# Patient Record
Sex: MALE | Race: WHITE | NOT HISPANIC OR LATINO | URBAN - METROPOLITAN AREA
[De-identification: names, ages, dates, MRNs, and addresses within clinical notes are randomized per-mention and may not be internally consistent; named-entity substitution may affect disease eponyms.]

---

## 2019-03-06 ENCOUNTER — EMERGENCY (EMERGENCY)
Facility: HOSPITAL | Age: 54
LOS: 1 days | Discharge: HOME | End: 2019-03-06
Attending: EMERGENCY MEDICINE
Payer: COMMERCIAL

## 2019-03-06 VITALS
DIASTOLIC BLOOD PRESSURE: 115 MMHG | HEIGHT: 76 IN | TEMPERATURE: 98 F | SYSTOLIC BLOOD PRESSURE: 149 MMHG | RESPIRATION RATE: 20 BRPM | HEART RATE: 101 BPM | WEIGHT: 300.05 LBS | OXYGEN SATURATION: 97 %

## 2019-03-06 VITALS
SYSTOLIC BLOOD PRESSURE: 159 MMHG | DIASTOLIC BLOOD PRESSURE: 107 MMHG | RESPIRATION RATE: 20 BRPM | TEMPERATURE: 99 F | HEART RATE: 100 BPM | OXYGEN SATURATION: 95 %

## 2019-03-06 DIAGNOSIS — K56.609 UNSPECIFIED INTESTINAL OBSTRUCTION, UNSPECIFIED AS TO PARTIAL VERSUS COMPLETE OBSTRUCTION: ICD-10-CM

## 2019-03-06 LAB
ALBUMIN SERPL ELPH-MCNC: 4 G/DL — SIGNIFICANT CHANGE UP (ref 3.3–5)
ALP SERPL-CCNC: 77 U/L — SIGNIFICANT CHANGE UP (ref 40–120)
ALT FLD-CCNC: 11 U/L — SIGNIFICANT CHANGE UP (ref 10–45)
ANION GAP SERPL CALC-SCNC: 14 MMOL/L — SIGNIFICANT CHANGE UP (ref 5–17)
APTT BLD: 31.3 SEC — SIGNIFICANT CHANGE UP (ref 27.5–36.3)
AST SERPL-CCNC: 11 U/L — SIGNIFICANT CHANGE UP (ref 10–40)
BASOPHILS # BLD AUTO: 0.1 K/UL — SIGNIFICANT CHANGE UP (ref 0–0.2)
BASOPHILS NFR BLD AUTO: 0.5 % — SIGNIFICANT CHANGE UP (ref 0–2)
BILIRUB SERPL-MCNC: 0.9 MG/DL — SIGNIFICANT CHANGE UP (ref 0.2–1.2)
BUN SERPL-MCNC: 14 MG/DL — SIGNIFICANT CHANGE UP (ref 7–23)
CALCIUM SERPL-MCNC: 9.7 MG/DL — SIGNIFICANT CHANGE UP (ref 8.4–10.5)
CHLORIDE SERPL-SCNC: 101 MMOL/L — SIGNIFICANT CHANGE UP (ref 96–108)
CO2 SERPL-SCNC: 23 MMOL/L — SIGNIFICANT CHANGE UP (ref 22–31)
CREAT SERPL-MCNC: 1.28 MG/DL — SIGNIFICANT CHANGE UP (ref 0.5–1.3)
EOSINOPHIL # BLD AUTO: 0.3 K/UL — SIGNIFICANT CHANGE UP (ref 0–0.5)
EOSINOPHIL NFR BLD AUTO: 2.3 % — SIGNIFICANT CHANGE UP (ref 0–6)
GAS PNL BLDV: SIGNIFICANT CHANGE UP
GLUCOSE SERPL-MCNC: 104 MG/DL — HIGH (ref 70–99)
HCT VFR BLD CALC: 48.2 % — SIGNIFICANT CHANGE UP (ref 39–50)
HGB BLD-MCNC: 16.6 G/DL — SIGNIFICANT CHANGE UP (ref 13–17)
INR BLD: 1.09 RATIO — SIGNIFICANT CHANGE UP (ref 0.88–1.16)
LYMPHOCYTES # BLD AUTO: 1.8 K/UL — SIGNIFICANT CHANGE UP (ref 1–3.3)
LYMPHOCYTES # BLD AUTO: 15 % — SIGNIFICANT CHANGE UP (ref 13–44)
MCHC RBC-ENTMCNC: 28.9 PG — SIGNIFICANT CHANGE UP (ref 27–34)
MCHC RBC-ENTMCNC: 34.4 GM/DL — SIGNIFICANT CHANGE UP (ref 32–36)
MCV RBC AUTO: 84.2 FL — SIGNIFICANT CHANGE UP (ref 80–100)
MONOCYTES # BLD AUTO: 1 K/UL — HIGH (ref 0–0.9)
MONOCYTES NFR BLD AUTO: 8.2 % — SIGNIFICANT CHANGE UP (ref 2–14)
NEUTROPHILS # BLD AUTO: 8.8 K/UL — HIGH (ref 1.8–7.4)
NEUTROPHILS NFR BLD AUTO: 73.9 % — SIGNIFICANT CHANGE UP (ref 43–77)
PLATELET # BLD AUTO: 335 K/UL — SIGNIFICANT CHANGE UP (ref 150–400)
POTASSIUM SERPL-MCNC: 3.4 MMOL/L — LOW (ref 3.5–5.3)
POTASSIUM SERPL-SCNC: 3.4 MMOL/L — LOW (ref 3.5–5.3)
PROT SERPL-MCNC: 7.4 G/DL — SIGNIFICANT CHANGE UP (ref 6–8.3)
PROTHROM AB SERPL-ACNC: 12.6 SEC — SIGNIFICANT CHANGE UP (ref 10–12.9)
RBC # BLD: 5.72 M/UL — SIGNIFICANT CHANGE UP (ref 4.2–5.8)
RBC # FLD: 13.1 % — SIGNIFICANT CHANGE UP (ref 10.3–14.5)
SODIUM SERPL-SCNC: 138 MMOL/L — SIGNIFICANT CHANGE UP (ref 135–145)
WBC # BLD: 11.9 K/UL — HIGH (ref 3.8–10.5)
WBC # FLD AUTO: 11.9 K/UL — HIGH (ref 3.8–10.5)

## 2019-03-06 PROCEDURE — 85730 THROMBOPLASTIN TIME PARTIAL: CPT

## 2019-03-06 PROCEDURE — 85027 COMPLETE CBC AUTOMATED: CPT

## 2019-03-06 PROCEDURE — 74177 CT ABD & PELVIS W/CONTRAST: CPT

## 2019-03-06 PROCEDURE — 96375 TX/PRO/DX INJ NEW DRUG ADDON: CPT

## 2019-03-06 PROCEDURE — 82803 BLOOD GASES ANY COMBINATION: CPT

## 2019-03-06 PROCEDURE — 85014 HEMATOCRIT: CPT

## 2019-03-06 PROCEDURE — 82330 ASSAY OF CALCIUM: CPT

## 2019-03-06 PROCEDURE — 74177 CT ABD & PELVIS W/CONTRAST: CPT | Mod: 26

## 2019-03-06 PROCEDURE — 84132 ASSAY OF SERUM POTASSIUM: CPT

## 2019-03-06 PROCEDURE — 83690 ASSAY OF LIPASE: CPT

## 2019-03-06 PROCEDURE — 80053 COMPREHEN METABOLIC PANEL: CPT

## 2019-03-06 PROCEDURE — 93005 ELECTROCARDIOGRAM TRACING: CPT

## 2019-03-06 PROCEDURE — 99284 EMERGENCY DEPT VISIT MOD MDM: CPT | Mod: 25

## 2019-03-06 PROCEDURE — 85610 PROTHROMBIN TIME: CPT

## 2019-03-06 PROCEDURE — 83605 ASSAY OF LACTIC ACID: CPT

## 2019-03-06 PROCEDURE — 82435 ASSAY OF BLOOD CHLORIDE: CPT

## 2019-03-06 PROCEDURE — 96374 THER/PROPH/DIAG INJ IV PUSH: CPT | Mod: XU

## 2019-03-06 PROCEDURE — 99285 EMERGENCY DEPT VISIT HI MDM: CPT | Mod: 25

## 2019-03-06 PROCEDURE — 93010 ELECTROCARDIOGRAM REPORT: CPT

## 2019-03-06 PROCEDURE — 84295 ASSAY OF SERUM SODIUM: CPT

## 2019-03-06 PROCEDURE — 82947 ASSAY GLUCOSE BLOOD QUANT: CPT

## 2019-03-06 RX ORDER — METRONIDAZOLE 500 MG
500 TABLET ORAL ONCE
Qty: 0 | Refills: 0 | Status: COMPLETED | OUTPATIENT
Start: 2019-03-06 | End: 2019-03-06

## 2019-03-06 RX ORDER — MOXIFLOXACIN HYDROCHLORIDE TABLETS, 400 MG 400 MG/1
1 TABLET, FILM COATED ORAL
Qty: 20 | Refills: 0 | OUTPATIENT
Start: 2019-03-06 | End: 2019-03-15

## 2019-03-06 RX ORDER — OXYCODONE HYDROCHLORIDE 5 MG/1
5 TABLET ORAL ONCE
Qty: 0 | Refills: 0 | Status: DISCONTINUED | OUTPATIENT
Start: 2019-03-06 | End: 2019-03-06

## 2019-03-06 RX ORDER — METRONIDAZOLE 500 MG
1 TABLET ORAL
Qty: 20 | Refills: 0 | OUTPATIENT
Start: 2019-03-06 | End: 2019-03-15

## 2019-03-06 RX ORDER — SODIUM CHLORIDE 9 MG/ML
1000 INJECTION INTRAMUSCULAR; INTRAVENOUS; SUBCUTANEOUS ONCE
Qty: 0 | Refills: 0 | Status: COMPLETED | OUTPATIENT
Start: 2019-03-06 | End: 2019-03-06

## 2019-03-06 RX ORDER — FAMOTIDINE 10 MG/ML
20 INJECTION INTRAVENOUS ONCE
Qty: 0 | Refills: 0 | Status: COMPLETED | OUTPATIENT
Start: 2019-03-06 | End: 2019-03-06

## 2019-03-06 RX ORDER — ACETAMINOPHEN 500 MG
975 TABLET ORAL ONCE
Qty: 0 | Refills: 0 | Status: COMPLETED | OUTPATIENT
Start: 2019-03-06 | End: 2019-03-06

## 2019-03-06 RX ORDER — ONDANSETRON 8 MG/1
4 TABLET, FILM COATED ORAL ONCE
Qty: 0 | Refills: 0 | Status: COMPLETED | OUTPATIENT
Start: 2019-03-06 | End: 2019-03-06

## 2019-03-06 RX ORDER — MORPHINE SULFATE 50 MG/1
4 CAPSULE, EXTENDED RELEASE ORAL ONCE
Qty: 0 | Refills: 0 | Status: DISCONTINUED | OUTPATIENT
Start: 2019-03-06 | End: 2019-03-06

## 2019-03-06 RX ORDER — CIPROFLOXACIN LACTATE 400MG/40ML
500 VIAL (ML) INTRAVENOUS ONCE
Qty: 0 | Refills: 0 | Status: COMPLETED | OUTPATIENT
Start: 2019-03-06 | End: 2019-03-06

## 2019-03-06 RX ADMIN — Medication 500 MILLIGRAM(S): at 11:21

## 2019-03-06 RX ADMIN — Medication 975 MILLIGRAM(S): at 11:21

## 2019-03-06 RX ADMIN — ONDANSETRON 4 MILLIGRAM(S): 8 TABLET, FILM COATED ORAL at 08:37

## 2019-03-06 RX ADMIN — FAMOTIDINE 20 MILLIGRAM(S): 10 INJECTION INTRAVENOUS at 08:37

## 2019-03-06 RX ADMIN — MORPHINE SULFATE 4 MILLIGRAM(S): 50 CAPSULE, EXTENDED RELEASE ORAL at 08:37

## 2019-03-06 RX ADMIN — SODIUM CHLORIDE 2000 MILLILITER(S): 9 INJECTION INTRAMUSCULAR; INTRAVENOUS; SUBCUTANEOUS at 08:36

## 2019-03-06 RX ADMIN — MORPHINE SULFATE 4 MILLIGRAM(S): 50 CAPSULE, EXTENDED RELEASE ORAL at 09:19

## 2019-03-06 RX ADMIN — OXYCODONE HYDROCHLORIDE 5 MILLIGRAM(S): 5 TABLET ORAL at 11:21

## 2019-03-06 NOTE — ED PROVIDER NOTE - PROGRESS NOTE DETAILS
Laney: Pain improved, tolerating PO, discussed need for cscope given unlikely concurrence of CA. Patient verbalized understanding and results provided.

## 2019-03-06 NOTE — ED PROVIDER NOTE - OBJECTIVE STATEMENT
54M w/PMH remote diverticulitis (w/cscope ~15y ago reportedly normal), obesity, hld, hemorrhoids p/w abd pain x5d, worsened 3d ago. Pain is epigastric, radiating b/l and suprapubic, was constant, now intermittent, "excruciating" like a "vice". +nausea, decreased flatus. Last BM loose and "black" s/p kaopectate yesterday AM. +small red blood s/p BM that patient attributes to hemorrhoid. +varicocele surgery but no abd surgeries. Denies f/c, cp/sob/cough, vomiting, dysuria/frequency/hematuria, travel/sick contacts. Former ETOH abuse abstinent for years. +smoker, +cocaine abuse intermittent (last 2d ago).

## 2019-03-06 NOTE — ED ADULT NURSE NOTE - NSIMPLEMENTINTERV_GEN_ALL_ED
Implemented All Universal Safety Interventions:  [place] to call system. Call bell, personal items and telephone within reach. Instruct patient to call for assistance. Room bathroom lighting operational. Non-slip footwear when patient is off stretcher. Physically safe environment: no spills, clutter or unnecessary equipment. Stretcher in lowest position, wheels locked, appropriate side rails in place.

## 2019-03-06 NOTE — ED ADULT TRIAGE NOTE - CHIEF COMPLAINT QUOTE
Abdominal pain since Monday with nausea, no vomiting and diarrhea. Pt states pain started after eating 4 bagels Saturday

## 2019-03-06 NOTE — ED ADULT NURSE NOTE - OBJECTIVE STATEMENT
54 y m came to the ed with abdominal pain and constipation. patient states being constipated since the weekend. was having diarrhea then had one solid bm yesterday morning but has been constipated since yesterday. denies diarrhea since yesterday. c/o "total blockage" without passing gas. c/o minor nausea but denies vomiting. states abdominal pain is all over his abdomen. had abdominal surgery about 15 years ago but forgot what the surgery was for. denies any pmh. denies fevers, chills, chest pain, sob. skin is warm and dry.

## 2019-03-06 NOTE — ED PROVIDER NOTE - CLINICAL SUMMARY MEDICAL DECISION MAKING FREE TEXT BOX
54M w/PMH remote diverticulitis, hld, obesity p/w abd pain. Assess for SBO, diverticulitis, pancreatitis. Screening EKG, labs, analgesic, IVF, antiemetic, UA, CT A/P, reassess.

## 2019-03-06 NOTE — ED PROVIDER NOTE - ATTENDING CONTRIBUTION TO CARE
53 yo obese male presents with abdominal distension and obstipation.  Nontoxic on exam, scant bowel sounds.  Will check labs, CT abdomen/pelvis r/o obstruction and reassess.

## 2019-03-06 NOTE — ED PROVIDER NOTE - NSFOLLOWUPINSTRUCTIONS_ED_ALL_ED_FT
-please see your primary doctor this week    -please take your antibiotics as prescribed    -please follow-up with a gastroenterologist for colonoscopy given CT inflammatory findings    -please return to ER for worsening pain, fever, chills ,vomiting

## 2019-03-06 NOTE — ED PROVIDER NOTE - INTERPRETATION
EKG reviewed for rate, rhythm, axis, intervals and segments, including QRS morphology, P wave appearance T wave appearance, NV interval, and QT interval.  I find the EKG to be unremarkable in all of these regards except as follows: 1st deg AVB

## 2019-03-07 ENCOUNTER — INPATIENT (INPATIENT)
Facility: HOSPITAL | Age: 54
LOS: 12 days | Discharge: HOME | DRG: 330 | End: 2019-03-20
Attending: SURGERY | Admitting: SURGERY
Payer: COMMERCIAL

## 2019-03-07 VITALS
WEIGHT: 270.07 LBS | HEART RATE: 116 BPM | SYSTOLIC BLOOD PRESSURE: 131 MMHG | TEMPERATURE: 98 F | RESPIRATION RATE: 20 BRPM | HEIGHT: 72 IN | DIASTOLIC BLOOD PRESSURE: 86 MMHG | OXYGEN SATURATION: 98 %

## 2019-03-07 LAB
ALBUMIN SERPL ELPH-MCNC: 4.1 G/DL — SIGNIFICANT CHANGE UP (ref 3.3–5)
ALP SERPL-CCNC: 78 U/L — SIGNIFICANT CHANGE UP (ref 40–120)
ALT FLD-CCNC: 9 U/L — LOW (ref 10–45)
ANION GAP SERPL CALC-SCNC: 18 MMOL/L — HIGH (ref 5–17)
APTT BLD: 29.4 SEC — SIGNIFICANT CHANGE UP (ref 27.5–36.3)
AST SERPL-CCNC: 13 U/L — SIGNIFICANT CHANGE UP (ref 10–40)
BASOPHILS # BLD AUTO: 0.1 K/UL — SIGNIFICANT CHANGE UP (ref 0–0.2)
BASOPHILS NFR BLD AUTO: 0.8 % — SIGNIFICANT CHANGE UP (ref 0–2)
BILIRUB SERPL-MCNC: 1.1 MG/DL — SIGNIFICANT CHANGE UP (ref 0.2–1.2)
BUN SERPL-MCNC: 15 MG/DL — SIGNIFICANT CHANGE UP (ref 7–23)
CALCIUM SERPL-MCNC: 9.9 MG/DL — SIGNIFICANT CHANGE UP (ref 8.4–10.5)
CHLORIDE SERPL-SCNC: 101 MMOL/L — SIGNIFICANT CHANGE UP (ref 96–108)
CO2 SERPL-SCNC: 19 MMOL/L — LOW (ref 22–31)
CREAT SERPL-MCNC: 1.38 MG/DL — HIGH (ref 0.5–1.3)
EOSINOPHIL # BLD AUTO: 0 K/UL — SIGNIFICANT CHANGE UP (ref 0–0.5)
EOSINOPHIL NFR BLD AUTO: 0.5 % — SIGNIFICANT CHANGE UP (ref 0–6)
GAS PNL BLDV: SIGNIFICANT CHANGE UP
GLUCOSE SERPL-MCNC: 119 MG/DL — HIGH (ref 70–99)
HCT VFR BLD CALC: 51.2 % — HIGH (ref 39–50)
HGB BLD-MCNC: 17.6 G/DL — HIGH (ref 13–17)
INR BLD: 1.14 RATIO — SIGNIFICANT CHANGE UP (ref 0.88–1.16)
LIDOCAIN IGE QN: 15 U/L — SIGNIFICANT CHANGE UP (ref 7–60)
LYMPHOCYTES # BLD AUTO: 0.4 K/UL — LOW (ref 1–3.3)
LYMPHOCYTES # BLD AUTO: 3.6 % — LOW (ref 13–44)
MCHC RBC-ENTMCNC: 28.9 PG — SIGNIFICANT CHANGE UP (ref 27–34)
MCHC RBC-ENTMCNC: 34.4 GM/DL — SIGNIFICANT CHANGE UP (ref 32–36)
MCV RBC AUTO: 84 FL — SIGNIFICANT CHANGE UP (ref 80–100)
MONOCYTES # BLD AUTO: 0.7 K/UL — SIGNIFICANT CHANGE UP (ref 0–0.9)
MONOCYTES NFR BLD AUTO: 7.1 % — SIGNIFICANT CHANGE UP (ref 2–14)
NEUTROPHILS # BLD AUTO: 9.2 K/UL — HIGH (ref 1.8–7.4)
NEUTROPHILS NFR BLD AUTO: 88 % — HIGH (ref 43–77)
PLATELET # BLD AUTO: 286 K/UL — SIGNIFICANT CHANGE UP (ref 150–400)
POTASSIUM SERPL-MCNC: 3.8 MMOL/L — SIGNIFICANT CHANGE UP (ref 3.5–5.3)
POTASSIUM SERPL-SCNC: 3.8 MMOL/L — SIGNIFICANT CHANGE UP (ref 3.5–5.3)
PROT SERPL-MCNC: 7.5 G/DL — SIGNIFICANT CHANGE UP (ref 6–8.3)
PROTHROM AB SERPL-ACNC: 13.1 SEC — HIGH (ref 10–12.9)
RBC # BLD: 6.1 M/UL — HIGH (ref 4.2–5.8)
RBC # FLD: 13.2 % — SIGNIFICANT CHANGE UP (ref 10.3–14.5)
SODIUM SERPL-SCNC: 138 MMOL/L — SIGNIFICANT CHANGE UP (ref 135–145)
WBC # BLD: 10.4 K/UL — SIGNIFICANT CHANGE UP (ref 3.8–10.5)
WBC # FLD AUTO: 10.4 K/UL — SIGNIFICANT CHANGE UP (ref 3.8–10.5)

## 2019-03-07 PROCEDURE — 99284 EMERGENCY DEPT VISIT MOD MDM: CPT | Mod: 25

## 2019-03-07 PROCEDURE — 93010 ELECTROCARDIOGRAM REPORT: CPT | Mod: 59

## 2019-03-07 PROCEDURE — 43753 TX GASTRO INTUB W/ASP: CPT

## 2019-03-07 PROCEDURE — 74018 RADEX ABDOMEN 1 VIEW: CPT | Mod: 26

## 2019-03-07 RX ORDER — SODIUM CHLORIDE 9 MG/ML
1000 INJECTION INTRAMUSCULAR; INTRAVENOUS; SUBCUTANEOUS ONCE
Qty: 0 | Refills: 0 | Status: COMPLETED | OUTPATIENT
Start: 2019-03-07 | End: 2019-03-07

## 2019-03-07 RX ORDER — ONDANSETRON 8 MG/1
4 TABLET, FILM COATED ORAL ONCE
Qty: 0 | Refills: 0 | Status: COMPLETED | OUTPATIENT
Start: 2019-03-07 | End: 2019-03-07

## 2019-03-07 RX ORDER — MORPHINE SULFATE 50 MG/1
4 CAPSULE, EXTENDED RELEASE ORAL ONCE
Qty: 0 | Refills: 0 | Status: DISCONTINUED | OUTPATIENT
Start: 2019-03-07 | End: 2019-03-07

## 2019-03-07 RX ORDER — METRONIDAZOLE 500 MG
1 TABLET ORAL
Qty: 12 | Refills: 0 | OUTPATIENT
Start: 2019-03-07 | End: 2019-03-10

## 2019-03-07 RX ADMIN — SODIUM CHLORIDE 2000 MILLILITER(S): 9 INJECTION INTRAMUSCULAR; INTRAVENOUS; SUBCUTANEOUS at 22:56

## 2019-03-07 RX ADMIN — MORPHINE SULFATE 4 MILLIGRAM(S): 50 CAPSULE, EXTENDED RELEASE ORAL at 22:52

## 2019-03-07 RX ADMIN — ONDANSETRON 4 MILLIGRAM(S): 8 TABLET, FILM COATED ORAL at 23:09

## 2019-03-07 NOTE — ED PROVIDER NOTE - ATTENDING CONTRIBUTION TO CARE
I performed a history and physical exam of the patient and discussed their management with the resident and /or advanced care provider. I reviewed the resident and /or ACP's note and agree with the documented findings and plan of care. My medical decison making and observations are found above.  Abd distended, tender upper abd

## 2019-03-07 NOTE — ED POST DISCHARGE NOTE - ADDITIONAL DOCUMENTATION
pt's wife called, concerned  is still having pain. spoke to , his pain is improving he reports. pt on metronidazole BID, will increase to TID for the 10 days, will send rx for additional pills

## 2019-03-07 NOTE — ED PROVIDER NOTE - CLINICAL SUMMARY MEDICAL DECISION MAKING FREE TEXT BOX
Odilia: pt with increasing abd pian, seen yesterday had CT which may have suggested fistula, hx of diverticula. Will look for perforation, check labs, treat sx and get surgery involved. Pain control and hydration are important.

## 2019-03-07 NOTE — ED PROVIDER NOTE - OBJECTIVE STATEMENT
54M presenting with worsening of abd pain. Pt presented to the ED yesterday with abd pain, s/p CT with finding consistent for colitis with possible fistulization to the more distal sigmoid colon with positive regional lymphadenopathy/ no SBO. Pt was dc with Cipro and Flagyl - pt came back today because his pain is not well controlled in the ED associated with nausea and vomiting. No chest pain endorse. No GI/ sxs.

## 2019-03-08 DIAGNOSIS — K56.609 UNSPECIFIED INTESTINAL OBSTRUCTION, UNSPECIFIED AS TO PARTIAL VERSUS COMPLETE OBSTRUCTION: ICD-10-CM

## 2019-03-08 DIAGNOSIS — Z98.890 OTHER SPECIFIED POSTPROCEDURAL STATES: Chronic | ICD-10-CM

## 2019-03-08 LAB
ANION GAP SERPL CALC-SCNC: 16 MMOL/L — SIGNIFICANT CHANGE UP (ref 5–17)
APPEARANCE UR: CLEAR — SIGNIFICANT CHANGE UP
BACTERIA # UR AUTO: NEGATIVE — SIGNIFICANT CHANGE UP
BILIRUB UR-MCNC: ABNORMAL
BUN SERPL-MCNC: 14 MG/DL — SIGNIFICANT CHANGE UP (ref 7–23)
CALCIUM SERPL-MCNC: 8.8 MG/DL — SIGNIFICANT CHANGE UP (ref 8.4–10.5)
CHLORIDE SERPL-SCNC: 101 MMOL/L — SIGNIFICANT CHANGE UP (ref 96–108)
CO2 SERPL-SCNC: 23 MMOL/L — SIGNIFICANT CHANGE UP (ref 22–31)
COD CRY URNS QL: ABNORMAL
COLOR SPEC: YELLOW — SIGNIFICANT CHANGE UP
CREAT SERPL-MCNC: 1.44 MG/DL — HIGH (ref 0.5–1.3)
DIFF PNL FLD: ABNORMAL
EPI CELLS # UR: 1 /HPF — SIGNIFICANT CHANGE UP
GLUCOSE SERPL-MCNC: 106 MG/DL — HIGH (ref 70–99)
GLUCOSE UR QL: NEGATIVE — SIGNIFICANT CHANGE UP
HCT VFR BLD CALC: 53.2 % — HIGH (ref 39–50)
HGB BLD-MCNC: 16.2 G/DL — SIGNIFICANT CHANGE UP (ref 13–17)
HYALINE CASTS # UR AUTO: 5 /LPF — HIGH (ref 0–2)
KETONES UR-MCNC: ABNORMAL
LEUKOCYTE ESTERASE UR-ACNC: NEGATIVE — SIGNIFICANT CHANGE UP
MAGNESIUM SERPL-MCNC: 1.8 MG/DL — SIGNIFICANT CHANGE UP (ref 1.6–2.6)
MCHC RBC-ENTMCNC: 26.7 PG — LOW (ref 27–34)
MCHC RBC-ENTMCNC: 30.5 GM/DL — LOW (ref 32–36)
MCV RBC AUTO: 87.6 FL — SIGNIFICANT CHANGE UP (ref 80–100)
NITRITE UR-MCNC: NEGATIVE — SIGNIFICANT CHANGE UP
PH UR: 6.5 — SIGNIFICANT CHANGE UP (ref 5–8)
PHOSPHATE SERPL-MCNC: 3.4 MG/DL — SIGNIFICANT CHANGE UP (ref 2.5–4.5)
PLATELET # BLD AUTO: 280 K/UL — SIGNIFICANT CHANGE UP (ref 150–400)
POTASSIUM SERPL-MCNC: 3.7 MMOL/L — SIGNIFICANT CHANGE UP (ref 3.5–5.3)
POTASSIUM SERPL-SCNC: 3.7 MMOL/L — SIGNIFICANT CHANGE UP (ref 3.5–5.3)
PROT UR-MCNC: ABNORMAL
RBC # BLD: 6.07 M/UL — HIGH (ref 4.2–5.8)
RBC # FLD: 13.9 % — SIGNIFICANT CHANGE UP (ref 10.3–14.5)
RBC CASTS # UR COMP ASSIST: 18 /HPF — HIGH (ref 0–4)
SODIUM SERPL-SCNC: 140 MMOL/L — SIGNIFICANT CHANGE UP (ref 135–145)
SP GR SPEC: >1.05 (ref 1.01–1.02)
UROBILINOGEN FLD QL: ABNORMAL
WBC # BLD: 4.06 K/UL — SIGNIFICANT CHANGE UP (ref 3.8–10.5)
WBC # FLD AUTO: 4.06 K/UL — SIGNIFICANT CHANGE UP (ref 3.8–10.5)
WBC UR QL: 2 /HPF — SIGNIFICANT CHANGE UP (ref 0–5)

## 2019-03-08 PROCEDURE — 99222 1ST HOSP IP/OBS MODERATE 55: CPT

## 2019-03-08 PROCEDURE — 99253 IP/OBS CNSLTJ NEW/EST LOW 45: CPT | Mod: GC

## 2019-03-08 PROCEDURE — 74177 CT ABD & PELVIS W/CONTRAST: CPT | Mod: 26

## 2019-03-08 PROCEDURE — 71045 X-RAY EXAM CHEST 1 VIEW: CPT | Mod: 26,77

## 2019-03-08 PROCEDURE — 71045 X-RAY EXAM CHEST 1 VIEW: CPT | Mod: 26

## 2019-03-08 RX ORDER — POTASSIUM CHLORIDE 20 MEQ
10 PACKET (EA) ORAL
Qty: 0 | Refills: 0 | Status: COMPLETED | OUTPATIENT
Start: 2019-03-08 | End: 2019-03-08

## 2019-03-08 RX ORDER — ACETAMINOPHEN 500 MG
1000 TABLET ORAL ONCE
Qty: 0 | Refills: 0 | Status: COMPLETED | OUTPATIENT
Start: 2019-03-08 | End: 2019-03-08

## 2019-03-08 RX ORDER — MORPHINE SULFATE 50 MG/1
4 CAPSULE, EXTENDED RELEASE ORAL ONCE
Qty: 0 | Refills: 0 | Status: DISCONTINUED | OUTPATIENT
Start: 2019-03-08 | End: 2019-03-08

## 2019-03-08 RX ORDER — ENOXAPARIN SODIUM 100 MG/ML
40 INJECTION SUBCUTANEOUS DAILY
Qty: 0 | Refills: 0 | Status: DISCONTINUED | OUTPATIENT
Start: 2019-03-08 | End: 2019-03-11

## 2019-03-08 RX ORDER — MAGNESIUM SULFATE 500 MG/ML
2 VIAL (ML) INJECTION ONCE
Qty: 0 | Refills: 0 | Status: COMPLETED | OUTPATIENT
Start: 2019-03-08 | End: 2019-03-08

## 2019-03-08 RX ORDER — SODIUM CHLORIDE 9 MG/ML
1000 INJECTION, SOLUTION INTRAVENOUS
Qty: 0 | Refills: 0 | Status: DISCONTINUED | OUTPATIENT
Start: 2019-03-08 | End: 2019-03-08

## 2019-03-08 RX ORDER — PIPERACILLIN AND TAZOBACTAM 4; .5 G/20ML; G/20ML
3.38 INJECTION, POWDER, LYOPHILIZED, FOR SOLUTION INTRAVENOUS EVERY 8 HOURS
Qty: 0 | Refills: 0 | Status: DISCONTINUED | OUTPATIENT
Start: 2019-03-08 | End: 2019-03-12

## 2019-03-08 RX ORDER — PIPERACILLIN AND TAZOBACTAM 4; .5 G/20ML; G/20ML
3.38 INJECTION, POWDER, LYOPHILIZED, FOR SOLUTION INTRAVENOUS ONCE
Qty: 0 | Refills: 0 | Status: COMPLETED | OUTPATIENT
Start: 2019-03-08 | End: 2019-03-08

## 2019-03-08 RX ORDER — DEXTROSE MONOHYDRATE, SODIUM CHLORIDE, AND POTASSIUM CHLORIDE 50; .745; 4.5 G/1000ML; G/1000ML; G/1000ML
1000 INJECTION, SOLUTION INTRAVENOUS
Qty: 0 | Refills: 0 | Status: DISCONTINUED | OUTPATIENT
Start: 2019-03-08 | End: 2019-03-12

## 2019-03-08 RX ADMIN — Medication 100 MILLIEQUIVALENT(S): at 09:57

## 2019-03-08 RX ADMIN — SODIUM CHLORIDE 100 MILLILITER(S): 9 INJECTION, SOLUTION INTRAVENOUS at 04:48

## 2019-03-08 RX ADMIN — PIPERACILLIN AND TAZOBACTAM 25 GRAM(S): 4; .5 INJECTION, POWDER, LYOPHILIZED, FOR SOLUTION INTRAVENOUS at 21:01

## 2019-03-08 RX ADMIN — Medication 1000 MILLIGRAM(S): at 09:14

## 2019-03-08 RX ADMIN — MORPHINE SULFATE 4 MILLIGRAM(S): 50 CAPSULE, EXTENDED RELEASE ORAL at 02:49

## 2019-03-08 RX ADMIN — Medication 100 MILLIEQUIVALENT(S): at 21:00

## 2019-03-08 RX ADMIN — Medication 1000 MILLIGRAM(S): at 02:49

## 2019-03-08 RX ADMIN — PIPERACILLIN AND TAZOBACTAM 25 GRAM(S): 4; .5 INJECTION, POWDER, LYOPHILIZED, FOR SOLUTION INTRAVENOUS at 04:48

## 2019-03-08 RX ADMIN — Medication 400 MILLIGRAM(S): at 00:29

## 2019-03-08 RX ADMIN — PIPERACILLIN AND TAZOBACTAM 25 GRAM(S): 4; .5 INJECTION, POWDER, LYOPHILIZED, FOR SOLUTION INTRAVENOUS at 13:50

## 2019-03-08 RX ADMIN — Medication 100 MILLIEQUIVALENT(S): at 11:49

## 2019-03-08 RX ADMIN — ENOXAPARIN SODIUM 40 MILLIGRAM(S): 100 INJECTION SUBCUTANEOUS at 11:49

## 2019-03-08 RX ADMIN — Medication 400 MILLIGRAM(S): at 08:46

## 2019-03-08 RX ADMIN — SODIUM CHLORIDE 125 MILLILITER(S): 9 INJECTION, SOLUTION INTRAVENOUS at 13:50

## 2019-03-08 RX ADMIN — PIPERACILLIN AND TAZOBACTAM 200 GRAM(S): 4; .5 INJECTION, POWDER, LYOPHILIZED, FOR SOLUTION INTRAVENOUS at 02:49

## 2019-03-08 RX ADMIN — Medication 50 GRAM(S): at 09:12

## 2019-03-08 RX ADMIN — SODIUM CHLORIDE 2000 MILLILITER(S): 9 INJECTION INTRAMUSCULAR; INTRAVENOUS; SUBCUTANEOUS at 00:18

## 2019-03-08 RX ADMIN — SODIUM CHLORIDE 125 MILLILITER(S): 9 INJECTION, SOLUTION INTRAVENOUS at 09:13

## 2019-03-08 RX ADMIN — MORPHINE SULFATE 4 MILLIGRAM(S): 50 CAPSULE, EXTENDED RELEASE ORAL at 03:45

## 2019-03-08 RX ADMIN — Medication 400 MILLIGRAM(S): at 15:15

## 2019-03-08 RX ADMIN — MORPHINE SULFATE 4 MILLIGRAM(S): 50 CAPSULE, EXTENDED RELEASE ORAL at 00:20

## 2019-03-08 RX ADMIN — MORPHINE SULFATE 4 MILLIGRAM(S): 50 CAPSULE, EXTENDED RELEASE ORAL at 00:18

## 2019-03-08 NOTE — ED ADULT NURSE NOTE - OBJECTIVE STATEMENT
53 y/o male presented to the ED with worsening abdominal pain. pt was seen yesterday at ED and discharged, Dx with colitis with possible fistulization to the more distal sigmoid colon with positive regional lymphadenopathy/ no SBO. Pt was dc with Cipro and Flagyl - pt came back today because his pain is not well controlled in the ED associated with nausea and vomiting. pt states pain is 10/10. yelling in hallway, being aggressive for pain management. pt is A&Ox3, on room air with no signs of distress. pt states he cant eat or drink due to pain and abdominal distention. pt wife next to bedside. awaiting MD dispo.

## 2019-03-08 NOTE — CONSULT NOTE ADULT - ATTENDING COMMENTS
As above.    Pt had multiple bowel movements.  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    Recommendations:  Surgery planned for next week, please call back if requesting for colonic stent.

## 2019-03-08 NOTE — H&P ADULT - NSHPPHYSICALEXAM_GEN_ALL_CORE
General: WN/WD NAD  Neurology: A&Ox3, nonfocal, CARSON x 4  Head:  Normocephalic, atraumatic  ENT:  Mucosa moist, no ulcerations  Neck:  Supple, no sinuses or palpable masses  Lymphatic:  No palpable cervical, supraclavicular, axillary or inguinal adenopathy  Respiratory: CTA B/L  CV: RRR, S1S2, no murmur  Abdominal: obese, softly distended, mild tenderness LUQ  MSK: No edema, + peripheral pulses, FROM all 4 extremity

## 2019-03-08 NOTE — H&P ADULT - ASSESSMENT
54M presented with abdominal pain for one week found to have persistent sigmoid colo colonic fistula, unchanged from two days ago, now being read as an obstruction with proximal large bowel dilatation.    Contrast all the way to the colon at this time from prior CT.  Patient being decompressed proximally by NGT with incompetant ileocecal valve.  Close monitoring and serial abdominal exams.   Differential diagnosis includes malignancy (with lymphadenopathy on CT) vs diverticular stricture/ obstruction  GI and colorectal consultation likely in the AM  Discussed with Dr. Ketty Templeton, PGY-3

## 2019-03-08 NOTE — ED ADULT NURSE REASSESSMENT NOTE - NS ED NURSE REASSESS COMMENT FT1
Pt had NG placed. Draining TV 800ML dark brown emesis. NG tube is not marked. Xray verified in stomach. pt tolerated well.

## 2019-03-08 NOTE — H&P ADULT - HISTORY OF PRESENT ILLNESS
54M presented with abdominal pain for one week. As per patient, he started having pain since last Friday, after eating a few bagels. Pain in bilateral upper quadrants and somewhat sharp, associated with diarrhea. Patient denies nausea or vomiting but does endorse decreased appetite. No fevers or chills at home. Reports last BM this am and last flatus also this am. Last colonoscopy 15 years ago reportedly normal, brother with history of colon CA in the past.   The patient presented to the ER on Wednesday with normal vital signs, leukocytosis to 12 and CT read showing colitis with a sigmoid colo-colonic fistula. The patient was discharged home at that time on cipro/ flagyl without surgical/ GI consultation.   He returned to the ED this evening complaining of persistent pain. Patient mildly febrile in the ED to 38.1 and tachycardic to 115. Exam with tenderness in bilateral upper quadrants, CT scan showing persistent fistula now also with small bowel dilatation. NGT placed in ED with 800cc feculent output.  Of note, patient says he has had one episode of diverticulitis in the past, was hospitalized 10 years ago in [facility]. Denies weight loss or constitutional symptoms at this time. Has not seen a doctor in many many years, is not aware of any medical problems and takes no medicines. Only surgery in the past was repair of hydrocele >15 years ago.

## 2019-03-08 NOTE — H&P ADULT - ATTENDING COMMENTS
diverticulitis verse malignancy verse inflammatory bowel disease  causing colonic colonic fistula of the sigmoid colon and large bowel obstruction  competant ileo cecal valve  not toxic  non acute abdomen  NPO,  NGT  will need further workup and plan evaluation by CRS

## 2019-03-08 NOTE — H&P ADULT - NSHPLABSRESULTS_GEN_ALL_CORE
CBC Full  -  ( 07 Mar 2019 23:04 )  WBC Count : 10.4 K/uL  Hemoglobin : 17.6 g/dL  Hematocrit : 51.2 %  Platelet Count - Automated : 286 K/uL  Mean Cell Volume : 84.0 fl  Mean Cell Hemoglobin : 28.9 pg  Mean Cell Hemoglobin Concentration : 34.4 gm/dL  Auto Neutrophil # : 9.2 K/uL  Auto Lymphocyte # : 0.4 K/uL  Auto Monocyte # : 0.7 K/uL  Auto Eosinophil # : 0.0 K/uL  Auto Basophil # : 0.1 K/uL  Auto Neutrophil % : 88.0 %  Auto Lymphocyte % : 3.6 %  Auto Monocyte % : 7.1 %  Auto Eosinophil % : 0.5 %  Auto Basophil % : 0.8 %    03-07    138  |  101  |  15  ----------------------------<  119<H>  3.8   |  19<L>  |  1.38<H>    Ca    9.9      07 Mar 2019 23:04    TPro  7.5  /  Alb  4.1  /  TBili  1.1  /  DBili  x   /  AST  13  /  ALT  9<L>  /  AlkPhos  78  03-07    LIVER FUNCTIONS - ( 07 Mar 2019 23:04 )  Alb: 4.1 g/dL / Pro: 7.5 g/dL / ALK PHOS: 78 U/L / ALT: 9 U/L / AST: 13 U/L / GGT: x           CAPILLARY BLOOD GLUCOSE          PT/INR - ( 07 Mar 2019 23:04 )   PT: 13.1 sec;   INR: 1.14 ratio         PTT - ( 07 Mar 2019 23:04 )  PTT:29.4 sec

## 2019-03-08 NOTE — CHART NOTE - NSCHARTNOTEFT_GEN_A_CORE
Patient seen and examined at bedside after provider was contacted that NGT only drained 200cc today and notified that patient does not appear well. NGT appeared to be draining appropriately on exam, but CXR obtained to confirm NGT placement. NGT appeared to be curled at the level of the GE junction. Returned to patients room to adjust NGT. On arrival patient moved, dislodged his NGT causing it to fall out completely. Afterwards, patient noted that he felt much better with NGT out. Denies increased distention and nausea/vomiting. Pt notes lots of flatus and 3 BMs earlier today. VS HR 95, BP 95/63, afebrile. Discussed with patient that if he feels increased nausea, NGT should be replaced overnight and patient was agreeable.       Gen: resting comfortably in bed, NAD  Resp: breathing comfortably  Abd: morbidly obese, NT, softly distented. No rebound/guarding.     A/P: 54M w/ LBO 2/2 sigmoid mass    - Pain control  - Monitor for N/V, increased distention  - Monitor GI function  - If patient feels increased nausea overnight, will replace NGT stat  - Otherwise NGT replacement per discretion of primary team on 3/9  - Plan d/w patient and Dr. Helder Marin, PGY-1  General Surgery Patient seen and examined at bedside after provider was contacted that NGT only drained 200cc today and notified that patient does not appear well. NGT appeared to be draining appropriately on exam, but CXR obtained to confirm NGT placement. NGT appeared to be curled at the level of the GE junction. Returned to patients room to adjust NGT. On arrival patient moved, dislodged his NGT causing it to fall out completely. Afterwards, patient noted that he felt much better with NGT out. Denies increased distention and nausea/vomiting. Pt notes lots of flatus and 3 BMs earlier today. VS HR 95, BP 95/63, afebrile. Discussed with patient that if he feels increased nausea, NGT should be replaced overnight and patient was agreeable.       Gen: resting comfortably in bed, NAD  Resp: breathing comfortably  Abd: morbidly obese, NT, softly distended. No rebound/guarding.     A/P: 54M w/ LBO 2/2 sigmoid mass    - Pain control  - Monitor for N/V, increased distention  - Monitor GI function  - If patient feels increased nausea overnight, will replace NGT stat  - Otherwise NGT replacement per discretion of primary team on 3/9  - Plan d/w patient and Dr. Helder Marin, PGY-1  General Surgery Patient seen and examined at bedside after provider was contacted that NGT only drained 200cc today and notified that patient does not appear well. NGT appeared to be draining appropriately on exam, but CXR obtained to confirm NGT placement. NGT appeared to be curled at the level of the GE junction. Returned to patients room to adjust NGT. On arrival patient moved, dislodged his NGT causing it to fall out completely. Afterwards, patient noted that he felt much better with NGT out. Denies increased distention and nausea/vomiting. Pt notes lots of flatus and 3 BMs earlier today. VS HR 95, BP 95/63, afebrile. Discussed with patient that if he feels increased nausea, NGT should be replaced overnight and patient was agreeable.       Gen: resting comfortably in bed, NAD  Resp: breathing comfortably  Abd: morbidly obese, NT, softly distended. No rebound/guarding.     A/P: 54M w/ LBO 2/2 sigmoid mass    - Pain control  - NPO/IVF  - Monitor for N/V, increased distention  - Monitor GI function  - If patient feels increased nausea overnight, will replace NGT stat  - Otherwise NGT replacement per discretion of primary team on 3/9  - Plan d/w patient and Dr. Helder Marin, PGY-1  General Surgery

## 2019-03-08 NOTE — CONSULT NOTE ADULT - ASSESSMENT
54M with PMHx of morbid obesity, HLD, sleep apnea presented with abdominal pain for one week found to have LBO with concern for sigmoid mass. Planned for surgical resection next week but GI consulted for possible colonic stent if need or patient clinically worsens.    1) LBO 2/2 possible sigmoid mass      - 54M with PMHx of morbid obesity, HLD, sleep apnea presented with abdominal pain for one week found to have LBO with concern for sigmoid mass. Planned for surgical resection next week but GI consulted for possible colonic stent if need or patient clinically worsens.    1) LBO 2/2 possible sigmoid mass  Planned for surgical resection next week.    - continue to monitor BMs  - if any signs of worsening abdominal distension, constipation, or N/V, please notify GI  - NGT in place  - rest of plan as per primary team

## 2019-03-08 NOTE — CONSULT NOTE ADULT - SUBJECTIVE AND OBJECTIVE BOX
Chief Complaint:  Patient is a 54y old  Male who presents with a chief complaint of abdominal pain (08 Mar 2019 10:05)      HPI:  54M with PMHx of morbid obesity, HLD, sleep apnea presented with abdominal pain for one week.     As per patient, he started having pain since last Friday, after eating a few bagels. Pain in bilateral upper quadrants and somewhat sharp, associated with diarrhea. Patient denies nausea or vomiting but does endorse decreased appetite. No fevers or chills at home. Reports last BM this am and last flatus also this am. Last colonoscopy 15 years ago reportedly normal, brother with history of colon CA in the past.     The patient presented to the ER on Wednesday with normal vital signs, leukocytosis to 12 and CT read showing colitis with a sigmoid colo-colonic fistula. The patient was discharged home at that time on cipro/ flagyl without surgical/ GI consultation.   He returned to the ED for this admission complaining of persistent pain. Patient mildly febrile in the ED to 38.1 and tachycardic to 115. Exam with tenderness in bilateral upper quadrants.  NGT placed in ED with 800cc feculent output.  CT scan showing persistent fistula now with LBO 2/2 obstruction in the sigmoid colon. Colorectal reviewed images with radiology and there is concern for colonic mass.    Surgical plan for colonic resection. At this time, patient having BMs. GI consulted to consider possible colonic stent if patient becomes more obstructed as bridge to surgery next week.        Allergies:  No Known Allergies      Home Medications:    Hospital Medications:  enoxaparin Injectable 40 milliGRAM(s) SubCutaneous daily  lactated ringers. 1000 milliLiter(s) IV Continuous <Continuous>  piperacillin/tazobactam IVPB. 3.375 Gram(s) IV Intermittent every 8 hours  potassium chloride  10 mEq/100 mL IVPB 10 milliEquivalent(s) IV Intermittent every 1 hour      PMHX/PSHX:  Diverticulitis  Morbid obesity  HLD (hyperlipidemia)  Sleep apnea  S/P repair of hydrocele  No significant past surgical history      Family history:  No pertinent family history in first degree relatives      Social History:     ROS:   as above      PHYSICAL EXAM:     GENERAL:  Appears stated age, well-groomed, well-nourished, no distress  HEENT:  NC/AT,  conjunctivae clear and pink,  no JVD  CHEST:  Full & symmetric excursion, no increased effort, breath sounds clear  HEART:  Regular rhythm, S1, S2, no murmur/rub/S3/S4, no abdominal bruit, no edema  ABDOMEN:  Soft, non-tender, non-distended, normoactive bowel sounds,  no masses ,  EXTREMITIES:  no cyanosis,clubbing or edema  SKIN:  No rash/erythema/ecchymoses/petechiae/wounds/abscess/warm/dry  NEURO:  Alert, oriented    Vital Signs:  Vital Signs Last 24 Hrs  T(C): 37.2 (08 Mar 2019 09:40), Max: 38.1 (08 Mar 2019 00:16)  T(F): 99 (08 Mar 2019 09:40), Max: 100.6 (08 Mar 2019 00:16)  HR: 109 (08 Mar 2019 09:40) (105 - 116)  BP: 114/75 (08 Mar 2019 09:40) (114/75 - 131/86)  BP(mean): 102 (08 Mar 2019 02:52) (102 - 102)  RR: 18 (08 Mar 2019 09:40) (18 - 20)  SpO2: 93% (08 Mar 2019 09:40) (93% - 98%)  Daily Height in cm: 182.88 (07 Mar 2019 22:06)    Daily     LABS:                        16.2   4.06  )-----------( 280      ( 08 Mar 2019 10:00 )             53.2     03-08    140  |  101  |  14  ----------------------------<  106<H>  3.7   |  23  |  1.44<H>    Ca    8.8      08 Mar 2019 07:16  Phos  3.4     03-08  Mg     1.8     03-08    TPro  7.5  /  Alb  4.1  /  TBili  1.1  /  DBili  x   /  AST  13  /  ALT  9<L>  /  AlkPhos  78  03-07    LIVER FUNCTIONS - ( 07 Mar 2019 23:04 )  Alb: 4.1 g/dL / Pro: 7.5 g/dL / ALK PHOS: 78 U/L / ALT: 9 U/L / AST: 13 U/L / GGT: x           PT/INR - ( 07 Mar 2019 23:04 )   PT: 13.1 sec;   INR: 1.14 ratio         PTT - ( 07 Mar 2019 23:04 )  PTT:29.4 sec  Urinalysis Basic - ( 08 Mar 2019 07:17 )    Color: Yellow / Appearance: Clear / SG: >1.050 / pH: x  Gluc: x / Ketone: Small  / Bili: Small / Urobili: 2 mg/dL   Blood: x / Protein: 30 mg/dL / Nitrite: Negative   Leuk Esterase: Negative / RBC: 18 /hpf / WBC 2 /HPF   Sq Epi: x / Non Sq Epi: 1 /hpf / Bacteria: Negative      Amylase Serum--      Lipase serum15       Ammonia--      Imaging:      < from: CT Abdomen and Pelvis w/ Oral Cont and w/ IV Cont (03.08.19 @ 00:55) >  INDINGS:    LOWER CHEST: Subsegmental atelectasis in the right lower lobe.    LIVER: A 5 cm cyst in the caudate lobe is again noted.  BILE DUCTS: Normal caliber.  GALLBLADDER: High density may represent vicarious excretion of contrast.  SPLEEN: Within normal limits.  PANCREAS: Within normal limits.  ADRENALS: Within normal limits.    KIDNEYS/URETERS: Within normal limits.  BLADDER: Within normal limits.  REPRODUCTIVE ORGANS: Prostate within normal limits.    BOWEL: Mild diffuse pericolonic inflammation. Redemonstration of a   complex fistulous connection between 2 loops of sigmoid colon (2,   102-105). There are two narrowed segments of sigmoid colon in this region   with persistent colonic distention proximally. Mildly dilated stomach and   loops of small bowel is new from the prior study. Scattered colonic   diverticula. Appendix is normal.    PERITONEUM: No free air.  VESSELS:  Within normal limits.    RETROPERITONEUM: Mildly enlarged retroperitoneal lymph nodes. A lower   para-aortic lymph node measuring 1.6x 1.3 cm, previously 1.7 x 1.9 cm.   ABDOMINAL WALL: Surgical clip in the left inguinal region.  BONES: Multilevel spondylosis with L4-L5 disc degeneration and vacuum   change.    IMPRESSION:     Findings are suggestive of a large bowel obstruction secondary to   inflammatory stricture in the sigmoid colon (sigmoid colo-colonic   fistula). Small bowel and gastric distention is new from the prior study.    Findings discussed with Dr. Martin on 3/8/2019 at 3/8/2019 at 1:30 AM with   read back.    < end of copied text > Chief Complaint:  Patient is a 54y old  Male who presents with a chief complaint of abdominal pain (08 Mar 2019 10:05)      HPI:  54M with PMHx of morbid obesity, HLD, sleep apnea presented with abdominal pain for one week.     As per patient, he started having pain since last Friday, after eating a few bagels. Pain in bilateral upper quadrants and somewhat sharp, associated with diarrhea. Patient denies nausea or vomiting but does endorse decreased appetite. No fevers or chills at home. He was having watery intermittent episodes of diarrhea over the course of last week after the start of his symptoms. Reports last BM this am and last flatus also this am. Last colonoscopy 15 years ago reportedly normal, brother with history of colon CA in the past.     The patient presented to the ER on Wednesday with normal vital signs, leukocytosis to 12 and CT read showing colitis with a sigmoid colo-colonic fistula. The patient was discharged home at that time on cipro/ flagyl without surgical/ GI consultation.   He returned to the ED for this admission complaining of persistent pain. Patient mildly febrile in the ED to 38.1 and tachycardic to 115. Exam with tenderness in bilateral upper quadrants.  NGT placed in ED with 800cc feculent output.  CT scan showing persistent fistula now with LBO 2/2 obstruction in the sigmoid colon. Colorectal reviewed images with radiology and there is concern for colonic mass.    Surgical plan for colonic resection. At this time, patient having BMs. GI consulted to consider possible colonic stent if patient becomes more obstructed as bridge to surgery next week.    Today, patient feels abdominal pain improved. Denies any N/V, melena BRBpR. He had another watery BM this morning.        Allergies:  No Known Allergies      Home Medications:    Hospital Medications:  enoxaparin Injectable 40 milliGRAM(s) SubCutaneous daily  lactated ringers. 1000 milliLiter(s) IV Continuous <Continuous>  piperacillin/tazobactam IVPB. 3.375 Gram(s) IV Intermittent every 8 hours  potassium chloride  10 mEq/100 mL IVPB 10 milliEquivalent(s) IV Intermittent every 1 hour      PMHX/PSHX:  Diverticulitis  Morbid obesity  HLD (hyperlipidemia)  Sleep apnea  S/P repair of hydrocele  No significant past surgical history      Family history:  No pertinent family history in first degree relatives      Social History:     ROS:   as above      PHYSICAL EXAM:     GENERAL: NAD, obese  HEENT:  sclera anicteric  CHEST:  CTA bl  HEART:  Regular rhythm  ABDOMEN:  obese, nontender, soft  EXTREMITIES:  no cyanosis,clubbing or edema  SKIN:  No rash/erythema/ecchymoses/petechiae/wounds/abscess/warm/dry  NEURO:  Alert, oriented    Vital Signs:  Vital Signs Last 24 Hrs  T(C): 37.2 (08 Mar 2019 09:40), Max: 38.1 (08 Mar 2019 00:16)  T(F): 99 (08 Mar 2019 09:40), Max: 100.6 (08 Mar 2019 00:16)  HR: 109 (08 Mar 2019 09:40) (105 - 116)  BP: 114/75 (08 Mar 2019 09:40) (114/75 - 131/86)  BP(mean): 102 (08 Mar 2019 02:52) (102 - 102)  RR: 18 (08 Mar 2019 09:40) (18 - 20)  SpO2: 93% (08 Mar 2019 09:40) (93% - 98%)  Daily Height in cm: 182.88 (07 Mar 2019 22:06)    Daily     LABS:                        16.2   4.06  )-----------( 280      ( 08 Mar 2019 10:00 )             53.2     03-08    140  |  101  |  14  ----------------------------<  106<H>  3.7   |  23  |  1.44<H>    Ca    8.8      08 Mar 2019 07:16  Phos  3.4     03-08  Mg     1.8     03-08    TPro  7.5  /  Alb  4.1  /  TBili  1.1  /  DBili  x   /  AST  13  /  ALT  9<L>  /  AlkPhos  78  03-07    LIVER FUNCTIONS - ( 07 Mar 2019 23:04 )  Alb: 4.1 g/dL / Pro: 7.5 g/dL / ALK PHOS: 78 U/L / ALT: 9 U/L / AST: 13 U/L / GGT: x           PT/INR - ( 07 Mar 2019 23:04 )   PT: 13.1 sec;   INR: 1.14 ratio         PTT - ( 07 Mar 2019 23:04 )  PTT:29.4 sec  Urinalysis Basic - ( 08 Mar 2019 07:17 )    Color: Yellow / Appearance: Clear / SG: >1.050 / pH: x  Gluc: x / Ketone: Small  / Bili: Small / Urobili: 2 mg/dL   Blood: x / Protein: 30 mg/dL / Nitrite: Negative   Leuk Esterase: Negative / RBC: 18 /hpf / WBC 2 /HPF   Sq Epi: x / Non Sq Epi: 1 /hpf / Bacteria: Negative      Amylase Serum--      Lipase serum15       Ammonia--      Imaging:      < from: CT Abdomen and Pelvis w/ Oral Cont and w/ IV Cont (03.08.19 @ 00:55) >  INDINGS:    LOWER CHEST: Subsegmental atelectasis in the right lower lobe.    LIVER: A 5 cm cyst in the caudate lobe is again noted.  BILE DUCTS: Normal caliber.  GALLBLADDER: High density may represent vicarious excretion of contrast.  SPLEEN: Within normal limits.  PANCREAS: Within normal limits.  ADRENALS: Within normal limits.    KIDNEYS/URETERS: Within normal limits.  BLADDER: Within normal limits.  REPRODUCTIVE ORGANS: Prostate within normal limits.    BOWEL: Mild diffuse pericolonic inflammation. Redemonstration of a   complex fistulous connection between 2 loops of sigmoid colon (2,   102-105). There are two narrowed segments of sigmoid colon in this region   with persistent colonic distention proximally. Mildly dilated stomach and   loops of small bowel is new from the prior study. Scattered colonic   diverticula. Appendix is normal.    PERITONEUM: No free air.  VESSELS:  Within normal limits.    RETROPERITONEUM: Mildly enlarged retroperitoneal lymph nodes. A lower   para-aortic lymph node measuring 1.6x 1.3 cm, previously 1.7 x 1.9 cm.   ABDOMINAL WALL: Surgical clip in the left inguinal region.  BONES: Multilevel spondylosis with L4-L5 disc degeneration and vacuum   change.    IMPRESSION:     Findings are suggestive of a large bowel obstruction secondary to   inflammatory stricture in the sigmoid colon (sigmoid colo-colonic   fistula). Small bowel and gastric distention is new from the prior study.    Findings discussed with Dr. Martin on 3/8/2019 at 3/8/2019 at 1:30 AM with   read back.    < end of copied text >

## 2019-03-09 LAB
ANION GAP SERPL CALC-SCNC: 15 MMOL/L — SIGNIFICANT CHANGE UP (ref 5–17)
BUN SERPL-MCNC: 13 MG/DL — SIGNIFICANT CHANGE UP (ref 7–23)
CALCIUM SERPL-MCNC: 8.9 MG/DL — SIGNIFICANT CHANGE UP (ref 8.4–10.5)
CHLORIDE SERPL-SCNC: 103 MMOL/L — SIGNIFICANT CHANGE UP (ref 96–108)
CO2 SERPL-SCNC: 21 MMOL/L — LOW (ref 22–31)
CREAT SERPL-MCNC: 1.25 MG/DL — SIGNIFICANT CHANGE UP (ref 0.5–1.3)
GLUCOSE SERPL-MCNC: 109 MG/DL — HIGH (ref 70–99)
HCT VFR BLD CALC: 42.1 % — SIGNIFICANT CHANGE UP (ref 39–50)
HGB BLD-MCNC: 14.3 G/DL — SIGNIFICANT CHANGE UP (ref 13–17)
MAGNESIUM SERPL-MCNC: 2 MG/DL — SIGNIFICANT CHANGE UP (ref 1.6–2.6)
MCHC RBC-ENTMCNC: 28.2 PG — SIGNIFICANT CHANGE UP (ref 27–34)
MCHC RBC-ENTMCNC: 34.1 GM/DL — SIGNIFICANT CHANGE UP (ref 32–36)
MCV RBC AUTO: 82.6 FL — SIGNIFICANT CHANGE UP (ref 80–100)
PHOSPHATE SERPL-MCNC: 2.2 MG/DL — LOW (ref 2.5–4.5)
PHOSPHATE SERPL-MCNC: 3 MG/DL — SIGNIFICANT CHANGE UP (ref 2.5–4.5)
PLATELET # BLD AUTO: 271 K/UL — SIGNIFICANT CHANGE UP (ref 150–400)
POTASSIUM SERPL-MCNC: 3.1 MMOL/L — LOW (ref 3.5–5.3)
POTASSIUM SERPL-MCNC: 3.5 MMOL/L — SIGNIFICANT CHANGE UP (ref 3.5–5.3)
POTASSIUM SERPL-SCNC: 3.1 MMOL/L — LOW (ref 3.5–5.3)
POTASSIUM SERPL-SCNC: 3.5 MMOL/L — SIGNIFICANT CHANGE UP (ref 3.5–5.3)
RBC # BLD: 5.1 M/UL — SIGNIFICANT CHANGE UP (ref 4.2–5.8)
RBC # FLD: 12.7 % — SIGNIFICANT CHANGE UP (ref 10.3–14.5)
SODIUM SERPL-SCNC: 139 MMOL/L — SIGNIFICANT CHANGE UP (ref 135–145)
WBC # BLD: 5.4 K/UL — SIGNIFICANT CHANGE UP (ref 3.8–10.5)
WBC # FLD AUTO: 5.4 K/UL — SIGNIFICANT CHANGE UP (ref 3.8–10.5)

## 2019-03-09 PROCEDURE — 71045 X-RAY EXAM CHEST 1 VIEW: CPT | Mod: 26

## 2019-03-09 RX ORDER — LANOLIN ALCOHOL/MO/W.PET/CERES
3 CREAM (GRAM) TOPICAL AT BEDTIME
Qty: 0 | Refills: 0 | Status: DISCONTINUED | OUTPATIENT
Start: 2019-03-09 | End: 2019-03-12

## 2019-03-09 RX ORDER — ONDANSETRON 8 MG/1
4 TABLET, FILM COATED ORAL ONCE
Qty: 0 | Refills: 0 | Status: COMPLETED | OUTPATIENT
Start: 2019-03-09 | End: 2019-03-09

## 2019-03-09 RX ORDER — BENZOCAINE AND MENTHOL 5; 1 G/100ML; G/100ML
1 LIQUID ORAL THREE TIMES A DAY
Qty: 0 | Refills: 0 | Status: DISCONTINUED | OUTPATIENT
Start: 2019-03-09 | End: 2019-03-12

## 2019-03-09 RX ORDER — ACETAMINOPHEN 500 MG
1000 TABLET ORAL ONCE
Qty: 0 | Refills: 0 | Status: COMPLETED | OUTPATIENT
Start: 2019-03-09 | End: 2019-03-09

## 2019-03-09 RX ORDER — POTASSIUM PHOSPHATE, MONOBASIC POTASSIUM PHOSPHATE, DIBASIC 236; 224 MG/ML; MG/ML
30 INJECTION, SOLUTION INTRAVENOUS ONCE
Qty: 0 | Refills: 0 | Status: COMPLETED | OUTPATIENT
Start: 2019-03-09 | End: 2019-03-09

## 2019-03-09 RX ORDER — POTASSIUM CHLORIDE 20 MEQ
10 PACKET (EA) ORAL
Qty: 0 | Refills: 0 | Status: COMPLETED | OUTPATIENT
Start: 2019-03-09 | End: 2019-03-09

## 2019-03-09 RX ORDER — LANOLIN ALCOHOL/MO/W.PET/CERES
3 CREAM (GRAM) TOPICAL AT BEDTIME
Qty: 0 | Refills: 0 | Status: DISCONTINUED | OUTPATIENT
Start: 2019-03-09 | End: 2019-03-09

## 2019-03-09 RX ORDER — KETOROLAC TROMETHAMINE 30 MG/ML
15 SYRINGE (ML) INJECTION ONCE
Qty: 0 | Refills: 0 | Status: DISCONTINUED | OUTPATIENT
Start: 2019-03-09 | End: 2019-03-09

## 2019-03-09 RX ORDER — LIDOCAINE 4 G/100G
1 CREAM TOPICAL ONCE
Qty: 0 | Refills: 0 | Status: DISCONTINUED | OUTPATIENT
Start: 2019-03-09 | End: 2019-03-12

## 2019-03-09 RX ADMIN — PIPERACILLIN AND TAZOBACTAM 25 GRAM(S): 4; .5 INJECTION, POWDER, LYOPHILIZED, FOR SOLUTION INTRAVENOUS at 20:19

## 2019-03-09 RX ADMIN — Medication 1000 MILLIGRAM(S): at 14:43

## 2019-03-09 RX ADMIN — Medication 100 MILLIEQUIVALENT(S): at 20:15

## 2019-03-09 RX ADMIN — PIPERACILLIN AND TAZOBACTAM 25 GRAM(S): 4; .5 INJECTION, POWDER, LYOPHILIZED, FOR SOLUTION INTRAVENOUS at 05:27

## 2019-03-09 RX ADMIN — DEXTROSE MONOHYDRATE, SODIUM CHLORIDE, AND POTASSIUM CHLORIDE 150 MILLILITER(S): 50; .745; 4.5 INJECTION, SOLUTION INTRAVENOUS at 14:13

## 2019-03-09 RX ADMIN — Medication 400 MILLIGRAM(S): at 14:13

## 2019-03-09 RX ADMIN — Medication 100 MILLIEQUIVALENT(S): at 23:51

## 2019-03-09 RX ADMIN — ENOXAPARIN SODIUM 40 MILLIGRAM(S): 100 INJECTION SUBCUTANEOUS at 11:36

## 2019-03-09 RX ADMIN — Medication 15 MILLIGRAM(S): at 16:05

## 2019-03-09 RX ADMIN — POTASSIUM PHOSPHATE, MONOBASIC POTASSIUM PHOSPHATE, DIBASIC 83.33 MILLIMOLE(S): 236; 224 INJECTION, SOLUTION INTRAVENOUS at 11:36

## 2019-03-09 RX ADMIN — ONDANSETRON 4 MILLIGRAM(S): 8 TABLET, FILM COATED ORAL at 02:31

## 2019-03-09 RX ADMIN — Medication 15 MILLIGRAM(S): at 15:35

## 2019-03-09 RX ADMIN — PIPERACILLIN AND TAZOBACTAM 25 GRAM(S): 4; .5 INJECTION, POWDER, LYOPHILIZED, FOR SOLUTION INTRAVENOUS at 14:13

## 2019-03-09 NOTE — CHART NOTE - NSCHARTNOTEFT_GEN_A_CORE
Provider called by nursing staff because patient having increased abdominal pain. Pt notes slightly more bloating and intermittent abdominal pain. Recommended NGT to patient. Discussed risks/benefits/alternatives and patient is refusing NG tube at this time.       VS: /70, , SpO2 95% RA    Gen: resting in bed, appears anxious   Resp: breathing comfortably  Abd: morbidly obese, NT, softly distended. No rebound/guarding.     A/P: 54M w/ LBO 2/2 sigmoid mass    - Pain control  - NPO/IVF  - Monitor for N/V, increased distention  - Monitor GI function  - Pt currently refusing NGT at this time. Will re-attempt NGT in AM  - Plan d/w patient and Dr. Helder Marin, PGY-1  General Surgery

## 2019-03-09 NOTE — CHART NOTE - NSCHARTNOTEFT_GEN_A_CORE
[facility] CHART NOTE    Patient seen and examined. Reports pain is improved. Passing gas intermittently and having liquid BMs. Soft, distended, tender in epigastrum, no rebound/guarding.    [id number]

## 2019-03-09 NOTE — PROGRESS NOTE ADULT - SUBJECTIVE AND OBJECTIVE BOX
Surgery Progress Note     Subjective/24hour Events:   Patient seen and examined.   Overnight NGT self dc'ed, replaced for continued nausea.   Passing flatus, having bowel movements.     Vital Signs:  Vital Signs Last 24 Hrs  T(C): 36.8 (09 Mar 2019 01:41), Max: 37.2 (08 Mar 2019 09:40)  T(F): 98.2 (09 Mar 2019 01:41), Max: 99 (08 Mar 2019 09:40)  HR: 84 (09 Mar 2019 01:41) (84 - 109)  BP: 116/89 (09 Mar 2019 01:41) (95/63 - 127/81)  BP(mean): --  RR: 18 (09 Mar 2019 01:41) (18 - 18)  SpO2: 96% (09 Mar 2019 01:41) (93% - 96%)    CAPILLARY BLOOD GLUCOSE          I&O's Detail    07 Mar 2019 07:01  -  08 Mar 2019 07:00  --------------------------------------------------------  IN:    IV PiggyBack: 100 mL    lactated ringers.: 1200 mL  Total IN: 1300 mL    OUT:    Voided: 300 mL  Total OUT: 300 mL    Total NET: 1000 mL      08 Mar 2019 07:01  -  09 Mar 2019 05:58  --------------------------------------------------------  IN:    dextrose 5% + sodium chloride 0.45% with potassium chloride 20 mEq/L: 1800 mL    IV PiggyBack: 450 mL  Total IN: 2250 mL    OUT:    Nasoenteral Tube: 300 mL    Voided: 150 mL  Total OUT: 450 mL    Total NET: 1800 mL          MEDICATIONS  (STANDING):  dextrose 5% + sodium chloride 0.45% with potassium chloride 20 mEq/L 1000 milliLiter(s) (150 mL/Hr) IV Continuous <Continuous>  enoxaparin Injectable 40 milliGRAM(s) SubCutaneous daily  lidocaine 2% Gel 1 Application(s) Topical once  piperacillin/tazobactam IVPB. 3.375 Gram(s) IV Intermittent every 8 hours    MEDICATIONS  (PRN):      Physical Exam:  Gen: NAD.  Ab: Soft, nontender, minimally distended.   Ext: Moves all 4 spontaneously.     Labs:    03-08    140  |  101  |  14  ----------------------------<  106<H>  3.7   |  23  |  1.44<H>    Ca    8.8      08 Mar 2019 07:16  Phos  3.4     03-08  Mg     1.8     03-08    TPro  7.5  /  Alb  4.1  /  TBili  1.1  /  DBili  x   /  AST  13  /  ALT  9<L>  /  AlkPhos  78  03-07    LIVER FUNCTIONS - ( 07 Mar 2019 23:04 )  Alb: 4.1 g/dL / Pro: 7.5 g/dL / ALK PHOS: 78 U/L / ALT: 9 U/L / AST: 13 U/L / GGT: x                                 16.2   4.06  )-----------( 280      ( 08 Mar 2019 10:00 )             53.2     PT/INR - ( 07 Mar 2019 23:04 )   PT: 13.1 sec;   INR: 1.14 ratio         PTT - ( 07 Mar 2019 23:04 )  PTT:29.4 sec

## 2019-03-09 NOTE — CHART NOTE - NSCHARTNOTEFT_GEN_A_CORE
Pt with continued abdominal pain and burping. NGT was placed with 100cc of clear output.     Gen: resting in bed, appears anxious   Resp: breathing comfortably  Abd: morbidly obese, NT, softly distended. No rebound/guarding.     A/P: 54M w/ LBO 2/2 sigmoid mass    - CXR to confirm NGT placement   - Pain control  - NPO/IVF  - Monitor for N/V, increased distention  - Monitor GI function    King Marin, PGY-1  General Surgery

## 2019-03-09 NOTE — PROGRESS NOTE ADULT - ASSESSMENT
54M presented with abdominal pain for one week found to have an obstruction with proximal large bowel dilatation.    - Appreciate GI input: will hold off on colonic stenting at this time due to positive bowel functin.   - NPO/NGT/IVF.   - Serial abdominal exams.   - Follow up AM labs.   - IV abx.   - DVT ppx: lovenox, sequential compression devices.     [facility] Pager #[phone]

## 2019-03-10 LAB
ANION GAP SERPL CALC-SCNC: 12 MMOL/L — SIGNIFICANT CHANGE UP (ref 5–17)
BUN SERPL-MCNC: 10 MG/DL — SIGNIFICANT CHANGE UP (ref 7–23)
CALCIUM SERPL-MCNC: 9.1 MG/DL — SIGNIFICANT CHANGE UP (ref 8.4–10.5)
CEA SERPL-MCNC: 3.3 NG/ML — SIGNIFICANT CHANGE UP (ref 0–3.8)
CHLORIDE SERPL-SCNC: 104 MMOL/L — SIGNIFICANT CHANGE UP (ref 96–108)
CO2 SERPL-SCNC: 25 MMOL/L — SIGNIFICANT CHANGE UP (ref 22–31)
CREAT SERPL-MCNC: 1.23 MG/DL — SIGNIFICANT CHANGE UP (ref 0.5–1.3)
GLUCOSE SERPL-MCNC: 95 MG/DL — SIGNIFICANT CHANGE UP (ref 70–99)
HCT VFR BLD CALC: 42.7 % — SIGNIFICANT CHANGE UP (ref 39–50)
HGB BLD-MCNC: 13.6 G/DL — SIGNIFICANT CHANGE UP (ref 13–17)
MAGNESIUM SERPL-MCNC: 2 MG/DL — SIGNIFICANT CHANGE UP (ref 1.6–2.6)
MCHC RBC-ENTMCNC: 27.2 PG — SIGNIFICANT CHANGE UP (ref 27–34)
MCHC RBC-ENTMCNC: 31.9 GM/DL — LOW (ref 32–36)
MCV RBC AUTO: 85.4 FL — SIGNIFICANT CHANGE UP (ref 80–100)
PHOSPHATE SERPL-MCNC: 2.2 MG/DL — LOW (ref 2.5–4.5)
PLATELET # BLD AUTO: 289 K/UL — SIGNIFICANT CHANGE UP (ref 150–400)
POTASSIUM SERPL-MCNC: 3.6 MMOL/L — SIGNIFICANT CHANGE UP (ref 3.5–5.3)
POTASSIUM SERPL-SCNC: 3.6 MMOL/L — SIGNIFICANT CHANGE UP (ref 3.5–5.3)
RBC # BLD: 5 M/UL — SIGNIFICANT CHANGE UP (ref 4.2–5.8)
RBC # FLD: 14 % — SIGNIFICANT CHANGE UP (ref 10.3–14.5)
SODIUM SERPL-SCNC: 141 MMOL/L — SIGNIFICANT CHANGE UP (ref 135–145)
WBC # BLD: 6.37 K/UL — SIGNIFICANT CHANGE UP (ref 3.8–10.5)
WBC # FLD AUTO: 6.37 K/UL — SIGNIFICANT CHANGE UP (ref 3.8–10.5)

## 2019-03-10 RX ORDER — DIPHENHYDRAMINE HCL 50 MG
25 CAPSULE ORAL ONCE
Qty: 0 | Refills: 0 | Status: COMPLETED | OUTPATIENT
Start: 2019-03-10 | End: 2019-03-10

## 2019-03-10 RX ORDER — ACETAMINOPHEN 500 MG
1000 TABLET ORAL ONCE
Qty: 0 | Refills: 0 | Status: COMPLETED | OUTPATIENT
Start: 2019-03-10 | End: 2019-03-10

## 2019-03-10 RX ORDER — POTASSIUM PHOSPHATE, MONOBASIC POTASSIUM PHOSPHATE, DIBASIC 236; 224 MG/ML; MG/ML
15 INJECTION, SOLUTION INTRAVENOUS ONCE
Qty: 0 | Refills: 0 | Status: COMPLETED | OUTPATIENT
Start: 2019-03-10 | End: 2019-03-10

## 2019-03-10 RX ORDER — CHLORHEXIDINE GLUCONATE 213 G/1000ML
1 SOLUTION TOPICAL EVERY 12 HOURS
Qty: 0 | Refills: 0 | Status: COMPLETED | OUTPATIENT
Start: 2019-03-10 | End: 2019-03-11

## 2019-03-10 RX ADMIN — Medication 3 MILLIGRAM(S): at 00:50

## 2019-03-10 RX ADMIN — Medication 25 MILLIGRAM(S): at 22:42

## 2019-03-10 RX ADMIN — PIPERACILLIN AND TAZOBACTAM 25 GRAM(S): 4; .5 INJECTION, POWDER, LYOPHILIZED, FOR SOLUTION INTRAVENOUS at 22:42

## 2019-03-10 RX ADMIN — Medication 400 MILLIGRAM(S): at 00:51

## 2019-03-10 RX ADMIN — ENOXAPARIN SODIUM 40 MILLIGRAM(S): 100 INJECTION SUBCUTANEOUS at 11:09

## 2019-03-10 RX ADMIN — Medication 1000 MILLIGRAM(S): at 11:55

## 2019-03-10 RX ADMIN — Medication 100 MILLIEQUIVALENT(S): at 00:52

## 2019-03-10 RX ADMIN — PIPERACILLIN AND TAZOBACTAM 25 GRAM(S): 4; .5 INJECTION, POWDER, LYOPHILIZED, FOR SOLUTION INTRAVENOUS at 05:37

## 2019-03-10 RX ADMIN — PIPERACILLIN AND TAZOBACTAM 25 GRAM(S): 4; .5 INJECTION, POWDER, LYOPHILIZED, FOR SOLUTION INTRAVENOUS at 15:11

## 2019-03-10 RX ADMIN — POTASSIUM PHOSPHATE, MONOBASIC POTASSIUM PHOSPHATE, DIBASIC 62.5 MILLIMOLE(S): 236; 224 INJECTION, SOLUTION INTRAVENOUS at 11:09

## 2019-03-10 RX ADMIN — Medication 1000 MILLIGRAM(S): at 01:57

## 2019-03-10 RX ADMIN — DEXTROSE MONOHYDRATE, SODIUM CHLORIDE, AND POTASSIUM CHLORIDE 150 MILLILITER(S): 50; .745; 4.5 INJECTION, SOLUTION INTRAVENOUS at 05:37

## 2019-03-10 RX ADMIN — Medication 400 MILLIGRAM(S): at 11:25

## 2019-03-10 RX ADMIN — CHLORHEXIDINE GLUCONATE 1 APPLICATION(S): 213 SOLUTION TOPICAL at 22:41

## 2019-03-10 NOTE — CHART NOTE - NSCHARTNOTEFT_GEN_A_CORE
Pre-operative Note    - Pre-operative Diagnosis: Large Bowel Obstruction    - Procedure: Lap possible open Sigmoidectomy    - Labs:                        13.6   6.37  )-----------( 289      ( 10 Mar 2019 10:44 )             42.7     03-10    141  |  104  |  10  ----------------------------<  95  3.6   |  25  |  1.23    Ca    9.1      10 Mar 2019 07:32  Phos  2.2     03-10  Mg     2.0     03-10        Type & Screen #1: Pending  Type & Screen #2: Pending    - CXR: < from: Xray Chest 1 View- PORTABLE-Urgent (03.09.19 @ 03:33) >    FINDINGS:  The study is limited with collimation of lateral left chest.  NG tube tip and side-port within the stomach.  Cardiac silhouette is stable in appearance.  The lungs are grossly clear. No significant pleural effusion or   pneumothorax.  The bony structures are intact.    IMPRESSION:   NG tube in stomach.  Grossly clear lungs.        < end of copied text >        - EKG: Ventricular Rate 115 BPM    Atrial Rate 115 BPM    P-R Interval 176 ms    QRS Duration 98 ms    Q-T Interval 334 ms    QTC Calculation(Bezet) 462 ms    P Axis 15 degrees    R Axis 68 degrees    T Axis 25 degrees    Diagnosis Line SINUS TACHYCARDIA  OTHERWISE NORMAL ECG    Confirmed by ATTENDING, ED ([id number]),  JUNIOR DAIGLE ([id number]) on 3/8/2019 6:56:45 AM    < end of copied text >    - Orders:  > NPO at midnight  > IVF at midnight  > Perioperative antibiotics not needed.  > Morning Labs: CBC, BMP, coags, type & screen, night and morning chlorhexidine bath    - Permits:  > Consent in chart.  > Case scheduled with OR at 1400.

## 2019-03-10 NOTE — PROGRESS NOTE ADULT - ASSESSMENT
54M presented with abdominal pain for one week found to have an obstruction with proximal large bowel dilatation.    - Appreciate GI input: will hold off on colonic stenting at this time due to positive bowel function.   - NPO/NGT/IVF.   - Serial abdominal exams.   - Follow up AM labs.   - IV abx.   - DVT ppx: lovenox, sequential compression devices.     [facility] Pager #[phone]

## 2019-03-10 NOTE — PROGRESS NOTE ADULT - SUBJECTIVE AND OBJECTIVE BOX
Surgery Progress Note     Subjective/24hour Events:   Patient seen and examined.   No acute events overnight.   Pain controlled.   Passing flatus, having liquid bowel movements.     Vital Signs:  Vital Signs Last 24 Hrs  T(C): 36.9 (10 Mar 2019 05:02), Max: 36.9 (09 Mar 2019 05:58)  T(F): 98.5 (10 Mar 2019 05:02), Max: 98.5 (09 Mar 2019 05:58)  HR: 72 (10 Mar 2019 05:02) (72 - 96)  BP: 121/77 (10 Mar 2019 05:02) (116/78 - 136/89)  BP(mean): --  RR: 18 (10 Mar 2019 05:02) (18 - 18)  SpO2: 96% (10 Mar 2019 05:02) (94% - 96%)    CAPILLARY BLOOD GLUCOSE          I&O's Detail    08 Mar 2019 07:01  -  09 Mar 2019 07:00  --------------------------------------------------------  IN:    dextrose 5% + sodium chloride 0.45% with potassium chloride 20 mEq/L: 1800 mL    Solution: 450 mL  Total IN: 2250 mL    OUT:    Nasoenteral Tube: 300 mL    Voided: 150 mL  Total OUT: 450 mL    Total NET: 1800 mL      09 Mar 2019 06:01  -  10 Mar 2019 05:49  --------------------------------------------------------  IN:    dextrose 5% + sodium chloride 0.45% with potassium chloride 20 mEq/L: 1800 mL    Solution: 250 mL    Solution: 100 mL    Solution: 100 mL  Total IN: 2250 mL    OUT:    Nasoenteral Tube: 50 mL    Voided: 550 mL  Total OUT: 600 mL    Total NET: 1650 mL          MEDICATIONS  (STANDING):  dextrose 5% + sodium chloride 0.45% with potassium chloride 20 mEq/L 1000 milliLiter(s) (150 mL/Hr) IV Continuous <Continuous>  enoxaparin Injectable 40 milliGRAM(s) SubCutaneous daily  lidocaine 2% Gel 1 Application(s) Topical once  piperacillin/tazobactam IVPB. 3.375 Gram(s) IV Intermittent every 8 hours    MEDICATIONS  (PRN):  benzocaine 15 mG/menthol 3.6 mG (Sugar-Free) Lozenge 1 Lozenge Oral three times a day PRN Sore Throat  melatonin 3 milliGRAM(s) Oral at bedtime PRN Insomnia      Physical Exam:  Gen: NAD.  Ab: Large ,soft, minimally tender in epigastrium, minimally distended.   Ext: Moves all 4 spontaneously.       Labs:    03-09    x   |  x   |  x   ----------------------------<  x   3.5   |  x   |  x     Ca    8.9      09 Mar 2019 09:00  Phos  3.0     03-09  Mg     2.0     03-09                              14.3   5.4   )-----------( 271      ( 09 Mar 2019 09:00 )             42.1

## 2019-03-11 ENCOUNTER — TRANSCRIPTION ENCOUNTER (OUTPATIENT)
Age: 54
End: 2019-03-11

## 2019-03-11 PROBLEM — K57.92 DIVERTICULITIS OF INTESTINE, PART UNSPECIFIED, WITHOUT PERFORATION OR ABSCESS WITHOUT BLEEDING: Chronic | Status: ACTIVE | Noted: 2019-03-08

## 2019-03-11 LAB
ANION GAP SERPL CALC-SCNC: 11 MMOL/L — SIGNIFICANT CHANGE UP (ref 5–17)
ANION GAP SERPL CALC-SCNC: 13 MMOL/L — SIGNIFICANT CHANGE UP (ref 5–17)
APTT BLD: 28.8 SEC — SIGNIFICANT CHANGE UP (ref 27.5–36.3)
APTT BLD: 29.7 SEC — SIGNIFICANT CHANGE UP (ref 27.5–36.3)
BLD GP AB SCN SERPL QL: NEGATIVE — SIGNIFICANT CHANGE UP
BUN SERPL-MCNC: 5 MG/DL — LOW (ref 7–23)
BUN SERPL-MCNC: 6 MG/DL — LOW (ref 7–23)
CALCIUM SERPL-MCNC: 8.8 MG/DL — SIGNIFICANT CHANGE UP (ref 8.4–10.5)
CALCIUM SERPL-MCNC: 8.9 MG/DL — SIGNIFICANT CHANGE UP (ref 8.4–10.5)
CHLORIDE SERPL-SCNC: 104 MMOL/L — SIGNIFICANT CHANGE UP (ref 96–108)
CHLORIDE SERPL-SCNC: 104 MMOL/L — SIGNIFICANT CHANGE UP (ref 96–108)
CO2 SERPL-SCNC: 23 MMOL/L — SIGNIFICANT CHANGE UP (ref 22–31)
CO2 SERPL-SCNC: 25 MMOL/L — SIGNIFICANT CHANGE UP (ref 22–31)
CREAT SERPL-MCNC: 1.06 MG/DL — SIGNIFICANT CHANGE UP (ref 0.5–1.3)
CREAT SERPL-MCNC: 1.09 MG/DL — SIGNIFICANT CHANGE UP (ref 0.5–1.3)
GLUCOSE SERPL-MCNC: 92 MG/DL — SIGNIFICANT CHANGE UP (ref 70–99)
GLUCOSE SERPL-MCNC: 94 MG/DL — SIGNIFICANT CHANGE UP (ref 70–99)
HCT VFR BLD CALC: 44.2 % — SIGNIFICANT CHANGE UP (ref 39–50)
HGB BLD-MCNC: 14 G/DL — SIGNIFICANT CHANGE UP (ref 13–17)
INR BLD: 1.2 RATIO — HIGH (ref 0.88–1.16)
INR BLD: 1.28 RATIO — HIGH (ref 0.88–1.16)
MAGNESIUM SERPL-MCNC: 1.7 MG/DL — SIGNIFICANT CHANGE UP (ref 1.6–2.6)
MCHC RBC-ENTMCNC: 26.8 PG — LOW (ref 27–34)
MCHC RBC-ENTMCNC: 31.7 GM/DL — LOW (ref 32–36)
MCV RBC AUTO: 84.7 FL — SIGNIFICANT CHANGE UP (ref 80–100)
PHOSPHATE SERPL-MCNC: 2.3 MG/DL — LOW (ref 2.5–4.5)
PLATELET # BLD AUTO: 309 K/UL — SIGNIFICANT CHANGE UP (ref 150–400)
POTASSIUM SERPL-MCNC: 3.2 MMOL/L — LOW (ref 3.5–5.3)
POTASSIUM SERPL-MCNC: 3.9 MMOL/L — SIGNIFICANT CHANGE UP (ref 3.5–5.3)
POTASSIUM SERPL-SCNC: 3.2 MMOL/L — LOW (ref 3.5–5.3)
POTASSIUM SERPL-SCNC: 3.9 MMOL/L — SIGNIFICANT CHANGE UP (ref 3.5–5.3)
PROTHROM AB SERPL-ACNC: 13.8 SEC — HIGH (ref 10–13.1)
PROTHROM AB SERPL-ACNC: 14.7 SEC — HIGH (ref 10–12.9)
RBC # BLD: 5.22 M/UL — SIGNIFICANT CHANGE UP (ref 4.2–5.8)
RBC # FLD: 13.7 % — SIGNIFICANT CHANGE UP (ref 10.3–14.5)
RH IG SCN BLD-IMP: POSITIVE — SIGNIFICANT CHANGE UP
RH IG SCN BLD-IMP: POSITIVE — SIGNIFICANT CHANGE UP
SODIUM SERPL-SCNC: 140 MMOL/L — SIGNIFICANT CHANGE UP (ref 135–145)
SODIUM SERPL-SCNC: 140 MMOL/L — SIGNIFICANT CHANGE UP (ref 135–145)
WBC # BLD: 6.5 K/UL — SIGNIFICANT CHANGE UP (ref 3.8–10.5)
WBC # FLD AUTO: 6.5 K/UL — SIGNIFICANT CHANGE UP (ref 3.8–10.5)

## 2019-03-11 RX ORDER — MAGNESIUM SULFATE 500 MG/ML
2 VIAL (ML) INJECTION ONCE
Qty: 0 | Refills: 0 | Status: COMPLETED | OUTPATIENT
Start: 2019-03-11 | End: 2019-03-11

## 2019-03-11 RX ORDER — POTASSIUM PHOSPHATE, MONOBASIC POTASSIUM PHOSPHATE, DIBASIC 236; 224 MG/ML; MG/ML
30 INJECTION, SOLUTION INTRAVENOUS ONCE
Qty: 0 | Refills: 0 | Status: COMPLETED | OUTPATIENT
Start: 2019-03-11 | End: 2019-03-11

## 2019-03-11 RX ORDER — POLYETHYLENE GLYCOL 3350 17 G/17G
119 POWDER, FOR SOLUTION ORAL ONCE
Qty: 0 | Refills: 0 | Status: COMPLETED | OUTPATIENT
Start: 2019-03-11 | End: 2019-03-11

## 2019-03-11 RX ORDER — POLYETHYLENE GLYCOL 3350 17 G/17G
117 POWDER, FOR SOLUTION ORAL ONCE
Qty: 0 | Refills: 0 | Status: DISCONTINUED | OUTPATIENT
Start: 2019-03-11 | End: 2019-03-11

## 2019-03-11 RX ORDER — POTASSIUM CHLORIDE 20 MEQ
10 PACKET (EA) ORAL
Qty: 0 | Refills: 0 | Status: COMPLETED | OUTPATIENT
Start: 2019-03-11 | End: 2019-03-11

## 2019-03-11 RX ORDER — POLYETHYLENE GLYCOL 3350 17 G/17G
17 POWDER, FOR SOLUTION ORAL ONCE
Qty: 0 | Refills: 0 | Status: COMPLETED | OUTPATIENT
Start: 2019-03-11 | End: 2019-03-11

## 2019-03-11 RX ADMIN — PIPERACILLIN AND TAZOBACTAM 25 GRAM(S): 4; .5 INJECTION, POWDER, LYOPHILIZED, FOR SOLUTION INTRAVENOUS at 13:33

## 2019-03-11 RX ADMIN — Medication 100 MILLIEQUIVALENT(S): at 15:53

## 2019-03-11 RX ADMIN — Medication 2 ENEMA: at 20:37

## 2019-03-11 RX ADMIN — POLYETHYLENE GLYCOL 3350 119 GRAM(S): 17 POWDER, FOR SOLUTION ORAL at 10:18

## 2019-03-11 RX ADMIN — POLYETHYLENE GLYCOL 3350 17 GRAM(S): 17 POWDER, FOR SOLUTION ORAL at 07:47

## 2019-03-11 RX ADMIN — CHLORHEXIDINE GLUCONATE 1 APPLICATION(S): 213 SOLUTION TOPICAL at 07:46

## 2019-03-11 RX ADMIN — Medication 50 GRAM(S): at 10:17

## 2019-03-11 RX ADMIN — Medication 100 MILLIEQUIVALENT(S): at 13:33

## 2019-03-11 RX ADMIN — PIPERACILLIN AND TAZOBACTAM 25 GRAM(S): 4; .5 INJECTION, POWDER, LYOPHILIZED, FOR SOLUTION INTRAVENOUS at 21:46

## 2019-03-11 RX ADMIN — ENOXAPARIN SODIUM 40 MILLIGRAM(S): 100 INJECTION SUBCUTANEOUS at 13:33

## 2019-03-11 RX ADMIN — Medication 1 ENEMA: at 17:06

## 2019-03-11 RX ADMIN — PIPERACILLIN AND TAZOBACTAM 25 GRAM(S): 4; .5 INJECTION, POWDER, LYOPHILIZED, FOR SOLUTION INTRAVENOUS at 05:27

## 2019-03-11 RX ADMIN — Medication 100 MILLIEQUIVALENT(S): at 10:17

## 2019-03-11 RX ADMIN — DEXTROSE MONOHYDRATE, SODIUM CHLORIDE, AND POTASSIUM CHLORIDE 150 MILLILITER(S): 50; .745; 4.5 INJECTION, SOLUTION INTRAVENOUS at 07:45

## 2019-03-11 RX ADMIN — POTASSIUM PHOSPHATE, MONOBASIC POTASSIUM PHOSPHATE, DIBASIC 83.33 MILLIMOLE(S): 236; 224 INJECTION, SOLUTION INTRAVENOUS at 18:44

## 2019-03-11 RX ADMIN — Medication 1 ENEMA: at 15:26

## 2019-03-11 NOTE — PROGRESS NOTE ADULT - ASSESSMENT
54M presented with abdominal pain for one week found to have an obstruction with proximal large bowel dilatation.    - OR today for lap/open sigmoidectomy.   - NPO/NGT/IVF.   - Serial abdominal exams.   - Follow up AM labs.   - IV abx.   - DVT ppx: lovenox, sequential compression devices.     [facility] Pager #[phone] 54M presented with abdominal pain for one week found to have an obstruction with proximal large bowel dilatation.    - OR tomorrow for lap/open sigmoidectomy.   - NPO/NGT/IVF.   - Serial abdominal exams.   - Follow up AM labs.   - IV abx.   - DVT ppx: lovenox, sequential compression devices.     [facility] Pager #[phone]

## 2019-03-11 NOTE — PROGRESS NOTE ADULT - SUBJECTIVE AND OBJECTIVE BOX
Surgery Progress Note     Subjective/24hour Events:   Patient seen and examined.   Expresses anxiety over upcoming surgery.  No acute events overnight.   Pain controlled.   Passing flatus, having liquid bowel movements.     Vital Signs:  Vital Signs Last 24 Hrs  T(C): 36.4 (10 Mar 2019 21:53), Max: 36.9 (10 Mar 2019 01:02)  T(F): 97.5 (10 Mar 2019 21:53), Max: 98.5 (10 Mar 2019 05:02)  HR: 75 (10 Mar 2019 21:53) (69 - 80)  BP: 137/90 (10 Mar 2019 21:53) (118/71 - 139/80)  BP(mean): --  RR: 18 (10 Mar 2019 21:53) (18 - 18)  SpO2: 98% (10 Mar 2019 21:53) (94% - 98%)    CAPILLARY BLOOD GLUCOSE          I&O's Detail    09 Mar 2019 06:01  -  10 Mar 2019 07:00  --------------------------------------------------------  IN:    dextrose 5% + sodium chloride 0.45% with potassium chloride 20 mEq/L: 3600 mL    Solution: 550 mL    Solution: 300 mL    Solution: 200 mL  Total IN: 4650 mL    OUT:    Nasoenteral Tube: 200 mL    Voided: 550 mL  Total OUT: 750 mL    Total NET: 3900 mL      10 Mar 2019 07:01  -  11 Mar 2019 00:04  --------------------------------------------------------  IN:    dextrose 5% + sodium chloride 0.45% with potassium chloride 20 mEq/L: 1500 mL    Solution: 100 mL    Solution: 250 mL    Solution: 100 mL  Total IN: 1950 mL    OUT:    Nasoenteral Tube: 50 mL    Voided: 400 mL  Total OUT: 450 mL    Total NET: 1500 mL          MEDICATIONS  (STANDING):  chlorhexidine 4% Liquid 1 Application(s) Topical every 12 hours  dextrose 5% + sodium chloride 0.45% with potassium chloride 20 mEq/L 1000 milliLiter(s) (150 mL/Hr) IV Continuous <Continuous>  enoxaparin Injectable 40 milliGRAM(s) SubCutaneous daily  lidocaine 2% Gel 1 Application(s) Topical once  piperacillin/tazobactam IVPB. 3.375 Gram(s) IV Intermittent every 8 hours    MEDICATIONS  (PRN):  benzocaine 15 mG/menthol 3.6 mG (Sugar-Free) Lozenge 1 Lozenge Oral three times a day PRN Sore Throat  melatonin 3 milliGRAM(s) Oral at bedtime PRN Insomnia      Physical Exam:  Gen: NAD, NGT in place.   Ab: Large ,soft, minimally tender in epigastrium, minimally distended.   Ext: Moves all 4 spontaneously.     Labs:    03-10    141  |  104  |  10  ----------------------------<  95  3.6   |  25  |  1.23    Ca    9.1      10 Mar 2019 07:32  Phos  2.2     03-10  Mg     2.0     03-10                              13.6   6.37  )-----------( 289      ( 10 Mar 2019 10:44 )             42.7

## 2019-03-11 NOTE — DIETITIAN INITIAL EVALUATION ADULT. - NS FNS REASON FOR WEIGHT CHANG
Pt reports following a healthy weight loss diet, walking more and going to the gym/other (specify)/decreased po intake

## 2019-03-11 NOTE — DIETITIAN INITIAL EVALUATION ADULT. - ENERGY NEEDS
Ht: 72 Wt: 270 pounds BMI: 36.6 kg/m2 IBW:  178 pounds(+/-10%)   No edema. No pressure ulcers documented.

## 2019-03-11 NOTE — DIETITIAN INITIAL EVALUATION ADULT. - NS FNS WEIGHT CHANGE REASON
intentional no nasal obstruction/no sinus symptoms/no nasal congestion/no nasal discharge/no vertigo

## 2019-03-11 NOTE — DIETITIAN INITIAL EVALUATION ADULT. - ORAL INTAKE PTA
good/Pt reports good appetite and PO intake PTA.  Pt denies micronutrient supplementation PTA. NKFA.

## 2019-03-12 ENCOUNTER — APPOINTMENT (OUTPATIENT)
Dept: SURGERY | Facility: HOSPITAL | Age: 54
End: 2019-03-12
Payer: COMMERCIAL

## 2019-03-12 ENCOUNTER — RESULT REVIEW (OUTPATIENT)
Age: 54
End: 2019-03-12

## 2019-03-12 DIAGNOSIS — K63.9 DISEASE OF INTESTINE, UNSPECIFIED: ICD-10-CM

## 2019-03-12 LAB
ANION GAP SERPL CALC-SCNC: 15 MMOL/L — SIGNIFICANT CHANGE UP (ref 5–17)
ANION GAP SERPL CALC-SCNC: 16 MMOL/L — SIGNIFICANT CHANGE UP (ref 5–17)
BUN SERPL-MCNC: 4 MG/DL — LOW (ref 7–23)
BUN SERPL-MCNC: 5 MG/DL — LOW (ref 7–23)
CALCIUM SERPL-MCNC: 9 MG/DL — SIGNIFICANT CHANGE UP (ref 8.4–10.5)
CALCIUM SERPL-MCNC: 9.1 MG/DL — SIGNIFICANT CHANGE UP (ref 8.4–10.5)
CHLORIDE SERPL-SCNC: 102 MMOL/L — SIGNIFICANT CHANGE UP (ref 96–108)
CHLORIDE SERPL-SCNC: 102 MMOL/L — SIGNIFICANT CHANGE UP (ref 96–108)
CO2 SERPL-SCNC: 21 MMOL/L — LOW (ref 22–31)
CO2 SERPL-SCNC: 22 MMOL/L — SIGNIFICANT CHANGE UP (ref 22–31)
CREAT SERPL-MCNC: 1.02 MG/DL — SIGNIFICANT CHANGE UP (ref 0.5–1.3)
CREAT SERPL-MCNC: 1.19 MG/DL — SIGNIFICANT CHANGE UP (ref 0.5–1.3)
GAS PNL BLDA: SIGNIFICANT CHANGE UP
GAS PNL BLDA: SIGNIFICANT CHANGE UP
GLUCOSE SERPL-MCNC: 104 MG/DL — HIGH (ref 70–99)
GLUCOSE SERPL-MCNC: 118 MG/DL — HIGH (ref 70–99)
HCT VFR BLD CALC: 45.7 % — SIGNIFICANT CHANGE UP (ref 39–50)
HCT VFR BLD CALC: 48.6 % — SIGNIFICANT CHANGE UP (ref 39–50)
HGB BLD-MCNC: 15.2 G/DL — SIGNIFICANT CHANGE UP (ref 13–17)
HGB BLD-MCNC: 15.5 G/DL — SIGNIFICANT CHANGE UP (ref 13–17)
MAGNESIUM SERPL-MCNC: 1.8 MG/DL — SIGNIFICANT CHANGE UP (ref 1.6–2.6)
MAGNESIUM SERPL-MCNC: 1.9 MG/DL — SIGNIFICANT CHANGE UP (ref 1.6–2.6)
MCHC RBC-ENTMCNC: 26.8 PG — LOW (ref 27–34)
MCHC RBC-ENTMCNC: 27.6 PG — SIGNIFICANT CHANGE UP (ref 27–34)
MCHC RBC-ENTMCNC: 31.9 GM/DL — LOW (ref 32–36)
MCHC RBC-ENTMCNC: 33.2 GM/DL — SIGNIFICANT CHANGE UP (ref 32–36)
MCV RBC AUTO: 83.3 FL — SIGNIFICANT CHANGE UP (ref 80–100)
MCV RBC AUTO: 83.9 FL — SIGNIFICANT CHANGE UP (ref 80–100)
PHOSPHATE SERPL-MCNC: 3.1 MG/DL — SIGNIFICANT CHANGE UP (ref 2.5–4.5)
PHOSPHATE SERPL-MCNC: 4.6 MG/DL — HIGH (ref 2.5–4.5)
PLATELET # BLD AUTO: 332 K/UL — SIGNIFICANT CHANGE UP (ref 150–400)
PLATELET # BLD AUTO: 377 K/UL — SIGNIFICANT CHANGE UP (ref 150–400)
POTASSIUM SERPL-MCNC: 3.6 MMOL/L — SIGNIFICANT CHANGE UP (ref 3.5–5.3)
POTASSIUM SERPL-MCNC: 4 MMOL/L — SIGNIFICANT CHANGE UP (ref 3.5–5.3)
POTASSIUM SERPL-SCNC: 3.6 MMOL/L — SIGNIFICANT CHANGE UP (ref 3.5–5.3)
POTASSIUM SERPL-SCNC: 4 MMOL/L — SIGNIFICANT CHANGE UP (ref 3.5–5.3)
RBC # BLD: 5.49 M/UL — SIGNIFICANT CHANGE UP (ref 4.2–5.8)
RBC # BLD: 5.79 M/UL — SIGNIFICANT CHANGE UP (ref 4.2–5.8)
RBC # FLD: 13.2 % — SIGNIFICANT CHANGE UP (ref 10.3–14.5)
RBC # FLD: 13.9 % — SIGNIFICANT CHANGE UP (ref 10.3–14.5)
SODIUM SERPL-SCNC: 139 MMOL/L — SIGNIFICANT CHANGE UP (ref 135–145)
SODIUM SERPL-SCNC: 139 MMOL/L — SIGNIFICANT CHANGE UP (ref 135–145)
WBC # BLD: 13.5 K/UL — HIGH (ref 3.8–10.5)
WBC # BLD: 9.87 K/UL — SIGNIFICANT CHANGE UP (ref 3.8–10.5)
WBC # FLD AUTO: 13.5 K/UL — HIGH (ref 3.8–10.5)
WBC # FLD AUTO: 9.87 K/UL — SIGNIFICANT CHANGE UP (ref 3.8–10.5)

## 2019-03-12 PROCEDURE — 88307 TISSUE EXAM BY PATHOLOGIST: CPT | Mod: 26

## 2019-03-12 PROCEDURE — 88342 IMHCHEM/IMCYTCHM 1ST ANTB: CPT | Mod: 26,59

## 2019-03-12 PROCEDURE — 88365 INSITU HYBRIDIZATION (FISH): CPT | Mod: 26,59

## 2019-03-12 PROCEDURE — 51860 REPAIR OF BLADDER WOUND: CPT

## 2019-03-12 PROCEDURE — 45330 DIAGNOSTIC SIGMOIDOSCOPY: CPT | Mod: 59

## 2019-03-12 PROCEDURE — 88309 TISSUE EXAM BY PATHOLOGIST: CPT | Mod: 26

## 2019-03-12 PROCEDURE — 44143 PARTIAL REMOVAL OF COLON: CPT

## 2019-03-12 PROCEDURE — 88341 IMHCHEM/IMCYTCHM EA ADD ANTB: CPT | Mod: 26,59

## 2019-03-12 PROCEDURE — 88360 TUMOR IMMUNOHISTOCHEM/MANUAL: CPT | Mod: 26

## 2019-03-12 PROCEDURE — 88364 INSITU HYBRIDIZATION (FISH): CPT | Mod: 26

## 2019-03-12 RX ORDER — SODIUM CHLORIDE 9 MG/ML
1000 INJECTION, SOLUTION INTRAVENOUS
Qty: 0 | Refills: 0 | Status: DISCONTINUED | OUTPATIENT
Start: 2019-03-12 | End: 2019-03-13

## 2019-03-12 RX ORDER — PANTOPRAZOLE SODIUM 20 MG/1
40 TABLET, DELAYED RELEASE ORAL DAILY
Qty: 0 | Refills: 0 | Status: DISCONTINUED | OUTPATIENT
Start: 2019-03-12 | End: 2019-03-14

## 2019-03-12 RX ORDER — HYDROMORPHONE HYDROCHLORIDE 2 MG/ML
30 INJECTION INTRAMUSCULAR; INTRAVENOUS; SUBCUTANEOUS
Qty: 0 | Refills: 0 | Status: DISCONTINUED | OUTPATIENT
Start: 2019-03-12 | End: 2019-03-16

## 2019-03-12 RX ORDER — ACETAMINOPHEN 500 MG
1000 TABLET ORAL ONCE
Qty: 0 | Refills: 0 | Status: COMPLETED | OUTPATIENT
Start: 2019-03-13 | End: 2019-03-13

## 2019-03-12 RX ORDER — ACETAMINOPHEN 500 MG
1000 TABLET ORAL ONCE
Qty: 0 | Refills: 0 | Status: COMPLETED | OUTPATIENT
Start: 2019-03-12 | End: 2019-03-12

## 2019-03-12 RX ORDER — ONDANSETRON 8 MG/1
4 TABLET, FILM COATED ORAL ONCE
Qty: 0 | Refills: 0 | Status: DISCONTINUED | OUTPATIENT
Start: 2019-03-12 | End: 2019-03-12

## 2019-03-12 RX ORDER — MAGNESIUM SULFATE 500 MG/ML
2 VIAL (ML) INJECTION ONCE
Qty: 0 | Refills: 0 | Status: COMPLETED | OUTPATIENT
Start: 2019-03-12 | End: 2019-03-12

## 2019-03-12 RX ORDER — LABETALOL HCL 100 MG
10 TABLET ORAL ONCE
Qty: 0 | Refills: 0 | Status: COMPLETED | OUTPATIENT
Start: 2019-03-12 | End: 2019-03-12

## 2019-03-12 RX ORDER — HYDROMORPHONE HYDROCHLORIDE 2 MG/ML
1 INJECTION INTRAMUSCULAR; INTRAVENOUS; SUBCUTANEOUS
Qty: 0 | Refills: 0 | Status: DISCONTINUED | OUTPATIENT
Start: 2019-03-12 | End: 2019-03-12

## 2019-03-12 RX ORDER — HYDROMORPHONE HYDROCHLORIDE 2 MG/ML
0.5 INJECTION INTRAMUSCULAR; INTRAVENOUS; SUBCUTANEOUS
Qty: 0 | Refills: 0 | Status: DISCONTINUED | OUTPATIENT
Start: 2019-03-12 | End: 2019-03-12

## 2019-03-12 RX ORDER — CHLORHEXIDINE GLUCONATE 213 G/1000ML
1 SOLUTION TOPICAL ONCE
Qty: 0 | Refills: 0 | Status: COMPLETED | OUTPATIENT
Start: 2019-03-12 | End: 2019-03-12

## 2019-03-12 RX ORDER — DIPHENHYDRAMINE HCL 50 MG
12.5 CAPSULE ORAL EVERY 4 HOURS
Qty: 0 | Refills: 0 | Status: DISCONTINUED | OUTPATIENT
Start: 2019-03-12 | End: 2019-03-16

## 2019-03-12 RX ORDER — HYDROMORPHONE HYDROCHLORIDE 2 MG/ML
0.5 INJECTION INTRAMUSCULAR; INTRAVENOUS; SUBCUTANEOUS
Qty: 0 | Refills: 0 | Status: DISCONTINUED | OUTPATIENT
Start: 2019-03-12 | End: 2019-03-16

## 2019-03-12 RX ORDER — METOPROLOL TARTRATE 50 MG
5 TABLET ORAL EVERY 6 HOURS
Qty: 0 | Refills: 0 | Status: DISCONTINUED | OUTPATIENT
Start: 2019-03-13 | End: 2019-03-16

## 2019-03-12 RX ORDER — NALOXONE HYDROCHLORIDE 4 MG/.1ML
0.1 SPRAY NASAL
Qty: 0 | Refills: 0 | Status: DISCONTINUED | OUTPATIENT
Start: 2019-03-12 | End: 2019-03-16

## 2019-03-12 RX ORDER — METOPROLOL TARTRATE 50 MG
5 TABLET ORAL ONCE
Qty: 0 | Refills: 0 | Status: COMPLETED | OUTPATIENT
Start: 2019-03-12 | End: 2019-03-12

## 2019-03-12 RX ORDER — ENOXAPARIN SODIUM 100 MG/ML
40 INJECTION SUBCUTANEOUS EVERY 24 HOURS
Qty: 0 | Refills: 0 | Status: DISCONTINUED | OUTPATIENT
Start: 2019-03-12 | End: 2019-03-20

## 2019-03-12 RX ORDER — ONDANSETRON 8 MG/1
4 TABLET, FILM COATED ORAL EVERY 6 HOURS
Qty: 0 | Refills: 0 | Status: DISCONTINUED | OUTPATIENT
Start: 2019-03-12 | End: 2019-03-16

## 2019-03-12 RX ADMIN — HYDROMORPHONE HYDROCHLORIDE 30 MILLILITER(S): 2 INJECTION INTRAMUSCULAR; INTRAVENOUS; SUBCUTANEOUS at 21:49

## 2019-03-12 RX ADMIN — HYDROMORPHONE HYDROCHLORIDE 0.5 MILLIGRAM(S): 2 INJECTION INTRAMUSCULAR; INTRAVENOUS; SUBCUTANEOUS at 16:45

## 2019-03-12 RX ADMIN — Medication 10 MILLIGRAM(S): at 19:56

## 2019-03-12 RX ADMIN — HYDROMORPHONE HYDROCHLORIDE 0.5 MILLIGRAM(S): 2 INJECTION INTRAMUSCULAR; INTRAVENOUS; SUBCUTANEOUS at 17:15

## 2019-03-12 RX ADMIN — HYDROMORPHONE HYDROCHLORIDE 30 MILLILITER(S): 2 INJECTION INTRAMUSCULAR; INTRAVENOUS; SUBCUTANEOUS at 23:53

## 2019-03-12 RX ADMIN — CHLORHEXIDINE GLUCONATE 1 APPLICATION(S): 213 SOLUTION TOPICAL at 08:14

## 2019-03-12 RX ADMIN — HYDROMORPHONE HYDROCHLORIDE 30 MILLILITER(S): 2 INJECTION INTRAMUSCULAR; INTRAVENOUS; SUBCUTANEOUS at 17:20

## 2019-03-12 RX ADMIN — SODIUM CHLORIDE 125 MILLILITER(S): 9 INJECTION, SOLUTION INTRAVENOUS at 17:23

## 2019-03-12 RX ADMIN — SODIUM CHLORIDE 125 MILLILITER(S): 9 INJECTION, SOLUTION INTRAVENOUS at 22:30

## 2019-03-12 RX ADMIN — PIPERACILLIN AND TAZOBACTAM 25 GRAM(S): 4; .5 INJECTION, POWDER, LYOPHILIZED, FOR SOLUTION INTRAVENOUS at 06:19

## 2019-03-12 RX ADMIN — Medication 2 ENEMA: at 06:20

## 2019-03-12 RX ADMIN — Medication 50 GRAM(S): at 20:37

## 2019-03-12 RX ADMIN — HYDROMORPHONE HYDROCHLORIDE 0.5 MILLIGRAM(S): 2 INJECTION INTRAMUSCULAR; INTRAVENOUS; SUBCUTANEOUS at 20:17

## 2019-03-12 RX ADMIN — Medication 5 MILLIGRAM(S): at 22:15

## 2019-03-12 RX ADMIN — Medication 1000 MILLIGRAM(S): at 22:50

## 2019-03-12 RX ADMIN — Medication 400 MILLIGRAM(S): at 22:21

## 2019-03-12 NOTE — PROVIDER CONTACT NOTE (OTHER) - ASSESSMENT
Pt bp 160/113, other VSS. Pt making adequate urine output. Pt states he is very anxious and nervous about surgery.

## 2019-03-12 NOTE — PROGRESS NOTE ADULT - SUBJECTIVE AND OBJECTIVE BOX
Surgery Progress Note      Subjective: Patient seen and examined. No acute events overnight. Bowel prep and enemas yesterday  passing gas and liquid stool    T(C): 36.8 (03-12-19 @ 00:49), Max: 36.9 (03-11-19 @ 21:37)  HR: 74 (03-12-19 @ 00:49) (69 - 80)  BP: 133/91 (03-12-19 @ 00:49) (133/91 - 157/99)  RR: 20 (03-12-19 @ 00:49) (18 - 20)  SpO2: 98% (03-12-19 @ 00:49) (95% - 98%)      03-10-19 @ 07:01  -  03-11-19 @ 07:00  --------------------------------------------------------  IN: 3350 mL / OUT: 525 mL / NET: 2825 mL    03-11-19 @ 07:01  -  03-12-19 @ 04:47  --------------------------------------------------------  IN: 2566 mL / OUT: 0 mL / NET: 2566 mL        Physical Exam:   Gen: NAD, NGT in place.   Ab: Large ,soft, minimally tender in epigastrium, minimally distended.     Labs:                        14.0   6.50  )-----------( 309      ( 11 Mar 2019 07:55 )             44.2     03-11    140  |  104  |  5<L>  ----------------------------<  94  3.9   |  25  |  1.06    Ca    8.8      11 Mar 2019 18:57  Phos  2.3     03-11  Mg     1.7     03-11          Medications:     benzocaine 15 mG/menthol 3.6 mG (Sugar-Free) Lozenge 1 Lozenge Oral three times a day PRN  dextrose 5% + sodium chloride 0.45% with potassium chloride 20 mEq/L 1000 milliLiter(s) IV Continuous <Continuous>  lidocaine 2% Gel 1 Application(s) Topical once  melatonin 3 milliGRAM(s) Oral at bedtime PRN  piperacillin/tazobactam IVPB. 3.375 Gram(s) IV Intermittent every 8 hours  saline laxative (FLEET) Rectal Enema 2 Enema Rectal <User Schedule>      Radiographs: No new imaging

## 2019-03-12 NOTE — CHART NOTE - NSCHARTNOTEFT_GEN_A_CORE
POST-OP NOTE:    Procedure: Diagnostic laparoscopy converted to lower midline laparotomy with sigmoid resection, partial repair of bladder, and end colostomy.    Subjective: Having some difficulties with pain control earlier, though somewhat better pain control now after some PCA boluses. -Nausea/-vomiting.    HTN in PACU. Not HTN at home per pt, though also does not see a doctor per pt's family member. One dose of 10mg labetalol IV given in PACU by anesthesia, as well as one dose of 5 mg lopressor IV given by team.    Vital Signs Last 24 Hrs  T(C): 36.4 (12 Mar 2019 23:56), Max: 36.8 (12 Mar 2019 06:08)  T(F): 97.6 (12 Mar 2019 23:56), Max: 98.2 (12 Mar 2019 06:08)  HR: 71 (12 Mar 2019 23:56) (66 - 90)  BP: 132/93 (12 Mar 2019 23:56) (132/93 - 184/85)  BP(mean): 119 (12 Mar 2019 22:30) (111 - 141)  RR: 20 (12 Mar 2019 23:56) (16 - 20)  SpO2: 94% (12 Mar 2019 23:56) (93% - 100%)  I&O's Summary    11 Mar 2019 07:01  -  12 Mar 2019 07:00  --------------------------------------------------------  IN: 4732 mL / OUT: 0 mL / NET: 4732 mL    12 Mar 2019 07:01  -  13 Mar 2019 00:57  --------------------------------------------------------  IN: 975 mL / OUT: 1080 mL / NET: -105 mL                            15.2   13.5  )-----------( 332      ( 12 Mar 2019 16:52 )             45.7     03-12    139  |  102  |  5<L>  ----------------------------<  118<H>  4.0   |  21<L>  |  1.19    Ca    9.1      12 Mar 2019 16:52  Phos  4.6     03-12  Mg     1.8     03-12     PT/INR - ( 11 Mar 2019 18:57 )   PT: 14.7 sec;   INR: 1.28 ratio         PTT - ( 11 Mar 2019 18:57 )  PTT:29.7 sec    PHYSICAL EXAM:  Gen: NAD, well-developed, NGT to LCWS with bilious output  Resp: breathing easily, no stridor  CV: no peripheral edema/cyanosis  Abd: soft, nontender, +morbidly obese abdomen, midline dressing with a little serosanguinous strikethru in the middle, laparoscopic incisions c/d/i, ostomy on left pink and viable though somewhat edematous, with gas and liquid green stool in the bag.  : allison draining light red-tinged yellow urine.     ASSESSMENT/PLAN:  53 y/o M found to have sigmoid mass causing obstruction and proximal large bowel dilatation now s/p diagnostic laparoscopy converted to lower midline laparotomy with sigmoid resection, partial repair of bladder, and end colostomy.    - Pain control: PCA, scheduled IV tylenol.  - C/w NGT to LCWS.  - F/u ostomy function & monitor output.  - Monitor UOP. Allison to continue to stay in place for 5 days because of bladder repair.  - Post-op labs checked and electrolytes repleted as needed.  - F/u AM labs.  - HTN in PACU - no HTN at home per pt, though possibly undiagnosed HTN -> 5 mg IV lopressor q6h (hold for sys BP <110 or hr <60).    [facility]  [id number] POST-OP NOTE:    Procedure: Diagnostic laparoscopy converted to lower midline laparotomy with sigmoid resection, partial repair of bladder, and end colostomy.    Subjective: Having some difficulties with pain control earlier, though somewhat better pain control now after some PCA boluses. -Nausea/-vomiting.    HTN in PACU to 160s-180s/90s-100s. Not HTN at home per pt, though also does not see a doctor per pt's family member. One dose of 10mg labetalol IV given in PACU by anesthesia, as well as one dose of 5 mg lopressor IV given by team.    Vital Signs Last 24 Hrs  T(C): 36.4 (12 Mar 2019 23:56), Max: 36.8 (12 Mar 2019 06:08)  T(F): 97.6 (12 Mar 2019 23:56), Max: 98.2 (12 Mar 2019 06:08)  HR: 71 (12 Mar 2019 23:56) (66 - 90)  BP: 132/93 (12 Mar 2019 23:56) (132/93 - 184/85)  BP(mean): 119 (12 Mar 2019 22:30) (111 - 141)  RR: 20 (12 Mar 2019 23:56) (16 - 20)  SpO2: 94% (12 Mar 2019 23:56) (93% - 100%)  I&O's Summary    11 Mar 2019 07:01  -  12 Mar 2019 07:00  --------------------------------------------------------  IN: 4732 mL / OUT: 0 mL / NET: 4732 mL    12 Mar 2019 07:01  -  13 Mar 2019 00:57  --------------------------------------------------------  IN: 975 mL / OUT: 1080 mL / NET: -105 mL                            15.2   13.5  )-----------( 332      ( 12 Mar 2019 16:52 )             45.7     03-12    139  |  102  |  5<L>  ----------------------------<  118<H>  4.0   |  21<L>  |  1.19    Ca    9.1      12 Mar 2019 16:52  Phos  4.6     03-12  Mg     1.8     03-12     PT/INR - ( 11 Mar 2019 18:57 )   PT: 14.7 sec;   INR: 1.28 ratio         PTT - ( 11 Mar 2019 18:57 )  PTT:29.7 sec    PHYSICAL EXAM:  Gen: NAD, well-developed, NGT to LCWS with bilious output  Resp: breathing easily, no stridor  CV: no peripheral edema/cyanosis  Abd: soft, nontender, +morbidly obese abdomen, midline dressing with a little serosanguinous strikethru in the middle, laparoscopic incisions c/d/i, ostomy on left pink and viable though somewhat edematous, with gas and liquid green stool in the bag.  : allison draining light red-tinged yellow urine.     ASSESSMENT/PLAN:  53 y/o M found to have sigmoid mass causing obstruction and proximal large bowel dilatation now s/p diagnostic laparoscopy converted to lower midline laparotomy with sigmoid resection, partial repair of bladder, and end colostomy.    - Pain control: PCA, scheduled IV tylenol.  - C/w NGT to LCWS.  - F/u ostomy function & monitor output.  - Monitor UOP. Allison to continue to stay in place for 5 days because of bladder repair.  - Post-op labs checked and electrolytes repleted as needed.  - F/u AM labs.  - HTN in PACU - no HTN at home per pt, though possibly undiagnosed HTN -> 5 mg IV lopressor q6h (hold for sys BP <110 or hr <60).    [facility]  [id number]

## 2019-03-12 NOTE — BRIEF OPERATIVE NOTE - OPERATION/FINDINGS
Cystoscopy with insertion of ureteral catheters by urology, Dr. Vargas.  Flexible sigmoidoscopy.  Diagnostic laparoscopy.  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  Conversion to lower midline laparotomy.  Sigmoid resection, bilateral ureters identified and preserved.  Mass was adhered to bladder, partial repair of bladder performed.  Rectal stump closed and tacked with 2-0 prolene suture.  Left sided end colostomy matured.  Licha in midline wound.  Right ureter removed at the end of the case Cystoscopy with insertion of ureteral catheters by urology, Dr. Vargas.  Flexible sigmoidoscopy.  Diagnostic laparoscopy.  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  Conversion to lower midline laparotomy.  Sigmoid resection, bilateral ureters identified and preserved.  Mass was adhered to bladder, partial repair of bladder performed.  Rectal stump closed and tacked with 2-0 prolene suture.  Left sided end colostomy matured.  Licha in midline wound.  Right ureteral catheter removed at the end of the case

## 2019-03-12 NOTE — PROGRESS NOTE ADULT - ASSESSMENT
54M presented with abdominal pain for one week found to have an obstruction with proximal large bowel dilatation.    - OR today for lap/open sigmoidectomy.   - NPO/NGT/IVF.   - Serial abdominal exams.   - Follow up AM labs.   - IV abx.   - DVT ppx: lovenox, sequential compression devices.     [facility] Pager #[phone]

## 2019-03-13 LAB
ANION GAP SERPL CALC-SCNC: 13 MMOL/L — SIGNIFICANT CHANGE UP (ref 5–17)
BUN SERPL-MCNC: 6 MG/DL — LOW (ref 7–23)
CALCIUM SERPL-MCNC: 8.7 MG/DL — SIGNIFICANT CHANGE UP (ref 8.4–10.5)
CHLORIDE SERPL-SCNC: 102 MMOL/L — SIGNIFICANT CHANGE UP (ref 96–108)
CO2 SERPL-SCNC: 25 MMOL/L — SIGNIFICANT CHANGE UP (ref 22–31)
CREAT SERPL-MCNC: 1.14 MG/DL — SIGNIFICANT CHANGE UP (ref 0.5–1.3)
GLUCOSE SERPL-MCNC: 97 MG/DL — SIGNIFICANT CHANGE UP (ref 70–99)
HCT VFR BLD CALC: 45.7 % — SIGNIFICANT CHANGE UP (ref 39–50)
HGB BLD-MCNC: 14.7 G/DL — SIGNIFICANT CHANGE UP (ref 13–17)
MAGNESIUM SERPL-MCNC: 2 MG/DL — SIGNIFICANT CHANGE UP (ref 1.6–2.6)
MCHC RBC-ENTMCNC: 26.8 PG — LOW (ref 27–34)
MCHC RBC-ENTMCNC: 32.2 GM/DL — SIGNIFICANT CHANGE UP (ref 32–36)
MCV RBC AUTO: 83.4 FL — SIGNIFICANT CHANGE UP (ref 80–100)
PHOSPHATE SERPL-MCNC: 4.7 MG/DL — HIGH (ref 2.5–4.5)
PLATELET # BLD AUTO: 353 K/UL — SIGNIFICANT CHANGE UP (ref 150–400)
POTASSIUM SERPL-MCNC: 4.4 MMOL/L — SIGNIFICANT CHANGE UP (ref 3.5–5.3)
POTASSIUM SERPL-SCNC: 4.4 MMOL/L — SIGNIFICANT CHANGE UP (ref 3.5–5.3)
RBC # BLD: 5.48 M/UL — SIGNIFICANT CHANGE UP (ref 4.2–5.8)
RBC # FLD: 14 % — SIGNIFICANT CHANGE UP (ref 10.3–14.5)
SODIUM SERPL-SCNC: 140 MMOL/L — SIGNIFICANT CHANGE UP (ref 135–145)
WBC # BLD: 11.93 K/UL — HIGH (ref 3.8–10.5)
WBC # FLD AUTO: 11.93 K/UL — HIGH (ref 3.8–10.5)

## 2019-03-13 RX ORDER — DEXTROSE MONOHYDRATE, SODIUM CHLORIDE, AND POTASSIUM CHLORIDE 50; .745; 4.5 G/1000ML; G/1000ML; G/1000ML
1000 INJECTION, SOLUTION INTRAVENOUS
Qty: 0 | Refills: 0 | Status: DISCONTINUED | OUTPATIENT
Start: 2019-03-13 | End: 2019-03-16

## 2019-03-13 RX ADMIN — Medication 1000 MILLIGRAM(S): at 05:15

## 2019-03-13 RX ADMIN — Medication 400 MILLIGRAM(S): at 11:44

## 2019-03-13 RX ADMIN — HYDROMORPHONE HYDROCHLORIDE 30 MILLILITER(S): 2 INJECTION INTRAMUSCULAR; INTRAVENOUS; SUBCUTANEOUS at 07:08

## 2019-03-13 RX ADMIN — ENOXAPARIN SODIUM 40 MILLIGRAM(S): 100 INJECTION SUBCUTANEOUS at 04:45

## 2019-03-13 RX ADMIN — Medication 1000 MILLIGRAM(S): at 12:14

## 2019-03-13 RX ADMIN — PANTOPRAZOLE SODIUM 40 MILLIGRAM(S): 20 TABLET, DELAYED RELEASE ORAL at 11:45

## 2019-03-13 RX ADMIN — SODIUM CHLORIDE 125 MILLILITER(S): 9 INJECTION, SOLUTION INTRAVENOUS at 04:46

## 2019-03-13 RX ADMIN — Medication 5 MILLIGRAM(S): at 11:45

## 2019-03-13 RX ADMIN — Medication 400 MILLIGRAM(S): at 04:46

## 2019-03-13 RX ADMIN — Medication 5 MILLIGRAM(S): at 04:45

## 2019-03-13 RX ADMIN — HYDROMORPHONE HYDROCHLORIDE 30 MILLILITER(S): 2 INJECTION INTRAMUSCULAR; INTRAVENOUS; SUBCUTANEOUS at 19:02

## 2019-03-13 RX ADMIN — Medication 5 MILLIGRAM(S): at 17:55

## 2019-03-13 RX ADMIN — HYDROMORPHONE HYDROCHLORIDE 30 MILLILITER(S): 2 INJECTION INTRAMUSCULAR; INTRAVENOUS; SUBCUTANEOUS at 22:19

## 2019-03-13 NOTE — PROGRESS NOTE ADULT - ASSESSMENT
54M found to have sigmoid mass causing obstruction and proximal large bowel dilatation now s/p diagnostic laparoscopy converted to lower midline laparotomy with sigmoid resection, partial repair of bladder, and end colostomy.    - Pain control: PCA, scheduled IV tylenol.  - C/w NGT to LCWS.  - F/u ostomy function & monitor output.  - Monitor UOP. Quispe to continue to stay in place for 5 days because of bladder repair.  - F/u AM labs.  - HTN in PACU - no HTN at home per pt, though possibly undiagnosed HTN -> 5 mg IV lopressor q6h (hold for sys BP <110 or hr <60).    [facility]  [id number]. 54M found to have sigmoid mass causing obstruction and proximal large bowel dilatation now s/p diagnostic laparoscopy converted to lower midline laparotomy with sigmoid resection, partial repair of bladder, and end colostomy.    - Pain control: PCA, scheduled IV tylenol.  - C/w NGT to LCWS.  - F/u ostomy function & monitor output.  - Monitor UOP. Quispe to continue to stay in place for 5 days because of bladder repair  - d/c ucath today  - ostomy teaching  - F/u AM labs.  - HTN in PACU - no HTN at home per pt, though possibly undiagnosed HTN -> 5 mg IV lopressor q6h (hold for sys BP <110 or hr <60).    [facility]  [id number]. 54M found to have sigmoid mass causing obstruction and proximal large bowel dilatation now s/p Diagnostic laparoscopy, open John's sigmoid resection for very large mass of the sigmoid adhered to the bladder, cystorhaphy 3/12     - Pain control: PCA, scheduled IV tylenol.  - C/w NGT to LCWS.  - F/u ostomy function & monitor output.  - Monitor UOP. Quispe to continue to stay in place for 5 days given the close adherence of the sigmoid mass to the bladder  - d/c ucath today  - ostomy teaching  - F/u AM labs.  - HTN in PACU - no HTN at home per pt, though possibly undiagnosed HTN -> 5 mg IV lopressor q6h (hold for sys BP <110 or hr <60).    [facility]  [id number].

## 2019-03-13 NOTE — PROGRESS NOTE ADULT - SUBJECTIVE AND OBJECTIVE BOX
Surgery Progress Note      Subjective: Patient seen and examined. underwent Diagnostic laparoscopy converted to lower midline laparotomy with sigmoid resection, partial repair of bladder, and end colostomy yesterday  Some hypertension in PACU  pain controlled with PCA    T(C): 36.8 (03-13-19 @ 03:00), Max: 36.8 (03-12-19 @ 06:08)  HR: 76 (03-13-19 @ 03:00) (66 - 90)  BP: 147/98 (03-13-19 @ 03:00) (120/79 - 184/85)  RR: 18 (03-13-19 @ 03:00) (16 - 20)  SpO2: 95% (03-13-19 @ 03:00) (93% - 100%)      03-11-19 @ 07:01  -  03-12-19 @ 07:00  --------------------------------------------------------  IN: 4732 mL / OUT: 0 mL / NET: 4732 mL    03-12-19 @ 07:01  -  03-13-19 @ 04:50  --------------------------------------------------------  IN: 975 mL / OUT: 1080 mL / NET: -105 mL        Physical Exam:   General: NAD, NGT in place  Abdomen: soft, nontender, +morbidly obese abdomen, midline dressing with a little serosanguinous strikethru in the middle, laparoscopic incisions c/d/i, ostomy on left pink and viable though somewhat edematous, with gas and liquid green fluid in the bag.  : allison in situ with pink tinged urine    Labs:                          15.2   13.5  )-----------( 332      ( 12 Mar 2019 16:52 )             45.7     03-12    139  |  102  |  5<L>  ----------------------------<  118<H>  4.0   |  21<L>  |  1.19    Ca    9.1      12 Mar 2019 16:52  Phos  4.6     03-12  Mg     1.8     03-12          Medications:     acetaminophen  IVPB .. 1000 milliGRAM(s) IV Intermittent once  diphenhydrAMINE   Injectable 12.5 milliGRAM(s) IV Push every 4 hours PRN  enoxaparin Injectable 40 milliGRAM(s) SubCutaneous every 24 hours  HYDROmorphone PCA (1 mG/mL) 30 milliLiter(s) PCA Continuous PCA Continuous  HYDROmorphone PCA (1 mG/mL) Rescue Clinician Bolus 0.5 milliGRAM(s) IV Push every 15 minutes PRN  lactated ringers. 1000 milliLiter(s) IV Continuous <Continuous>  metoprolol tartrate Injectable 5 milliGRAM(s) IV Push every 6 hours  naloxone Injectable 0.1 milliGRAM(s) IV Push every 3 minutes PRN  ondansetron Injectable 4 milliGRAM(s) IV Push every 6 hours PRN  pantoprazole  Injectable 40 milliGRAM(s) IV Push daily      Radiographs: No new imaging Surgery Progress Note      Subjective: Patient seen and examined. underwent Diagnostic laparoscopy, open John's sigmoid resection, cystorhaphy 3/12   Some hypertension in PACU  pain controlled with PCA    T(C): 36.8 (03-13-19 @ 03:00), Max: 36.8 (03-12-19 @ 06:08)  HR: 76 (03-13-19 @ 03:00) (66 - 90)  BP: 147/98 (03-13-19 @ 03:00) (120/79 - 184/85)  RR: 18 (03-13-19 @ 03:00) (16 - 20)  SpO2: 95% (03-13-19 @ 03:00) (93% - 100%)      03-11-19 @ 07:01  -  03-12-19 @ 07:00  --------------------------------------------------------  IN: 4732 mL / OUT: 0 mL / NET: 4732 mL    03-12-19 @ 07:01  -  03-13-19 @ 04:50  --------------------------------------------------------  IN: 975 mL / OUT: 1080 mL / NET: -105 mL        Physical Exam:   General: NAD, NGT in place  Abdomen: soft, nontender, +morbidly obese abdomen, midline dressing with a little serosanguinous strike thru in the middle, laparoscopic incisions c/d/i, ostomy on left pink and viable though somewhat edematous, with gas and liquid green fluid in the bag.  : allison in situ with pink tinged urine    Labs:                          15.2   13.5  )-----------( 332      ( 12 Mar 2019 16:52 )             45.7     03-12    139  |  102  |  5<L>  ----------------------------<  118<H>  4.0   |  21<L>  |  1.19    Ca    9.1      12 Mar 2019 16:52  Phos  4.6     03-12  Mg     1.8     03-12          Medications:     acetaminophen  IVPB .. 1000 milliGRAM(s) IV Intermittent once  diphenhydrAMINE   Injectable 12.5 milliGRAM(s) IV Push every 4 hours PRN  enoxaparin Injectable 40 milliGRAM(s) SubCutaneous every 24 hours  HYDROmorphone PCA (1 mG/mL) 30 milliLiter(s) PCA Continuous PCA Continuous  HYDROmorphone PCA (1 mG/mL) Rescue Clinician Bolus 0.5 milliGRAM(s) IV Push every 15 minutes PRN  lactated ringers. 1000 milliLiter(s) IV Continuous <Continuous>  metoprolol tartrate Injectable 5 milliGRAM(s) IV Push every 6 hours  naloxone Injectable 0.1 milliGRAM(s) IV Push every 3 minutes PRN  ondansetron Injectable 4 milliGRAM(s) IV Push every 6 hours PRN  pantoprazole  Injectable 40 milliGRAM(s) IV Push daily      Radiographs: No new imaging

## 2019-03-13 NOTE — PROGRESS NOTE ADULT - SUBJECTIVE AND OBJECTIVE BOX
Day 1 of Anesthesia Pain Management Service    SUBJECTIVE: Patient is doing well with IV PCA    Pain Scale Score:	[X] Refer to charted pain scores    THERAPY:    [ ] IV PCA Morphine		[ ] 5 mg/mL	[ ] 1 mg/mL  [X] IV PCA Hydromorphone	[ ] 5 mg/mL	[X] 1 mg/mL  [ ] IV PCA Fentanyl		[ ] 50 micrograms/mL    Demand dose: 0.2 mg     Lockout: 6 minutes   Continuous Rate: 0 mg/hr  4 Hour Limit: 4 mg    MEDICATIONS  (STANDING):  acetaminophen  IVPB .. 1000 milliGRAM(s) IV Intermittent once  enoxaparin Injectable 40 milliGRAM(s) SubCutaneous every 24 hours  HYDROmorphone PCA (1 mG/mL) 30 milliLiter(s) PCA Continuous PCA Continuous  lactated ringers. 1000 milliLiter(s) (125 mL/Hr) IV Continuous <Continuous>  metoprolol tartrate Injectable 5 milliGRAM(s) IV Push every 6 hours  pantoprazole  Injectable 40 milliGRAM(s) IV Push daily    MEDICATIONS  (PRN):  diphenhydrAMINE   Injectable 12.5 milliGRAM(s) IV Push every 4 hours PRN Pruritus  HYDROmorphone PCA (1 mG/mL) Rescue Clinician Bolus 0.5 milliGRAM(s) IV Push every 15 minutes PRN for Pain Scale GREATER THAN 6  naloxone Injectable 0.1 milliGRAM(s) IV Push every 3 minutes PRN For ANY of the following changes in patient status:  A. RR LESS THAN 10 breaths per minute, B. Oxygen saturation LESS THAN 90%, C. Sedation score of 6  ondansetron Injectable 4 milliGRAM(s) IV Push every 6 hours PRN Nausea      OBJECTIVE:    Sedation Score:	[ X] Alert	[ ] Drowsy 	[ ] Arousable	[ ] Asleep	[ ] Unresponsive    Side Effects:	[X ] None	[ ] Nausea	[ ] Vomiting	[ ] Pruritus  		[ ] Other:    Vital Signs Last 24 Hrs  T(C): 36.8 (13 Mar 2019 06:39), Max: 36.8 (13 Mar 2019 03:00)  T(F): 98.3 (13 Mar 2019 06:39), Max: 98.3 (13 Mar 2019 06:39)  HR: 73 (13 Mar 2019 06:39) (70 - 90)  BP: 139/88 (13 Mar 2019 06:39) (120/79 - 184/85)  BP(mean): 119 (12 Mar 2019 22:30) (111 - 141)  RR: 18 (13 Mar 2019 06:39) (16 - 20)  SpO2: 96% (13 Mar 2019 06:39) (93% - 100%)    ASSESSMENT/ PLAN    Therapy to  be:               [X] Continued   [ ] Discontinued   [ ] Changed to PRN Analgesics    Documentation and Verification of current medications:   [X] Done	[ ] Not done, not eligible    Comments: +NGT +DAMION -CPAP use (lost 125pounds) PCA use 1-5x/hr. Endorses being uncomfortable. Continue PCA and IV acetaminophen

## 2019-03-14 LAB
ANION GAP SERPL CALC-SCNC: 10 MMOL/L — SIGNIFICANT CHANGE UP (ref 5–17)
BUN SERPL-MCNC: 7 MG/DL — SIGNIFICANT CHANGE UP (ref 7–23)
CALCIUM SERPL-MCNC: 8.8 MG/DL — SIGNIFICANT CHANGE UP (ref 8.4–10.5)
CHLORIDE SERPL-SCNC: 101 MMOL/L — SIGNIFICANT CHANGE UP (ref 96–108)
CO2 SERPL-SCNC: 29 MMOL/L — SIGNIFICANT CHANGE UP (ref 22–31)
CREAT SERPL-MCNC: 0.97 MG/DL — SIGNIFICANT CHANGE UP (ref 0.5–1.3)
GLUCOSE SERPL-MCNC: 100 MG/DL — HIGH (ref 70–99)
HCT VFR BLD CALC: 41.2 % — SIGNIFICANT CHANGE UP (ref 39–50)
HGB BLD-MCNC: 13 G/DL — SIGNIFICANT CHANGE UP (ref 13–17)
MAGNESIUM SERPL-MCNC: 1.8 MG/DL — SIGNIFICANT CHANGE UP (ref 1.6–2.6)
MCHC RBC-ENTMCNC: 26.6 PG — LOW (ref 27–34)
MCHC RBC-ENTMCNC: 31.6 GM/DL — LOW (ref 32–36)
MCV RBC AUTO: 84.4 FL — SIGNIFICANT CHANGE UP (ref 80–100)
PHOSPHATE SERPL-MCNC: 2.4 MG/DL — LOW (ref 2.5–4.5)
PLATELET # BLD AUTO: 355 K/UL — SIGNIFICANT CHANGE UP (ref 150–400)
POTASSIUM SERPL-MCNC: 4.3 MMOL/L — SIGNIFICANT CHANGE UP (ref 3.5–5.3)
POTASSIUM SERPL-SCNC: 4.3 MMOL/L — SIGNIFICANT CHANGE UP (ref 3.5–5.3)
RBC # BLD: 4.88 M/UL — SIGNIFICANT CHANGE UP (ref 4.2–5.8)
RBC # FLD: 14.2 % — SIGNIFICANT CHANGE UP (ref 10.3–14.5)
SODIUM SERPL-SCNC: 140 MMOL/L — SIGNIFICANT CHANGE UP (ref 135–145)
WBC # BLD: 8.91 K/UL — SIGNIFICANT CHANGE UP (ref 3.8–10.5)
WBC # FLD AUTO: 8.91 K/UL — SIGNIFICANT CHANGE UP (ref 3.8–10.5)

## 2019-03-14 RX ORDER — PANTOPRAZOLE SODIUM 20 MG/1
40 TABLET, DELAYED RELEASE ORAL EVERY 12 HOURS
Qty: 0 | Refills: 0 | Status: DISCONTINUED | OUTPATIENT
Start: 2019-03-14 | End: 2019-03-16

## 2019-03-14 RX ORDER — MAGNESIUM SULFATE 500 MG/ML
2 VIAL (ML) INJECTION ONCE
Qty: 0 | Refills: 0 | Status: COMPLETED | OUTPATIENT
Start: 2019-03-14 | End: 2019-03-14

## 2019-03-14 RX ADMIN — ENOXAPARIN SODIUM 40 MILLIGRAM(S): 100 INJECTION SUBCUTANEOUS at 05:29

## 2019-03-14 RX ADMIN — Medication 5 MILLIGRAM(S): at 05:29

## 2019-03-14 RX ADMIN — HYDROMORPHONE HYDROCHLORIDE 30 MILLILITER(S): 2 INJECTION INTRAMUSCULAR; INTRAVENOUS; SUBCUTANEOUS at 19:01

## 2019-03-14 RX ADMIN — Medication 5 MILLIGRAM(S): at 01:30

## 2019-03-14 RX ADMIN — Medication 50 GRAM(S): at 09:07

## 2019-03-14 RX ADMIN — PANTOPRAZOLE SODIUM 40 MILLIGRAM(S): 20 TABLET, DELAYED RELEASE ORAL at 21:53

## 2019-03-14 RX ADMIN — HYDROMORPHONE HYDROCHLORIDE 30 MILLILITER(S): 2 INJECTION INTRAMUSCULAR; INTRAVENOUS; SUBCUTANEOUS at 07:17

## 2019-03-14 RX ADMIN — Medication 62.5 MILLIMOLE(S): at 11:30

## 2019-03-14 RX ADMIN — PANTOPRAZOLE SODIUM 40 MILLIGRAM(S): 20 TABLET, DELAYED RELEASE ORAL at 11:30

## 2019-03-14 NOTE — PROGRESS NOTE ADULT - SUBJECTIVE AND OBJECTIVE BOX
Pain Management Attending Addendum    SUBJECTIVE: no complaints    Therapy:	  [x ] IV PCA	   [ ] Epidural           [ ] s/p Spinal Opoid              [ ] Postpartum infusion	  [ ] Patient controlled regional anesthesia (PCRA)    [ ] prn Analgesics    OBJECTIVE:   [x ] No new signs     [ ] Other:    Side Effects:  [x ] None			[ ] Other:    Assessment of Catheter Site:		[ ] Intact		[ ] Other:    ASSESSMENT/PLAN  [x ] Continue current therapy    [ ] Therapy changed to:    [ ] IV PCA       [ ] Epidural     [ ] prn Analgesics     [ ] post partum infusion    Comments:

## 2019-03-14 NOTE — PROVIDER CONTACT NOTE (OTHER) - SITUATION
pt with c/o abdominal pain
/76. Held IV Lopressor for parameter
PT NGT accidentally removed by patient.
Pt bp elevated
Pt c/o abd pain. No pain medication ordered. NGT in place.

## 2019-03-14 NOTE — CHART NOTE - NSCHARTNOTEFT_GEN_A_CORE
Nutrition Follow Up Note      Source: medical record     Diet : NPO    Patient seen for nutrition follow up for NPO status. Medical chart reviewed/events noted. Per chart, pt "found to have sigmoid mass causing obstruction and proximal large bowel dilatation now s/p Diagnostic laparoscopy, open John's sigmoid resection for very large mass of the sigmoid adhered to the bladder, cystorhaphy 3/12". Pt currently NPO day #7. Pt denies GI distress. Colostomy output 350ml x 24 hours, NGT output 1500ml x 24 hours. Pt amenable to nutrition education when diet advanced, made aware RD remains available.            Daily Weight in k.7 ()- no new weight to assess       Pertinent Medications: MEDICATIONS  (STANDING):  dextrose 5% + sodium chloride 0.45% with potassium chloride 20 mEq/L 1000 milliLiter(s) (100 mL/Hr) IV Continuous <Continuous>  enoxaparin Injectable 40 milliGRAM(s) SubCutaneous every 24 hours  HYDROmorphone PCA (1 mG/mL) 30 milliLiter(s) PCA Continuous PCA Continuous  metoprolol tartrate Injectable 5 milliGRAM(s) IV Push every 6 hours  pantoprazole  Injectable 40 milliGRAM(s) IV Push daily  sodium phosphate IVPB 15 milliMole(s) IV Intermittent once    MEDICATIONS  (PRN):  diphenhydrAMINE   Injectable 12.5 milliGRAM(s) IV Push every 4 hours PRN Pruritus  HYDROmorphone PCA (1 mG/mL) Rescue Clinician Bolus 0.5 milliGRAM(s) IV Push every 15 minutes PRN for Pain Scale GREATER THAN 6  naloxone Injectable 0.1 milliGRAM(s) IV Push every 3 minutes PRN For ANY of the following changes in patient status:  A. RR LESS THAN 10 breaths per minute, B. Oxygen saturation LESS THAN 90%, C. Sedation score of 6  ondansetron Injectable 4 milliGRAM(s) IV Push every 6 hours PRN Nausea    Pertinent Labs:  @ 07:23: Na 140, BUN 7, Cr 0.97, <H>, K+ 4.3, Phos 2.4<L>, Mg 1.8, Alk Phos --, ALT/SGPT --, AST/SGOT --, HbA1c --    No edema. No pressure ulcers documented.     Estimated Needs:   [X ] no change since previous assessment  [ ] recalculated:     Previous Nutrition Diagnosis: Inadequate protein energy intake   Nutrition Diagnosis is: ongoing     New Nutrition Diagnosis: N/A       Recommend  1)  Medical team to advance diet when medically feasible via tolerated route. Consider advancing to clear liquid, followed by low fiber diet as tolerated. If unable to advance diet, consider alternate means of nutrition if within pt/family GOC.  2) RD remains available for nutrition education with diet advancement     Monitoring and Evaluation:     Continue to monitor Nutritional intake, Tolerance to diet prescription, weights, labs, skin integrity    RD remains available upon request and will follow up per protocol

## 2019-03-14 NOTE — PROVIDER CONTACT NOTE (OTHER) - BACKGROUND
pt admitted with LBO
+LBO
Large bowel obstruction
Pt admitted with large bowl obstruction and is scheduled for OR today
S/P Oneyda

## 2019-03-14 NOTE — PROGRESS NOTE ADULT - ASSESSMENT
54M found to have sigmoid mass causing obstruction and proximal large bowel dilatation now s/p Diagnostic laparoscopy, open John's sigmoid resection for very large mass of the sigmoid adhered to the bladder, cystorhaphy 3/12     - Pain control: PCA  - C/w NGT to LCWS.  - F/u ostomy function & monitor output.  - Monitor UOP. Quispe to continue to stay in place for 5 days given the close adherence of the sigmoid mass to the bladder  - ostomy teaching  - F/u AM labs.  - HTN in PACU - no HTN at home per pt, though possibly undiagnosed HTN -> 5 mg IV lopressor q6h (hold for sys BP <110 or hr <60).    [facility]  [id number].

## 2019-03-14 NOTE — PROGRESS NOTE ADULT - SUBJECTIVE AND OBJECTIVE BOX
Surgery Progress Note      Subjective: Patient seen and examined. No acute events overnight.     T(C): 37.3 (03-14-19 @ 01:24), Max: 37.4 (03-13-19 @ 21:33)  HR: 83 (03-14-19 @ 01:24) (73 - 83)  BP: 122/78 (03-14-19 @ 01:24) (121/73 - 139/88)  RR: 20 (03-14-19 @ 01:24) (17 - 20)  SpO2: 95% (03-14-19 @ 01:24) (94% - 97%)      03-12-19 @ 07:01  -  03-13-19 @ 07:00  --------------------------------------------------------  IN: 1950 mL / OUT: 1430 mL / NET: 520 mL    03-13-19 @ 07:01  -  03-14-19 @ 05:11  --------------------------------------------------------  IN: 1300 mL / OUT: 2525 mL / NET: -1225 mL        Physical Exam:     General: NAD, NGT in place  Abdomen: soft, nontender, +morbidly obese abdomen, midline dressing with a little serosanguinous strike thru in the middle, laparoscopic incisions c/d/i, ostomy on left pink and viable though somewhat edematous, with gas and liquid green fluid in the bag.  : allison in situ with pink tinged urine    Labs:                          14.7   11.93 )-----------( 353      ( 13 Mar 2019 09:34 )             45.7     03-13    140  |  102  |  6<L>  ----------------------------<  97  4.4   |  25  |  1.14    Ca    8.7      13 Mar 2019 07:12  Phos  4.7     03-13  Mg     2.0     03-13        Medications:     dextrose 5% + sodium chloride 0.45% with potassium chloride 20 mEq/L 1000 milliLiter(s) IV Continuous <Continuous>  diphenhydrAMINE   Injectable 12.5 milliGRAM(s) IV Push every 4 hours PRN  enoxaparin Injectable 40 milliGRAM(s) SubCutaneous every 24 hours  HYDROmorphone PCA (1 mG/mL) 30 milliLiter(s) PCA Continuous PCA Continuous  HYDROmorphone PCA (1 mG/mL) Rescue Clinician Bolus 0.5 milliGRAM(s) IV Push every 15 minutes PRN  metoprolol tartrate Injectable 5 milliGRAM(s) IV Push every 6 hours  naloxone Injectable 0.1 milliGRAM(s) IV Push every 3 minutes PRN  ondansetron Injectable 4 milliGRAM(s) IV Push every 6 hours PRN  pantoprazole  Injectable 40 milliGRAM(s) IV Push daily      Radiographs: No new imaging

## 2019-03-14 NOTE — PROGRESS NOTE ADULT - SUBJECTIVE AND OBJECTIVE BOX
Day 2 of Anesthesia Pain Management Service    SUBJECTIVE: Patient is doing well with IV PCA    Pain Scale Score:	[X] Refer to charted pain scores    THERAPY:    [ ] IV PCA Morphine		[ ] 5 mg/mL	[ ] 1 mg/mL  [X] IV PCA Hydromorphone	[ ] 5 mg/mL	[X] 1 mg/mL  [ ] IV PCA Fentanyl		[ ] 50 micrograms/mL    Demand dose: 0.2 mg     Lockout: 6 minutes   Continuous Rate: 0 mg/hr  4 Hour Limit: 4 mg    MEDICATIONS  (STANDING):  dextrose 5% + sodium chloride 0.45% with potassium chloride 20 mEq/L 1000 milliLiter(s) (100 mL/Hr) IV Continuous <Continuous>  enoxaparin Injectable 40 milliGRAM(s) SubCutaneous every 24 hours  HYDROmorphone PCA (1 mG/mL) 30 milliLiter(s) PCA Continuous PCA Continuous  metoprolol tartrate Injectable 5 milliGRAM(s) IV Push every 6 hours  pantoprazole  Injectable 40 milliGRAM(s) IV Push daily  sodium phosphate IVPB 15 milliMole(s) IV Intermittent once    MEDICATIONS  (PRN):  diphenhydrAMINE   Injectable 12.5 milliGRAM(s) IV Push every 4 hours PRN Pruritus  HYDROmorphone PCA (1 mG/mL) Rescue Clinician Bolus 0.5 milliGRAM(s) IV Push every 15 minutes PRN for Pain Scale GREATER THAN 6  naloxone Injectable 0.1 milliGRAM(s) IV Push every 3 minutes PRN For ANY of the following changes in patient status:  A. RR LESS THAN 10 breaths per minute, B. Oxygen saturation LESS THAN 90%, C. Sedation score of 6  ondansetron Injectable 4 milliGRAM(s) IV Push every 6 hours PRN Nausea      OBJECTIVE:    Sedation Score:	[ X] Alert	[ ] Drowsy 	[ ] Arousable	[ ] Asleep	[ ] Unresponsive    Side Effects:	[X ] None	[ ] Nausea	[ ] Vomiting	[ ] Pruritus  		[ ] Other:    Vital Signs Last 24 Hrs  T(C): 37.3 (14 Mar 2019 05:21), Max: 37.4 (13 Mar 2019 21:33)  T(F): 99.2 (14 Mar 2019 05:21), Max: 99.4 (13 Mar 2019 21:33)  HR: 80 (14 Mar 2019 05:21) (76 - 83)  BP: 115/71 (14 Mar 2019 05:21) (115/71 - 138/82)  BP(mean): --  RR: 18 (14 Mar 2019 05:21) (17 - 20)  SpO2: 94% (14 Mar 2019 05:21) (94% - 97%)    ASSESSMENT/ PLAN    Therapy to  be:               [X] Continued   [ ] Discontinued   [ ] Changed to PRN Analgesics    Documentation and Verification of current medications:   [X] Done	[ ] Not done, not eligible    Comments: OOB in chair. Using 3-5x/hr. NGT clamped. Continue.

## 2019-03-14 NOTE — PROVIDER CONTACT NOTE (OTHER) - ACTION/TREATMENT ORDERED:
MD to come assess pt prior to ordering medications, continue to monitor
MD aware. NGT placed at bedside with chest xray completed and confirming placement. Will continue to monitor.
MD made aware. No pain medication ordered at this time. will cont to monitor.
No further interventions at this point, pt awaiting OR. Will continue to monitor.
will continue to monitor

## 2019-03-15 ENCOUNTER — TRANSCRIPTION ENCOUNTER (OUTPATIENT)
Age: 54
End: 2019-03-15

## 2019-03-15 LAB
ANION GAP SERPL CALC-SCNC: 12 MMOL/L — SIGNIFICANT CHANGE UP (ref 5–17)
BUN SERPL-MCNC: 7 MG/DL — SIGNIFICANT CHANGE UP (ref 7–23)
CALCIUM SERPL-MCNC: 9 MG/DL — SIGNIFICANT CHANGE UP (ref 8.4–10.5)
CHLORIDE SERPL-SCNC: 99 MMOL/L — SIGNIFICANT CHANGE UP (ref 96–108)
CO2 SERPL-SCNC: 29 MMOL/L — SIGNIFICANT CHANGE UP (ref 22–31)
CREAT SERPL-MCNC: 0.99 MG/DL — SIGNIFICANT CHANGE UP (ref 0.5–1.3)
GLUCOSE SERPL-MCNC: 97 MG/DL — SIGNIFICANT CHANGE UP (ref 70–99)
HCT VFR BLD CALC: 40.9 % — SIGNIFICANT CHANGE UP (ref 39–50)
HGB BLD-MCNC: 13.1 G/DL — SIGNIFICANT CHANGE UP (ref 13–17)
MAGNESIUM SERPL-MCNC: 1.9 MG/DL — SIGNIFICANT CHANGE UP (ref 1.6–2.6)
MCHC RBC-ENTMCNC: 27.7 PG — SIGNIFICANT CHANGE UP (ref 27–34)
MCHC RBC-ENTMCNC: 32.2 GM/DL — SIGNIFICANT CHANGE UP (ref 32–36)
MCV RBC AUTO: 86.3 FL — SIGNIFICANT CHANGE UP (ref 80–100)
PHOSPHATE SERPL-MCNC: 2.8 MG/DL — SIGNIFICANT CHANGE UP (ref 2.5–4.5)
PLATELET # BLD AUTO: 333 K/UL — SIGNIFICANT CHANGE UP (ref 150–400)
POTASSIUM SERPL-MCNC: 3.7 MMOL/L — SIGNIFICANT CHANGE UP (ref 3.5–5.3)
POTASSIUM SERPL-SCNC: 3.7 MMOL/L — SIGNIFICANT CHANGE UP (ref 3.5–5.3)
RBC # BLD: 4.74 M/UL — SIGNIFICANT CHANGE UP (ref 4.2–5.8)
RBC # FLD: 13.3 % — SIGNIFICANT CHANGE UP (ref 10.3–14.5)
SODIUM SERPL-SCNC: 140 MMOL/L — SIGNIFICANT CHANGE UP (ref 135–145)
WBC # BLD: 10.6 K/UL — HIGH (ref 3.8–10.5)
WBC # FLD AUTO: 10.6 K/UL — HIGH (ref 3.8–10.5)

## 2019-03-15 RX ADMIN — ENOXAPARIN SODIUM 40 MILLIGRAM(S): 100 INJECTION SUBCUTANEOUS at 04:42

## 2019-03-15 RX ADMIN — HYDROMORPHONE HYDROCHLORIDE 30 MILLILITER(S): 2 INJECTION INTRAMUSCULAR; INTRAVENOUS; SUBCUTANEOUS at 19:02

## 2019-03-15 RX ADMIN — DEXTROSE MONOHYDRATE, SODIUM CHLORIDE, AND POTASSIUM CHLORIDE 125 MILLILITER(S): 50; .745; 4.5 INJECTION, SOLUTION INTRAVENOUS at 10:12

## 2019-03-15 RX ADMIN — PANTOPRAZOLE SODIUM 40 MILLIGRAM(S): 20 TABLET, DELAYED RELEASE ORAL at 21:21

## 2019-03-15 RX ADMIN — PANTOPRAZOLE SODIUM 40 MILLIGRAM(S): 20 TABLET, DELAYED RELEASE ORAL at 10:02

## 2019-03-15 RX ADMIN — HYDROMORPHONE HYDROCHLORIDE 30 MILLILITER(S): 2 INJECTION INTRAMUSCULAR; INTRAVENOUS; SUBCUTANEOUS at 07:03

## 2019-03-15 RX ADMIN — Medication 5 MILLIGRAM(S): at 06:57

## 2019-03-15 RX ADMIN — Medication 5 MILLIGRAM(S): at 01:05

## 2019-03-15 NOTE — PROGRESS NOTE ADULT - SUBJECTIVE AND OBJECTIVE BOX
GENERAL SURGERY DAILY PROGRESS NOTE:       Subjective:  Pt seen and examined at bedside. No acute events overnight. Failed NGT clamp trial yesterday. Pain controlled. (-)N/V, (+) flatus/stool in the ostomy.          Objective:    PE:  General: NAD, NGT in place to LCWS  Abdomen: soft, nontender, +morbidly obese abdomen, midline dressing with a little serosanguinous strike through in the middle, laparoscopic incisions c/d/i, ostomy on left pink and viable though somewhat edematous, with gas and liquid stool in the bag.  : allison in situ in place with clear yelllow urine    Vital Signs Last 24 Hrs  T(C): 36.8 (15 Mar 2019 00:59), Max: 37.3 (14 Mar 2019 05:21)  T(F): 98.3 (15 Mar 2019 00:59), Max: 99.2 (14 Mar 2019 05:21)  HR: 82 (15 Mar 2019 00:59) (75 - 83)  BP: 115/74 (15 Mar 2019 00:59) (100/63 - 117/84)  BP(mean): --  RR: 18 (15 Mar 2019 00:59) (18 - 18)  SpO2: 92% (15 Mar 2019 00:59) (92% - 95%)    I&O's Detail    13 Mar 2019 07:01  -  14 Mar 2019 07:00  --------------------------------------------------------  IN:    dextrose 5% + sodium chloride 0.45% with potassium chloride 20 mEq/L: 2400 mL    Solution: 100 mL  Total IN: 2500 mL    OUT:    Colostomy: 350 mL    Indwelling Catheter - Urethral: 1525 mL    Nasoenteral Tube: 1500 mL  Total OUT: 3375 mL    Total NET: -875 mL      14 Mar 2019 07:01  -  15 Mar 2019 04:58  --------------------------------------------------------  IN:    dextrose 5% + sodium chloride 0.45% with potassium chloride 20 mEq/L: 1325 mL  Total IN: 1325 mL    OUT:    Colostomy: 150 mL    Indwelling Catheter - Urethral: 1050 mL    Nasoenteral Tube: 700 mL  Total OUT: 1900 mL    Total NET: -575 mL          Daily     Daily     MEDICATIONS  (STANDING):  dextrose 5% + sodium chloride 0.45% with potassium chloride 20 mEq/L 1000 milliLiter(s) (125 mL/Hr) IV Continuous <Continuous>  enoxaparin Injectable 40 milliGRAM(s) SubCutaneous every 24 hours  HYDROmorphone PCA (1 mG/mL) 30 milliLiter(s) PCA Continuous PCA Continuous  metoprolol tartrate Injectable 5 milliGRAM(s) IV Push every 6 hours  pantoprazole  Injectable 40 milliGRAM(s) IV Push every 12 hours    MEDICATIONS  (PRN):  diphenhydrAMINE   Injectable 12.5 milliGRAM(s) IV Push every 4 hours PRN Pruritus  HYDROmorphone PCA (1 mG/mL) Rescue Clinician Bolus 0.5 milliGRAM(s) IV Push every 15 minutes PRN for Pain Scale GREATER THAN 6  naloxone Injectable 0.1 milliGRAM(s) IV Push every 3 minutes PRN For ANY of the following changes in patient status:  A. RR LESS THAN 10 breaths per minute, B. Oxygen saturation LESS THAN 90%, C. Sedation score of 6  ondansetron Injectable 4 milliGRAM(s) IV Push every 6 hours PRN Nausea      LABS:                        13.0   8.91  )-----------( 355      ( 14 Mar 2019 10:21 )             41.2     03-14    140  |  101  |  7   ----------------------------<  100<H>  4.3   |  29  |  0.97    Ca    8.8      14 Mar 2019 07:23  Phos  2.4     03-14  Mg     1.8     03-14            RADIOLOGY & ADDITIONAL STUDIES:

## 2019-03-15 NOTE — PROGRESS NOTE ADULT - SUBJECTIVE AND OBJECTIVE BOX
Day 3 of Anesthesia Pain Management Service    SUBJECTIVE: Patient is doing well with IV PCA    Pain Scale Score:	[X] Refer to charted pain scores    THERAPY:    [ ] IV PCA Morphine		[ ] 5 mg/mL	[ ] 1 mg/mL  [X] IV PCA Hydromorphone	[ ] 5 mg/mL	[X] 1 mg/mL  [ ] IV PCA Fentanyl		[ ] 50 micrograms/mL    Demand dose: 0.2 mg     Lockout: 6 minutes   Continuous Rate: 0 mg/hr  4 Hour Limit: 4 mg    MEDICATIONS  (STANDING):  dextrose 5% + sodium chloride 0.45% with potassium chloride 20 mEq/L 1000 milliLiter(s) (125 mL/Hr) IV Continuous <Continuous>  enoxaparin Injectable 40 milliGRAM(s) SubCutaneous every 24 hours  HYDROmorphone PCA (1 mG/mL) 30 milliLiter(s) PCA Continuous PCA Continuous  metoprolol tartrate Injectable 5 milliGRAM(s) IV Push every 6 hours  pantoprazole  Injectable 40 milliGRAM(s) IV Push every 12 hours    MEDICATIONS  (PRN):  diphenhydrAMINE   Injectable 12.5 milliGRAM(s) IV Push every 4 hours PRN Pruritus  HYDROmorphone PCA (1 mG/mL) Rescue Clinician Bolus 0.5 milliGRAM(s) IV Push every 15 minutes PRN for Pain Scale GREATER THAN 6  naloxone Injectable 0.1 milliGRAM(s) IV Push every 3 minutes PRN For ANY of the following changes in patient status:  A. RR LESS THAN 10 breaths per minute, B. Oxygen saturation LESS THAN 90%, C. Sedation score of 6  ondansetron Injectable 4 milliGRAM(s) IV Push every 6 hours PRN Nausea      OBJECTIVE:    Sedation Score:	[ X] Alert	[ ] Drowsy 	[ ] Arousable	[ ] Asleep	[ ] Unresponsive    Side Effects:	[X ] None	[ ] Nausea	[ ] Vomiting	[ ] Pruritus  		[ ] Other:    Vital Signs Last 24 Hrs  T(C): 36.7 (15 Mar 2019 06:21), Max: 37.3 (14 Mar 2019 22:22)  T(F): 98.1 (15 Mar 2019 06:21), Max: 99.1 (14 Mar 2019 22:22)  HR: 78 (15 Mar 2019 06:21) (75 - 83)  BP: 120/83 (15 Mar 2019 06:21) (100/63 - 120/83)  BP(mean): --  RR: 18 (15 Mar 2019 06:21) (18 - 18)  SpO2: 93% (15 Mar 2019 06:21) (92% - 95%)    ASSESSMENT/ PLAN    Therapy to  be:               [X] Continued   [ ] Discontinued   [ ] Changed to PRN Analgesics    Documentation and Verification of current medications:   [X] Done	[ ] Not done, not eligible    Comments: OOB in chair. Using 2-5x/hr. +NGT

## 2019-03-15 NOTE — DISCHARGE NOTE PROVIDER - HOSPITAL COURSE
54M was admitted to [facility] on 3/8/19. The patient had a large bowel obstruction. CT scan showed Mild near pancolonic distention with associated mild wall thickening and pericolic fat stranding to the level of the mid sigmoid, where there is a markedly heterogeneous 17.2 cm segment of bowel with fistulization to the more distal sigmoid colon. There is also regional lymphadenopathy. 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 Differential includes an inflammatory, neoplastic or infectious process with primary consideration inflammatory bowel disease. However, underlying neoplasm should also be considered. NGT was placed and patient was placed on bowel rest, NPO/IVF. On 3/12, patient went to the OR for diagnostic laparoscopy and more procedure. In the OR a large inflammatory mass was seen in the sigmoid colon. Mass was adhered to bladder, partial repair of bladder performed. Rectal stump closed and tacked with 2-0 prolene suture. Left sided end colostomy matured. Post operative the patient was sent to the PACU, the patient was hemodynamically stable and sent to a surgical floor. The patient had daily wound care and was seen by physical therapy who recommended home PT. The patient was pain was controlled by IV pain medications transitioned to po medications. The patient was advanced to low residual diet and tolerated it well. On POD5, the allison catheter was removed and the patient was voiding appropriately. The patient was hemodynamically stable and placed on home medications. The patient was told to follow up with Dr. Stein in 1 week and had no other issues. 54M was admitted to [facility] on 3/8/19. The patient had a large bowel obstruction. CT scan showed Mild near pancolonic distention with associated mild wall thickening and pericolic fat stranding to the level of the mid sigmoid, where there is a markedly heterogeneous 17.2 cm segment of bowel with fistulization to the more distal sigmoid colon. There is also regional lymphadenopathy. 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 Differential includes an inflammatory, neoplastic or infectious process with primary consideration inflammatory bowel disease. However, underlying neoplasm should also be considered. NGT was placed and patient was placed on bowel rest, NPO/IVF. On 3/12, patient went to the OR for diagnostic laparoscopy and more procedure. In the OR a large inflammatory mass was seen in the sigmoid colon. Mass was adhered to bladder, partial repair of bladder performed. Rectal stump closed and tacked with 2-0 prolene suture. Left sided end colostomy matured. Post operative the patient was sent to the PACU, the patient was hemodynamically stable and sent to a surgical floor. The patient had daily wound care and was seen by physical therapy who recommended home PT. The patient was pain was controlled by IV pain medications transitioned to po medications. The patient was advanced to low residual diet and tolerated it well. On POD5, the allison catheter was removed and the patient was voiding appropriately. Repeat CT on POD7 was obtained for leukocytosis of WBC=13. CT A/P showed Status post Blandon's and left lower quadrant colostomy. Mild wall thickening involving the colon proximal to the colostomy which may be reactive although underlying infection is not excluded. No drainable fluid collection or bowel obstruction. Mild wall thickening involving the urinary bladder; correlate with urinalysis. Ill-defined hypoattenuating lesions in the liver are indeterminate. Further evaluation with MRI with contrast can be performed. The patient was hemodynamically stable and placed on home medications. The patient was told to follow up with Dr. Stein in 1 week and had no other issues.

## 2019-03-15 NOTE — DISCHARGE NOTE PROVIDER - CARE PROVIDER_API CALL
Jannie Stein)  ColonRectal Surgery; Surgery  [address], [address]  [place] [address]  Phone: [phone]  Fax: [phone]  Follow Up Time: 1 week

## 2019-03-15 NOTE — DISCHARGE NOTE PROVIDER - NSDCCPCAREPLAN_GEN_ALL_CORE_FT
PRINCIPAL DISCHARGE DIAGNOSIS  Diagnosis: Large bowel obstruction  Assessment and Plan of Treatment: If you develop worsening abdominal pain, nausea/vomiting, lightheadedness/dizziness, fevers/chills please return to the ER for further evaluation PRINCIPAL DISCHARGE DIAGNOSIS  Diagnosis: Large bowel obstruction  Assessment and Plan of Treatment: Follow up with Dr. Stein in one week. Please call to schedule an appointment.   NOTIFY YOUR SURGEON IF: You have any bleeding that does not stop, any pus draining from your wound, any fever (over 100.4 F) or chills, persistent nausea/vomiting, persistent diarrhea, or if your pain is not controlled on your discharge pain medications.   Activity: No heavy lifting or straining;  Do not drive while taking narcotic pain medication; Do not submerge or scrub incision sites.   You have an ostomy. A Visiting nurse will come to your house to help you change it for the first few changes.

## 2019-03-15 NOTE — DISCHARGE NOTE PROVIDER - NSDCACTIVITY_GEN_ALL_CORE
Showering allowed/Walking - Indoors allowed/No heavy lifting/straining/Do not drive or operate machinery/Walking - Outdoors allowed/Do not make important decisions/Stairs allowed

## 2019-03-16 LAB
ANION GAP SERPL CALC-SCNC: 12 MMOL/L — SIGNIFICANT CHANGE UP (ref 5–17)
BUN SERPL-MCNC: 6 MG/DL — LOW (ref 7–23)
CALCIUM SERPL-MCNC: 8.6 MG/DL — SIGNIFICANT CHANGE UP (ref 8.4–10.5)
CHLORIDE SERPL-SCNC: 99 MMOL/L — SIGNIFICANT CHANGE UP (ref 96–108)
CO2 SERPL-SCNC: 27 MMOL/L — SIGNIFICANT CHANGE UP (ref 22–31)
CREAT SERPL-MCNC: 1.11 MG/DL — SIGNIFICANT CHANGE UP (ref 0.5–1.3)
GLUCOSE SERPL-MCNC: 98 MG/DL — SIGNIFICANT CHANGE UP (ref 70–99)
HCT VFR BLD CALC: 38.9 % — LOW (ref 39–50)
HGB BLD-MCNC: 11.9 G/DL — LOW (ref 13–17)
MAGNESIUM SERPL-MCNC: 1.7 MG/DL — SIGNIFICANT CHANGE UP (ref 1.6–2.6)
MCHC RBC-ENTMCNC: 26.7 PG — LOW (ref 27–34)
MCHC RBC-ENTMCNC: 30.6 GM/DL — LOW (ref 32–36)
MCV RBC AUTO: 87.2 FL — SIGNIFICANT CHANGE UP (ref 80–100)
PHOSPHATE SERPL-MCNC: 3.1 MG/DL — SIGNIFICANT CHANGE UP (ref 2.5–4.5)
PLATELET # BLD AUTO: 366 K/UL — SIGNIFICANT CHANGE UP (ref 150–400)
POTASSIUM SERPL-MCNC: 4.1 MMOL/L — SIGNIFICANT CHANGE UP (ref 3.5–5.3)
POTASSIUM SERPL-SCNC: 4.1 MMOL/L — SIGNIFICANT CHANGE UP (ref 3.5–5.3)
RBC # BLD: 4.46 M/UL — SIGNIFICANT CHANGE UP (ref 4.2–5.8)
RBC # FLD: 14.3 % — SIGNIFICANT CHANGE UP (ref 10.3–14.5)
SODIUM SERPL-SCNC: 138 MMOL/L — SIGNIFICANT CHANGE UP (ref 135–145)
WBC # BLD: 10.49 K/UL — SIGNIFICANT CHANGE UP (ref 3.8–10.5)
WBC # FLD AUTO: 10.49 K/UL — SIGNIFICANT CHANGE UP (ref 3.8–10.5)

## 2019-03-16 RX ORDER — PANTOPRAZOLE SODIUM 20 MG/1
40 TABLET, DELAYED RELEASE ORAL
Qty: 0 | Refills: 0 | Status: DISCONTINUED | OUTPATIENT
Start: 2019-03-16 | End: 2019-03-20

## 2019-03-16 RX ORDER — ACETAMINOPHEN 500 MG
650 TABLET ORAL EVERY 6 HOURS
Qty: 0 | Refills: 0 | Status: DISCONTINUED | OUTPATIENT
Start: 2019-03-16 | End: 2019-03-20

## 2019-03-16 RX ORDER — MAGNESIUM SULFATE 500 MG/ML
2 VIAL (ML) INJECTION ONCE
Qty: 0 | Refills: 0 | Status: COMPLETED | OUTPATIENT
Start: 2019-03-16 | End: 2019-03-16

## 2019-03-16 RX ORDER — OXYCODONE HYDROCHLORIDE 5 MG/1
10 TABLET ORAL EVERY 4 HOURS
Qty: 0 | Refills: 0 | Status: DISCONTINUED | OUTPATIENT
Start: 2019-03-16 | End: 2019-03-20

## 2019-03-16 RX ORDER — OXYCODONE HYDROCHLORIDE 5 MG/1
5 TABLET ORAL EVERY 4 HOURS
Qty: 0 | Refills: 0 | Status: DISCONTINUED | OUTPATIENT
Start: 2019-03-16 | End: 2019-03-20

## 2019-03-16 RX ORDER — DEXTROSE MONOHYDRATE, SODIUM CHLORIDE, AND POTASSIUM CHLORIDE 50; .745; 4.5 G/1000ML; G/1000ML; G/1000ML
1000 INJECTION, SOLUTION INTRAVENOUS
Qty: 0 | Refills: 0 | Status: DISCONTINUED | OUTPATIENT
Start: 2019-03-16 | End: 2019-03-16

## 2019-03-16 RX ORDER — IBUPROFEN 200 MG
600 TABLET ORAL EVERY 6 HOURS
Qty: 0 | Refills: 0 | Status: DISCONTINUED | OUTPATIENT
Start: 2019-03-16 | End: 2019-03-20

## 2019-03-16 RX ADMIN — Medication 600 MILLIGRAM(S): at 11:44

## 2019-03-16 RX ADMIN — Medication 50 GRAM(S): at 11:23

## 2019-03-16 RX ADMIN — Medication 600 MILLIGRAM(S): at 17:43

## 2019-03-16 RX ADMIN — HYDROMORPHONE HYDROCHLORIDE 30 MILLILITER(S): 2 INJECTION INTRAMUSCULAR; INTRAVENOUS; SUBCUTANEOUS at 07:16

## 2019-03-16 RX ADMIN — ENOXAPARIN SODIUM 40 MILLIGRAM(S): 100 INJECTION SUBCUTANEOUS at 07:18

## 2019-03-16 RX ADMIN — Medication 650 MILLIGRAM(S): at 17:43

## 2019-03-16 RX ADMIN — Medication 5 MILLIGRAM(S): at 11:31

## 2019-03-16 RX ADMIN — OXYCODONE HYDROCHLORIDE 5 MILLIGRAM(S): 5 TABLET ORAL at 21:12

## 2019-03-16 RX ADMIN — OXYCODONE HYDROCHLORIDE 5 MILLIGRAM(S): 5 TABLET ORAL at 11:24

## 2019-03-16 RX ADMIN — Medication 600 MILLIGRAM(S): at 17:42

## 2019-03-16 RX ADMIN — OXYCODONE HYDROCHLORIDE 5 MILLIGRAM(S): 5 TABLET ORAL at 11:22

## 2019-03-16 RX ADMIN — Medication 650 MILLIGRAM(S): at 11:44

## 2019-03-16 RX ADMIN — Medication 650 MILLIGRAM(S): at 17:42

## 2019-03-16 RX ADMIN — PANTOPRAZOLE SODIUM 40 MILLIGRAM(S): 20 TABLET, DELAYED RELEASE ORAL at 17:41

## 2019-03-16 RX ADMIN — Medication 600 MILLIGRAM(S): at 11:43

## 2019-03-16 RX ADMIN — Medication 650 MILLIGRAM(S): at 11:43

## 2019-03-16 RX ADMIN — OXYCODONE HYDROCHLORIDE 5 MILLIGRAM(S): 5 TABLET ORAL at 21:43

## 2019-03-16 NOTE — PHYSICAL THERAPY INITIAL EVALUATION ADULT - ADDITIONAL COMMENTS
Pt lives in a pvt house w/ family, 4-5STE and 1flight of stairs to go down to basement where the bed room is. Independent in ADls/IADls and functional ambulation PTA w/o ADs.

## 2019-03-16 NOTE — PHYSICAL THERAPY INITIAL EVALUATION ADULT - BALANCE TRAINING, PT EVAL
pt will demonstrate good standing balance to improve safety and reduce risks of fall during ADLs in 2weeks

## 2019-03-16 NOTE — PROGRESS NOTE ADULT - SUBJECTIVE AND OBJECTIVE BOX
Surgery Progress Note      Subjective: Patient seen and examined. No acute events overnight.   NGT clamp trial was successful and therefore, NGT was removed yesterday  Patient was advanced to CLD  Pain well controlled    T(C): 36.9 (03-15-19 @ 20:43), Max: 36.9 (03-15-19 @ 20:43)  HR: 80 (03-15-19 @ 20:43) (75 - 84)  BP: 113/75 (03-15-19 @ 20:43) (102/69 - 120/83)  RR: 18 (03-15-19 @ 20:43) (18 - 18)  SpO2: 94% (03-15-19 @ 20:43) (92% - 98%)      03-14-19 @ 07:01  -  03-15-19 @ 07:00  --------------------------------------------------------  IN: 2825 mL / OUT: 3050 mL / NET: -225 mL    03-15-19 @ 07:01  -  03-16-19 @ 00:02  --------------------------------------------------------  IN: 2160 mL / OUT: 770 mL / NET: 1390 mL        Physical Exam:   General: NAD, NGT in place to LCWS  Abdomen: soft, nontender, +morbidly obese abdomen, midline incision and lap sites c/d/i, ostomy pink, patent, edematous; gas and liquid in ostomy bag  : estefany in situ     Labs:                          13.1   10.6  )-----------( 333      ( 15 Mar 2019 10:19 )             40.9     03-15    140  |  99  |  7   ----------------------------<  97  3.7   |  29  |  0.99    Ca    9.0      15 Mar 2019 09:38  Phos  2.8     03-15  Mg     1.9     03-15        Medications:     dextrose 5% + sodium chloride 0.45% with potassium chloride 20 mEq/L 1000 milliLiter(s) IV Continuous <Continuous>  diphenhydrAMINE   Injectable 12.5 milliGRAM(s) IV Push every 4 hours PRN  enoxaparin Injectable 40 milliGRAM(s) SubCutaneous every 24 hours  HYDROmorphone PCA (1 mG/mL) 30 milliLiter(s) PCA Continuous PCA Continuous  HYDROmorphone PCA (1 mG/mL) Rescue Clinician Bolus 0.5 milliGRAM(s) IV Push every 15 minutes PRN  metoprolol tartrate Injectable 5 milliGRAM(s) IV Push every 6 hours  naloxone Injectable 0.1 milliGRAM(s) IV Push every 3 minutes PRN  ondansetron Injectable 4 milliGRAM(s) IV Push every 6 hours PRN  pantoprazole  Injectable 40 milliGRAM(s) IV Push every 12 hours      Radiographs: No new imaging

## 2019-03-16 NOTE — PHYSICAL THERAPY INITIAL EVALUATION ADULT - DISCHARGE DISPOSITION, PT EVAL
home w/ home PT/home w/assist of family and home PT for safety assessment, and to improve his strength, balance, and maximize his functional independence in ADLs/mobility in home environment

## 2019-03-16 NOTE — PROGRESS NOTE ADULT - SUBJECTIVE AND OBJECTIVE BOX
Day _4__ of Anesthesia Pain Management Service    SUBJECTIVE:    Pain Scale Score	At rest: ___ 	With Activity: ___ 	[x ] Refer to charted pain scores    THERAPY:    [ ] IV PCA Morphine		[ ] 5 mg/mL	[ ] 1 mg/mL  [x ] IV PCA Hydromorphone	[ ] 5 mg/mL	[x ] 1 mg/mL  [ ] IV PCA Fentanyl		[ ] 50 micrograms/mL    Demand dose 0.2    lockout 6   (minutes) Continuous Rate 0      MEDICATIONS  (STANDING):  dextrose 5% + sodium chloride 0.45% with potassium chloride 20 mEq/L 1000 milliLiter(s) (125 mL/Hr) IV Continuous <Continuous>  enoxaparin Injectable 40 milliGRAM(s) SubCutaneous every 24 hours  HYDROmorphone PCA (1 mG/mL) 30 milliLiter(s) PCA Continuous PCA Continuous  metoprolol tartrate Injectable 5 milliGRAM(s) IV Push every 6 hours  pantoprazole  Injectable 40 milliGRAM(s) IV Push every 12 hours    MEDICATIONS  (PRN):  diphenhydrAMINE   Injectable 12.5 milliGRAM(s) IV Push every 4 hours PRN Pruritus  HYDROmorphone PCA (1 mG/mL) Rescue Clinician Bolus 0.5 milliGRAM(s) IV Push every 15 minutes PRN for Pain Scale GREATER THAN 6  naloxone Injectable 0.1 milliGRAM(s) IV Push every 3 minutes PRN For ANY of the following changes in patient status:  A. RR LESS THAN 10 breaths per minute, B. Oxygen saturation LESS THAN 90%, C. Sedation score of 6  ondansetron Injectable 4 milliGRAM(s) IV Push every 6 hours PRN Nausea      OBJECTIVE:    Sedation Score:	[x ] Alert	[ ] Drowsy 	[ ] Arousable	[ ] Asleep	[ ] Unresponsive    Side Effects:	[x ] None	[ ] Nausea	[ ] Vomiting	[ ] Pruritus  		[ ] Other:    Vital Signs Last 24 Hrs  T(C): 37.1 (16 Mar 2019 01:09), Max: 37.1 (16 Mar 2019 01:09)  T(F): 98.7 (16 Mar 2019 01:09), Max: 98.7 (16 Mar 2019 01:09)  HR: 84 (16 Mar 2019 01:09) (75 - 84)  BP: 121/85 (16 Mar 2019 01:09) (102/69 - 121/85)  BP(mean): --  RR: 18 (16 Mar 2019 01:09) (18 - 18)  SpO2: 95% (16 Mar 2019 01:09) (92% - 98%)    ASSESSMENT/ PLAN    Therapy to  be:	[x ] Continue   [ ] Discontinued   [ ] Change to prn Analgesics    Documentation and Verification of current medications:   [X] Done	[ ] Not done, not eligible    Comments:

## 2019-03-16 NOTE — PROGRESS NOTE ADULT - ASSESSMENT
54M found to have sigmoid mass causing obstruction and proximal large bowel dilatation now s/p Diagnostic laparoscopy, open John's sigmoid resection for very large mass of the sigmoid adhered to the bladder, cystorhaphy 3/12     - Pain control: PCA  - CLD  - F/u ostomy function & monitor output  - Monitor UOP. Quispe to continue to stay in place for 5 days given the close adherence of the sigmoid mass to the bladder  - ostomy teaching  - F/u AM labs.  - HTN in PACU - no HTN at home per pt, though possibly undiagnosed HTN -> 5 mg IV lopressor q6h (hold for sys BP <110 or hr <60).    [facility] [id number].

## 2019-03-16 NOTE — PHYSICAL THERAPY INITIAL EVALUATION ADULT - PERTINENT HX OF CURRENT PROBLEM, REHAB EVAL
54M with PMHx of morbid obesity, HLD, sleep apnea presented with abdominal pain for one week. CT scan showing persistent fistula now with LBO 2/2 obstruction in the sigmoid colon. Colorectal reviewed images with radiology and there is concern for colonic mass s/p removal of mass and sigmoidectomy

## 2019-03-17 LAB
ANION GAP SERPL CALC-SCNC: 11 MMOL/L — SIGNIFICANT CHANGE UP (ref 5–17)
BUN SERPL-MCNC: 8 MG/DL — SIGNIFICANT CHANGE UP (ref 7–23)
CALCIUM SERPL-MCNC: 8.9 MG/DL — SIGNIFICANT CHANGE UP (ref 8.4–10.5)
CHLORIDE SERPL-SCNC: 102 MMOL/L — SIGNIFICANT CHANGE UP (ref 96–108)
CO2 SERPL-SCNC: 27 MMOL/L — SIGNIFICANT CHANGE UP (ref 22–31)
CREAT SERPL-MCNC: 1.14 MG/DL — SIGNIFICANT CHANGE UP (ref 0.5–1.3)
GLUCOSE SERPL-MCNC: 97 MG/DL — SIGNIFICANT CHANGE UP (ref 70–99)
HCT VFR BLD CALC: 38 % — LOW (ref 39–50)
HGB BLD-MCNC: 11.9 G/DL — LOW (ref 13–17)
MAGNESIUM SERPL-MCNC: 1.8 MG/DL — SIGNIFICANT CHANGE UP (ref 1.6–2.6)
MCHC RBC-ENTMCNC: 26.9 PG — LOW (ref 27–34)
MCHC RBC-ENTMCNC: 31.3 GM/DL — LOW (ref 32–36)
MCV RBC AUTO: 85.8 FL — SIGNIFICANT CHANGE UP (ref 80–100)
PHOSPHATE SERPL-MCNC: 3.4 MG/DL — SIGNIFICANT CHANGE UP (ref 2.5–4.5)
PLATELET # BLD AUTO: 382 K/UL — SIGNIFICANT CHANGE UP (ref 150–400)
POTASSIUM SERPL-MCNC: 3.8 MMOL/L — SIGNIFICANT CHANGE UP (ref 3.5–5.3)
POTASSIUM SERPL-SCNC: 3.8 MMOL/L — SIGNIFICANT CHANGE UP (ref 3.5–5.3)
RBC # BLD: 4.43 M/UL — SIGNIFICANT CHANGE UP (ref 4.2–5.8)
RBC # FLD: 14.1 % — SIGNIFICANT CHANGE UP (ref 10.3–14.5)
SODIUM SERPL-SCNC: 140 MMOL/L — SIGNIFICANT CHANGE UP (ref 135–145)
WBC # BLD: 10.62 K/UL — HIGH (ref 3.8–10.5)
WBC # FLD AUTO: 10.62 K/UL — HIGH (ref 3.8–10.5)

## 2019-03-17 RX ORDER — SODIUM,POTASSIUM PHOSPHATES 278-250MG
1 POWDER IN PACKET (EA) ORAL
Qty: 0 | Refills: 0 | Status: COMPLETED | OUTPATIENT
Start: 2019-03-17 | End: 2019-03-18

## 2019-03-17 RX ADMIN — Medication 600 MILLIGRAM(S): at 13:16

## 2019-03-17 RX ADMIN — OXYCODONE HYDROCHLORIDE 10 MILLIGRAM(S): 5 TABLET ORAL at 21:59

## 2019-03-17 RX ADMIN — Medication 600 MILLIGRAM(S): at 05:32

## 2019-03-17 RX ADMIN — Medication 650 MILLIGRAM(S): at 04:54

## 2019-03-17 RX ADMIN — Medication 1 TABLET(S): at 21:58

## 2019-03-17 RX ADMIN — Medication 650 MILLIGRAM(S): at 13:16

## 2019-03-17 RX ADMIN — Medication 1 TABLET(S): at 12:46

## 2019-03-17 RX ADMIN — ENOXAPARIN SODIUM 40 MILLIGRAM(S): 100 INJECTION SUBCUTANEOUS at 04:54

## 2019-03-17 RX ADMIN — Medication 650 MILLIGRAM(S): at 05:30

## 2019-03-17 RX ADMIN — Medication 650 MILLIGRAM(S): at 12:46

## 2019-03-17 RX ADMIN — Medication 600 MILLIGRAM(S): at 12:46

## 2019-03-17 RX ADMIN — Medication 600 MILLIGRAM(S): at 04:54

## 2019-03-17 RX ADMIN — OXYCODONE HYDROCHLORIDE 10 MILLIGRAM(S): 5 TABLET ORAL at 22:30

## 2019-03-17 NOTE — PROGRESS NOTE ADULT - ASSESSMENT
54M found to have sigmoid mass causing obstruction and proximal large bowel dilatation now s/p Diagnostic laparoscopy, open John's sigmoid resection for very large mass of the sigmoid adhered to the bladder, cystorhaphy 3/12     - LRD  - F/u ostomy function & monitor output  - Monitor UOP. Quispe to continue to stay in place for 5 days given the close adherence of the sigmoid mass to the bladder  - ostomy teaching  - F/u AM labs.  - HTN in PACU - no HTN at home per pt, though possibly undiagnosed HTN -> lopressor now dc'd and BP controlled     [facility] [id number].

## 2019-03-17 NOTE — PROGRESS NOTE ADULT - SUBJECTIVE AND OBJECTIVE BOX
GENERAL SURGERY DAILY PROGRESS NOTE:       Subjective:  Pt seen and examined at bedside. No acute events overnight. Pain controlled. Diet was advanced yesterday and now tolerating LR diet. PCA was dc'd and now pain controlled on PO meds. (-)N/V, (+) flatus/BM in the ostomy         Objective:    PE:  General: NAD  Abdomen: soft, nontender, +morbidly obese abdomen, midline incision and lap sites c/d/i, ostomy pink/viable; gas and stool in ostomy bag  : allison in place    Vital Signs Last 24 Hrs  T(C): 36.6 (17 Mar 2019 05:25), Max: 36.9 (16 Mar 2019 14:00)  T(F): 97.8 (17 Mar 2019 05:25), Max: 98.4 (16 Mar 2019 14:00)  HR: 63 (17 Mar 2019 05:25) (63 - 82)  BP: 135/92 (17 Mar 2019 05:25) (112/74 - 136/86)  BP(mean): --  RR: 18 (17 Mar 2019 05:25) (18 - 18)  SpO2: 98% (17 Mar 2019 05:25) (95% - 98%)    I&O's Detail    15 Mar 2019 07:01  -  16 Mar 2019 07:00  --------------------------------------------------------  IN:    dextrose 5% + sodium chloride 0.45% with potassium chloride 20 mEq/L: 3000 mL    Oral Fluid: 660 mL  Total IN: 3660 mL    OUT:    Colostomy: 150 mL    Indwelling Catheter - Urethral: 970 mL  Total OUT: 1120 mL    Total NET: 2540 mL      16 Mar 2019 07:01  -  17 Mar 2019 05:59  --------------------------------------------------------  IN:    dextrose 5% + sodium chloride 0.45% with potassium chloride 20 mEq/L: 300 mL    Oral Fluid: 940 mL    Solution: 50 mL  Total IN: 1290 mL    OUT:    Colostomy: 1075 mL    Indwelling Catheter - Urethral: 1100 mL  Total OUT: 2175 mL    Total NET: -885 mL          Daily     Daily     MEDICATIONS  (STANDING):  acetaminophen   Tablet .. 650 milliGRAM(s) Oral every 6 hours  enoxaparin Injectable 40 milliGRAM(s) SubCutaneous every 24 hours  ibuprofen  Tablet. 600 milliGRAM(s) Oral every 6 hours  pantoprazole    Tablet 40 milliGRAM(s) Oral two times a day    MEDICATIONS  (PRN):  oxyCODONE    IR 5 milliGRAM(s) Oral every 4 hours PRN Moderate Pain (4 - 6)  oxyCODONE    IR 10 milliGRAM(s) Oral every 4 hours PRN Severe Pain (7 - 10)      LABS:                        11.9   10.49 )-----------( 366      ( 16 Mar 2019 11:12 )             38.9     03-16    138  |  99  |  6<L>  ----------------------------<  98  4.1   |  27  |  1.11    Ca    8.6      16 Mar 2019 05:59  Phos  3.1     03-16  Mg     1.7     03-16            RADIOLOGY & ADDITIONAL STUDIES:

## 2019-03-18 LAB
ANION GAP SERPL CALC-SCNC: 11 MMOL/L — SIGNIFICANT CHANGE UP (ref 5–17)
BUN SERPL-MCNC: 7 MG/DL — SIGNIFICANT CHANGE UP (ref 7–23)
CALCIUM SERPL-MCNC: 9 MG/DL — SIGNIFICANT CHANGE UP (ref 8.4–10.5)
CHLORIDE SERPL-SCNC: 105 MMOL/L — SIGNIFICANT CHANGE UP (ref 96–108)
CO2 SERPL-SCNC: 26 MMOL/L — SIGNIFICANT CHANGE UP (ref 22–31)
CREAT SERPL-MCNC: 1.05 MG/DL — SIGNIFICANT CHANGE UP (ref 0.5–1.3)
GLUCOSE SERPL-MCNC: 104 MG/DL — HIGH (ref 70–99)
HCT VFR BLD CALC: 36.5 % — LOW (ref 39–50)
HGB BLD-MCNC: 12.1 G/DL — LOW (ref 13–17)
MAGNESIUM SERPL-MCNC: 1.6 MG/DL — SIGNIFICANT CHANGE UP (ref 1.6–2.6)
MCHC RBC-ENTMCNC: 27.8 PG — SIGNIFICANT CHANGE UP (ref 27–34)
MCHC RBC-ENTMCNC: 33.1 GM/DL — SIGNIFICANT CHANGE UP (ref 32–36)
MCV RBC AUTO: 83.8 FL — SIGNIFICANT CHANGE UP (ref 80–100)
PHOSPHATE SERPL-MCNC: 3.8 MG/DL — SIGNIFICANT CHANGE UP (ref 2.5–4.5)
PLATELET # BLD AUTO: 388 K/UL — SIGNIFICANT CHANGE UP (ref 150–400)
POTASSIUM SERPL-MCNC: 3.6 MMOL/L — SIGNIFICANT CHANGE UP (ref 3.5–5.3)
POTASSIUM SERPL-SCNC: 3.6 MMOL/L — SIGNIFICANT CHANGE UP (ref 3.5–5.3)
RBC # BLD: 4.35 M/UL — SIGNIFICANT CHANGE UP (ref 4.2–5.8)
RBC # FLD: 12.8 % — SIGNIFICANT CHANGE UP (ref 10.3–14.5)
SODIUM SERPL-SCNC: 142 MMOL/L — SIGNIFICANT CHANGE UP (ref 135–145)
WBC # BLD: 11.7 K/UL — HIGH (ref 3.8–10.5)
WBC # FLD AUTO: 11.7 K/UL — HIGH (ref 3.8–10.5)

## 2019-03-18 RX ORDER — PANTOPRAZOLE SODIUM 20 MG/1
1 TABLET, DELAYED RELEASE ORAL
Qty: 0 | Refills: 0 | DISCHARGE
Start: 2019-03-18

## 2019-03-18 RX ORDER — IBUPROFEN 200 MG
1 TABLET ORAL
Qty: 0 | Refills: 0 | DISCHARGE
Start: 2019-03-18

## 2019-03-18 RX ORDER — POTASSIUM CHLORIDE 20 MEQ
40 PACKET (EA) ORAL ONCE
Qty: 0 | Refills: 0 | Status: COMPLETED | OUTPATIENT
Start: 2019-03-18 | End: 2019-03-18

## 2019-03-18 RX ORDER — OXYCODONE HYDROCHLORIDE 5 MG/1
1 TABLET ORAL
Qty: 0 | Refills: 0 | COMMUNITY
Start: 2019-03-18

## 2019-03-18 RX ORDER — ACETAMINOPHEN 500 MG
2 TABLET ORAL
Qty: 0 | Refills: 0 | DISCHARGE
Start: 2019-03-18

## 2019-03-18 RX ORDER — MAGNESIUM SULFATE 500 MG/ML
2 VIAL (ML) INJECTION ONCE
Qty: 0 | Refills: 0 | Status: COMPLETED | OUTPATIENT
Start: 2019-03-18 | End: 2019-03-18

## 2019-03-18 RX ADMIN — Medication 650 MILLIGRAM(S): at 18:15

## 2019-03-18 RX ADMIN — ENOXAPARIN SODIUM 40 MILLIGRAM(S): 100 INJECTION SUBCUTANEOUS at 06:21

## 2019-03-18 RX ADMIN — Medication 600 MILLIGRAM(S): at 18:15

## 2019-03-18 RX ADMIN — Medication 1 TABLET(S): at 09:26

## 2019-03-18 RX ADMIN — Medication 600 MILLIGRAM(S): at 18:45

## 2019-03-18 RX ADMIN — Medication 650 MILLIGRAM(S): at 18:45

## 2019-03-18 RX ADMIN — Medication 650 MILLIGRAM(S): at 14:01

## 2019-03-18 RX ADMIN — Medication 650 MILLIGRAM(S): at 06:21

## 2019-03-18 RX ADMIN — Medication 600 MILLIGRAM(S): at 14:30

## 2019-03-18 RX ADMIN — Medication 50 GRAM(S): at 14:01

## 2019-03-18 RX ADMIN — Medication 600 MILLIGRAM(S): at 14:03

## 2019-03-18 RX ADMIN — PANTOPRAZOLE SODIUM 40 MILLIGRAM(S): 20 TABLET, DELAYED RELEASE ORAL at 06:21

## 2019-03-18 RX ADMIN — Medication 600 MILLIGRAM(S): at 06:55

## 2019-03-18 RX ADMIN — Medication 600 MILLIGRAM(S): at 06:21

## 2019-03-18 RX ADMIN — Medication 650 MILLIGRAM(S): at 06:54

## 2019-03-18 RX ADMIN — Medication 40 MILLIEQUIVALENT(S): at 14:01

## 2019-03-18 RX ADMIN — PANTOPRAZOLE SODIUM 40 MILLIGRAM(S): 20 TABLET, DELAYED RELEASE ORAL at 18:15

## 2019-03-18 RX ADMIN — Medication 650 MILLIGRAM(S): at 14:30

## 2019-03-18 NOTE — PROGRESS NOTE ADULT - ASSESSMENT
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 Quispe to continue to stay in place for 5 days given the close adherence of the sigmoid mass to the bladder  - ostomy teaching  - F/u AM labs.  - HTN in PACU - no HTN at home per pt, though possibly undiagnosed HTN -> lopressor now dc'd and BP controlled     [facility] [id number]. 54M found to have sigmoid mass causing obstruction and proximal large bowel dilatation now s/p Diagnostic laparoscopy, open John's sigmoid resection for very large mass of the sigmoid adhered to the bladder, cystorhaphy 3/12     - DC allison  - LRD  - F/u ostomy function & monitor output  - Monitor UOP. Allison to continue to stay in place for 5 days given the close adherence of the sigmoid mass to the bladder  - ostomy teaching  - F/u AM labs.  - HTN in PACU - no HTN at home per pt, though possibly undiagnosed HTN -> lopressor now dc'd and BP controlled     [facility] [id number].

## 2019-03-18 NOTE — PROGRESS NOTE ADULT - SUBJECTIVE AND OBJECTIVE BOX
GENERAL SURGERY DAILY PROGRESS NOTE:       Subjective:  Pt seen and examined at bedside. No acute events overnight. Pain controlled. Tolerating LR diet. (-)N/V, (+) flatus/BM in the ostomy         Objective:    PE:  General: NAD  Abdomen: soft, nontender, +morbidly obese abdomen, midline incision and lap sites c/d/i, ostomy pink/viable; gas and stool in ostomy bag  : allison in place    Vital Signs Last 24 Hrs  T(C): 36.8 (18 Mar 2019 00:45), Max: 36.8 (17 Mar 2019 13:17)  T(F): 98.2 (18 Mar 2019 00:45), Max: 98.3 (17 Mar 2019 13:17)  HR: 75 (18 Mar 2019 00:45) (63 - 75)  BP: 130/82 (18 Mar 2019 00:45) (129/88 - 140/81)  BP(mean): --  RR: 18 (18 Mar 2019 00:45) (18 - 18)  SpO2: 98% (18 Mar 2019 00:45) (95% - 98%)    I&O's Detail    16 Mar 2019 07:01  -  17 Mar 2019 07:00  --------------------------------------------------------  IN:    dextrose 5% + sodium chloride 0.45% with potassium chloride 20 mEq/L: 300 mL    Oral Fluid: 940 mL    Solution: 50 mL  Total IN: 1290 mL    OUT:    Colostomy: 1075 mL    Indwelling Catheter - Urethral: 1100 mL  Total OUT: 2175 mL    Total NET: -885 mL      17 Mar 2019 07:01  -  18 Mar 2019 05:02  --------------------------------------------------------  IN:    Oral Fluid: 580 mL  Total IN: 580 mL    OUT:    Colostomy: 670 mL    Indwelling Catheter - Urethral: 1200 mL  Total OUT: 1870 mL    Total NET: -1290 mL          Daily     Daily     MEDICATIONS  (STANDING):  acetaminophen   Tablet .. 650 milliGRAM(s) Oral every 6 hours  enoxaparin Injectable 40 milliGRAM(s) SubCutaneous every 24 hours  ibuprofen  Tablet. 600 milliGRAM(s) Oral every 6 hours  pantoprazole    Tablet 40 milliGRAM(s) Oral two times a day  potassium acid phosphate/sodium acid phosphate tablet (K-PHOS No. 2) 1 Tablet(s) Oral four times a day with meals    MEDICATIONS  (PRN):  oxyCODONE    IR 5 milliGRAM(s) Oral every 4 hours PRN Moderate Pain (4 - 6)  oxyCODONE    IR 10 milliGRAM(s) Oral every 4 hours PRN Severe Pain (7 - 10)      LABS:                        11.9   10.62 )-----------( 382      ( 17 Mar 2019 09:13 )             38.0     03-17    140  |  102  |  8   ----------------------------<  97  3.8   |  27  |  1.14    Ca    8.9      17 Mar 2019 07:17  Phos  3.4     03-17  Mg     1.8     03-17            RADIOLOGY & ADDITIONAL STUDIES: GENERAL SURGERY DAILY PROGRESS NOTE:       Subjective:  Pt seen and examined at bedside. No acute events overnight. Pain controlled. Tolerating LR diet. (-)N/V, (+) flatus/BM in the ostomy. Licha youssef'yogi yesterday on AM rounds.         Objective:    PE:  General: NAD  Abdomen: soft, nontender, +morbidly obese abdomen, midline incision and lap sites c/d/i, ostomy pink/viable; gas and stool in ostomy bag  : allison in place    Vital Signs Last 24 Hrs  T(C): 36.8 (18 Mar 2019 00:45), Max: 36.8 (17 Mar 2019 13:17)  T(F): 98.2 (18 Mar 2019 00:45), Max: 98.3 (17 Mar 2019 13:17)  HR: 75 (18 Mar 2019 00:45) (63 - 75)  BP: 130/82 (18 Mar 2019 00:45) (129/88 - 140/81)  BP(mean): --  RR: 18 (18 Mar 2019 00:45) (18 - 18)  SpO2: 98% (18 Mar 2019 00:45) (95% - 98%)    I&O's Detail    16 Mar 2019 07:01  -  17 Mar 2019 07:00  --------------------------------------------------------  IN:    dextrose 5% + sodium chloride 0.45% with potassium chloride 20 mEq/L: 300 mL    Oral Fluid: 940 mL    Solution: 50 mL  Total IN: 1290 mL    OUT:    Colostomy: 1075 mL    Indwelling Catheter - Urethral: 1100 mL  Total OUT: 2175 mL    Total NET: -885 mL      17 Mar 2019 07:01  -  18 Mar 2019 05:02  --------------------------------------------------------  IN:    Oral Fluid: 580 mL  Total IN: 580 mL    OUT:    Colostomy: 670 mL    Indwelling Catheter - Urethral: 1200 mL  Total OUT: 1870 mL    Total NET: -1290 mL          Daily     Daily     MEDICATIONS  (STANDING):  acetaminophen   Tablet .. 650 milliGRAM(s) Oral every 6 hours  enoxaparin Injectable 40 milliGRAM(s) SubCutaneous every 24 hours  ibuprofen  Tablet. 600 milliGRAM(s) Oral every 6 hours  pantoprazole    Tablet 40 milliGRAM(s) Oral two times a day  potassium acid phosphate/sodium acid phosphate tablet (K-PHOS No. 2) 1 Tablet(s) Oral four times a day with meals    MEDICATIONS  (PRN):  oxyCODONE    IR 5 milliGRAM(s) Oral every 4 hours PRN Moderate Pain (4 - 6)  oxyCODONE    IR 10 milliGRAM(s) Oral every 4 hours PRN Severe Pain (7 - 10)      LABS:                        11.9   10.62 )-----------( 382      ( 17 Mar 2019 09:13 )             38.0     03-17    140  |  102  |  8   ----------------------------<  97  3.8   |  27  |  1.14    Ca    8.9      17 Mar 2019 07:17  Phos  3.4     03-17  Mg     1.8     03-17            RADIOLOGY & ADDITIONAL STUDIES:

## 2019-03-18 NOTE — CHART NOTE - NSCHARTNOTEFT_GEN_A_CORE
Nutrition Follow Up Note      Source: medical record, pt     Diet : Low Fiber     Patient seen for nutrition follow up for nutrition education. Per chart, s/p Diagnostic laparoscopy, open John's sigmoid resection (3/12). Pt reports good appetite but fair PO intake (~50% of meals). Pt reports he is hesitant to eat too much at once as he is still adjusting to ostomy. Encouraged small frequent meals with an emphasis on protein dense food. Pt denies GI distress at this time, colostomy output 820ml x 24 hours. Pt amenable to nutrition education, provided and noted below.         Daily (3/12) 122.5 Kg- no new weight to assess       Pertinent Medications: MEDICATIONS  (STANDING):  acetaminophen   Tablet .. 650 milliGRAM(s) Oral every 6 hours  enoxaparin Injectable 40 milliGRAM(s) SubCutaneous every 24 hours  ibuprofen  Tablet. 600 milliGRAM(s) Oral every 6 hours  pantoprazole    Tablet 40 milliGRAM(s) Oral two times a day    MEDICATIONS  (PRN):  oxyCODONE    IR 5 milliGRAM(s) Oral every 4 hours PRN Moderate Pain (4 - 6)  oxyCODONE    IR 10 milliGRAM(s) Oral every 4 hours PRN Severe Pain (7 - 10)    Pertinent Labs: 03-18 @ 09:23: Na 142, BUN 7, Cr 1.05, <H>, K+ 3.6, Phos 3.8, Mg 1.6, Alk Phos --, ALT/SGPT --, AST/SGOT --, HbA1c --      No edema. No pressure ulcers documented.     Estimated Needs:   [x ] no change since previous assessment  [ ] recalculated:     Previous Nutrition Diagnosis: Inadequate protein energy intake   Nutrition Diagnosis is: improving with diet advancement     New Nutrition Diagnosis: Food and nutrition-related knowledge deficit   Related to: limited exposure to nutrition education of therapeutic diet.    As evidenced by: Pt S/P procedure noted above, new to low fiber diet     Interventions:   Nutrition education: Reviewed [facility] Colostomy nutrition therapy/food list handout (provided by ostomy RN). Discussed eating low-fiber foods, chewing foods well, small frequent meals high in protein & energy. Reviewed foods that may cause odors &/or gas, discussed foods that bulk stool and thin stool, and importance of adequate hydration.     Recommend  1) Continue low fiber diet  2) Provide food preferences as requested by Pt/family within diet restrictions  and encourage PO intake during meal times     Monitoring and Evaluation:     Continue to monitor Nutritional intake, Tolerance to diet prescription, weights, labs, skin integrity    RD remains available upon request and will follow up per protocol    Huong Michaud RD pager #[phone]

## 2019-03-19 LAB
APPEARANCE UR: CLEAR — SIGNIFICANT CHANGE UP
BILIRUB UR-MCNC: NEGATIVE — SIGNIFICANT CHANGE UP
COLOR SPEC: YELLOW — SIGNIFICANT CHANGE UP
DIFF PNL FLD: ABNORMAL
GLUCOSE UR QL: NEGATIVE — SIGNIFICANT CHANGE UP
HCT VFR BLD CALC: 37.7 % — LOW (ref 39–50)
HGB BLD-MCNC: 12.2 G/DL — LOW (ref 13–17)
KETONES UR-MCNC: NEGATIVE — SIGNIFICANT CHANGE UP
LEUKOCYTE ESTERASE UR-ACNC: NEGATIVE — SIGNIFICANT CHANGE UP
MCHC RBC-ENTMCNC: 27.5 PG — SIGNIFICANT CHANGE UP (ref 27–34)
MCHC RBC-ENTMCNC: 32.4 GM/DL — SIGNIFICANT CHANGE UP (ref 32–36)
MCV RBC AUTO: 84.8 FL — SIGNIFICANT CHANGE UP (ref 80–100)
NITRITE UR-MCNC: NEGATIVE — SIGNIFICANT CHANGE UP
PH UR: 6.5 — SIGNIFICANT CHANGE UP (ref 5–8)
PLATELET # BLD AUTO: 392 K/UL — SIGNIFICANT CHANGE UP (ref 150–400)
PROT UR-MCNC: ABNORMAL
RBC # BLD: 4.45 M/UL — SIGNIFICANT CHANGE UP (ref 4.2–5.8)
RBC # FLD: 13.2 % — SIGNIFICANT CHANGE UP (ref 10.3–14.5)
SP GR SPEC: 1.02 — SIGNIFICANT CHANGE UP (ref 1.01–1.02)
UROBILINOGEN FLD QL: NEGATIVE — SIGNIFICANT CHANGE UP
WBC # BLD: 13.2 K/UL — HIGH (ref 3.8–10.5)
WBC # FLD AUTO: 13.2 K/UL — HIGH (ref 3.8–10.5)

## 2019-03-19 PROCEDURE — 74177 CT ABD & PELVIS W/CONTRAST: CPT | Mod: 26

## 2019-03-19 RX ORDER — OXYCODONE HYDROCHLORIDE 5 MG/1
1 TABLET ORAL
Qty: 18 | Refills: 0
Start: 2019-03-19 | End: 2019-03-21

## 2019-03-19 RX ADMIN — Medication 600 MILLIGRAM(S): at 08:36

## 2019-03-19 RX ADMIN — OXYCODONE HYDROCHLORIDE 5 MILLIGRAM(S): 5 TABLET ORAL at 02:22

## 2019-03-19 RX ADMIN — OXYCODONE HYDROCHLORIDE 5 MILLIGRAM(S): 5 TABLET ORAL at 02:52

## 2019-03-19 RX ADMIN — Medication 650 MILLIGRAM(S): at 08:06

## 2019-03-19 RX ADMIN — OXYCODONE HYDROCHLORIDE 10 MILLIGRAM(S): 5 TABLET ORAL at 21:13

## 2019-03-19 RX ADMIN — ENOXAPARIN SODIUM 40 MILLIGRAM(S): 100 INJECTION SUBCUTANEOUS at 08:08

## 2019-03-19 RX ADMIN — OXYCODONE HYDROCHLORIDE 10 MILLIGRAM(S): 5 TABLET ORAL at 21:43

## 2019-03-19 RX ADMIN — Medication 600 MILLIGRAM(S): at 08:06

## 2019-03-19 RX ADMIN — Medication 650 MILLIGRAM(S): at 08:36

## 2019-03-19 NOTE — PROGRESS NOTE ADULT - SUBJECTIVE AND OBJECTIVE BOX
GENERAL SURGERY DAILY PROGRESS NOTE:       Subjective:  Pt seen and examined at bedside. No acute events overnight. Pain controlled. Tolerating LR diet. (-)N/V, (+) flatus/BM in the ostomy. Had ostomy teaching yesterday. Brittaney was dc'd and now voiding appropriately.         Objective:    PE:  General: NAD  Abdomen: soft, nontender, +morbidly obese abdomen, midline incision and lap sites c/d/i, ostomy pink/viable; gas and stool in ostomy bag    Vital Signs Last 24 Hrs  T(C): 36.8 (19 Mar 2019 00:20), Max: 37.1 (18 Mar 2019 06:20)  T(F): 98.2 (19 Mar 2019 00:20), Max: 98.8 (18 Mar 2019 06:20)  HR: 69 (19 Mar 2019 00:20) (69 - 75)  BP: 144/89 (19 Mar 2019 00:20) (135/87 - 165/93)  BP(mean): --  RR: 18 (19 Mar 2019 00:20) (18 - 20)  SpO2: 98% (19 Mar 2019 00:20) (94% - 98%)    I&O's Detail    17 Mar 2019 07:01  -  18 Mar 2019 07:00  --------------------------------------------------------  IN:    Oral Fluid: 580 mL  Total IN: 580 mL    OUT:    Colostomy: 820 mL    Indwelling Catheter - Urethral: 2000 mL  Total OUT: 2820 mL    Total NET: -2240 mL      18 Mar 2019 07:01  -  19 Mar 2019 05:05  --------------------------------------------------------  IN:    Oral Fluid: 1080 mL    Solution: 50 mL  Total IN: 1130 mL    OUT:    Colostomy: 1325 mL    Indwelling Catheter - Urethral: 250 mL    Voided: 1275 mL  Total OUT: 2850 mL    Total NET: -1720 mL          Daily     Daily     MEDICATIONS  (STANDING):  acetaminophen   Tablet .. 650 milliGRAM(s) Oral every 6 hours  enoxaparin Injectable 40 milliGRAM(s) SubCutaneous every 24 hours  ibuprofen  Tablet. 600 milliGRAM(s) Oral every 6 hours  pantoprazole    Tablet 40 milliGRAM(s) Oral two times a day    MEDICATIONS  (PRN):  oxyCODONE    IR 5 milliGRAM(s) Oral every 4 hours PRN Moderate Pain (4 - 6)  oxyCODONE    IR 10 milliGRAM(s) Oral every 4 hours PRN Severe Pain (7 - 10)      LABS:                        12.1   11.7  )-----------( 388      ( 18 Mar 2019 09:32 )             36.5     03-18    142  |  105  |  7   ----------------------------<  104<H>  3.6   |  26  |  1.05    Ca    9.0      18 Mar 2019 09:23  Phos  3.8     03-18  Mg     1.6     03-18            RADIOLOGY & ADDITIONAL STUDIES:

## 2019-03-19 NOTE — PROGRESS NOTE ADULT - ASSESSMENT
54M found to have sigmoid mass causing obstruction and proximal large bowel dilatation now s/p Diagnostic laparoscopy, open John's sigmoid resection for very large mass of the sigmoid adhered to the bladder, cystorhaphy 3/12     - LRD  - F/u ostomy function & monitor output  - Monitor UOP  - ostomy teaching  - F/u AM labs.  - HTN in PACU - no HTN at home per pt, though possibly undiagnosed HTN -> lopressor now dc'd and BP controlled   - Dispo: home today     [facility] [id number].

## 2019-03-20 ENCOUNTER — TRANSCRIPTION ENCOUNTER (OUTPATIENT)
Age: 54
End: 2019-03-20

## 2019-03-20 VITALS
RESPIRATION RATE: 18 BRPM | SYSTOLIC BLOOD PRESSURE: 125 MMHG | DIASTOLIC BLOOD PRESSURE: 89 MMHG | HEART RATE: 71 BPM | TEMPERATURE: 98 F | OXYGEN SATURATION: 96 %

## 2019-03-20 LAB
ANION GAP SERPL CALC-SCNC: 10 MMOL/L — SIGNIFICANT CHANGE UP (ref 5–17)
BASOPHILS # BLD AUTO: 0.08 K/UL — SIGNIFICANT CHANGE UP (ref 0–0.2)
BASOPHILS NFR BLD AUTO: 0.5 % — SIGNIFICANT CHANGE UP (ref 0–2)
BUN SERPL-MCNC: 7 MG/DL — SIGNIFICANT CHANGE UP (ref 7–23)
CALCIUM SERPL-MCNC: 9.1 MG/DL — SIGNIFICANT CHANGE UP (ref 8.4–10.5)
CHLORIDE SERPL-SCNC: 102 MMOL/L — SIGNIFICANT CHANGE UP (ref 96–108)
CO2 SERPL-SCNC: 27 MMOL/L — SIGNIFICANT CHANGE UP (ref 22–31)
CREAT SERPL-MCNC: 1.07 MG/DL — SIGNIFICANT CHANGE UP (ref 0.5–1.3)
EOSINOPHIL # BLD AUTO: 0.45 K/UL — SIGNIFICANT CHANGE UP (ref 0–0.5)
EOSINOPHIL NFR BLD AUTO: 3 % — SIGNIFICANT CHANGE UP (ref 0–6)
GLUCOSE SERPL-MCNC: 101 MG/DL — HIGH (ref 70–99)
HCT VFR BLD CALC: 38.5 % — LOW (ref 39–50)
HGB BLD-MCNC: 12.2 G/DL — LOW (ref 13–17)
IMM GRANULOCYTES NFR BLD AUTO: 3.1 % — HIGH (ref 0–1.5)
LYMPHOCYTES # BLD AUTO: 1.79 K/UL — SIGNIFICANT CHANGE UP (ref 1–3.3)
LYMPHOCYTES # BLD AUTO: 11.8 % — LOW (ref 13–44)
MAGNESIUM SERPL-MCNC: 1.6 MG/DL — SIGNIFICANT CHANGE UP (ref 1.6–2.6)
MCHC RBC-ENTMCNC: 26.7 PG — LOW (ref 27–34)
MCHC RBC-ENTMCNC: 31.7 GM/DL — LOW (ref 32–36)
MCV RBC AUTO: 84.2 FL — SIGNIFICANT CHANGE UP (ref 80–100)
MONOCYTES # BLD AUTO: 0.99 K/UL — HIGH (ref 0–0.9)
MONOCYTES NFR BLD AUTO: 6.5 % — SIGNIFICANT CHANGE UP (ref 2–14)
NEUTROPHILS # BLD AUTO: 11.35 K/UL — HIGH (ref 1.8–7.4)
NEUTROPHILS NFR BLD AUTO: 75.1 % — SIGNIFICANT CHANGE UP (ref 43–77)
PHOSPHATE SERPL-MCNC: 3.9 MG/DL — SIGNIFICANT CHANGE UP (ref 2.5–4.5)
PLATELET # BLD AUTO: 419 K/UL — HIGH (ref 150–400)
POTASSIUM SERPL-MCNC: 4.1 MMOL/L — SIGNIFICANT CHANGE UP (ref 3.5–5.3)
POTASSIUM SERPL-SCNC: 4.1 MMOL/L — SIGNIFICANT CHANGE UP (ref 3.5–5.3)
RBC # BLD: 4.57 M/UL — SIGNIFICANT CHANGE UP (ref 4.2–5.8)
RBC # FLD: 14.2 % — SIGNIFICANT CHANGE UP (ref 10.3–14.5)
SODIUM SERPL-SCNC: 139 MMOL/L — SIGNIFICANT CHANGE UP (ref 135–145)
WBC # BLD: 15.13 K/UL — HIGH (ref 3.8–10.5)
WBC # FLD AUTO: 15.13 K/UL — HIGH (ref 3.8–10.5)

## 2019-03-20 PROCEDURE — 97162 PT EVAL MOD COMPLEX 30 MIN: CPT

## 2019-03-20 PROCEDURE — 71045 X-RAY EXAM CHEST 1 VIEW: CPT

## 2019-03-20 PROCEDURE — C1758: CPT

## 2019-03-20 PROCEDURE — 83605 ASSAY OF LACTIC ACID: CPT

## 2019-03-20 PROCEDURE — 85730 THROMBOPLASTIN TIME PARTIAL: CPT

## 2019-03-20 PROCEDURE — 96376 TX/PRO/DX INJ SAME DRUG ADON: CPT | Mod: XU

## 2019-03-20 PROCEDURE — 74177 CT ABD & PELVIS W/CONTRAST: CPT

## 2019-03-20 PROCEDURE — 88364 INSITU HYBRIDIZATION (FISH): CPT

## 2019-03-20 PROCEDURE — C1769: CPT

## 2019-03-20 PROCEDURE — 85027 COMPLETE CBC AUTOMATED: CPT

## 2019-03-20 PROCEDURE — 81001 URINALYSIS AUTO W/SCOPE: CPT

## 2019-03-20 PROCEDURE — 96374 THER/PROPH/DIAG INJ IV PUSH: CPT | Mod: XU

## 2019-03-20 PROCEDURE — 80053 COMPREHEN METABOLIC PANEL: CPT

## 2019-03-20 PROCEDURE — 82803 BLOOD GASES ANY COMBINATION: CPT

## 2019-03-20 PROCEDURE — 43753 TX GASTRO INTUB W/ASP: CPT

## 2019-03-20 PROCEDURE — 86900 BLOOD TYPING SEROLOGIC ABO: CPT

## 2019-03-20 PROCEDURE — 82947 ASSAY GLUCOSE BLOOD QUANT: CPT

## 2019-03-20 PROCEDURE — 82378 CARCINOEMBRYONIC ANTIGEN: CPT

## 2019-03-20 PROCEDURE — 83690 ASSAY OF LIPASE: CPT

## 2019-03-20 PROCEDURE — 86901 BLOOD TYPING SEROLOGIC RH(D): CPT

## 2019-03-20 PROCEDURE — 85014 HEMATOCRIT: CPT

## 2019-03-20 PROCEDURE — 82435 ASSAY OF BLOOD CHLORIDE: CPT

## 2019-03-20 PROCEDURE — 84295 ASSAY OF SERUM SODIUM: CPT

## 2019-03-20 PROCEDURE — 82330 ASSAY OF CALCIUM: CPT

## 2019-03-20 PROCEDURE — 74018 RADEX ABDOMEN 1 VIEW: CPT

## 2019-03-20 PROCEDURE — 84132 ASSAY OF SERUM POTASSIUM: CPT

## 2019-03-20 PROCEDURE — 88342 IMHCHEM/IMCYTCHM 1ST ANTB: CPT

## 2019-03-20 PROCEDURE — 88341 IMHCHEM/IMCYTCHM EA ADD ANTB: CPT

## 2019-03-20 PROCEDURE — 96375 TX/PRO/DX INJ NEW DRUG ADDON: CPT | Mod: XU

## 2019-03-20 PROCEDURE — 86850 RBC ANTIBODY SCREEN: CPT

## 2019-03-20 PROCEDURE — 82565 ASSAY OF CREATININE: CPT

## 2019-03-20 PROCEDURE — 83735 ASSAY OF MAGNESIUM: CPT

## 2019-03-20 PROCEDURE — 99285 EMERGENCY DEPT VISIT HI MDM: CPT | Mod: 25

## 2019-03-20 PROCEDURE — 85610 PROTHROMBIN TIME: CPT

## 2019-03-20 PROCEDURE — 93005 ELECTROCARDIOGRAM TRACING: CPT | Mod: XU

## 2019-03-20 PROCEDURE — 84100 ASSAY OF PHOSPHORUS: CPT

## 2019-03-20 PROCEDURE — 88307 TISSUE EXAM BY PATHOLOGIST: CPT

## 2019-03-20 PROCEDURE — 88309 TISSUE EXAM BY PATHOLOGIST: CPT

## 2019-03-20 PROCEDURE — 80048 BASIC METABOLIC PNL TOTAL CA: CPT

## 2019-03-20 PROCEDURE — 88360 TUMOR IMMUNOHISTOCHEM/MANUAL: CPT

## 2019-03-20 PROCEDURE — 88365 INSITU HYBRIDIZATION (FISH): CPT

## 2019-03-20 RX ADMIN — Medication 600 MILLIGRAM(S): at 06:25

## 2019-03-20 RX ADMIN — Medication 600 MILLIGRAM(S): at 05:55

## 2019-03-20 RX ADMIN — ENOXAPARIN SODIUM 40 MILLIGRAM(S): 100 INJECTION SUBCUTANEOUS at 05:55

## 2019-03-20 RX ADMIN — Medication 650 MILLIGRAM(S): at 05:55

## 2019-03-20 RX ADMIN — Medication 650 MILLIGRAM(S): at 06:25

## 2019-03-20 RX ADMIN — PANTOPRAZOLE SODIUM 40 MILLIGRAM(S): 20 TABLET, DELAYED RELEASE ORAL at 05:55

## 2019-03-20 NOTE — PROGRESS NOTE ADULT - SUBJECTIVE AND OBJECTIVE BOX
GENERAL SURGERY DAILY PROGRESS NOTE:       Subjective:  Pt seen and examined at bedside. No acute events overnight. Pain controlled. Tolerating regular diet. (-)N/V, (+) flatus/BM in the ostomy. CT yesterday showed mural thickening and pericolonic fat inflammation of the colon proximal to the ostomy which may be infectious, inflammatory, or ischemic in etiology. Follow up full/official report in the morning.        Objective:    PE:  General: NAD  Abdomen: soft, nontender, +morbidly obese abdomen, midline incision and lap sites c/d/i, ostomy pink/viable; gas and stool in ostomy bag    Vital Signs Last 24 Hrs  T(C): 36.7 (19 Mar 2019 22:06), Max: 36.8 (19 Mar 2019 06:10)  T(F): 98.1 (19 Mar 2019 22:06), Max: 98.2 (19 Mar 2019 06:10)  HR: 76 (19 Mar 2019 22:06) (68 - 76)  BP: 143/90 (19 Mar 2019 22:06) (108/73 - 143/90)  BP(mean): --  RR: 18 (19 Mar 2019 22:06) (17 - 18)  SpO2: 96% (19 Mar 2019 22:06) (94% - 96%)    I&O's Detail    18 Mar 2019 07:01  -  19 Mar 2019 07:00  --------------------------------------------------------  IN:    Oral Fluid: 1080 mL    Solution: 50 mL  Total IN: 1130 mL    OUT:    Colostomy: 1325 mL    Indwelling Catheter - Urethral: 250 mL    Voided: 1625 mL  Total OUT: 3200 mL    Total NET: -2070 mL      19 Mar 2019 07:01  -  20 Mar 2019 05:16  --------------------------------------------------------  IN:    Oral Fluid: 1040 mL  Total IN: 1040 mL    OUT:    Voided: 1800 mL  Total OUT: 1800 mL    Total NET: -760 mL          Daily     Daily     MEDICATIONS  (STANDING):  acetaminophen   Tablet .. 650 milliGRAM(s) Oral every 6 hours  enoxaparin Injectable 40 milliGRAM(s) SubCutaneous every 24 hours  ibuprofen  Tablet. 600 milliGRAM(s) Oral every 6 hours  pantoprazole    Tablet 40 milliGRAM(s) Oral two times a day    MEDICATIONS  (PRN):  oxyCODONE    IR 5 milliGRAM(s) Oral every 4 hours PRN Moderate Pain (4 - 6)  oxyCODONE    IR 10 milliGRAM(s) Oral every 4 hours PRN Severe Pain (7 - 10)      LABS:                        12.2   13.2  )-----------( 392      ( 19 Mar 2019 07:27 )             37.7     03-18    142  |  105  |  7   ----------------------------<  104<H>  3.6   |  26  |  1.05    Ca    9.0      18 Mar 2019 09:23  Phos  3.8     -18  Mg     1.6     -18        Urinalysis Basic - ( 19 Mar 2019 16:10 )    Color: Yellow / Appearance: Clear / S.020 / pH: x  Gluc: x / Ketone: Negative  / Bili: Negative / Urobili: Negative   Blood: x / Protein: Trace / Nitrite: Negative   Leuk Esterase: Negative / RBC: 2 /hpf / WBC 2 /HPF   Sq Epi: x / Non Sq Epi: 1 /hpf / Bacteria: Negative        RADIOLOGY & ADDITIONAL STUDIES: GENERAL SURGERY DAILY PROGRESS NOTE:       Subjective:  Pt seen and examined at bedside. No acute events overnight. Pain controlled. Tolerating regular diet. (-)N/V, (+) flatus/BM in the ostomy. CT yesterday showed no collections (prelim report), also mural thickening and pericolonic fat inflammation of the colon proximal to the ostomy. Follow up full/official report in the morning.        Objective:    PE:  General: NAD  Abdomen: soft, nontender, +morbidly obese abdomen, midline incision and lap sites c/d/i, ostomy pink/viable; gas and stool in ostomy bag    Vital Signs Last 24 Hrs  T(C): 36.7 (19 Mar 2019 22:06), Max: 36.8 (19 Mar 2019 06:10)  T(F): 98.1 (19 Mar 2019 22:06), Max: 98.2 (19 Mar 2019 06:10)  HR: 76 (19 Mar 2019 22:06) (68 - 76)  BP: 143/90 (19 Mar 2019 22:06) (108/73 - 143/90)  BP(mean): --  RR: 18 (19 Mar 2019 22:06) (17 - 18)  SpO2: 96% (19 Mar 2019 22:06) (94% - 96%)    I&O's Detail    18 Mar 2019 07:01  -  19 Mar 2019 07:00  --------------------------------------------------------  IN:    Oral Fluid: 1080 mL    Solution: 50 mL  Total IN: 1130 mL    OUT:    Colostomy: 1325 mL    Indwelling Catheter - Urethral: 250 mL    Voided: 1625 mL  Total OUT: 3200 mL    Total NET: -2070 mL      19 Mar 2019 07:01  -  20 Mar 2019 05:16  --------------------------------------------------------  IN:    Oral Fluid: 1040 mL  Total IN: 1040 mL    OUT:    Voided: 1800 mL  Total OUT: 1800 mL    Total NET: -760 mL          Daily     Daily     MEDICATIONS  (STANDING):  acetaminophen   Tablet .. 650 milliGRAM(s) Oral every 6 hours  enoxaparin Injectable 40 milliGRAM(s) SubCutaneous every 24 hours  ibuprofen  Tablet. 600 milliGRAM(s) Oral every 6 hours  pantoprazole    Tablet 40 milliGRAM(s) Oral two times a day    MEDICATIONS  (PRN):  oxyCODONE    IR 5 milliGRAM(s) Oral every 4 hours PRN Moderate Pain (4 - 6)  oxyCODONE    IR 10 milliGRAM(s) Oral every 4 hours PRN Severe Pain (7 - 10)      LABS:                        12.2   13.2  )-----------( 392      ( 19 Mar 2019 07:27 )             37.7     03-18    142  |  105  |  7   ----------------------------<  104<H>  3.6   |  26  |  1.05    Ca    9.0      18 Mar 2019 09:23  Phos  3.8     -18  Mg     1.6     -18        Urinalysis Basic - ( 19 Mar 2019 16:10 )    Color: Yellow / Appearance: Clear / S.020 / pH: x  Gluc: x / Ketone: Negative  / Bili: Negative / Urobili: Negative   Blood: x / Protein: Trace / Nitrite: Negative   Leuk Esterase: Negative / RBC: 2 /hpf / WBC 2 /HPF   Sq Epi: x / Non Sq Epi: 1 /hpf / Bacteria: Negative        RADIOLOGY & ADDITIONAL STUDIES:

## 2019-03-20 NOTE — PROGRESS NOTE ADULT - ATTENDING COMMENTS
I was asked by Dr Alegria to take over pt care.  I reviewed the CTs with Dr Reno Mills, they are concerning for a large sigmoid colon Ca causing LBO, ICV appears to be incompetent with new gastric and SB dilatation in the new CT.  The pt denies abdominal pain currently.  Last flatus and BM yesterday morning.  Feels he will have a BM this morning.  Abdomen morbidly obese, NT, with soft epigastric distention. No peritoneal signs.   Denies his brother had colon cancer. States his brother had benign colon polyps in the past - they are 11 months apart in age.   Pt with picture concerning for colon ca causing LBO with an incompetent ICV.  It appears he will need sigmoid resection to address the colon disease (regardless of etiology). If he has bowel function, we will be able to decompress him, give him ABX and potentially be able to prep him preop, then do sigmoid resection with the intent of anastomosis in this admission.  If he deteriorates clinically, he will need urgent colectomy with colostomy (vs subtotal colectomy with EZEKIEL).  Will also consult advanced GI to obtain opinion on possibility of a stent as a bridge to surgery - however if the pt does have bowel function, he may not need it.   Above d/w the pt, all questions answered.
I have seen and evaluated patient and agree with resident assessment and plan.  Likely allison removal tomorrow +/- cystogram as per Dr. Stein
I have seen and evaluated patients and I agree with resident assessment and plan. Will maintain Quispe until Monday in case a cystogram needed. Diet advanced and tolerated
I have seen and evaluated the patient and discussed the relevant clinical findings with the surgical team.  Agree with the above documentation with changes made as appropriate.     No acute events overnight  No nausea; some mild epigastric abdominal discomfort  Abdomen soft, mild epigastric tenderness    Plan:  [] serial abdominal exams, NGT to suction  [] possible OR next week
I have seen and evaluated the patient and discussed the relevant clinical findings with the surgical team.  Agree with the above documentation with changes made as appropriate.     Passing flatus, feels well.  Abdomen is soft, obese, nontender.  NGT in place.    Plan:  [] monitor NGT output, will d/c when ready  [] OR planning for next week tentatively
I saw and examined the pt and discussed the tx plan with the House Staff. I agree with the exam and plan as documented in the surgery resident's note from today.  Feels well, colostomy working, voiding w/o problems.  D/c home with VNS, see me in the office in one week.   Jannie Stein MD
I saw and examined the pt and discussed the tx plan with the House Staff. I agree with the exam and plan as documented in the surgery resident's note from today.  Has had some BMs, continues to feel "gassy".  For lap, poss open sigmoid resection today, possible ostomy. Details, R/B/A discussed.   Jannie Stein MD
I saw and examined the pt and discussed the tx plan with the House Staff. I agree with the exam and plan as documented in the surgery resident's note from today.  pain controlled. felt bloated during the NGT clamping trial, bloating subsided when the tube was conneced back to suction.  NGT with dark maroon output, green in the canister.  Abdomen soft, less distended, stoma pink, with some air and liquid stool in the bag.  Improving, change Protonix to bid, continue NGT today - hopefully with be able to remove tomorrow, continue to ambulate.   Jannie Stein MD
I saw and examined the pt and discussed the tx plan with the House Staff. I agree with the exam and plan as documented in the surgery resident's note from today which I edited as indicated.  I was able to obtain an OR spot for tomorrow morning.  Pt was decompressed over the weekend, and has currently bowel fx. We will try to prep with Miralax via NGT.  OR plan discussed with the pt, R/B/A and expectations of recovery, all questions answered.   Jannie Stein MD
I saw and examined the pt and discussed the tx plan with the House Staff. I agree with the exam and plan as documented in the surgery resident's note from today.  CT reviewed, no collections, noted described colonic wall thickening typical of his large bowel obstruction with reactive thickening of the proximal colon - colostomy pink with no evidence of ischemia - no need for any intervention.  F/u official report.  D/c home today.  Jannie Stein MD
I saw and examined the pt and discussed the tx plan with the House Staff. I agree with the exam and plan as documented in the surgery resident's note from today.  Repeat NGT clamping trial. Colostomy functioning. Continue to ambulate.  Jannie Stein MD
I saw and examined the pt and discussed the tx plan with the House Staff. I agree with the exam and plan as documented in the surgery resident's note from today.  Voiding trial.  Placement.  Jannie Stein MD
I saw and examined the pt this morning at 7:45 am and discussed the tx plan with the House Staff. I agree with the exam and plan as documented in the surgery resident's note from today which I edited as indicated.  Intraop findings discussed.  Pain controlled.  Somewhat depressed.   Abdomen soft, large, with appropriate incisional tenderness.  Stoma pink.  Pain control, ambulate.   Jannie Stein MD

## 2019-03-20 NOTE — DISCHARGE NOTE NURSING/CASE MANAGEMENT/SOCIAL WORK - NSDCDPATPORTLINK_GEN_ALL_CORE
You can access the [facility] Portal, offered by [facility], by registering with the following website: [email or web address]

## 2019-03-21 PROBLEM — K63.9 COLONIC MASS: Status: RESOLVED | Noted: 2019-03-08 | Resolved: 2019-03-21

## 2019-03-21 LAB — SURGICAL PATHOLOGY STUDY: SIGNIFICANT CHANGE UP

## 2019-03-27 ENCOUNTER — APPOINTMENT (OUTPATIENT)
Dept: SURGERY | Facility: CLINIC | Age: 54
End: 2019-03-27
Payer: COMMERCIAL

## 2019-03-27 VITALS
SYSTOLIC BLOOD PRESSURE: 152 MMHG | BODY MASS INDEX: 39.28 KG/M2 | DIASTOLIC BLOOD PRESSURE: 107 MMHG | OXYGEN SATURATION: 98 % | HEART RATE: 91 BPM | TEMPERATURE: 97.9 F | HEIGHT: 72 IN | WEIGHT: 290 LBS | RESPIRATION RATE: 17 BRPM

## 2019-03-27 VITALS — DIASTOLIC BLOOD PRESSURE: 101 MMHG | SYSTOLIC BLOOD PRESSURE: 150 MMHG

## 2019-03-27 DIAGNOSIS — Z82.49 FAMILY HISTORY OF ISCHEMIC HEART DISEASE AND OTHER DISEASES OF THE CIRCULATORY SYSTEM: ICD-10-CM

## 2019-03-27 DIAGNOSIS — Z87.19 PERSONAL HISTORY OF OTHER DISEASES OF THE DIGESTIVE SYSTEM: ICD-10-CM

## 2019-03-27 DIAGNOSIS — Z80.9 FAMILY HISTORY OF MALIGNANT NEOPLASM, UNSPECIFIED: ICD-10-CM

## 2019-03-27 DIAGNOSIS — K56.699 OTHER INTESTINAL OBSTRUCTION UNSPECIFIED AS TO PARTIAL VERSUS COMPLETE OBSTRUCTION: ICD-10-CM

## 2019-03-27 PROCEDURE — 99024 POSTOP FOLLOW-UP VISIT: CPT

## 2019-03-27 RX ORDER — METRONIDAZOLE 500 MG/1
500 TABLET ORAL
Qty: 20 | Refills: 0 | Status: DISCONTINUED | COMMUNITY
Start: 2019-03-06 | End: 2019-03-27

## 2019-03-27 RX ORDER — CIPROFLOXACIN HYDROCHLORIDE 500 MG/1
500 TABLET, FILM COATED ORAL
Qty: 20 | Refills: 0 | Status: DISCONTINUED | COMMUNITY
Start: 2019-03-06 | End: 2019-03-27

## 2019-03-27 RX ORDER — OXYCODONE 5 MG/1
5 TABLET ORAL
Qty: 18 | Refills: 0 | Status: DISCONTINUED | COMMUNITY
Start: 2019-03-19 | End: 2019-03-27

## 2019-03-29 NOTE — REASON FOR VISIT
[Post Op: _________] : a [unfilled] post op visit [FreeTextEntry1] : Open John's resection for diverticular stricture with LBO 03/12/19

## 2019-03-29 NOTE — HISTORY OF PRESENT ILLNESS
[FreeTextEntry1] : 53 y/o male here for post op visit. Today patient report having trouble sleeping and has been going through a stress time (getting ). No pain and fevers or chills, + night sweats. No nausea or vomiting. Denies issues with colostomy care, no leaks. Trying to loose weight, states he lost 15 lbs postop.

## 2019-03-29 NOTE — ASSESSMENT
[FreeTextEntry1] : Doing well postop.\par Path discussed. \par Instructions for diet, activity, wound care given, all questions answered.\par Discussed plan for weight loss and eventual John's reversal in about 4 months' time.\par RTO in 4 weeks.\par \par

## 2019-03-29 NOTE — PHYSICAL EXAM
[FreeTextEntry1] : Abdomen soft, non-tender, non-distended. Incisions healing well. Staples removed. Open wounds along midline incision are clean and superficial. Colostomy pink, everted. No hernias.

## 2019-05-01 ENCOUNTER — APPOINTMENT (OUTPATIENT)
Dept: SURGERY | Facility: CLINIC | Age: 54
End: 2019-05-01
Payer: COMMERCIAL

## 2019-05-01 VITALS
HEIGHT: 72 IN | SYSTOLIC BLOOD PRESSURE: 138 MMHG | HEART RATE: 90 BPM | BODY MASS INDEX: 38.6 KG/M2 | TEMPERATURE: 98.6 F | WEIGHT: 285 LBS | RESPIRATION RATE: 16 BRPM | DIASTOLIC BLOOD PRESSURE: 97 MMHG | OXYGEN SATURATION: 97 %

## 2019-05-01 DIAGNOSIS — Z87.898 PERSONAL HISTORY OF OTHER SPECIFIED CONDITIONS: ICD-10-CM

## 2019-05-01 PROCEDURE — 99024 POSTOP FOLLOW-UP VISIT: CPT

## 2019-05-01 RX ORDER — ZOLPIDEM TARTRATE 5 MG/1
5 TABLET ORAL
Qty: 30 | Refills: 0 | Status: DISCONTINUED | COMMUNITY
Start: 2019-03-27 | End: 2019-05-01

## 2019-05-01 RX ORDER — OMEPRAZOLE 40 MG/1
40 CAPSULE, DELAYED RELEASE ORAL
Qty: 30 | Refills: 0 | Status: DISCONTINUED | COMMUNITY
Start: 2019-03-04 | End: 2019-05-01

## 2019-05-02 NOTE — ASSESSMENT
[FreeTextEntry1] : Doing well postop.\par Encouraged to continue the efforts for weight loss.\par Plan for John's reversal towards the end of June/beginning of July. He will need a preop colonoscopy. My office will coordinate.\par RTO in about one month for preop discussion and instructions.\par \par

## 2019-05-02 NOTE — HISTORY OF PRESENT ILLNESS
[FreeTextEntry1] : 55 y/o male here for post op visit. Feels well. No problems with the ostomy. Trying to loose weight - successfully. Says he has lost about 26 lbs postop. Wants to plan for the colostomy reversal.

## 2019-05-02 NOTE — PHYSICAL EXAM
[FreeTextEntry1] : Abdomen soft, non-tender, non-distended. Incisions have healed well. Colostomy pink, everted. No hernias.

## 2019-05-02 NOTE — REASON FOR VISIT
[Post Op: _________] : a [unfilled] post op visit [FreeTextEntry1] : Open John's resection for diverticular stricture with LBO 03/12/19.

## 2019-05-25 ENCOUNTER — MOBILE ON CALL (OUTPATIENT)
Age: 54
End: 2019-05-25

## 2019-05-29 ENCOUNTER — APPOINTMENT (OUTPATIENT)
Dept: SURGERY | Facility: CLINIC | Age: 54
End: 2019-05-29
Payer: COMMERCIAL

## 2019-05-30 ENCOUNTER — APPOINTMENT (OUTPATIENT)
Dept: SURGERY | Facility: CLINIC | Age: 54
End: 2019-05-30
Payer: COMMERCIAL

## 2019-06-05 ENCOUNTER — APPOINTMENT (OUTPATIENT)
Dept: SURGERY | Facility: CLINIC | Age: 54
End: 2019-06-05
Payer: COMMERCIAL

## 2019-06-05 VITALS
HEART RATE: 93 BPM | OXYGEN SATURATION: 97 % | RESPIRATION RATE: 17 BRPM | DIASTOLIC BLOOD PRESSURE: 92 MMHG | SYSTOLIC BLOOD PRESSURE: 135 MMHG | TEMPERATURE: 98 F

## 2019-06-05 DIAGNOSIS — L53.9 ERYTHEMATOUS CONDITION, UNSPECIFIED: ICD-10-CM

## 2019-06-05 PROCEDURE — 99024 POSTOP FOLLOW-UP VISIT: CPT

## 2019-06-05 RX ORDER — CHLORHEXIDINE GLUCONATE 4 %
LIQUID (ML) TOPICAL
Refills: 0 | Status: DISCONTINUED | COMMUNITY
End: 2019-06-05

## 2019-06-05 RX ORDER — METRONIDAZOLE 250 MG/1
250 TABLET ORAL
Qty: 3 | Refills: 0 | Status: DISCONTINUED | COMMUNITY
Start: 2019-05-29 | End: 2019-06-05

## 2019-06-05 RX ORDER — CIPROFLOXACIN HYDROCHLORIDE 250 MG/1
250 TABLET, FILM COATED ORAL
Qty: 3 | Refills: 0 | Status: DISCONTINUED | COMMUNITY
Start: 2019-05-29 | End: 2019-06-05

## 2019-06-05 RX ORDER — CEPHALEXIN 500 MG/1
500 CAPSULE ORAL 4 TIMES DAILY
Qty: 20 | Refills: 0 | Status: DISCONTINUED | COMMUNITY
Start: 2019-05-25 | End: 2019-06-05

## 2019-06-10 PROBLEM — L53.9: Status: RESOLVED | Noted: 2019-05-25 | Resolved: 2019-06-10

## 2019-06-10 NOTE — HISTORY OF PRESENT ILLNESS
[FreeTextEntry1] : 55 y/o male here for post op visit and preop talk for John's reversal. Feels well overall. No problems with the ostomy. He has regained a lot of the weight he lost.  \par He developed a pimple along the umbilical part of his incision scar and was placed on antibiotics few days ago, the area started draining purulent fluid and the pain resolved.

## 2019-06-10 NOTE — ASSESSMENT
[FreeTextEntry1] : Stable overall.\par Instructed to resume the efforts for weight loss.\par Plan for John's reversal in end of June/beginning of July. He will need a preop colonoscopy. I recommended the open John's reversal and colonoscopy details, R/B/A of the procedure with the pt. I discussed our Enhanced Recovery Protocol in place for colectomy pts and its advantages for faster recovery. All questions answered. The pt will schedule the surgery with my office.

## 2019-06-10 NOTE — PHYSICAL EXAM
[FreeTextEntry1] : Abdomen soft, non-tender, non-distended. Colostomy pink, everted. No hernias.\par Midline scar with small skin opening caudal to the umbilicus, with mild purulent drainage. Opening probed and there is a tract down his abdominal wall, down to the fascia, several cms deep. No abscess or undrained collections. Nothing to I&D at this time. Will plan to address the subq tract at the time of his John's reversal. \par

## 2019-06-10 NOTE — REASON FOR VISIT
[FreeTextEntry1] : Open John's resection for diverticular stricture with LBO 03/12/19.  [Post Op: _________] : a [unfilled] post op visit

## 2019-06-19 ENCOUNTER — APPOINTMENT (OUTPATIENT)
Dept: UROLOGY | Facility: CLINIC | Age: 54
End: 2019-06-19
Payer: COMMERCIAL

## 2019-06-19 VITALS
BODY MASS INDEX: 39.28 KG/M2 | HEIGHT: 72 IN | OXYGEN SATURATION: 95 % | RESPIRATION RATE: 14 BRPM | HEART RATE: 100 BPM | WEIGHT: 290 LBS | SYSTOLIC BLOOD PRESSURE: 126 MMHG | DIASTOLIC BLOOD PRESSURE: 80 MMHG

## 2019-06-19 PROCEDURE — 99203 OFFICE O/P NEW LOW 30 MIN: CPT

## 2019-06-19 NOTE — HISTORY OF PRESENT ILLNESS
[FreeTextEntry1] : He is a 54-year-old man who is seen today for initial visit. According to the patient, a few months ago he had diverticulitis and a colostomy was performed. He is supposed to go back on June 28 for colostomy reversal and we were asked to place ureteral catheters. He otherwise does not have significant urinary symptoms. He has been out of work since the surgery. There is no flank pain. Based on CT scan, kidneys were normal. Small area of the bladder that was thinned out during the initial surgery, was repaired intraoperatively.

## 2019-06-19 NOTE — ASSESSMENT
[FreeTextEntry1] : He declined genital examination. We discussed the risks and benefits of ureteral catheter placement. Risks of injury to the urinary tract was discussed. Inability to identify the ureters during the procedure was reviewed. Questions and concerns were answered.\par \par Helio Sommer MD, FACS\par [facility]\par  of Urology\par \par [address], [address]\[place] [address]\par \par [address], [address]\[place] [address]\par \par Tel: [phone]\par Fax: [phone]

## 2019-06-19 NOTE — PHYSICAL EXAM
[General Appearance - In No Acute Distress] : no acute distress [Respiration, Rhythm And Depth] : normal respiratory rhythm and effort [Edema] : no peripheral edema [] : no respiratory distress [Abdomen Tenderness] : non-tender [Exaggerated Use Of Accessory Muscles For Inspiration] : no accessory muscle use [FreeTextEntry1] : obese, colostomy noted [Oriented To Time, Place, And Person] : oriented to person, place, and time [Normal Station and Gait] : the gait and station were normal for the patient's age

## 2019-06-20 ENCOUNTER — OUTPATIENT (OUTPATIENT)
Dept: OUTPATIENT SERVICES | Facility: HOSPITAL | Age: 54
LOS: 1 days | End: 2019-06-20
Payer: COMMERCIAL

## 2019-06-20 VITALS
HEART RATE: 87 BPM | OXYGEN SATURATION: 98 % | TEMPERATURE: 98 F | DIASTOLIC BLOOD PRESSURE: 89 MMHG | WEIGHT: 304.02 LBS | SYSTOLIC BLOOD PRESSURE: 123 MMHG | RESPIRATION RATE: 17 BRPM | HEIGHT: 72 IN

## 2019-06-20 DIAGNOSIS — Z29.9 ENCOUNTER FOR PROPHYLACTIC MEASURES, UNSPECIFIED: ICD-10-CM

## 2019-06-20 DIAGNOSIS — Z93.3 COLOSTOMY STATUS: ICD-10-CM

## 2019-06-20 DIAGNOSIS — G47.33 OBSTRUCTIVE SLEEP APNEA (ADULT) (PEDIATRIC): ICD-10-CM

## 2019-06-20 DIAGNOSIS — Z98.890 OTHER SPECIFIED POSTPROCEDURAL STATES: Chronic | ICD-10-CM

## 2019-06-20 DIAGNOSIS — Z01.818 ENCOUNTER FOR OTHER PREPROCEDURAL EXAMINATION: ICD-10-CM

## 2019-06-20 LAB
ANION GAP SERPL CALC-SCNC: 14 MMOL/L — SIGNIFICANT CHANGE UP (ref 5–17)
BLD GP AB SCN SERPL QL: NEGATIVE — SIGNIFICANT CHANGE UP
BUN SERPL-MCNC: 24 MG/DL — HIGH (ref 7–23)
CALCIUM SERPL-MCNC: 9.5 MG/DL — SIGNIFICANT CHANGE UP (ref 8.4–10.5)
CHLORIDE SERPL-SCNC: 103 MMOL/L — SIGNIFICANT CHANGE UP (ref 96–108)
CO2 SERPL-SCNC: 23 MMOL/L — SIGNIFICANT CHANGE UP (ref 22–31)
CREAT SERPL-MCNC: 0.95 MG/DL — SIGNIFICANT CHANGE UP (ref 0.5–1.3)
GLUCOSE SERPL-MCNC: 116 MG/DL — HIGH (ref 70–99)
HBA1C BLD-MCNC: 5.5 % — SIGNIFICANT CHANGE UP (ref 4–5.6)
HCT VFR BLD CALC: 49.3 % — SIGNIFICANT CHANGE UP (ref 39–50)
HGB BLD-MCNC: 15.1 G/DL — SIGNIFICANT CHANGE UP (ref 13–17)
MCHC RBC-ENTMCNC: 25.8 PG — LOW (ref 27–34)
MCHC RBC-ENTMCNC: 30.6 GM/DL — LOW (ref 32–36)
MCV RBC AUTO: 84.3 FL — SIGNIFICANT CHANGE UP (ref 80–100)
PLATELET # BLD AUTO: 356 K/UL — SIGNIFICANT CHANGE UP (ref 150–400)
POTASSIUM SERPL-MCNC: 4.1 MMOL/L — SIGNIFICANT CHANGE UP (ref 3.5–5.3)
POTASSIUM SERPL-SCNC: 4.1 MMOL/L — SIGNIFICANT CHANGE UP (ref 3.5–5.3)
RBC # BLD: 5.85 M/UL — HIGH (ref 4.2–5.8)
RBC # FLD: 15.1 % — HIGH (ref 10.3–14.5)
RH IG SCN BLD-IMP: POSITIVE — SIGNIFICANT CHANGE UP
SODIUM SERPL-SCNC: 140 MMOL/L — SIGNIFICANT CHANGE UP (ref 135–145)
WBC # BLD: 8.91 K/UL — SIGNIFICANT CHANGE UP (ref 3.8–10.5)
WBC # FLD AUTO: 8.91 K/UL — SIGNIFICANT CHANGE UP (ref 3.8–10.5)

## 2019-06-20 PROCEDURE — 85027 COMPLETE CBC AUTOMATED: CPT

## 2019-06-20 PROCEDURE — 83036 HEMOGLOBIN GLYCOSYLATED A1C: CPT

## 2019-06-20 PROCEDURE — 86850 RBC ANTIBODY SCREEN: CPT

## 2019-06-20 PROCEDURE — 86901 BLOOD TYPING SEROLOGIC RH(D): CPT

## 2019-06-20 PROCEDURE — 80048 BASIC METABOLIC PNL TOTAL CA: CPT

## 2019-06-20 PROCEDURE — 86900 BLOOD TYPING SEROLOGIC ABO: CPT

## 2019-06-20 PROCEDURE — 87086 URINE CULTURE/COLONY COUNT: CPT

## 2019-06-20 PROCEDURE — G0463: CPT

## 2019-06-20 RX ORDER — CEFOTETAN DISODIUM 1 G
2 VIAL (EA) INJECTION ONCE
Refills: 0 | Status: DISCONTINUED | OUTPATIENT
Start: 2019-06-28 | End: 2019-06-28

## 2019-06-20 NOTE — H&P PST ADULT - NSICDXPASTMEDICALHX_GEN_ALL_CORE_FT
PAST MEDICAL HISTORY:  Diverticulitis     HLD (hyperlipidemia)     Morbid obesity     Sleep apnea PAST MEDICAL HISTORY:  Diverticulitis     History of neuropathy big toes    HLD (hyperlipidemia)     Morbid obesity     Sleep apnea PAST MEDICAL HISTORY:  Colostomy present     Diverticulitis     History of neuropathy big toes    HLD (hyperlipidemia)     Morbid obesity     Sleep apnea

## 2019-06-20 NOTE — H&P PST ADULT - ASSESSMENT
CAPRINI SCORE [CLOT updated 18]    AGE RELATED RISK FACTORS                                                       MOBILITY RELATED FACTORS  [ 1] Age 41-60 years                                            (1 Point)                    [ ] Bed rest                                                        (1 Point)  [ ] Age: 61-74 years                                           (2 Points)                  [ ] Plaster cast                                                   (2 Points)  [ ] Age= 75 years                                              (3 Points)                    [ ] Bed bound for more than 72 hours                 (2 Points)    DISEASE RELATED RISK FACTORS                                               GENDER SPECIFIC FACTORS  [ ] Edema in the lower extremities                       (1 Point)              [ ] Pregnancy                                                     (1 Point)  [ ] Varicose veins                                               (1 Point)                     [ ] Post-partum < 6 weeks                                   (1 Point)             [1 ] BMI > 25 Kg/m2                                            (1 Point)                     [ ] Hormonal therapy  or oral contraception          (1 Point)                 [ ] Sepsis (in the previous month)                        (1 Point)               [ ] History of pregnancy complications                 (1 point)  [ ] Pneumonia or serious lung disease                                               [ ] Unexplained or recurrent                     (1 Point)           (in the previous month)                               (1 Point)  [ ] Abnormal pulmonary function test                     (1 Point)                 SURGERY RELATED RISK FACTORS  [ ] Acute myocardial infarction                              (1 Point)               [ ]  Section                                             (1 Point)  [ ] Congestive heart failure (in the previous month)  (1 Point)      [ ] Minor surgery                                                  (1 Point)   [ ] Inflammatory bowel disease                             (1 Point)               [ ] Arthroscopic surgery                                        (2 Points)  [ ] Central venous access                                      (2 Points)                [2 ] General surgery lasting more than 45 minutes (2 points)  [ ] Present or previous malignancy                     (2 Points)                [ ] Elective arthroplasty                                         (5 points)    [ ] Stroke (in the previous month)                          (5 Points)                                                                                                                                                           HEMATOLOGY RELATED FACTORS                                                 TRAUMA RELATED RISK FACTORS  [ ] Prior episodes of VTE                                     (3 Points)                [ ] Fracture of the hip, pelvis, or leg                       (5 Points)  [ ] Positive family history for VTE                         (3 Points)             [ ] Acute spinal cord injury (in the previous month)  (5 Points)  [ ] Prothrombin 00330 A                                     (3 Points)               [ ] Paralysis  (less than 1 month)                             (5 Points)  [ ] Factor V Leiden                                             (3 Points)                  [ ] Multiple Trauma within 1 month                        (5 Points)  [ ] Lupus anticoagulants                                     (3 Points)                                                           [ ] Anticardiolipin antibodies                               (3 Points)                                                       [ ] High homocysteine in the blood                      (3 Points)                                             [ ] Other congenital or acquired thrombophilia      (3 Points)                                                [ ] Heparin induced thrombocytopenia                  (3 Points)                                     Total Score [4  ]

## 2019-06-20 NOTE — H&P PST ADULT - NSICDXPROBLEM_GEN_ALL_CORE_FT
PROBLEM DIAGNOSES  Problem: Presence of colostomy  Assessment and Plan: scheduled for ERAS, open colostomy reversal with epidural, cystoscopy insertion of ureteral catheters.  midstream urine collected for culture  preop instruction including surgical wash given  patient to follow bowel prep as per surgeon for reversal of colostomy on 6/28/2019 and colonoscopy on 6/27/2019  fingerstick on admit     Problem: DAMION on CPAP  Assessment and Plan: OR booking notified     Problem: Need for prophylactic measure  Assessment and Plan: The Caprini score indicates this patient is at risk for a VTE event (score 3-5).  Most surgical patients in this group would benefit from pharmacologic prophylaxis.  The surgical team will determine the balance between VTE risk and bleeding risk

## 2019-06-20 NOTE — H&P PST ADULT - HISTORY OF PRESENT ILLNESS
54 year old morbidly obese male with h/o colon resection with creation of colostomy in March 2019 for diverticulitis, is been seeing in preadmission testing for ERAS/ Open colostomy reversal with epidural/ cystoscopy insertion of ureteral catheters on 6/28/2019. Patient medical history also includes DAMION (CPAP, states resolved since lose 120 lbs in one year, and not using the CPAP), also hyperlipidemia, neuropathy bilateral big toes.  Also Patient is scheduled for colonoscopy on 6/27/2019.

## 2019-06-21 LAB
CULTURE RESULTS: NO GROWTH — SIGNIFICANT CHANGE UP
SPECIMEN SOURCE: SIGNIFICANT CHANGE UP

## 2019-06-27 ENCOUNTER — OUTPATIENT (OUTPATIENT)
Dept: OUTPATIENT SERVICES | Facility: HOSPITAL | Age: 54
LOS: 1 days | End: 2019-06-27
Payer: COMMERCIAL

## 2019-06-27 ENCOUNTER — TRANSCRIPTION ENCOUNTER (OUTPATIENT)
Age: 54
End: 2019-06-27

## 2019-06-27 ENCOUNTER — APPOINTMENT (OUTPATIENT)
Dept: SURGERY | Facility: HOSPITAL | Age: 54
End: 2019-06-27
Payer: COMMERCIAL

## 2019-06-27 ENCOUNTER — RESULT REVIEW (OUTPATIENT)
Age: 54
End: 2019-06-27

## 2019-06-27 DIAGNOSIS — Z93.3 COLOSTOMY STATUS: ICD-10-CM

## 2019-06-27 DIAGNOSIS — Z98.890 OTHER SPECIFIED POSTPROCEDURAL STATES: Chronic | ICD-10-CM

## 2019-06-27 PROCEDURE — 88305 TISSUE EXAM BY PATHOLOGIST: CPT | Mod: 26

## 2019-06-27 PROCEDURE — 44388 COLONOSCOPY THRU STOMA SPX: CPT

## 2019-06-28 ENCOUNTER — APPOINTMENT (OUTPATIENT)
Dept: SURGERY | Facility: HOSPITAL | Age: 54
End: 2019-06-28
Payer: COMMERCIAL

## 2019-06-28 ENCOUNTER — INPATIENT (INPATIENT)
Facility: HOSPITAL | Age: 54
LOS: 4 days | Discharge: HOME | DRG: 330 | End: 2019-07-03
Attending: SURGERY | Admitting: SURGERY
Payer: COMMERCIAL

## 2019-06-28 ENCOUNTER — RESULT REVIEW (OUTPATIENT)
Age: 54
End: 2019-06-28

## 2019-06-28 VITALS
HEIGHT: 72 IN | RESPIRATION RATE: 18 BRPM | HEART RATE: 90 BPM | OXYGEN SATURATION: 97 % | TEMPERATURE: 98 F | SYSTOLIC BLOOD PRESSURE: 120 MMHG | DIASTOLIC BLOOD PRESSURE: 86 MMHG | WEIGHT: 304.02 LBS

## 2019-06-28 DIAGNOSIS — Z98.890 OTHER SPECIFIED POSTPROCEDURAL STATES: Chronic | ICD-10-CM

## 2019-06-28 DIAGNOSIS — Z93.3 COLOSTOMY STATUS: ICD-10-CM

## 2019-06-28 LAB
ANION GAP SERPL CALC-SCNC: 14 MMOL/L — SIGNIFICANT CHANGE UP (ref 5–17)
BUN SERPL-MCNC: 15 MG/DL — SIGNIFICANT CHANGE UP (ref 7–23)
CALCIUM SERPL-MCNC: 8.3 MG/DL — LOW (ref 8.4–10.5)
CHLORIDE SERPL-SCNC: 102 MMOL/L — SIGNIFICANT CHANGE UP (ref 96–108)
CO2 SERPL-SCNC: 22 MMOL/L — SIGNIFICANT CHANGE UP (ref 22–31)
CREAT SERPL-MCNC: 1.21 MG/DL — SIGNIFICANT CHANGE UP (ref 0.5–1.3)
GLUCOSE BLDC GLUCOMTR-MCNC: 83 MG/DL — SIGNIFICANT CHANGE UP (ref 70–99)
GLUCOSE SERPL-MCNC: 145 MG/DL — HIGH (ref 70–99)
HCT VFR BLD CALC: 46.8 % — SIGNIFICANT CHANGE UP (ref 39–50)
HGB BLD-MCNC: 15.7 G/DL — SIGNIFICANT CHANGE UP (ref 13–17)
MAGNESIUM SERPL-MCNC: 1.9 MG/DL — SIGNIFICANT CHANGE UP (ref 1.6–2.6)
MCHC RBC-ENTMCNC: 27.7 PG — SIGNIFICANT CHANGE UP (ref 27–34)
MCHC RBC-ENTMCNC: 33.5 GM/DL — SIGNIFICANT CHANGE UP (ref 32–36)
MCV RBC AUTO: 82.8 FL — SIGNIFICANT CHANGE UP (ref 80–100)
PHOSPHATE SERPL-MCNC: 4.8 MG/DL — HIGH (ref 2.5–4.5)
PLATELET # BLD AUTO: 265 K/UL — SIGNIFICANT CHANGE UP (ref 150–400)
POTASSIUM SERPL-MCNC: 4.5 MMOL/L — SIGNIFICANT CHANGE UP (ref 3.5–5.3)
POTASSIUM SERPL-SCNC: 4.5 MMOL/L — SIGNIFICANT CHANGE UP (ref 3.5–5.3)
RBC # BLD: 5.66 M/UL — SIGNIFICANT CHANGE UP (ref 4.2–5.8)
RBC # FLD: 14.2 % — SIGNIFICANT CHANGE UP (ref 10.3–14.5)
SODIUM SERPL-SCNC: 138 MMOL/L — SIGNIFICANT CHANGE UP (ref 135–145)
SURGICAL PATHOLOGY STUDY: SIGNIFICANT CHANGE UP
WBC # BLD: 17.8 K/UL — HIGH (ref 3.8–10.5)
WBC # FLD AUTO: 17.8 K/UL — HIGH (ref 3.8–10.5)

## 2019-06-28 PROCEDURE — 11406 EXC TR-EXT B9+MARG >4.0 CM: CPT

## 2019-06-28 PROCEDURE — 88304 TISSUE EXAM BY PATHOLOGIST: CPT | Mod: 26

## 2019-06-28 PROCEDURE — 88305 TISSUE EXAM BY PATHOLOGIST: CPT

## 2019-06-28 PROCEDURE — 49560: CPT | Mod: 59

## 2019-06-28 PROCEDURE — 45380 COLONOSCOPY AND BIOPSY: CPT

## 2019-06-28 PROCEDURE — 44626 REPAIR BOWEL OPENING: CPT

## 2019-06-28 PROCEDURE — 88305 TISSUE EXAM BY PATHOLOGIST: CPT | Mod: 26

## 2019-06-28 PROCEDURE — 88307 TISSUE EXAM BY PATHOLOGIST: CPT | Mod: 26

## 2019-06-28 PROCEDURE — 88302 TISSUE EXAM BY PATHOLOGIST: CPT | Mod: 26

## 2019-06-28 PROCEDURE — 52005 CYSTO W/URTRL CATHJ: CPT

## 2019-06-28 RX ORDER — ACETAMINOPHEN 500 MG
1000 TABLET ORAL EVERY 6 HOURS
Refills: 0 | Status: COMPLETED | OUTPATIENT
Start: 2019-06-28 | End: 2019-06-29

## 2019-06-28 RX ORDER — SODIUM CHLORIDE 9 MG/ML
3 INJECTION INTRAMUSCULAR; INTRAVENOUS; SUBCUTANEOUS EVERY 8 HOURS
Refills: 0 | Status: DISCONTINUED | OUTPATIENT
Start: 2019-06-28 | End: 2019-06-28

## 2019-06-28 RX ORDER — LIDOCAINE HCL 20 MG/ML
0.2 VIAL (ML) INJECTION ONCE
Refills: 0 | Status: DISCONTINUED | OUTPATIENT
Start: 2019-06-28 | End: 2019-06-28

## 2019-06-28 RX ORDER — NALOXONE HYDROCHLORIDE 4 MG/.1ML
0.1 SPRAY NASAL
Refills: 0 | Status: DISCONTINUED | OUTPATIENT
Start: 2019-06-28 | End: 2019-06-29

## 2019-06-28 RX ORDER — PHENYLEPHRINE HYDROCHLORIDE 10 MG/ML
0.5 INJECTION INTRAVENOUS
Qty: 40 | Refills: 0 | Status: DISCONTINUED | OUTPATIENT
Start: 2019-06-28 | End: 2019-06-28

## 2019-06-28 RX ORDER — HEPARIN SODIUM 5000 [USP'U]/ML
5000 INJECTION INTRAVENOUS; SUBCUTANEOUS EVERY 8 HOURS
Refills: 0 | Status: DISCONTINUED | OUTPATIENT
Start: 2019-06-28 | End: 2019-07-03

## 2019-06-28 RX ORDER — ONDANSETRON 8 MG/1
4 TABLET, FILM COATED ORAL EVERY 6 HOURS
Refills: 0 | Status: DISCONTINUED | OUTPATIENT
Start: 2019-06-28 | End: 2019-06-29

## 2019-06-28 RX ORDER — SODIUM CHLORIDE 9 MG/ML
1000 INJECTION, SOLUTION INTRAVENOUS
Refills: 0 | Status: DISCONTINUED | OUTPATIENT
Start: 2019-06-28 | End: 2019-06-29

## 2019-06-28 RX ORDER — CELECOXIB 200 MG/1
400 CAPSULE ORAL ONCE
Refills: 0 | Status: COMPLETED | OUTPATIENT
Start: 2019-06-28 | End: 2019-06-28

## 2019-06-28 RX ORDER — GABAPENTIN 400 MG/1
600 CAPSULE ORAL ONCE
Refills: 0 | Status: COMPLETED | OUTPATIENT
Start: 2019-06-28 | End: 2019-06-28

## 2019-06-28 RX ORDER — MORPHINE SULFATE 50 MG/1
2 CAPSULE, EXTENDED RELEASE ORAL ONCE
Refills: 0 | Status: DISCONTINUED | OUTPATIENT
Start: 2019-06-28 | End: 2019-06-28

## 2019-06-28 RX ORDER — CHLORHEXIDINE GLUCONATE 213 G/1000ML
1 SOLUTION TOPICAL ONCE
Refills: 0 | Status: DISCONTINUED | OUTPATIENT
Start: 2019-06-28 | End: 2019-06-28

## 2019-06-28 RX ORDER — ONDANSETRON 8 MG/1
4 TABLET, FILM COATED ORAL ONCE
Refills: 0 | Status: DISCONTINUED | OUTPATIENT
Start: 2019-06-28 | End: 2019-06-28

## 2019-06-28 RX ADMIN — Medication 400 MILLIGRAM(S): at 20:16

## 2019-06-28 RX ADMIN — CELECOXIB 400 MILLIGRAM(S): 200 CAPSULE ORAL at 06:29

## 2019-06-28 RX ADMIN — HEPARIN SODIUM 5000 UNIT(S): 5000 INJECTION INTRAVENOUS; SUBCUTANEOUS at 22:56

## 2019-06-28 RX ADMIN — MORPHINE SULFATE 2 MILLIGRAM(S): 50 CAPSULE, EXTENDED RELEASE ORAL at 23:44

## 2019-06-28 RX ADMIN — Medication 1000 MILLIGRAM(S): at 20:45

## 2019-06-28 RX ADMIN — GABAPENTIN 600 MILLIGRAM(S): 400 CAPSULE ORAL at 06:29

## 2019-06-28 NOTE — BRIEF OPERATIVE NOTE - OPERATION/FINDINGS
Open reversal of colostomy. Takedown of splenic flexure. 33EEA stapler used. Leak test negative for air leak. Primary repair of parastomal hernia. Bilateral skin flaps for closure. VAC placement.

## 2019-06-28 NOTE — CHART NOTE - NSCHARTNOTEFT_GEN_A_CORE
POST-OPERATIVE NOTE    Subjective:  Patient is s/p open reversal of colostomy, takedown of splenic flexure.  Recovering appropriately. SBP was in low 90s due to PCEA. SBP improved when PCEA was held. Post-op labs w/o evidence of bleeding. Good UOP.     Vital Signs Last 24 Hrs  T(C): 36.9 (29 Jun 2019 04:50), Max: 37.1 (28 Jun 2019 22:15)  T(F): 98.4 (29 Jun 2019 04:50), Max: 98.8 (28 Jun 2019 22:15)  HR: 83 (29 Jun 2019 04:50) (79 - 103)  BP: 94/57 (29 Jun 2019 04:31) (91/63 - 145/80)  BP(mean): 95 (28 Jun 2019 20:30) (81 - 105)  RR: 18 (29 Jun 2019 04:50) (14 - 18)  SpO2: 93% (29 Jun 2019 04:50) (92% - 99%)  I&O's Detail    28 Jun 2019 07:01  -  29 Jun 2019 05:28  --------------------------------------------------------  IN:    lactated ringers.: 875 mL    Solution: 100 mL  Total IN: 975 mL    OUT:    Bulb: 20 mL    Indwelling Catheter - Urethral: 785 mL  Total OUT: 805 mL    Total NET: 170 mL        heparin  Injectable 5000    PAST MEDICAL & SURGICAL HISTORY:  Colostomy present  History of neuropathy: big toes  Diverticulitis  Morbid obesity  HLD (hyperlipidemia)  Sleep apnea  S/P repair of hydrocele        Physical Exam:  General: NAD, resting comfortably in bed  Pulmonary: Nonlabored breathing, no respiratory distress  Cardiovascular: NSR  Abdominal: soft, appropriately tender and distended w/ incisional vac w/ good seal  Extremities: WWP      LABS:                        15.7   17.8  )-----------( 265      ( 28 Jun 2019 18:02 )             46.8     06-28    138  |  102  |  15  ----------------------------<  145<H>  4.5   |  22  |  1.21    Ca    8.3<L>      28 Jun 2019 18:02  Phos  4.8     06-28  Mg     1.9     06-28        CAPILLARY BLOOD GLUCOSE      POCT Blood Glucose.: 83 mg/dL (28 Jun 2019 06:34)      Radiology and Additional Studies:    Assessment:  The patient is a 54y Male who is now several hours post-op from a s/p open reversal of colostomy, takedown of splenic flexure.    Plan:  - F/u SBP/vitals  - Pain control   - F/u am labs  - DVT ppx  - OOB and ambulating as tolerated      Green 0106

## 2019-06-28 NOTE — BRIEF OPERATIVE NOTE - NSICDXBRIEFPOSTOP_GEN_ALL_CORE_FT
POST-OP DIAGNOSIS:  Parastomal hernia 28-Jun-2019 17:05:55  Caleb Vasquez  S/P colostomy 28-Jun-2019 17:05:47  Caleb Vasquez

## 2019-06-28 NOTE — BRIEF OPERATIVE NOTE - NSICDXBRIEFPROCEDURE_GEN_ALL_CORE_FT
PROCEDURES:  Repair of parastomal hernia 28-Jun-2019 17:06:16  Caleb Vasquez  Reanastomosis, colon, with reversal of John pouch 28-Jun-2019 17:04:52  Caleb Vasquez

## 2019-06-29 LAB
ANION GAP SERPL CALC-SCNC: 16 MMOL/L — SIGNIFICANT CHANGE UP (ref 5–17)
BUN SERPL-MCNC: 15 MG/DL — SIGNIFICANT CHANGE UP (ref 7–23)
CALCIUM SERPL-MCNC: 7.8 MG/DL — LOW (ref 8.4–10.5)
CHLORIDE SERPL-SCNC: 103 MMOL/L — SIGNIFICANT CHANGE UP (ref 96–108)
CO2 SERPL-SCNC: 21 MMOL/L — LOW (ref 22–31)
CREAT SERPL-MCNC: 1.05 MG/DL — SIGNIFICANT CHANGE UP (ref 0.5–1.3)
GLUCOSE SERPL-MCNC: 81 MG/DL — SIGNIFICANT CHANGE UP (ref 70–99)
HCT VFR BLD CALC: 41.9 % — SIGNIFICANT CHANGE UP (ref 39–50)
HGB BLD-MCNC: 13.3 G/DL — SIGNIFICANT CHANGE UP (ref 13–17)
MAGNESIUM SERPL-MCNC: 1.7 MG/DL — SIGNIFICANT CHANGE UP (ref 1.6–2.6)
MCHC RBC-ENTMCNC: 26.8 PG — LOW (ref 27–34)
MCHC RBC-ENTMCNC: 31.7 GM/DL — LOW (ref 32–36)
MCV RBC AUTO: 84.4 FL — SIGNIFICANT CHANGE UP (ref 80–100)
PHOSPHATE SERPL-MCNC: 3.5 MG/DL — SIGNIFICANT CHANGE UP (ref 2.5–4.5)
PLATELET # BLD AUTO: 253 K/UL — SIGNIFICANT CHANGE UP (ref 150–400)
POTASSIUM SERPL-MCNC: 4.3 MMOL/L — SIGNIFICANT CHANGE UP (ref 3.5–5.3)
POTASSIUM SERPL-SCNC: 4.3 MMOL/L — SIGNIFICANT CHANGE UP (ref 3.5–5.3)
RBC # BLD: 4.96 M/UL — SIGNIFICANT CHANGE UP (ref 4.2–5.8)
RBC # FLD: 14.2 % — SIGNIFICANT CHANGE UP (ref 10.3–14.5)
SODIUM SERPL-SCNC: 140 MMOL/L — SIGNIFICANT CHANGE UP (ref 135–145)
WBC # BLD: 11.4 K/UL — HIGH (ref 3.8–10.5)
WBC # FLD AUTO: 11.4 K/UL — HIGH (ref 3.8–10.5)

## 2019-06-29 RX ORDER — GABAPENTIN 400 MG/1
100 CAPSULE ORAL THREE TIMES A DAY
Refills: 0 | Status: DISCONTINUED | OUTPATIENT
Start: 2019-06-29 | End: 2019-07-03

## 2019-06-29 RX ORDER — MAGNESIUM SULFATE 500 MG/ML
2 VIAL (ML) INJECTION ONCE
Refills: 0 | Status: COMPLETED | OUTPATIENT
Start: 2019-06-29 | End: 2019-06-29

## 2019-06-29 RX ORDER — NALOXONE HYDROCHLORIDE 4 MG/.1ML
0.1 SPRAY NASAL
Refills: 0 | Status: DISCONTINUED | OUTPATIENT
Start: 2019-06-29 | End: 2019-07-02

## 2019-06-29 RX ORDER — ACETAMINOPHEN 500 MG
1000 TABLET ORAL ONCE
Refills: 0 | Status: COMPLETED | OUTPATIENT
Start: 2019-06-29 | End: 2019-06-29

## 2019-06-29 RX ORDER — DIAZEPAM 5 MG
2.5 TABLET ORAL EVERY 8 HOURS
Refills: 0 | Status: DISCONTINUED | OUTPATIENT
Start: 2019-06-29 | End: 2019-06-29

## 2019-06-29 RX ORDER — DEXTROSE MONOHYDRATE, SODIUM CHLORIDE, AND POTASSIUM CHLORIDE 50; .745; 4.5 G/1000ML; G/1000ML; G/1000ML
1000 INJECTION, SOLUTION INTRAVENOUS
Refills: 0 | Status: DISCONTINUED | OUTPATIENT
Start: 2019-06-29 | End: 2019-07-02

## 2019-06-29 RX ORDER — DIAZEPAM 5 MG
2.5 TABLET ORAL ONCE
Refills: 0 | Status: DISCONTINUED | OUTPATIENT
Start: 2019-06-29 | End: 2019-06-30

## 2019-06-29 RX ORDER — ACETAMINOPHEN 500 MG
1000 TABLET ORAL ONCE
Refills: 0 | Status: COMPLETED | OUTPATIENT
Start: 2019-06-30 | End: 2019-06-30

## 2019-06-29 RX ORDER — ONDANSETRON 8 MG/1
4 TABLET, FILM COATED ORAL EVERY 6 HOURS
Refills: 0 | Status: DISCONTINUED | OUTPATIENT
Start: 2019-06-29 | End: 2019-07-02

## 2019-06-29 RX ADMIN — Medication 50 GRAM(S): at 09:05

## 2019-06-29 RX ADMIN — HEPARIN SODIUM 5000 UNIT(S): 5000 INJECTION INTRAVENOUS; SUBCUTANEOUS at 05:48

## 2019-06-29 RX ADMIN — HEPARIN SODIUM 5000 UNIT(S): 5000 INJECTION INTRAVENOUS; SUBCUTANEOUS at 21:13

## 2019-06-29 RX ADMIN — SODIUM CHLORIDE 125 MILLILITER(S): 9 INJECTION, SOLUTION INTRAVENOUS at 01:48

## 2019-06-29 RX ADMIN — DEXTROSE MONOHYDRATE, SODIUM CHLORIDE, AND POTASSIUM CHLORIDE 125 MILLILITER(S): 50; .745; 4.5 INJECTION, SOLUTION INTRAVENOUS at 17:57

## 2019-06-29 RX ADMIN — Medication 1000 MILLIGRAM(S): at 02:21

## 2019-06-29 RX ADMIN — GABAPENTIN 100 MILLIGRAM(S): 400 CAPSULE ORAL at 21:14

## 2019-06-29 RX ADMIN — Medication 1000 MILLIGRAM(S): at 13:15

## 2019-06-29 RX ADMIN — SODIUM CHLORIDE 125 MILLILITER(S): 9 INJECTION, SOLUTION INTRAVENOUS at 07:28

## 2019-06-29 RX ADMIN — Medication 400 MILLIGRAM(S): at 21:14

## 2019-06-29 RX ADMIN — Medication 400 MILLIGRAM(S): at 07:27

## 2019-06-29 RX ADMIN — Medication 400 MILLIGRAM(S): at 01:46

## 2019-06-29 RX ADMIN — HEPARIN SODIUM 5000 UNIT(S): 5000 INJECTION INTRAVENOUS; SUBCUTANEOUS at 13:01

## 2019-06-29 RX ADMIN — Medication 1000 MILLIGRAM(S): at 07:45

## 2019-06-29 RX ADMIN — Medication 1000 MILLIGRAM(S): at 21:45

## 2019-06-29 RX ADMIN — Medication 400 MILLIGRAM(S): at 13:00

## 2019-06-29 RX ADMIN — MORPHINE SULFATE 2 MILLIGRAM(S): 50 CAPSULE, EXTENDED RELEASE ORAL at 00:48

## 2019-06-29 NOTE — PROGRESS NOTE ADULT - ATTENDING COMMENTS
Surgery Attending    Pt seen and examined; agree with above assessment and plan    PAin control  OOB ambulating as soon as able

## 2019-06-29 NOTE — PROVIDER CONTACT NOTE (OTHER) - SITUATION
Pt's VS shows low BP (94/65). Repeated x2 at 22:57 (91/63). Pt was asymptomatic, denies any dizziness, N&V.

## 2019-06-29 NOTE — PROGRESS NOTE ADULT - SUBJECTIVE AND OBJECTIVE BOX
Day __1_ of Anesthesia Pain Management Service    SUBJECTIVE:  Pain Scale Score	At rest: ___ 	With Activity: ___ 	[  x] Refer to charted pain scores    THERAPY:  [x ] Epidural Bupivacaine 0.0625% and Hydromorphone 10 micrograms/mL  [ ] Epidural Ropivacaine 0.2% plain     Demand dose _3__ lockout _15__ (minutes) Continuous Rate _6__       MEDICATIONS  (STANDING):  heparin  Injectable 5000 Unit(s) SubCutaneous every 8 hours  HYDROmorphone (10 MICROgram(s)/mL) + BUpivacaine 0.0625% in 0.9% Sodium Chloride PCEA 250 milliLiter(s) Epidural PCA Continuous  lactated ringers. 1000 milliLiter(s) (125 mL/Hr) IV Continuous <Continuous>    MEDICATIONS  (PRN):  HYDROmorphone (10 MICROgram(s)/mL) + BUpivacaine 0.0625% in 0.9% Sodium Chloride PCEA Rescue Clinician  Bolus 5 milliLiter(s) Epidural every 15 minutes PRN for Pain Score greater than 6  naloxone Injectable 0.1 milliGRAM(s) IV Push every 3 minutes PRN For ANY of the following changes in patient status:  A. RR LESS THAN 10 breaths per minute, B. Oxygen saturation LESS THAN 90%, C. Sedation score of 6  ondansetron Injectable 4 milliGRAM(s) IV Push every 6 hours PRN Nausea      OBJECTIVE:    Assessment of Epidural Catheter Site: 	    [ x] Dressing intact	[x ] Site non-tender	[x ] Site without erythema, discharge, edema  [ x] Epidural tubing and connection checked	[x) Gross neurological exam within normal limits  [ ] Catheter removed – tip intact		                          13.3   11.4  )-----------( 253      ( 29 Jun 2019 07:38 )             41.9     Vital Signs Last 24 Hrs  T(C): 36.7 (06-29-19 @ 09:08), Max: 37.1 (06-28-19 @ 22:15)  T(F): 98 (06-29-19 @ 09:08), Max: 98.8 (06-28-19 @ 22:15)  HR: 84 (06-29-19 @ 12:29) (79 - 103)  BP: 95/62 (06-29-19 @ 09:08) (91/63 - 145/80)  BP(mean): 95 (06-28-19 @ 20:30) (81 - 105)  RR: 18 (06-29-19 @ 12:29) (14 - 18)  SpO2: 95% (06-29-19 @ 12:29) (92% - 99%)      Sedation Score:	[x ] Alert	[ ] Drowsy	[ ] Arousable  [ ] Asleep  [ ] Unresponsive    Side Effects:	[x ] None	[ ] Nausea	[ ] Vomiting  [ ] Pruritus  		[ ] Weakness  [ ] Numbness  [ ] Other:    ASSESSMENT/ PLAN:    Therapy to  be:	[x ] Continue   [ ] Discontinued   [ ] Change to prn Analgesics    Documentation and Verification of current medications:  [ X ] Done	[ ] Not done, not eligible, reason:    Comments:

## 2019-06-29 NOTE — PROGRESS NOTE ADULT - SUBJECTIVE AND OBJECTIVE BOX
Surgery Post-Operative Note    Procedure: Repair of parastomal hernia  Reanastomosis, colon, with reversal of John pouch      Subjective: Patient examined at bedside.     Vital Signs Last 24 Hrs  T(C): 37 (29 Jun 2019 05:46), Max: 37.1 (28 Jun 2019 22:15)  T(F): 98.6 (29 Jun 2019 05:46), Max: 98.8 (28 Jun 2019 22:15)  HR: 81 (29 Jun 2019 05:46) (79 - 103)  BP: 96/64 (29 Jun 2019 05:46) (91/63 - 145/80)  BP(mean): 95 (28 Jun 2019 20:30) (81 - 105)  RR: 18 (29 Jun 2019 05:46) (14 - 18)  SpO2: 93% (29 Jun 2019 04:50) (92% - 99%)    Physical Exam:  General: NAD, resting comfortably in bed  Pulmonary: Nonlabored breathing, no respiratory distress  Cardiovascular: NSR  Abdominal: soft, ND, appropriately tender to palpation, no RT, no guarding, incisions/dressing clean, dry, intact, incisional vac in place.  : Quispe with clear urine.      LABS:                        15.7   17.8  )-----------( 265      ( 28 Jun 2019 18:02 )             46.8     06-28    138  |  102  |  15  ----------------------------<  145<H>  4.5   |  22  |  1.21    Ca    8.3<L>      28 Jun 2019 18:02  Phos  4.8     06-28  Mg     1.9     06-28        CAPILLARY BLOOD GLUCOSE                  Radiology and Additional Studies: Surgery Progress Note    Procedure: Repair of parastomal hernia  Reanastomosis, colon, with reversal of John pouch      Subjective: Patient examined at bedside. Had episodes of hypotension overnight. Pain management recommended added valium given muscle spasms and since patient is maxing out PCEA pushes.      Vital Signs Last 24 Hrs  T(C): 37 (29 Jun 2019 05:46), Max: 37.1 (28 Jun 2019 22:15)  T(F): 98.6 (29 Jun 2019 05:46), Max: 98.8 (28 Jun 2019 22:15)  HR: 81 (29 Jun 2019 05:46) (79 - 103)  BP: 96/64 (29 Jun 2019 05:46) (91/63 - 145/80)  BP(mean): 95 (28 Jun 2019 20:30) (81 - 105)  RR: 18 (29 Jun 2019 05:46) (14 - 18)  SpO2: 93% (29 Jun 2019 04:50) (92% - 99%)    Physical Exam:  General: NAD, resting comfortably in bed  Pulmonary: Nonlabored breathing, no respiratory distress  Cardiovascular: NSR  Abdominal: soft, ND, appropriately tender to palpation, no RT, no guarding, incisions/dressing clean, dry, intact, incisional vac in place.  : Quispe with clear urine.      LABS:                        15.7   17.8  )-----------( 265      ( 28 Jun 2019 18:02 )             46.8     06-28    138  |  102  |  15  ----------------------------<  145<H>  4.5   |  22  |  1.21    Ca    8.3<L>      28 Jun 2019 18:02  Phos  4.8     06-28  Mg     1.9     06-28        CAPILLARY BLOOD GLUCOSE                  Radiology and Additional Studies:

## 2019-06-29 NOTE — PROGRESS NOTE ADULT - ASSESSMENT
The patient is a 54y Male who is now several hours post-op from a s/p open reversal of colostomy, takedown of splenic flexure.    Plan:  - F/u SBP/vitals  - Pain control   - F/u am labs  - DVT ppx  - OOB and ambulating as tolerated      Hamzah [id number]. The patient is a 54y Male who is now several hours post-op from a s/p open reversal of colostomy, takedown of splenic flexure.    Plan:  - F/u SBP/vitals  - Pain control: PCEA  - F/u am labs  - DVT ppx  - OOB and ambulating as tolerated      Green [phone].

## 2019-06-30 LAB
ANION GAP SERPL CALC-SCNC: 8 MMOL/L — SIGNIFICANT CHANGE UP (ref 5–17)
BUN SERPL-MCNC: 11 MG/DL — SIGNIFICANT CHANGE UP (ref 7–23)
CALCIUM SERPL-MCNC: 8.3 MG/DL — LOW (ref 8.4–10.5)
CHLORIDE SERPL-SCNC: 101 MMOL/L — SIGNIFICANT CHANGE UP (ref 96–108)
CO2 SERPL-SCNC: 28 MMOL/L — SIGNIFICANT CHANGE UP (ref 22–31)
CREAT SERPL-MCNC: 1 MG/DL — SIGNIFICANT CHANGE UP (ref 0.5–1.3)
GLUCOSE SERPL-MCNC: 107 MG/DL — HIGH (ref 70–99)
HCT VFR BLD CALC: 40.9 % — SIGNIFICANT CHANGE UP (ref 39–50)
HGB BLD-MCNC: 12.4 G/DL — LOW (ref 13–17)
MAGNESIUM SERPL-MCNC: 2.1 MG/DL — SIGNIFICANT CHANGE UP (ref 1.6–2.6)
MCHC RBC-ENTMCNC: 25.7 PG — LOW (ref 27–34)
MCHC RBC-ENTMCNC: 30.3 GM/DL — LOW (ref 32–36)
MCV RBC AUTO: 84.7 FL — SIGNIFICANT CHANGE UP (ref 80–100)
PHOSPHATE SERPL-MCNC: 1.7 MG/DL — LOW (ref 2.5–4.5)
PLATELET # BLD AUTO: 232 K/UL — SIGNIFICANT CHANGE UP (ref 150–400)
POTASSIUM SERPL-MCNC: 4.2 MMOL/L — SIGNIFICANT CHANGE UP (ref 3.5–5.3)
POTASSIUM SERPL-SCNC: 4.2 MMOL/L — SIGNIFICANT CHANGE UP (ref 3.5–5.3)
RBC # BLD: 4.83 M/UL — SIGNIFICANT CHANGE UP (ref 4.2–5.8)
RBC # FLD: 15.4 % — HIGH (ref 10.3–14.5)
SODIUM SERPL-SCNC: 137 MMOL/L — SIGNIFICANT CHANGE UP (ref 135–145)
WBC # BLD: 9.59 K/UL — SIGNIFICANT CHANGE UP (ref 3.8–10.5)
WBC # FLD AUTO: 9.59 K/UL — SIGNIFICANT CHANGE UP (ref 3.8–10.5)

## 2019-06-30 RX ORDER — DIPHENHYDRAMINE HCL 50 MG
50 CAPSULE ORAL ONCE
Refills: 0 | Status: COMPLETED | OUTPATIENT
Start: 2019-06-30 | End: 2019-06-30

## 2019-06-30 RX ORDER — ACETAMINOPHEN 500 MG
650 TABLET ORAL EVERY 6 HOURS
Refills: 0 | Status: DISCONTINUED | OUTPATIENT
Start: 2019-06-30 | End: 2019-07-02

## 2019-06-30 RX ADMIN — GABAPENTIN 100 MILLIGRAM(S): 400 CAPSULE ORAL at 05:41

## 2019-06-30 RX ADMIN — Medication 650 MILLIGRAM(S): at 19:21

## 2019-06-30 RX ADMIN — HEPARIN SODIUM 5000 UNIT(S): 5000 INJECTION INTRAVENOUS; SUBCUTANEOUS at 21:02

## 2019-06-30 RX ADMIN — Medication 2.5 MILLIGRAM(S): at 02:53

## 2019-06-30 RX ADMIN — GABAPENTIN 100 MILLIGRAM(S): 400 CAPSULE ORAL at 21:02

## 2019-06-30 RX ADMIN — GABAPENTIN 100 MILLIGRAM(S): 400 CAPSULE ORAL at 13:14

## 2019-06-30 RX ADMIN — HEPARIN SODIUM 5000 UNIT(S): 5000 INJECTION INTRAVENOUS; SUBCUTANEOUS at 13:13

## 2019-06-30 RX ADMIN — Medication 650 MILLIGRAM(S): at 19:52

## 2019-06-30 RX ADMIN — Medication 1000 MILLIGRAM(S): at 01:15

## 2019-06-30 RX ADMIN — Medication 85 MILLIMOLE(S): at 13:14

## 2019-06-30 RX ADMIN — Medication 400 MILLIGRAM(S): at 00:42

## 2019-06-30 RX ADMIN — HEPARIN SODIUM 5000 UNIT(S): 5000 INJECTION INTRAVENOUS; SUBCUTANEOUS at 05:41

## 2019-06-30 RX ADMIN — Medication 50 MILLIGRAM(S): at 23:37

## 2019-06-30 RX ADMIN — Medication 650 MILLIGRAM(S): at 23:38

## 2019-06-30 NOTE — PROGRESS NOTE ADULT - ASSESSMENT
The patient is a 54y Male who is now several hours post-op from a s/p open reversal of colostomy, takedown of splenic flexure.    Plan:  - Pain control: PCEA  - F/u am labs  - DVT ppx  - OOB and ambulating as tolerated  - Discontinue allison       Green [phone]. The patient is a 54y Male who is now POD#2 from a s/p open reversal of colostomy, takedown of splenic flexure, primary repair of parastomal hernia.    Plan:  - Pain control: PCEA  - F/u am labs  - DVT ppx  - OOB and ambulating as tolerated  - Continue allison       Green [phone]. The patient is a 54y Male who is now POD#2 from a s/p open reversal of colostomy, takedown of splenic flexure, primary repair of parastomal hernia.    Plan:  - Pain control: PCEA  - F/u am labs  - DVT ppx  - OOB and ambulating as tolerated  - Continue allison       Green [phone]

## 2019-06-30 NOTE — PROGRESS NOTE ADULT - ATTENDING COMMENTS
I saw and examined the pt and discussed the tx plan with the House Staff. I agree with the exam and plan as documented in the surgery resident's note from today which I edited as indicated.  Continue ambulation, await bowel fx, continue Quispe given epidural in male and inadequate ambulation so far.  Jannie Stein MD I saw and examined the pt and discussed the tx plan with the House Staff. I agree with the exam and plan as documented in the surgery resident's note from today which I edited as indicated.  Flatus x 1. Has bloating.   Continue ambulation, await better bowel fx, continue Quispe given epidural in male and inadequate ambulation so far. Sips of clears.   Jannie Stein MD

## 2019-06-30 NOTE — PROGRESS NOTE ADULT - SUBJECTIVE AND OBJECTIVE BOX
Surgery Progress Note      Subjective: Patient examined at bedside. Continues to have some pain, denies nausea/vomiting. -flatus/BM.    Objective:  Vital Signs Last 24 Hrs  T(C): 37 (30 Jun 2019 01:08), Max: 37 (29 Jun 2019 05:46)  T(F): 98.6 (30 Jun 2019 01:08), Max: 98.6 (29 Jun 2019 05:46)  HR: 78 (30 Jun 2019 01:08) (74 - 87)  BP: 95/64 (30 Jun 2019 01:08) (92/61 - 98/62)  BP(mean): --  RR: 18 (30 Jun 2019 01:08) (18 - 18)  SpO2: 96% (30 Jun 2019 01:08) (94% - 96%)    I&O's Detail    28 Jun 2019 07:01  -  29 Jun 2019 07:00  --------------------------------------------------------  IN:    lactated ringers.: 1500 mL    Solution: 100 mL  Total IN: 1600 mL    OUT:    Bulb: 20 mL    Indwelling Catheter - Urethral: 785 mL  Total OUT: 805 mL    Total NET: 795 mL      29 Jun 2019 07:01  -  30 Jun 2019 05:18  --------------------------------------------------------  IN:    dextrose 5% + sodium chloride 0.45% with potassium chloride 20 mEq/L: 125 mL    lactated ringers.: 1250 mL    Solution: 200 mL  Total IN: 1575 mL    OUT:    Bulb: 25 mL    Indwelling Catheter - Urethral: 925 mL  Total OUT: 950 mL    Total NET: 625 mL          MEDICATIONS  (STANDING):  dextrose 5% + sodium chloride 0.45% with potassium chloride 20 mEq/L 1000 milliLiter(s) (125 mL/Hr) IV Continuous <Continuous>  gabapentin 100 milliGRAM(s) Oral three times a day  heparin  Injectable 5000 Unit(s) SubCutaneous every 8 hours  HYDROmorphone (10 MICROgram(s)/mL) + BUpivacaine 0.0625% in 0.9% Sodium Chloride PCEA 250 milliLiter(s) Epidural PCA Continuous    MEDICATIONS  (PRN):  HYDROmorphone (10 MICROgram(s)/mL) + BUpivacaine 0.0625% in 0.9% Sodium Chloride PCEA Rescue Clinician  Bolus 5 milliLiter(s) Epidural every 15 minutes PRN for Pain Score greater than 6  naloxone Injectable 0.1 milliGRAM(s) IV Push every 3 minutes PRN For ANY of the following changes in patient status:  A. RR LESS THAN 10 breaths per minute, B. Oxygen saturation LESS THAN 90%, C. Sedation score of 6  ondansetron Injectable 4 milliGRAM(s) IV Push every 6 hours PRN Nausea                            13.3   11.4  )-----------( 253      ( 29 Jun 2019 07:38 )             41.9       06-29    140  |  103  |  15  ----------------------------<  81  4.3   |  21<L>  |  1.05    Ca    7.8<L>      29 Jun 2019 07:38  Phos  3.5     06-29  Mg     1.7     06-29            Physical Exam:  General: NAD, resting comfortably in bed  Pulmonary: Nonlabored breathing, no respiratory distress  Cardiovascular: NSR  Abdominal: soft, ND, appropriately tender to palpation, no RT, no guarding, incisions/dressing clean, dry, intact, incisional vac in place.  : Quispe with clear urine.

## 2019-06-30 NOTE — PROGRESS NOTE ADULT - SUBJECTIVE AND OBJECTIVE BOX
Day __2_ of Anesthesia Pain Management Service    SUBJECTIVE:  Pain Scale Score	At rest: ___ 	With Activity: ___ 	[  x] Refer to charted pain scores    THERAPY:  [x ] Epidural Bupivacaine 0.0625% and Hydromorphone 10 micrograms/mL  [ ] Epidural Ropivacaine 0.2% plain     Demand dose _3__ lockout _15__ (minutes) Continuous Rate _6__       MEDICATIONS  (STANDING):  dextrose 5% + sodium chloride 0.45% with potassium chloride 20 mEq/L 1000 milliLiter(s) (125 mL/Hr) IV Continuous <Continuous>  gabapentin 100 milliGRAM(s) Oral three times a day  heparin  Injectable 5000 Unit(s) SubCutaneous every 8 hours  HYDROmorphone (10 MICROgram(s)/mL) + BUpivacaine 0.0625% in 0.9% Sodium Chloride PCEA 250 milliLiter(s) Epidural PCA Continuous    MEDICATIONS  (PRN):  HYDROmorphone (10 MICROgram(s)/mL) + BUpivacaine 0.0625% in 0.9% Sodium Chloride PCEA Rescue Clinician  Bolus 5 milliLiter(s) Epidural every 15 minutes PRN for Pain Score greater than 6  naloxone Injectable 0.1 milliGRAM(s) IV Push every 3 minutes PRN For ANY of the following changes in patient status:  A. RR LESS THAN 10 breaths per minute, B. Oxygen saturation LESS THAN 90%, C. Sedation score of 6  ondansetron Injectable 4 milliGRAM(s) IV Push every 6 hours PRN Nausea      OBJECTIVE:    Assessment of Epidural Catheter Site: 	    [ x] Dressing intact	[x ] Site non-tender	[x ] Site without erythema, discharge, edema  [ x] Epidural tubing and connection checked	[x) Gross neurological exam within normal limits  [ ] Catheter removed – tip intact		                          13.3   11.4  )-----------( 253      ( 29 Jun 2019 07:38 )             41.9     Vital Signs Last 24 Hrs  T(C): 36.5 (06-30-19 @ 05:37), Max: 37 (06-30-19 @ 01:08)  T(F): 97.7 (06-30-19 @ 05:37), Max: 98.6 (06-30-19 @ 01:08)  HR: 61 (06-30-19 @ 05:37) (61 - 87)  BP: 105/70 (06-30-19 @ 05:37) (92/61 - 105/70)  BP(mean): --  RR: 18 (06-30-19 @ 05:37) (18 - 18)  SpO2: 95% (06-30-19 @ 05:37) (94% - 96%)      Sedation Score:	[x ] Alert	[ ] Drowsy	[ ] Arousable  [ ] Asleep  [ ] Unresponsive    Side Effects:	[x ] None	[ ] Nausea	[ ] Vomiting  [ ] Pruritus  		[ ] Weakness  [ ] Numbness  [ ] Other:    ASSESSMENT/ PLAN:    Therapy to  be:	[x ] Continue   [ ] Discontinued   [ ] Change to prn Analgesics    Documentation and Verification of current medications:  [ X ] Done	[ ] Not done, not eligible, reason:    Comments:

## 2019-07-01 LAB
ANION GAP SERPL CALC-SCNC: 10 MMOL/L — SIGNIFICANT CHANGE UP (ref 5–17)
ANION GAP SERPL CALC-SCNC: 9 MMOL/L — SIGNIFICANT CHANGE UP (ref 5–17)
BUN SERPL-MCNC: 6 MG/DL — LOW (ref 7–23)
BUN SERPL-MCNC: 7 MG/DL — SIGNIFICANT CHANGE UP (ref 7–23)
CALCIUM SERPL-MCNC: 8.4 MG/DL — SIGNIFICANT CHANGE UP (ref 8.4–10.5)
CALCIUM SERPL-MCNC: 8.6 MG/DL — SIGNIFICANT CHANGE UP (ref 8.4–10.5)
CHLORIDE SERPL-SCNC: 102 MMOL/L — SIGNIFICANT CHANGE UP (ref 96–108)
CHLORIDE SERPL-SCNC: 102 MMOL/L — SIGNIFICANT CHANGE UP (ref 96–108)
CO2 SERPL-SCNC: 26 MMOL/L — SIGNIFICANT CHANGE UP (ref 22–31)
CO2 SERPL-SCNC: 26 MMOL/L — SIGNIFICANT CHANGE UP (ref 22–31)
CREAT SERPL-MCNC: 0.89 MG/DL — SIGNIFICANT CHANGE UP (ref 0.5–1.3)
CREAT SERPL-MCNC: 0.98 MG/DL — SIGNIFICANT CHANGE UP (ref 0.5–1.3)
GLUCOSE SERPL-MCNC: 82 MG/DL — SIGNIFICANT CHANGE UP (ref 70–99)
GLUCOSE SERPL-MCNC: 88 MG/DL — SIGNIFICANT CHANGE UP (ref 70–99)
HCT VFR BLD CALC: 38.4 % — LOW (ref 39–50)
HGB BLD-MCNC: 11.7 G/DL — LOW (ref 13–17)
MAGNESIUM SERPL-MCNC: 2 MG/DL — SIGNIFICANT CHANGE UP (ref 1.6–2.6)
MCHC RBC-ENTMCNC: 26.2 PG — LOW (ref 27–34)
MCHC RBC-ENTMCNC: 30.5 GM/DL — LOW (ref 32–36)
MCV RBC AUTO: 86.1 FL — SIGNIFICANT CHANGE UP (ref 80–100)
PHOSPHATE SERPL-MCNC: 2.2 MG/DL — LOW (ref 2.5–4.5)
PHOSPHATE SERPL-MCNC: 2.3 MG/DL — LOW (ref 2.5–4.5)
PLATELET # BLD AUTO: 218 K/UL — SIGNIFICANT CHANGE UP (ref 150–400)
POTASSIUM SERPL-MCNC: 4 MMOL/L — SIGNIFICANT CHANGE UP (ref 3.5–5.3)
POTASSIUM SERPL-MCNC: 4.4 MMOL/L — SIGNIFICANT CHANGE UP (ref 3.5–5.3)
POTASSIUM SERPL-SCNC: 4 MMOL/L — SIGNIFICANT CHANGE UP (ref 3.5–5.3)
POTASSIUM SERPL-SCNC: 4.4 MMOL/L — SIGNIFICANT CHANGE UP (ref 3.5–5.3)
RBC # BLD: 4.46 M/UL — SIGNIFICANT CHANGE UP (ref 4.2–5.8)
RBC # FLD: 15.3 % — HIGH (ref 10.3–14.5)
SODIUM SERPL-SCNC: 137 MMOL/L — SIGNIFICANT CHANGE UP (ref 135–145)
SODIUM SERPL-SCNC: 138 MMOL/L — SIGNIFICANT CHANGE UP (ref 135–145)
WBC # BLD: 7.79 K/UL — SIGNIFICANT CHANGE UP (ref 3.8–10.5)
WBC # FLD AUTO: 7.79 K/UL — SIGNIFICANT CHANGE UP (ref 3.8–10.5)

## 2019-07-01 RX ORDER — HYDROMORPHONE HYDROCHLORIDE 2 MG/ML
0.5 INJECTION INTRAMUSCULAR; INTRAVENOUS; SUBCUTANEOUS EVERY 6 HOURS
Refills: 0 | Status: DISCONTINUED | OUTPATIENT
Start: 2019-07-01 | End: 2019-07-02

## 2019-07-01 RX ORDER — SODIUM,POTASSIUM PHOSPHATES 278-250MG
1 POWDER IN PACKET (EA) ORAL
Refills: 0 | Status: COMPLETED | OUTPATIENT
Start: 2019-07-01 | End: 2019-07-01

## 2019-07-01 RX ORDER — HYDROMORPHONE HYDROCHLORIDE 2 MG/ML
4 INJECTION INTRAMUSCULAR; INTRAVENOUS; SUBCUTANEOUS EVERY 4 HOURS
Refills: 0 | Status: DISCONTINUED | OUTPATIENT
Start: 2019-07-01 | End: 2019-07-02

## 2019-07-01 RX ORDER — HYDROMORPHONE HYDROCHLORIDE 2 MG/ML
2 INJECTION INTRAMUSCULAR; INTRAVENOUS; SUBCUTANEOUS EVERY 4 HOURS
Refills: 0 | Status: DISCONTINUED | OUTPATIENT
Start: 2019-07-01 | End: 2019-07-02

## 2019-07-01 RX ADMIN — GABAPENTIN 100 MILLIGRAM(S): 400 CAPSULE ORAL at 21:14

## 2019-07-01 RX ADMIN — Medication 650 MILLIGRAM(S): at 17:28

## 2019-07-01 RX ADMIN — Medication 1 PACKET(S): at 12:47

## 2019-07-01 RX ADMIN — Medication 650 MILLIGRAM(S): at 12:44

## 2019-07-01 RX ADMIN — GABAPENTIN 100 MILLIGRAM(S): 400 CAPSULE ORAL at 05:13

## 2019-07-01 RX ADMIN — Medication 650 MILLIGRAM(S): at 05:45

## 2019-07-01 RX ADMIN — Medication 650 MILLIGRAM(S): at 00:10

## 2019-07-01 RX ADMIN — DEXTROSE MONOHYDRATE, SODIUM CHLORIDE, AND POTASSIUM CHLORIDE 75 MILLILITER(S): 50; .745; 4.5 INJECTION, SOLUTION INTRAVENOUS at 12:47

## 2019-07-01 RX ADMIN — Medication 650 MILLIGRAM(S): at 13:00

## 2019-07-01 RX ADMIN — HYDROMORPHONE HYDROCHLORIDE 4 MILLIGRAM(S): 2 INJECTION INTRAMUSCULAR; INTRAVENOUS; SUBCUTANEOUS at 23:26

## 2019-07-01 RX ADMIN — HEPARIN SODIUM 5000 UNIT(S): 5000 INJECTION INTRAVENOUS; SUBCUTANEOUS at 13:22

## 2019-07-01 RX ADMIN — GABAPENTIN 100 MILLIGRAM(S): 400 CAPSULE ORAL at 13:22

## 2019-07-01 RX ADMIN — HEPARIN SODIUM 5000 UNIT(S): 5000 INJECTION INTRAVENOUS; SUBCUTANEOUS at 21:14

## 2019-07-01 RX ADMIN — Medication 1 PACKET(S): at 16:46

## 2019-07-01 RX ADMIN — HEPARIN SODIUM 5000 UNIT(S): 5000 INJECTION INTRAVENOUS; SUBCUTANEOUS at 05:13

## 2019-07-01 RX ADMIN — Medication 650 MILLIGRAM(S): at 05:14

## 2019-07-01 RX ADMIN — Medication 1 PACKET(S): at 21:14

## 2019-07-01 NOTE — PHYSICAL THERAPY INITIAL EVALUATION ADULT - GENERAL OBSERVATIONS, REHAB EVAL
Pt rec'ed seated in bedside chair, NAD, +call bell, daisy PT WC Eval & VAC change w/o adverse reaction

## 2019-07-01 NOTE — PHYSICAL THERAPY INITIAL EVALUATION ADULT - ADDITIONAL COMMENTS
Lives w/ family in private house w/ 3 steps to enter and a flight of steps to get to the basement where his bedroom is. PTA pt (I) w/ functional mobility & ADL w/o AD.

## 2019-07-01 NOTE — PHYSICAL THERAPY INITIAL EVALUATION ADULT - MODALITIES TREATMENT COMMENTS
Pt rec'ed seated in bedside chair, NAD, +call bell, daisy PT WC Eval & VAC change w/o adverse reaction. Used PCA for pain.  WC consult received to manage VAC therapy to midline abdomen surgical wound & old ostomy site on L abdomen. Wound rec'ed C/D/I, no odor, no purulence, no erythema. Midline wound w/ interrupted sutures measuring 29cm x 3cm x 4cm and the L old ostomy site measuring 4cm x 3cm x 2.5cm. Cleansed w/ NS, cavilon to periwound, white versafoam to base, adaptic touch as contact layer over staples, black granufoam, good seal 125mmHg, continuous. Pt left reclined in bedside chair, NAD, +call AD larios/Gali ogden, 5Ps

## 2019-07-01 NOTE — PHYSICAL THERAPY INITIAL EVALUATION ADULT - PERTINENT HX OF CURRENT PROBLEM, REHAB EVAL
54yoM, morbidly obese, h/o colon resection w/ creation of colostomy in March 2019 for diverticulitis, now s/p open reversal of colostomy, takedown of splenic flexure

## 2019-07-01 NOTE — PROGRESS NOTE ADULT - SUBJECTIVE AND OBJECTIVE BOX
Day 1 of Anesthesia Pain Management Service    SUBJECTIVE: Patient doing well with PCEA    Pain Scale Score:   Refer to charted pain scores    THERAPY:  [X] Epidural Bupivacaine 0.0625% and Hydromorphone         [X] 10 micrograms/mL 	[ ] 5 micrograms/mL  [ ] Epidural Ropivacaine 0.2% plain – 1 mg/mL    Demand dose: 3 mL  Lockout: 15 minutes  Continuous Rate: 6 mL/hr    MEDICATIONS  (STANDING):  acetaminophen   Tablet .. 650 milliGRAM(s) Oral every 6 hours  dextrose 5% + sodium chloride 0.45% with potassium chloride 20 mEq/L 1000 milliLiter(s) (75 mL/Hr) IV Continuous <Continuous>  gabapentin 100 milliGRAM(s) Oral three times a day  heparin  Injectable 5000 Unit(s) SubCutaneous every 8 hours  HYDROmorphone (10 MICROgram(s)/mL) + BUpivacaine 0.0625% in 0.9% Sodium Chloride PCEA 250 milliLiter(s) Epidural PCA Continuous    MEDICATIONS  (PRN):  HYDROmorphone (10 MICROgram(s)/mL) + BUpivacaine 0.0625% in 0.9% Sodium Chloride PCEA Rescue Clinician  Bolus 5 milliLiter(s) Epidural every 15 minutes PRN for Pain Score greater than 6  naloxone Injectable 0.1 milliGRAM(s) IV Push every 3 minutes PRN For ANY of the following changes in patient status:  A. RR LESS THAN 10 breaths per minute, B. Oxygen saturation LESS THAN 90%, C. Sedation score of 6  ondansetron Injectable 4 milliGRAM(s) IV Push every 6 hours PRN Nausea      OBJECTIVE:    Assessment of Catheter Site:    [X] Epidural 	  [X] Dressing intact	[X] Site non-tender	[X] Site without erythema, discharge, edema  [X] Epidural tubing and connection checked	[X] Gross neurological exam within normal limits  [ ] Catheter removed – tip intact		[X] Afebrile	            [ ] Febrile: ___                          12.4   9.59  )-----------( 232      ( 30 Jun 2019 08:57 )             40.9     Vital Signs Last 24 Hrs  T(C): 36.9 (07-01-19 @ 05:00), Max: 37.3 (06-30-19 @ 14:19)  T(F): 98.4 (07-01-19 @ 05:00), Max: 99.1 (06-30-19 @ 14:19)  HR: 74 (07-01-19 @ 05:00) (74 - 83)  BP: 134/78 (07-01-19 @ 05:00) (92/64 - 134/78)  BP(mean): 57 (06-30-19 @ 17:27) (57 - 57)  RR: 18 (07-01-19 @ 05:00) (18 - 18)  SpO2: 95% (07-01-19 @ 05:00) (93% - 96%)      Sedation Score:	[X] Alert 	[ ] Drowsy	[ ] Arousable  [ ] Asleep     [ ] Unresponsive    Side Effects:	[X] None	[ ] Nausea	[ ] Vomiting   [ ] Pruritus  		[ ] Weakness     [ ] Numbness	[ ] Other:    ASSESSMENT/ PLAN:    Therapy:	[X] Continue   [ ] Discontinue   [ ] Change to PRN Analgesics   [ ] Change to PCA    Documentation and Verification of current medications:  [X] Done	[ ] Not done, not eligible, reason:    COMMENTS: Monitor PT/PTT for epidural removal Day 1 of Anesthesia Pain Management Service    SUBJECTIVE: Patient doing well with PCEA    Pain Scale Score:   Refer to charted pain scores    THERAPY:  [X] Epidural Bupivacaine 0.0625% and Hydromorphone         [X] 10 micrograms/mL 	[ ] 5 micrograms/mL  [ ] Epidural Ropivacaine 0.2% plain – 1 mg/mL    Demand dose: 3 mL  Lockout: 15 minutes  Continuous Rate: 6 mL/hr    MEDICATIONS  (STANDING):  acetaminophen   Tablet .. 650 milliGRAM(s) Oral every 6 hours  dextrose 5% + sodium chloride 0.45% with potassium chloride 20 mEq/L 1000 milliLiter(s) (75 mL/Hr) IV Continuous <Continuous>  gabapentin 100 milliGRAM(s) Oral three times a day  heparin  Injectable 5000 Unit(s) SubCutaneous every 8 hours  HYDROmorphone (10 MICROgram(s)/mL) + BUpivacaine 0.0625% in 0.9% Sodium Chloride PCEA 250 milliLiter(s) Epidural PCA Continuous    MEDICATIONS  (PRN):  HYDROmorphone (10 MICROgram(s)/mL) + BUpivacaine 0.0625% in 0.9% Sodium Chloride PCEA Rescue Clinician  Bolus 5 milliLiter(s) Epidural every 15 minutes PRN for Pain Score greater than 6  naloxone Injectable 0.1 milliGRAM(s) IV Push every 3 minutes PRN For ANY of the following changes in patient status:  A. RR LESS THAN 10 breaths per minute, B. Oxygen saturation LESS THAN 90%, C. Sedation score of 6  ondansetron Injectable 4 milliGRAM(s) IV Push every 6 hours PRN Nausea      OBJECTIVE:    Assessment of Catheter Site:    [X] Epidural 	  [X] Dressing intact	[X] Site non-tender	[X] Site without erythema, discharge, edema  [X] Epidural tubing and connection checked	[X] Gross neurological exam within normal limits  [ ] Catheter removed – tip intact		[X] Afebrile	            [ ] Febrile: ___                          12.4   9.59  )-----------( 232      ( 30 Jun 2019 08:57 )             40.9     Vital Signs Last 24 Hrs  T(C): 36.9 (07-01-19 @ 05:00), Max: 37.3 (06-30-19 @ 14:19)  T(F): 98.4 (07-01-19 @ 05:00), Max: 99.1 (06-30-19 @ 14:19)  HR: 74 (07-01-19 @ 05:00) (74 - 83)  BP: 134/78 (07-01-19 @ 05:00) (92/64 - 134/78)  BP(mean): 57 (06-30-19 @ 17:27) (57 - 57)  RR: 18 (07-01-19 @ 05:00) (18 - 18)  SpO2: 95% (07-01-19 @ 05:00) (93% - 96%)      Sedation Score:	[X] Alert 	[ ] Drowsy	[ ] Arousable  [ ] Asleep     [ ] Unresponsive    Side Effects:	[X] None	[ ] Nausea	[ ] Vomiting   [ ] Pruritus  		[ ] Weakness     [ ] Numbness	[ ] Other:    ASSESSMENT/ PLAN:    Therapy:	[X] Continue   [ ] Discontinue   [ ] Change to PRN Analgesics   [ ] Change to PCA    Documentation and Verification of current medications:  [X] Done	[ ] Not done, not eligible, reason:    COMMENTS: Monitor PT/PTT for epidural removal tomorrow.

## 2019-07-01 NOTE — PROGRESS NOTE ADULT - ATTENDING COMMENTS
I saw and examined the pt and discussed the tx plan with the House Staff. I agree with the exam and plan as documented in the surgery resident's note from today.  + flatus, BM.  Abdomen soft, appropriately tender, no obvious distention.  Stable.  ADAT.  Continue epidural today.  Jannie Stein MD

## 2019-07-01 NOTE — PROGRESS NOTE ADULT - SUBJECTIVE AND OBJECTIVE BOX
Surgery Progress Note    Procedure: Repair of parastomal hernia  Reanastomosis, colon, with reversal of John pouch      Subjective: Patient examined at bedside. No issues overnight. Not passing flatus or bowel movement.    Vital Signs Last 24 Hrs  Vital Signs Last 24 Hrs  T(C): 36.9 (01 Jul 2019 05:00), Max: 37.3 (30 Jun 2019 14:19)  T(F): 98.4 (01 Jul 2019 05:00), Max: 99.1 (30 Jun 2019 14:19)  HR: 74 (01 Jul 2019 05:00) (61 - 83)  BP: 134/78 (01 Jul 2019 05:00) (92/64 - 134/78)  BP(mean): 57 (30 Jun 2019 17:27) (57 - 57)  RR: 18 (01 Jul 2019 05:00) (18 - 18)  SpO2: 95% (01 Jul 2019 05:00) (93% - 96%)    Physical Exam:  General: NAD, resting comfortably in bed  Pulmonary: Nonlabored breathing, no respiratory distress  Cardiovascular: NSR  Abdominal: soft, ND, appropriately tender to palpation, no RT, no guarding, incisions/dressing clean, dry, intact, incisional vac in place.  : Quispe with clear urine.      LABS:                                 12.4   9.59  )-----------( 232      ( 30 Jun 2019 08:57 )             40.9 Interval Events:    S: Patient doing well, denies fevers, chills, nausea, emesis, chest pain, SOB. Passing flatus and liquid stool.    O: Vital Signs  T(C): 36.9 (07-01 @ 05:00), Max: 37.3 (06-30 @ 14:19)  HR: 74 (07-01 @ 05:00) (74 - 83)  BP: 134/78 (07-01 @ 05:00) (92/64 - 134/78)  RR: 18 (07-01 @ 05:00) (18 - 18)  SpO2: 95% (07-01 @ 05:00) (93% - 96%)  06-29-19 @ 07:01  -  06-30-19 @ 07:00  --------------------------------------------------------  IN: 3275 mL / OUT: 950 mL / NET: 2325 mL    06-30-19 @ 07:01  -  07-01-19 @ 06:42  --------------------------------------------------------  IN: 2500 mL / OUT: 1276 mL / NET: 1224 mL      General: alert and oriented, NAD  Resp: airway patent, respirations unlabored  CVS: regular rate and rhythm  Abdomen: soft, nontender, nondistended, KAY with serosanguinous output, dressings c/d/i  Extremities: no edema  Skin: warm, dry, appropriate color                          12.4   9.59  )-----------( 232      ( 30 Jun 2019 08:57 )             40.9   06-30    137  |  102  |  7   ----------------------------<  88  4.0   |  26  |  0.89    Ca    8.4      30 Jun 2019 23:43  Phos  2.3     06-30  Mg     2.1     06-30

## 2019-07-01 NOTE — PROGRESS NOTE ADULT - ASSESSMENT
The patient is a 54y Male who is now several hours post-op from a s/p open reversal of colostomy, takedown of splenic flexure.    Plan:  - Awaiting return of bowel function  - Pain control: PCEA  - Quispe w/PCEA  - DVT ppx  - OOB and ambulating as tolerated      Hamzah [id number]. The patient is a 54y Male 3d s/p open reversal of colostomy, takedown of splenic flexure, primary repair of parastomal hernia.    Plan:  - Advance diet as tolerated  - Pain control: PCEA  - Quispe w/PCEA  - DVT ppx  - OOB and ambulating as tolerated      Green [id number].

## 2019-07-02 LAB
ANION GAP SERPL CALC-SCNC: 11 MMOL/L — SIGNIFICANT CHANGE UP (ref 5–17)
BUN SERPL-MCNC: 10 MG/DL — SIGNIFICANT CHANGE UP (ref 7–23)
CALCIUM SERPL-MCNC: 8.9 MG/DL — SIGNIFICANT CHANGE UP (ref 8.4–10.5)
CHLORIDE SERPL-SCNC: 104 MMOL/L — SIGNIFICANT CHANGE UP (ref 96–108)
CO2 SERPL-SCNC: 23 MMOL/L — SIGNIFICANT CHANGE UP (ref 22–31)
CREAT SERPL-MCNC: 0.98 MG/DL — SIGNIFICANT CHANGE UP (ref 0.5–1.3)
GLUCOSE SERPL-MCNC: 93 MG/DL — SIGNIFICANT CHANGE UP (ref 70–99)
HCT VFR BLD CALC: 37.5 % — LOW (ref 39–50)
HGB BLD-MCNC: 11.5 G/DL — LOW (ref 13–17)
INR BLD: 0.98 RATIO — SIGNIFICANT CHANGE UP (ref 0.88–1.16)
MAGNESIUM SERPL-MCNC: 1.8 MG/DL — SIGNIFICANT CHANGE UP (ref 1.6–2.6)
MCHC RBC-ENTMCNC: 25.7 PG — LOW (ref 27–34)
MCHC RBC-ENTMCNC: 30.7 GM/DL — LOW (ref 32–36)
MCV RBC AUTO: 83.9 FL — SIGNIFICANT CHANGE UP (ref 80–100)
PHOSPHATE SERPL-MCNC: 3.1 MG/DL — SIGNIFICANT CHANGE UP (ref 2.5–4.5)
PLATELET # BLD AUTO: 284 K/UL — SIGNIFICANT CHANGE UP (ref 150–400)
POTASSIUM SERPL-MCNC: 4.2 MMOL/L — SIGNIFICANT CHANGE UP (ref 3.5–5.3)
POTASSIUM SERPL-SCNC: 4.2 MMOL/L — SIGNIFICANT CHANGE UP (ref 3.5–5.3)
PROTHROM AB SERPL-ACNC: 11.2 SEC — SIGNIFICANT CHANGE UP (ref 10–13.1)
RBC # BLD: 4.47 M/UL — SIGNIFICANT CHANGE UP (ref 4.2–5.8)
RBC # FLD: 15.3 % — HIGH (ref 10.3–14.5)
SODIUM SERPL-SCNC: 138 MMOL/L — SIGNIFICANT CHANGE UP (ref 135–145)
WBC # BLD: 9.49 K/UL — SIGNIFICANT CHANGE UP (ref 3.8–10.5)
WBC # FLD AUTO: 9.49 K/UL — SIGNIFICANT CHANGE UP (ref 3.8–10.5)

## 2019-07-02 RX ORDER — DIPHENHYDRAMINE HCL 50 MG
25 CAPSULE ORAL ONCE
Refills: 0 | Status: COMPLETED | OUTPATIENT
Start: 2019-07-02 | End: 2019-07-02

## 2019-07-02 RX ORDER — OXYCODONE HYDROCHLORIDE 5 MG/1
10 TABLET ORAL EVERY 4 HOURS
Refills: 0 | Status: DISCONTINUED | OUTPATIENT
Start: 2019-07-02 | End: 2019-07-03

## 2019-07-02 RX ORDER — LIDOCAINE HCL 20 MG/ML
10 VIAL (ML) INJECTION ONCE
Refills: 0 | Status: COMPLETED | OUTPATIENT
Start: 2019-07-02 | End: 2019-07-02

## 2019-07-02 RX ORDER — OXYCODONE HYDROCHLORIDE 5 MG/1
5 TABLET ORAL EVERY 4 HOURS
Refills: 0 | Status: DISCONTINUED | OUTPATIENT
Start: 2019-07-02 | End: 2019-07-03

## 2019-07-02 RX ORDER — ACETAMINOPHEN 500 MG
1000 TABLET ORAL EVERY 8 HOURS
Refills: 0 | Status: DISCONTINUED | OUTPATIENT
Start: 2019-07-02 | End: 2019-07-03

## 2019-07-02 RX ADMIN — Medication 650 MILLIGRAM(S): at 05:30

## 2019-07-02 RX ADMIN — DEXTROSE MONOHYDRATE, SODIUM CHLORIDE, AND POTASSIUM CHLORIDE 30 MILLILITER(S): 50; .745; 4.5 INJECTION, SOLUTION INTRAVENOUS at 00:17

## 2019-07-02 RX ADMIN — Medication 1000 MILLIGRAM(S): at 13:20

## 2019-07-02 RX ADMIN — Medication 650 MILLIGRAM(S): at 00:15

## 2019-07-02 RX ADMIN — HYDROMORPHONE HYDROCHLORIDE 4 MILLIGRAM(S): 2 INJECTION INTRAMUSCULAR; INTRAVENOUS; SUBCUTANEOUS at 07:19

## 2019-07-02 RX ADMIN — GABAPENTIN 100 MILLIGRAM(S): 400 CAPSULE ORAL at 05:01

## 2019-07-02 RX ADMIN — HYDROMORPHONE HYDROCHLORIDE 4 MILLIGRAM(S): 2 INJECTION INTRAMUSCULAR; INTRAVENOUS; SUBCUTANEOUS at 07:40

## 2019-07-02 RX ADMIN — GABAPENTIN 100 MILLIGRAM(S): 400 CAPSULE ORAL at 21:13

## 2019-07-02 RX ADMIN — Medication 25 MILLIGRAM(S): at 23:22

## 2019-07-02 RX ADMIN — Medication 650 MILLIGRAM(S): at 05:01

## 2019-07-02 RX ADMIN — Medication 10 MILLILITER(S): at 10:40

## 2019-07-02 RX ADMIN — Medication 1000 MILLIGRAM(S): at 13:19

## 2019-07-02 RX ADMIN — Medication 25 MILLIGRAM(S): at 00:16

## 2019-07-02 RX ADMIN — OXYCODONE HYDROCHLORIDE 10 MILLIGRAM(S): 5 TABLET ORAL at 21:22

## 2019-07-02 RX ADMIN — HEPARIN SODIUM 5000 UNIT(S): 5000 INJECTION INTRAVENOUS; SUBCUTANEOUS at 05:02

## 2019-07-02 RX ADMIN — HEPARIN SODIUM 5000 UNIT(S): 5000 INJECTION INTRAVENOUS; SUBCUTANEOUS at 13:15

## 2019-07-02 RX ADMIN — GABAPENTIN 100 MILLIGRAM(S): 400 CAPSULE ORAL at 13:15

## 2019-07-02 RX ADMIN — Medication 1000 MILLIGRAM(S): at 21:43

## 2019-07-02 RX ADMIN — HYDROMORPHONE HYDROCHLORIDE 4 MILLIGRAM(S): 2 INJECTION INTRAMUSCULAR; INTRAVENOUS; SUBCUTANEOUS at 00:18

## 2019-07-02 RX ADMIN — Medication 1000 MILLIGRAM(S): at 21:13

## 2019-07-02 RX ADMIN — OXYCODONE HYDROCHLORIDE 10 MILLIGRAM(S): 5 TABLET ORAL at 14:43

## 2019-07-02 RX ADMIN — HEPARIN SODIUM 5000 UNIT(S): 5000 INJECTION INTRAVENOUS; SUBCUTANEOUS at 21:12

## 2019-07-02 RX ADMIN — Medication 650 MILLIGRAM(S): at 00:45

## 2019-07-02 RX ADMIN — OXYCODONE HYDROCHLORIDE 10 MILLIGRAM(S): 5 TABLET ORAL at 20:52

## 2019-07-02 RX ADMIN — OXYCODONE HYDROCHLORIDE 10 MILLIGRAM(S): 5 TABLET ORAL at 15:05

## 2019-07-02 NOTE — PROGRESS NOTE ADULT - ATTENDING COMMENTS
I saw and examined the pt and discussed the tx plan with the House Staff. I agree with the exam and plan as documented in the surgery resident's note from today.  Epidural dislodged. Give Tylenol po around the clock, oxycodone prn. D/c Quispe.  Jannie Stein MD

## 2019-07-02 NOTE — PROGRESS NOTE ADULT - ASSESSMENT
54M s/p open reversal of colostomy, takedown of splenic flexure, primary repair of parastomal hernia.  - Consider removal of PCEA today  - Dispo planning - needs home wound vac  - Advance diet as tolerated (bowel function +/+) 54M s/p open reversal of colostomy, takedown of splenic flexure, primary repair of parastomal hernia.  - Pain control  - Remove allison, TOV  - Dispo planning - needs home wound vac  - Advance diet as tolerated (bowel function +/+)

## 2019-07-02 NOTE — PROGRESS NOTE ADULT - SUBJECTIVE AND OBJECTIVE BOX
Interval Events:    S: Patient doing well, denies fevers, chills, nausea, emesis, chest pain, SOB.    O: Vital Signs  T(C): 37 (07-02 @ 01:06), Max: 37.2 (07-01 @ 13:11)  HR: 82 (07-02 @ 01:06) (74 - 82)  BP: 120/77 (07-02 @ 01:06) (97/67 - 134/78)  RR: 18 (07-02 @ 01:06) (17 - 18)  SpO2: 97% (07-02 @ 01:06) (93% - 97%)  06-30-19 @ 07:01  -  07-01-19 @ 07:00  --------------------------------------------------------  IN: 2500 mL / OUT: 1276 mL / NET: 1224 mL    07-01-19 @ 07:01  -  07-02-19 @ 04:52  --------------------------------------------------------  IN: 1905 mL / OUT: 1790 mL / NET: 115 mL      General: alert and oriented, NAD  Resp: airway patent, respirations unlabored  CVS: regular rate and rhythm  Abdomen: soft, nontender, nondistended, **KAY drain, **wound vac  Extremities: no edema  Skin: warm, dry, appropriate color                          11.7   7.79  )-----------( 218      ( 01 Jul 2019 09:41 )             38.4   07-01    138  |  102  |  6<L>  ----------------------------<  82  4.4   |  26  |  0.98    Ca    8.6      01 Jul 2019 07:02  Phos  2.2     07-01  Mg     2.0     07-01 Interval Events: PCEA came out o/n    S: Patient doing well, denies fevers, chills, nausea, emesis, chest pain, SOB.    O: Vital Signs  T(C): 37 (07-02 @ 01:06), Max: 37.2 (07-01 @ 13:11)  HR: 82 (07-02 @ 01:06) (74 - 82)  BP: 120/77 (07-02 @ 01:06) (97/67 - 134/78)  RR: 18 (07-02 @ 01:06) (17 - 18)  SpO2: 97% (07-02 @ 01:06) (93% - 97%)  06-30-19 @ 07:01  -  07-01-19 @ 07:00  --------------------------------------------------------  IN: 2500 mL / OUT: 1276 mL / NET: 1224 mL    07-01-19 @ 07:01  -  07-02-19 @ 04:52  --------------------------------------------------------  IN: 1905 mL / OUT: 1790 mL / NET: 115 mL      General: alert and oriented, NAD  Resp: airway patent, respirations unlabored  CVS: regular rate and rhythm  Abdomen: soft, nontender, nondistended, **KAY drain, **wound vac  Extremities: no edema  Skin: warm, dry, appropriate color                          11.7   7.79  )-----------( 218      ( 01 Jul 2019 09:41 )             38.4   07-01    138  |  102  |  6<L>  ----------------------------<  82  4.4   |  26  |  0.98    Ca    8.6      01 Jul 2019 07:02  Phos  2.2     07-01  Mg     2.0     07-01

## 2019-07-02 NOTE — PROVIDER CONTACT NOTE (OTHER) - ACTION/TREATMENT ORDERED:
MD made aware, MD at bedside and assessed Pt. Provider to Rn to hold PCEA pump for 2 hrs and monitor BP. BP reassessed at 23:48 (96/62). Will continue to monitor
PCEA D/C'd by Shelia Flores RN and wasted with witness. Will continue to monitor. Awaiting new PRN pain medication order.

## 2019-07-03 ENCOUNTER — TRANSCRIPTION ENCOUNTER (OUTPATIENT)
Age: 54
End: 2019-07-03

## 2019-07-03 VITALS
RESPIRATION RATE: 18 BRPM | OXYGEN SATURATION: 95 % | DIASTOLIC BLOOD PRESSURE: 70 MMHG | SYSTOLIC BLOOD PRESSURE: 116 MMHG | TEMPERATURE: 98 F | HEART RATE: 80 BPM

## 2019-07-03 PROBLEM — Z86.69 PERSONAL HISTORY OF OTHER DISEASES OF THE NERVOUS SYSTEM AND SENSE ORGANS: Chronic | Status: ACTIVE | Noted: 2019-06-20

## 2019-07-03 PROCEDURE — 85610 PROTHROMBIN TIME: CPT

## 2019-07-03 PROCEDURE — 82962 GLUCOSE BLOOD TEST: CPT

## 2019-07-03 PROCEDURE — 88305 TISSUE EXAM BY PATHOLOGIST: CPT

## 2019-07-03 PROCEDURE — C1889: CPT

## 2019-07-03 PROCEDURE — 85027 COMPLETE CBC AUTOMATED: CPT

## 2019-07-03 PROCEDURE — 88307 TISSUE EXAM BY PATHOLOGIST: CPT

## 2019-07-03 PROCEDURE — 80048 BASIC METABOLIC PNL TOTAL CA: CPT

## 2019-07-03 PROCEDURE — 88304 TISSUE EXAM BY PATHOLOGIST: CPT

## 2019-07-03 PROCEDURE — 88302 TISSUE EXAM BY PATHOLOGIST: CPT

## 2019-07-03 PROCEDURE — 84100 ASSAY OF PHOSPHORUS: CPT

## 2019-07-03 PROCEDURE — C1758: CPT

## 2019-07-03 PROCEDURE — 83735 ASSAY OF MAGNESIUM: CPT

## 2019-07-03 PROCEDURE — 97161 PT EVAL LOW COMPLEX 20 MIN: CPT

## 2019-07-03 PROCEDURE — 97605 NEG PRS WND THER DME<=50SQCM: CPT

## 2019-07-03 RX ORDER — GABAPENTIN 400 MG/1
1 CAPSULE ORAL
Qty: 0 | Refills: 0 | DISCHARGE
Start: 2019-07-03

## 2019-07-03 RX ORDER — OXYCODONE HYDROCHLORIDE 5 MG/1
1 TABLET ORAL
Qty: 18 | Refills: 0
Start: 2019-07-03 | End: 2019-07-05

## 2019-07-03 RX ORDER — DOCUSATE SODIUM 100 MG
1 CAPSULE ORAL
Qty: 90 | Refills: 0
Start: 2019-07-03 | End: 2019-08-01

## 2019-07-03 RX ORDER — OXYCODONE HYDROCHLORIDE 5 MG/1
1 TABLET ORAL
Qty: 30 | Refills: 0
Start: 2019-07-03 | End: 2019-07-07

## 2019-07-03 RX ORDER — ACETAMINOPHEN 500 MG
2 TABLET ORAL
Qty: 0 | Refills: 0 | DISCHARGE
Start: 2019-07-03

## 2019-07-03 RX ADMIN — OXYCODONE HYDROCHLORIDE 10 MILLIGRAM(S): 5 TABLET ORAL at 05:38

## 2019-07-03 RX ADMIN — Medication 1000 MILLIGRAM(S): at 06:08

## 2019-07-03 RX ADMIN — Medication 1000 MILLIGRAM(S): at 12:51

## 2019-07-03 RX ADMIN — GABAPENTIN 100 MILLIGRAM(S): 400 CAPSULE ORAL at 05:37

## 2019-07-03 RX ADMIN — Medication 1000 MILLIGRAM(S): at 05:38

## 2019-07-03 RX ADMIN — OXYCODONE HYDROCHLORIDE 10 MILLIGRAM(S): 5 TABLET ORAL at 10:01

## 2019-07-03 RX ADMIN — OXYCODONE HYDROCHLORIDE 10 MILLIGRAM(S): 5 TABLET ORAL at 10:20

## 2019-07-03 RX ADMIN — HEPARIN SODIUM 5000 UNIT(S): 5000 INJECTION INTRAVENOUS; SUBCUTANEOUS at 12:51

## 2019-07-03 RX ADMIN — HEPARIN SODIUM 5000 UNIT(S): 5000 INJECTION INTRAVENOUS; SUBCUTANEOUS at 05:39

## 2019-07-03 RX ADMIN — OXYCODONE HYDROCHLORIDE 10 MILLIGRAM(S): 5 TABLET ORAL at 06:08

## 2019-07-03 RX ADMIN — GABAPENTIN 100 MILLIGRAM(S): 400 CAPSULE ORAL at 12:51

## 2019-07-03 NOTE — PROGRESS NOTE ADULT - SUBJECTIVE AND OBJECTIVE BOX
Interval Events: none    S: Patient doing well, denies fevers, chills, nausea, emesis, chest pain, SOB.    O: Vital Signs  T(C): 37 (07-03 @ 01:32), Max: 37 (07-03 @ 01:32)  HR: 81 (07-03 @ 01:32) (74 - 81)  BP: 106/72 (07-03 @ 01:32) (104/71 - 124/82)  RR: 18 (07-03 @ 01:32) (18 - 18)  SpO2: 95% (07-03 @ 01:32) (95% - 98%)  07-01-19 @ 07:01  -  07-02-19 @ 07:00  --------------------------------------------------------  IN: 2265 mL / OUT: 2190 mL / NET: 75 mL    07-02-19 @ 07:01  -  07-03-19 @ 04:33  --------------------------------------------------------  IN: 1160 mL / OUT: 825 mL / NET: 335 mL      General: alert and oriented, NAD  Resp: airway patent, respirations unlabored  CVS: regular rate and rhythm  Abdomen: soft, nontender, nondistended, loyd drain and wound vac in place  Extremities: no edema  Skin: warm, dry, appropriate color                          11.5   9.49  )-----------( 284      ( 02 Jul 2019 09:57 )             37.5   07-02    138  |  104  |  10  ----------------------------<  93  4.2   |  23  |  0.98    Ca    8.9      02 Jul 2019 07:09  Phos  3.1     07-02  Mg     1.8     07-02

## 2019-07-03 NOTE — DISCHARGE NOTE NURSING/CASE MANAGEMENT/SOCIAL WORK - NSDCDPATPORTLINK_GEN_ALL_CORE
You can access the [facility] Patient Portal, offered by [facility], by registering with the following website: [email or web address]

## 2019-07-03 NOTE — DISCHARGE NOTE PROVIDER - NSDCCPCAREPLAN_GEN_ALL_CORE_FT
PRINCIPAL DISCHARGE DIAGNOSIS  Diagnosis: Status post colostomy takedown  Assessment and Plan of Treatment: Continue low residue diet.  Wound vac to stay in place.  Please call with any questions or concerns.

## 2019-07-03 NOTE — PROGRESS NOTE ADULT - ATTENDING COMMENTS
I saw and examined the pt and discussed the tx plan with the House Staff. I agree with the exam and plan as documented in the surgery resident's note from today.  + bowel fx. Tolerating po.  Abdomen soft, with minimal incisional tenderness, obese, ND.  Hopefully home today with the VAC.  Jannie Stein MD

## 2019-07-03 NOTE — DISCHARGE NOTE PROVIDER - NSDCCPTREATMENT_GEN_ALL_CORE_FT
PRINCIPAL PROCEDURE  Procedure: Reversal of colostomy after partial colectomy  Findings and Treatment: · Operative Findings	Open reversal of colostomy. Takedown of splenic flexure. 33EEA stapler used. Leak test negative for air leak. Primary repair of parastomal hernia. Bilateral skin flaps for closure. VAC placement.

## 2019-07-03 NOTE — PROGRESS NOTE ADULT - ASSESSMENT
54M s/p open reversal of colostomy, takedown of splenic flexure, primary repair of parastomal hernia.  - Pain controlled  - Quispe out, passed TOV  - On diet (bowel function +/+)  - Dispo pending home wound vac

## 2019-07-03 NOTE — DISCHARGE NOTE PROVIDER - HOSPITAL COURSE
Mr. Rushing presented on 6/28/19 for open colostomy reversal with repair of parastomal hernia.  Intraoperatively, the splenic flexure was mobilized, and the colostomy was reversed using a stapled anastomosis.  Bilateral skin flaps were formed for closure and a wound vac was placed. The patient tolerated the procedure well and was transferred to the floor postoperatively. On post-op day 3 patient was passing flatus and stool, and diet was initiated.  Later that day he was advanced to a low residue diet which he tolerated well.  On post-op day 4 his patient-controlled epidural analgesia and urinary catheter were removed. Subsequently he voided spontaneously and his pain was well controlled on oral medications.  On day of discharge he remains stable, tolerating a low residue diet, with good pain control, voiding spontaneously, with appropriate bowel function, and is ready for discharge to home, with wound vac in place.

## 2019-07-03 NOTE — DISCHARGE NOTE PROVIDER - CARE PROVIDER_API CALL
Jannie tSein)  ColonRectal Surgery; Surgery  [address], [address]  [place] [address]  Phone: [phone]  Fax: [phone]  Follow Up Time:

## 2019-07-07 PROBLEM — K43.5 PARASTOMAL HERNIA: Status: RESOLVED | Noted: 2019-07-07 | Resolved: 2019-07-07

## 2019-07-07 PROBLEM — Z93.3 COLOSTOMY STATUS: Status: RESOLVED | Noted: 2019-05-14 | Resolved: 2019-07-07

## 2019-07-07 RX ORDER — SODIUM PICOSULFATE, MAGNESIUM OXIDE, AND ANHYDROUS CITRIC ACID 10; 3.5; 12 MG/160ML; G/160ML; G/160ML
10-3.5-12 MG-GM LIQUID ORAL
Qty: 1 | Refills: 0 | Status: DISCONTINUED | COMMUNITY
Start: 2019-05-29 | End: 2019-07-07

## 2019-07-08 ENCOUNTER — APPOINTMENT (OUTPATIENT)
Dept: SURGERY | Facility: CLINIC | Age: 54
End: 2019-07-08
Payer: COMMERCIAL

## 2019-07-08 VITALS
SYSTOLIC BLOOD PRESSURE: 144 MMHG | OXYGEN SATURATION: 95 % | HEART RATE: 78 BPM | TEMPERATURE: 97.7 F | RESPIRATION RATE: 16 BRPM | DIASTOLIC BLOOD PRESSURE: 73 MMHG

## 2019-07-08 PROCEDURE — 99024 POSTOP FOLLOW-UP VISIT: CPT

## 2019-07-09 LAB — SURGICAL PATHOLOGY STUDY: SIGNIFICANT CHANGE UP

## 2019-07-10 ENCOUNTER — APPOINTMENT (OUTPATIENT)
Dept: SURGERY | Facility: CLINIC | Age: 54
End: 2019-07-10
Payer: COMMERCIAL

## 2019-07-10 DIAGNOSIS — K43.5 PARASTOMAL HERNIA WITHOUT OBSTRUCTION OR GANGRENE: ICD-10-CM

## 2019-07-10 DIAGNOSIS — Z93.3 COLOSTOMY STATUS: ICD-10-CM

## 2019-07-17 ENCOUNTER — APPOINTMENT (OUTPATIENT)
Dept: SURGERY | Facility: CLINIC | Age: 54
End: 2019-07-17
Payer: COMMERCIAL

## 2019-07-17 VITALS
HEART RATE: 98 BPM | WEIGHT: 290 LBS | DIASTOLIC BLOOD PRESSURE: 81 MMHG | HEIGHT: 72 IN | OXYGEN SATURATION: 97 % | RESPIRATION RATE: 19 BRPM | TEMPERATURE: 98.3 F | BODY MASS INDEX: 39.28 KG/M2 | SYSTOLIC BLOOD PRESSURE: 121 MMHG

## 2019-07-17 PROCEDURE — 99024 POSTOP FOLLOW-UP VISIT: CPT

## 2019-07-17 RX ORDER — OXYCODONE 5 MG/1
5 TABLET ORAL
Qty: 15 | Refills: 0 | Status: DISCONTINUED | COMMUNITY
End: 2019-07-17

## 2019-07-19 NOTE — PHYSICAL EXAM
[FreeTextEntry1] : In NAD.\par Abdomen soft, non-tender, non-distended. \par VAC removed. \par Midline and left transverse incisions healing well. No hernias.\par Staples removed. \par The midline incision has two open wounds and the center of the left transverse incision is open as well. All three open areas are pink and granulating well. The caudal midline open wound is the deepest. No tunneling. All three wounds are shallow enough that no further VAC tx is required. \par The drain in the left lateral incision continues to drain murky material, 25 cc in the bulb currently since this morning. Left in place. \par

## 2019-07-19 NOTE — HISTORY OF PRESENT ILLNESS
[FreeTextEntry1] : Feels overall well. Incisional pain better, has not required any pain meds recently. Tolerating po, + flatus and daily soft BMs. No fevers/chills.

## 2019-07-19 NOTE — PHYSICAL EXAM
[FreeTextEntry1] : In NAD.\par Abdomen soft, with incisional tenderness, non-distended. \par VAC removed, he has white and black sponge. \par Midline and left transverse incisions healing well. No hernias.\par The midline incision has two open wounds and the center of the left transverse incision is open as well. All three open areas are pink and granulating well. The caudal midline open wound is the deepest. No tunneling.\par VAC reapplied, only black sponge was used.

## 2019-07-19 NOTE — ASSESSMENT
[FreeTextEntry1] : Doing well postop.\par Only needs black sponge from now on.\par Instructions for diet, activity, wound care given, all questions answered.\par RTO in 10 days.\par \par

## 2019-07-19 NOTE — REASON FOR VISIT
[Post Op: _________] : a [unfilled] post op visit [FreeTextEntry1] : Open John's reversal and repair of parastomal hernia on 6/28/19.

## 2019-07-19 NOTE — HISTORY OF PRESENT ILLNESS
[FreeTextEntry1] : Feels overall well. + incisional pain. Tolerating po, + flatus and daily soft BMs. No fevers/chills.

## 2019-07-19 NOTE — ASSESSMENT
[FreeTextEntry1] : Doing well postop.\par Instructed to transition to high-fiber diet starting the end of next week. \par Instructed to do wet-to-dry saline dressing changes for the 3 open wounds and shown how to do it.\par Instructed to return the VAC machine to the Company.\par All questions answered.\par RTO when the drain output is less than 5-10 cc/day for removal.\par \par

## 2019-07-31 ENCOUNTER — APPOINTMENT (OUTPATIENT)
Dept: SURGERY | Facility: CLINIC | Age: 54
End: 2019-07-31
Payer: COMMERCIAL

## 2019-07-31 VITALS
RESPIRATION RATE: 16 BRPM | DIASTOLIC BLOOD PRESSURE: 80 MMHG | HEART RATE: 101 BPM | TEMPERATURE: 98.1 F | OXYGEN SATURATION: 98 % | SYSTOLIC BLOOD PRESSURE: 113 MMHG

## 2019-07-31 PROCEDURE — 99024 POSTOP FOLLOW-UP VISIT: CPT

## 2019-07-31 PROCEDURE — 17250 CHEM CAUT OF GRANLTJ TISSUE: CPT | Mod: 58

## 2019-07-31 RX ORDER — DOCUSATE SODIUM 100 MG/1
100 CAPSULE, LIQUID FILLED ORAL
Qty: 90 | Refills: 0 | Status: DISCONTINUED | COMMUNITY
Start: 2019-07-03 | End: 2019-07-31

## 2019-07-31 RX ORDER — CAMPHOR 0.45 %
GEL (GRAM) TOPICAL
Refills: 0 | Status: DISCONTINUED | COMMUNITY
End: 2019-07-31

## 2019-07-31 NOTE — PHYSICAL EXAM
[de-identified] : Soft and nontender. The wounds are clean dry and healing well. There were 3 areas of exuberant granulation tissue in the midline wound, and stomal closure site. These were treated with silver nitrate. A dry sterile dressing was placed over the drain site

## 2019-07-31 NOTE — HISTORY OF PRESENT ILLNESS
[de-identified] : Del is a 55 y/o male here for post op visit for open John's reversal and repair of parastomal hernia on 6/28/19 with Dr. Stein. Last seen on 07/17/19 and felt  overall well. Incisional pain better, has not required any pain meds recently. Tolerating po, + flatus and daily soft BMs. No fevers/chills. \par  [de-identified] : This patient's Castro drain was draining 10-15 cc for several days at some point the drain fell out when he turned in bed. He denies fever or chills or abdominal pain.

## 2019-07-31 NOTE — ASSESSMENT
[FreeTextEntry1] : This patient is recovering nicely from his stomal closure. He will see Dr. English in 2 weeks to ensure proper healing of the wounds

## 2019-07-31 NOTE — REASON FOR VISIT
[Post Op: _________] : a [unfilled] post op visit [FreeTextEntry1] : Open John's reversal and repair of parastomal hernia on 6/28/19 with Dr. Stein.

## 2019-08-07 NOTE — PRE-OP CHECKLIST - NEEDLE GAUGE
Called patient regarding his H Pylori results.  Was informed of his Stress test that he was waiting to hear about.  Notified that I would track down results and inform pcp to call.    Went to pcp desk and she did have a copy at her desk regarding patients Stress test from [facility].  She stated will call over lunch time.   Ac Kinsey M.A.     20

## 2019-08-14 ENCOUNTER — APPOINTMENT (OUTPATIENT)
Dept: SURGERY | Facility: CLINIC | Age: 54
End: 2019-08-14
Payer: COMMERCIAL

## 2019-08-14 VITALS
HEIGHT: 72 IN | HEART RATE: 96 BPM | SYSTOLIC BLOOD PRESSURE: 126 MMHG | DIASTOLIC BLOOD PRESSURE: 84 MMHG | BODY MASS INDEX: 39.28 KG/M2 | RESPIRATION RATE: 16 BRPM | WEIGHT: 290 LBS | OXYGEN SATURATION: 97 % | TEMPERATURE: 97.8 F

## 2019-08-14 DIAGNOSIS — T14.8XXA OTHER INJURY OF UNSPECIFIED BODY REGION, INITIAL ENCOUNTER: ICD-10-CM

## 2019-08-14 PROCEDURE — 99024 POSTOP FOLLOW-UP VISIT: CPT

## 2019-08-14 NOTE — ASSESSMENT
[FreeTextEntry1] : Doing well postop overall, but with intermittent wound discharge. His drain from the hernia repair fell out and given his body habitus he is at high risk for developing a deep seroma/infected seroma.\par I have requested a CT abd/pelvis to r/o abd wall fluid collections. \par All questions answered.\par RTO in 2 weeks. \par \par

## 2019-08-14 NOTE — PHYSICAL EXAM
[FreeTextEntry1] : In NAD.\par Abdomen soft, non-tender, non-distended. \par Midline and left transverse incisions appear to be healing well. No hernias.\par The midline incision has two small areas of granulation tissue, one above and one below the umbilicus. The left lateral wound has one small area of granulation tissue in the center. All three areas appear to be healing well and are dry. The superior midline granulation tissue was treated with silver nitrate. \par The lower midline granulation area is the one of concern to the pt with the discharge. I was not able to express any discharge with probing the wound or by applying pressure. The pt applied pressure in the surrounding tissues too with no discharge. \par There is no erythema.

## 2019-08-14 NOTE — HISTORY OF PRESENT ILLNESS
[FreeTextEntry1] : Feels overall well. Lifted something the other day and developed "pulling" sensation in the left lateral wound. His left abd wall drain fell out on 7/31, he was seen by my associate, Dr Chowdhury. \par Tolerating po, + flatus and daily soft BMs. No fevers/chills. Reports large amount of thick discharge from the lower aspect of the midline wound, changes the bandage 3 times a day. Discharge is intermittent.

## 2019-08-27 ENCOUNTER — FORM ENCOUNTER (OUTPATIENT)
Age: 54
End: 2019-08-27

## 2019-08-28 ENCOUNTER — OUTPATIENT (OUTPATIENT)
Dept: OUTPATIENT SERVICES | Facility: HOSPITAL | Age: 54
LOS: 1 days | End: 2019-08-28
Payer: COMMERCIAL

## 2019-08-28 ENCOUNTER — APPOINTMENT (OUTPATIENT)
Dept: CT IMAGING | Facility: CLINIC | Age: 54
End: 2019-08-28

## 2019-08-28 DIAGNOSIS — Z98.890 OTHER SPECIFIED POSTPROCEDURAL STATES: Chronic | ICD-10-CM

## 2019-08-28 DIAGNOSIS — Z09 ENCOUNTER FOR FOLLOW-UP EXAMINATION AFTER COMPLETED TREATMENT FOR CONDITIONS OTHER THAN MALIGNANT NEOPLASM: ICD-10-CM

## 2019-08-28 DIAGNOSIS — T14.8XXA OTHER INJURY OF UNSPECIFIED BODY REGION, INITIAL ENCOUNTER: ICD-10-CM

## 2019-08-28 PROCEDURE — 74177 CT ABD & PELVIS W/CONTRAST: CPT | Mod: 26

## 2019-08-28 PROCEDURE — 74177 CT ABD & PELVIS W/CONTRAST: CPT

## 2019-09-04 ENCOUNTER — FORM ENCOUNTER (OUTPATIENT)
Age: 54
End: 2019-09-04

## 2019-09-05 ENCOUNTER — OUTPATIENT (OUTPATIENT)
Dept: OUTPATIENT SERVICES | Facility: HOSPITAL | Age: 54
LOS: 1 days | End: 2019-09-05
Payer: COMMERCIAL

## 2019-09-05 DIAGNOSIS — S30.1XXA CONTUSION OF ABDOMINAL WALL, INITIAL ENCOUNTER: ICD-10-CM

## 2019-09-05 DIAGNOSIS — Z98.890 OTHER SPECIFIED POSTPROCEDURAL STATES: Chronic | ICD-10-CM

## 2019-09-05 LAB
BASOPHILS # BLD AUTO: 0.06 K/UL
BASOPHILS NFR BLD AUTO: 0.6 %
EOSINOPHIL # BLD AUTO: 0.4 K/UL
EOSINOPHIL NFR BLD AUTO: 4.1 %
GRAM STN FLD: SIGNIFICANT CHANGE UP
HCT VFR BLD CALC: 46.3 %
HGB BLD-MCNC: 14.1 G/DL
INR PPP: 1.02 RATIO
LYMPHOCYTES # BLD AUTO: 2.75 K/UL
LYMPHOCYTES NFR BLD AUTO: 27.7 %
MAN DIFF?: NO
MCHC RBC-ENTMCNC: 25.3 PG
MCHC RBC-ENTMCNC: 30.4 GM/DL
MCV RBC AUTO: 83.2 FL
MONOCYTES # BLD AUTO: 0.78 K/UL
MONOCYTES NFR BLD AUTO: 7.8 %
NEUTROPHILS # BLD AUTO: 5.94 K/UL
NEUTROPHILS NFR BLD AUTO: 59.8 %
PLATELET # BLD AUTO: 369 K/UL
PT BLD: 11.7 SEC
RBC # BLD: 5.56 M/UL
RBC # FLD: 14.7 %
SPECIMEN SOURCE: SIGNIFICANT CHANGE UP
WBC # FLD AUTO: 9.93 K/UL

## 2019-09-05 PROCEDURE — 10160 PNXR ASPIR ABSC HMTMA BULLA: CPT

## 2019-09-05 PROCEDURE — 76942 ECHO GUIDE FOR BIOPSY: CPT

## 2019-09-05 PROCEDURE — 87075 CULTR BACTERIA EXCEPT BLOOD: CPT

## 2019-09-05 PROCEDURE — 87205 SMEAR GRAM STAIN: CPT

## 2019-09-05 PROCEDURE — C1788: CPT

## 2019-09-05 PROCEDURE — C1894: CPT

## 2019-09-05 PROCEDURE — 87070 CULTURE OTHR SPECIMN AEROBIC: CPT

## 2019-09-05 PROCEDURE — 76942 ECHO GUIDE FOR BIOPSY: CPT | Mod: 26

## 2019-09-05 PROCEDURE — 87186 SC STD MICRODIL/AGAR DIL: CPT

## 2019-09-05 PROCEDURE — C1729: CPT

## 2019-09-05 PROCEDURE — C1769: CPT

## 2019-09-08 LAB
-  AMPICILLIN/SULBACTAM: SIGNIFICANT CHANGE UP
-  CEFAZOLIN: SIGNIFICANT CHANGE UP
-  CLINDAMYCIN: SIGNIFICANT CHANGE UP
-  DAPTOMYCIN: SIGNIFICANT CHANGE UP
-  ERYTHROMYCIN: SIGNIFICANT CHANGE UP
-  GENTAMICIN: SIGNIFICANT CHANGE UP
-  LINEZOLID: SIGNIFICANT CHANGE UP
-  OXACILLIN: SIGNIFICANT CHANGE UP
-  PENICILLIN: SIGNIFICANT CHANGE UP
-  RIFAMPIN: SIGNIFICANT CHANGE UP
-  TETRACYCLINE: SIGNIFICANT CHANGE UP
-  TRIMETHOPRIM/SULFAMETHOXAZOLE: SIGNIFICANT CHANGE UP
-  VANCOMYCIN: SIGNIFICANT CHANGE UP
METHOD TYPE: SIGNIFICANT CHANGE UP

## 2019-09-09 ENCOUNTER — APPOINTMENT (OUTPATIENT)
Dept: SURGERY | Facility: CLINIC | Age: 54
End: 2019-09-09
Payer: COMMERCIAL

## 2019-09-09 VITALS
OXYGEN SATURATION: 98 % | RESPIRATION RATE: 16 BRPM | SYSTOLIC BLOOD PRESSURE: 132 MMHG | DIASTOLIC BLOOD PRESSURE: 89 MMHG | HEART RATE: 99 BPM | TEMPERATURE: 98.8 F

## 2019-09-09 DIAGNOSIS — Z22.322 CARRIER OR SUSPECTED CARRIER OF METHICILLIN RESISTANT STAPHYLOCOCCUS AUREUS: ICD-10-CM

## 2019-09-09 PROCEDURE — 99024 POSTOP FOLLOW-UP VISIT: CPT

## 2019-09-10 LAB
CULTURE RESULTS: SIGNIFICANT CHANGE UP
ORGANISM # SPEC MICROSCOPIC CNT: SIGNIFICANT CHANGE UP
ORGANISM # SPEC MICROSCOPIC CNT: SIGNIFICANT CHANGE UP
SPECIMEN SOURCE: SIGNIFICANT CHANGE UP

## 2019-09-11 NOTE — HISTORY OF PRESENT ILLNESS
[FreeTextEntry1] : He had aspiration of small abdominal wall fluid collection below the umbilicus last week. Feels better and his abd wall symptoms of pain/"pulling" sensation have resolved. He has clear discharge from upper aspect of the incision now.  \par Tolerating po, + flatus and daily soft BMs. \par Prelim Cx of the aspiration with Staph.

## 2019-09-11 NOTE — PHYSICAL EXAM
[FreeTextEntry1] : In NAD.\par Abdomen soft, non-tender, non-distended. \par Midline and left transverse incisions appear to be healing well. No hernias.\par The midline incision has one small area of granulation tissue below the umbilicus, treated with silver nitrate. \par There is no erythema. \par There is a small skin opening in the supraumbilical midline incision, probed, short narrow tunnel about 2.5 cm, dry.

## 2019-09-12 DIAGNOSIS — T81.89XA OTHER COMPLICATIONS OF PROCEDURES, NOT ELSEWHERE CLASSIFIED, INITIAL ENCOUNTER: ICD-10-CM

## 2019-09-12 DIAGNOSIS — Z90.49 ACQUIRED ABSENCE OF OTHER SPECIFIED PARTS OF DIGESTIVE TRACT: ICD-10-CM

## 2019-09-18 ENCOUNTER — APPOINTMENT (OUTPATIENT)
Dept: SURGERY | Facility: CLINIC | Age: 54
End: 2019-09-18
Payer: COMMERCIAL

## 2019-09-18 VITALS
HEART RATE: 81 BPM | OXYGEN SATURATION: 97 % | TEMPERATURE: 98 F | RESPIRATION RATE: 15 BRPM | DIASTOLIC BLOOD PRESSURE: 89 MMHG | SYSTOLIC BLOOD PRESSURE: 125 MMHG

## 2019-09-18 PROCEDURE — 99024 POSTOP FOLLOW-UP VISIT: CPT

## 2019-09-18 RX ORDER — SULFAMETHOXAZOLE AND TRIMETHOPRIM 800; 160 MG/1; MG/1
800-160 TABLET ORAL TWICE DAILY
Qty: 14 | Refills: 0 | Status: DISCONTINUED | COMMUNITY
Start: 2019-09-09 | End: 2019-09-18

## 2019-09-21 NOTE — HISTORY OF PRESENT ILLNESS
[FreeTextEntry1] : Feels well. No abdominal pain. Daily formed BMs. He had US guided aspiration of small abdominal wall collection, Cx MRSA, treated with Bactrim. He had episode of clear discharge from upper aspect of the incision recently, none currently.  \par

## 2019-09-21 NOTE — PHYSICAL EXAM
[FreeTextEntry1] : In NAD.\par Abdomen soft, non-tender, non-distended. \par Midline and left transverse incisions appear to be healing well. No hernias.\par No skin openings and no discharge.

## 2019-09-21 NOTE — ASSESSMENT
[FreeTextEntry1] : Doing well postop overall. \par Discussed importance of high fiber diet given resection for diverticulitis. Emphasized again loosing weight to avoid hernia recurrence. \par Discussed possibility of developing an abdominal wall sinus tract down the road - educated on symptoms, knows to return for any wound issues. \par RTO as needed. \par \par

## 2020-07-28 ENCOUNTER — INPATIENT (INPATIENT)
Facility: HOSPITAL | Age: 55
LOS: 1 days | Discharge: HOME | DRG: 247 | End: 2020-07-30
Attending: INTERNAL MEDICINE | Admitting: INTERNAL MEDICINE
Payer: COMMERCIAL

## 2020-07-28 VITALS
RESPIRATION RATE: 18 BRPM | SYSTOLIC BLOOD PRESSURE: 135 MMHG | DIASTOLIC BLOOD PRESSURE: 102 MMHG | HEIGHT: 72 IN | TEMPERATURE: 97 F | HEART RATE: 72 BPM | OXYGEN SATURATION: 96 % | WEIGHT: 315 LBS

## 2020-07-28 DIAGNOSIS — I21.4 NON-ST ELEVATION (NSTEMI) MYOCARDIAL INFARCTION: ICD-10-CM

## 2020-07-28 DIAGNOSIS — Z98.890 OTHER SPECIFIED POSTPROCEDURAL STATES: Chronic | ICD-10-CM

## 2020-07-28 LAB
ALBUMIN SERPL ELPH-MCNC: 4.2 G/DL — SIGNIFICANT CHANGE UP (ref 3.3–5)
ALP SERPL-CCNC: 62 U/L — SIGNIFICANT CHANGE UP (ref 40–120)
ALT FLD-CCNC: 15 U/L — SIGNIFICANT CHANGE UP (ref 10–45)
ANION GAP SERPL CALC-SCNC: 16 MMOL/L — SIGNIFICANT CHANGE UP (ref 5–17)
APTT BLD: 32.4 SEC — SIGNIFICANT CHANGE UP (ref 27.5–35.5)
AST SERPL-CCNC: 31 U/L — SIGNIFICANT CHANGE UP (ref 10–40)
BASOPHILS # BLD AUTO: 0.06 K/UL — SIGNIFICANT CHANGE UP (ref 0–0.2)
BASOPHILS NFR BLD AUTO: 0.5 % — SIGNIFICANT CHANGE UP (ref 0–2)
BILIRUB SERPL-MCNC: 0.5 MG/DL — SIGNIFICANT CHANGE UP (ref 0.2–1.2)
BUN SERPL-MCNC: 14 MG/DL — SIGNIFICANT CHANGE UP (ref 7–23)
CALCIUM SERPL-MCNC: 9.3 MG/DL — SIGNIFICANT CHANGE UP (ref 8.4–10.5)
CHLORIDE SERPL-SCNC: 104 MMOL/L — SIGNIFICANT CHANGE UP (ref 96–108)
CK MB CFR SERPL CALC: 45.3 NG/ML — HIGH (ref 0–6.7)
CO2 SERPL-SCNC: 23 MMOL/L — SIGNIFICANT CHANGE UP (ref 22–31)
CREAT SERPL-MCNC: 1.21 MG/DL — SIGNIFICANT CHANGE UP (ref 0.5–1.3)
D DIMER BLD IA.RAPID-MCNC: <150 NG/ML DDU — SIGNIFICANT CHANGE UP
EOSINOPHIL # BLD AUTO: 0.27 K/UL — SIGNIFICANT CHANGE UP (ref 0–0.5)
EOSINOPHIL NFR BLD AUTO: 2.3 % — SIGNIFICANT CHANGE UP (ref 0–6)
GLUCOSE SERPL-MCNC: 113 MG/DL — HIGH (ref 70–99)
HCT VFR BLD CALC: 48.5 % — SIGNIFICANT CHANGE UP (ref 39–50)
HGB BLD-MCNC: 15.1 G/DL — SIGNIFICANT CHANGE UP (ref 13–17)
IMM GRANULOCYTES NFR BLD AUTO: 1.2 % — SIGNIFICANT CHANGE UP (ref 0–1.5)
INR BLD: 0.96 RATIO — SIGNIFICANT CHANGE UP (ref 0.88–1.16)
LYMPHOCYTES # BLD AUTO: 1.76 K/UL — SIGNIFICANT CHANGE UP (ref 1–3.3)
LYMPHOCYTES # BLD AUTO: 15.3 % — SIGNIFICANT CHANGE UP (ref 13–44)
MCHC RBC-ENTMCNC: 27.8 PG — SIGNIFICANT CHANGE UP (ref 27–34)
MCHC RBC-ENTMCNC: 31.1 GM/DL — LOW (ref 32–36)
MCV RBC AUTO: 89.3 FL — SIGNIFICANT CHANGE UP (ref 80–100)
MONOCYTES # BLD AUTO: 0.91 K/UL — HIGH (ref 0–0.9)
MONOCYTES NFR BLD AUTO: 7.9 % — SIGNIFICANT CHANGE UP (ref 2–14)
NEUTROPHILS # BLD AUTO: 8.39 K/UL — HIGH (ref 1.8–7.4)
NEUTROPHILS NFR BLD AUTO: 72.8 % — SIGNIFICANT CHANGE UP (ref 43–77)
NRBC # BLD: 0 /100 WBCS — SIGNIFICANT CHANGE UP (ref 0–0)
PLATELET # BLD AUTO: 252 K/UL — SIGNIFICANT CHANGE UP (ref 150–400)
POTASSIUM SERPL-MCNC: 4.4 MMOL/L — SIGNIFICANT CHANGE UP (ref 3.5–5.3)
POTASSIUM SERPL-SCNC: 4.4 MMOL/L — SIGNIFICANT CHANGE UP (ref 3.5–5.3)
PROT SERPL-MCNC: 7.1 G/DL — SIGNIFICANT CHANGE UP (ref 6–8.3)
PROTHROM AB SERPL-ACNC: 11.4 SEC — SIGNIFICANT CHANGE UP (ref 10.6–13.6)
RBC # BLD: 5.43 M/UL — SIGNIFICANT CHANGE UP (ref 4.2–5.8)
RBC # FLD: 14.6 % — HIGH (ref 10.3–14.5)
SARS-COV-2 RNA SPEC QL NAA+PROBE: SIGNIFICANT CHANGE UP
SODIUM SERPL-SCNC: 143 MMOL/L — SIGNIFICANT CHANGE UP (ref 135–145)
TROPONIN T, HIGH SENSITIVITY RESULT: 129 NG/L — HIGH (ref 0–51)
TROPONIN T, HIGH SENSITIVITY RESULT: 194 NG/L — HIGH (ref 0–51)
WBC # BLD: 11.53 K/UL — HIGH (ref 3.8–10.5)
WBC # FLD AUTO: 11.53 K/UL — HIGH (ref 3.8–10.5)

## 2020-07-28 PROCEDURE — 71275 CT ANGIOGRAPHY CHEST: CPT | Mod: 26

## 2020-07-28 PROCEDURE — 93010 ELECTROCARDIOGRAM REPORT: CPT

## 2020-07-28 PROCEDURE — 71046 X-RAY EXAM CHEST 2 VIEWS: CPT | Mod: 26

## 2020-07-28 PROCEDURE — 99291 CRITICAL CARE FIRST HOUR: CPT

## 2020-07-28 PROCEDURE — 93308 TTE F-UP OR LMTD: CPT | Mod: 26

## 2020-07-28 PROCEDURE — 74174 CTA ABD&PLVS W/CONTRAST: CPT | Mod: 26

## 2020-07-28 PROCEDURE — 93321 DOPPLER ECHO F-UP/LMTD STD: CPT | Mod: 26

## 2020-07-28 RX ORDER — LANOLIN ALCOHOL/MO/W.PET/CERES
5 CREAM (GRAM) TOPICAL ONCE
Refills: 0 | Status: COMPLETED | OUTPATIENT
Start: 2020-07-28 | End: 2020-07-28

## 2020-07-28 RX ORDER — HEPARIN SODIUM 5000 [USP'U]/ML
6000 INJECTION INTRAVENOUS; SUBCUTANEOUS EVERY 6 HOURS
Refills: 0 | Status: DISCONTINUED | OUTPATIENT
Start: 2020-07-28 | End: 2020-07-29

## 2020-07-28 RX ORDER — NITROGLYCERIN 6.5 MG
0.4 CAPSULE, EXTENDED RELEASE ORAL
Refills: 0 | Status: DISCONTINUED | OUTPATIENT
Start: 2020-07-28 | End: 2020-07-29

## 2020-07-28 RX ORDER — NICOTINE POLACRILEX 2 MG
1 GUM BUCCAL DAILY
Refills: 0 | Status: DISCONTINUED | OUTPATIENT
Start: 2020-07-28 | End: 2020-07-30

## 2020-07-28 RX ORDER — ASPIRIN/CALCIUM CARB/MAGNESIUM 324 MG
325 TABLET ORAL DAILY
Refills: 0 | Status: DISCONTINUED | OUTPATIENT
Start: 2020-07-28 | End: 2020-07-29

## 2020-07-28 RX ORDER — TICAGRELOR 90 MG/1
180 TABLET ORAL ONCE
Refills: 0 | Status: COMPLETED | OUTPATIENT
Start: 2020-07-28 | End: 2020-07-28

## 2020-07-28 RX ORDER — DIAZEPAM 5 MG
5 TABLET ORAL ONCE
Refills: 0 | Status: DISCONTINUED | OUTPATIENT
Start: 2020-07-28 | End: 2020-07-28

## 2020-07-28 RX ORDER — HEPARIN SODIUM 5000 [USP'U]/ML
5000 INJECTION INTRAVENOUS; SUBCUTANEOUS ONCE
Refills: 0 | Status: COMPLETED | OUTPATIENT
Start: 2020-07-28 | End: 2020-07-28

## 2020-07-28 RX ORDER — ASPIRIN/CALCIUM CARB/MAGNESIUM 324 MG
162 TABLET ORAL ONCE
Refills: 0 | Status: COMPLETED | OUTPATIENT
Start: 2020-07-28 | End: 2020-07-28

## 2020-07-28 RX ORDER — HEPARIN SODIUM 5000 [USP'U]/ML
INJECTION INTRAVENOUS; SUBCUTANEOUS
Qty: 25000 | Refills: 0 | Status: DISCONTINUED | OUTPATIENT
Start: 2020-07-28 | End: 2020-07-29

## 2020-07-28 RX ADMIN — HEPARIN SODIUM 5000 UNIT(S): 5000 INJECTION INTRAVENOUS; SUBCUTANEOUS at 20:28

## 2020-07-28 RX ADMIN — Medication 0.4 MILLIGRAM(S): at 19:55

## 2020-07-28 RX ADMIN — Medication 5 MILLIGRAM(S): at 23:29

## 2020-07-28 RX ADMIN — Medication 0.4 MILLIGRAM(S): at 20:21

## 2020-07-28 RX ADMIN — Medication 162 MILLIGRAM(S): at 18:56

## 2020-07-28 RX ADMIN — Medication 5 MILLIGRAM(S): at 18:55

## 2020-07-28 RX ADMIN — TICAGRELOR 180 MILLIGRAM(S): 90 TABLET ORAL at 19:54

## 2020-07-28 RX ADMIN — HEPARIN SODIUM 1000 UNIT(S)/HR: 5000 INJECTION INTRAVENOUS; SUBCUTANEOUS at 20:28

## 2020-07-28 RX ADMIN — Medication 5 MILLIGRAM(S): at 19:54

## 2020-07-28 NOTE — ED PROVIDER NOTE - NS ED MD DISPO ISOLATION TYPES
Pt denies history of a heart condition.  I did discuss low risk of heart problem.    Pt prefers zithromax as he doesn't want to use levaquin too often and develop resistance.   ASHWIN Paris   None

## 2020-07-28 NOTE — CONSULT NOTE ADULT - ATTENDING COMMENTS
53 year old man with morbid obesity, chronic cocaine use, most recent 2 days ago and presenting with persistent substernal chest pain. EKG with inferior lead ST elevation, rising troponin level, all indicative of STEMI involving RCA. Instituted ACS protocl and plan early coronary angiography.

## 2020-07-28 NOTE — H&P ADULT - ASSESSMENT
6yo M Hx HLD, diverticulosis presenting with complaints of chest pressure. Pt reports that last night he started felling midsternal chest pressure with intermittent sob and diaphoresis. pain continued this AM took a baby asa without relief. never had similar pain in the past. states that he has been under a lot of stress recently, he lost his job at the start of the pandemic, gained weight and is going through a divorce. also endorse cocaine use, last used 2 days ago. has not been to a doctor in years, states never had a stress test or echo.    NSTEMI  - Brilinta was  loaded and asa and iv heparin was started by ER  - cardiology was called by ER MD  - telemetry monitor  - c/w asa and brilinta  - trend troponin  - FLP  - official echo    tob use d/o  - tob cessation counselling given  - nicotine patch    cocaine abuse  - social work consult 6yo M Hx HLD, diverticulosis presenting with complaints of chest pressure. Pt reports that last night he started felling midsternal chest pressure with intermittent sob and diaphoresis. pain continued this AM took a baby asa without relief. never had similar pain in the past. states that he has been under a lot of stress recently, he lost his job at the start of the pandemic, gained weight and is going through a divorce. also endorse cocaine use, last used 2 days ago. has not been to a doctor in years, states never had a stress test or echo.    STEMI  - Brilinta was  loaded and asa and iv heparin was started by ER  - cardiology was called by ER MD  - telemetry monitor  - c/w asa and brilinta  - trend troponin  - FLP  - official echo    tob use d/o  - tob cessation counselling given  - nicotine patch    cocaine abuse  - social work consult

## 2020-07-28 NOTE — ED PROVIDER NOTE - PROGRESS NOTE DETAILS
Ava Diaz PGY2: Pt with positive troponin and continued chest pressure. will give nitro and asa. give brilinta and start heparin. admit and call cards. Ava Diaz PGY2: Pt seen by cardiology, noted that blood pressures were unequal between the upper extremities, with continued pain despite nitro, and complaining of pain radiating to his back. advised CTA to r/o dissection. CTA ordered. Ava Diaz PGY2: Pt seen by cardiology, noted that blood pressures were unequal between the upper extremities, with continued pain despite nitro, and complaining of pain radiating to his back. advised CTA to r/o dissection. CTA ordered. called CT to bring patient over now for imaging.

## 2020-07-28 NOTE — ED ADULT NURSE NOTE - NS ED NURSE TRANSPORT WITH
oxygen/Cardiac Monitor/Defib/ACLS/Rescue Kit/O2/BVM/pulse ox/Oxygen tank filled 1200 PSI ACLS Rescue Kit/IV pump/oxygen/Cardiac Monitor/Defib/ACLS/Rescue Kit/O2/BVM/pulse ox/Oxygen tank filled 1200 PSI

## 2020-07-28 NOTE — H&P ADULT - NSICDXPASTMEDICALHX_GEN_ALL_CORE_FT
PAST MEDICAL HISTORY:  Colostomy present     Diverticulitis     History of neuropathy big toes    HLD (hyperlipidemia)     Morbid obesity     Sleep apnea

## 2020-07-28 NOTE — H&P ADULT - HISTORY OF PRESENT ILLNESS
54yo M Hx HLD, diverticulosis presenting with complaints of chest pressure. Pt reports that last night he started felling midsternal chest pressure with intermittent sob and diaphoresis. pain continued this AM took a baby asa without relief. never had similar pain in the past. states that he has been under a lot of stress recently, he lost his job at the start of the pandemic, gained weight and is going through a divorce. also endorse cocaine use, last used 2 days ago. has not  been to a doctor in years, states never had a stress test or echo.

## 2020-07-28 NOTE — ED PROVIDER NOTE - CARE PLAN
Principal Discharge DX:	NSTEMI (non-ST elevated myocardial infarction)  Secondary Diagnosis:	Cocaine abuse

## 2020-07-28 NOTE — ED PROVIDER NOTE - OBJECTIVE STATEMENT
56yo M Hx HLD, diverticulosis presenting with complaints of chest pressure. Pt reports that last night he started felling midsternal chest pressure with intermittent sob and diaphoresis. pain continued this AM took a baby asa without relief. never had similar pain in the past. states that he has been under a lot of stress recently, he lost his job at the start of the pandemic, gained weight and is going through a divorce. also endorse cocaine use, last used 2 days ago. hastn been to a doctor in years, states never had a stress test or echo.

## 2020-07-28 NOTE — H&P ADULT - NSHPLABSRESULTS_GEN_ALL_CORE
LABS:                        15.1   11.53 )-----------( 252      ( 28 Jul 2020 18:57 )             48.5     07-28    143  |  104  |  14  ----------------------------<  113<H>  4.4   |  23  |  1.21    Ca    9.3      28 Jul 2020 18:57    TPro  7.1  /  Alb  4.2  /  TBili  0.5  /  DBili  x   /  AST  31  /  ALT  15  /  AlkPhos  62  07-28    PT/INR - ( 28 Jul 2020 20:11 )   PT: 11.4 sec;   INR: 0.96 ratio         PTT - ( 28 Jul 2020 20:11 )  PTT:32.4 sec          RADIOLOGY & ADDITIONAL TESTS:

## 2020-07-28 NOTE — ED PROVIDER NOTE - CLINICAL SUMMARY MEDICAL DECISION MAKING FREE TEXT BOX
56yo M presenting with complaints of chest pressure starting last night with associated intermittent sob, diaphoresis. last used cocaine 2 days ago. too 81mg asa this am. with ongoing cp. has never had a stress test or echo, however charts show a normal CT coronary in 2012. concern for ACS, cocaine induced chest pain. will obtain labs, ekg, cxr, analgesia

## 2020-07-28 NOTE — ED ADULT NURSE REASSESSMENT NOTE - NS ED NURSE REASSESS COMMENT FT1
Report received from AD constantino. Pt alert & oriented in bed, mildly anxious. MD Peterson at bedside explaining plan of care. VSS. Additional IV access obtained, pt to be weighed and Heparin drip to be started. On cardiac monitor, VSS.

## 2020-07-28 NOTE — ED ADULT NURSE NOTE - OBJECTIVE STATEMENT
54 y/o male coming in from home complaining of chest pressure. AOx4, ambulatory, PMH diverticulitis with most recent surgery in march 2019. Pt. reports he does not see doctors and he does not have any past medical history. Reports chest pressure and cold sweats that has been constant since last night making the patient unable to sleep last night. Pt. reports SOB at rest and with activity. Reports smoking, drinking a couple of beers occasionally, reports recent cocaine use approx. 2 days ago but states it "was a small amount" and that he does not think this is from the cocaine use. Reports increased stress due to recent divorce, kids going to college. Denies abdominal pain, fever. Pt. placed on cardiac monitor and in hospital gown, EKG done at bedside. VSS. Will continue to reassess.

## 2020-07-28 NOTE — ED PROVIDER NOTE - PMH
Colostomy present    Diverticulitis    History of neuropathy  big toes  HLD (hyperlipidemia)    Morbid obesity    Sleep apnea

## 2020-07-28 NOTE — CONSULT NOTE ADULT - SUBJECTIVE AND OBJECTIVE BOX
Patient seen and evaluated at bedside    Chief Complaint: cp     HPI: 54 YO M with PMH of significant cocaine use (decades per pt) and morbid obesity p/w 2 days hx of midsternal cp described as pressure like, radiating to the back, intermittent, no clear exertional or positonal component. cp started after he snored his usual dose of cocaine 2 days ago and cp has been getting worse which prompted him to come to the ED. at its worst, 9/10 and now 3/10 after 3 doses of nitro subQ. In the ED, hsTrop 129->149, ckmb 45, EKG with subMM LITA in II, III, avF. Upon my exam, bilateral bp was taken, RUE bp 120/70 and LUE 90/70. Given his cp nature and need for heparin gtt/DAPT, aortic dissection needed to be ruled out. CTA chest abd with contrast was negative. DDimer was negative. Repeat EKG still with sub MM LITA in II, III and avF. Stat ECHO was done to r/o WMA. Due to body habitus and uncooperation, poor window was obtained and no RWA can be assessed even with Definity contrast however LVEF grossly appear to be normal. Spoke with IC on call, pt will have LHC +/- PCI in AM. Pt is consented and form in chart. Last cardiac work up was many years ago for palpitation, supposedly had CTA coronary that was negative.     PMHx:   Colostomy present  History of neuropathy  Diverticulitis  Morbid obesity  HLD (hyperlipidemia)  Sleep apnea      PSHx:   S/P repair of hydrocele  No significant past surgical history      Allergies:  No Known Allergies      Home Meds:    Current Medications:   heparin   Injectable 6000 Unit(s) IV Push every 6 hours PRN  heparin  Infusion.  Unit(s)/Hr IV Continuous <Continuous>  nitroglycerin     SubLingual 0.4 milliGRAM(s) SubLingual every 5 minutes PRN      FAMILY HISTORY:  Family history of lung cancer: mother  age 40, hx lung cancer      Social History:  Smoking History:  Alcohol Use:  Drug Use:    REVIEW OF SYSTEMS:  Constitutional:     [x ] negative [ ] fevers [ ] chills [ ] weight loss [ ] weight gain  HEENT:                  [x ] negative [ ] dry eyes [ ] eye irritation [ ] postnasal drip [ ] nasal congestion  CV:                         [ ] negative  [x ] chest pain [ ] orthopnea [ ] palpitations [ ] murmur  Resp:                     [x ] negative [ ] cough [ ] shortness of breath [ ] dyspnea [ ] wheezing [ ] sputum [ ]hemoptysis  GI:                          [ x] negative [ ] nausea [ ] vomiting [ ] diarrhea [ ] constipation [ ] abd pain [ ] dysphagia   :                        [ x] negative [ ] dysuria [ ] nocturia [ ] hematuria [ ] increased urinary frequency  Musculoskeletal: [x ] negative [ ] back pain [ ] myalgias [ ] arthralgias [ ] fracture  Skin:                       [ x] negative [ ] rash [ ] itch  Neurological:        [ x] negative [ ] headache [ ] dizziness [ ] syncope [ ] weakness [ ] numbness  Psychiatric:           [ x] negative [ ] anxiety [ ] depression  Endocrine:            [ x] negative [ ] diabetes [ ] thyroid problem  Heme/Lymph:      [ x] negative [ ] anemia [ ] bleeding problem  Allergic/Immune: [ x] negative [ ] itchy eyes [ ] nasal discharge [ ] hives [ ] angioedema    [ x] All other systems negative  [ ] Unable to assess ROS due to      Physical Exam:  T(F): 97.8 (), Max: 97.8 ()  HR: 64 () (62 - 72)  BP: 112/68 () (97/62 - 135/102)  RR: 18 ()  SpO2: 100% ()  GENERAL: agitated, well-developed  HEAD:  Atraumatic, Normocephalic. thick neck   ENT: EOMI, PERRLA, conjunctiva and sclera clear, Neck supple, No JVD, moist mucosa  CHEST/LUNG: Clear to auscultation bilaterally; No wheeze, equal breath sounds bilaterally   BACK: No spinal tenderness  HEART: Regular rate and rhythm; No murmurs, rubs, or gallops  ABDOMEN: Soft, Nontender, Nondistended; Bowel sounds present  EXTREMITIES:  No clubbing, cyanosis, or edema  PSYCH: Nl behavior, nl affect  NEUROLOGY: AAOx3, non-focal, cranial nerves intact  SKIN: Normal color, No rashes or lesions  LINES: none     Cardiovascular Diagnostic Testing:    ECG: Personally reviewed: NSR <1mm LITA in II, III and avF with minimal STD in lateral leads.     Echo: Personally performed and reviewed     Stress Testing: none     Cath:  none   Imaging:  CTA chest w contrast no evidence of dissection     CXR: Personally reviewed    Labs: Personally reviewed                        15.1   11.53 )-----------( 252      ( 2020 18:57 )             48.5         143  |  104  |  14  ----------------------------<  113<H>  4.4   |  23  |  1.21    Ca    9.3      2020 18:57    TPro  7.1  /  Alb  4.2  /  TBili  0.5  /  DBili  x   /  AST  31  /  ALT  15  /  AlkPhos  62      PT/INR - ( 2020 20:11 )   PT: 11.4 sec;   INR: 0.96 ratio         PTT - ( 2020 20:11 )  PTT:32.4 sec

## 2020-07-28 NOTE — ED PROVIDER NOTE - ATTENDING CONTRIBUTION TO CARE
cp, pleuritic, cocaine use  still present  rrr, clear lungs  ekg / xr chest / labs / Symptomatic treatment. ro acs and pe. dissection unlikely clinically.

## 2020-07-28 NOTE — ED ADULT NURSE REASSESSMENT NOTE - NS ED NURSE REASSESS COMMENT FT1
Heparin ordered based on patients actual measured weight. ACS nomogram being used. Pt educated on Heparin and signs & symptoms of bleeding to make RN aware of. Heparin started at 10 mL/hr verified with 2 RN's. Pt on cardiac monitor, VSS.

## 2020-07-29 LAB
A1C WITH ESTIMATED AVERAGE GLUCOSE RESULT: 5.5 % — SIGNIFICANT CHANGE UP (ref 4–5.6)
ALBUMIN SERPL ELPH-MCNC: 4.1 G/DL — SIGNIFICANT CHANGE UP (ref 3.3–5)
ALP SERPL-CCNC: 64 U/L — SIGNIFICANT CHANGE UP (ref 40–120)
ALT FLD-CCNC: 20 U/L — SIGNIFICANT CHANGE UP (ref 10–45)
AMPHET UR-MCNC: NEGATIVE — SIGNIFICANT CHANGE UP
ANION GAP SERPL CALC-SCNC: 17 MMOL/L — SIGNIFICANT CHANGE UP (ref 5–17)
APTT BLD: 31.9 SEC — SIGNIFICANT CHANGE UP (ref 27.5–35.5)
APTT BLD: 36.2 SEC — HIGH (ref 27.5–35.5)
APTT BLD: 47 SEC — HIGH (ref 27.5–35.5)
AST SERPL-CCNC: 67 U/L — HIGH (ref 10–40)
BARBITURATES UR SCN-MCNC: NEGATIVE — SIGNIFICANT CHANGE UP
BENZODIAZ UR-MCNC: NEGATIVE — SIGNIFICANT CHANGE UP
BILIRUB DIRECT SERPL-MCNC: 0.1 MG/DL — SIGNIFICANT CHANGE UP (ref 0–0.2)
BILIRUB INDIRECT FLD-MCNC: 0.7 MG/DL — SIGNIFICANT CHANGE UP (ref 0.2–1)
BILIRUB SERPL-MCNC: 0.8 MG/DL — SIGNIFICANT CHANGE UP (ref 0.2–1.2)
BUN SERPL-MCNC: 12 MG/DL — SIGNIFICANT CHANGE UP (ref 7–23)
CALCIUM SERPL-MCNC: 9.4 MG/DL — SIGNIFICANT CHANGE UP (ref 8.4–10.5)
CHLORIDE SERPL-SCNC: 103 MMOL/L — SIGNIFICANT CHANGE UP (ref 96–108)
CHOLEST SERPL-MCNC: 253 MG/DL — HIGH (ref 10–199)
CK MB BLD-MCNC: 10.2 % — HIGH (ref 0–3.5)
CK MB CFR SERPL CALC: 70.4 NG/ML — HIGH (ref 0–6.7)
CK SERPL-CCNC: 693 U/L — HIGH (ref 30–200)
CO2 SERPL-SCNC: 23 MMOL/L — SIGNIFICANT CHANGE UP (ref 22–31)
COCAINE METAB.OTHER UR-MCNC: POSITIVE
CREAT SERPL-MCNC: 1.03 MG/DL — SIGNIFICANT CHANGE UP (ref 0.5–1.3)
ESTIMATED AVERAGE GLUCOSE: 111 MG/DL — SIGNIFICANT CHANGE UP (ref 68–114)
FOLATE SERPL-MCNC: >20 NG/ML — SIGNIFICANT CHANGE UP
GLUCOSE SERPL-MCNC: 105 MG/DL — HIGH (ref 70–99)
HCT VFR BLD CALC: 44.9 % — SIGNIFICANT CHANGE UP (ref 39–50)
HCT VFR BLD CALC: 47.5 % — SIGNIFICANT CHANGE UP (ref 39–50)
HCT VFR BLD CALC: 47.6 % — SIGNIFICANT CHANGE UP (ref 39–50)
HDLC SERPL-MCNC: 61 MG/DL — SIGNIFICANT CHANGE UP
HGB BLD-MCNC: 14.1 G/DL — SIGNIFICANT CHANGE UP (ref 13–17)
HGB BLD-MCNC: 14.7 G/DL — SIGNIFICANT CHANGE UP (ref 13–17)
HGB BLD-MCNC: 15.1 G/DL — SIGNIFICANT CHANGE UP (ref 13–17)
LIPID PNL WITH DIRECT LDL SERPL: 157 MG/DL — HIGH
MAGNESIUM SERPL-MCNC: 2 MG/DL — SIGNIFICANT CHANGE UP (ref 1.6–2.6)
MCHC RBC-ENTMCNC: 27.8 PG — SIGNIFICANT CHANGE UP (ref 27–34)
MCHC RBC-ENTMCNC: 28.1 PG — SIGNIFICANT CHANGE UP (ref 27–34)
MCHC RBC-ENTMCNC: 28.4 PG — SIGNIFICANT CHANGE UP (ref 27–34)
MCHC RBC-ENTMCNC: 30.9 GM/DL — LOW (ref 32–36)
MCHC RBC-ENTMCNC: 31.4 GM/DL — LOW (ref 32–36)
MCHC RBC-ENTMCNC: 31.7 GM/DL — LOW (ref 32–36)
MCV RBC AUTO: 89.6 FL — SIGNIFICANT CHANGE UP (ref 80–100)
MCV RBC AUTO: 89.6 FL — SIGNIFICANT CHANGE UP (ref 80–100)
MCV RBC AUTO: 90 FL — SIGNIFICANT CHANGE UP (ref 80–100)
METHADONE UR-MCNC: NEGATIVE — SIGNIFICANT CHANGE UP
NRBC # BLD: 0 /100 WBCS — SIGNIFICANT CHANGE UP (ref 0–0)
OPIATES UR-MCNC: NEGATIVE — SIGNIFICANT CHANGE UP
OXYCODONE UR-MCNC: NEGATIVE — SIGNIFICANT CHANGE UP
PCP SPEC-MCNC: SIGNIFICANT CHANGE UP
PCP UR-MCNC: NEGATIVE — SIGNIFICANT CHANGE UP
PHOSPHATE SERPL-MCNC: 3.1 MG/DL — SIGNIFICANT CHANGE UP (ref 2.5–4.5)
PLATELET # BLD AUTO: 222 K/UL — SIGNIFICANT CHANGE UP (ref 150–400)
PLATELET # BLD AUTO: 233 K/UL — SIGNIFICANT CHANGE UP (ref 150–400)
PLATELET # BLD AUTO: 241 K/UL — SIGNIFICANT CHANGE UP (ref 150–400)
POTASSIUM SERPL-MCNC: 4 MMOL/L — SIGNIFICANT CHANGE UP (ref 3.5–5.3)
POTASSIUM SERPL-SCNC: 4 MMOL/L — SIGNIFICANT CHANGE UP (ref 3.5–5.3)
PROT SERPL-MCNC: 6.8 G/DL — SIGNIFICANT CHANGE UP (ref 6–8.3)
RBC # BLD: 5.01 M/UL — SIGNIFICANT CHANGE UP (ref 4.2–5.8)
RBC # BLD: 5.28 M/UL — SIGNIFICANT CHANGE UP (ref 4.2–5.8)
RBC # BLD: 5.31 M/UL — SIGNIFICANT CHANGE UP (ref 4.2–5.8)
RBC # FLD: 14.6 % — HIGH (ref 10.3–14.5)
RBC # FLD: 14.6 % — HIGH (ref 10.3–14.5)
RBC # FLD: 14.7 % — HIGH (ref 10.3–14.5)
SARS-COV-2 IGG SERPL QL IA: NEGATIVE — SIGNIFICANT CHANGE UP
SARS-COV-2 IGM SERPL IA-ACNC: 0.07 INDEX — SIGNIFICANT CHANGE UP
SODIUM SERPL-SCNC: 143 MMOL/L — SIGNIFICANT CHANGE UP (ref 135–145)
THC UR QL: NEGATIVE — SIGNIFICANT CHANGE UP
TOTAL CHOLESTEROL/HDL RATIO MEASUREMENT: 4.2 RATIO — SIGNIFICANT CHANGE UP (ref 3.4–9.6)
TRIGL SERPL-MCNC: 173 MG/DL — HIGH (ref 10–149)
TROPONIN T, HIGH SENSITIVITY RESULT: 421 NG/L — HIGH (ref 0–51)
TROPONIN T, HIGH SENSITIVITY RESULT: 845 NG/L — HIGH (ref 0–51)
TROPONIN T, HIGH SENSITIVITY RESULT: 942 NG/L — HIGH (ref 0–51)
TSH SERPL-MCNC: 0.72 UIU/ML — SIGNIFICANT CHANGE UP (ref 0.27–4.2)
VIT B12 SERPL-MCNC: 411 PG/ML — SIGNIFICANT CHANGE UP (ref 232–1245)
WBC # BLD: 10.03 K/UL — SIGNIFICANT CHANGE UP (ref 3.8–10.5)
WBC # BLD: 11.58 K/UL — HIGH (ref 3.8–10.5)
WBC # BLD: 13.38 K/UL — HIGH (ref 3.8–10.5)
WBC # FLD AUTO: 10.03 K/UL — SIGNIFICANT CHANGE UP (ref 3.8–10.5)
WBC # FLD AUTO: 11.58 K/UL — HIGH (ref 3.8–10.5)
WBC # FLD AUTO: 13.38 K/UL — HIGH (ref 3.8–10.5)

## 2020-07-29 PROCEDURE — 99152 MOD SED SAME PHYS/QHP 5/>YRS: CPT

## 2020-07-29 PROCEDURE — 93454 CORONARY ARTERY ANGIO S&I: CPT | Mod: 26,59

## 2020-07-29 PROCEDURE — 99255 IP/OBS CONSLTJ NEW/EST HI 80: CPT | Mod: GC,25

## 2020-07-29 PROCEDURE — 92978 ENDOLUMINL IVUS OCT C 1ST: CPT | Mod: 26,RC

## 2020-07-29 PROCEDURE — 92941 PRQ TRLML REVSC TOT OCCL AMI: CPT | Mod: RC

## 2020-07-29 PROCEDURE — 93010 ELECTROCARDIOGRAM REPORT: CPT

## 2020-07-29 PROCEDURE — 93010 ELECTROCARDIOGRAM REPORT: CPT | Mod: 59

## 2020-07-29 RX ORDER — HEPARIN SODIUM 5000 [USP'U]/ML
1100 INJECTION INTRAVENOUS; SUBCUTANEOUS
Qty: 25000 | Refills: 0 | Status: DISCONTINUED | OUTPATIENT
Start: 2020-07-29 | End: 2020-07-29

## 2020-07-29 RX ORDER — HEPARIN SODIUM 5000 [USP'U]/ML
4000 INJECTION INTRAVENOUS; SUBCUTANEOUS EVERY 6 HOURS
Refills: 0 | Status: DISCONTINUED | OUTPATIENT
Start: 2020-07-29 | End: 2020-07-29

## 2020-07-29 RX ORDER — ASPIRIN/CALCIUM CARB/MAGNESIUM 324 MG
81 TABLET ORAL DAILY
Refills: 0 | Status: DISCONTINUED | OUTPATIENT
Start: 2020-07-30 | End: 2020-07-30

## 2020-07-29 RX ORDER — TICAGRELOR 90 MG/1
90 TABLET ORAL EVERY 12 HOURS
Refills: 0 | Status: DISCONTINUED | OUTPATIENT
Start: 2020-07-29 | End: 2020-07-30

## 2020-07-29 RX ORDER — DIAZEPAM 5 MG
5 TABLET ORAL THREE TIMES A DAY
Refills: 0 | Status: DISCONTINUED | OUTPATIENT
Start: 2020-07-29 | End: 2020-07-30

## 2020-07-29 RX ADMIN — Medication 5 MILLIGRAM(S): at 00:29

## 2020-07-29 RX ADMIN — Medication 325 MILLIGRAM(S): at 12:10

## 2020-07-29 RX ADMIN — HEPARIN SODIUM 6000 UNIT(S): 5000 INJECTION INTRAVENOUS; SUBCUTANEOUS at 03:24

## 2020-07-29 RX ADMIN — HEPARIN SODIUM 1100 UNIT(S)/HR: 5000 INJECTION INTRAVENOUS; SUBCUTANEOUS at 10:45

## 2020-07-29 RX ADMIN — TICAGRELOR 90 MILLIGRAM(S): 90 TABLET ORAL at 18:10

## 2020-07-29 RX ADMIN — HEPARIN SODIUM 1300 UNIT(S)/HR: 5000 INJECTION INTRAVENOUS; SUBCUTANEOUS at 03:24

## 2020-07-29 NOTE — PROGRESS NOTE ADULT - SUBJECTIVE AND OBJECTIVE BOX
Patient is a 55y old  Male who presents with a chief complaint of cp (28 Jul 2020 23:38)      SUBJECTIVE / OVERNIGHT EVENTS:  no cp overnight    MEDICATIONS  (STANDING):  aspirin 325 milliGRAM(s) Oral daily  heparin  Infusion. 1100 Unit(s)/Hr (11 mL/Hr) IV Continuous <Continuous>  nicotine - 21 mG/24Hr(s) Patch 1 patch Transdermal daily    MEDICATIONS  (PRN):  diazepam    Tablet 5 milliGRAM(s) Oral three times a day PRN agitation  heparin   Injectable 4000 Unit(s) IV Push every 6 hours PRN For aPTT less than 40      Vital Signs Last 24 Hrs  T(C): 36.6 (29 Jul 2020 04:34), Max: 36.6 (28 Jul 2020 18:41)  T(F): 97.9 (29 Jul 2020 04:34), Max: 97.9 (29 Jul 2020 04:34)  HR: 71 (29 Jul 2020 04:34) (62 - 72)  BP: 130/88 (29 Jul 2020 04:34) (97/62 - 135/102)  BP(mean): 88 (28 Jul 2020 21:57) (74 - 102)  RR: 18 (29 Jul 2020 04:34) (14 - 22)  SpO2: 98% (29 Jul 2020 04:34) (96% - 100%)  CAPILLARY BLOOD GLUCOSE        I&O's Summary    28 Jul 2020 07:01  -  29 Jul 2020 07:00  --------------------------------------------------------  IN: 112 mL / OUT: 300 mL / NET: -188 mL    29 Jul 2020 07:01  -  29 Jul 2020 11:46  --------------------------------------------------------  IN: 240 mL / OUT: 600 mL / NET: -360 mL        PHYSICAL EXAM:  GENERAL: NAD, well-developed, obese  HEAD:  Atraumatic, Normocephalic  EYES: EOMI, PERRLA, conjunctiva and sclera clear  NECK: Supple, No JVD  CHEST/LUNG: Clear to auscultation bilaterally; No wheeze  HEART: Regular rate and rhythm; No murmurs, rubs, or gallops  ABDOMEN: Soft, Nontender, Nondistended; Bowel sounds present  EXTREMITIES:  2+ Peripheral Pulses, No clubbing, cyanosis, or edema  PSYCH: AAOx3  NEUROLOGY: non-focal  SKIN: No rashes or lesions    LABS:                        14.7   11.58 )-----------( 241      ( 29 Jul 2020 07:11 )             47.5     07-29    143  |  103  |  12  ----------------------------<  105<H>  4.0   |  23  |  1.03    Ca    9.4      29 Jul 2020 07:21  Phos  3.1     07-29  Mg     2.0     07-29    TPro  6.8  /  Alb  4.1  /  TBili  0.8  /  DBili  0.1  /  AST  67<H>  /  ALT  20  /  AlkPhos  64  07-29    PT/INR - ( 28 Jul 2020 20:11 )   PT: 11.4 sec;   INR: 0.96 ratio         PTT - ( 29 Jul 2020 09:44 )  PTT:47.0 sec  CARDIAC MARKERS ( 29 Jul 2020 07:21 )  x     / x     / 693 U/L / x     / 70.4 ng/mL  CARDIAC MARKERS ( 28 Jul 2020 20:36 )  x     / x     / x     / x     / 45.3 ng/mL          RADIOLOGY & ADDITIONAL TESTS:    Imaging Personally Reviewed:    Consultant(s) Notes Reviewed:      Care Discussed with Consultants/Other Providers:

## 2020-07-29 NOTE — PROGRESS NOTE ADULT - ASSESSMENT
4yo M Hx HLD, diverticulosis presenting with complaints of chest pressure. Pt reports that last night he started felling midsternal chest pressure with intermittent sob and diaphoresis. pain continued this AM took a baby asa without relief. never had similar pain in the past. states that he has been under a lot of stress recently, he lost his job at the start of the pandemic, gained weight and is going through a divorce. also endorse cocaine use, last used 2 days ago. has not been to a doctor in years, states never had a stress test or echo.    STEMI  - Brilinta was  loaded and asa and iv heparin was started by ER  - cardiology saw pt  - telemetry monitor  - c/w asa and brilinta  - trend troponin  - cardiac cath soon  - official echo    HLD  - start lipitor    tob use d/o  - tob cessation counselling given  - refusing nicotine patch    cocaine abuse  - social work consult    - anxiety  - valium prn  - psych eval

## 2020-07-30 ENCOUNTER — TRANSCRIPTION ENCOUNTER (OUTPATIENT)
Age: 55
End: 2020-07-30

## 2020-07-30 VITALS
DIASTOLIC BLOOD PRESSURE: 76 MMHG | SYSTOLIC BLOOD PRESSURE: 121 MMHG | HEART RATE: 83 BPM | TEMPERATURE: 98 F | RESPIRATION RATE: 19 BRPM | OXYGEN SATURATION: 97 %

## 2020-07-30 DIAGNOSIS — F33.1 MAJOR DEPRESSIVE DISORDER, RECURRENT, MODERATE: ICD-10-CM

## 2020-07-30 DIAGNOSIS — F14.10 COCAINE ABUSE, UNCOMPLICATED: ICD-10-CM

## 2020-07-30 DIAGNOSIS — F43.23 ADJUSTMENT DISORDER WITH MIXED ANXIETY AND DEPRESSED MOOD: ICD-10-CM

## 2020-07-30 LAB
ANION GAP SERPL CALC-SCNC: 13 MMOL/L — SIGNIFICANT CHANGE UP (ref 5–17)
BUN SERPL-MCNC: 15 MG/DL — SIGNIFICANT CHANGE UP (ref 7–23)
CALCIUM SERPL-MCNC: 8.9 MG/DL — SIGNIFICANT CHANGE UP (ref 8.4–10.5)
CHLORIDE SERPL-SCNC: 104 MMOL/L — SIGNIFICANT CHANGE UP (ref 96–108)
CO2 SERPL-SCNC: 22 MMOL/L — SIGNIFICANT CHANGE UP (ref 22–31)
CREAT SERPL-MCNC: 1.09 MG/DL — SIGNIFICANT CHANGE UP (ref 0.5–1.3)
GLUCOSE SERPL-MCNC: 99 MG/DL — SIGNIFICANT CHANGE UP (ref 70–99)
HCT VFR BLD CALC: 45.2 % — SIGNIFICANT CHANGE UP (ref 39–50)
HGB BLD-MCNC: 14.3 G/DL — SIGNIFICANT CHANGE UP (ref 13–17)
MCHC RBC-ENTMCNC: 28.4 PG — SIGNIFICANT CHANGE UP (ref 27–34)
MCHC RBC-ENTMCNC: 31.6 GM/DL — LOW (ref 32–36)
MCV RBC AUTO: 89.7 FL — SIGNIFICANT CHANGE UP (ref 80–100)
NRBC # BLD: 0 /100 WBCS — SIGNIFICANT CHANGE UP (ref 0–0)
PLATELET # BLD AUTO: 231 K/UL — SIGNIFICANT CHANGE UP (ref 150–400)
POTASSIUM SERPL-MCNC: 3.8 MMOL/L — SIGNIFICANT CHANGE UP (ref 3.5–5.3)
POTASSIUM SERPL-SCNC: 3.8 MMOL/L — SIGNIFICANT CHANGE UP (ref 3.5–5.3)
RBC # BLD: 5.04 M/UL — SIGNIFICANT CHANGE UP (ref 4.2–5.8)
RBC # FLD: 14.6 % — HIGH (ref 10.3–14.5)
SODIUM SERPL-SCNC: 139 MMOL/L — SIGNIFICANT CHANGE UP (ref 135–145)
WBC # BLD: 9.62 K/UL — SIGNIFICANT CHANGE UP (ref 3.8–10.5)
WBC # FLD AUTO: 9.62 K/UL — SIGNIFICANT CHANGE UP (ref 3.8–10.5)

## 2020-07-30 PROCEDURE — 99152 MOD SED SAME PHYS/QHP 5/>YRS: CPT

## 2020-07-30 PROCEDURE — 84100 ASSAY OF PHOSPHORUS: CPT

## 2020-07-30 PROCEDURE — 93308 TTE F-UP OR LMTD: CPT | Mod: 26

## 2020-07-30 PROCEDURE — 83735 ASSAY OF MAGNESIUM: CPT

## 2020-07-30 PROCEDURE — C1887: CPT

## 2020-07-30 PROCEDURE — C1894: CPT

## 2020-07-30 PROCEDURE — 82553 CREATINE MB FRACTION: CPT

## 2020-07-30 PROCEDURE — 80048 BASIC METABOLIC PNL TOTAL CA: CPT

## 2020-07-30 PROCEDURE — 80076 HEPATIC FUNCTION PANEL: CPT

## 2020-07-30 PROCEDURE — 92978 ENDOLUMINL IVUS OCT C 1ST: CPT | Mod: RC

## 2020-07-30 PROCEDURE — 80061 LIPID PANEL: CPT

## 2020-07-30 PROCEDURE — 74174 CTA ABD&PLVS W/CONTRAST: CPT

## 2020-07-30 PROCEDURE — 85379 FIBRIN DEGRADATION QUANT: CPT

## 2020-07-30 PROCEDURE — 84484 ASSAY OF TROPONIN QUANT: CPT

## 2020-07-30 PROCEDURE — C1769: CPT

## 2020-07-30 PROCEDURE — C1725: CPT

## 2020-07-30 PROCEDURE — 84443 ASSAY THYROID STIM HORMONE: CPT

## 2020-07-30 PROCEDURE — 96374 THER/PROPH/DIAG INJ IV PUSH: CPT | Mod: XU

## 2020-07-30 PROCEDURE — 82746 ASSAY OF FOLIC ACID SERUM: CPT

## 2020-07-30 PROCEDURE — 85730 THROMBOPLASTIN TIME PARTIAL: CPT

## 2020-07-30 PROCEDURE — 82550 ASSAY OF CK (CPK): CPT

## 2020-07-30 PROCEDURE — 93005 ELECTROCARDIOGRAM TRACING: CPT

## 2020-07-30 PROCEDURE — C1874: CPT

## 2020-07-30 PROCEDURE — 80307 DRUG TEST PRSMV CHEM ANLYZR: CPT

## 2020-07-30 PROCEDURE — 82607 VITAMIN B-12: CPT

## 2020-07-30 PROCEDURE — 85610 PROTHROMBIN TIME: CPT

## 2020-07-30 PROCEDURE — 93321 DOPPLER ECHO F-UP/LMTD STD: CPT

## 2020-07-30 PROCEDURE — 93321 DOPPLER ECHO F-UP/LMTD STD: CPT | Mod: 26

## 2020-07-30 PROCEDURE — 83036 HEMOGLOBIN GLYCOSYLATED A1C: CPT

## 2020-07-30 PROCEDURE — C8924: CPT

## 2020-07-30 PROCEDURE — 99153 MOD SED SAME PHYS/QHP EA: CPT

## 2020-07-30 PROCEDURE — 85027 COMPLETE CBC AUTOMATED: CPT

## 2020-07-30 PROCEDURE — 86769 SARS-COV-2 COVID-19 ANTIBODY: CPT

## 2020-07-30 PROCEDURE — 99285 EMERGENCY DEPT VISIT HI MDM: CPT | Mod: 25

## 2020-07-30 PROCEDURE — 93010 ELECTROCARDIOGRAM REPORT: CPT

## 2020-07-30 PROCEDURE — 71275 CT ANGIOGRAPHY CHEST: CPT

## 2020-07-30 PROCEDURE — C1753: CPT

## 2020-07-30 PROCEDURE — C9606: CPT | Mod: RC

## 2020-07-30 PROCEDURE — 71046 X-RAY EXAM CHEST 2 VIEWS: CPT

## 2020-07-30 PROCEDURE — 93454 CORONARY ARTERY ANGIO S&I: CPT | Mod: 59

## 2020-07-30 PROCEDURE — 99233 SBSQ HOSP IP/OBS HIGH 50: CPT | Mod: GC

## 2020-07-30 PROCEDURE — 80053 COMPREHEN METABOLIC PANEL: CPT

## 2020-07-30 PROCEDURE — 90792 PSYCH DIAG EVAL W/MED SRVCS: CPT

## 2020-07-30 RX ORDER — MIRTAZAPINE 45 MG/1
1 TABLET, ORALLY DISINTEGRATING ORAL
Qty: 30 | Refills: 0
Start: 2020-07-30 | End: 2020-08-28

## 2020-07-30 RX ORDER — TICAGRELOR 90 MG/1
1 TABLET ORAL
Qty: 60 | Refills: 0
Start: 2020-07-30 | End: 2020-08-28

## 2020-07-30 RX ORDER — ATORVASTATIN CALCIUM 80 MG/1
1 TABLET, FILM COATED ORAL
Qty: 30 | Refills: 0
Start: 2020-07-30 | End: 2020-08-28

## 2020-07-30 RX ORDER — MIRTAZAPINE 45 MG/1
15 TABLET, ORALLY DISINTEGRATING ORAL AT BEDTIME
Refills: 0 | Status: DISCONTINUED | OUTPATIENT
Start: 2020-07-30 | End: 2020-07-30

## 2020-07-30 RX ORDER — ATORVASTATIN CALCIUM 80 MG/1
40 TABLET, FILM COATED ORAL AT BEDTIME
Refills: 0 | Status: DISCONTINUED | OUTPATIENT
Start: 2020-07-30 | End: 2020-07-30

## 2020-07-30 RX ORDER — NICOTINE POLACRILEX 2 MG
2 GUM BUCCAL
Refills: 0 | Status: DISCONTINUED | OUTPATIENT
Start: 2020-07-30 | End: 2020-07-30

## 2020-07-30 RX ORDER — NICOTINE POLACRILEX 2 MG
1 GUM BUCCAL
Qty: 14 | Refills: 0
Start: 2020-07-30 | End: 2020-08-12

## 2020-07-30 RX ORDER — ASPIRIN/CALCIUM CARB/MAGNESIUM 324 MG
1 TABLET ORAL
Qty: 30 | Refills: 0
Start: 2020-07-30 | End: 2020-08-28

## 2020-07-30 RX ORDER — NICOTINE POLACRILEX 2 MG
1 GUM BUCCAL
Qty: 42 | Refills: 0
Start: 2020-07-30 | End: 2020-08-05

## 2020-07-30 RX ORDER — DIAZEPAM 5 MG
1 TABLET ORAL
Qty: 10 | Refills: 0
Start: 2020-07-30 | End: 2020-08-03

## 2020-07-30 RX ADMIN — Medication 81 MILLIGRAM(S): at 13:29

## 2020-07-30 RX ADMIN — Medication 2 MILLIGRAM(S): at 13:29

## 2020-07-30 RX ADMIN — TICAGRELOR 90 MILLIGRAM(S): 90 TABLET ORAL at 05:30

## 2020-07-30 NOTE — BEHAVIORAL HEALTH ASSESSMENT NOTE - NSBHCONSULTOBS_PSY_A_CORE
Patient needs an appointment with Dr Soriano to discuss radiation treatments after she gets started on January 30, 2017.  Please call patient today to schedule appointment.  
appt 2/2/17 at 440 patient will see how radiation goes and if schedules conflict she will call back to reschedule  
Routine observation

## 2020-07-30 NOTE — CONSULT NOTE ADULT - ASSESSMENT
54 YO M with PMH of significant cocaine use (decades per pt) and morbid obesity p/w 2 days hx of midsternal cp with EKG changes and elevated hsTrop concerning for NSTE-ACS     #NSTE-ACS- MARIBETH 127/FABIAN 4   -no dissection on CTA   -brilinta 180 x1 followed by 90 mg bid  -asa 325 mg x 1 following 81 mg qd   -cont heparin gtt   -avoid nitro for cp given possible inferior wall involvement   -IV cardizem prn for cp   -check lipid panel, A1c, thyroid panel   -please check EKG/hsTrop q3 hours for STEMI criteria      #Cocaine use   -No beta blockers. if BP issues or cp, consider IV cardizem   -for agitation, prefer IV benzo ie IV diazepam 5 mg prn for agitation     Discussed with IC attending (Dr. Breann Alvarez) on call   Aleta Chang MD  PGY4 Cardiology Fellow  [phone]  All Cardiology service information can be found 24/7 on [email or web address], password: [email or web address]
NSTEMI  CAD s/p PCI  Morbid obesity with historyof sleep apnea: currently lost to follow up and not using CPAP  Active smoker: r/o underlying COPD  Mild emphysema on CT chest    REC    Outpatient pulmonary evaluation including PFT's and formal sleep study  No objection to DC from pulm POV

## 2020-07-30 NOTE — DISCHARGE NOTE PROVIDER - CARE PROVIDER_API CALL
[facility], [facility]  [address] [place] [address]  Phone: [phone]  Fax: (   )    -  Follow Up Time:     Division of Internal Medicine, [address] [address]  Phone: [phone]  Fax: (   )    -  Follow Up Time: [facility], [facility]  [address] [place] [address]  Phone: [phone]  Fax: (   )    -  Follow Up Time:     Division of Internal Medicine, [address] [address]  Phone: [phone]  Fax: (   )    -  Follow Up Time:

## 2020-07-30 NOTE — PROGRESS NOTE ADULT - ATTENDING COMMENTS
53 year old man with morbid obesity, chronic cocaine use, most recent 2 days prior to admission and presenting with persistent substernal chest pain. EKG with inferior lead ST elevation, rising troponin level, all indicative of STEMI involving RCA. Instituted ACS protocl and coronary angiography confirmed RCA culprit lesion and stents placed. Patient now asymptomatic and have stress need for uninterrupted DAPT, abstinence fro cigarettes and any substance abuse. Psychiatry arranging tobacco withdrawal management and ongoing care.

## 2020-07-30 NOTE — PROGRESS NOTE ADULT - ASSESSMENT
53M with morbid obesity, chronic cocaine use, most recent 2 days ago and presenting with IWSTEMI  Echo with relatively preserved LV function.   Continue aspirin, Brilinta (Please make sure patient have coverage for Brilinta)  Start atorvastatin 40 mg daily  May start metoprolol 25 XL daily if patient is agreeable. Please advise patient this medication cannot be taken with active cocaine use.     Stable for discharge home. Recommend outpatient followup within 1-2 weeks of discharge.     Deniz Florian MD  Cardiology Fellow   [phone]    Please check [email or web address] password: [email or web address] for cardiology service schedule and contact information.

## 2020-07-30 NOTE — BEHAVIORAL HEALTH ASSESSMENT NOTE - HPI (INCLUDE ILLNESS QUALITY, SEVERITY, DURATION, TIMING, CONTEXT, MODIFYING FACTORS, ASSOCIATED SIGNS AND SYMPTOMS)
Pt is a 54yo M Hx HLD, diverticulosis presented to the ED yesterday with substernal chest pain, s/p stent placement yesterday for inferior wall STEMI, domiciled with wife and two children, currently in divorce negotiations, currently unemployed due to COVID, with hx of cocaine use two days ago, consult called for anxiety.  The patient reports no changes in appetite, sleep, thoughts of guilt, or anhedonia, but reports feelings of sadness and passive SI. The patient reports no plan or intent to hurt himself or others. The patient reports anxiety about his finances due to divorce attorneys and hospital bills from last year. The patient reports no perceptual disturbances, including auditory and visual hallucinations. The patient endorses the use of cocaine and tobacco, despite understanding the risks of use, but denies the use of other substances. Pt is a 54yo M Hx HLD, diverticulosis presented to the ED yesterday with substernal chest pain, s/p stent placement yesterday for inferior wall STEMI, domiciled with wife and two children, currently in divorce negotiations, currently unemployed due to COVID, with hx of cocaine use two days ago, consult called for anxiety.  The patient reports no changes in appetite, sleep, thoughts of guilt, or anhedonia, but reports feelings of sadness and sometimes wishes he were dead but denies any thoughts of harming himself or others and has no past history of any suicidality. The patient reports anxiety about his finances due to divorce attorneys and hospital bills from last year since he recently lost his job because of Covid 19. The patient reports no perceptual disturbances, including auditory and visual hallucinations. The patient endorses the use of cocaine and tobacco, despite understanding the risks of use, but denies the use of other substances. He says he knows that he needs to stop using cocaine and smoking cigarettes.

## 2020-07-30 NOTE — PROGRESS NOTE ADULT - ASSESSMENT
6yo M Hx HLD, diverticulosis presenting with complaints of chest pressure. Pt reports that last night he started felling midsternal chest pressure with intermittent sob and diaphoresis. pain continued this AM took a baby asa without relief. never had similar pain in the past. states that he has been under a lot of stress recently, he lost his job at the start of the pandemic, gained weight and is going through a divorce. also endorse cocaine use, last used 2 days ago. has not been to a doctor in years, states never had a stress test or echo.    STEMI  -s/p cath and PCI ( no report in sunrise )  - c/w asa and brilinta  - cardio following    HLD  - start lipitor    tob use d/o  - tob cessation counselling given  - refusing nicotine patch    cocaine abuse  - social work consult    - anxiety  - valium prn  - psych eval

## 2020-07-30 NOTE — BEHAVIORAL HEALTH ASSESSMENT NOTE - ACTIVATING EVENTS/STRESSORS
Acute medical problem/Triggering events leading to humiliation, shame, and/or despair (e.g. Loss of relationship, financial or health status) (real or anticipated)

## 2020-07-30 NOTE — DISCHARGE NOTE PROVIDER - PROVIDER TOKENS
FREE:[LAST:[[facility]],FIRST:[[facility]],PHONE:[phone]],FAX:[(   )    -],ADDRESS:[[address] [place] [address]FREE:[LAST:[Division of Internal Medicine],FIRST:[[facility]],PHONE:[phone]],FAX:[(   )    -],ADDRESS:[[address] [place] [address]]]

## 2020-07-30 NOTE — BEHAVIORAL HEALTH ASSESSMENT NOTE - NSBHCHARTREVIEWINVESTIGATE_PSY_A_CORE FT
Ventricular Rate 72 BPM  Atrial Rate 72 BPM  P-R Interval 224 ms    QRS Duration 94 ms    Q-T Interval 418 ms    QTC Calculation(Bezet) 457 ms    P Axis 31 degrees    R Axis 39 degrees    T Axis 64 degrees    Diagnosis Line SINUS RHYTHM WITH 1ST DEGREE A-V BLOCK  INFERIOR-POSTERIOR INFARCT (CITED ON OR BEFORE 28-JUL-2020)  ABNORMAL ECG  WHEN COMPARED WITH ECG OF 28-JUL-2020 21:19, (UNCONFIRMED)  NO SIGNIFICANT CHANGE WAS FOUND  Confirmed by MANSOOR NAPIER, MARBIN TITUS ([id number]) on 7/29/2020 8:43:23 AM

## 2020-07-30 NOTE — CHART NOTE - NSCHARTNOTEFT_GEN_A_CORE
Called regarding 5 seconds of bradycardia while patient was sleeping. This is AV-block in setting of previously undiagnosed sleep apnea, rather than ischemia.   Patient should follow pulm recommendations regarding outpatient sleep study and treatment of his DAMION.   There is no indication for pace-maker implantation at this time.
Cardiology and Pulmonary input appreciated.  Spoke with Cardiology Fellow, decision can be made regarding introduction of BB upon follow-up in the Cardiology Clinic.  Patient is being discharged home - d/w Dr. Teixeira.    Karen Martinez NP  [phone]
Upon Nutritional Assessment by the Registered Dietitian your patient was determined to meet criteria / has evidence of the following diagnosis/diagnoses:          [ ]  Mild Protein Calorie Malnutrition        [ ]  Moderate Protein Calorie Malnutrition        [ ] Severe Protein Calorie Malnutrition        [ ] Unspecified Protein Calorie Malnutrition        [ ] Underweight / BMI <19        [ x] Morbid Obesity / BMI > 40      Findings as based on:  [x ] Comprehensive nutrition assessment  (BMI 47.4)  [ ] Nutrition Focused Physical Exam  [ ] Other:       Nutrition Plan/Recommendations:      continue DASH diet, monitor for pt acceptance to diet ed. placed sticker for BMI>40.      PROVIDER Section:     By signing this assessment you are acknowledging and agree with the diagnosis/diagnoses assigned by the Registered Dietitian    Comments:
Called by RN for patient with arrhythmia on telemetry.  Strip reviewed, patient note to have apparent sinus pause x 5 seconds.  Patient immediately seen, reported that he was previously sleeping and jerked awake, and endorsed that he has a history of sleep apnea but stopped using the machine after prior weight loss.  Denies CP, palpitations, SOB, nausea, lightheadedness, diaphoresis.    Vitals:  121/76, 83, 19, 97% on room air, and 98.0  EKG:  SR, HR 80    General:  Awake; alert and oriented x 3  CV:  +S1, S2  Pulm:  CTA B/L  GI:  Soft and NT    A/P:  55 year-old male with morbid obesity and chronic cocaine use, with PMH of HLD, DAMION previously on CPAP and lost to follow up, and diverticulosis, presented with c/o mid-sternal chest pressure associated with intermittent SOB and diaphoresis, [place] now s/p cardiac catheterization with PCI/MAINOR x 2 pRCA on DAPT - awaiting discharge at the time he was noted to have apparent sinus pause on telemetry while asleep without associated symptoms.      - Sinus arrythmia in the setting of DAMION    - EKG done (see above)    - Pulmonary Consult called    - Cardiology fellow informed, will review strip and provide recommendation    - d/w Attending.

## 2020-07-30 NOTE — DISCHARGE NOTE PROVIDER - NSDCCPCAREPLAN_GEN_ALL_CORE_FT
PRINCIPAL DISCHARGE DIAGNOSIS  Diagnosis: ST elevation MI (STEMI)  Assessment and Plan of Treatment: You had a heart attack.  Call your doctor if you have any new pain, pressure, or discomfort in the center of your chest, pain, tingling or discomfort in arms, back, neck, jaw, or stomach, shortness of breath, nausea, vomiting, burping or heartburn, sweating, cold and clammy skin, racing or abnormal heartbeat for more than 10 minutes or if they keep coming & going. You can help yourself with lifestyle changes (quitting smoking if you smoke), eat fruits, vegetables, low fat dairy products, not a lot of meat and fatty foods, walk or some form of physical activity most days of the week, lose weight if you are overweight.  Take your cardiac medication as prescribed to lower cholesterol, to lower blood pressure, and control your blood sugar.  ***********  Continue your medications.  Do not stop Aspirin or Plavix unless instructed by your cardiologist.  No heavy lifting, strenuous activity, bending, straining or unnecessary stair climbing for 2 weeks. No sex for 1 week.  No driving for 2 days. You may shower 24 hours following procedure but avoid baths and swimming for 1 week. Check groin site for bleeding and/or swelling daily following procedure. Call your doctor/cardiologist immediately for bleeding or swelling or if you have increased/persistent pain, fever/chills, or drainage at the site. Follow up with your cardiologist in 1- 2 weeks. You may call [facility] at [phone] or [phone] after office hours and weekends with any questions or concerns following your procedure.        SECONDARY DISCHARGE DIAGNOSES  Diagnosis: Smoker  Assessment and Plan of Treatment: Smoking cessation has been strongly encouraged.  Utilize smoking cessation medication as ordered.    [facility] – [phone].  The [facility] Smokers' Quitline provides free, confidential services that include information, tools, quit coaching and support in English and Frisian. Provides FREE starter kits of nicotine replacement therapy (NRT) patches to eligible [place]. The Quitline also works with employers, health plans and health care providers to ensure [place] tobacco users have access to effective cessation treatments. To learn more:  Call 1-866-[place]-QUITS (1-699.546.6536) toll free, or  Visit www.STRATUSCORE.      Diagnosis: Adjustment disorder with mixed anxiety and depressed mood  Assessment and Plan of Treatment: Take all medictions as prescribed.  Follow up in the [facility] within 3-5 days.    Diagnosis: Moderate episode of recurrent major depressive disorder  Assessment and Plan of Treatment: Take all medictions as prescribed.  Follow up in the [facility] within 3-5 days.    Diagnosis: Cocaine abuse  Assessment and Plan of Treatment: Treatment for substance abuse treatment often includes medication and therapy.  Diagnosing and treating substance abuse as soon as possible may relieve or prevent long-term consequence and even death.  With treatment and care, your substance abuse may be controlled, and your quality of life improved.  Keep all follow up appointments. Write down any questions you may have. This way you will remember to ask these questions during your next visit.

## 2020-07-30 NOTE — BEHAVIORAL HEALTH ASSESSMENT NOTE - DETAILS
passive SI recently herniation around abdominal surgical site passive SI recently but no thoughts of self harm

## 2020-07-30 NOTE — BEHAVIORAL HEALTH ASSESSMENT NOTE - COMMENTS ON SUICIDE RISK/PROTECTIVE FACTORS:
patient loves his daughters and has a supportive brother patient loves his twin 18yr old daughters and has a supportive brother

## 2020-07-30 NOTE — BEHAVIORAL HEALTH ASSESSMENT NOTE - AXIS III
HLD, diverticulosis presented to the ED yesterday with substernal chest pain, s/p stent placement yesterday for inferior wall STEMI

## 2020-07-30 NOTE — PROVIDER CONTACT NOTE (OTHER) - ASSESSMENT
Pt is A+ox4, sleeping at the time of ectopy, called by tele tech. PT arousable, VSS. Sinus rhythm. denies chest pain, discomfort or SOB

## 2020-07-30 NOTE — BEHAVIORAL HEALTH ASSESSMENT NOTE - DESCRIPTION (FIRST USE, LAST USE, QUANTITY, FREQUENCY, DURATION)
less than one PPD, patient reports he does not have cravings, smokes because it gives him something to do 1-2 drinks per month last use two days ago

## 2020-07-30 NOTE — BEHAVIORAL HEALTH ASSESSMENT NOTE - SUMMARY
In summary, pt is a 56yo M Hx HLD, diverticulosis presented to the ED yesterday with substernal chest pain, s/p stent placement yesterday for inferior wall STEMI, domiciled with wife and two children, currently in divorce negotiations, currently unemployed due to COVID, with hx of cocaine use two days ago, consult called for anxiety.  The patient reports no changes in appetite, sleep, thoughts of guilt, or anhedonia, but reports feelings of sadness and passive SI. The patient reports no plan or intent to hurt himself or others. The patient reports anxiety about his finances due to divorce attorneys and hospital bills from last year. The patient reports no perceptual disturbances, including auditory and visual hallucinations. The patient endorses the use of cocaine and tobacco, despite understanding the risks of use, but denies the use of other substances. The patient was counseled on the dangers of cocaine and cigarette smoking and was provided with numbers for walk-in clinic at [facility] to help with his substance use and the emotionally difficult circumstances at home. We recommend 15mg of remeron at night to help with sleep and depressive symptoms, as well as ___mg of valium ____ per day. In summary, pt is a 54yo M Hx HLD, diverticulosis presented to the ED yesterday with substernal chest pain, s/p stent placement yesterday for inferior wall STEMI, domiciled with wife and two children, currently in divorce negotiations, currently unemployed due to COVID, with hx of cocaine use two days ago, consult called for anxiety.  The patient reports no changes in appetite, sleep, thoughts of guilt, or anhedonia, but reports feelings of sadness and passive SI. The patient reports no plan or intent to hurt himself or others. The patient reports anxiety about his finances due to divorce attorneys and hospital bills from last year. The patient reports no perceptual disturbances, including auditory and visual hallucinations. The patient endorses the use of cocaine and tobacco, despite understanding the risks of use, but denies the use of other substances. The patient was counseled on the dangers of cocaine and cigarette smoking and was provided with numbers for walk-in clinic at [facility] to help with his substance use and the emotionally difficult circumstances at home. We recommend:  Remeron 15mg at night to help with sleep and depressive symptoms, and  Valium 5mg TID PRN

## 2020-07-30 NOTE — BEHAVIORAL HEALTH ASSESSMENT NOTE - RISK ASSESSMENT
Low Acute Suicide Risk Patient has a history of using substances and has emotionally difficult and potentially isolating circumstances at home. Patient reports past passive SI without plan or intent, but additional risk factors include his current health status and medical necessitation of cocaine and tobacco cessation. Patient has a history of using substances and has emotionally difficult and potentially isolating circumstances at home. Patient reports wishing he were dead intermittently without plan or intent, but additional risk factors include his current health status and medical necessitation of cocaine and tobacco cessation.

## 2020-07-30 NOTE — DISCHARGE NOTE PROVIDER - HOSPITAL COURSE
55 year-old male with morbid obesity and chronic cocaine use, with PMH of HLD, DAMION previously on CPAP, and diverticulosis, presented with c/o mid-sternal chest pressure associated with intermittent SOB and diaphoresis.  Reports increasing stress due to job loss resulting from the pandemic, weight gain, and the process of going through a divorce. Additionally, he endorses cocaine use - last occurred 2 days PTA.  He does not follow up with doctors.  Patient was found to have IWSTEMI - he was loaded with ASA and Brilanta and started on a heparin gtt for ACS protocol - subsequently underwent coronary angiography which confirmed RCA culprit lesion with PCI/D - Cardiologist has stress the need for uninterrupted DAPT, abstinence from cigarettes and any substance abuse.  Psychiatry arranging tobacco withdrawal management and ongoing care………….  Seen by Social Work for Cocaine abuse and declined resources.  ? smoker???        He refused nicotine patch.  Patient was cleared for discharge by Cardiology, will follow up with Medicine and             May start metoprolol 25 XL daily if patient is agreeable. Please advise patient this medication cannot be taken with active cocaine use.         Stable for discharge home. Recommend outpatient followup within 1-2 weeks of discharge. 55 year-old male with morbid obesity and chronic cocaine use, with PMH of HLD, DAMION previously on CPAP and lost to follow up, and diverticulosis, presented with c/o mid-sternal chest pressure associated with intermittent SOB and diaphoresis.  Reports increasing stress due to job loss resulting from the pandemic, weight gain, and the process of going through a divorce. Additionally, he endorses cocaine use - last occurred 2 days PTA.  He does not follow up with doctors.  Patient was found to have IWSTEMI - he was loaded with ASA and Brilanta and started on a heparin gtt for ACS protocol - subsequently underwent coronary angiography which confirmed RCA culprit lesion with PCI/D - Cardiologist has stress the need for uninterrupted DAPT, abstinence from cigarettes and any substance abuse.  Psychiatry arranging tobacco withdrawal management and ongoing care.  Seen by Social Work for Cocaine abuse and declined resources.  Patient accepted nicotine patch/lozenges.  Patient was noted to have brief episode of ventricular standstill during sleep - per Cardiology this represents AV-block in setting of sleep apnea, rather than ischemia.  He was evaluated by Pulmonary who recommended outpatient pulmonary evaluation including PFT's and formal sleep study.  No indication for pace-maker implantation per Cardiology.  He will follow up in the Cardiology clinic to evaluate whether BB therapy should be introduced.  Yadira will follow up with IM, Cardiology, Pulmonary, and Behavioral health within 1 weel. 55 year-old male with morbid obesity and chronic cocaine use, with PMH of HLD, DAMION previously on CPAP and lost to follow up, and diverticulosis, presented with c/o mid-sternal chest pressure associated with intermittent SOB and diaphoresis.  Reports increasing stress due to job loss resulting from the pandemic, weight gain, and the process of going through a divorce. Additionally, he endorses cocaine use - last occurred 2 days PTA.  He does not follow up with doctors.  Patient was found to have IWSTEMI - he was loaded with ASA and Brilanta and started on a heparin gtt for ACS protocol - subsequently underwent coronary angiography which confirmed RCA culprit lesion with PCI/D - Cardiologist has stress the need for uninterrupted DAPT, abstinence from cigarettes and any substance abuse.  Psychiatry arranging tobacco withdrawal management and ongoing care.  Seen by Social Work for Cocaine abuse and declined resources.  Patient accepted nicotine patch/lozenges.  Patient was noted to have brief episode of ventricular standstill during sleep - per Cardiology this represents AV-block in setting of sleep apnea, rather than ischemia.  He was evaluated by Pulmonary who recommended outpatient pulmonary evaluation including PFT's and formal sleep study.  No indication for pace-maker implantation per Cardiology.  He will follow up in the Cardiology clinic to evaluate whether BB therapy should be introduced.  Paatient will follow up with IM, Cardiology, Pulmonary, and Behavioral health within 1 week. 55 year-old male with morbid obesity and chronic cocaine use, with PMH of HLD, DAMION previously on CPAP and lost to follow up, and diverticulosis, presented with c/o mid-sternal chest pressure associated with intermittent SOB and diaphoresis.  Reports increasing stress due to job loss resulting from the pandemic, weight gain, and the process of going through a divorce. Additionally, he endorses cocaine use - last occurred 2 days PTA.  He does not follow up with doctors.  Patient was found to have IWSTEMI - he was loaded with ASA and Brilanta and started on a heparin gtt for ACS protocol - subsequently underwent coronary angiography which confirmed RCA culprit lesion with PCI/D - Cardiologist has stress the need for uninterrupted DAPT, abstinence from cigarettes and any substance abuse.  Psychiatry arranging tobacco withdrawal management and ongoing care.  Seen by Social Work for Cocaine abuse and declined resources.  Patient accepted nicotine patch/lozenges.  Patient was noted to have brief episode of sinus pause during sleep - per Cardiology this represents AV-block in setting of sleep apnea, rather than ischemia.  He was evaluated by Pulmonary who recommended outpatient pulmonary evaluation including PFT's and formal sleep study.  No indication for pace-maker implantation per Cardiology.  He will follow up in the Cardiology clinic to evaluate whether BB therapy should be introduced.  Paatient will follow up with IM, Cardiology, Pulmonary, and Behavioral health within 1 week.

## 2020-07-30 NOTE — BEHAVIORAL HEALTH ASSESSMENT NOTE - PRIMARY DX
Adjustment disorder with mixed anxiety and depressed mood Moderate episode of recurrent major depressive disorder

## 2020-07-30 NOTE — PROGRESS NOTE ADULT - SUBJECTIVE AND OBJECTIVE BOX
Patient is a 55y old  Male who presents with a chief complaint of cp (28 Jul 2020 23:38)      SUBJECTIVE / OVERNIGHT EVENTS:  No chest pain. No shortness of breath. No complaints. No events overnight.     MEDICATIONS  (STANDING):  aspirin enteric coated 81 milliGRAM(s) Oral daily  nicotine - 21 mG/24Hr(s) Patch 1 patch Transdermal daily  ticagrelor 90 milliGRAM(s) Oral every 12 hours    MEDICATIONS  (PRN):  diazepam    Tablet 5 milliGRAM(s) Oral three times a day PRN agitation      Vital Signs Last 24 Hrs  T(C): 36.7 (30 Jul 2020 04:26), Max: 36.7 (30 Jul 2020 04:26)  T(F): 98 (30 Jul 2020 04:26), Max: 98 (30 Jul 2020 04:26)  HR: 83 (30 Jul 2020 04:26) (65 - 96)  BP: 113/65 (30 Jul 2020 04:26) (98/69 - 120/68)  BP(mean): --  RR: 18 (30 Jul 2020 04:26) (18 - 19)  SpO2: 94% (30 Jul 2020 04:26) (94% - 97%)  CAPILLARY BLOOD GLUCOSE        I&O's Summary    29 Jul 2020 07:01  -  30 Jul 2020 07:00  --------------------------------------------------------  IN: 300 mL / OUT: 600 mL / NET: -300 mL        PHYSICAL EXAM:  GENERAL: NAD, well-developed  HEAD:  Atraumatic, Normocephalic  EYES: EOMI, PERRLA, conjunctiva and sclera clear  NECK: Supple, No JVD  CHEST/LUNG: Clear to auscultation bilaterally; No wheeze  HEART: Regular rate and rhythm; No murmurs, rubs, or gallops  ABDOMEN: Soft, Nontender, Nondistended; Bowel sounds present  EXTREMITIES:  2+ Peripheral Pulses, No clubbing, cyanosis, or edema  PSYCH: AAOx3  NEUROLOGY: non-focal  SKIN: No rashes or lesions    LABS:                        14.3   9.62  )-----------( 231      ( 30 Jul 2020 06:21 )             45.2     07-30    139  |  104  |  15  ----------------------------<  99  3.8   |  22  |  1.09    Ca    8.9      30 Jul 2020 06:21  Phos  3.1     07-29  Mg     2.0     07-29    TPro  6.8  /  Alb  4.1  /  TBili  0.8  /  DBili  0.1  /  AST  67<H>  /  ALT  20  /  AlkPhos  64  07-29    PT/INR - ( 28 Jul 2020 20:11 )   PT: 11.4 sec;   INR: 0.96 ratio         PTT - ( 29 Jul 2020 19:06 )  PTT:31.9 sec  CARDIAC MARKERS ( 29 Jul 2020 07:21 )  x     / x     / 693 U/L / x     / 70.4 ng/mL  CARDIAC MARKERS ( 28 Jul 2020 20:36 )  x     / x     / x     / x     / 45.3 ng/mL          RADIOLOGY & ADDITIONAL TESTS:    Imaging Personally Reviewed:    Consultant(s) Notes Reviewed:      Care Discussed with Consultants/Other Providers:

## 2020-07-30 NOTE — DISCHARGE NOTE PROVIDER - NSDCMRMEDTOKEN_GEN_ALL_CORE_FT
acetaminophen 500 mg oral tablet: 2 tab(s) orally every 8 hours  aspirin 81 mg oral delayed release tablet: 1 tab(s) orally once a day  atorvastatin 40 mg oral tablet: 1 tab(s) orally once a day (at bedtime)  Colace 100 mg oral capsule: 1 cap(s) orally 3 times a day  gabapentin 100 mg oral capsule: 1 cap(s) orally 3 times a day  oxyCODONE 5 mg oral tablet: 1 tab(s) orally every 4 hours, As needed, Moderate Pain (4 - 6) MDD:6  ticagrelor 90 mg oral tablet: 1 tab(s) orally every 12 hours aspirin 81 mg oral delayed release tablet: 1 tab(s) orally once a day  atorvastatin 40 mg oral tablet: 1 tab(s) orally once a day (at bedtime)  Colace 100 mg oral capsule: 1 cap(s) orally 3 times a day  diazePAM 5 mg oral tablet: 1 tab(s) orally every 12 hours, As Needed -agitation - for anxiety MDD:2 tabs  gabapentin 100 mg oral capsule: 1 cap(s) orally 3 times a day  mirtazapine 15 mg oral tablet: 1 tab(s) orally once a day (at bedtime)  nicotine 2 mg oral transmucosal lozenge: 1 lozenge by transmucosal administration every 4 hours, As Needed   nicotine 21 mg/24 hr transdermal film, extended release: 1 patch transdermal once a day   ticagrelor 90 mg oral tablet: 1 tab(s) orally every 12 hours

## 2020-07-30 NOTE — BEHAVIORAL HEALTH ASSESSMENT NOTE - NSBHCONSULTFOLLOWAFTERCARE_PSY_A_CORE FT
gave patient information regarding outpatient clinic services, [facility] walk-in [phone].  counseled on smoking cessation, recommended patches.  consider reducing valium to 5mg PO prn BID on discharge.

## 2020-07-30 NOTE — DISCHARGE NOTE PROVIDER - NSDCFUADDINST_GEN_ALL_CORE_FT
- Follow up in the Cardiology Clinic within 1 week.  - Follow up in the Internal Medicine Clinic within 1 week.  - Follow up in the [facility] Walk-In Clinic within 3-5 days. - Follow up in the Cardiology Clinic within 1 week.  - Follow up in the Internal Medicine Clinic within 1 week.  - Follow up in the [facility] Walk-In Clinic within 3-5 days.  - Follow up in the Pulmonary Clinic within 1 week for pulmonary evaluation including PFT's and formal sleep study.

## 2020-07-30 NOTE — BEHAVIORAL HEALTH ASSESSMENT NOTE - NSBHCHARTREVIEWVS_PSY_A_CORE FT
Vital Signs Last 24 Hrs  T(C): 36.7 (30 Jul 2020 04:26), Max: 36.7 (30 Jul 2020 04:26)  T(F): 98 (30 Jul 2020 04:26), Max: 98 (30 Jul 2020 04:26)  HR: 83 (30 Jul 2020 04:26) (65 - 96)  BP: 113/65 (30 Jul 2020 04:26) (98/69 - 120/68)  BP(mean): --  RR: 18 (30 Jul 2020 04:26) (18 - 19)  SpO2: 94% (30 Jul 2020 04:26) (94% - 97%)

## 2020-07-30 NOTE — PROVIDER CONTACT NOTE (OTHER) - BACKGROUND
54yo M Hx HLD, diverticulosis presenting with complaints of chest pressure. Pt reports sob and diaphoresis. PMH Sleep apnea, morbid obesity diverticulitis

## 2020-07-30 NOTE — DIETITIAN INITIAL EVALUATION ADULT. - PERTINENT MEDS FT
aspirin enteric coated 81  diazepam    Tablet 5 PRN  nicotine - 21 mG/24Hr(s) Patch 1  ticagrelor 90

## 2020-07-30 NOTE — PROGRESS NOTE ADULT - SUBJECTIVE AND OBJECTIVE BOX
For all Cardiology service contact information, go to [email or web address] and use "[employer]" to login.    SUBJECTIVE:   No events overnight. Denies CP, SOB or Dyspnea. Patient wants to be discharged today.   -------------------------------------------------------------------------------------------  ROS:  CV: chest pain (-), palpitation (-), orthopnea (-), PND (-), edema (-)  PULM: SOB (-), cough (-), wheezing (-), hemoptysis (-).   CONST: fever (-), chills (-) or fatigue (-)  GI: abdominal distension (-), abdominal pain (-) , nausea/vomiting (-), hematemesis, (-), melena (-), hematochezia (-)  : dysuria (-), frequency (-), hematuria (-).   NEURO: numbness (-), weakness (-), dizziness (-)  SKIN: itching (-), rash (-)  HEENT:  visual changes (-); vertigo or throat pain (-);  neck stiffness (-)     All other review of systems is negative unless indicated above.   -------------------------------------------------------------------------------------------  VS:  T(F): 98 (), Max: 98 ()  HR: 83 () (65 - 96)  BP: 113/65 () (98/69 - 120/68)  RR: 18 ()  SpO2: 94% ()  I&O's Summary    2020 07:01  -  2020 07:00  --------------------------------------------------------  IN: 300 mL / OUT: 600 mL / NET: -300 mL      ------------------------------------------------------------------------------------------  PHYSICAL EXAM:  GENERAL: NAD, lying in bed comfortably. Obese.   HEAD:  Atraumatic, Normocephalic  EYES: EOMI, PERRLA, conjunctiva and sclera clear  ENT: Moist mucous membranes  NECK: Supple, No JVD  CHEST/LUNG: Clear to auscultation bilaterally; No rales, rhonchi, wheezing, or rubs. Unlabored respirations  HEART: Regular rate and rhythm; No murmurs, rubs, or gallops  ABDOMEN: Bowel sounds present; Soft, Nontender, Nondistended.   EXTREMITIES:  2+ Peripheral Pulses, brisk capillary refill. No clubbing, cyanosis, or edema  NERVOUS SYSTEM:  Alert & Oriented X3, speech clear. No deficits   MSK: FROM all 4 extremities, full and equal strength  SKIN: No rashes or lesions  PSYCH: normal affect.   -------------------------------------------------------------------------------------------  LABS:                          14.3   9.62  )-----------( 231      ( 2020 06:21 )             45.2     07-30    139  |  104  |  15  ----------------------------<  99  3.8   |  22  |  1.09    Ca    8.9      2020 06:21  Phos  3.1     07-29  Mg     2.0     07-29    TPro  6.8  /  Alb  4.1  /  TBili  0.8  /  DBili  0.1  /  AST  67<H>  /  ALT  20  /  AlkPhos  64  07-29    PT/INR - ( 2020 20:11 )   PT: 11.4 sec;   INR: 0.96 ratio         PTT - ( 2020 19:06 )  PTT:31.9 sec  CARDIAC MARKERS ( 2020 07:21 )  942 ng/L / x     / x     / 693 U/L / x     / 70.4 ng/mL  CARDIAC MARKERS ( 2020 07:11 )  845 ng/L / x     / x     / x     / x     / x      CARDIAC MARKERS ( 2020 01:06 )  421 ng/L / x     / x     / x     / x     / x      CARDIAC MARKERS ( 2020 20:36 )  194 ng/L / x     / x     / x     / x     / 45.3 ng/mL  CARDIAC MARKERS ( 2020 18:57 )  129 ng/L / x     / x     / x     / x     / x          Total Cholesterol: 253  LDL: 157  HDL: 61  T        -------------------------------------------------------------------------------------------  Meds:  aspirin enteric coated 81 milliGRAM(s) Oral daily  diazepam    Tablet 5 milliGRAM(s) Oral three times a day PRN  nicotine - 21 mG/24Hr(s) Patch 1 patch Transdermal daily  ticagrelor 90 milliGRAM(s) Oral every 12 hours    -------------------------------------------------------------------------------------------

## 2020-07-30 NOTE — BEHAVIORAL HEALTH ASSESSMENT NOTE - NSBHCHARTREVIEWLAB_PSY_A_CORE FT
14.3   9.62  )-----------( 231      ( 30 Jul 2020 06:21 )             45.2   07-30    139  |  104  |  15  ----------------------------<  99  3.8   |  22  |  1.09    Ca    8.9      30 Jul 2020 06:21  Phos  3.1     07-29  Mg     2.0     07-29    TPro  6.8  /  Alb  4.1  /  TBili  0.8  /  DBili  0.1  /  AST  67<H>  /  ALT  20  /  AlkPhos  64  07-29    CARDIAC MARKERS ( 29 Jul 2020 07:21 )  x     / x     / 693 U/L / x     / 70.4 ng/mL  CARDIAC MARKERS ( 28 Jul 2020 20:36 )  x     / x     / x     / x     / 45.3 ng/mL

## 2020-07-30 NOTE — DISCHARGE NOTE PROVIDER - NSFOLLOWUPCLINICS_GEN_ALL_ED_FT
[facility]  Cardiology  [address] [place] [address]  Phone: [phone]  Fax: [facility]  Cardiology  [address] [place] [id number]  Phone: [phone]  Fax:     [facility]  Pulmonology  [address] [place] [id number]  Phone: [phone]  Fax:   Follow Up Time:

## 2020-07-30 NOTE — DISCHARGE NOTE NURSING/CASE MANAGEMENT/SOCIAL WORK - PATIENT PORTAL LINK FT
You can access the FollowMyHealth Patient Portal offered by [facility] by registering at the following website: [email or web address]. By joining [employer]’s FollowMyHealth portal, you will also be able to view your health information using other applications (apps) compatible with our system.

## 2020-07-30 NOTE — CONSULT NOTE ADULT - SUBJECTIVE AND OBJECTIVE BOX
PULMONARY CONSULT  Yaron Bey MD  [phone]    Initial HPI on admission:  HPI:  54yo M Hx HLD, diverticulosis presenting with complaints of chest pressure. Pt reports that last night he started felling midsternal chest pressure with intermittent sob and diaphoresis. pain continued this AM took a baby asa without relief. never had similar pain in the past. states that he has been under a lot of stress recently, he lost his job at the start of the pandemic, gained weight and is going through a divorce. also endorse cocaine use, last used 2 days ago. has not  been to a doctor in years, states never had a stress test or echo.         PAST MEDICAL & SURGICAL HISTORY:  Colostomy present  History of neuropathy: big toes  Diverticulitis  Morbid obesity  HLD (hyperlipidemia)  Sleep apnea  S/P repair of hydrocele    FAMILY HISTORY:  Family history of lung cancer: mother  age 40, hx lung cancer    Social History (marital status, living situation, occupation, tobacco use, alcohol and drug use, and sexual history): smokes 1 ppd  denies etoh used cocaine 2 days ago	     Tobacco Screening:  · Core Measure Site	No	    Risk Assessment:    Heart Failure:  Does this patient have a history of or has been diagnosed with heart failure? no.         Review of Systems:  · Negative General Symptoms	no fever; no chills; no sweating; no anorexia; no weight loss; no weight gain; no polyphagia; no polyuria; no polydipsia; no malaise; no fatigue	  · Skin/Breast	negative	  · Ophthalmologic	negative	  · ENMT	negative	  · Negative Respiratory and Thorax Symptoms	no wheezing; no cough; no hemoptysis; no pleuritic chest pain	  · Respiratory and Thorax Symptoms	dyspnea	  · Negative Cardiovascular Symptoms	no palpitations; no orthopnea; no paroxysmal nocturnal dyspnea	  · Cardiovascular Symptoms	chest pain; dyspnea on exertion	  · Gastrointestinal	negative	  · Genitourinary	negative	  · Musculoskeletal	negative	  · Neurological	negative	  · Psychiatric	negative	  · Hematology/Lymphatics	negative	  · Endocrine	negative	  · Allergic/Immunologic	negative	      Allergies and Intolerances:        Allergies:  	No Known Allergies:         Medications:  MEDICATIONS  (STANDING):  aspirin enteric coated 81 milliGRAM(s) Oral daily  atorvastatin 40 milliGRAM(s) Oral at bedtime  mirtazapine 15 milliGRAM(s) Oral at bedtime  nicotine - 21 mG/24Hr(s) Patch 1 patch Transdermal daily  ticagrelor 90 milliGRAM(s) Oral every 12 hours    MEDICATIONS  (PRN):  diazepam    Tablet 5 milliGRAM(s) Oral three times a day PRN agitation  nicotine  Polacrilex Lozenge 2 milliGRAM(s) Oral every 3 hours PRN nicotine craving    Vital Signs Last 24 Hrs  T(C): 36.7 (2020 12:19), Max: 36.7 (2020 04:26)  T(F): 98 (2020 12:19), Max: 98 (2020 04:26)  HR: 83 (:19) (65 - 96)  BP: 121/76 (2020 12:19) (98/69 - 121/76)  BP(mean): --  RR: 19 (:19) (18 - 19)  SpO2: 97% (:19) (94% - 97%)       @ 07:01  -   @ 07:00  --------------------------------------------------------  IN: 300 mL / OUT: 600 mL / NET: -300 mL      LABS:                        14.3   9.62  )-----------( 231      ( 2020 06:21 )             45.2     07-30    139  |  104  |  15  ----------------------------<  99  3.8   |  22  |  1.09    Ca    8.9      2020 06:21  Phos  3.1     07  Mg     2.0         TPro  6.8  /  Alb  4.1  /  TBili  0.8  /  DBili  0.1  /  AST  67<H>  /  ALT  20  /  AlkPhos  64  07-29      PT/INR - ( 2020 20:11 )   PT: 11.4 sec;   INR: 0.96 ratio         PTT - ( 2020 19:06 )  PTT:31.9 sec      Physical Examination:    General: No acute distress.      HEENT: Pupils equal, reactive to light.  Symmetric.    PULM: Clear to auscultation bilaterally, no significant sputum production    CVS: Regular rate and rhythm, no murmurs, rubs, or gallops    ABD: Soft, nondistended, nontender, normoactive bowel sounds, no masses    EXT: No edema, nontender    SKIN: Warm and well perfused, no rashes noted.    NEURO: Alert, oriented, interactive, nonfocal    RADIOLOGY REVIEWED PERSONALLY  CXR:    CT ChestAbe    PROCEDURE DATE:  2020        INTERPRETATION:  CLINICAL INFORMATION: Unequal upper extremity blood pressure. Evaluate for aortic dissection.    COMPARISON: CT abdomen and pelvis 2019.    PROCEDURE:  CT Angiography of the Chest, Abdomen and Pelvis.  Gated precontrast imaging was performed through the heart followed by gated CT Angiography of the heart with subsequentnon-gated arterial phase imaging of the chest, abdomen and pelvis.  Intravenous contrast: 90 ml Omnipaque 350. 10 ml discarded.  Oral contrast: None.  Sagittal and coronal reformats were performed as well as 3D (MIP) reconstructions.    FINDINGS:  CHEST:  LUNGS AND LARGE AIRWAYS: Patent central airways. Centrilobular emphysema, most prominent in the upper lobes. No pulmonary nodules, masses or consolidation.  PLEURA: No pleural effusion or pneumothorax.  VESSELS: No aortic dissection or thoracic aortic aneurysm. No intramural hematoma or penetrating ulcer. No pulmonary embolism.  HEART: Heart size is normal. No pericardial effusion.  MEDIASTINUM AND KEVIN: No lymphadenopathy.  CHEST WALL AND LOWER NECK: Within normal limits.    ABDOMEN AND PELVIS:  LIVER: Redemonstration of a left hepatic cyst.  BILE DUCTS: Normal caliber.  GALLBLADDER: Within normal limits.  SPLEEN: Within normal limits.  PANCREAS: Within normal limits.  ADRENALS: Within normal limits.  KIDNEYS/URETERS: Left renal subcentimeter hypodense lesion which is too small for accurate characterization. Right kidney within normal limits.    BLADDER: Within normal limits.  REPRODUCTIVE ORGANS: Prostate within normal limits.    BOWEL: Sigmoid anastomotic sutures. Incidental 1.9 cm lipoma adjacent to the jejunal loop (2:456). No bowel obstruction or inflammation. Appendix is normal.  PERITONEUM: No ascites.  VESSELS: Abdominal aorta is normal in caliber without aneurysm or dissection. No penetrating ulcer. The celiac axis, superior mesenteric and renal arteries are patent. The inferior mesenteric artery is not well visualized.  RETROPERITONEUM/LYMPH NODES: No lymphadenopathy. Left retroperitoneal surgical clips.  ABDOMINAL WALL: Wide mouthed anterior upper abdominal wall hernia containing nonobstructed loops of small bowel and mesenteric fat. Midline anterior abdominal wall scar.  BONES: Degenerative changes of the spine. Benign-appearing, well-circumscribed and well marginated 1.6 cm cystic lesion in the left femoral neck.    IMPRESSION:    No evidence of an aortic dissection.  TTE:      PROCEDURE: Limited transthoracic echocardiogram with 2-D.  M-Mode and spectral and color flow Doppler. Verbal consent  was obtained for injection of  Ultrasonic Enhancing Agent  following a discussion of risks and benefits. Following  intravenous injection of Ultrasonic Enhancing Agent ,  harmonic imaging was performed.  INDICATION: Chest pain, unspecified (R07.9)  ------------------------------------------------------------------------  Dimensions:    Normal Values:  LA:            2.0 - 4.0 cm  Ao:            2.0 - 3.8 cm  SEPTUM:        0.6 - 1.2 cm  PWT:     0.6 - 1.1 cm  LVIDd:         3.0 - 5.6 cm  LVIDs:         1.8 - 4.0 cm  EF (Visual Estimate): 55-60 %  ------------------------------------------------------------------------  Observations:  Aortic Valve/Aorta:  Left Ventricle: Endocardium not well visualized despite the  use definity contrast; grossly normal left ventricular  systolic function. Regional wall motion could not be  accurately assessed. Endocardial visualization enhanced  with intravenous injection of Ultrasonic Enhancing Agent  (Definity).  Right Heart: The right ventricle is not well visualized.  Pericardium/Pleura: Normal pericardium with no pericardial  effusion.  Hemodynamic: Estimated right atrial pressure is 8 mm Hg.  ------------------------------------------------------------------------  Conclusions:  Technically difficult study with limited/suboptimal views.  1. Endocardium not well visualized despite the use definity  contrast; grossly normal left ventricular systolic  function. Regional wall motion could not be accurately  assessed. Endocardial visualization enhanced with  intravenous injection of Ultrasonic Enhancing Agent  (Definity).  2. The right ventricle is not well visualized. PULMONARY CONSULT  Yaron Bey MD  [phone]    Initial HPI on admission:  HPI:  54yo M Hx HLD, diverticulosis presenting with complaints of chest pressure. Pt reports that last night he started felling midsternal chest pressure with intermittent sob and diaphoresis. pain continued this AM took a baby asa without relief. never had similar pain in the past. states that he has been under a lot of stress recently, he lost his job at the start of the pandemic, gained weight and is going through a divorce. also endorse cocaine use, last used 2 days ago. has not  been to a doctor in years, states never had a stress test or echo.     Patient s/p PCI after NSTEMI  TTE with preserved LV function  Patient is an active 1 PPD smoker since teenager: denies history of COPD or asthma  He was diagnosed with sleep apnea several years ago and was prescribed nocturnal CPAP: he has not used in years stating that he lost weight which has been regained during pandemic  He currently denies dyspnea or productive cough  RA sat 94%    PAST MEDICAL & SURGICAL HISTORY:  Colostomy present  History of neuropathy: big toes  Diverticulitis  Morbid obesity  HLD (hyperlipidemia)  Sleep apnea  S/P repair of hydrocele    FAMILY HISTORY:  Family history of lung cancer: mother  age 40, hx lung cancer    Social History (marital status, living situation, occupation, tobacco use, alcohol and drug use, and sexual history): smokes 1 ppd  denies etoh used cocaine 2 days ago	     Tobacco Screening:  · Core Measure Site	No	    Risk Assessment:    Heart Failure:  Does this patient have a history of or has been diagnosed with heart failure? no.         Review of Systems:  · Negative General Symptoms	no fever; no chills; no sweating; no anorexia; no weight loss; no weight gain; no polyphagia; no polyuria; no polydipsia; no malaise; no fatigue	  · Skin/Breast	negative	  · Ophthalmologic	negative	  · ENMT	negative	  · Negative Respiratory and Thorax Symptoms	no wheezing; no cough; no hemoptysis; no pleuritic chest pain	  · Respiratory and Thorax Symptoms	dyspnea	  · Negative Cardiovascular Symptoms	no palpitations; no orthopnea; no paroxysmal nocturnal dyspnea	  · Cardiovascular Symptoms	chest pain; dyspnea on exertion	  · Gastrointestinal	negative	  · Genitourinary	negative	  · Musculoskeletal	negative	  · Neurological	negative	  · Psychiatric	negative	  · Hematology/Lymphatics	negative	  · Endocrine	negative	  · Allergic/Immunologic	negative	      Allergies and Intolerances:        Allergies:  	No Known Allergies:         Medications:  MEDICATIONS  (STANDING):  aspirin enteric coated 81 milliGRAM(s) Oral daily  atorvastatin 40 milliGRAM(s) Oral at bedtime  mirtazapine 15 milliGRAM(s) Oral at bedtime  nicotine - 21 mG/24Hr(s) Patch 1 patch Transdermal daily  ticagrelor 90 milliGRAM(s) Oral every 12 hours    MEDICATIONS  (PRN):  diazepam    Tablet 5 milliGRAM(s) Oral three times a day PRN agitation  nicotine  Polacrilex Lozenge 2 milliGRAM(s) Oral every 3 hours PRN nicotine craving    Vital Signs Last 24 Hrs  T(C): 36.7 (2020 12:19), Max: 36.7 (2020 04:26)  T(F): 98 (:19), Max: 98 (2020 04:26)  HR: 83 (2020 12:19) (65 - 96)  BP: 121/76 (2020 12:19) (98/69 - 121/76)  BP(mean): --  RR: 19 (2020 12:19) (18 - 19)  SpO2: 97% (2020 12:19) (94% - 97%)       @ 07:01  -  0730 @ 07:00  --------------------------------------------------------  IN: 300 mL / OUT: 600 mL / NET: -300 mL      LABS:                        14.3   9.62  )-----------( 231      ( 2020 06:21 )             45.2     0730    139  |  104  |  15  ----------------------------<  99  3.8   |  22  |  1.09    Ca    8.9      2020 06:21  Phos  3.1       Mg     2.0         TPro  6.8  /  Alb  4.1  /  TBili  0.8  /  DBili  0.1  /  AST  67<H>  /  ALT  20  /  AlkPhos  64  29      PT/INR - ( 2020 20:11 )   PT: 11.4 sec;   INR: 0.96 ratio         PTT - ( 2020 19:06 )  PTT:31.9 sec      Physical Examination:    General: Non toxic, No acute distress.      HEENT: Pupils equal, reactive to light.  Symmetric.    PULM: No wheeze or rhonchi    CVS: Regular rate and rhythm, no murmurs, rubs, or gallops    ABD: Soft, nondistended, nontender, normoactive bowel sounds, no masses    EXT: No edema, nontender    SKIN: Warm and well perfused, no rashes noted.    NEURO: Alert, oriented, interactive, nonfocal    RADIOLOGY REVIEWED PERSONALLY  CXR:    CT ChestAbe    PROCEDURE DATE:  2020        INTERPRETATION:  CLINICAL INFORMATION: Unequal upper extremity blood pressure. Evaluate for aortic dissection.    COMPARISON: CT abdomen and pelvis 2019.    PROCEDURE:  CT Angiography of the Chest, Abdomen and Pelvis.  Gated precontrast imaging was performed through the heart followed by gated CT Angiography of the heart with subsequentnon-gated arterial phase imaging of the chest, abdomen and pelvis.  Intravenous contrast: 90 ml Omnipaque 350. 10 ml discarded.  Oral contrast: None.  Sagittal and coronal reformats were performed as well as 3D (MIP) reconstructions.    FINDINGS:  CHEST:  LUNGS AND LARGE AIRWAYS: Patent central airways. Centrilobular emphysema, most prominent in the upper lobes. No pulmonary nodules, masses or consolidation.  PLEURA: No pleural effusion or pneumothorax.  VESSELS: No aortic dissection or thoracic aortic aneurysm. No intramural hematoma or penetrating ulcer. No pulmonary embolism.  HEART: Heart size is normal. No pericardial effusion.  MEDIASTINUM AND KEVIN: No lymphadenopathy.  CHEST WALL AND LOWER NECK: Within normal limits.    ABDOMEN AND PELVIS:  LIVER: Redemonstration of a left hepatic cyst.  BILE DUCTS: Normal caliber.  GALLBLADDER: Within normal limits.  SPLEEN: Within normal limits.  PANCREAS: Within normal limits.  ADRENALS: Within normal limits.  KIDNEYS/URETERS: Left renal subcentimeter hypodense lesion which is too small for accurate characterization. Right kidney within normal limits.    BLADDER: Within normal limits.  REPRODUCTIVE ORGANS: Prostate within normal limits.    BOWEL: Sigmoid anastomotic sutures. Incidental 1.9 cm lipoma adjacent to the jejunal loop (2:456). No bowel obstruction or inflammation. Appendix is normal.  PERITONEUM: No ascites.  VESSELS: Abdominal aorta is normal in caliber without aneurysm or dissection. No penetrating ulcer. The celiac axis, superior mesenteric and renal arteries are patent. The inferior mesenteric artery is not well visualized.  RETROPERITONEUM/LYMPH NODES: No lymphadenopathy. Left retroperitoneal surgical clips.  ABDOMINAL WALL: Wide mouthed anterior upper abdominal wall hernia containing nonobstructed loops of small bowel and mesenteric fat. Midline anterior abdominal wall scar.  BONES: Degenerative changes of the spine. Benign-appearing, well-circumscribed and well marginated 1.6 cm cystic lesion in the left femoral neck.    IMPRESSION:    No evidence of an aortic dissection.  TTE:      PROCEDURE: Limited transthoracic echocardiogram with 2-D.  M-Mode and spectral and color flow Doppler. Verbal consent  was obtained for injection of  Ultrasonic Enhancing Agent  following a discussion of risks and benefits. Following  intravenous injection of Ultrasonic Enhancing Agent ,  harmonic imaging was performed.  INDICATION: Chest pain, unspecified (R07.9)  ------------------------------------------------------------------------  Dimensions:    Normal Values:  LA:            2.0 - 4.0 cm  Ao:            2.0 - 3.8 cm  SEPTUM:        0.6 - 1.2 cm  PWT:     0.6 - 1.1 cm  LVIDd:         3.0 - 5.6 cm  LVIDs:         1.8 - 4.0 cm  EF (Visual Estimate): 55-60 %  ------------------------------------------------------------------------  Observations:  Aortic Valve/Aorta:  Left Ventricle: Endocardium not well visualized despite the  use definity contrast; grossly normal left ventricular  systolic function. Regional wall motion could not be  accurately assessed. Endocardial visualization enhanced  with intravenous injection of Ultrasonic Enhancing Agent  (Definity).  Right Heart: The right ventricle is not well visualized.  Pericardium/Pleura: Normal pericardium with no pericardial  effusion.  Hemodynamic: Estimated right atrial pressure is 8 mm Hg.  ------------------------------------------------------------------------  Conclusions:  Technically difficult study with limited/suboptimal views.  1. Endocardium not well visualized despite the use definity  contrast; grossly normal left ventricular systolic  function. Regional wall motion could not be accurately  assessed. Endocardial visualization enhanced with  intravenous injection of Ultrasonic Enhancing Agent  (Definity).  2. The right ventricle is not well visualized.

## 2020-07-30 NOTE — BEHAVIORAL HEALTH ASSESSMENT NOTE - CASE SUMMARY
Pt is a 56yo M Hx HLD, diverticulosis presented to the ED yesterday with substernal chest pain, s/p stent placement yesterday for inferior wall STEMI, domiciled with wife and two children, currently in divorce negotiations, currently unemployed due to COVID, with hx of cocaine use two days ago, consult called for anxiety.  Pt appears depressed but somewhat motivated to smoking and cocaine use cessation.  He understands that he may follow up at the [facility] walk in clinic.  I agree with starting Remeron 15mg po qhs and continuing the Valium 5mg po TID prn anxiety.

## 2020-08-05 ENCOUNTER — APPOINTMENT (OUTPATIENT)
Dept: CARDIOLOGY | Facility: CLINIC | Age: 55
End: 2020-08-05

## 2020-08-20 ENCOUNTER — APPOINTMENT (OUTPATIENT)
Dept: PULMONOLOGY | Facility: CLINIC | Age: 55
End: 2020-08-20
Payer: COMMERCIAL

## 2020-08-20 VITALS
BODY MASS INDEX: 42.66 KG/M2 | DIASTOLIC BLOOD PRESSURE: 70 MMHG | RESPIRATION RATE: 18 BRPM | OXYGEN SATURATION: 96 % | SYSTOLIC BLOOD PRESSURE: 108 MMHG | WEIGHT: 315 LBS | TEMPERATURE: 97.1 F | HEART RATE: 95 BPM | HEIGHT: 72 IN

## 2020-08-20 DIAGNOSIS — R06.02 SHORTNESS OF BREATH: ICD-10-CM

## 2020-08-20 PROCEDURE — 99203 OFFICE O/P NEW LOW 30 MIN: CPT

## 2020-08-20 RX ORDER — DIAZEPAM 5 MG/1
5 TABLET ORAL
Qty: 20 | Refills: 0 | Status: COMPLETED | COMMUNITY
Start: 2020-08-20

## 2020-08-20 NOTE — PHYSICAL EXAM
[Normal Appearance] : normal appearance [No Acute Distress] : no acute distress [No Neck Mass] : no neck mass [No Murmurs] : no murmurs [Normal S1, S2] : normal s1, s2 [Normal Rate/Rhythm] : normal rate/rhythm [No Resp Distress] : no resp distress [Clear to Auscultation Bilaterally] : clear to auscultation bilaterally [Normal Gait] : normal gait [Benign] : benign [No Abnormalities] : no abnormalities [No Clubbing] : no clubbing [No Cyanosis] : no cyanosis [No Edema] : no edema [Normal Color/ Pigmentation] : normal color/ pigmentation [No Focal Deficits] : no focal deficits [Oriented x3] : oriented x3 [Normal Affect] : normal affect [TextBox_2] : appears anxious [TextBox_140] : anxious

## 2020-08-20 NOTE — REASON FOR VISIT
[Sleep Apnea] : sleep apnea [Shortness of Breath] : shortness of breath [Initial] : an initial visit

## 2020-08-20 NOTE — REVIEW OF SYSTEMS
[Chest Tightness] : chest tightness [Dyspnea] : dyspnea [Negative] : Allergy/Immunology [Cough] : no cough [Hemoptysis] : no hemoptysis [Sputum] : no sputum [SOB on Exertion] : no sob on exertion

## 2020-08-20 NOTE — DISCUSSION/SUMMARY
[FreeTextEntry1] : 55 year old male with history of diverticulosis s/p colectomy with reversal, NSTEMI, DAMION and anxiety.  Shortness of breath appears to be related to anxiety and body habitue. Patient did not do COVID testing so PFTs not performed and level of COPD unknown. Medications have been refilled including a 10 day supply of diazepam.  Pt has an appointment with psychiatrist in the next week as well as with cardiology.  Follow up as needed. \par I did suggest a sleep study and pFTs

## 2020-08-20 NOTE — HISTORY OF PRESENT ILLNESS
[TextBox_4] : 55 year old male with history of diverticulosis s/p colectomy with reversal, NSTEMI PCI/MAINOR x 2 pRCA on DAPT,  DAMION,anxiety chronic cocaine use.  He was recently hospitalized for chest discomfort and had elevated cardiac enzymes. He currently is experiencing shortness of breath but is not sure if this is solely due to anxiety.  He lost his job, is currently going through a divorce and no longer has a car.  He was given diazepam in the hospital and 10 days supply subsequently and reports that this did improve the anxiety.  He is also having difficulty sleeping and feels that it is difficult to sleep at night.  \par He denies fever, chills, cough, chest discomfort ,wheezing.  \par He feels as though the shortness of breath is due to hyperventilating as he will start to fixate on his breath and subsequently become short of breath.

## 2020-08-23 ENCOUNTER — APPOINTMENT (OUTPATIENT)
Dept: DISASTER EMERGENCY | Facility: CLINIC | Age: 55
End: 2020-08-23

## 2020-08-23 DIAGNOSIS — Z01.818 ENCOUNTER FOR OTHER PREPROCEDURAL EXAMINATION: ICD-10-CM

## 2020-09-16 ENCOUNTER — APPOINTMENT (OUTPATIENT)
Dept: CARDIOLOGY | Facility: CLINIC | Age: 55
End: 2020-09-16
Payer: COMMERCIAL

## 2020-09-16 ENCOUNTER — NON-APPOINTMENT (OUTPATIENT)
Age: 55
End: 2020-09-16

## 2020-09-16 VITALS
SYSTOLIC BLOOD PRESSURE: 111 MMHG | BODY MASS INDEX: 42.66 KG/M2 | WEIGHT: 315 LBS | HEIGHT: 72 IN | HEART RATE: 94 BPM | DIASTOLIC BLOOD PRESSURE: 80 MMHG | OXYGEN SATURATION: 98 %

## 2020-09-16 PROCEDURE — 93000 ELECTROCARDIOGRAM COMPLETE: CPT

## 2020-09-16 PROCEDURE — 99215 OFFICE O/P EST HI 40 MIN: CPT

## 2020-09-16 RX ORDER — ASPIRIN 81 MG/1
81 TABLET, CHEWABLE ORAL DAILY
Qty: 90 | Refills: 3 | Status: ACTIVE | COMMUNITY
Start: 2020-08-20 | End: 1900-01-01

## 2020-09-17 LAB
ALBUMIN SERPL ELPH-MCNC: 4.6 G/DL
ALP BLD-CCNC: 83 U/L
ALT SERPL-CCNC: 15 U/L
ANION GAP SERPL CALC-SCNC: 15 MMOL/L
AST SERPL-CCNC: 12 U/L
BASOPHILS # BLD AUTO: 0.04 K/UL
BASOPHILS NFR BLD AUTO: 0.4 %
BILIRUB SERPL-MCNC: 0.6 MG/DL
BUN SERPL-MCNC: 22 MG/DL
CALCIUM SERPL-MCNC: 9.9 MG/DL
CHLORIDE SERPL-SCNC: 105 MMOL/L
CHOLEST SERPL-MCNC: 179 MG/DL
CHOLEST/HDLC SERPL: 3.1 RATIO
CO2 SERPL-SCNC: 24 MMOL/L
CREAT SERPL-MCNC: 1.31 MG/DL
EOSINOPHIL # BLD AUTO: 0.43 K/UL
EOSINOPHIL NFR BLD AUTO: 4.6 %
ESTIMATED AVERAGE GLUCOSE: 114 MG/DL
GLUCOSE SERPL-MCNC: 106 MG/DL
HBA1C MFR BLD HPLC: 5.6 %
HCT VFR BLD CALC: 51.2 %
HDLC SERPL-MCNC: 59 MG/DL
HGB BLD-MCNC: 16.4 G/DL
IMM GRANULOCYTES NFR BLD AUTO: 1 %
LDLC SERPL CALC-MCNC: 81 MG/DL
LYMPHOCYTES # BLD AUTO: 2.35 K/UL
LYMPHOCYTES NFR BLD AUTO: 25 %
MAN DIFF?: NORMAL
MCHC RBC-ENTMCNC: 28.1 PG
MCHC RBC-ENTMCNC: 32 GM/DL
MCV RBC AUTO: 87.7 FL
MONOCYTES # BLD AUTO: 0.77 K/UL
MONOCYTES NFR BLD AUTO: 8.2 %
NEUTROPHILS # BLD AUTO: 5.73 K/UL
NEUTROPHILS NFR BLD AUTO: 60.8 %
PLATELET # BLD AUTO: 276 K/UL
POTASSIUM SERPL-SCNC: 4.4 MMOL/L
PROT SERPL-MCNC: 7.1 G/DL
RBC # BLD: 5.84 M/UL
RBC # FLD: 13.7 %
SODIUM SERPL-SCNC: 143 MMOL/L
TRIGL SERPL-MCNC: 197 MG/DL
WBC # FLD AUTO: 9.41 K/UL

## 2020-12-08 NOTE — ED PROVIDER NOTE - PSYCHIATRIC, MLM
Alert and oriented to person, place, time/situation. normal mood and affect. no apparent risk to self or others.
Yes/tylenol, motrin

## 2020-12-17 NOTE — DISCHARGE NOTE NURSING/CASE MANAGEMENT/SOCIAL WORK - NSDCPETBCESMAN_GEN_ALL_CORE
If you are a smoker, it is important for your health to stop smoking. Please be aware that second hand smoke is also harmful. [de-identified] : no pain

## 2020-12-23 LAB — SARS-COV-2 N GENE NPH QL NAA+PROBE: NOT DETECTED

## 2021-03-17 ENCOUNTER — APPOINTMENT (OUTPATIENT)
Dept: CARDIOLOGY | Facility: CLINIC | Age: 56
End: 2021-03-17

## 2021-06-23 ENCOUNTER — APPOINTMENT (OUTPATIENT)
Dept: SURGERY | Facility: CLINIC | Age: 56
End: 2021-06-23
Payer: COMMERCIAL

## 2021-06-23 VITALS
OXYGEN SATURATION: 98 % | HEIGHT: 72 IN | WEIGHT: 315 LBS | BODY MASS INDEX: 42.66 KG/M2 | HEART RATE: 86 BPM | RESPIRATION RATE: 18 BRPM | SYSTOLIC BLOOD PRESSURE: 137 MMHG | DIASTOLIC BLOOD PRESSURE: 91 MMHG | TEMPERATURE: 98 F

## 2021-06-23 VITALS
OXYGEN SATURATION: 98 % | BODY MASS INDEX: 42.66 KG/M2 | HEART RATE: 86 BPM | RESPIRATION RATE: 18 BRPM | HEIGHT: 72 IN | DIASTOLIC BLOOD PRESSURE: 91 MMHG | SYSTOLIC BLOOD PRESSURE: 137 MMHG | TEMPERATURE: 98 F | WEIGHT: 315 LBS

## 2021-06-23 DIAGNOSIS — I25.2 OLD MYOCARDIAL INFARCTION: ICD-10-CM

## 2021-06-23 DIAGNOSIS — U07.1 COVID-19: ICD-10-CM

## 2021-06-23 PROCEDURE — 99214 OFFICE O/P EST MOD 30 MIN: CPT

## 2021-06-23 RX ORDER — DIAZEPAM 5 MG/1
5 TABLET ORAL
Qty: 20 | Refills: 0 | Status: DISCONTINUED | COMMUNITY
Start: 2020-08-20 | End: 2021-06-23

## 2021-06-24 NOTE — ASSESSMENT
**Please note: In an effort to reduce hospital traffic during Covid-19 crisis, this is a limited consult note with information gathered from chart review**    Nutrition Assessment   Assessment Type: Initial assessment  Reason for Visit: Consult and Registered Dietitian Evaluation  Referral Requested By: Physician/Staff  Chart Medications Lab Results Reviewed: yes    Nutritional Risk Factors: Poor PO prior to admission and Low BMI    Current Diet Order: Regular  Diet Tolerance: tolerating; poor intake  Food Allergies: no  Priority Points: Status 2-3    Demographic/Anthropometrics Information  Gender: female   Patient Age: 76 year old  Height: Height: 5' 6\" (167.6 cm)  Weight: Weight: 42.6 kg  BMI: Body mass index is 15.16 kg/m².    Usual Body Weight: 80  lbs 8/2016; current wt 94 lbs  % Weight change: + 15%; no edema  Physical Appearance: Emaciated and Muscle wasting per RN  Weight Classification: Underweight (BMI < 18.5)    Estimated Nutritional Needs  Assessment Weight:  43 kg  Energy Needs: 30-35 kcal/kg  (2043-3237 kcal/day)  Protein Needs: 1.2-1.5 g/kg     (52-65 grams/day)  Fluid Needs: Fluids per MD     Nutrition Diagnosis (PES)  Underweight related to Confusion and/or mental status change and Decreased intake as evidenced by Calculated needs, Diet history/recall and Documented/reported poor oral intake    Nutrition Plan  Recommended Nutrition Intervention: Coordination of nutrition care by a nutrition professional and nutrition supplemental therapy  Monitor: Biochemical data, medical tests, procedures, Food and beverage intake, Weight and Food and nutrient administration    Discharge Needs: Supplements  Care Plan Discussed With: Other: RN  Goals: Increase oral intake to >/=50%of meals and supplements and Maintain or improve weight  Goal Progress: Initiated  Timeframe to Achieve Goal: By discharge    Dietitian Notes/Impressions/Recommendations:  5/31/20: Pt screened d/t low BMI. Admitted with UTI, weakness.  [FreeTextEntry1] : 57 yo morbidly obese male with large recurrent incisional hernia and BMI of 50.  He will need open repair of the hernia with mesh. I will be obtaining an epidural for him for pain control postoperatively. I discussed the details, risks, benefits, alternatives of the procedure and expectations of recovery. He will have surgical drains postop. \par I will obtain a CT of the abdomen and pelvis for him for preoperative planning.  He would benefit from a preoperative Botox injection to the abdominal wall musculature for muscle lengthening. \par The patient is anxious to have the surgery as soon as possible.  I urged him for weight loss as much as possible prior to the surgery which he has done in the past.  He will need cardiology clearance and I will need input from Cardiology on whether he still needs dual antiplatelet therapy as he is reaching one year after the stents placement.\par  Consult also received from MD. Reviewed chart, [facility]. Chronic malnutrition noted. Per RN, pt is a poor historian; from home alone, is very weak and cachetic in appearance. Only had 2 bites of a mini bagel for breakfast; tolerated. IVF: NS at 100 ml/hr. On K+ protocol. POA is pt's brother Aba.  NFPE: Emaciated per RN    TREATMENT PLAN: Monitoring & Interventions   1. CPM with regular diet as tolerated.  2. Ensure Enlive TID to provide 1050 cals/60 gm protein to meet 80% est jesus needs/100% est prot needs if pt consumes 100%  3. 100 mg thiamine, MVI with minerals daily  4. Check mag and phos levels in am.  5. Consider palliative care eval/GOC discussion re: supplemental TF should intake remain poor/pt not accepting of supplements  6. Monitor intake, supplement acceptance, POC.      Malnutrition Status: Pt with Chronic Severe Protein Calorie Malnutrition as evidenced by </= to 75% of estimated requirements for >/= to 1 month, severe body fat depletion, severe muscle mass depletion, reduced functional capacity.

## 2021-06-24 NOTE — HISTORY OF PRESENT ILLNESS
[de-identified] : Del is a 56 year old male here with a complaint of large abdominal wall bulge present for several months.  He reports discomfort in the area.  Denies obstructing symptoms.  He has gained weight since the pandemic (50 pounds). He had two cardiac stents placed in 2020 July on Brilinta and baby Aspirin. \par PSH: emergency Blandon's for perforated diverticulitis followed by open John's reversal and repair of parastomal hernia on 6/28/19. \par He reports ongoing depression and anxiety.\par Pt weight today is 375 lbs.

## 2021-06-30 ENCOUNTER — APPOINTMENT (OUTPATIENT)
Dept: CT IMAGING | Facility: CLINIC | Age: 56
End: 2021-06-30
Payer: COMMERCIAL

## 2021-06-30 ENCOUNTER — OUTPATIENT (OUTPATIENT)
Dept: OUTPATIENT SERVICES | Facility: HOSPITAL | Age: 56
LOS: 1 days | End: 2021-06-30
Payer: COMMERCIAL

## 2021-06-30 DIAGNOSIS — Z98.890 OTHER SPECIFIED POSTPROCEDURAL STATES: Chronic | ICD-10-CM

## 2021-06-30 DIAGNOSIS — K43.5 PARASTOMAL HERNIA WITHOUT OBSTRUCTION OR GANGRENE: ICD-10-CM

## 2021-06-30 PROCEDURE — 74177 CT ABD & PELVIS W/CONTRAST: CPT | Mod: 26

## 2021-06-30 PROCEDURE — 74177 CT ABD & PELVIS W/CONTRAST: CPT

## 2021-07-11 ENCOUNTER — INPATIENT (INPATIENT)
Facility: HOSPITAL | Age: 56
LOS: 12 days | Discharge: HOME | DRG: 393 | End: 2021-07-24
Attending: SURGERY | Admitting: COLON & RECTAL SURGERY
Payer: COMMERCIAL

## 2021-07-11 VITALS
OXYGEN SATURATION: 95 % | RESPIRATION RATE: 20 BRPM | HEART RATE: 102 BPM | HEIGHT: 72 IN | SYSTOLIC BLOOD PRESSURE: 109 MMHG | TEMPERATURE: 98 F | DIASTOLIC BLOOD PRESSURE: 78 MMHG | WEIGHT: 315 LBS

## 2021-07-11 DIAGNOSIS — Z98.890 OTHER SPECIFIED POSTPROCEDURAL STATES: Chronic | ICD-10-CM

## 2021-07-11 LAB
ALBUMIN SERPL ELPH-MCNC: 4.4 G/DL — SIGNIFICANT CHANGE UP (ref 3.3–5)
ALP SERPL-CCNC: 77 U/L — SIGNIFICANT CHANGE UP (ref 40–120)
ALT FLD-CCNC: 12 U/L — SIGNIFICANT CHANGE UP (ref 10–45)
ANION GAP SERPL CALC-SCNC: 20 MMOL/L — HIGH (ref 5–17)
AST SERPL-CCNC: 13 U/L — SIGNIFICANT CHANGE UP (ref 10–40)
BILIRUB SERPL-MCNC: 0.8 MG/DL — SIGNIFICANT CHANGE UP (ref 0.2–1.2)
BUN SERPL-MCNC: 70 MG/DL — HIGH (ref 7–23)
CALCIUM SERPL-MCNC: 9.9 MG/DL — SIGNIFICANT CHANGE UP (ref 8.4–10.5)
CHLORIDE SERPL-SCNC: 92 MMOL/L — LOW (ref 96–108)
CO2 SERPL-SCNC: 25 MMOL/L — SIGNIFICANT CHANGE UP (ref 22–31)
CREAT SERPL-MCNC: 2.82 MG/DL — HIGH (ref 0.5–1.3)
GAS PNL BLDV: SIGNIFICANT CHANGE UP
GLUCOSE SERPL-MCNC: 129 MG/DL — HIGH (ref 70–99)
HCT VFR BLD CALC: 53.1 % — HIGH (ref 39–50)
HGB BLD-MCNC: 17 G/DL — SIGNIFICANT CHANGE UP (ref 13–17)
LIDOCAIN IGE QN: 17 U/L — SIGNIFICANT CHANGE UP (ref 7–60)
MCHC RBC-ENTMCNC: 27.2 PG — SIGNIFICANT CHANGE UP (ref 27–34)
MCHC RBC-ENTMCNC: 32 GM/DL — SIGNIFICANT CHANGE UP (ref 32–36)
MCV RBC AUTO: 84.8 FL — SIGNIFICANT CHANGE UP (ref 80–100)
PLATELET # BLD AUTO: 441 K/UL — HIGH (ref 150–400)
POTASSIUM SERPL-MCNC: 3.4 MMOL/L — LOW (ref 3.5–5.3)
POTASSIUM SERPL-SCNC: 3.4 MMOL/L — LOW (ref 3.5–5.3)
PROT SERPL-MCNC: 8.2 G/DL — SIGNIFICANT CHANGE UP (ref 6–8.3)
RBC # BLD: 6.26 M/UL — HIGH (ref 4.2–5.8)
RBC # FLD: 15.9 % — HIGH (ref 10.3–14.5)
SODIUM SERPL-SCNC: 137 MMOL/L — SIGNIFICANT CHANGE UP (ref 135–145)
TROPONIN T, HIGH SENSITIVITY RESULT: 21 NG/L — SIGNIFICANT CHANGE UP (ref 0–51)
WBC # BLD: 7.05 K/UL — SIGNIFICANT CHANGE UP (ref 3.8–10.5)
WBC # FLD AUTO: 7.05 K/UL — SIGNIFICANT CHANGE UP (ref 3.8–10.5)

## 2021-07-11 PROCEDURE — 99285 EMERGENCY DEPT VISIT HI MDM: CPT | Mod: 25

## 2021-07-11 PROCEDURE — 93010 ELECTROCARDIOGRAM REPORT: CPT

## 2021-07-11 PROCEDURE — 74176 CT ABD & PELVIS W/O CONTRAST: CPT | Mod: 26,MA

## 2021-07-11 RX ORDER — MORPHINE SULFATE 50 MG/1
4 CAPSULE, EXTENDED RELEASE ORAL ONCE
Refills: 0 | Status: DISCONTINUED | OUTPATIENT
Start: 2021-07-11 | End: 2021-07-11

## 2021-07-11 RX ORDER — ONDANSETRON 8 MG/1
4 TABLET, FILM COATED ORAL ONCE
Refills: 0 | Status: COMPLETED | OUTPATIENT
Start: 2021-07-11 | End: 2021-07-11

## 2021-07-11 RX ORDER — SODIUM CHLORIDE 9 MG/ML
1000 INJECTION INTRAMUSCULAR; INTRAVENOUS; SUBCUTANEOUS ONCE
Refills: 0 | Status: COMPLETED | OUTPATIENT
Start: 2021-07-11 | End: 2021-07-11

## 2021-07-11 RX ADMIN — SODIUM CHLORIDE 1000 MILLILITER(S): 9 INJECTION INTRAMUSCULAR; INTRAVENOUS; SUBCUTANEOUS at 22:35

## 2021-07-11 RX ADMIN — MORPHINE SULFATE 4 MILLIGRAM(S): 50 CAPSULE, EXTENDED RELEASE ORAL at 22:34

## 2021-07-11 RX ADMIN — ONDANSETRON 4 MILLIGRAM(S): 8 TABLET, FILM COATED ORAL at 22:34

## 2021-07-11 RX ADMIN — ONDANSETRON 4 MILLIGRAM(S): 8 TABLET, FILM COATED ORAL at 23:54

## 2021-07-11 RX ADMIN — SODIUM CHLORIDE 1000 MILLILITER(S): 9 INJECTION INTRAMUSCULAR; INTRAVENOUS; SUBCUTANEOUS at 23:54

## 2021-07-11 RX ADMIN — MORPHINE SULFATE 4 MILLIGRAM(S): 50 CAPSULE, EXTENDED RELEASE ORAL at 23:54

## 2021-07-11 NOTE — ED PROVIDER NOTE - CONSTITUTIONAL, MLM
uncomfortable appearing, awake, alert, oriented to person, place, time/situation and in no apparent distress. normal...

## 2021-07-11 NOTE — ED PROVIDER NOTE - CLINICAL SUMMARY MEDICAL DECISION MAKING FREE TEXT BOX
55 y/o M, PMH of HLD, CAD s/p 2 stents, Diverticulitis c/b obstruction s/p colectomy/colostomy w/ reversal (2019) and subsequent ventral hernias, presents to ED c/o diffuse abd pain and multiple episodes of bilious vomiting x 4 days. Pt states "this feels like the time I had an obstruction." Pt reports that he is still having BMs and passing gas, but has not been able to keep tolerate PO. Pt denies f/c, CP, SOB, back pain, diarrhea, urinary sx, or weakness/numbness.  Pt mildly tachycardic, but otherwise VSS. Physical exam significant for severe diffuse abdominal tenderness, and b/l reducible ventral hernias.  High suspicion for bowel obstruction. Plan to check labs, VBG, and CT A/P. Reassess.

## 2021-07-11 NOTE — ED ADULT NURSE NOTE - OBJECTIVE STATEMENT
57 y/o male PMHx Diverticulitis, History of neuropathy, HLD, Morbid obesity, MI s/p 2 stents, Sleep apnea arrives to [facility] ED by with c/o. Pt states 2019 obstruction due to diverticulitis, colostomy and s/p resection. surgery scheduled for 8/19/2021 for hernia repair. Patient states symptoms feel similar to SBO. Ate pistachios thursday, unable to eat since...Pain radiating from right abdomen to left abdomen. 57 y/o male PMHx Diverticulitis, History of neuropathy, HLD, Morbid obesity, MI s/p 2 stents, Sleep apnea arrives to [facility] ED by car from home with c/o abdominal pain. Pt states 2019 obstruction due to diverticulitis, s/p colostomy and later resection. Patient arrives for abdominal pain 10/10, a/w N/V, states symptoms feel similar to SBO. Patient ate pistachios thursday, endorses decreased PO intake. Pain radiating from right abdomen to left abdomen. Surgery scheduled for 8/19/2021 for hernia repair. Patient is A&Ox4, irritable and loud. Respirations spontaneous and unlabored. Denies SOB, dyspnea, cough, chest pain, palpitations. Abdominal distension, specifically to umbilical area. +N/V. Denies urinary symptoms. Denies fever/chills. No sick contacts. Skin is warm/dry and normal for race. Ambulates independently at baseline.

## 2021-07-11 NOTE — ED ADULT TRIAGE NOTE - CHIEF COMPLAINT QUOTE
Pt states that for the last few days, pt has had severe abdominal pain, pt states pain feels like past "obstructions." Pt endorsing many episodes of emesis.

## 2021-07-11 NOTE — ED PROVIDER NOTE - PROGRESS NOTE DETAILS
Nina Alberto PGY2: CT shows SBO w/ transition point at central aspect of large ventral hernia. Surgery consulted. Will place NGT Domingo Marks MD: Received sign out on patient. patient signed out pending CT, and CT showed SBO. surgery is consulted.

## 2021-07-11 NOTE — ED PROVIDER NOTE - ATTENDING CONTRIBUTION TO CARE
Patient is a 55 yo M with history of hyperlipidemia, diverticulitis complicated by obstruction and need for colectomy/ colostomy with reversal with subsequent ventral hernias here for 4 days of abdominal discomfort and vomiting. He states he had pistachios and then developed pain and vomiting. He is passing gas and stool but complains of severe abdominal pain. No fevers. He reports multiple episodes of bilious vomiting. He has been unable to tolerate anything by mouth.     VS noted  Gen: uncomfortable, retching  HEENT: EOMI, mmm  Lungs: CTAB/L no C/ W /R   CVS: RRR   Abd; obese, soft, palpable and reducible ventral hernias, ttp, no guarding or rebound  Ext: no edema  Skin: no rash  Neuro AAOx3 non focal clear speech  a/p: vomiting/ abd pain - unable to tolerate PO. plan for ekg, labs, IVF, CT A/P to evaluate for obstruction.   - Braulio OLIVA

## 2021-07-11 NOTE — ED PROVIDER NOTE - OBJECTIVE STATEMENT
57 y/o M, PMH of HLD, CAD s/p 2 stents, Diverticulitis c/b obstruction s/p colectomy/colostomy w/ reversal (2019) and subsequent ventral hernias, presents to ED c/o diffuse abd pain and multiple episodes of bilious vomiting x 4 days. Pt states "this feels like the time I had an obstruction." Pt reports that he is still having BMs and passing gas, but has not been able to keep tolerate PO. Pt denies f/c, CP, SOB, back pain, diarrhea, urinary sx, or weakness/numbness.

## 2021-07-11 NOTE — ED ADULT NURSE NOTE - INTERVENTIONS DEFINITIONS
Physically safe environment: no spills, clutter or unnecessary equipment/Stretcher in lowest position, wheels locked, appropriate side rails in place/Monitor for mental status changes and reorient to person, place, and time/Reinforce activity limits and safety measures with patient and family

## 2021-07-12 DIAGNOSIS — K56.609 UNSPECIFIED INTESTINAL OBSTRUCTION, UNSPECIFIED AS TO PARTIAL VERSUS COMPLETE OBSTRUCTION: ICD-10-CM

## 2021-07-12 LAB
ANION GAP SERPL CALC-SCNC: 15 MMOL/L — SIGNIFICANT CHANGE UP (ref 5–17)
ANION GAP SERPL CALC-SCNC: 18 MMOL/L — HIGH (ref 5–17)
ANION GAP SERPL CALC-SCNC: 18 MMOL/L — HIGH (ref 5–17)
BASOPHILS # BLD AUTO: 0 K/UL — SIGNIFICANT CHANGE UP (ref 0–0.2)
BASOPHILS NFR BLD AUTO: 0 % — SIGNIFICANT CHANGE UP (ref 0–2)
BUN SERPL-MCNC: 50 MG/DL — HIGH (ref 7–23)
BUN SERPL-MCNC: 66 MG/DL — HIGH (ref 7–23)
BUN SERPL-MCNC: 67 MG/DL — HIGH (ref 7–23)
CALCIUM SERPL-MCNC: 8.8 MG/DL — SIGNIFICANT CHANGE UP (ref 8.4–10.5)
CALCIUM SERPL-MCNC: 9.2 MG/DL — SIGNIFICANT CHANGE UP (ref 8.4–10.5)
CALCIUM SERPL-MCNC: 9.4 MG/DL — SIGNIFICANT CHANGE UP (ref 8.4–10.5)
CHLORIDE SERPL-SCNC: 101 MMOL/L — SIGNIFICANT CHANGE UP (ref 96–108)
CHLORIDE SERPL-SCNC: 96 MMOL/L — SIGNIFICANT CHANGE UP (ref 96–108)
CHLORIDE SERPL-SCNC: 97 MMOL/L — SIGNIFICANT CHANGE UP (ref 96–108)
CO2 SERPL-SCNC: 24 MMOL/L — SIGNIFICANT CHANGE UP (ref 22–31)
CO2 SERPL-SCNC: 25 MMOL/L — SIGNIFICANT CHANGE UP (ref 22–31)
CO2 SERPL-SCNC: 27 MMOL/L — SIGNIFICANT CHANGE UP (ref 22–31)
CREAT SERPL-MCNC: 1.83 MG/DL — HIGH (ref 0.5–1.3)
CREAT SERPL-MCNC: 2.38 MG/DL — HIGH (ref 0.5–1.3)
CREAT SERPL-MCNC: 2.51 MG/DL — HIGH (ref 0.5–1.3)
EOSINOPHIL # BLD AUTO: 0.06 K/UL — SIGNIFICANT CHANGE UP (ref 0–0.5)
EOSINOPHIL NFR BLD AUTO: 0.9 % — SIGNIFICANT CHANGE UP (ref 0–6)
GLUCOSE SERPL-MCNC: 111 MG/DL — HIGH (ref 70–99)
GLUCOSE SERPL-MCNC: 114 MG/DL — HIGH (ref 70–99)
GLUCOSE SERPL-MCNC: 118 MG/DL — HIGH (ref 70–99)
HCT VFR BLD CALC: 48.2 % — SIGNIFICANT CHANGE UP (ref 39–50)
HCT VFR BLD CALC: 49.6 % — SIGNIFICANT CHANGE UP (ref 39–50)
HGB BLD-MCNC: 15.6 G/DL — SIGNIFICANT CHANGE UP (ref 13–17)
HGB BLD-MCNC: 15.8 G/DL — SIGNIFICANT CHANGE UP (ref 13–17)
LACTATE SERPL-SCNC: 1.8 MMOL/L — SIGNIFICANT CHANGE UP (ref 0.7–2)
LACTATE SERPL-SCNC: 1.9 MMOL/L — SIGNIFICANT CHANGE UP (ref 0.7–2)
LYMPHOCYTES # BLD AUTO: 1.33 K/UL — SIGNIFICANT CHANGE UP (ref 1–3.3)
LYMPHOCYTES # BLD AUTO: 18.9 % — SIGNIFICANT CHANGE UP (ref 13–44)
MAGNESIUM SERPL-MCNC: 2.3 MG/DL — SIGNIFICANT CHANGE UP (ref 1.6–2.6)
MAGNESIUM SERPL-MCNC: 2.3 MG/DL — SIGNIFICANT CHANGE UP (ref 1.6–2.6)
MAGNESIUM SERPL-MCNC: 2.4 MG/DL — SIGNIFICANT CHANGE UP (ref 1.6–2.6)
MCHC RBC-ENTMCNC: 27.1 PG — SIGNIFICANT CHANGE UP (ref 27–34)
MCHC RBC-ENTMCNC: 27.3 PG — SIGNIFICANT CHANGE UP (ref 27–34)
MCHC RBC-ENTMCNC: 31.9 GM/DL — LOW (ref 32–36)
MCHC RBC-ENTMCNC: 32.4 GM/DL — SIGNIFICANT CHANGE UP (ref 32–36)
MCV RBC AUTO: 83.8 FL — SIGNIFICANT CHANGE UP (ref 80–100)
MCV RBC AUTO: 85.8 FL — SIGNIFICANT CHANGE UP (ref 80–100)
MONOCYTES # BLD AUTO: 1.14 K/UL — HIGH (ref 0–0.9)
MONOCYTES NFR BLD AUTO: 16.2 % — HIGH (ref 2–14)
NEUTROPHILS # BLD AUTO: 3.62 K/UL — SIGNIFICANT CHANGE UP (ref 1.8–7.4)
NEUTROPHILS NFR BLD AUTO: 46 % — SIGNIFICANT CHANGE UP (ref 43–77)
NRBC # BLD: 0 /100 WBCS — SIGNIFICANT CHANGE UP (ref 0–0)
NRBC # BLD: 0 /100 WBCS — SIGNIFICANT CHANGE UP (ref 0–0)
PHOSPHATE SERPL-MCNC: 2.8 MG/DL — SIGNIFICANT CHANGE UP (ref 2.5–4.5)
PHOSPHATE SERPL-MCNC: 4 MG/DL — SIGNIFICANT CHANGE UP (ref 2.5–4.5)
PHOSPHATE SERPL-MCNC: 4.8 MG/DL — HIGH (ref 2.5–4.5)
PLATELET # BLD AUTO: 358 K/UL — SIGNIFICANT CHANGE UP (ref 150–400)
PLATELET # BLD AUTO: 368 K/UL — SIGNIFICANT CHANGE UP (ref 150–400)
POTASSIUM SERPL-MCNC: 3.3 MMOL/L — LOW (ref 3.5–5.3)
POTASSIUM SERPL-MCNC: 3.4 MMOL/L — LOW (ref 3.5–5.3)
POTASSIUM SERPL-MCNC: 3.5 MMOL/L — SIGNIFICANT CHANGE UP (ref 3.5–5.3)
POTASSIUM SERPL-SCNC: 3.3 MMOL/L — LOW (ref 3.5–5.3)
POTASSIUM SERPL-SCNC: 3.4 MMOL/L — LOW (ref 3.5–5.3)
POTASSIUM SERPL-SCNC: 3.5 MMOL/L — SIGNIFICANT CHANGE UP (ref 3.5–5.3)
RBC # BLD: 5.75 M/UL — SIGNIFICANT CHANGE UP (ref 4.2–5.8)
RBC # BLD: 5.78 M/UL — SIGNIFICANT CHANGE UP (ref 4.2–5.8)
RBC # FLD: 15.2 % — HIGH (ref 10.3–14.5)
RBC # FLD: 15.2 % — HIGH (ref 10.3–14.5)
SARS-COV-2 RNA SPEC QL NAA+PROBE: SIGNIFICANT CHANGE UP
SODIUM SERPL-SCNC: 138 MMOL/L — SIGNIFICANT CHANGE UP (ref 135–145)
SODIUM SERPL-SCNC: 140 MMOL/L — SIGNIFICANT CHANGE UP (ref 135–145)
SODIUM SERPL-SCNC: 143 MMOL/L — SIGNIFICANT CHANGE UP (ref 135–145)
WBC # BLD: 5.01 K/UL — SIGNIFICANT CHANGE UP (ref 3.8–10.5)
WBC # BLD: 6.21 K/UL — SIGNIFICANT CHANGE UP (ref 3.8–10.5)
WBC # FLD AUTO: 5.01 K/UL — SIGNIFICANT CHANGE UP (ref 3.8–10.5)
WBC # FLD AUTO: 6.21 K/UL — SIGNIFICANT CHANGE UP (ref 3.8–10.5)

## 2021-07-12 PROCEDURE — 71045 X-RAY EXAM CHEST 1 VIEW: CPT | Mod: 26

## 2021-07-12 RX ORDER — ASPIRIN/CALCIUM CARB/MAGNESIUM 324 MG
300 TABLET ORAL DAILY
Refills: 0 | Status: DISCONTINUED | OUTPATIENT
Start: 2021-07-12 | End: 2021-07-12

## 2021-07-12 RX ORDER — TETRACAINE/BENZOCAINE/BUTAMBEN 2%-14%-2%
1 OINTMENT (GRAM) TOPICAL ONCE
Refills: 0 | Status: COMPLETED | OUTPATIENT
Start: 2021-07-12 | End: 2021-07-12

## 2021-07-12 RX ORDER — HEPARIN SODIUM 5000 [USP'U]/ML
5000 INJECTION INTRAVENOUS; SUBCUTANEOUS EVERY 8 HOURS
Refills: 0 | Status: DISCONTINUED | OUTPATIENT
Start: 2021-07-12 | End: 2021-07-15

## 2021-07-12 RX ORDER — POTASSIUM CHLORIDE 20 MEQ
10 PACKET (EA) ORAL
Refills: 0 | Status: COMPLETED | OUTPATIENT
Start: 2021-07-12 | End: 2021-07-12

## 2021-07-12 RX ORDER — DOCUSATE SODIUM 100 MG
1 CAPSULE ORAL
Qty: 0 | Refills: 0 | DISCHARGE

## 2021-07-12 RX ORDER — SODIUM CHLORIDE 9 MG/ML
1000 INJECTION, SOLUTION INTRAVENOUS
Refills: 0 | Status: DISCONTINUED | OUTPATIENT
Start: 2021-07-12 | End: 2021-07-15

## 2021-07-12 RX ORDER — PANTOPRAZOLE SODIUM 20 MG/1
40 TABLET, DELAYED RELEASE ORAL DAILY
Refills: 0 | Status: DISCONTINUED | OUTPATIENT
Start: 2021-07-12 | End: 2021-07-13

## 2021-07-12 RX ORDER — POTASSIUM PHOSPHATE, MONOBASIC POTASSIUM PHOSPHATE, DIBASIC 236; 224 MG/ML; MG/ML
15 INJECTION, SOLUTION INTRAVENOUS ONCE
Refills: 0 | Status: COMPLETED | OUTPATIENT
Start: 2021-07-12 | End: 2021-07-12

## 2021-07-12 RX ADMIN — HEPARIN SODIUM 5000 UNIT(S): 5000 INJECTION INTRAVENOUS; SUBCUTANEOUS at 13:10

## 2021-07-12 RX ADMIN — SODIUM CHLORIDE 150 MILLILITER(S): 9 INJECTION, SOLUTION INTRAVENOUS at 04:13

## 2021-07-12 RX ADMIN — Medication 100 MILLIEQUIVALENT(S): at 09:29

## 2021-07-12 RX ADMIN — HEPARIN SODIUM 5000 UNIT(S): 5000 INJECTION INTRAVENOUS; SUBCUTANEOUS at 22:26

## 2021-07-12 RX ADMIN — Medication 100 MILLIEQUIVALENT(S): at 04:13

## 2021-07-12 RX ADMIN — Medication 100 MILLIEQUIVALENT(S): at 20:02

## 2021-07-12 RX ADMIN — SODIUM CHLORIDE 150 MILLILITER(S): 9 INJECTION, SOLUTION INTRAVENOUS at 11:26

## 2021-07-12 RX ADMIN — Medication 100 MILLIEQUIVALENT(S): at 22:28

## 2021-07-12 RX ADMIN — PANTOPRAZOLE SODIUM 40 MILLIGRAM(S): 20 TABLET, DELAYED RELEASE ORAL at 17:38

## 2021-07-12 RX ADMIN — HEPARIN SODIUM 5000 UNIT(S): 5000 INJECTION INTRAVENOUS; SUBCUTANEOUS at 05:09

## 2021-07-12 RX ADMIN — Medication 100 MILLIEQUIVALENT(S): at 18:08

## 2021-07-12 RX ADMIN — Medication 100 MILLIEQUIVALENT(S): at 06:45

## 2021-07-12 RX ADMIN — Medication 100 MILLIEQUIVALENT(S): at 13:10

## 2021-07-12 RX ADMIN — Medication 100 MILLIEQUIVALENT(S): at 11:25

## 2021-07-12 RX ADMIN — Medication 100 MILLIEQUIVALENT(S): at 05:08

## 2021-07-12 RX ADMIN — Medication 1 SPRAY(S): at 02:36

## 2021-07-12 NOTE — H&P ADULT - ASSESSMENT
56M with hx of HLD, CAD s/p MAINOR x 2 (July 2020) on DAPT, LBO 2/2 diverticulitis s/p John's procedure (March 2019) and reversal (June 2019) presenting with SBO 2/2 large ventral hernia and JANELL 2/2 volume depletion.    - Admit to surgery, Dr. Chanel  - NG tube placed with 1.7 liters out - will monitor output  - Hernia with large defect is reducible - will need eventual repair  - Will recheck labs and continue fluid resuscitation  - Cardiology consult for recommendations on antiplatelet therapy  - VTE ppx  - NPO    Green team surgery  Pager [phone] 56M with hx of HLD, CAD s/p MAINOR x 2 (July 2020) on DAPT, LBO 2/2 diverticulitis s/p John's procedure (March 2019) and reversal (June 2019) presenting with SBO 2/2 large ventral hernia and JANELL 2/2 volume depletion.    - Admit to surgery, Dr. Chanel  - NG tube placed with 1.7 liters out - will monitor output  - Hernia with large defect is reducible - will need eventual repair  - Will recheck labs and continue fluid resuscitation  - Cardiology consult for recommendations on antiplatelet therapy (Dr Harsh Lopez)  - VTE ppx  - NPO    Green team surgery  Pager [phone]

## 2021-07-12 NOTE — PATIENT PROFILE ADULT - NSPROPTRIGHTBILLOFRIGHTS_GEN_A_NUR
EXAMINATION:  7/3/2017 8:52 AM    HISTORY/REASON FOR EXAM:  SHOULDER PAIN.         TECHNIQUE/EXAM DESCRIPTION: Contrast injection prior to MRI scan.    COMPARISON:  None    FLUOROSCOPY TIME: 1.4 minutes    FINDINGS:  The procedure and its risks were explained the patient.  The patient accepted these risks and agreed to the procedure.    Utilizing sterile technique and fluoroscopic guidance, 12 cc of contrast material was injected into the left shoulder.  This consisted of a mixture of 7 cc of iodinated contrast material, 10 cc of saline, 3 mL of 1% lidocaine and .1 cc of gadolinium.    The patient tolerated the procedure well.    Total fluoroscopic images: 2 fluoroscopic images obtained.    IMPRESSION:  Contrast injection prior to MRI scan of the shoulder.  
patient

## 2021-07-12 NOTE — H&P ADULT - NSHPLABSRESULTS_GEN_ALL_CORE
T(C): 36.9 (07-12-21 @ 00:01), Max: 36.9 (07-11-21 @ 22:40)  HR: 101 (07-12-21 @ 00:01) (101 - 106)  BP: 111/80 (07-12-21 @ 00:01) (109/78 - 118/85)  RR: 20 (07-12-21 @ 00:01) (20 - 20)  SpO2: 95% (07-12-21 @ 00:01) (94% - 95%)    LABS:                        17.0   7.05  )-----------( 441      ( 11 Jul 2021 22:39 )             53.1     11 Jul 2021 22:39    137    |  92     |  70     ----------------------------<  129    3.4     |  25     |  2.82     Ca    9.9        11 Jul 2021 22:39    TPro  8.2    /  Alb  4.4    /  TBili  0.8    /  DBili  x      /  AST  13     /  ALT  12     /  AlkPhos  77     11 Jul 2021 22:39    IMAGING:    CT ABDOMEN AND PELVIS                          PROCEDURE DATE:  07/11/2021      LOWER CHEST: Subsegmental atelectasis. Coronary artery calcification.    LIVER: Excluded dome. Again noted, left hepatic cyst.  GALLBLADDER/BILE DUCTS: No intrahepatic or extrahepatic biliary dilatation. No radiopaque gallstone.  PANCREAS: Diffuse fatty atrophy.  SPLEEN: Unremarkable.    ADRENALS: Unremarkable.  KIDNEYS/URETERS: No hydronephrosis, hydroureter or significant perinephric stranding. No radiopaque urinary tract stone.  BLADDER: Partially distended.  REPRODUCTIVE ORGANS: Unremarkable.    BOWEL: Transition point at the right central aspect of the ventral hernia (4:78 and 6:15) with upstream small bowel and gastricdilatation and distal collapse. Nonspecific small bowel wall thickening within the right aspect of the ventral hernia. Again noted, focal fat in the small bowel loop in the central abdomen, suggesting a lipoma. Sigmoid colon anastomosis. Colon diverticulosis. Submucosal fat in the colon. Unremarkable appendix.  PERITONEUM: No drainable fluid collection or free air in the visualized abdomen and pelvis. Nonspecific mild mesenteric edema.  VESSELS: Atherosclerosis. Normal caliber of the abdominal aorta.  RETROPERITONEUM: Subcentimeter lymph nodes without lymphadenopathy.  ABDOMINAL WALL/SOFT TISSUES: Postsurgical changes along the anterior abdominal and pelvic wall soft tissue. Again noted, large ventral hernia as described. Small fat-containinginguinal hernias.  BONES: Degenerative changes of the spine, pubic symphysis, sacroiliac and hip joints.    IMPRESSION:    Small bowel obstruction with a transition point at the right central aspect of the large ventral hernia. Nonspecific small bowel thickening with mesenteric edema, which can be seen in the setting of bowel congestion/ischemia. Recommend clinical correlation.

## 2021-07-12 NOTE — H&P ADULT - NSHPPHYSICALEXAM_GEN_ALL_CORE
General: Alert, NAD  Neuro: A+Ox3  HEENT: NC/AT, no asymmetry, no scleral icterus  Cardio: Pulse regular, hemodynamically normal  Resp: Airway patent, unlabored breathing  GI/Abd: Soft, large ventral hernia reducible, mild focal tenderness at hernia, no guarding or rebound tenderness  Ext: No edema, all 4 extremities moving spontaneously, no limitations

## 2021-07-12 NOTE — H&P ADULT - NSHPSOCIALHISTORY_GEN_ALL_CORE
He currently uses cocaine.  Smokes 1/2 PPD and has for many years.  Drinks alcohol socially.  He is going through a divorce.

## 2021-07-12 NOTE — ED ADULT NURSE REASSESSMENT NOTE - NS ED NURSE REASSESS COMMENT FT1
NG tube placed in right nare by DEZ Alberto. NG tube draining to low continuous suction. Approx. 1200 ccs dark green fluid drained upon insertion. Will continue to reassess.

## 2021-07-12 NOTE — H&P ADULT - HISTORY OF PRESENT ILLNESS
56M with hx of HLD, CAD s/p MAINOR x 2 (July 2020) on DAPT, LBO 2/2 diverticulitis s/p John's procedure (March 2019) and reversal (June 2019) presenting with abdominal pain for 3 days. The pain worsened in the last 1-2 days and he has had multiple episodes of emesis since yesterday. He states he is still passing gas and has been having diarrhea today. Has not had anything to eat or drink in 3 days. Denies fevers, chills, chest pain, SOB, dysuria. He says that he has had several episodes of pain like this since his surgery but it has always resolved at home.    He takes aspirin, Brilinta and Lipitor. Last colonoscopy was in 2019. He is scheduled for a hernia repair with Dr. Stein on August 19th.    In the ED he received 2L crystalloid. NG tube was placed with 1.7 L out on insertion. He is hemodynamically normal with WBC 7, Hgb/Hct 17/53, Cr 2.8, lactate 2.9. CT without contrast shows SBO with transition point at the right central aspect of the ventral hernia with some bowel wall thickening and mesenteric edema. Hernia is reducible. 56M with hx of HLD, CAD s/p MAINOR x 2 (July 2020) on DAPT, LBO 2/2 diverticulitis s/p John's procedure (March 2019) and reversal (June 2019) presenting with abdominal pain for 3 days. The pain worsened in the last 1-2 days and he has had multiple episodes of emesis since yesterday. He states he is still passing gas and has been having diarrhea today. Has not had anything to eat or drink in 3 days. Denies fevers, chills, chest pain, SOB, dysuria. He says that he has had several episodes of pain like this since his surgery but it has always resolved at home.    He takes aspirin, Brilinta and Lipitor. Last colonoscopy was in 2019. He is scheduled for a hernia repair with Dr. Stein on August 19th.    In the ED he received 2L crystalloid. NG tube was placed with 1.7 L out on insertion. He is hemodynamically normal with WBC 7, Hgb/Hct 17/53, Cr 2.8, lactate 2.9. CT without contrast shows SBO with transition point at the right central aspect of the ventral hernia with some bowel wall thickening and mesenteric edema. Hernia is reducible.    Cardiologist is Dr. Harsh Lopez.

## 2021-07-12 NOTE — H&P ADULT - ATTENDING COMMENTS
I saw the pt at 7:30 am. Denies pain. large incisional hernia reducible. Picture c/w SBO d/t adhesions inside the hernia. Continue NGT, IVFs, hold full AC. He may need repair in this admission, once effect from Brillinta wears off (he took last yesterday morning). Cardiology eval.

## 2021-07-12 NOTE — CHART NOTE - NSCHARTNOTEFT_GEN_A_CORE
Spoke with the patient's cardiologist, Dr. Harsh Collins, earlier today. Per the conversation, since the patient is a year out from MAINOR x2 placement, it is okay if the patient is not on Brilinta during the hospitalization while NPO and perioperatively. Recommend for the patient to be taking an aspirin suppository.    In terms of potential surgical intervention during this hospitalization (repairing the ventral hernia that is causing the SBO), the patient does not need any additional cardiac tests or clearances from a cardiology perspective prior to going to the operating room as long as he does not have any chest pain while walking.

## 2021-07-13 LAB
ANION GAP SERPL CALC-SCNC: 13 MMOL/L — SIGNIFICANT CHANGE UP (ref 5–17)
BUN SERPL-MCNC: 38 MG/DL — HIGH (ref 7–23)
CALCIUM SERPL-MCNC: 9.4 MG/DL — SIGNIFICANT CHANGE UP (ref 8.4–10.5)
CHLORIDE SERPL-SCNC: 99 MMOL/L — SIGNIFICANT CHANGE UP (ref 96–108)
CO2 SERPL-SCNC: 32 MMOL/L — HIGH (ref 22–31)
COVID-19 SPIKE DOMAIN AB INTERP: POSITIVE
COVID-19 SPIKE DOMAIN ANTIBODY RESULT: >250 U/ML — HIGH
CREAT SERPL-MCNC: 1.68 MG/DL — HIGH (ref 0.5–1.3)
GLUCOSE SERPL-MCNC: 85 MG/DL — SIGNIFICANT CHANGE UP (ref 70–99)
HCT VFR BLD CALC: 49.7 % — SIGNIFICANT CHANGE UP (ref 39–50)
HGB BLD-MCNC: 15.2 G/DL — SIGNIFICANT CHANGE UP (ref 13–17)
MAGNESIUM SERPL-MCNC: 2.6 MG/DL — SIGNIFICANT CHANGE UP (ref 1.6–2.6)
MCHC RBC-ENTMCNC: 26.8 PG — LOW (ref 27–34)
MCHC RBC-ENTMCNC: 30.6 GM/DL — LOW (ref 32–36)
MCV RBC AUTO: 87.5 FL — SIGNIFICANT CHANGE UP (ref 80–100)
NRBC # BLD: 0 /100 WBCS — SIGNIFICANT CHANGE UP (ref 0–0)
PHOSPHATE SERPL-MCNC: 3 MG/DL — SIGNIFICANT CHANGE UP (ref 2.5–4.5)
PLATELET # BLD AUTO: 365 K/UL — SIGNIFICANT CHANGE UP (ref 150–400)
POTASSIUM SERPL-MCNC: 3.5 MMOL/L — SIGNIFICANT CHANGE UP (ref 3.5–5.3)
POTASSIUM SERPL-SCNC: 3.5 MMOL/L — SIGNIFICANT CHANGE UP (ref 3.5–5.3)
RBC # BLD: 5.68 M/UL — SIGNIFICANT CHANGE UP (ref 4.2–5.8)
RBC # FLD: 15.3 % — HIGH (ref 10.3–14.5)
SARS-COV-2 IGG+IGM SERPL QL IA: >250 U/ML — HIGH
SARS-COV-2 IGG+IGM SERPL QL IA: POSITIVE
SODIUM SERPL-SCNC: 144 MMOL/L — SIGNIFICANT CHANGE UP (ref 135–145)
WBC # BLD: 7.83 K/UL — SIGNIFICANT CHANGE UP (ref 3.8–10.5)
WBC # FLD AUTO: 7.83 K/UL — SIGNIFICANT CHANGE UP (ref 3.8–10.5)

## 2021-07-13 PROCEDURE — 74176 CT ABD & PELVIS W/O CONTRAST: CPT | Mod: 26

## 2021-07-13 PROCEDURE — 99255 IP/OBS CONSLTJ NEW/EST HI 80: CPT

## 2021-07-13 RX ORDER — PANTOPRAZOLE SODIUM 20 MG/1
40 TABLET, DELAYED RELEASE ORAL
Refills: 0 | Status: DISCONTINUED | OUTPATIENT
Start: 2021-07-13 | End: 2021-07-18

## 2021-07-13 RX ORDER — TETRACAINE/BENZOCAINE/BUTAMBEN 2%-14%-2%
1 OINTMENT (GRAM) TOPICAL ONCE
Refills: 0 | Status: COMPLETED | OUTPATIENT
Start: 2021-07-13 | End: 2021-07-13

## 2021-07-13 RX ORDER — BENZOCAINE AND MENTHOL 5; 1 G/100ML; G/100ML
1 LIQUID ORAL EVERY 4 HOURS
Refills: 0 | Status: DISCONTINUED | OUTPATIENT
Start: 2021-07-13 | End: 2021-07-16

## 2021-07-13 RX ORDER — POTASSIUM CHLORIDE 20 MEQ
10 PACKET (EA) ORAL
Refills: 0 | Status: COMPLETED | OUTPATIENT
Start: 2021-07-13 | End: 2021-07-13

## 2021-07-13 RX ADMIN — SODIUM CHLORIDE 150 MILLILITER(S): 9 INJECTION, SOLUTION INTRAVENOUS at 05:17

## 2021-07-13 RX ADMIN — BENZOCAINE AND MENTHOL 1 LOZENGE: 5; 1 LIQUID ORAL at 05:20

## 2021-07-13 RX ADMIN — HEPARIN SODIUM 5000 UNIT(S): 5000 INJECTION INTRAVENOUS; SUBCUTANEOUS at 21:23

## 2021-07-13 RX ADMIN — Medication 100 MILLIEQUIVALENT(S): at 09:33

## 2021-07-13 RX ADMIN — BENZOCAINE AND MENTHOL 1 LOZENGE: 5; 1 LIQUID ORAL at 17:31

## 2021-07-13 RX ADMIN — HEPARIN SODIUM 5000 UNIT(S): 5000 INJECTION INTRAVENOUS; SUBCUTANEOUS at 05:18

## 2021-07-13 RX ADMIN — HEPARIN SODIUM 5000 UNIT(S): 5000 INJECTION INTRAVENOUS; SUBCUTANEOUS at 14:12

## 2021-07-13 RX ADMIN — Medication 1 SPRAY(S): at 17:31

## 2021-07-13 RX ADMIN — Medication 100 MILLIEQUIVALENT(S): at 12:27

## 2021-07-13 RX ADMIN — PANTOPRAZOLE SODIUM 40 MILLIGRAM(S): 20 TABLET, DELAYED RELEASE ORAL at 11:09

## 2021-07-13 RX ADMIN — POTASSIUM PHOSPHATE, MONOBASIC POTASSIUM PHOSPHATE, DIBASIC 62.5 MILLIMOLE(S): 236; 224 INJECTION, SOLUTION INTRAVENOUS at 01:39

## 2021-07-13 RX ADMIN — Medication 100 MILLIEQUIVALENT(S): at 14:12

## 2021-07-13 RX ADMIN — PANTOPRAZOLE SODIUM 40 MILLIGRAM(S): 20 TABLET, DELAYED RELEASE ORAL at 22:42

## 2021-07-13 NOTE — PROGRESS NOTE ADULT - ATTENDING COMMENTS
I saw the pt at 8:30 am this morning.  Denies pain. + flatus.   Abdomen soft, NT, large reducible hernia.  High NGT output, states, he has been drinking water.  Monitor NGT output in the morning.  Obtain f/u CT.

## 2021-07-13 NOTE — CONSULT NOTE ADULT - SUBJECTIVE AND OBJECTIVE BOX
============================================================  CHIEF COMPLAINT/REASON FOR CONSULT:  Patient is a 56y old  Male who presents with a chief complaint of     HISTORY OF PRESENT ILLNESS:  56yMale with a history of prior cocaine use, diverticulosis obesity elsa CAD s/p NSTEMI in  x 2 MAINOR > pRCA [Nl LVEF] presenting with abdominal discomfort found to have an obstructed bowel.   No reported worsening CP/Palps/SOB    ============================================================  Interval Events   -   -     ============================================================      Allergies    No Known Allergies    Intolerances    	    MEDICATIONS:  heparin   Injectable 5000 Unit(s) SubCutaneous every 8 hours          pantoprazole  Injectable 40 milliGRAM(s) IV Push two times a day      benzocaine 15 mG/menthol 3.6 mG (Sugar-Free) Lozenge 1 Lozenge Oral every 4 hours PRN  lactated ringers. 1000 milliLiter(s) IV Continuous <Continuous>      PAST MEDICAL & SURGICAL HISTORY:  Sleep apnea    HLD (hyperlipidemia)    Morbid obesity    Diverticulitis    History of neuropathy  big toes    Colostomy present    S/P repair of hydrocele        FAMILY HISTORY:  Family history of lung cancer  mother  age 40, hx lung cancer      Mother - HTN      SOCIAL HISTORY:    [ x] Non-smoker  [x] No Illicit Drug Use  [ x] No Excess EtOH Use      REVIEW OF SYSTEMS: (Unless + Before Symptom, it is negative)  Constitutional: [] Fever, []Fatigue, []Weight Changes  Eyes:  []Recent Vision Changes, []Eye Pain  ENT: []Congestion, []Sore Throat  Endocrine: []Excess Sweating, []Temperature Intolerance  Cardiovascular:  []Chest Pain, []Palpitations, []Shortness of Breath, []Pre-syncope, []Syncope,[] LE Swelling  Respiratory:[] Cough, []Congestion,[] Wheezing  Gastrointestinal: [] Abdominal Pain,[] Nausea, []Vomiting  Genitourinary: []Dysuria,[] hematuria  Musculoskeletal: []Joint Pain, []Hip/Knee Injury  Neurologic: []Headaches,[] Imbalance, []Weakness  Skin: []Rashes, []hematoma, []purprura    ================================    PHYSICAL EXAM:  T(C): 36.5 (21 @ 16:14), Max: 36.7 (21 @ 20:48)  HR: 104 (21 @ 16:14) (75 - 104)  BP: 116/76 (21 @ 16:14) (90/56 - 143/94)  RR: 18 (21 @ 16:14) (18 - 20)  SpO2: 95% (21 @ 16:14) (92% - 95%)  Wt(kg): --  I&O's Summary    2021 07:  -  2021 07:00  --------------------------------------------------------  IN: 4450 mL / OUT: 5750 mL / NET: -1300 mL    2021 07:  -  2021 18:22  --------------------------------------------------------  IN: 2100 mL / OUT: 1600 mL / NET: 500 mL      Appearance: Normal; NAD	  Psychiatry: AOx3; +Depressed Mood/Affect  HEENT:   Normal oral mucosa, EOMI	+NGT  Lymphatic: No lymphadenopathy  Cardiovascular: Normal S1 S2, No JVD, No murmurs, No edema  Respiratory: Lungs clear to auscultation, no use of accessory muscles	  Gastrointestinal:  Soft, Non-tender	  Skin: No rashes, No ecchymoses, No cyanosis	  Neurologic: Non-focal, No Focal Deficits  Extremities: Normal range of motion, No clubbing, cyanosis  Vascular: Peripheral pulses palpable 2+ bilaterally, no prominent varicosities    ============================    LABS:	   Labs  @ 18:22	    CBC Full  -  ( 2021 07:45 )  WBC Count : 7.83 K/uL  Hemoglobin : 15.2 g/dL  Hematocrit : 49.7 %  Platelet Count - Automated : 365 K/uL  Mean Cell Volume : 87.5 fl  Mean Cell Hemoglobin : 26.8 pg  Mean Cell Hemoglobin Concentration : 30.6 gm/dL  Auto Neutrophil # : x  Auto Lymphocyte # : x  Auto Monocyte # : x  Auto Eosinophil # : x  Auto Basophil # : x  Auto Neutrophil % : x  Auto Lymphocyte % : x  Auto Monocyte % : x  Auto Eosinophil % : x  Auto Basophil % : x        144  |  99  |  38<H>  ----------------------------<  85  3.5   |  32<H>  |  1.68<H>      143  |  101  |  50<H>  ----------------------------<  111<H>  3.5   |  27  |  1.83<H>    Ca    9.4      2021 07:43  Ca    9.4      2021 17:22  Phos  3.0       Phos  2.8       Mg     2.6       Mg     2.4         TPro  8.2  /  Alb  4.4  /  TBili  0.8  /  DBili  x   /  AST  13  /  ALT  12  /  AlkPhos  77          =========================================================================  ASSESSMENT:  56yMale with a history of prior cocaine use, diverticulosis obesity elsa CAD s/p NSTEMI in  x 2 MAINOR > pRCA [Nl LVEF] presenting with abdominal discomfort found to have an obstructed bowel.     Recommendations  - MUST CONTINUE USE of ASA 81 -> FL route if necessary  - [facility] within last yr - no active symptoms - can proceed with surgery without further cardiac testing  - Very Depressed - consider inpatient psych consult     Harsh Lopez MD, [facility]  [phone]                                                                                                        Total time spent in face-to-face encounter was 80 minutes. > 50 minutes spent in counseling and coordination of care addressing all medical issues listed above. All labs, imaging, consultant reports, and any relevant outside medical records personally reviewed in order to evaluate and manage the patient medically as well as provide coordination of care amongst providers.

## 2021-07-13 NOTE — PROGRESS NOTE ADULT - SUBJECTIVE AND OBJECTIVE BOX
SUBJECTIVE:   Seen and examined at bedside. Overnight, NGT moved by the pt. It was repositioned and confirmed via CXR. No nausea or vomiting.     OBJECTIVE: T(C): 36.7 (07-12-21 @ 20:48), Max: 36.9 (07-12-21 @ 09:38)  HR: 93 (07-12-21 @ 20:48) (93 - 146)  BP: 143/94 (07-12-21 @ 20:48) (100/68 - 143/94)  RR: 20 (07-12-21 @ 20:48) (20 - 20)  SpO2: 92% (07-12-21 @ 20:48) (92% - 94%)  Wt(kg): --  I&O's Summary    11 Jul 2021 07:01  -  12 Jul 2021 07:00  --------------------------------------------------------  IN: 450 mL / OUT: 0 mL / NET: 450 mL    12 Jul 2021 07:01  -  13 Jul 2021 01:14  --------------------------------------------------------  IN: 2200 mL / OUT: 4250 mL / NET: -2050 mL      I&O's Detail    11 Jul 2021 07:01  -  12 Jul 2021 07:00  --------------------------------------------------------  IN:    Lactated Ringers: 450 mL  Total IN: 450 mL    OUT:  Total OUT: 0 mL    Total NET: 450 mL      12 Jul 2021 07:01  -  13 Jul 2021 01:14  --------------------------------------------------------  IN:    IV PiggyBack: 400 mL    Lactated Ringers: 1800 mL  Total IN: 2200 mL    OUT:    Nasogastric/Oral tube (mL): 1850 mL    Oral Fluid: 0 mL    Voided (mL): 2400 mL  Total OUT: 4250 mL    Total NET: -2050 mL      Physical Exam  General: Alert, NAD  Cardio: Pulse regular, hemodynamically normal  Resp: Airway patent, unlabored breathing  GI/Abd: Soft, large ventral hernia reducible, mild focal tenderness at hernia, no guarding or rebound tenderness. NGT intact   Ext: No edema, all 4 extremities moving spontaneously, no limitations    MEDICATIONS  (STANDING):  heparin   Injectable 5000 Unit(s) SubCutaneous every 8 hours  lactated ringers. 1000 milliLiter(s) (150 mL/Hr) IV Continuous <Continuous>  pantoprazole  Injectable 40 milliGRAM(s) IV Push daily  potassium phosphate IVPB 15 milliMole(s) IV Intermittent once    MEDICATIONS  (PRN):  benzocaine 15 mG/menthol 3.6 mG (Sugar-Free) Lozenge 1 Lozenge Oral every 4 hours PRN Sore Throat      LABS:                        15.8   5.01  )-----------( 358      ( 12 Jul 2021 07:19 )             49.6     07-12    143  |  101  |  50<H>  ----------------------------<  111<H>  3.5   |  27  |  1.83<H>    Ca    9.4      12 Jul 2021 17:22  Phos  2.8     07-12  Mg     2.4     07-12    TPro  8.2  /  Alb  4.4  /  TBili  0.8  /  DBili  x   /  AST  13  /  ALT  12  /  AlkPhos  77  07-11             SUBJECTIVE:   Seen and examined at bedside. Overnight, NGT moved by the pt. It was repositioned and confirmed via CXR. No nausea or vomiting. Pain better than yesterday. (+)gas/(+)BM.    OBJECTIVE: T(C): 36.7 (07-12-21 @ 20:48), Max: 36.9 (07-12-21 @ 09:38)  HR: 93 (07-12-21 @ 20:48) (93 - 146)  BP: 143/94 (07-12-21 @ 20:48) (100/68 - 143/94)  RR: 20 (07-12-21 @ 20:48) (20 - 20)  SpO2: 92% (07-12-21 @ 20:48) (92% - 94%)  Wt(kg): --  I&O's Summary    11 Jul 2021 07:01  -  12 Jul 2021 07:00  --------------------------------------------------------  IN: 450 mL / OUT: 0 mL / NET: 450 mL    12 Jul 2021 07:01  -  13 Jul 2021 01:14  --------------------------------------------------------  IN: 2200 mL / OUT: 4250 mL / NET: -2050 mL      I&O's Detail    11 Jul 2021 07:01  -  12 Jul 2021 07:00  --------------------------------------------------------  IN:    Lactated Ringers: 450 mL  Total IN: 450 mL    OUT:  Total OUT: 0 mL    Total NET: 450 mL      12 Jul 2021 07:01  -  13 Jul 2021 01:14  --------------------------------------------------------  IN:    IV PiggyBack: 400 mL    Lactated Ringers: 1800 mL  Total IN: 2200 mL    OUT:    Nasogastric/Oral tube (mL): 1850 mL    Oral Fluid: 0 mL    Voided (mL): 2400 mL  Total OUT: 4250 mL    Total NET: -2050 mL      Physical Exam  General: Alert, NAD  Cardio: Pulse regular, hemodynamically normal  Resp: Airway patent, unlabored breathing  GI/Abd: Soft, large ventral hernia reducible, mild focal tenderness at hernia, no guarding or rebound tenderness. NGT intact   Ext: No edema, all 4 extremities moving spontaneously, no limitations    MEDICATIONS  (STANDING):  heparin   Injectable 5000 Unit(s) SubCutaneous every 8 hours  lactated ringers. 1000 milliLiter(s) (150 mL/Hr) IV Continuous <Continuous>  pantoprazole  Injectable 40 milliGRAM(s) IV Push daily  potassium phosphate IVPB 15 milliMole(s) IV Intermittent once    MEDICATIONS  (PRN):  benzocaine 15 mG/menthol 3.6 mG (Sugar-Free) Lozenge 1 Lozenge Oral every 4 hours PRN Sore Throat      LABS:                        15.8   5.01  )-----------( 358      ( 12 Jul 2021 07:19 )             49.6     07-12    143  |  101  |  50<H>  ----------------------------<  111<H>  3.5   |  27  |  1.83<H>    Ca    9.4      12 Jul 2021 17:22  Phos  2.8     07-12  Mg     2.4     07-12    TPro  8.2  /  Alb  4.4  /  TBili  0.8  /  DBili  x   /  AST  13  /  ALT  12  /  AlkPhos  77  07-11

## 2021-07-13 NOTE — PROGRESS NOTE ADULT - ASSESSMENT
56M with hx of HLD, CAD s/p MAINOR x 2 (July 2020) on DAPT, LBO 2/2 diverticulitis s/p John's procedure (March 2019) and reversal (June 2019) presenting with SBO 2/2 large ventral hernia and JANELL 2/2 volume depletion.    Plan:  - serial abdominal exam  - no pain meds prior to exam  - NPO/NGT/IVF  - VTE ppx  - FU am labs, replete prn    [facility]  [id number]

## 2021-07-14 LAB
ANION GAP SERPL CALC-SCNC: 16 MMOL/L — SIGNIFICANT CHANGE UP (ref 5–17)
APPEARANCE UR: CLEAR — SIGNIFICANT CHANGE UP
BACTERIA # UR AUTO: NEGATIVE — SIGNIFICANT CHANGE UP
BILIRUB UR-MCNC: NEGATIVE — SIGNIFICANT CHANGE UP
BUN SERPL-MCNC: 26 MG/DL — HIGH (ref 7–23)
CALCIUM SERPL-MCNC: 9.8 MG/DL — SIGNIFICANT CHANGE UP (ref 8.4–10.5)
CHLORIDE SERPL-SCNC: 102 MMOL/L — SIGNIFICANT CHANGE UP (ref 96–108)
CO2 SERPL-SCNC: 28 MMOL/L — SIGNIFICANT CHANGE UP (ref 22–31)
COLOR SPEC: YELLOW — SIGNIFICANT CHANGE UP
CREAT SERPL-MCNC: 1.37 MG/DL — HIGH (ref 0.5–1.3)
DIFF PNL FLD: NEGATIVE — SIGNIFICANT CHANGE UP
EPI CELLS # UR: 0 /HPF — SIGNIFICANT CHANGE UP
GLUCOSE SERPL-MCNC: 71 MG/DL — SIGNIFICANT CHANGE UP (ref 70–99)
GLUCOSE UR QL: NEGATIVE — SIGNIFICANT CHANGE UP
HCT VFR BLD CALC: 49.4 % — SIGNIFICANT CHANGE UP (ref 39–50)
HGB BLD-MCNC: 15 G/DL — SIGNIFICANT CHANGE UP (ref 13–17)
HYALINE CASTS # UR AUTO: 0 /LPF — SIGNIFICANT CHANGE UP (ref 0–2)
KETONES UR-MCNC: ABNORMAL
LEUKOCYTE ESTERASE UR-ACNC: NEGATIVE — SIGNIFICANT CHANGE UP
MAGNESIUM SERPL-MCNC: 2.6 MG/DL — SIGNIFICANT CHANGE UP (ref 1.6–2.6)
MCHC RBC-ENTMCNC: 26.6 PG — LOW (ref 27–34)
MCHC RBC-ENTMCNC: 30.4 GM/DL — LOW (ref 32–36)
MCV RBC AUTO: 87.6 FL — SIGNIFICANT CHANGE UP (ref 80–100)
NITRITE UR-MCNC: NEGATIVE — SIGNIFICANT CHANGE UP
NRBC # BLD: 0 /100 WBCS — SIGNIFICANT CHANGE UP (ref 0–0)
PH UR: 6.5 — SIGNIFICANT CHANGE UP (ref 5–8)
PHOSPHATE SERPL-MCNC: 2.7 MG/DL — SIGNIFICANT CHANGE UP (ref 2.5–4.5)
PLATELET # BLD AUTO: 373 K/UL — SIGNIFICANT CHANGE UP (ref 150–400)
POTASSIUM SERPL-MCNC: 3.4 MMOL/L — LOW (ref 3.5–5.3)
POTASSIUM SERPL-SCNC: 3.4 MMOL/L — LOW (ref 3.5–5.3)
PROT UR-MCNC: ABNORMAL
RBC # BLD: 5.64 M/UL — SIGNIFICANT CHANGE UP (ref 4.2–5.8)
RBC # FLD: 15.1 % — HIGH (ref 10.3–14.5)
RBC CASTS # UR COMP ASSIST: 1 /HPF — SIGNIFICANT CHANGE UP (ref 0–4)
SODIUM SERPL-SCNC: 146 MMOL/L — HIGH (ref 135–145)
SP GR SPEC: 1.02 — SIGNIFICANT CHANGE UP (ref 1.01–1.02)
UROBILINOGEN FLD QL: NEGATIVE — SIGNIFICANT CHANGE UP
WBC # BLD: 10.73 K/UL — HIGH (ref 3.8–10.5)
WBC # FLD AUTO: 10.73 K/UL — HIGH (ref 3.8–10.5)
WBC UR QL: 2 /HPF — SIGNIFICANT CHANGE UP (ref 0–5)

## 2021-07-14 PROCEDURE — 71045 X-RAY EXAM CHEST 1 VIEW: CPT | Mod: 26,77,76

## 2021-07-14 PROCEDURE — 99254 IP/OBS CNSLTJ NEW/EST MOD 60: CPT

## 2021-07-14 PROCEDURE — 71045 X-RAY EXAM CHEST 1 VIEW: CPT | Mod: 26

## 2021-07-14 RX ORDER — ASPIRIN/CALCIUM CARB/MAGNESIUM 324 MG
81 TABLET ORAL DAILY
Refills: 0 | Status: DISCONTINUED | OUTPATIENT
Start: 2021-07-14 | End: 2021-07-20

## 2021-07-14 RX ORDER — LANOLIN ALCOHOL/MO/W.PET/CERES
3 CREAM (GRAM) TOPICAL AT BEDTIME
Refills: 0 | Status: COMPLETED | OUTPATIENT
Start: 2021-07-14 | End: 2021-07-15

## 2021-07-14 RX ORDER — POTASSIUM PHOSPHATE, MONOBASIC POTASSIUM PHOSPHATE, DIBASIC 236; 224 MG/ML; MG/ML
15 INJECTION, SOLUTION INTRAVENOUS ONCE
Refills: 0 | Status: COMPLETED | OUTPATIENT
Start: 2021-07-14 | End: 2021-07-14

## 2021-07-14 RX ORDER — POTASSIUM CHLORIDE 20 MEQ
10 PACKET (EA) ORAL
Refills: 0 | Status: COMPLETED | OUTPATIENT
Start: 2021-07-14 | End: 2021-07-14

## 2021-07-14 RX ADMIN — HEPARIN SODIUM 5000 UNIT(S): 5000 INJECTION INTRAVENOUS; SUBCUTANEOUS at 15:26

## 2021-07-14 RX ADMIN — PANTOPRAZOLE SODIUM 40 MILLIGRAM(S): 20 TABLET, DELAYED RELEASE ORAL at 21:06

## 2021-07-14 RX ADMIN — POTASSIUM PHOSPHATE, MONOBASIC POTASSIUM PHOSPHATE, DIBASIC 62.5 MILLIMOLE(S): 236; 224 INJECTION, SOLUTION INTRAVENOUS at 10:28

## 2021-07-14 RX ADMIN — Medication 81 MILLIGRAM(S): at 17:26

## 2021-07-14 RX ADMIN — Medication 100 MILLIEQUIVALENT(S): at 16:27

## 2021-07-14 RX ADMIN — HEPARIN SODIUM 5000 UNIT(S): 5000 INJECTION INTRAVENOUS; SUBCUTANEOUS at 06:30

## 2021-07-14 RX ADMIN — PANTOPRAZOLE SODIUM 40 MILLIGRAM(S): 20 TABLET, DELAYED RELEASE ORAL at 12:27

## 2021-07-14 RX ADMIN — Medication 100 MILLIEQUIVALENT(S): at 11:27

## 2021-07-14 RX ADMIN — Medication 100 MILLIEQUIVALENT(S): at 14:27

## 2021-07-14 RX ADMIN — HEPARIN SODIUM 5000 UNIT(S): 5000 INJECTION INTRAVENOUS; SUBCUTANEOUS at 21:06

## 2021-07-14 NOTE — CHART NOTE - NSCHARTNOTEFT_GEN_A_CORE
Preop Diagnosis: ventral hernia  Planned Procedure: ventral hernia repair  Surgeon: Dr. Stein    Pertinent Labs:                          15.0   10.73 )-----------( 373      ( 14 Jul 2021 07:24 )             49.4       07-14    146<H>  |  102  |  26<H>  ----------------------------<  71  3.4<L>   |  28  |  1.37<H>    Ca    9.8      14 Jul 2021 07:23  Phos  2.7     07-14  Mg     2.6     07-14      Coags: pending    T&S: pending    COVID-19 PCR: NotDetec (11 Jul 2021 22:39)    EKG: < from: 12 Lead ECG (07.11.21 @ 22:41) >  Diagnosis Line SINUS TACHYCARDIA  POSSIBLE LEFT ATRIAL ENLARGEMENT  INFERIOR INFARCT (CITED ON OR BEFORE 28-JUL-2020)  ABNORMAL ECG  WHEN COMPARED WITH ECG OF 30-JUL-2020 12:32, no  < end of copied text >    CXR:  < from: Xray Chest 1 View- PORTABLE-Urgent (Xray Chest 1 View- PORTABLE-Urgent .) (07.12.21 @ 22:57) >  IMPRESSION:  The heart is normal in size. The left costophrenic angle is not included in this study. The right lung is clear. An NG tube was placed and the tip is in the mid esophagus. It should be advanced.  < end of copied text >    UA: pending      Plan:  -OR tomorrow 7/15 at 10:00am  -NPOaMN/IVF  -Continue NGT  -Continue baby aspirin, brilinta on hold (last dose 7/11)  -Cards cleared 7/13: [facility] within last yr - no active symptoms - can proceed with surgery without further cardiac testing  -Serial abdominal exams  -F/u AM preop labs  -Consent: Signed and in chart      [facility]  [id number] Preop Diagnosis: ventral hernia  Planned Procedure: ventral hernia repair  Surgeon: Dr. Stein    Pertinent Labs:                          15.0   10.73 )-----------( 373      ( 2021 07:24 )             49.4       07-14    146<H>  |  102  |  26<H>  ----------------------------<  71  3.4<L>   |  28  |  1.37<H>    Ca    9.8      2021 07:23  Phos  2.7     07-14  Mg     2.6     07-14      Coags: pending    T&S: pending    COVID-19 PCR: NotDetec (2021 22:39)    EKG: < from: 12 Lead ECG (21 @ 22:41) >  Diagnosis Line SINUS TACHYCARDIA  POSSIBLE LEFT ATRIAL ENLARGEMENT  INFERIOR INFARCT (CITED ON OR BEFORE 2020)  ABNORMAL ECG  WHEN COMPARED WITH ECG OF 2020 12:32, no  < end of copied text >    CXR:  < from: Xray Chest 1 View- PORTABLE-Urgent (Xray Chest 1 View- PORTABLE-Urgent .) (21 @ 22:57) >  IMPRESSION:  The heart is normal in size. The left costophrenic angle is not included in this study. The right lung is clear. An NG tube was placed and the tip is in the mid esophagus. It should be advanced.  < end of copied text >    UA:  Urinalysis Basic - ( 2021 15:34 )  Color: Yellow / Appearance: Clear / S.023 / pH: x  Gluc: x / Ketone: Small  / Bili: Negative / Urobili: Negative   Blood: x / Protein: Trace / Nitrite: Negative   Leuk Esterase: Negative / RBC: 1 /hpf / WBC 2 /HPF   Sq Epi: x / Non Sq Epi: 0 /hpf / Bacteria: Negative        Plan:  -OR tomorrow 7/15 at 10:00am  -NPOaMN/IVF  -Continue NGT  -Continue baby aspirin, brilinta on hold (last dose )  -Cards cleared : [facility] within last yr - no active symptoms - can proceed with surgery without further cardiac testing  -Serial abdominal exams  -F/u AM preop labs  -Consent: Signed and in chart      [facility]  [id number]

## 2021-07-14 NOTE — BH CONSULTATION LIAISON ASSESSMENT NOTE - CASE SUMMARY
This is a 56-y.o. CM patient, domiciled, currently going through a divorce after 30 years of marriage, w/ dependents (two 20 y/o daughters), unemployed, PPHx significant for substance abuse, no SA or psychiatric hospitalizations, with PMHx HLD, CAD s/p MAINOR x 2 (July 2020) on DAPT, LBO 2/2 diverticulitis s/p John's procedure (March 2019) and reversal (June 2019), hernia repair scheduled for August 2021, presented to the ED with abdominal pain and emesis, admitted for SBO, psychiatry consulted for depression.    I have seen and evaluated this patient myself. Chart, labs, meds reviewed. I agree with trainee's assessment and plan.

## 2021-07-14 NOTE — BH CONSULTATION LIAISON ASSESSMENT NOTE - NSBHCHARTREVIEWVS_PSY_A_CORE FT
Vital Signs Last 24 Hrs  T(C): 36.9 (14 Jul 2021 09:44), Max: 36.9 (14 Jul 2021 09:44)  T(F): 98.5 (14 Jul 2021 09:44), Max: 98.5 (14 Jul 2021 09:44)  HR: 76 (14 Jul 2021 09:44) (75 - 104)  BP: 133/61 (14 Jul 2021 09:44) (100/70 - 133/61)  BP(mean): --  RR: 18 (14 Jul 2021 09:44) (18 - 18)  SpO2: 90% (14 Jul 2021 09:44) (90% - 97%)

## 2021-07-14 NOTE — PROGRESS NOTE ADULT - SUBJECTIVE AND OBJECTIVE BOX
Surgery Progress Note     Subjective/24hour Events:   Patient seen and examined.   No acute events overnight.   Pain controlled.     Vital Signs:  Vital Signs Last 24 Hrs  T(C): 36.8 (14 Jul 2021 00:50), Max: 36.8 (14 Jul 2021 00:50)  T(F): 98.3 (14 Jul 2021 00:50), Max: 98.3 (14 Jul 2021 00:50)  HR: 78 (14 Jul 2021 00:50) (75 - 104)  BP: 107/76 (14 Jul 2021 00:50) (90/56 - 131/70)  BP(mean): --  RR: 18 (14 Jul 2021 00:50) (18 - 20)  SpO2: 94% (14 Jul 2021 00:50) (93% - 97%)    CAPILLARY BLOOD GLUCOSE          I&O's Detail    12 Jul 2021 07:01  -  13 Jul 2021 07:00  --------------------------------------------------------  IN:    IV PiggyBack: 850 mL    Lactated Ringers: 3600 mL  Total IN: 4450 mL    OUT:    Nasogastric/Oral tube (mL): 3350 mL    Oral Fluid: 0 mL    Voided (mL): 2400 mL  Total OUT: 5750 mL    Total NET: -1300 mL      13 Jul 2021 07:01  -  14 Jul 2021 04:45  --------------------------------------------------------  IN:    IV PiggyBack: 300 mL    Lactated Ringers: 1800 mL  Total IN: 2100 mL    OUT:    Nasogastric/Oral tube (mL): 3200 mL    Oral Fluid: 0 mL    Voided (mL): 1200 mL  Total OUT: 4400 mL    Total NET: -2300 mL          MEDICATIONS  (STANDING):  heparin   Injectable 5000 Unit(s) SubCutaneous every 8 hours  lactated ringers. 1000 milliLiter(s) (150 mL/Hr) IV Continuous <Continuous>  pantoprazole  Injectable 40 milliGRAM(s) IV Push two times a day    MEDICATIONS  (PRN):  benzocaine 15 mG/menthol 3.6 mG (Sugar-Free) Lozenge 1 Lozenge Oral every 4 hours PRN Sore Throat      Physical Exam:  Gen: NAD.  Lungs: Non labored breathing.   Ab: Soft, nontender, nondistended.   Ext: Moves all 4 spontaneously.     Labs:    07-13    144  |  99  |  38<H>  ----------------------------<  85  3.5   |  32<H>  |  1.68<H>    Ca    9.4      13 Jul 2021 07:43  Phos  3.0     07-13  Mg     2.6     07-13                              15.2   7.83  )-----------( 365      ( 13 Jul 2021 07:45 )             49.7          Surgery Progress Note     Subjective/24hour Events:   Seen and examined at bedside. No nausea or vomiting. Pain is improving. (+)gas/(-)BM. Patient was drinking fluids yesterday while NPO yesterday.    Vital Signs:  Vital Signs Last 24 Hrs  T(C): 36.8 (14 Jul 2021 00:50), Max: 36.8 (14 Jul 2021 00:50)  T(F): 98.3 (14 Jul 2021 00:50), Max: 98.3 (14 Jul 2021 00:50)  HR: 78 (14 Jul 2021 00:50) (75 - 104)  BP: 107/76 (14 Jul 2021 00:50) (90/56 - 131/70)  BP(mean): --  RR: 18 (14 Jul 2021 00:50) (18 - 20)  SpO2: 94% (14 Jul 2021 00:50) (93% - 97%)    CAPILLARY BLOOD GLUCOSE          I&O's Detail    12 Jul 2021 07:01  -  13 Jul 2021 07:00  --------------------------------------------------------  IN:    IV PiggyBack: 850 mL    Lactated Ringers: 3600 mL  Total IN: 4450 mL    OUT:    Nasogastric/Oral tube (mL): 3350 mL    Oral Fluid: 0 mL    Voided (mL): 2400 mL  Total OUT: 5750 mL    Total NET: -1300 mL      13 Jul 2021 07:01  -  14 Jul 2021 04:45  --------------------------------------------------------  IN:    IV PiggyBack: 300 mL    Lactated Ringers: 1800 mL  Total IN: 2100 mL    OUT:    Nasogastric/Oral tube (mL): 3200 mL    Oral Fluid: 0 mL    Voided (mL): 1200 mL  Total OUT: 4400 mL    Total NET: -2300 mL          MEDICATIONS  (STANDING):  heparin   Injectable 5000 Unit(s) SubCutaneous every 8 hours  lactated ringers. 1000 milliLiter(s) (150 mL/Hr) IV Continuous <Continuous>  pantoprazole  Injectable 40 milliGRAM(s) IV Push two times a day    MEDICATIONS  (PRN):  benzocaine 15 mG/menthol 3.6 mG (Sugar-Free) Lozenge 1 Lozenge Oral every 4 hours PRN Sore Throat      Physical Exam  General: Alert, NAD  Cardio: Pulse regular, hemodynamically normal  Resp: Airway patent, unlabored breathing  GI/Abd: Soft, large ventral hernia reducible, mild focal tenderness at hernia, no guarding or rebound tenderness. NGT intact   Ext: No edema, all 4 extremities moving spontaneously, no limitations    Labs:    07-13    144  |  99  |  38<H>  ----------------------------<  85  3.5   |  32<H>  |  1.68<H>    Ca    9.4      13 Jul 2021 07:43  Phos  3.0     07-13  Mg     2.6     07-13                              15.2   7.83  )-----------( 365      ( 13 Jul 2021 07:45 )             49.7
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
## 2021-07-14 NOTE — BH CONSULTATION LIAISON ASSESSMENT NOTE - SUMMARY
55 y/o man, domiciled, currently going through a divorce after 30 years of marriage, w/ dependents (two 18 y/o daughters), unemployed, PPHx significant for substance abuse, no SA or psychiatric hospitalizations, with PMHx HLD, CAD s/p MAINOR x 2 (July 2020) on DAPT, LBO 2/2 diverticulitis s/p John's procedure (March 2019) and reversal (June 2019), hernia repair scheduled for August 2021, presented to the ED with abdominal pain with emesis, admitted for SBO, psychiatry consulted for depression. Since 2019 patient has gone through many life change, including a challenging divorce, moving into a new home, and losing his family support. Currently states that his biggest concern is what he will be able to do for work after discharge and he would like to be connected with a  to identify the best plan moving forward.  55 y/o man, domiciled, currently going through a divorce after 30 years of marriage, w/ dependents (two 18 y/o daughters), unemployed, PPHx significant for substance abuse, no SA or psychiatric hospitalizations, with PMHx HLD, CAD s/p MAINOR x 2 (July 2020) on DAPT, LBO 2/2 diverticulitis s/p John's procedure (March 2019) and reversal (June 2019), hernia repair scheduled for August 2021, presented to the ED with abdominal pain and emesis, admitted for SBO, psychiatry consulted for depression. Since 2019 patient has gone through many life change, including a challenging divorce, moving into a new home, and losing his family support. Currently states that his biggest concern is what he will be able to do for work after discharge. He would like to be connected with a  to identify the best plan moving forward - believes this will help alleviate some of his depressive symptoms.  57 y/o man, domiciled, currently going through a divorce after 30 years of marriage, w/ dependents (two 20 y/o daughters), unemployed, PPHx significant for substance abuse, no SA or psychiatric hospitalizations, with PMHx HLD, CAD s/p MAINOR x 2 (July 2020) on DAPT, LBO 2/2 diverticulitis s/p John's procedure (March 2019) and reversal (June 2019), hernia repair scheduled for August 2021, presented to the ED with abdominal pain and emesis, admitted for SBO, psychiatry consulted for depression. Since 2019 patient has gone through many life change, including a challenging divorce, moving into a new home, and losing his family support. States his depression has been persistent since this time, and now he has anxiety regarding his future. + passive SI, but no plan or intent. Currently states that his biggest concern is what he will be able to do for work after discharge and how he will manage at home. He would like to be connected with a  to identify the best plan moving forward - believes this will help alleviate some of his depressive symptoms.

## 2021-07-14 NOTE — BH CONSULTATION LIAISON ASSESSMENT NOTE - NSBHCHARTREVIEWLAB_PSY_A_CORE FT
07-14    146<H>  |  102  |  26<H>  ----------------------------<  71  3.4<L>   |  28  |  1.37<H>    Ca    9.8      14 Jul 2021 07:23  Phos  2.7     07-14  Mg     2.6     07-14

## 2021-07-14 NOTE — BH CONSULTATION LIAISON ASSESSMENT NOTE - NSACTIVEVENT_PSY_ALL_CORE
Triggering events leading to humiliation, shame, and/or despair (e.g., Loss of relationship, financial or health status) (real or anticipated)/Current or pending social isolation/Chronic pain or other acute medical condition/Inadequate social supports

## 2021-07-14 NOTE — BH CONSULTATION LIAISON ASSESSMENT NOTE - NSBHCONSULTRECOMMENDOTHER_PSY_A_CORE FT
1. *SSRI 50 mg for depression  2. Ambien * mg PO at bedtime for sleep sertraline 50 mg p.o. daily  melatonin 3mg p.o. at bedtime    Please have CSW provide patient with info on disability as well as with a referral to an outpatient provider in his vicinity.

## 2021-07-14 NOTE — PROGRESS NOTE ADULT - ATTENDING COMMENTS
Denies pain. + flatus.   Very anxious and depressed. States he wants to go home because "he has stuff to do before the surgery like going to [facility]".   Abdomen soft, with large incisional hernia.  NGT output remains high, bilious today. He has been drinking water/ice chips on his own but denies it is to this amount.   CT shows still dilated and decompressed loops of SB, dilatation less which is likely due to NGT decompression as well as him passing flatus, picture c/w PSBO due to adhesions in a multi-fenestrated hernia.   His best option at this time given overall picture is surgery in this admission - he is more decompressed which will facilitate the surgery. I feel the option of removing the NGT and risking the bowel getting again dilated could be detrimental and potentially lead to perforation and other catastrophic events.   After our conversation he understands it is best he stays and has the surgery tomorrow. Asks to continue ice chips today for comfort, I allowed for emotional comfort.   Chewable baby ASA available and ordered.  Psych consult.  Preop for tomorrow.

## 2021-07-14 NOTE — BH CONSULTATION LIAISON ASSESSMENT NOTE - RISK ASSESSMENT
Risk Factors: passive SI, substance abuse, recent divorce    Protective Factors: no current suicidal intent or plan, no h/o SA/SIB, no access to weapons, domiciled    Patient is at chronically elevated risk of harm mitigated by multiple protective factors and does not meet criteria for psychiatric admission at this time.  Risk Factors: passive SI, substance abuse, recent divorce, minimal social supports    Protective Factors: no current suicidal intent or plan, no h/o SA/SIB, no access to weapons, domiciled, access to health care    Patient is at chronically elevated risk of harm mitigated by multiple protective factors and does not meet criteria for psychiatric admission at this time.

## 2021-07-14 NOTE — BH CONSULTATION LIAISON ASSESSMENT NOTE - CURRENT MEDICATION
MEDICATIONS  (STANDING):  aspirin  chewable 81 milliGRAM(s) Oral daily  heparin   Injectable 5000 Unit(s) SubCutaneous every 8 hours  lactated ringers. 1000 milliLiter(s) (150 mL/Hr) IV Continuous <Continuous>  pantoprazole  Injectable 40 milliGRAM(s) IV Push two times a day  potassium chloride  10 mEq/100 mL IVPB 10 milliEquivalent(s) IV Intermittent every 1 hour  potassium phosphate IVPB 15 milliMole(s) IV Intermittent once    MEDICATIONS  (PRN):  benzocaine 15 mG/menthol 3.6 mG (Sugar-Free) Lozenge 1 Lozenge Oral every 4 hours PRN Sore Throat

## 2021-07-14 NOTE — PROGRESS NOTE ADULT - ASSESSMENT
56M with hx of HLD, CAD s/p MAINOR x 2 (July 2020) on DAPT, LBO 2/2 diverticulitis s/p John's procedure (March 2019) and reversal (June 2019) presenting with SBO 2/2 large ventral hernia and JANELL 2/2 volume depletion.    Plan:  - serial abdominal exam  - no pain meds prior to exam  - NPO/NGT/IVF  - VTE ppx  - FU am labs, replete prn    [facility]  [id number]  56M with hx of HLD, CAD s/p MAINOR x 2 (July 2020) on DAPT, LBO 2/2 diverticulitis s/p John's procedure (March 2019) and reversal (June 2019) presenting with SBO 2/2 large ventral hernia and JANELL 2/2 volume depletion. JANELL now resolved    Plan:  - serial abdominal exam  - no pain meds prior to exam  - NPO/NGT/IVF  - VTE ppx  - CT obtained yesterday  - Cards recommendation: to resume ASA; will continue to hold in anticipation of operative intervention soon  - FU am labs, replete prn    [facility]  [id number]  56M with hx of HLD, CAD s/p MAINOR x 2 (July 2020) on DAPT, LBO 2/2 diverticulitis s/p John's procedure (March 2019) and reversal (June 2019) presenting with SBO 2/2 large ventral hernia and JANELL 2/2 volume depletion. JANELL now resolved    Plan:  - serial abdominal exam  - no pain meds prior to exam  - NPO/NGT/IVF  - VTE ppx  - CT obtained yesterday  - Cards recommendation: to resume ASA  - FU am labs, replete prn    [facility]  [id number]

## 2021-07-15 ENCOUNTER — APPOINTMENT (OUTPATIENT)
Dept: SURGERY | Facility: HOSPITAL | Age: 56
End: 2021-07-15

## 2021-07-15 ENCOUNTER — TRANSCRIPTION ENCOUNTER (OUTPATIENT)
Age: 56
End: 2021-07-15

## 2021-07-15 LAB
ANION GAP SERPL CALC-SCNC: 16 MMOL/L — SIGNIFICANT CHANGE UP (ref 5–17)
APTT BLD: 26.6 SEC — LOW (ref 27.5–35.5)
BLD GP AB SCN SERPL QL: NEGATIVE — SIGNIFICANT CHANGE UP
BUN SERPL-MCNC: 19 MG/DL — SIGNIFICANT CHANGE UP (ref 7–23)
CALCIUM SERPL-MCNC: 9.1 MG/DL — SIGNIFICANT CHANGE UP (ref 8.4–10.5)
CHLORIDE SERPL-SCNC: 101 MMOL/L — SIGNIFICANT CHANGE UP (ref 96–108)
CO2 SERPL-SCNC: 27 MMOL/L — SIGNIFICANT CHANGE UP (ref 22–31)
CREAT SERPL-MCNC: 1.38 MG/DL — HIGH (ref 0.5–1.3)
GLUCOSE BLDC GLUCOMTR-MCNC: 84 MG/DL — SIGNIFICANT CHANGE UP (ref 70–99)
GLUCOSE SERPL-MCNC: 85 MG/DL — SIGNIFICANT CHANGE UP (ref 70–99)
HCT VFR BLD CALC: 45.1 % — SIGNIFICANT CHANGE UP (ref 39–50)
HGB BLD-MCNC: 13.8 G/DL — SIGNIFICANT CHANGE UP (ref 13–17)
INR BLD: 1.07 RATIO — SIGNIFICANT CHANGE UP (ref 0.88–1.16)
MAGNESIUM SERPL-MCNC: 2.2 MG/DL — SIGNIFICANT CHANGE UP (ref 1.6–2.6)
MCHC RBC-ENTMCNC: 26.5 PG — LOW (ref 27–34)
MCHC RBC-ENTMCNC: 30.6 GM/DL — LOW (ref 32–36)
MCV RBC AUTO: 86.7 FL — SIGNIFICANT CHANGE UP (ref 80–100)
NRBC # BLD: 0 /100 WBCS — SIGNIFICANT CHANGE UP (ref 0–0)
PHOSPHATE SERPL-MCNC: 3.4 MG/DL — SIGNIFICANT CHANGE UP (ref 2.5–4.5)
PLATELET # BLD AUTO: 305 K/UL — SIGNIFICANT CHANGE UP (ref 150–400)
POTASSIUM SERPL-MCNC: 3.2 MMOL/L — LOW (ref 3.5–5.3)
POTASSIUM SERPL-SCNC: 3.2 MMOL/L — LOW (ref 3.5–5.3)
PROTHROM AB SERPL-ACNC: 12.8 SEC — SIGNIFICANT CHANGE UP (ref 10.6–13.6)
RBC # BLD: 5.2 M/UL — SIGNIFICANT CHANGE UP (ref 4.2–5.8)
RBC # FLD: 14.6 % — HIGH (ref 10.3–14.5)
RH IG SCN BLD-IMP: POSITIVE — SIGNIFICANT CHANGE UP
RH IG SCN BLD-IMP: POSITIVE — SIGNIFICANT CHANGE UP
SODIUM SERPL-SCNC: 144 MMOL/L — SIGNIFICANT CHANGE UP (ref 135–145)
WBC # BLD: 9.89 K/UL — SIGNIFICANT CHANGE UP (ref 3.8–10.5)
WBC # FLD AUTO: 9.89 K/UL — SIGNIFICANT CHANGE UP (ref 3.8–10.5)

## 2021-07-15 PROCEDURE — 71045 X-RAY EXAM CHEST 1 VIEW: CPT | Mod: 26

## 2021-07-15 PROCEDURE — 74018 RADEX ABDOMEN 1 VIEW: CPT | Mod: 26

## 2021-07-15 RX ORDER — POTASSIUM CHLORIDE 20 MEQ
10 PACKET (EA) ORAL
Refills: 0 | Status: COMPLETED | OUTPATIENT
Start: 2021-07-15 | End: 2021-07-15

## 2021-07-15 RX ORDER — TETRACAINE/BENZOCAINE/BUTAMBEN 2%-14%-2%
1 OINTMENT (GRAM) TOPICAL ONCE
Refills: 0 | Status: DISCONTINUED | OUTPATIENT
Start: 2021-07-15 | End: 2021-07-16

## 2021-07-15 RX ORDER — ONABOTULINUMTOXINA 100 UNIT
300 VIAL (EA) INJECTION ONCE
Refills: 0 | Status: DISCONTINUED | OUTPATIENT
Start: 2021-07-15 | End: 2021-07-20

## 2021-07-15 RX ORDER — LANOLIN ALCOHOL/MO/W.PET/CERES
3 CREAM (GRAM) TOPICAL AT BEDTIME
Refills: 0 | Status: DISCONTINUED | OUTPATIENT
Start: 2021-07-15 | End: 2021-07-16

## 2021-07-15 RX ORDER — DEXTROSE MONOHYDRATE, SODIUM CHLORIDE, AND POTASSIUM CHLORIDE 50; .745; 4.5 G/1000ML; G/1000ML; G/1000ML
1000 INJECTION, SOLUTION INTRAVENOUS
Refills: 0 | Status: DISCONTINUED | OUTPATIENT
Start: 2021-07-15 | End: 2021-07-17

## 2021-07-15 RX ORDER — ACETAMINOPHEN 500 MG
1000 TABLET ORAL ONCE
Refills: 0 | Status: COMPLETED | OUTPATIENT
Start: 2021-07-15 | End: 2021-07-15

## 2021-07-15 RX ORDER — SERTRALINE 25 MG/1
50 TABLET, FILM COATED ORAL DAILY
Refills: 0 | Status: DISCONTINUED | OUTPATIENT
Start: 2021-07-15 | End: 2021-07-24

## 2021-07-15 RX ORDER — ONDANSETRON 8 MG/1
4 TABLET, FILM COATED ORAL ONCE
Refills: 0 | Status: COMPLETED | OUTPATIENT
Start: 2021-07-15 | End: 2021-07-15

## 2021-07-15 RX ADMIN — ONDANSETRON 4 MILLIGRAM(S): 8 TABLET, FILM COATED ORAL at 17:29

## 2021-07-15 RX ADMIN — Medication 100 MILLIEQUIVALENT(S): at 11:33

## 2021-07-15 RX ADMIN — HEPARIN SODIUM 5000 UNIT(S): 5000 INJECTION INTRAVENOUS; SUBCUTANEOUS at 13:08

## 2021-07-15 RX ADMIN — HEPARIN SODIUM 5000 UNIT(S): 5000 INJECTION INTRAVENOUS; SUBCUTANEOUS at 05:24

## 2021-07-15 RX ADMIN — Medication 1000 MILLIGRAM(S): at 05:54

## 2021-07-15 RX ADMIN — Medication 100 MILLIEQUIVALENT(S): at 13:09

## 2021-07-15 RX ADMIN — PANTOPRAZOLE SODIUM 40 MILLIGRAM(S): 20 TABLET, DELAYED RELEASE ORAL at 21:26

## 2021-07-15 RX ADMIN — Medication 100 MILLIEQUIVALENT(S): at 09:42

## 2021-07-15 RX ADMIN — PANTOPRAZOLE SODIUM 40 MILLIGRAM(S): 20 TABLET, DELAYED RELEASE ORAL at 11:33

## 2021-07-15 RX ADMIN — Medication 400 MILLIGRAM(S): at 05:24

## 2021-07-15 RX ADMIN — Medication 3 MILLIGRAM(S): at 00:28

## 2021-07-15 RX ADMIN — Medication 3 MILLIGRAM(S): at 21:26

## 2021-07-15 RX ADMIN — Medication 81 MILLIGRAM(S): at 11:33

## 2021-07-15 NOTE — DISCHARGE NOTE PROVIDER - NSFOLLOWUPCLINICS_GEN_ALL_ED_FT
[facility]  Psychiatry  [address] [place] [address]  Phone: [phone]  Fax:

## 2021-07-15 NOTE — DISCHARGE NOTE PROVIDER - NSDCFUSCHEDAPPT_GEN_ALL_CORE_FT
OBI GAMBLE ; 07/20/2021 ; [facility] Ultrasound [address]  OBI GAMBLE ; 07/27/2021 ; [facility] Cardio [address]  OBI GAMBLE ; 07/27/2021 ; [facility] Cardio [address] OBI Luong ; 08/06/2021 ; [facility]  OBI GAMBLE ; 08/19/2021 ; [facility] PreAdmits  OBI GAMBLE ; 08/19/2021 ; [facility] Surg Gen [address] OBI Luong ; 08/19/2021 ; [facility] Surg Gen [address]

## 2021-07-15 NOTE — DISCHARGE NOTE PROVIDER - HOSPITAL COURSE
56M with hx of HLD, CAD s/p MAINOR x 2 (July 2020) on DAPT, LBO 2/2 diverticulitis s/p John's procedure (March 2019) and reversal (June 2019) presenting with abdominal pain for 3 days. The pain worsened in the last 1-2 days and he has had multiple episodes of emesis since yesterday. He states he is still passing gas and has been having diarrhea today. Has not had anything to eat or drink in 3 days. Denies fevers, chills, chest pain, SOB, dysuria. He says that he has had several episodes of pain like this since his surgery but it has always resolved at home. He takes aspirin, Brilinta and Lipitor. Last colonoscopy was in 2019. He is scheduled for a hernia repair with Dr. Stein on August 19th. In the ED he received 2L crystalloid. NG tube was placed with 1.7 L out on insertion. He is hemodynamically normal with WBC 7, Hgb/Hct 17/53, Cr 2.8, lactate 2.9. CT without contrast shows SBO with transition point at the right central aspect of the ventral hernia with some bowel wall thickening and mesenteric edema. Hernia is reducible.    Pt admitted to surgery.  NPO/IVF/NGT. Noted to have kira 2/2 volume depletion which improved w resuscitation.  Evaluated by cardiology- cleared for OR without further cardiac testing if needed but recommended that pt continue asa 81mg. Brilinta held. Started on chewable baby asa. Repeat CT w dilated and decompressed loops of SB; some improvement compared to prior c/w PSBO. Psych consulted for depression, likely adjustment disorder with depressed mood, recommended sertraline 50 mg p.o. daily and melatonin 3mg p.o. at bedtime. Additionally recommended op f/u and SW input for financial assistance. 7/15 overnight ngt fell out and was replaced. Outputs remained high due to pt drinking water/taking ice chips. Passing +flatus and reported feeling better. Given above, pt preferred to have Botox and then surgery in 4 weeks as originally planned. After extensive discussions, inpatient surgery cancelled, ngt removed and pt started on clears. Planned for US-guided Botox (while inpt) to the abdominal wall muscles for muscle lengthening in anticipation of future surgery. Diet advanced and __________________.    On day of discharge, the patient was tolerating diet, having gi fxn, ambulating well and pain was controlled. Pt to f/u with Dr English, Dr Collins, outpatient psych and PMD within 1-2 wks of dc 56M with hx of HLD, CAD s/p MAINOR x 2 (July 2020) on DAPT, LBO 2/2 diverticulitis s/p John's procedure (March 2019) and reversal (June 2019) presenting with abdominal pain for 3 days. The pain worsened in the last 1-2 days and he has had multiple episodes of emesis since yesterday. He states he is still passing gas and has been having diarrhea today. Has not had anything to eat or drink in 3 days. Denies fevers, chills, chest pain, SOB, dysuria. He says that he has had several episodes of pain like this since his surgery but it has always resolved at home. He takes aspirin, Brilinta and Lipitor. Last colonoscopy was in 2019. He is scheduled for a hernia repair with Dr. Stein on August 19th. In the ED he received 2L crystalloid. NG tube was placed with 1.7 L out on insertion. He is hemodynamically normal with WBC 7, Hgb/Hct 17/53, Cr 2.8, lactate 2.9. CT without contrast shows SBO with transition point at the right central aspect of the ventral hernia with some bowel wall thickening and mesenteric edema. Hernia is reducible.    Pt admitted to surgery.  NPO/IVF/NGT. Noted to have kira 2/2 volume depletion which improved w resuscitation.  Evaluated by cardiology- cleared for OR without further cardiac testing if needed but recommended that pt continue asa 81mg. Brilinta held. Started on chewable baby asa. Repeat CT w dilated and decompressed loops of SB; some improvement compared to prior c/w PSBO. Psych consulted for depression, likely adjustment disorder with depressed mood, recommended sertraline 50 mg p.o. daily and melatonin 3mg p.o. at bedtime. Additionally recommended op f/u and SW input for financial assistance. 7/15 overnight ngt fell out and was replaced. Outputs remained high due to pt drinking water/taking ice chips. Passing +flatus and reported feeling better. Given above, pt preferred to have Botox and then surgery in 4 weeks as originally planned. After extensive discussions, inpatient surgery cancelled, ngt removed and pt started on clears. US-guided Botox (while inpt) to the abdominal wall muscles for muscle lengthening in anticipation of future surgery.  n 7/16/2021, 50 units of Botox in 25 cc of saline was administered intramuscularly at each of 6 locations for a total of 300 units injected. Botox was distributed evenly between the external oblique, internal oblique and transversalis muscles. Patient tolerated the procedure well. Diet advanced and __________________.    On day of discharge, the patient was tolerating diet, having gi fxn, ambulating well and pain was controlled. Pt to f/u with Dr English, Dr Collins, outpatient psych and PMD within 1-2 wks of dc 56M with hx of HLD, CAD s/p MAINOR x 2 (July 2020) on DAPT, LBO 2/2 diverticulitis s/p John's procedure (March 2019) and reversal (June 2019) presenting with abdominal pain for 3 days. The pain worsened in the last 1-2 days and he has had multiple episodes of emesis since yesterday. He states he is still passing gas and has been having diarrhea today. Has not had anything to eat or drink in 3 days. Denies fevers, chills, chest pain, SOB, dysuria. He says that he has had several episodes of pain like this since his surgery but it has always resolved at home. He takes aspirin, Brilinta and Lipitor. Last colonoscopy was in 2019. He is scheduled for a hernia repair with Dr. Stein on August 19th. In the ED he received 2L crystalloid. NG tube was placed with 1.7 L out on insertion. He is hemodynamically normal with WBC 7, Hgb/Hct 17/53, Cr 2.8, lactate 2.9. CT without contrast shows SBO with transition point at the right central aspect of the ventral hernia with some bowel wall thickening and mesenteric edema. Hernia is reducible.    Pt admitted to surgery.  NPO/IVF/NGT. Noted to have kira 2/2 volume depletion which improved w resuscitation.  Evaluated by cardiology- cleared for OR without further cardiac testing if needed but recommended that pt continue asa 81mg. Brilinta held. Started on chewable baby asa. Repeat CT w dilated and decompressed loops of SB; some improvement compared to prior c/w PSBO. Psych consulted for depression, likely adjustment disorder with depressed mood, recommended sertraline 50 mg p.o. daily and melatonin 3mg p.o. at bedtime. Additionally recommended op f/u and SW input for financial assistance. 7/15 overnight ngt fell out and was replaced. Outputs remained high due to pt drinking water/taking ice chips. Passing +flatus and reported feeling better. Given above, pt preferred to have Botox and then surgery in 4 weeks as originally planned. After extensive discussions, inpatient surgery cancelled, ngt removed and pt started on clears. US-guided Botox (while inpt) to the abdominal wall muscles for muscle lengthening in anticipation of future surgery.  n 7/16/2021, 50 units of Botox in 25 cc of saline was administered intramuscularly at each of 6 locations for a total of 300 units injected. Botox was distributed evenly between the external oblique, internal oblique and transversalis muscles. Patient tolerated the procedure well.  On 7/17am, patient was Febrile(100.6F), Chest X-ray showed left side atelectasis vs. pneumonia, labs significant for leukocytosis(WBC 13), Blood culture sent, UA(+) and urine culture sent. Started patient on ceftriaxone on 7/17.  Medicine consult given WBC count, CXR findings, and slight increase in Cr.  In the evening on 7/18, he remained persistently febrile, and became hypotensive and tachycardic. 1x 500cc LR bolus given on the surgical floors without resolution of hypotension. SICU consulted for hemodynamic monitoring with concern for sepsis.     7/18 in SICU- Additional 500cc bolus of NS given  - Given 1x metoprolol 5mg IV, with tachycardia controlled  - Hypotensive to 80s again, given another 1L bolus of NS    7/19  - Given digoxin load  - Changed Duoneb -> levalbuterol  - IV locked and diuresis w/ Lasix 20 mg IV x1 dose  - Lactate cleared from 2.3 -> 1  - CTA chest obtained and negative for PE  - Vancomycin trough 10.3    7/20   - hypotensive to 80s systolic, trops elevated 21 -> 47 (trending), given 500ml LR bolus  - EKG sinus  - continues to be febrile, UCx growing GNR, BCx with GPC, repeat BCx sent  - MRSA swab sent  - nocturnal CPAP ordered for DAMION, pt refused  - chest PT for LLL PNA  - started on bowel regimen  - PT/OT eval: home with no skilled needs    7/21  - Started on NS at 75 mL/hr due to concern for hypovolemic hyponatremia w/ improvement in Na from 130 -> 138 overnight so IV locked  - MRSA/MSSA PCR positive so kept on vancomycin  - Urine culture from 7/18 with pan-sensitive E. coli  - Blood cultures from 7/17 w/ GPC in clusters resembling Korcuria sepcies in one set of cultures but repeat cultures from 7/20 w/ no growth to date  - Cardiology consulted & following given patient's new onset atrial flutter  - Started on Eliquis for anticoagulation and metoprolol for rate control of patient's atrial flutter  - Vancomycin trough 14.4    7/22  - ID consulted: In view of risk for nephrotoxicity with vanco + zosyn combination would recommend change to cefepime, continue abx for a total of 7 days (end date: 7/24 evening)  -Per cards re: prior DAPT, patient OK to c/w ASA and d/c brilinta as pt 1yr post stents  -Patient was stable and transferred from SICU back to floor.    7/24: Patient completed course of abx. On day of discharge, the patient was tolerating diet, having gi fxn, ambulating well and pain was controlled. Pt to f/u with Dr English, Dr Collins, outpatient psych and PMD within 1-2 wks of dc. Patient had no further questions.

## 2021-07-15 NOTE — PROGRESS NOTE ADULT - ASSESSMENT
56M with hx of HLD, CAD s/p MAINOR x 2 (July 2020) on DAPT, LBO 2/2 diverticulitis s/p John's procedure (March 2019) and reversal (June 2019) presenting with SBO 2/2 large ventral hernia and JANELL 2/2 volume depletion. JANELL now resolved    Plan:  - OR today  - consent in chart   - no pain meds prior to exam  - NPO/NGT/IVF  - VTE ppx  - FU preop labs, replete prn    [facility]  [id number]  56M with hx of HLD, CAD s/p MAINOR x 2 (July 2020) on DAPT, LBO 2/2 diverticulitis s/p John's procedure (March 2019) and reversal (June 2019) presenting with SBO 2/2 large ventral hernia and JANELL 2/2 volume depletion. JANELL now resolved    Plan:  - OR scheduled for today  - consent in chart   - no pain meds prior to exam  - NPO/NGT/IVF  - VTE ppx  - FU preop labs, replete prn    [facility]  [id number]

## 2021-07-15 NOTE — DISCHARGE NOTE PROVIDER - CARE PROVIDERS DIRECT ADDRESSES
,[email or web address]

## 2021-07-15 NOTE — PROGRESS NOTE ADULT - ATTENDING COMMENTS
More calm today. No pain. + flatus. Drinking with the NGT in place. Feels that the bloating has resolved and states he would like to have the Botox and the surgery in 4 weeks as planned, but he'll agree with my recommendations.  Abdomen soft, large, NT, ND.  This is a difficult decision; there is no doubt he would benefit from preop weight loss (he had already lost 15 lbs after his office visit in preparation for the surgery). In the past he has been able to loose considerable weight in anticipation for surgery. He would also benefit from the Botox given his very large hernia. Botox now and surgery in 4 weeks with weight loss in between would be the best case scenario for him. I am concerned about possibility of SBO recurrence while we wait. He understands he has to eat small meals, etc and needs to seek medical attention early in case of symptoms.   I canceled the surgery today. NGT removed. ADAT. US-guided Botox to the abdominal wall muscles for muscle lengthening prior to discharge (he was scheduled for it as outpt for 7/20).

## 2021-07-15 NOTE — DISCHARGE NOTE PROVIDER - NSDCMRMEDTOKEN_GEN_ALL_CORE_FT
aspirin 81 mg oral delayed release tablet: 1 tab(s) orally once a day  atorvastatin 40 mg oral tablet: 1 tab(s) orally once a day (at bedtime)  ticagrelor 90 mg oral tablet: 1 tab(s) orally every 12 hours   acetaminophen 325 mg oral tablet: 3 tab(s) orally every 6 hours, As needed, Mild Pain (1 - 3)  apixaban 5 mg oral tablet: 1 tab(s) orally every 12 hours MDD:2  aspirin 81 mg oral delayed release tablet: 1 tab(s) orally once a day  atorvastatin 40 mg oral tablet: 1 tab(s) orally once a day (at bedtime)  digoxin 125 mcg (0.125 mg) oral tablet: 1 tab(s) orally once a day  melatonin 5 mg oral tablet: 1 tab(s) orally once a day (at bedtime)  metoprolol tartrate 25 mg oral tablet: 0.5 tab(s) orally every 12 hours MDD:1tab daily  nicotine 7 mg/24 hr transdermal film, extended release: 1 patch transdermal once a day   pantoprazole 40 mg oral delayed release tablet: 1 tab(s) orally once a day (before a meal)  polyethylene glycol 3350 oral powder for reconstitution: 17 gram(s) orally once a day  senna oral tablet: 2 tab(s) orally once a day (at bedtime)  sertraline 50 mg oral tablet: 1 tab(s) orally once a day  simethicone 80 mg oral tablet, chewable: 1 tab(s) orally every 6 hours, As needed, Gas   acetaminophen 325 mg oral tablet: 3 tab(s) orally every 6 hours, As needed, Mild Pain (1 - 3)  apixaban 5 mg oral tablet: 1 tab(s) orally 2 times a day MDD:2 tabs  aspirin 81 mg oral delayed release tablet: 1 tab(s) orally once a day  atorvastatin 40 mg oral tablet: 1 tab(s) orally once a day (at bedtime)  digoxin 125 mcg (0.125 mg) oral tablet: 1 tab(s) orally once a day MDD:1 tab  melatonin 5 mg oral tablet: 1 tab(s) orally once a day (at bedtime)  metoprolol tartrate 25 mg oral tablet: 0.5 tab(s) orally 2 times a day MDD:1 tab  nicotine 7 mg/24 hr transdermal film, extended release: 1 patch transdermally once a day   pantoprazole 40 mg oral delayed release tablet: 1 tab(s) orally once a day (before a meal) MDD:1 tab  polyethylene glycol 3350 oral powder for reconstitution: 17 gram(s) orally once a day  senna oral tablet: 2 tab(s) orally once a day (at bedtime)  sertraline 50 mg oral tablet: 1 tab(s) orally once a day MDD:1 tab  simethicone 80 mg oral tablet, chewable: 1 tab(s) orally every 6 hours, As needed, Gas

## 2021-07-15 NOTE — DISCHARGE NOTE PROVIDER - CARE PROVIDER_API CALL
Jannie Stein)  ColonRectal Surgery; Surgery  [address] [facility], [address]  [place] [address]  Phone: [phone]  Fax: [phone]  Follow Up Time: 1 week    Harsh Lopez J  CARDIOVASCULAR DISEASE  [address] [place] [address]  Phone: [phone]  Fax: [phone]  Follow Up Time: 2 weeks   Jannie Stein)  ColonRectal Surgery; Surgery  [address] [facility], [address]  [place] [address]  Phone: [phone]  Fax: [phone]  Follow Up Time: 1 week    Harsh Lopez  CARDIOVASCULAR DISEASE  [address] [place] [address]  Phone: [phone]  Fax: [phone]  Follow Up Time: 2 weeks    Dillon Gamez)  Infectious Disease; Internal Medicine  [address] [place] [address]  Phone: [phone]  Fax: [phone]  Follow Up Time:    Jannie Stein)  ColonRectal Surgery; Surgery  [address] [facility], [address]  [place] [address]  Phone: [phone]  Fax: [phone]  Follow Up Time: 1 week    Harsh Lopez J  CARDIOVASCULAR DISEASE  [address] [place] [address]  Phone: [phone]  Fax: [phone]  Follow Up Time: 2 weeks

## 2021-07-15 NOTE — DISCHARGE NOTE PROVIDER - NSDCFUADDAPPT_GEN_ALL_CORE_FT
Please follow up in the office with Dr. Stein in 1-2 weeks.  Please follow up with Dr. Lopez, your cardiologist, in 1-2 weeks.  Please follow up with Psychiatry in 1-2 weeks.

## 2021-07-15 NOTE — PROGRESS NOTE ADULT - SUBJECTIVE AND OBJECTIVE BOX
SUBJECTIVE:   Seen and examined at bedside during AM rounds. Overnight, NGT fell off and it was replaced. Position confirmed via CXR. NGT output are unusually high over the past 24hrs due to pt drinking water and ice chips around the NGT. No nausea or vomiting.   Passing flatus.     OBJECTIVE: T(C): 36.7 (07-15-21 @ 00:55), Max: 36.9 (21 @ 09:44)  HR: 92 (07-15-21 @ 00:55) (76 - 92)  BP: 134/71 (07-15-21 @ 00:55) (108/77 - 137/87)  RR: 19 (07-15-21 @ 00:55) (18 - 19)  SpO2: 92% (07-15-21 @ 00:55) (90% - 95%)  Wt(kg): --  I&O's Summary    2021 07:  -  2021 07:00  --------------------------------------------------------  IN: 3900 mL / OUT: 5050 mL / NET: -1150 mL    2021 07:  -  15 Jul 2021 02:10  --------------------------------------------------------  IN: 1500 mL / OUT: 8150 mL / NET: -6650 mL      I&O's Detail    2021 07:01  -  2021 07:00  --------------------------------------------------------  IN:    IV PiggyBack: 300 mL    Lactated Ringers: 3600 mL  Total IN: 3900 mL    OUT:    Nasogastric/Oral tube (mL): 3450 mL    Oral Fluid: 0 mL    Voided (mL): 1600 mL  Total OUT: 5050 mL    Total NET: -1150 mL      2021 07:01  -  15 Jul 2021 02:10  --------------------------------------------------------  IN:    Lactated Ringers: 1500 mL  Total IN: 1500 mL    OUT:    Nasogastric/Oral tube (mL): 6900 mL    Oral Fluid: 0 mL    Voided (mL): 1250 mL  Total OUT: 8150 mL    Total NET: -6650 mL        Physical Exam  General: Alert, NAD  Cardio: Pulse regular, hemodynamically normal  Resp: Airway patent, unlabored breathing  GI/Abd: Soft, large ventral hernia reducible, mild focal tenderness at hernia, no guarding or rebound tenderness. NGT intact   Ext: No edema, all 4 extremities moving spontaneously, no limitations    MEDICATIONS  (STANDING):  aspirin  chewable 81 milliGRAM(s) Oral daily  heparin   Injectable 5000 Unit(s) SubCutaneous every 8 hours  lactated ringers. 1000 milliLiter(s) (150 mL/Hr) IV Continuous <Continuous>  pantoprazole  Injectable 40 milliGRAM(s) IV Push two times a day  tetracaine/benzocaine/butamben Spray 1 Spray(s) Topical once    MEDICATIONS  (PRN):  benzocaine 15 mG/menthol 3.6 mG (Sugar-Free) Lozenge 1 Lozenge Oral every 4 hours PRN Sore Throat      LABS:                        15.0   10.73 )-----------( 373      ( 2021 07:24 )             49.4     07-14    146<H>  |  102  |  26<H>  ----------------------------<  71  3.4<L>   |  28  |  1.37<H>    Ca    9.8      2021 07:23  Phos  2.7     07-14  Mg     2.6     07-14        Urinalysis Basic - ( 2021 15:34 )    Color: Yellow / Appearance: Clear / S.023 / pH: x  Gluc: x / Ketone: Small  / Bili: Negative / Urobili: Negative   Blood: x / Protein: Trace / Nitrite: Negative   Leuk Esterase: Negative / RBC: 1 /hpf / WBC 2 /HPF   Sq Epi: x / Non Sq Epi: 0 /hpf / Bacteria: Negative           SUBJECTIVE:   Seen and examined at bedside during AM rounds. Overnight, NGT fell off and it was replaced. Position confirmed via CXR. NGT output are unusually high over the past 24hrs due to pt drinking water and ice chips around the NGT. No nausea or vomiting. No BM. States he has been passing gas. Patient states that he does not want surgery. Pain has resolved per the patient.  Passing flatus.     OBJECTIVE: T(C): 36.7 (07-15-21 @ 00:55), Max: 36.9 (21 @ 09:44)  HR: 92 (07-15-21 @ 00:55) (76 - 92)  BP: 134/71 (07-15-21 @ 00:55) (108/77 - 137/87)  RR: 19 (07-15-21 @ 00:55) (18 - 19)  SpO2: 92% (07-15-21 @ 00:55) (90% - 95%)  Wt(kg): --  I&O's Summary    2021 07:  -  2021 07:00  --------------------------------------------------------  IN: 3900 mL / OUT: 5050 mL / NET: -1150 mL    2021 07:  -  15 Jul 2021 02:10  --------------------------------------------------------  IN: 1500 mL / OUT: 8150 mL / NET: -6650 mL      I&O's Detail    2021 07:  -  2021 07:00  --------------------------------------------------------  IN:    IV PiggyBack: 300 mL    Lactated Ringers: 3600 mL  Total IN: 3900 mL    OUT:    Nasogastric/Oral tube (mL): 3450 mL    Oral Fluid: 0 mL    Voided (mL): 1600 mL  Total OUT: 5050 mL    Total NET: -1150 mL      2021 07:01  -  15 Jul 2021 02:10  --------------------------------------------------------  IN:    Lactated Ringers: 1500 mL  Total IN: 1500 mL    OUT:    Nasogastric/Oral tube (mL): 6900 mL    Oral Fluid: 0 mL    Voided (mL): 1250 mL  Total OUT: 8150 mL    Total NET: -6650 mL        Physical Exam  General: Alert, NAD  Cardio: Pulse regular, hemodynamically normal  Resp: Airway patent, unlabored breathing  GI/Abd: Soft, large ventral hernia reducible, no focal tenderness at hernia, no guarding or rebound tenderness. NGT intact   Ext: No edema, all 4 extremities moving spontaneously, no limitations    MEDICATIONS  (STANDING):  aspirin  chewable 81 milliGRAM(s) Oral daily  heparin   Injectable 5000 Unit(s) SubCutaneous every 8 hours  lactated ringers. 1000 milliLiter(s) (150 mL/Hr) IV Continuous <Continuous>  pantoprazole  Injectable 40 milliGRAM(s) IV Push two times a day  tetracaine/benzocaine/butamben Spray 1 Spray(s) Topical once    MEDICATIONS  (PRN):  benzocaine 15 mG/menthol 3.6 mG (Sugar-Free) Lozenge 1 Lozenge Oral every 4 hours PRN Sore Throat      LABS:                        15.0   10.73 )-----------( 373      ( 2021 07:24 )             49.4     07-14    146<H>  |  102  |  26<H>  ----------------------------<  71  3.4<L>   |  28  |  1.37<H>    Ca    9.8      2021 07:23  Phos  2.7     07-14  Mg     2.6     07-14        Urinalysis Basic - ( 2021 15:34 )    Color: Yellow / Appearance: Clear / S.023 / pH: x  Gluc: x / Ketone: Small  / Bili: Negative / Urobili: Negative   Blood: x / Protein: Trace / Nitrite: Negative   Leuk Esterase: Negative / RBC: 1 /hpf / WBC 2 /HPF   Sq Epi: x / Non Sq Epi: 0 /hpf / Bacteria: Negative

## 2021-07-15 NOTE — DISCHARGE NOTE PROVIDER - NSDCCPCAREPLAN_GEN_ALL_CORE_FT
PRINCIPAL DISCHARGE DIAGNOSIS  Diagnosis: SBO (small bowel obstruction)  Assessment and Plan of Treatment: -due to ventral hernia, now resolved  -planned for future, elective repair   DIET: in order to prevent future complications prior to planned surgery, please consume smaller, more frequent meals as tolerated  NOTIFY YOUR SURGEON IF: You have any fever (over 100.4 F) or chills, persistent nausea/vomiting with inability to tolerate food or liquids, or lack of gi function  FOLLOW-UP:  -Please call to make a follow-up appointment with Dr Stein within 1-2 weeks of discharge  -Please call to make a follow-up appointment with Dr Collins (cardiologist) within 1-2 weeks of discharge  -Please follow up with your primary care physician within 1-2wks regarding your hospitalization        SECONDARY DISCHARGE DIAGNOSES  Diagnosis: Adjustment disorder with depressed mood  Assessment and Plan of Treatment: -you were seen by psychiatry and started on sertraline daily and melatonin at bedtime  -outpatient psychiatry follow up as needed; you can call [facility] to schedule [phone]     PRINCIPAL DISCHARGE DIAGNOSIS  Diagnosis: SBO (small bowel obstruction)  Assessment and Plan of Treatment: -due to ventral hernia, now resolved  -planned for future, elective repair   DIET: in order to prevent future complications prior to planned surgery, please consume smaller, more frequent meals as tolerated  NOTIFY YOUR SURGEON IF: You have any fever (over 100.4 F) or chills, persistent nausea/vomiting with inability to tolerate food or liquids, or lack of gi function  FOLLOW-UP:  -Please call to make a follow-up appointment with Dr Stein within 1-2 weeks of discharge  -Please call to make a follow-up appointment with Dr Collins (cardiologist) within 1-2 weeks of discharge  -Please follow up with your primary care physician within 1-2wks regarding your hospitalization        SECONDARY DISCHARGE DIAGNOSES  Diagnosis: Adjustment disorder with depressed mood  Assessment and Plan of Treatment: -you were seen by psychiatry and started on melatonin at bedtime and _____________  -outpatient psychiatry follow up as needed; you can call [facility] to schedule [phone]     PRINCIPAL DISCHARGE DIAGNOSIS  Diagnosis: SBO (small bowel obstruction)  Assessment and Plan of Treatment: -due to ventral hernia, now resolved  -planned for future, elective repair   DIET: in order to prevent future complications prior to planned surgery, please consume smaller, more frequent meals as tolerated  NOTIFY YOUR SURGEON IF: You have any fever (over 100.4 F) or chills, persistent nausea/vomiting with inability to tolerate food or liquids, significant abdominal bloating/distension, no passing of flatus or bowel movements, or acutely concerning matters to you, that may require urgent medical attention.   FOLLOW-UP:  -Please call to make a follow-up appointment with Dr Stein within 1-2 weeks of discharge.  -Please call to make a follow-up appointment with Dr Collins (cardiologist) within 1-2 weeks of discharge  -Please follow up with your primary care physician within 1-2wks regarding your hospitalization.        SECONDARY DISCHARGE DIAGNOSES  Diagnosis: Adjustment disorder with depressed mood  Assessment and Plan of Treatment: -you were seen by psychiatry and started on sertraline 50 mg oral daily  melatonin 3mg oral at bedtime.  -outpatient psychiatry follow up as needed; you can call [facility] to schedule [phone]    Diagnosis: New onset atrial flutter  Assessment and Plan of Treatment: - continue with digoxin 125mcg once a day; currently rate-controlled and NSR; continue metoprolol 12.5 mg every 12 hours. (If RX 25mg prescibed, cut tablet in half)  - continue eliquis  - if surgery planned outpatient, can consider LAY/DCCV post-surgery  - Cardiology follow up in 1-2 weeks  - if patient does not have Cardiologist, he can call [phone] to arrange appointment with the clinic      Diagnosis: CAD (coronary artery disease)  Assessment and Plan of Treatment: - continue ASA, and Atorvastatin    Diagnosis: Acute kidney injury  Assessment and Plan of Treatment: Resolved. Likely near or around his baseline kidney function. Encourage oral intake.    Diagnosis: Hypophosphatemia  Assessment and Plan of Treatment: repleted during hospital course, improved    Diagnosis: Hypokalemia  Assessment and Plan of Treatment: repleted during hospital course, improved

## 2021-07-15 NOTE — DISCHARGE NOTE PROVIDER - PROVIDER TOKENS
PROVIDER:[TOKEN:[[id number]],FOLLOWUP:[1 week]],PROVIDER:[TOKEN:[[id number]],FOLLOWUP:[2 weeks]] PROVIDER:[TOKEN:[[id number]],FOLLOWUP:[1 week]],PROVIDER:[TOKEN:[[id number]],FOLLOWUP:[2 weeks]],PROVIDER:[TOKEN:[[id number]]] PROVIDER:[TOKEN:[[id number]],FOLLOWUP:[1 week]],PROVIDER:[TOKEN:[[id number]],FOLLOWUP:[2 weeks]]

## 2021-07-16 LAB
ANION GAP SERPL CALC-SCNC: 14 MMOL/L — SIGNIFICANT CHANGE UP (ref 5–17)
BUN SERPL-MCNC: 13 MG/DL — SIGNIFICANT CHANGE UP (ref 7–23)
CALCIUM SERPL-MCNC: 8.6 MG/DL — SIGNIFICANT CHANGE UP (ref 8.4–10.5)
CHLORIDE SERPL-SCNC: 103 MMOL/L — SIGNIFICANT CHANGE UP (ref 96–108)
CO2 SERPL-SCNC: 25 MMOL/L — SIGNIFICANT CHANGE UP (ref 22–31)
CREAT SERPL-MCNC: 1.5 MG/DL — HIGH (ref 0.5–1.3)
GLUCOSE SERPL-MCNC: 112 MG/DL — HIGH (ref 70–99)
HCT VFR BLD CALC: 42.9 % — SIGNIFICANT CHANGE UP (ref 39–50)
HGB BLD-MCNC: 13.2 G/DL — SIGNIFICANT CHANGE UP (ref 13–17)
MAGNESIUM SERPL-MCNC: 1.8 MG/DL — SIGNIFICANT CHANGE UP (ref 1.6–2.6)
MCHC RBC-ENTMCNC: 26.8 PG — LOW (ref 27–34)
MCHC RBC-ENTMCNC: 30.8 GM/DL — LOW (ref 32–36)
MCV RBC AUTO: 87.2 FL — SIGNIFICANT CHANGE UP (ref 80–100)
NRBC # BLD: 0 /100 WBCS — SIGNIFICANT CHANGE UP (ref 0–0)
PHOSPHATE SERPL-MCNC: 2.9 MG/DL — SIGNIFICANT CHANGE UP (ref 2.5–4.5)
PLATELET # BLD AUTO: 282 K/UL — SIGNIFICANT CHANGE UP (ref 150–400)
POTASSIUM SERPL-MCNC: 3.3 MMOL/L — LOW (ref 3.5–5.3)
POTASSIUM SERPL-SCNC: 3.3 MMOL/L — LOW (ref 3.5–5.3)
RBC # BLD: 4.92 M/UL — SIGNIFICANT CHANGE UP (ref 4.2–5.8)
RBC # FLD: 14.9 % — HIGH (ref 10.3–14.5)
SODIUM SERPL-SCNC: 142 MMOL/L — SIGNIFICANT CHANGE UP (ref 135–145)
WBC # BLD: 7.95 K/UL — SIGNIFICANT CHANGE UP (ref 3.8–10.5)
WBC # FLD AUTO: 7.95 K/UL — SIGNIFICANT CHANGE UP (ref 3.8–10.5)

## 2021-07-16 PROCEDURE — 76942 ECHO GUIDE FOR BIOPSY: CPT | Mod: 26

## 2021-07-16 PROCEDURE — 64647 CHEMODENERV TRUNK MUSC 6/>: CPT

## 2021-07-16 RX ORDER — POTASSIUM CHLORIDE 20 MEQ
10 PACKET (EA) ORAL
Refills: 0 | Status: COMPLETED | OUTPATIENT
Start: 2021-07-16 | End: 2021-07-16

## 2021-07-16 RX ORDER — LANOLIN ALCOHOL/MO/W.PET/CERES
5 CREAM (GRAM) TOPICAL AT BEDTIME
Refills: 0 | Status: DISCONTINUED | OUTPATIENT
Start: 2021-07-16 | End: 2021-07-24

## 2021-07-16 RX ORDER — SODIUM,POTASSIUM PHOSPHATES 278-250MG
1 POWDER IN PACKET (EA) ORAL ONCE
Refills: 0 | Status: COMPLETED | OUTPATIENT
Start: 2021-07-16 | End: 2021-07-16

## 2021-07-16 RX ORDER — MAGNESIUM SULFATE 500 MG/ML
2 VIAL (ML) INJECTION ONCE
Refills: 0 | Status: COMPLETED | OUTPATIENT
Start: 2021-07-16 | End: 2021-07-16

## 2021-07-16 RX ORDER — ONDANSETRON 8 MG/1
4 TABLET, FILM COATED ORAL ONCE
Refills: 0 | Status: COMPLETED | OUTPATIENT
Start: 2021-07-16 | End: 2021-07-17

## 2021-07-16 RX ADMIN — PANTOPRAZOLE SODIUM 40 MILLIGRAM(S): 20 TABLET, DELAYED RELEASE ORAL at 23:42

## 2021-07-16 RX ADMIN — Medication 1 PACKET(S): at 09:31

## 2021-07-16 RX ADMIN — Medication 100 MILLIEQUIVALENT(S): at 09:31

## 2021-07-16 RX ADMIN — Medication 50 GRAM(S): at 09:31

## 2021-07-16 RX ADMIN — Medication 100 MILLIEQUIVALENT(S): at 17:47

## 2021-07-16 RX ADMIN — Medication 81 MILLIGRAM(S): at 12:14

## 2021-07-16 RX ADMIN — Medication 100 MILLIEQUIVALENT(S): at 12:15

## 2021-07-16 RX ADMIN — Medication 5 MILLIGRAM(S): at 23:44

## 2021-07-16 RX ADMIN — SERTRALINE 50 MILLIGRAM(S): 25 TABLET, FILM COATED ORAL at 12:15

## 2021-07-16 RX ADMIN — DEXTROSE MONOHYDRATE, SODIUM CHLORIDE, AND POTASSIUM CHLORIDE 125 MILLILITER(S): 50; .745; 4.5 INJECTION, SOLUTION INTRAVENOUS at 09:32

## 2021-07-16 RX ADMIN — PANTOPRAZOLE SODIUM 40 MILLIGRAM(S): 20 TABLET, DELAYED RELEASE ORAL at 09:32

## 2021-07-16 NOTE — PROGRESS NOTE ADULT - ASSESSMENT
56M with hx of HLD, CAD s/p MAINOR x 2 (July 2020) on DAPT, LBO 2/2 diverticulitis s/p John's procedure (March 2019) and reversal (June 2019) presenting with SBO 2/2 large ventral hernia and JANELL 2/2 volume depletion. JANELL now resolved    Plan:  - Diet: NPO, except sips   - mIVF  - on home meds   - VTE ppx  - FU am labs, replete prn  - Botox injections   - [facility]  [id number]  56M with hx of HLD, CAD s/p MAINOR x 2 (July 2020) on DAPT, LBO 2/2 diverticulitis s/p John's procedure (March 2019) and reversal (June 2019) presenting with SBO 2/2 large ventral hernia and JANELL 2/2 volume depletion. JANELL now resolved    Plan:  - Diet: NPO, except medication  - mIVF  - on home meds   - VTE ppx  - FU am labs, replete prn  - Botox injections vs. OR today  - discuss with attending    [facility]  [id number]  56M with hx of HLD, CAD s/p MAINOR x 2 (July 2020) on DAPT, LBO 2/2 diverticulitis s/p John's procedure (March 2019) and reversal (June 2019) presenting with SBO 2/2 large ventral hernia and JANELL 2/2 volume depletion. JANELL now resolved. NG tube removed yesterday.    Plan:  - Diet: NPO, except medication, no sips/icechips  - mIVF  - on home meds   - VTE ppx  - FU am labs, replete prn  - Botox injections vs. OR today  - discuss with attending    [facility]  [id number]  56M with hx of HLD, CAD s/p MAINOR x 2 (July 2020) on DAPT, LBO 2/2 diverticulitis s/p John's procedure (March 2019) and reversal (June 2019) presenting with SBO 2/2 large ventral hernia and JANELL 2/2 volume depletion. JANELL now resolved. NG tube removed yesterday.    Plan:  - Diet: clear liquid   - mIVF  - on home meds   - VTE ppx  - FU am labs, replete prn  - Botox injections by IR today  - discuss with attending    [facility]  [id number]  56M with hx of HLD, CAD s/p MAINOR x 2 (July 2020) on DAPT, LBO 2/2 diverticulitis s/p John's procedure (March 2019) and reversal (June 2019) presenting with SBO 2/2 large ventral hernia and JANELL 2/2 volume depletion. JANELL now resolved. NG tube removed yesterday.    Plan:  - Diet: clear liquid   - mIVF  - on home meds   - VTE ppx  - FU am labs, replete prn  - ultrasound guided Botox injections to abdominal wall muscles today  - discuss with attending    [facility]  [id number]

## 2021-07-16 NOTE — PROGRESS NOTE ADULT - ATTENDING COMMENTS
He had nausea yesterday after 2 meals of clears. He got Zofran and also had multiple liquid BMs. Today no nausea (was NPO overnight) and + flatus. NO pain. AXR yesterday with air in the colon and no obvious SB dil.  Trial of clears.  Botox today.

## 2021-07-16 NOTE — PROGRESS NOTE ADULT - SUBJECTIVE AND OBJECTIVE BOX
SUBJECTIVE:   Seen and examined at bedside during AM rounds. No acute events overnight.     OBJECTIVE: T(C): 37.5 (07-15-21 @ 22:38), Max: 37.7 (07-15-21 @ 17:39)  HR: 105 (07-15-21 @ 22:38) (81 - 105)  BP: 109/74 (07-15-21 @ 22:38) (109/71 - 140/98)  RR: 19 (07-15-21 @ 22:38) (19 - 19)  SpO2: 94% (07-15-21 @ 22:38) (92% - 94%)  Wt(kg): --  I&O's Summary    2021 07:  -  15 Jul 2021 07:00  --------------------------------------------------------  IN: 3350 mL / OUT: 8850 mL / NET: -5500 mL    15 Jul 2021 07:01  -  2021 01:27  --------------------------------------------------------  IN: 1320 mL / OUT: 1100 mL / NET: 220 mL      I&O's Detail    2021 07:01  -  15 Jul 2021 07:00  --------------------------------------------------------  IN:    IV PiggyBack: 50 mL    Lactated Ringers: 3300 mL  Total IN: 3350 mL    OUT:    Nasogastric/Oral tube (mL): 7150 mL    Oral Fluid: 0 mL    Voided (mL): 1700 mL  Total OUT: 8850 mL    Total NET: -5500 mL      15 Jul 2021 07:  -  2021 01:27  --------------------------------------------------------  IN:    dextrose 5% + sodium chloride 0.45% w/ Additives: 900 mL    IV PiggyBack: 300 mL    Oral Fluid: 120 mL  Total IN: 1320 mL    OUT:    Voided (mL): 1100 mL  Total OUT: 1100 mL    Total NET: 220 mL        Physical Exam  General: Alert, NAD  Cardio: Pulse regular, hemodynamically normal  Resp: Airway patent, unlabored breathing  GI/Abd: Soft, large ventral hernia reducible, no focal tenderness at hernia, no guarding or rebound tenderness  Ext: No edema, all 4 extremities moving spontaneously, no limitations    MEDICATIONS  (STANDING):  aspirin  chewable 81 milliGRAM(s) Oral daily  dextrose 5% + sodium chloride 0.45% with potassium chloride 20 mEq/L 1000 milliLiter(s) (125 mL/Hr) IV Continuous <Continuous>  melatonin 3 milliGRAM(s) Oral at bedtime  onabotulinumtoxinA Injectable 300 Unit(s) IntraMuscular once  pantoprazole  Injectable 40 milliGRAM(s) IV Push two times a day  sertraline 50 milliGRAM(s) Oral daily  tetracaine/benzocaine/butamben Spray 1 Spray(s) Topical once    MEDICATIONS  (PRN):  benzocaine 15 mG/menthol 3.6 mG (Sugar-Free) Lozenge 1 Lozenge Oral every 4 hours PRN Sore Throat      LABS:                        13.8   9.89  )-----------( 305      ( 15 Jul 2021 07:08 )             45.1     07-15    144  |  101  |  19  ----------------------------<  85  3.2<L>   |  27  |  1.38<H>    Ca    9.1      15 Jul 2021 07:08  Phos  3.4     07-15  Mg     2.2     07-15      PT/INR - ( 15 Jul 2021 07:08 )   PT: 12.8 sec;   INR: 1.07 ratio         PTT - ( 15 Jul 2021 07:08 )  PTT:26.6 sec  Urinalysis Basic - ( 2021 15:34 )    Color: Yellow / Appearance: Clear / S.023 / pH: x  Gluc: x / Ketone: Small  / Bili: Negative / Urobili: Negative   Blood: x / Protein: Trace / Nitrite: Negative   Leuk Esterase: Negative / RBC: 1 /hpf / WBC 2 /HPF   Sq Epi: x / Non Sq Epi: 0 /hpf / Bacteria: Negative             SUBJECTIVE:   Seen and examined at bedside during AM rounds. No acute events overnight. No n/v. Multiple BM overnight.     OBJECTIVE: T(C): 37.5 (07-15-21 @ 22:38), Max: 37.7 (07-15-21 @ 17:39)  HR: 105 (07-15-21 @ 22:38) (81 - 105)  BP: 109/74 (07-15-21 @ 22:38) (109/71 - 140/98)  RR: 19 (07-15-21 @ 22:38) (19 - 19)  SpO2: 94% (07-15-21 @ 22:38) (92% - 94%)  Wt(kg): --  I&O's Summary    2021 07:  -  15 Jul 2021 07:00  --------------------------------------------------------  IN: 3350 mL / OUT: 8850 mL / NET: -5500 mL    15 Jul 2021 07:01  -  2021 01:27  --------------------------------------------------------  IN: 1320 mL / OUT: 1100 mL / NET: 220 mL      I&O's Detail    2021 07:01  -  15 Jul 2021 07:00  --------------------------------------------------------  IN:    IV PiggyBack: 50 mL    Lactated Ringers: 3300 mL  Total IN: 3350 mL    OUT:    Nasogastric/Oral tube (mL): 7150 mL    Oral Fluid: 0 mL    Voided (mL): 1700 mL  Total OUT: 8850 mL    Total NET: -5500 mL      15 Jul 2021 07:  -  2021 01:27  --------------------------------------------------------  IN:    dextrose 5% + sodium chloride 0.45% w/ Additives: 900 mL    IV PiggyBack: 300 mL    Oral Fluid: 120 mL  Total IN: 1320 mL    OUT:    Voided (mL): 1100 mL  Total OUT: 1100 mL    Total NET: 220 mL        Physical Exam  General: Alert, NAD  Cardio: Pulse regular, hemodynamically normal  Resp: Airway patent, unlabored breathing  GI/Abd: Soft, large ventral hernia reducible, no focal tenderness at hernia, no guarding or rebound tenderness  Ext: No edema, all 4 extremities moving spontaneously, no limitations    MEDICATIONS  (STANDING):  aspirin  chewable 81 milliGRAM(s) Oral daily  dextrose 5% + sodium chloride 0.45% with potassium chloride 20 mEq/L 1000 milliLiter(s) (125 mL/Hr) IV Continuous <Continuous>  melatonin 3 milliGRAM(s) Oral at bedtime  onabotulinumtoxinA Injectable 300 Unit(s) IntraMuscular once  pantoprazole  Injectable 40 milliGRAM(s) IV Push two times a day  sertraline 50 milliGRAM(s) Oral daily  tetracaine/benzocaine/butamben Spray 1 Spray(s) Topical once    MEDICATIONS  (PRN):  benzocaine 15 mG/menthol 3.6 mG (Sugar-Free) Lozenge 1 Lozenge Oral every 4 hours PRN Sore Throat      LABS:                        13.8   9.89  )-----------( 305      ( 15 Jul 2021 07:08 )             45.1     07-15    144  |  101  |  19  ----------------------------<  85  3.2<L>   |  27  |  1.38<H>    Ca    9.1      15 Jul 2021 07:08  Phos  3.4     07-15  Mg     2.2     07-15      PT/INR - ( 15 Jul 2021 07:08 )   PT: 12.8 sec;   INR: 1.07 ratio         PTT - ( 15 Jul 2021 07:08 )  PTT:26.6 sec  Urinalysis Basic - ( 2021 15:34 )    Color: Yellow / Appearance: Clear / S.023 / pH: x  Gluc: x / Ketone: Small  / Bili: Negative / Urobili: Negative   Blood: x / Protein: Trace / Nitrite: Negative   Leuk Esterase: Negative / RBC: 1 /hpf / WBC 2 /HPF   Sq Epi: x / Non Sq Epi: 0 /hpf / Bacteria: Negative

## 2021-07-17 LAB
ANION GAP SERPL CALC-SCNC: 12 MMOL/L — SIGNIFICANT CHANGE UP (ref 5–17)
APPEARANCE UR: ABNORMAL
BACTERIA # UR AUTO: ABNORMAL
BILIRUB UR-MCNC: NEGATIVE — SIGNIFICANT CHANGE UP
BUN SERPL-MCNC: 8 MG/DL — SIGNIFICANT CHANGE UP (ref 7–23)
CALCIUM SERPL-MCNC: 8.5 MG/DL — SIGNIFICANT CHANGE UP (ref 8.4–10.5)
CHLORIDE SERPL-SCNC: 100 MMOL/L — SIGNIFICANT CHANGE UP (ref 96–108)
CO2 SERPL-SCNC: 23 MMOL/L — SIGNIFICANT CHANGE UP (ref 22–31)
COLOR SPEC: YELLOW — SIGNIFICANT CHANGE UP
CREAT SERPL-MCNC: 1.36 MG/DL — HIGH (ref 0.5–1.3)
DIFF PNL FLD: ABNORMAL
EPI CELLS # UR: 1 — SIGNIFICANT CHANGE UP
GLUCOSE SERPL-MCNC: 128 MG/DL — HIGH (ref 70–99)
GLUCOSE UR QL: NEGATIVE — SIGNIFICANT CHANGE UP
HCT VFR BLD CALC: 41.1 % — SIGNIFICANT CHANGE UP (ref 39–50)
HGB BLD-MCNC: 12.9 G/DL — LOW (ref 13–17)
HYALINE CASTS # UR AUTO: 0 /LPF — SIGNIFICANT CHANGE UP (ref 0–7)
KETONES UR-MCNC: NEGATIVE — SIGNIFICANT CHANGE UP
LEUKOCYTE ESTERASE UR-ACNC: NEGATIVE — SIGNIFICANT CHANGE UP
MAGNESIUM SERPL-MCNC: 1.6 MG/DL — SIGNIFICANT CHANGE UP (ref 1.6–2.6)
MCHC RBC-ENTMCNC: 26.5 PG — LOW (ref 27–34)
MCHC RBC-ENTMCNC: 31.4 GM/DL — LOW (ref 32–36)
MCV RBC AUTO: 84.6 FL — SIGNIFICANT CHANGE UP (ref 80–100)
NITRITE UR-MCNC: POSITIVE
NRBC # BLD: 0 /100 WBCS — SIGNIFICANT CHANGE UP (ref 0–0)
PH UR: 6 — SIGNIFICANT CHANGE UP (ref 5–8)
PHOSPHATE SERPL-MCNC: 2.2 MG/DL — LOW (ref 2.5–4.5)
PLATELET # BLD AUTO: 222 K/UL — SIGNIFICANT CHANGE UP (ref 150–400)
POTASSIUM SERPL-MCNC: 3.6 MMOL/L — SIGNIFICANT CHANGE UP (ref 3.5–5.3)
POTASSIUM SERPL-SCNC: 3.6 MMOL/L — SIGNIFICANT CHANGE UP (ref 3.5–5.3)
PROT UR-MCNC: ABNORMAL
RBC # BLD: 4.86 M/UL — SIGNIFICANT CHANGE UP (ref 4.2–5.8)
RBC # FLD: 15 % — HIGH (ref 10.3–14.5)
RBC CASTS # UR COMP ASSIST: 5 /HPF — HIGH (ref 0–4)
SODIUM SERPL-SCNC: 135 MMOL/L — SIGNIFICANT CHANGE UP (ref 135–145)
SP GR SPEC: 1.01 — SIGNIFICANT CHANGE UP (ref 1.01–1.02)
UROBILINOGEN FLD QL: NEGATIVE — SIGNIFICANT CHANGE UP
WBC # BLD: 12.94 K/UL — HIGH (ref 3.8–10.5)
WBC # FLD AUTO: 12.94 K/UL — HIGH (ref 3.8–10.5)
WBC UR QL: 5 /HPF — SIGNIFICANT CHANGE UP (ref 0–5)

## 2021-07-17 PROCEDURE — 93010 ELECTROCARDIOGRAM REPORT: CPT

## 2021-07-17 PROCEDURE — 74018 RADEX ABDOMEN 1 VIEW: CPT | Mod: 26

## 2021-07-17 PROCEDURE — 71045 X-RAY EXAM CHEST 1 VIEW: CPT | Mod: 26

## 2021-07-17 RX ORDER — ACETAMINOPHEN 500 MG
975 TABLET ORAL ONCE
Refills: 0 | Status: COMPLETED | OUTPATIENT
Start: 2021-07-17 | End: 2021-07-17

## 2021-07-17 RX ORDER — POTASSIUM CHLORIDE 20 MEQ
20 PACKET (EA) ORAL ONCE
Refills: 0 | Status: COMPLETED | OUTPATIENT
Start: 2021-07-17 | End: 2021-07-17

## 2021-07-17 RX ORDER — CEFTRIAXONE 500 MG/1
1000 INJECTION, POWDER, FOR SOLUTION INTRAMUSCULAR; INTRAVENOUS EVERY 24 HOURS
Refills: 0 | Status: DISCONTINUED | OUTPATIENT
Start: 2021-07-17 | End: 2021-07-18

## 2021-07-17 RX ORDER — SODIUM,POTASSIUM PHOSPHATES 278-250MG
2 POWDER IN PACKET (EA) ORAL ONCE
Refills: 0 | Status: COMPLETED | OUTPATIENT
Start: 2021-07-17 | End: 2021-07-17

## 2021-07-17 RX ORDER — ACETAMINOPHEN 500 MG
1000 TABLET ORAL ONCE
Refills: 0 | Status: COMPLETED | OUTPATIENT
Start: 2021-07-17 | End: 2021-07-17

## 2021-07-17 RX ORDER — SODIUM,POTASSIUM PHOSPHATES 278-250MG
1 POWDER IN PACKET (EA) ORAL ONCE
Refills: 0 | Status: DISCONTINUED | OUTPATIENT
Start: 2021-07-17 | End: 2021-07-17

## 2021-07-17 RX ORDER — MAGNESIUM SULFATE 500 MG/ML
2 VIAL (ML) INJECTION ONCE
Refills: 0 | Status: COMPLETED | OUTPATIENT
Start: 2021-07-17 | End: 2021-07-17

## 2021-07-17 RX ORDER — HEPARIN SODIUM 5000 [USP'U]/ML
5000 INJECTION INTRAVENOUS; SUBCUTANEOUS EVERY 8 HOURS
Refills: 0 | Status: DISCONTINUED | OUTPATIENT
Start: 2021-07-17 | End: 2021-07-19

## 2021-07-17 RX ADMIN — Medication 400 MILLIGRAM(S): at 09:27

## 2021-07-17 RX ADMIN — ONDANSETRON 4 MILLIGRAM(S): 8 TABLET, FILM COATED ORAL at 06:38

## 2021-07-17 RX ADMIN — Medication 1000 MILLIGRAM(S): at 09:55

## 2021-07-17 RX ADMIN — Medication 50 GRAM(S): at 11:31

## 2021-07-17 RX ADMIN — Medication 5 MILLIGRAM(S): at 21:01

## 2021-07-17 RX ADMIN — Medication 975 MILLIGRAM(S): at 21:00

## 2021-07-17 RX ADMIN — PANTOPRAZOLE SODIUM 40 MILLIGRAM(S): 20 TABLET, DELAYED RELEASE ORAL at 21:01

## 2021-07-17 RX ADMIN — DEXTROSE MONOHYDRATE, SODIUM CHLORIDE, AND POTASSIUM CHLORIDE 125 MILLILITER(S): 50; .745; 4.5 INJECTION, SOLUTION INTRAVENOUS at 06:38

## 2021-07-17 RX ADMIN — HEPARIN SODIUM 5000 UNIT(S): 5000 INJECTION INTRAVENOUS; SUBCUTANEOUS at 21:01

## 2021-07-17 RX ADMIN — Medication 20 MILLIEQUIVALENT(S): at 18:27

## 2021-07-17 RX ADMIN — Medication 81 MILLIGRAM(S): at 13:25

## 2021-07-17 RX ADMIN — HEPARIN SODIUM 5000 UNIT(S): 5000 INJECTION INTRAVENOUS; SUBCUTANEOUS at 13:25

## 2021-07-17 RX ADMIN — Medication 2 TABLET(S): at 11:31

## 2021-07-17 RX ADMIN — SERTRALINE 50 MILLIGRAM(S): 25 TABLET, FILM COATED ORAL at 13:25

## 2021-07-17 RX ADMIN — Medication 975 MILLIGRAM(S): at 21:30

## 2021-07-17 RX ADMIN — CEFTRIAXONE 100 MILLIGRAM(S): 500 INJECTION, POWDER, FOR SOLUTION INTRAMUSCULAR; INTRAVENOUS at 21:30

## 2021-07-17 RX ADMIN — PANTOPRAZOLE SODIUM 40 MILLIGRAM(S): 20 TABLET, DELAYED RELEASE ORAL at 11:32

## 2021-07-17 NOTE — PROGRESS NOTE ADULT - ATTENDING COMMENTS
Patient feels nauseous but no emesis.  Passing flatus. No pain.    Abdomen is soft, nontender.    Will check CXR and AXR  NPO for now    Juvenal Clark MD

## 2021-07-17 NOTE — PROGRESS NOTE ADULT - ASSESSMENT
56M with hx of HLD, CAD s/p MAINOR x 2 (July 2020) on DAPT, LBO 2/2 diverticulitis s/p John's procedure (March 2019) and reversal (June 2019) presenting with SBO 2/2 large ventral hernia and JANELL 2/2 volume depletion. JANELL now resolved. NG tube removed yesterday. Underwent ultrasound guided Botox injections to abdominal wall muscles today    Plan:  - pain control prn   - Diet: Reg  - IVL  - on home meds   - VTE ppx  - FU am labs, replete prn    [facility]  [id number] 56M with hx of HLD, CAD s/p MAINOR x 2 (July 2020) on DAPT, LBO 2/2 diverticulitis s/p John's procedure (March 2019) and reversal (June 2019) presenting with SBO 2/2 large ventral hernia and JANELL 2/2 volume depletion. JANELL now resolved. NG tube removed yesterday. Underwent ultrasound guided Botox injections(300 u in total) to abdominal wall muscles yesterday. Tachycardia to 110 per/min last evening and this am,sinus, BP around 120/80mmHg, patient had no complains.     Plan:  - pain control prn   - Diet: clear liquid  - IVL  - on home meds   - VTE ppx  - FU am labs, replete prn    [facility]  [id number] 56M with hx of HLD, CAD s/p MAINOR x 2 (July 2020) on DAPT, LBO 2/2 diverticulitis s/p John's procedure (March 2019) and reversal (June 2019) presenting with SBO 2/2 large ventral hernia and JANELL 2/2 volume depletion. JANELL now resolved. NG tube removed yesterday. Underwent ultrasound guided Botox injections(300 u in total) to abdominal wall muscles yesterday. Tachycardia to 110 per/min last evening and this am,sinus, BP around 120/80mmHg, patient had no complains.     Plan:  - pain control prn   - Diet: clear liquid  - IVL  - on home meds for antiplatelet  - FU am labs, replete prn    [facility]  [id number] 56M with hx of HLD, CAD s/p MAINOR x 2 (July 2020) on DAPT, LBO 2/2 diverticulitis s/p John's procedure (March 2019) and reversal (June 2019) presenting with SBO 2/2 large ventral hernia and JANELL 2/2 volume depletion. JANELL now resolved. NG tube removed yesterday. Underwent ultrasound guided Botox injections(300 u in total) to abdominal wall muscles yesterday. Tachycardia to 110 per/min last evening and this am, sinus, BP around 120/80mmHg, patient had no complains.     Plan:  - pain control prn   - Diet: clear liquid  - IVL  - Chest and abdomin X-Ray to check for overloaded fluid and SBO condition   - on home meds for antiplatelet(aspirin 81mg po)  - FU am labs, replete prn    [facility]  [id number] 56M with hx of HLD, CAD s/p MAINOR x 2 (July 2020) on DAPT, LBO 2/2 diverticulitis s/p John's procedure (March 2019) and reversal (June 2019) presenting with SBO 2/2 large ventral hernia and JANELL 2/2 volume depletion. JANELL now resolved. NG tube removed yesterday. Underwent ultrasound guided Botox injections(300 u in total) to abdominal wall muscles yesterday. Tachycardia to 110 per/min last evening and this am, sinus, BP around 120/80mmHg, patient had no complains.     Plan:  - pain control prn   - Diet: clear liquid  - IVL  - Chest and abdomin X-Ray to check for overloaded fluid and SBO condition   - on home meds for antiplatelet(aspirin 81mg po)  - DVT ppx  - FU am labs, replete prn    [facility]  [id number]

## 2021-07-17 NOTE — PROGRESS NOTE ADULT - SUBJECTIVE AND OBJECTIVE BOX
SUBJECTIVE:   Seen and examined at bedside during AM rounds. No acute events overnight. No n/v.      OBJECTIVE: T(C): 37.6 (07-17-21 @ 01:35), Max: 37.6 (07-17-21 @ 01:35)  HR: 104 (07-17-21 @ 02:20) (87 - 109)  BP: 123/77 (07-17-21 @ 02:20) (106/62 - 143/75)  RR: 18 (07-17-21 @ 02:20) (18 - 20)  SpO2: 93% (07-17-21 @ 02:20) (93% - 95%)  Wt(kg): --  I&O's Summary    15 Jul 2021 07:01  -  16 Jul 2021 07:00  --------------------------------------------------------  IN: 2820 mL / OUT: 1100 mL / NET: 1720 mL    16 Jul 2021 07:01  -  17 Jul 2021 02:33  --------------------------------------------------------  IN: 2450 mL / OUT: 0 mL / NET: 2450 mL      I&O's Detail    15 Jul 2021 07:01  -  16 Jul 2021 07:00  --------------------------------------------------------  IN:    dextrose 5% + sodium chloride 0.45% w/ Additives: 2400 mL    IV PiggyBack: 300 mL    Oral Fluid: 120 mL  Total IN: 2820 mL    OUT:    Voided (mL): 1100 mL  Total OUT: 1100 mL    Total NET: 1720 mL      16 Jul 2021 07:01  -  17 Jul 2021 02:33  --------------------------------------------------------  IN:    dextrose 5% + sodium chloride 0.45% w/ Additives: 1500 mL    IV PiggyBack: 350 mL    Oral Fluid: 600 mL  Total IN: 2450 mL    OUT:  Total OUT: 0 mL    Total NET: 2450 mL        Physical Exam  General: Alert, NAD  Resp: Airway patent, unlabored breathing  GI/Abd: Soft, large ventral hernia reducible, no focal tenderness at hernia, no guarding or rebound tenderness  Ext: No edema, all 4 extremities moving spontaneously, no limitations    MEDICATIONS  (STANDING):  aspirin  chewable 81 milliGRAM(s) Oral daily  dextrose 5% + sodium chloride 0.45% with potassium chloride 20 mEq/L 1000 milliLiter(s) (125 mL/Hr) IV Continuous <Continuous>  melatonin 5 milliGRAM(s) Oral at bedtime  onabotulinumtoxinA Injectable 300 Unit(s) IntraMuscular once  ondansetron Injectable 4 milliGRAM(s) IV Push once  pantoprazole  Injectable 40 milliGRAM(s) IV Push two times a day  sertraline 50 milliGRAM(s) Oral daily    MEDICATIONS  (PRN):      LABS:                        13.2   7.95  )-----------( 282      ( 16 Jul 2021 07:21 )             42.9     07-16    142  |  103  |  13  ----------------------------<  112<H>  3.3<L>   |  25  |  1.50<H>    Ca    8.6      16 Jul 2021 07:20  Phos  2.9     07-16  Mg     1.8     07-16      PT/INR - ( 15 Jul 2021 07:08 )   PT: 12.8 sec;   INR: 1.07 ratio         PTT - ( 15 Jul 2021 07:08 )  PTT:26.6 sec           SUBJECTIVE:   Seen and examined at bedside during AM rounds. No acute events overnight.  No n/v.  No abdominal pain. (+)gas/(+)BM.    OBJECTIVE: T(C): 37.6 (07-17-21 @ 01:35), Max: 37.6 (07-17-21 @ 01:35)  HR: 104 (07-17-21 @ 02:20) (87 - 109)  BP: 123/77 (07-17-21 @ 02:20) (106/62 - 143/75)  RR: 18 (07-17-21 @ 02:20) (18 - 20)  SpO2: 93% (07-17-21 @ 02:20) (93% - 95%)  Wt(kg): --  I&O's Summary    15 Jul 2021 07:01  -  16 Jul 2021 07:00  --------------------------------------------------------  IN: 2820 mL / OUT: 1100 mL / NET: 1720 mL    16 Jul 2021 07:01  -  17 Jul 2021 02:33  --------------------------------------------------------  IN: 2450 mL / OUT: 0 mL / NET: 2450 mL      I&O's Detail    15 Jul 2021 07:01  -  16 Jul 2021 07:00  --------------------------------------------------------  IN:    dextrose 5% + sodium chloride 0.45% w/ Additives: 2400 mL    IV PiggyBack: 300 mL    Oral Fluid: 120 mL  Total IN: 2820 mL    OUT:    Voided (mL): 1100 mL  Total OUT: 1100 mL    Total NET: 1720 mL      16 Jul 2021 07:01  -  17 Jul 2021 02:33  --------------------------------------------------------  IN:    dextrose 5% + sodium chloride 0.45% w/ Additives: 1500 mL    IV PiggyBack: 350 mL    Oral Fluid: 600 mL  Total IN: 2450 mL    OUT:  Total OUT: 0 mL    Total NET: 2450 mL        Physical Exam  General: Alert, NAD  Resp: Airway patent, unlabored breathing  GI/Abd: Soft, large ventral hernia reducible, no focal tenderness at hernia, no guarding or rebound tenderness  Ext: No edema, all 4 extremities moving spontaneously, no limitations    MEDICATIONS  (STANDING):  aspirin  chewable 81 milliGRAM(s) Oral daily  dextrose 5% + sodium chloride 0.45% with potassium chloride 20 mEq/L 1000 milliLiter(s) (125 mL/Hr) IV Continuous <Continuous>  melatonin 5 milliGRAM(s) Oral at bedtime  onabotulinumtoxinA Injectable 300 Unit(s) IntraMuscular once  ondansetron Injectable 4 milliGRAM(s) IV Push once  pantoprazole  Injectable 40 milliGRAM(s) IV Push two times a day  sertraline 50 milliGRAM(s) Oral daily    MEDICATIONS  (PRN):      LABS:                        13.2   7.95  )-----------( 282      ( 16 Jul 2021 07:21 )             42.9     07-16    142  |  103  |  13  ----------------------------<  112<H>  3.3<L>   |  25  |  1.50<H>    Ca    8.6      16 Jul 2021 07:20  Phos  2.9     07-16  Mg     1.8     07-16      PT/INR - ( 15 Jul 2021 07:08 )   PT: 12.8 sec;   INR: 1.07 ratio         PTT - ( 15 Jul 2021 07:08 )  PTT:26.6 sec

## 2021-07-18 DIAGNOSIS — N17.9 ACUTE KIDNEY FAILURE, UNSPECIFIED: ICD-10-CM

## 2021-07-18 DIAGNOSIS — R65.10 SYSTEMIC INFLAMMATORY RESPONSE SYNDROME (SIRS) OF NON-INFECTIOUS ORIGIN WITHOUT ACUTE ORGAN DYSFUNCTION: ICD-10-CM

## 2021-07-18 DIAGNOSIS — I25.10 ATHEROSCLEROTIC HEART DISEASE OF NATIVE CORONARY ARTERY WITHOUT ANGINA PECTORIS: ICD-10-CM

## 2021-07-18 DIAGNOSIS — Z29.9 ENCOUNTER FOR PROPHYLACTIC MEASURES, UNSPECIFIED: ICD-10-CM

## 2021-07-18 LAB
ALBUMIN SERPL ELPH-MCNC: 2.4 G/DL — LOW (ref 3.3–5)
ALP SERPL-CCNC: 68 U/L — SIGNIFICANT CHANGE UP (ref 40–120)
ALT FLD-CCNC: 24 U/L — SIGNIFICANT CHANGE UP (ref 10–45)
ANION GAP SERPL CALC-SCNC: 13 MMOL/L — SIGNIFICANT CHANGE UP (ref 5–17)
ANION GAP SERPL CALC-SCNC: 13 MMOL/L — SIGNIFICANT CHANGE UP (ref 5–17)
AST SERPL-CCNC: 22 U/L — SIGNIFICANT CHANGE UP (ref 10–40)
BILIRUB SERPL-MCNC: 0.8 MG/DL — SIGNIFICANT CHANGE UP (ref 0.2–1.2)
BUN SERPL-MCNC: 8 MG/DL — SIGNIFICANT CHANGE UP (ref 7–23)
BUN SERPL-MCNC: 9 MG/DL — SIGNIFICANT CHANGE UP (ref 7–23)
CALCIUM SERPL-MCNC: 8.4 MG/DL — SIGNIFICANT CHANGE UP (ref 8.4–10.5)
CALCIUM SERPL-MCNC: 8.7 MG/DL — SIGNIFICANT CHANGE UP (ref 8.4–10.5)
CHLORIDE SERPL-SCNC: 96 MMOL/L — SIGNIFICANT CHANGE UP (ref 96–108)
CHLORIDE SERPL-SCNC: 97 MMOL/L — SIGNIFICANT CHANGE UP (ref 96–108)
CO2 SERPL-SCNC: 21 MMOL/L — LOW (ref 22–31)
CO2 SERPL-SCNC: 23 MMOL/L — SIGNIFICANT CHANGE UP (ref 22–31)
CREAT SERPL-MCNC: 1.36 MG/DL — HIGH (ref 0.5–1.3)
CREAT SERPL-MCNC: 1.43 MG/DL — HIGH (ref 0.5–1.3)
GLUCOSE SERPL-MCNC: 117 MG/DL — HIGH (ref 70–99)
GLUCOSE SERPL-MCNC: 119 MG/DL — HIGH (ref 70–99)
HCT VFR BLD CALC: 37.2 % — LOW (ref 39–50)
HCT VFR BLD CALC: 42.1 % — SIGNIFICANT CHANGE UP (ref 39–50)
HGB BLD-MCNC: 11.9 G/DL — LOW (ref 13–17)
HGB BLD-MCNC: 13 G/DL — SIGNIFICANT CHANGE UP (ref 13–17)
LACTATE SERPL-SCNC: 1.6 MMOL/L — SIGNIFICANT CHANGE UP (ref 0.7–2)
MAGNESIUM SERPL-MCNC: 1.9 MG/DL — SIGNIFICANT CHANGE UP (ref 1.6–2.6)
MAGNESIUM SERPL-MCNC: 1.9 MG/DL — SIGNIFICANT CHANGE UP (ref 1.6–2.6)
MCHC RBC-ENTMCNC: 26.6 PG — LOW (ref 27–34)
MCHC RBC-ENTMCNC: 27 PG — SIGNIFICANT CHANGE UP (ref 27–34)
MCHC RBC-ENTMCNC: 30.9 GM/DL — LOW (ref 32–36)
MCHC RBC-ENTMCNC: 32 GM/DL — SIGNIFICANT CHANGE UP (ref 32–36)
MCV RBC AUTO: 84.4 FL — SIGNIFICANT CHANGE UP (ref 80–100)
MCV RBC AUTO: 86.3 FL — SIGNIFICANT CHANGE UP (ref 80–100)
NRBC # BLD: 0 /100 WBCS — SIGNIFICANT CHANGE UP (ref 0–0)
NRBC # BLD: 0 /100 WBCS — SIGNIFICANT CHANGE UP (ref 0–0)
PHOSPHATE SERPL-MCNC: 1.5 MG/DL — LOW (ref 2.5–4.5)
PHOSPHATE SERPL-MCNC: 2.5 MG/DL — SIGNIFICANT CHANGE UP (ref 2.5–4.5)
PLATELET # BLD AUTO: 160 K/UL — SIGNIFICANT CHANGE UP (ref 150–400)
PLATELET # BLD AUTO: 180 K/UL — SIGNIFICANT CHANGE UP (ref 150–400)
POTASSIUM SERPL-MCNC: 3.7 MMOL/L — SIGNIFICANT CHANGE UP (ref 3.5–5.3)
POTASSIUM SERPL-MCNC: 3.9 MMOL/L — SIGNIFICANT CHANGE UP (ref 3.5–5.3)
POTASSIUM SERPL-SCNC: 3.7 MMOL/L — SIGNIFICANT CHANGE UP (ref 3.5–5.3)
POTASSIUM SERPL-SCNC: 3.9 MMOL/L — SIGNIFICANT CHANGE UP (ref 3.5–5.3)
PROT SERPL-MCNC: 6.1 G/DL — SIGNIFICANT CHANGE UP (ref 6–8.3)
RBC # BLD: 4.41 M/UL — SIGNIFICANT CHANGE UP (ref 4.2–5.8)
RBC # BLD: 4.88 M/UL — SIGNIFICANT CHANGE UP (ref 4.2–5.8)
RBC # FLD: 15.1 % — HIGH (ref 10.3–14.5)
RBC # FLD: 15.1 % — HIGH (ref 10.3–14.5)
SODIUM SERPL-SCNC: 130 MMOL/L — LOW (ref 135–145)
SODIUM SERPL-SCNC: 133 MMOL/L — LOW (ref 135–145)
TROPONIN T, HIGH SENSITIVITY RESULT: 27 NG/L — SIGNIFICANT CHANGE UP (ref 0–51)
WBC # BLD: 14.65 K/UL — HIGH (ref 3.8–10.5)
WBC # BLD: 16.82 K/UL — HIGH (ref 3.8–10.5)
WBC # FLD AUTO: 14.65 K/UL — HIGH (ref 3.8–10.5)
WBC # FLD AUTO: 16.82 K/UL — HIGH (ref 3.8–10.5)

## 2021-07-18 PROCEDURE — 93010 ELECTROCARDIOGRAM REPORT: CPT | Mod: 76

## 2021-07-18 PROCEDURE — 99223 1ST HOSP IP/OBS HIGH 75: CPT

## 2021-07-18 PROCEDURE — 74177 CT ABD & PELVIS W/CONTRAST: CPT | Mod: 26

## 2021-07-18 PROCEDURE — 99223 1ST HOSP IP/OBS HIGH 75: CPT | Mod: GC

## 2021-07-18 PROCEDURE — 71260 CT THORAX DX C+: CPT | Mod: 26

## 2021-07-18 PROCEDURE — 71045 X-RAY EXAM CHEST 1 VIEW: CPT | Mod: 26

## 2021-07-18 RX ORDER — SODIUM CHLORIDE 9 MG/ML
500 INJECTION, SOLUTION INTRAVENOUS ONCE
Refills: 0 | Status: COMPLETED | OUTPATIENT
Start: 2021-07-18 | End: 2021-07-18

## 2021-07-18 RX ORDER — PIPERACILLIN AND TAZOBACTAM 4; .5 G/20ML; G/20ML
3.38 INJECTION, POWDER, LYOPHILIZED, FOR SOLUTION INTRAVENOUS EVERY 8 HOURS
Refills: 0 | Status: DISCONTINUED | OUTPATIENT
Start: 2021-07-18 | End: 2021-07-22

## 2021-07-18 RX ORDER — PANTOPRAZOLE SODIUM 20 MG/1
40 TABLET, DELAYED RELEASE ORAL
Refills: 0 | Status: DISCONTINUED | OUTPATIENT
Start: 2021-07-18 | End: 2021-07-24

## 2021-07-18 RX ORDER — VANCOMYCIN HCL 1 G
1500 VIAL (EA) INTRAVENOUS EVERY 12 HOURS
Refills: 0 | Status: DISCONTINUED | OUTPATIENT
Start: 2021-07-18 | End: 2021-07-22

## 2021-07-18 RX ORDER — SODIUM CHLORIDE 9 MG/ML
1000 INJECTION, SOLUTION INTRAVENOUS ONCE
Refills: 0 | Status: DISCONTINUED | OUTPATIENT
Start: 2021-07-18 | End: 2021-07-18

## 2021-07-18 RX ORDER — MAGNESIUM SULFATE 500 MG/ML
2 VIAL (ML) INJECTION ONCE
Refills: 0 | Status: COMPLETED | OUTPATIENT
Start: 2021-07-18 | End: 2021-07-18

## 2021-07-18 RX ORDER — POTASSIUM CHLORIDE 20 MEQ
40 PACKET (EA) ORAL ONCE
Refills: 0 | Status: COMPLETED | OUTPATIENT
Start: 2021-07-18 | End: 2021-07-18

## 2021-07-18 RX ORDER — SODIUM CHLORIDE 9 MG/ML
1000 INJECTION INTRAMUSCULAR; INTRAVENOUS; SUBCUTANEOUS
Refills: 0 | Status: DISCONTINUED | OUTPATIENT
Start: 2021-07-18 | End: 2021-07-19

## 2021-07-18 RX ORDER — IPRATROPIUM/ALBUTEROL SULFATE 18-103MCG
3 AEROSOL WITH ADAPTER (GRAM) INHALATION EVERY 6 HOURS
Refills: 0 | Status: DISCONTINUED | OUTPATIENT
Start: 2021-07-18 | End: 2021-07-19

## 2021-07-18 RX ORDER — VANCOMYCIN HCL 1 G
2000 VIAL (EA) INTRAVENOUS EVERY 12 HOURS
Refills: 0 | Status: DISCONTINUED | OUTPATIENT
Start: 2021-07-18 | End: 2021-07-18

## 2021-07-18 RX ORDER — PIPERACILLIN AND TAZOBACTAM 4; .5 G/20ML; G/20ML
3.38 INJECTION, POWDER, LYOPHILIZED, FOR SOLUTION INTRAVENOUS ONCE
Refills: 0 | Status: COMPLETED | OUTPATIENT
Start: 2021-07-18 | End: 2021-07-18

## 2021-07-18 RX ORDER — POTASSIUM CHLORIDE 20 MEQ
10 PACKET (EA) ORAL ONCE
Refills: 0 | Status: COMPLETED | OUTPATIENT
Start: 2021-07-18 | End: 2021-07-18

## 2021-07-18 RX ORDER — CEFEPIME 1 G/1
2000 INJECTION, POWDER, FOR SOLUTION INTRAMUSCULAR; INTRAVENOUS EVERY 12 HOURS
Refills: 0 | Status: DISCONTINUED | OUTPATIENT
Start: 2021-07-18 | End: 2021-07-18

## 2021-07-18 RX ORDER — ACETAMINOPHEN 500 MG
975 TABLET ORAL EVERY 6 HOURS
Refills: 0 | Status: DISCONTINUED | OUTPATIENT
Start: 2021-07-18 | End: 2021-07-20

## 2021-07-18 RX ORDER — ACETAMINOPHEN 500 MG
1000 TABLET ORAL ONCE
Refills: 0 | Status: COMPLETED | OUTPATIENT
Start: 2021-07-18 | End: 2021-07-19

## 2021-07-18 RX ORDER — SODIUM CHLORIDE 9 MG/ML
500 INJECTION INTRAMUSCULAR; INTRAVENOUS; SUBCUTANEOUS ONCE
Refills: 0 | Status: COMPLETED | OUTPATIENT
Start: 2021-07-18 | End: 2021-07-18

## 2021-07-18 RX ORDER — POTASSIUM CHLORIDE 20 MEQ
10 PACKET (EA) ORAL
Refills: 0 | Status: DISCONTINUED | OUTPATIENT
Start: 2021-07-18 | End: 2021-07-18

## 2021-07-18 RX ORDER — SODIUM CHLORIDE 9 MG/ML
1000 INJECTION, SOLUTION INTRAVENOUS
Refills: 0 | Status: DISCONTINUED | OUTPATIENT
Start: 2021-07-18 | End: 2021-07-18

## 2021-07-18 RX ORDER — TICAGRELOR 90 MG/1
90 TABLET ORAL
Refills: 0 | Status: DISCONTINUED | OUTPATIENT
Start: 2021-07-18 | End: 2021-07-18

## 2021-07-18 RX ADMIN — Medication 50 GRAM(S): at 09:42

## 2021-07-18 RX ADMIN — Medication 81 MILLIGRAM(S): at 13:32

## 2021-07-18 RX ADMIN — Medication 975 MILLIGRAM(S): at 20:33

## 2021-07-18 RX ADMIN — Medication 83.33 MILLIMOLE(S): at 13:33

## 2021-07-18 RX ADMIN — SERTRALINE 50 MILLIGRAM(S): 25 TABLET, FILM COATED ORAL at 13:32

## 2021-07-18 RX ADMIN — Medication 975 MILLIGRAM(S): at 10:42

## 2021-07-18 RX ADMIN — PANTOPRAZOLE SODIUM 40 MILLIGRAM(S): 20 TABLET, DELAYED RELEASE ORAL at 21:26

## 2021-07-18 RX ADMIN — HEPARIN SODIUM 5000 UNIT(S): 5000 INJECTION INTRAVENOUS; SUBCUTANEOUS at 21:26

## 2021-07-18 RX ADMIN — SODIUM CHLORIDE 125 MILLILITER(S): 9 INJECTION INTRAMUSCULAR; INTRAVENOUS; SUBCUTANEOUS at 23:44

## 2021-07-18 RX ADMIN — HEPARIN SODIUM 5000 UNIT(S): 5000 INJECTION INTRAVENOUS; SUBCUTANEOUS at 13:33

## 2021-07-18 RX ADMIN — Medication 250 MILLIGRAM(S): at 18:40

## 2021-07-18 RX ADMIN — SODIUM CHLORIDE 3000 MILLILITER(S): 9 INJECTION INTRAMUSCULAR; INTRAVENOUS; SUBCUTANEOUS at 23:44

## 2021-07-18 RX ADMIN — PANTOPRAZOLE SODIUM 40 MILLIGRAM(S): 20 TABLET, DELAYED RELEASE ORAL at 09:42

## 2021-07-18 RX ADMIN — SODIUM CHLORIDE 125 MILLILITER(S): 9 INJECTION INTRAMUSCULAR; INTRAVENOUS; SUBCUTANEOUS at 23:24

## 2021-07-18 RX ADMIN — PIPERACILLIN AND TAZOBACTAM 200 GRAM(S): 4; .5 INJECTION, POWDER, LYOPHILIZED, FOR SOLUTION INTRAVENOUS at 14:47

## 2021-07-18 RX ADMIN — HEPARIN SODIUM 5000 UNIT(S): 5000 INJECTION INTRAVENOUS; SUBCUTANEOUS at 05:23

## 2021-07-18 RX ADMIN — Medication 5 MILLIGRAM(S): at 21:26

## 2021-07-18 RX ADMIN — Medication 50 GRAM(S): at 23:51

## 2021-07-18 RX ADMIN — Medication 100 MILLIEQUIVALENT(S): at 09:42

## 2021-07-18 RX ADMIN — SODIUM CHLORIDE 2000 MILLILITER(S): 9 INJECTION, SOLUTION INTRAVENOUS at 22:26

## 2021-07-18 RX ADMIN — PIPERACILLIN AND TAZOBACTAM 25 GRAM(S): 4; .5 INJECTION, POWDER, LYOPHILIZED, FOR SOLUTION INTRAVENOUS at 18:40

## 2021-07-18 RX ADMIN — SODIUM CHLORIDE 150 MILLILITER(S): 9 INJECTION, SOLUTION INTRAVENOUS at 21:26

## 2021-07-18 RX ADMIN — Medication 40 MILLIEQUIVALENT(S): at 23:55

## 2021-07-18 NOTE — CONSULT NOTE ADULT - ASSESSMENT
56y M with Hx LBO s/p John's (03/2019) and reversal (06/2019), CAD (s/p drug-eluting stents x2 in 07/2020, on DAPT), and a large reducible ventral hernia who presented with abd pain for 3 days, found to have SBO with transition point at ventral hernia on CT scan on 7/12. His SBO resolved with medical management and his NGT was removed on 7/15. Botox was injected into the abdominal muscle on 7/16 in preparation for possible ventral hernia repair. On 7/17 AM, he became febrile. CXR demonstrated L sided atelectasis vs. pneumonia. UA was also (+) at that time. He was started on Ceftriaxone and pan-cultures were sent. In the evening on 7/18, he remained persistently febrile, and became hypotensive and tachycardic. SICU consulted for hemodynamic monitoring with concern for sepsis.     PLAN:    Neuro:  - Pain control: Tylenol 975 PO q6h PRN for mild pain  - Melatonin 6mg PO qHS PRN for insomnia  - Home Zoloft 50mg PO qD     Respiratory: active smoker  - Monitor respiratory status  - Duonebs q6h PRN  - AM CXR    Cardiovascular:  - Monitor vitals signs q1h  - 500cc NS bolus x1    Gastrointestinal:  - Diet: LRD  - Protonix 40mg PO qD    Genitourinary/Renal:  - IVF: NS @ 125  - Monitor UOP q1h  - Trend electrolytes on BMP qD, replete PRN    Heme:  - Trend H/H on CBC qD  - VTE ppx: HSQ   - SCDs while in bed  - Home ASA 81 PO qD    ID:  - Vancomycin 1.5g BID  - Zosyn 3.375 q8h  - Trend WBC on CBC qD  - Monitor fever curve  - F/u BCx, UCx    Endocrine:  - Trend FS q6/on BMP  - ISS    Lines:   - PIV x 2    Dispo: SICU  Code Status: Full Code      Letty Hall, PGY-2  SICU  #[id number]

## 2021-07-18 NOTE — PROGRESS NOTE ADULT - ASSESSMENT
56M with hx of HLD, CAD s/p MAINOR x 2 (July 2020) on DAPT, LBO 2/2 diverticulitis s/p John's procedure (March 2019) and reversal (June 2019) presenting with SBO 2/2 large ventral hernia and JANELL 2/2 volume depletion. JANELL now resolved. NG tube removed on 7/15. Botox inject to abdominal muscle on 7/16. Febrile(100.6F)UA(+)    Plan:  - Diet: clear liquid   - mIVF  - on home meds   - VTE ppx  - FU am labs, replete prn  - ultrasound guided Botox injections to abdominal wall muscles today  - discuss with attending    [facility]  [id number]  56M with hx of HLD, CAD s/p MAINOR x 2 (July 2020) on DAPT, LBO 2/2 diverticulitis s/p John's procedure (March 2019) and reversal (June 2019) presenting with SBO 2/2 large ventral hernia and JANELL 2/2 volume depletion. JANELL now resolved. NG tube removed on 7/15. Botox inject to abdominal muscle on 7/16. Yesterday am, patient was Febrile(100.6F), Chest X-ray showed left side atelectasis vs. pneumonia , labs significant for leukocytosis(WBC 13), Blood culture sent, UA(+) and urine culture sent. Started patient on ceftriaxone.     Plan:  - Diet: LRD  - on home meds   - f/u blood culture, urine culture  - VTE ppx  - Continue ceftriaxone.   - FU am labs, replete prn  - discuss with attending    [facility]  [id number]  56M with hx of HLD, CAD s/p MAINOR x 2 (July 2020) on DAPT, LBO 2/2 diverticulitis s/p John's procedure (March 2019) and reversal (June 2019) presenting with SBO 2/2 large ventral hernia and JANELL 2/2 volume depletion. JANELL now resolved. NG tube removed on 7/15. Botox injected to abdominal muscle on 7/16. On 7/17am, patient was Febrile(100.6F), Chest X-ray showed left side atelectasis vs. pneumonia, labs significant for leukocytosis(WBC 13), Blood culture sent, UA(+) and urine culture sent. Started patient on ceftriaxone.     Plan:  - Diet: LRD  - on home meds   - f/u blood culture, urine culture  - repeat CXR this AM  - DVT ppx  - Continue rocephin, will transition to PO abx for discharge  - FU am labs, replete prn  - Dispo planning: dc home pending review of CXR    [facility]  [id number]  56M with hx of HLD, CAD s/p MAINOR x 2 (July 2020) on DAPT, LBO 2/2 diverticulitis s/p John's procedure (March 2019) and reversal (June 2019) presenting with SBO 2/2 large ventral hernia and JANELL 2/2 volume depletion. JANELL now resolved. NG tube removed on 7/15. Botox injected to abdominal muscle on 7/16. On 7/17am, patient was Febrile(100.6F), Chest X-ray showed left side atelectasis vs. pneumonia, labs significant for leukocytosis(WBC 13), Blood culture sent, UA(+) and urine culture sent. Started patient on ceftriaxone.     Plan:  - Diet: LRD  - on home meds   - f/u blood culture, urine culture  - repeat CXR this AM  - DVT ppx  - Continue rocephin, will transition to PO abx for discharge  - FU am labs, replete prn  - Medicine consult given WBC count, CXR findings, and slight increase in Cr    [facility]  [id number]

## 2021-07-18 NOTE — CONSULT NOTE ADULT - SUBJECTIVE AND OBJECTIVE BOX
HISTORY OF PRESENT ILLNESS:  OBI GAMBLE is a 56y Male with Hx LBO s/p John's (03/2019) and reversal (06/2019), HLD, CAD (s/p drug-eluting stents x2 in 07/2020, on DAPT), morbid obesity, and a large reducible ventral hernia who presented with abd pain for 3 days, found to have SBO with transition point at ventral hernia on CT scan on 7/12. During this admission, his SBO resolved with medical management and his NGT was removed on 7/15. Botox was injected into the abdominal muscle on 7/16 in preparation for possible ventral hernia repair. On 7/17 AM, he became febrile. CXR demonstrated L sided atelectasis vs. pneumonia. UA was also (+) at that time. He was started on Ceftriaxone and pan-cultures were sent. In the evening on 7/18, he remained persistently febrile, and became hypotensive and tachycardic. 1x 500cc LR bolus given on the surgical floors without resolution of hypotension. SICU consulted for hemodynamic monitoring with concern for sepsis.     PAST MEDICAL HISTORY:   Sleep apnea  HLD (hyperlipidemia)  Morbid obesity  Diverticulitis  History of neuropathy  Colostomy present    PAST SURGICAL HISTORY:   No significant past surgical history  S/P repair of hydrocele    FAMILY HISTORY:   No pertinent family history in first degree relatives  Family history of lung cancer    HOME MEDICATIONS:  · 	atorvastatin 40 mg oral tablet: Last Dose Taken:  , 1 tab(s) orally once a day (at bedtime)  · 	aspirin 81 mg oral delayed release tablet: Last Dose Taken:  , 1 tab(s) orally once a day          · 	ticagrelor 90 mg oral tablet: Last Dose Taken:  , 1 tab(s) orally every 12 hours    ALLERGIES:   No Known Allergies      PHYSICAL EXAM:    VITAL SIGNS:  ICU Vital Signs Last 24 Hrs  T(C): 38.3 (18 Jul 2021 21:16), Max: 39.4 (18 Jul 2021 10:11)  T(F): 101 (18 Jul 2021 21:16), Max: 102.9 (18 Jul 2021 10:11)  HR: 117 (18 Jul 2021 21:16) (79 - 121)  BP: 91/55 (18 Jul 2021 21:16) (91/55 - 122/69)  RR: 20 (18 Jul 2021 21:16) (18 - 20)  SpO2: 90% (18 Jul 2021 21:16) (90% - 95%)      NEURO  Exam: awake, alert, oriented  Medications:   acetaminophen   Tablet .. 975 milliGRAM(s) Oral every 6 hours PRN Temp greater or equal to 38C (100.4F), Mild Pain (1 - 3)  acetaminophen  IVPB .. 1000 milliGRAM(s) IV Intermittent once  melatonin 5 milliGRAM(s) Oral at bedtime  onabotulinumtoxinA Injectable 300 Unit(s) IntraMuscular once  sertraline 50 milliGRAM(s) Oral daily      RESPIRATORY  Exam: no increased WOB on RA  Mechanical Ventilation: none  Labs:  ABG - ( 18 Jul 2021 21:59 )  pH: x     /  pCO2: x     /  pO2: x     / HCO3: x     / Base Excess: x     /  SaO2: x       Lactate: 1.6    Medications: none      CARDIOVASCULAR  Exam: normal rate and rhythm noted on cardiac monitor  Cardiac Rhythm: normal sinus rhythm  Medications: none      GI/NUTRITION  Exam: abd soft, non-distended, non-tender; ventral hernia noted, softly reducible  Diet: LRD  pantoprazole  Injectable 40 milliGRAM(s) IV Push two times a day      GENITOURINARY/RENAL  [ ] Quispe catheter, indication: urine output monitoring in critically ill patient  Meds: none  I/Os:  Total IN: 1930 mL  OUT:    Voided (mL): 450 mL  Total OUT: 450 mL  Total NET: 1480 mL  Labs:  130<L>  |  96  |  9   ----------------------------<  119<H>  3.7   |  21<L>  |  1.36<H>  Ca    8.4      18 Jul 2021 21:59  Phos  2.5     07-18  Mg     1.9     07-18  TPro  6.1  /  Alb  2.4<L>  /  TBili  0.8  /  DBili  x   /  AST  22  /  ALT  24  /  AlkPhos  68  07-18      HEMATOLOGIC  [x] VTE Prophylaxis: HSQ  Medications:  aspirin  chewable 81 milliGRAM(s) Oral daily  heparin   Injectable 5000 Unit(s) SubCutaneous every 8 hours  Labs:             11.9   14.65 )-----------( 160      ( 18 Jul 2021 21:59 )             37.2   Transfusion: [ ] PRBC	[ ] Platelets	[ ] FFP	[ ] Cryoprecipitate      INFECTIOUS DISEASES:  Medications:  piperacillin/tazobactam IVPB.. 3.375 Gram(s) IV Intermittent every 8 hours  vancomycin  IVPB 2000 milliGRAM(s) IV Intermittent every 12 hours    Recent Cultures:  Specimen Source: .Blood Blood-Peripheral  Date/Time: 07-17 @ 15:01  Culture Results:   No growth to date.      ENDOCRINE  Medications: none      PATIENT CARE ACCESS DEVICES:  [2] Peripheral IV  [ ] Central Venous Line	[ ] R	[ ] L	[ ] IJ	[ ] Fem	[ ] SC	Placed:   [ ] Arterial Line		[ ] R	[ ] L	[ ] Fem	[ ] Rad	[ ] Ax	Placed:   [ ] PICC:					[ ] Mediport  [ ] Urinary Catheter, Date Placed:   [x] Necessity of urinary, arterial, and venous catheters discussed    OTHER MEDICATIONS:       IMAGING STUDIES:    CT CAP - 7/18:    ******** PENDING FINAL READ ***************    CXR - 7/18:   Heart is similar in size.  Interval improvement in pulmonary vascular congestion.  Right lung shows no focal consolidations.  Interval improvement in left basilar haziness.  No large pleural effusion.  No pneumothorax. HISTORY OF PRESENT ILLNESS:  OBI GAMBLE is a 56y Male with Hx LBO s/p John's (03/2019) and reversal (06/2019), HLD, CAD (s/p drug-eluting stents x2 in 07/2020, on DAPT), morbid obesity, and a large reducible ventral hernia who presented with abd pain for 3 days, found to have SBO with transition point at ventral hernia on CT scan on 7/12. During this admission, his SBO resolved with medical management and his NGT was removed on 7/15. Botox was injected into the abdominal muscle on 7/16 in preparation for possible ventral hernia repair. On 7/17 AM, he became febrile. CXR demonstrated L sided atelectasis vs. pneumonia. UA was also (+) at that time. He was started on Ceftriaxone and pan-cultures were sent. In the evening on 7/18, he remained persistently febrile, and became hypotensive and tachycardic. 1x 500cc LR bolus given on the surgical floors without resolution of hypotension. SICU consulted for hemodynamic monitoring with concern for sepsis.     PAST MEDICAL HISTORY:   Sleep apnea  HLD (hyperlipidemia)  Morbid obesity  Diverticulitis  History of neuropathy  Colostomy present    PAST SURGICAL HISTORY:   No significant past surgical history  S/P repair of hydrocele    FAMILY HISTORY:   No pertinent family history in first degree relatives  Family history of lung cancer    HOME MEDICATIONS:  · 	atorvastatin 40 mg oral tablet: Last Dose Taken:  , 1 tab(s) orally once a day (at bedtime)  · 	aspirin 81 mg oral delayed release tablet: Last Dose Taken:  , 1 tab(s) orally once a day          · 	ticagrelor 90 mg oral tablet: Last Dose Taken:  , 1 tab(s) orally every 12 hours    ALLERGIES:   No Known Allergies      PHYSICAL EXAM:    VITAL SIGNS:  ICU Vital Signs Last 24 Hrs  T(C): 38.3 (18 Jul 2021 21:16), Max: 39.4 (18 Jul 2021 10:11)  T(F): 101 (18 Jul 2021 21:16), Max: 102.9 (18 Jul 2021 10:11)  HR: 117 (18 Jul 2021 21:16) (79 - 121)  BP: 91/55 (18 Jul 2021 21:16) (91/55 - 122/69)  RR: 20 (18 Jul 2021 21:16) (18 - 20)  SpO2: 90% (18 Jul 2021 21:16) (90% - 95%)      NEURO  Exam: awake, alert, oriented  Medications:   acetaminophen   Tablet .. 975 milliGRAM(s) Oral every 6 hours PRN Temp greater or equal to 38C (100.4F), Mild Pain (1 - 3)  acetaminophen  IVPB .. 1000 milliGRAM(s) IV Intermittent once  melatonin 5 milliGRAM(s) Oral at bedtime  onabotulinumtoxinA Injectable 300 Unit(s) IntraMuscular once  sertraline 50 milliGRAM(s) Oral daily      RESPIRATORY  Exam: no increased WOB on RA  Mechanical Ventilation: none  Labs:  Lactate: 1.6    Medications: none      CARDIOVASCULAR  Exam: normal rate and rhythm noted on cardiac monitor  Cardiac Rhythm: normal sinus rhythm  Medications: none      GI/NUTRITION  Exam: abd soft, non-distended, non-tender; ventral hernia noted, softly reducible  Diet: LRD  pantoprazole  Injectable 40 milliGRAM(s) IV Push two times a day      GENITOURINARY/RENAL  [ ] Quispe catheter, indication: urine output monitoring in critically ill patient  Meds: none  I/Os:  Total IN: 1930 mL  OUT:    Voided (mL): 450 mL  Total OUT: 450 mL  Total NET: 1480 mL  Labs:  130<L>  |  96  |  9   ----------------------------<  119<H>  3.7   |  21<L>  |  1.36<H>  Ca    8.4      18 Jul 2021 21:59  Phos  2.5     07-18  Mg     1.9     07-18  TPro  6.1  /  Alb  2.4<L>  /  TBili  0.8  /  DBili  x   /  AST  22  /  ALT  24  /  AlkPhos  68  07-18      HEMATOLOGIC  [x] VTE Prophylaxis: HSQ  Medications:  aspirin  chewable 81 milliGRAM(s) Oral daily  heparin   Injectable 5000 Unit(s) SubCutaneous every 8 hours  Labs:             11.9   14.65 )-----------( 160      ( 18 Jul 2021 21:59 )             37.2   Transfusion: [ ] PRBC	[ ] Platelets	[ ] FFP	[ ] Cryoprecipitate      INFECTIOUS DISEASES:  Medications:  piperacillin/tazobactam IVPB.. 3.375 Gram(s) IV Intermittent every 8 hours  vancomycin  IVPB 2000 milliGRAM(s) IV Intermittent every 12 hours    Recent Cultures:  Specimen Source: .Blood Blood-Peripheral  Date/Time: 07-17 @ 15:01  Culture Results:   No growth to date.      ENDOCRINE  Medications: none      PATIENT CARE ACCESS DEVICES:  [2] Peripheral IV  [ ] Central Venous Line	[ ] R	[ ] L	[ ] IJ	[ ] Fem	[ ] SC	Placed:   [ ] Arterial Line		[ ] R	[ ] L	[ ] Fem	[ ] Rad	[ ] Ax	Placed:   [ ] PICC:					[ ] Mediport  [ ] Urinary Catheter, Date Placed:   [x] Necessity of urinary, arterial, and venous catheters discussed    OTHER MEDICATIONS:       IMAGING STUDIES:    CT CAP - 7/18:    ******** PENDING FINAL READ ***************    CXR - 7/18:   Heart is similar in size.  Interval improvement in pulmonary vascular congestion.  Right lung shows no focal consolidations.  Interval improvement in left basilar haziness.  No large pleural effusion.  No pneumothorax.

## 2021-07-18 NOTE — CONSULT NOTE ADULT - PROBLEM SELECTOR RECOMMENDATION 9
-Patient meeting SIRS criteria with fever of 102.9, HR>90 and leukocytosis however no clear source  -UA very weakly positive with positive nitrites, no leukocyte esterase and only 5 WBC  -CXR yesterday-New mid/lower left lung airspace haziness - may be due to atelectasis - but underlying pneumonia not excluded in the right clinical setting  -CXR today-Interval improvement in left basilar haziness  -Unclear if either is really the source and patient without localizing symptoms-denies cough, shortness of breath, dysuria, hematuria, abdominal pain, nausea, vomiting or diarrhea  -Patient received ceftriaxone for UTI  -Agree with broad spectrum vanc/zosyn for potential HAP  -Xrays qualities not very good, would obtain CT chest non contrast  -Follow up blood cultures, urine cultures, sputum cultures  -Obtain lactate and can give 30cc/kg ideal body weight IV crystalloid if elevated per sepsis protocol  -Tylenol PRN for fevers -Patient meeting SIRS criteria with fever of 102.9, HR>90 and leukocytosis however no clear source  -UA very weakly positive with positive nitrites, no leukocyte esterase and only 5 WBC  -CXR yesterday-New mid/lower left lung airspace haziness - may be due to atelectasis - but underlying pneumonia not excluded in the right clinical setting  -CXR today-Interval improvement in left basilar haziness  -Unclear if either is really the source and patient without localizing symptoms-denies cough, shortness of breath, dysuria, hematuria, abdominal pain, nausea, vomiting or diarrhea  -Patient received ceftriaxone for UTI  -Agree with broad spectrum vanc/zosyn for potential HAP until more information is available  -Xray qualities not very good, would obtain CT chest non contrast  -Follow up blood cultures, urine cultures, sputum cultures  -Obtain lactate and can give 30cc/kg ideal body weight IV crystalloid if elevated per sepsis protocol  -Tylenol PRN for fevers -Patient meeting SIRS criteria with fever of 102.9, HR>90 and leukocytosis however no clear source  -UA very weakly positive with positive nitrites, no leukocyte esterase and only 5 WBC  -CXR yesterday-New mid/lower left lung airspace haziness - may be due to atelectasis - but underlying pneumonia not excluded in the right clinical setting  -CXR today-Interval improvement in left basilar haziness  -Unclear if either is really the source and patient without localizing symptoms-denies cough, shortness of breath, dysuria, hematuria, abdominal pain, nausea, vomiting or diarrhea  -Patient received ceftriaxone for UTI  -Agree with broad spectrum vanc/zosyn for potential HAP until more information is available  -Xray qualities not very good, would obtain CT chest non contrast  -Follow up blood cultures, urine cultures, sputum cultures  -Obtain Full RVP  -Obtain lactate and can give 30cc/kg ideal body weight IV crystalloid if elevated per sepsis protocol  -Tylenol PRN for fevers

## 2021-07-18 NOTE — CONSULT NOTE ADULT - PROBLEM SELECTOR RECOMMENDATION 3
-Patient presenting with creatinine of 2.82 (in 2020 was about 1.1-1.2) likely 2/2 partial SBO and volume depletion  -Resolving, not much higher than baseline creatinine, continue to encourage PO intake, avoid nephrotoxins and renally dose medications

## 2021-07-18 NOTE — CONSULT NOTE ADULT - ASSESSMENT
56M with hx of HLD, CAD s/p MAINOR x 2 (July 2020) on DAPT, LBO 2/2 diverticulitis s/p John's procedure (March 2019) and reversal (June 2019) presenting with SBO 2/2 large ventral hernia and JANELL 2/2 volume depletion. S/p resolving JANELL,  NG tube removal on 7/15. Botox  injection to abdominal muscle on 7/16.   Medicine consult is called for leukocytosis and CXR findings. On 7/17 patient was Febrile(100.6F), Chest X-ray showed left side atelectasis vs. pneumonia, labs significant for rising leukocytosis(WBC 17), Blood culture sent, UA(+) and urine culture sent. Started patient on ceftriaxone 7/17.     Recommendations:     56M with hx of HLD, CAD s/p MAINOR x 2 (July 2020) on DAPT, LBO 2/2 diverticulitis s/p John's procedure (March 2019) and reversal (June 2019) presenting with SBO 2/2 large ventral hernia and JANELL 2/2 volume depletion. S/p resolving JANELL,  NG tube removal on 7/15. Botox  injection to abdominal muscle on 7/16.   Medicine consult is called for leukocytosis and CXR findings. On 7/17 patient was Febrile(100.6F), Chest X-ray showed left side atelectasis vs. pneumonia, labs significant for rising leukocytosis(WBC 17), Blood culture sent, UA(+) and urine culture sent. Started patient on ceftriaxone 7/17.     Recommendations:    #JANELL 2/2 partial SPO and volume depletion (on admission Cr. of 2.8, now 1.4) 7/2020 ~1.1-1.2  - Resolving. Likely near or around his baseline kidney function. Encourage PO intake.    #Hospital Acquired Pneumonia (new fevers, tachycardia, crackles in LLL on exam, CXR with possible LLL infiltrate which developed while in hospital, which appeared to be developing 7/15 on CXR)  - DC Ceftriaxone. Start vancomycin 20mg/kg Q12h (renally dosed). Check Vanco trough pre 4th dose for redosing. Can redose with pharmacy aid based on level. Start cefepime 2gQ12h (renally dosed)  - Tylenol PRN fever, cooling blanket as needed.  - F/U Blood and urine cultures. Can attempt sputum culture, but does not complain of cough or productiveness.    Yaron Morfin  PGY-3  Internal Medicine Resident   56M with hx of HLD, CAD s/p MAINOR x 2 (July 2020) on DAPT, LBO 2/2 diverticulitis s/p John's procedure (March 2019) and reversal (June 2019) presenting with SBO 2/2 large ventral hernia and JANELL 2/2 volume depletion. S/p resolving JANELL,  NG tube removal on 7/15. Botox  injection to abdominal muscle on 7/16.   Medicine consult is called for leukocytosis and CXR findings. On 7/17 patient was Febrile(100.6F), Chest X-ray showed left side atelectasis vs. pneumonia, labs significant for rising leukocytosis(WBC 17), Blood culture sent, UA(+) and urine culture sent. Started patient on ceftriaxone 7/17.     Recommendations:    #JANELL 2/2 partial SPO and volume depletion (on admission Cr. of 2.8, now 1.4) 7/2020 ~1.1-1.2  - Resolving. Likely near or around his baseline kidney function. Encourage PO intake.    #Hospital Acquired Pneumonia (new fevers, tachycardia, crackles in LLL on exam, CXR with possible LLL infiltrate which developed while in hospital, which appeared to be developing 7/15 on CXR)  - DC Ceftriaxone. Start vancomycin 20mg/kg Q12h (renally dosed). Check Vanco trough pre 4th dose for redosing. Can redose with pharmacy aid based on level. Start cefepime 2gQ12h (renally dosed)  - Check Lactate, if >2, bolus 30cc/kg (IDEAL BODY WEIGHT) crystalloid fluid then recheck lactate in 6 hours for clinical response to fluid resuscitation.   - Tylenol PRN fever, cooling blanket as needed.  - F/U Blood and urine cultures. Tailor as needed. Can attempt sputum culture, but does not complain of cough or productiveness.      Yaron Morfin  PGY-3  Internal Medicine Resident   56M with hx of HLD, CAD s/p MAINOR x 2 (July 2020) on DAPT, LBO 2/2 diverticulitis s/p John's procedure (March 2019) and reversal (June 2019) presenting with SBO 2/2 large ventral hernia and JANELL 2/2 volume depletion. S/p resolving JANELL,  NG tube removal on 7/15. Botox  injection to abdominal muscle on 7/16.   Medicine consult is called for leukocytosis and CXR findings. On 7/17 patient was Febrile(100.6F), Chest X-ray showed left side atelectasis vs. pneumonia, labs significant for rising leukocytosis(WBC 17), Blood culture sent, UA(+) and urine culture sent. Started patient on ceftriaxone 7/17.     Recommendations:    #JANELL 2/2 partial SPO and volume depletion (on admission Cr. of 2.8, now 1.4) 7/2020 ~1.1-1.2  - Resolving. Likely near or around his baseline kidney function. Encourage PO intake.    #Hospital Acquired Pneumonia (new fevers, tachycardia, crackles in LLL on exam, CXR with possible LLL infiltrate which developed while in hospital, which appeared to be developing 7/15 on CXR)  - DC Ceftriaxone. Start vancomycin 20mg/kg Q12h (renally dosed). Check Vanco trough pre 4th dose for redosing. Can redose with pharmacy aid based on level. Start zosyn (renally dosed per GFR on sunrise) more anaerobic coverage.  - Would recommend CT chest non con to evaluate findings on CXR better.   - Should continue DAPT if possible, can hold ticagrelor is surgery imminent Would continue high intensity statin given CAD w/ stents.   - Check Lactate, if >2, bolus 30cc/kg (IDEAL BODY WEIGHT) crystalloid fluid then recheck lactate in 6 hours for clinical response to fluid resuscitation.   - Tylenol PRN fever, cooling blanket as needed.  - F/U Blood and urine cultures. Tailor as needed. Can attempt sputum culture, but does not complain of cough or productiveness.      Yaron Morfin  PGY-3  Internal Medicine Resident   56M with hx of HLD, CAD s/p MAINOR x 2 (July 2020) on DAPT, LBO 2/2 diverticulitis s/p John's procedure (March 2019) and reversal (June 2019) presenting with SBO 2/2 large ventral hernia and JANELL 2/2 volume depletion. S/p resolving JANELL,  NG tube removal on 7/15. Botox  injection to abdominal muscle on 7/16.   Medicine consult is called for leukocytosis and CXR findings. On 7/17 patient was Febrile(100.6F), Chest X-ray showed left side atelectasis vs. pneumonia, labs significant for rising leukocytosis(WBC 17), Blood culture sent, UA(+) and urine culture sent. Started patient on ceftriaxone 7/17.       #JANELL 2/2 partial SPO and volume depletion (on admission Cr. of 2.8, now 1.4) 7/2020 ~1.1-1.2  - Resolving. Likely near or around his baseline kidney function. Encourage PO intake.      #Hospital Acquired Pneumonia (new fevers, tachycardia, crackles in LLL on exam, CXR with possible LLL infiltrate which developed while in hospital, which appeared to be developing 7/15 on CXR)  - DC Ceftriaxone. Start vancomycin 20mg/kg Q12h (renally dosed). Check Vanco trough pre 4th dose for redosing. Can redose with pharmacy aid based on level. Start zosyn (renally dosed per GFR on sunrise) more anaerobic coverage.  - Would recommend CT chest non con to evaluate findings on CXR better.   - Should continue DAPT if possible, can hold ticagrelor is surgery imminent Would continue high intensity statin given CAD w/ stents.   - Check Lactate, if >2, bolus 30cc/kg (IDEAL BODY WEIGHT) crystalloid fluid then recheck lactate in 6 hours for clinical response to fluid resuscitation.   - Tylenol PRN fever, cooling blanket as needed.  - F/U Blood and urine cultures. Tailor as needed. Can attempt sputum culture, but does not complain of cough or productiveness.      Yaron Morfin  PGY-3  Internal Medicine Resident

## 2021-07-18 NOTE — CONSULT NOTE ADULT - ATTENDING COMMENTS
56y M with Hx LBO s/p John's (03/2019) and reversal (06/2019), CAD (s/p drug-eluting stents x2 in 07/2020, on DAPT), and a large reducible ventral hernia who presented with abd pain for 3 days, found to have SBO with transition point at ventral hernia on CT scan on 7/12. His SBO resolved with medical management. On 7/17 AM, he became febrile with CXR demonstrated L sided atelectasis vs. pneumonia. UA was also (+) at that time. He was started on Ceftriaxone and pan-cultures were sent. He remained persistently febrile, and became hypotensive and tachycardic concern for pna and UTI sepsis.   Awake and alert, on sertraline, denies pain  Saturating well. H/o smoking and DAMION, refusing NIV, will Rx with Duoneb  Resume Brilinta, continue ASA, not schedule for surgery  CT reviewed, not in fluid overload, Rx hypotension with fluid bolus  Start IVF NS sec to hyponatremia  Abx Vanco Zosyn, will follow all cultures, monitor fever leucocytosis PCT  DVT ppx with Lovenox 30 mg bid based on BMI
Plan per resident/my documentation as above.   JANELL-Encourage PO intake  CAD-Restart CAD medications if no contraindication  SIRS-Fever workup, broad spectrum antibiotics    Nikita Convissar, DO  Pager [phone]  If no answer [phone]

## 2021-07-18 NOTE — CONSULT NOTE ADULT - PROBLEM SELECTOR RECOMMENDATION 2
-Patient with a history of CAD with stents on aspirin, Brilinta and Lipitor at home but currently only on aspirin  -Would clarify with primary surgical team why the patient is not on Lipitor and Brilinta while in the hospital and restart if no contraidications -Patient with a history of CAD with stents on aspirin, Brilinta and Lipitor at home but currently only on aspirin  -Would clarify with primary surgical team why the patient is not on Lipitor and Brilinta while in the hospital and restart if no contraindications

## 2021-07-18 NOTE — CONSULT NOTE ADULT - SUBJECTIVE AND OBJECTIVE BOX
INCOMPLETE      Internal Medicine Consult  [facility]  PGY-3  Spectra [phone]    Reason for consult: leukocytosis, CXR findings    HPI/Course  56M with hx of HLD, CAD s/p MAINOR x 2 (2020) on DAPT, LBO 2/2 diverticulitis s/p John's procedure (2019) and reversal (2019) presenting with abdominal pain for 3 days. The pain worsened in the last 1-2 days and he has had multiple episodes of emesis since yesterday. He states he is still passing gas and has been having diarrhea today. Has not had anything to eat or drink in 3 days. Denies fevers, chills, chest pain, SOB, dysuria. He says that he has had several episodes of pain like this since his surgery but it has always resolved at home.    He takes aspirin, Brilinta and Lipitor. Last colonoscopy was in . He is scheduled for a hernia repair with Dr. Stein on .    In the ED he received 2L crystalloid. NG tube was placed with 1.7 L out on insertion. He is hemodynamically normal with WBC 7, Hgb/Hct 17/53, Cr 2.8, lactate 2.9. CT without contrast shows SBO with transition point at the right central aspect of the ventral hernia with some bowel wall thickening and mesenteric edema. Hernia is reducible.    JANELL now resolved. NG tube removed on 7/15. Botox injected to abdominal muscle on . On m, patient was Febrile(100.6F), Chest X-ray showed left side atelectasis vs. pneumonia, labs significant for leukocytosis(WBC 13), Blood culture sent, UA(+) and urine culture sent. Started patient on ceftriaxone on .            PMHx:   Sleep apnea    HLD (hyperlipidemia)    Morbid obesity    Diverticulitis    History of neuropathy    Colostomy present        PSHx:   No significant past surgical history    S/P repair of hydrocele        Allergies:  No Known Allergies      Home Meds:    Current Medications:   acetaminophen   Tablet .. 975 milliGRAM(s) Oral every 6 hours PRN  aspirin  chewable 81 milliGRAM(s) Oral daily  cefTRIAXone   IVPB 1000 milliGRAM(s) IV Intermittent every 24 hours  heparin   Injectable 5000 Unit(s) SubCutaneous every 8 hours  melatonin 5 milliGRAM(s) Oral at bedtime  onabotulinumtoxinA Injectable 300 Unit(s) IntraMuscular once  pantoprazole  Injectable 40 milliGRAM(s) IV Push two times a day  sertraline 50 milliGRAM(s) Oral daily  sodium phosphate IVPB 30 milliMole(s) IV Intermittent once      FAMILY HISTORY:  Family history of lung cancer  mother  age 40, hx lung cancer        REVIEW OF SYSTEMS:  Constitutional:     [ ] negative [ ] fevers [ ] chills [ ] weight loss [ ] weight gain  HEENT:                  [ ] negative [ ] dry eyes [ ] eye irritation [ ] postnasal drip [ ] nasal congestion  CV:                         [ ] negative  [ ] chest pain [ ] orthopnea [ ] palpitations [ ] murmur  Resp:                     [ ] negative [ ] cough [ ] shortness of breath [ ] dyspnea [ ] wheezing [ ] sputum [ ]hemoptysis  GI:                          [ ] negative [ ] nausea [ ] vomiting [ ] diarrhea [ ] constipation [ ] abd pain [ ] dysphagia   :                        [ ] negative [ ] dysuria [ ] nocturia [ ] hematuria [ ] increased urinary frequency  Musculoskeletal: [ ] negative [ ] back pain [ ] myalgias [ ] arthralgias [ ] fracture  Skin:                       [ ] negative [ ] rash [ ] itch  Neurological:        [ ] negative [ ] headache [ ] dizziness [ ] syncope [ ] weakness [ ] numbness  Psychiatric:           [ ] negative [ ] anxiety [ ] depression  Endocrine:            [ ] negative [ ] diabetes [ ] thyroid problem  Heme/Lymph:      [ ] negative [ ] anemia [ ] bleeding problem  Allergic/Immune: [ ] negative [ ] itchy eyes [ ] nasal discharge [ ] hives [ ] angioedema    [ ] All other systems negative  [ ] Unable to assess ROS due to      Physical Exam:  T(F): 102.9 (-), Max: 102.9 ()  HR: 102 (-) (91 - 107)  BP: 106/72 () (98/62 - 130/76)  RR: 18 (-)  SpO2: 93% (-)    GENERAL: No acute distress, well-developed  HEAD:  Atraumatic, Normocephalic  ENT: EOMI, PERRLA, conjunctiva and sclera clear, Neck supple, No JVD, moist mucosa  CHEST/LUNG: Clear to auscultation bilaterally; No wheeze, equal breath sounds bilaterally   HEART: Regular rate and rhythm; No murmurs, rubs, or gallops  ABDOMEN: Soft, Nontender, Nondistended; Bowel sounds present  EXTREMITIES:  No clubbing, cyanosis, or edema  SKIN: Normal color, No rashes or lesions  LINES:                          13.0   16.82 )-----------( 180      ( 2021 07:13 )             42.1     07-18    133<L>  |  97  |  8   ----------------------------<  117<H>  3.9   |  23  |  1.43<H>    Ca    8.7      2021 07:08  Phos  1.5     07-  Mg     1.9     -18    < from: Xray Chest 1 View- PORTABLE-Urgent (Xray Chest 1 View- PORTABLE-Urgent .) (21 @ 09:27) >  New mid/lower left lung airspace haziness - may be due to atelectasis - but underlying pneumonia not excluded in the right clinical setting. No pneumothorax.    < end of copied text >    Urine Microscopic-Add On (NC) (21 @ 17:24)   Red Blood Cell - Urine: 5 /hpf   White Blood Cell - Urine: 5 /HPF   Hyaline Casts: 0 /LPF   Bacteria: Many   Epithelial Cells: 1   `< from: CT Abdomen and Pelvis w/ Oral Cont (21 @ 16:37) >  IMPRESSION:  Complex ventral hernia containing mildly dilated fluid filled small bowel is again noted. Multiple loops of dilated proximal small bowel are again noted with gradual transition to more collapsed distal small bowel. There is overall decreased small bowel dilatation as compared with prior study dated 2021, consistent with resolving small bowel obstruction.    Small bowel lipoma again noted..    < end of copied text >           INCOMPLETE      Internal Medicine Consult  [facility]  PGY-3  Spectra [id number]    Reason for consult: leukocytosis, CXR findings    HPI/Course  56M with hx of HLD, CAD s/p MAINOR x 2 (2020) on DAPT, LBO 2/2 diverticulitis s/p John's procedure (2019) and reversal (2019) presenting with abdominal pain for 3 days. The pain worsened in the last 1-2 days and he has had multiple episodes of emesis since yesterday. He states he is still passing gas and has been having diarrhea today. Has not had anything to eat or drink in 3 days. Denies fevers, chills, chest pain, SOB, dysuria. He says that he has had several episodes of pain like this since his surgery but it has always resolved at home.    He takes aspirin, Brilinta and Lipitor. Last colonoscopy was in . He is scheduled for a hernia repair with Dr. Stein on .    In the ED he received 2L crystalloid. NG tube was placed with 1.7 L out on insertion. He is hemodynamically normal with WBC 7, Hgb/Hct 17/53, Cr 2.8, lactate 2.9. CT without contrast shows SBO with transition point at the right central aspect of the ventral hernia with some bowel wall thickening and mesenteric edema. Hernia is reducible.    JANELL now resolved. NG tube removed on 7/15. Botox injected to abdominal muscle on . On m, patient was Febrile(100.6F), Chest X-ray showed left side atelectasis vs. pneumonia, labs significant for leukocytosis(WBC 13), Blood culture sent, UA(+) and urine culture sent. Started patient on ceftriaxone on .    SUBJECTIVE/Overnight Events:  Febrile, tachycardic overnight.            PMHx:   Sleep apnea    HLD (hyperlipidemia)    Morbid obesity    Diverticulitis    History of neuropathy    Colostomy present        PSHx:   No significant past surgical history    S/P repair of hydrocele        Allergies:  No Known Allergies      Home Meds:    Current Medications:   acetaminophen   Tablet .. 975 milliGRAM(s) Oral every 6 hours PRN  aspirin  chewable 81 milliGRAM(s) Oral daily  cefTRIAXone   IVPB 1000 milliGRAM(s) IV Intermittent every 24 hours  heparin   Injectable 5000 Unit(s) SubCutaneous every 8 hours  melatonin 5 milliGRAM(s) Oral at bedtime  onabotulinumtoxinA Injectable 300 Unit(s) IntraMuscular once  pantoprazole  Injectable 40 milliGRAM(s) IV Push two times a day  sertraline 50 milliGRAM(s) Oral daily  sodium phosphate IVPB 30 milliMole(s) IV Intermittent once      FAMILY HISTORY:  Family history of lung cancer  mother  age 40, hx lung cancer        REVIEW OF SYSTEMS:  Constitutional:     [ ] negative [ ] fevers [ ] chills [ ] weight loss [ ] weight gain  HEENT:                  [ ] negative [ ] dry eyes [ ] eye irritation [ ] postnasal drip [ ] nasal congestion  CV:                         [ ] negative  [ ] chest pain [ ] orthopnea [ ] palpitations [ ] murmur  Resp:                     [ ] negative [ ] cough [ ] shortness of breath [ ] dyspnea [ ] wheezing [ ] sputum [ ]hemoptysis  GI:                          [ ] negative [ ] nausea [ ] vomiting [ ] diarrhea [ ] constipation [ ] abd pain [ ] dysphagia   :                        [ ] negative [ ] dysuria [ ] nocturia [ ] hematuria [ ] increased urinary frequency  Musculoskeletal: [ ] negative [ ] back pain [ ] myalgias [ ] arthralgias [ ] fracture  Skin:                       [ ] negative [ ] rash [ ] itch  Neurological:        [ ] negative [ ] headache [ ] dizziness [ ] syncope [ ] weakness [ ] numbness  Psychiatric:           [ ] negative [ ] anxiety [ ] depression  Endocrine:            [ ] negative [ ] diabetes [ ] thyroid problem  Heme/Lymph:      [ ] negative [ ] anemia [ ] bleeding problem  Allergic/Immune: [ ] negative [ ] itchy eyes [ ] nasal discharge [ ] hives [ ] angioedema    [ ] All other systems negative  [ ] Unable to assess ROS due to      Physical Exam:  T(F): 102.9 (-), Max: 102.9 ()  HR: 102 () (91 - 107)  BP: 106/72 () (98/62 - 130/76)  RR: 18 (-)  SpO2: 93% ()    GENERAL: No acute distress, well-developed  HEAD:  Atraumatic, Normocephalic  ENT: EOMI, PERRLA, conjunctiva and sclera clear, Neck supple, No JVD, moist mucosa  CHEST/LUNG: Clear to auscultation bilaterally; No wheeze, equal breath sounds bilaterally   HEART: Regular rate and rhythm; No murmurs, rubs, or gallops  ABDOMEN: Soft, Nontender, Nondistended; Bowel sounds present  EXTREMITIES:  No clubbing, cyanosis, or edema  SKIN: Normal color, No rashes or lesions  LINES:                          13.0   16.82 )-----------( 180      ( 2021 07:13 )             42.1     07-18    133<L>  |  97  |  8   ----------------------------<  117<H>  3.9   |  23  |  1.43<H>    Ca    8.7      2021 07:08  Phos  1.5     07-18  Mg     1.9     07-18    < from: Xray Chest 1 View- PORTABLE-Urgent (Xray Chest 1 View- PORTABLE-Urgent .) (21 @ 09:27) >  New mid/lower left lung airspace haziness - may be due to atelectasis - but underlying pneumonia not excluded in the right clinical setting. No pneumothorax.    < end of copied text >    Urine Microscopic-Add On (NC) (21 @ 17:24)   Red Blood Cell - Urine: 5 /hpf   White Blood Cell - Urine: 5 /HPF   Hyaline Casts: 0 /LPF   Bacteria: Many   Epithelial Cells: 1   `< from: CT Abdomen and Pelvis w/ Oral Cont (21 @ 16:37) >  IMPRESSION:  Complex ventral hernia containing mildly dilated fluid filled small bowel is again noted. Multiple loops of dilated proximal small bowel are again noted with gradual transition to more collapsed distal small bowel. There is overall decreased small bowel dilatation as compared with prior study dated 2021, consistent with resolving small bowel obstruction.    Small bowel lipoma again noted..    < end of copied text >           INCOMPLETE      Internal Medicine Consult  [facility]  PGY-3  Spectra [phone]    Reason for consult: leukocytosis, CXR findings    HPI/Course  56M with hx of HLD, CAD s/p MAINOR x 2 (2020) on DAPT, LBO 2/2 diverticulitis s/p John's procedure (2019) and reversal (2019) presenting with abdominal pain for 3 days. The pain worsened in the last 1-2 days and he has had multiple episodes of emesis since yesterday. He states he is still passing gas and has been having diarrhea today. Has not had anything to eat or drink in 3 days. Denies fevers, chills, chest pain, SOB, dysuria. He says that he has had several episodes of pain like this since his surgery but it has always resolved at home.    He takes aspirin, Brilinta and Lipitor. Last colonoscopy was in . He is scheduled for a hernia repair with Dr. Stein on .    In the ED he received 2L crystalloid. NG tube was placed with 1.7 L out on insertion. He is hemodynamically normal with WBC 7, Hgb/Hct 17/53, Cr 2.8, lactate 2.9. CT without contrast shows SBO with transition point at the right central aspect of the ventral hernia with some bowel wall thickening and mesenteric edema. Hernia is reducible.    JANELL now resolved. NG tube removed on 7/15. Botox injected to abdominal muscle on . On m, patient was Febrile(100.6F), Chest X-ray showed left side atelectasis vs. pneumonia, labs significant for leukocytosis(WBC 13), Blood culture sent, UA(+) and urine culture sent. Started patient on ceftriaxone on .    SUBJECTIVE/Overnight Events:  Febrile, tachycardic overnight. Only complains of feeling feverish currently. Does not feel SOB, denies cough, CP, weakness.            PMHx:   Sleep apnea    HLD (hyperlipidemia)    Morbid obesity    Diverticulitis    History of neuropathy    Colostomy present        PSHx:   No significant past surgical history    S/P repair of hydrocele        Allergies:  No Known Allergies      Home Meds:    Current Medications:   acetaminophen   Tablet .. 975 milliGRAM(s) Oral every 6 hours PRN  aspirin  chewable 81 milliGRAM(s) Oral daily  cefTRIAXone   IVPB 1000 milliGRAM(s) IV Intermittent every 24 hours  heparin   Injectable 5000 Unit(s) SubCutaneous every 8 hours  melatonin 5 milliGRAM(s) Oral at bedtime  onabotulinumtoxinA Injectable 300 Unit(s) IntraMuscular once  pantoprazole  Injectable 40 milliGRAM(s) IV Push two times a day  sertraline 50 milliGRAM(s) Oral daily  sodium phosphate IVPB 30 milliMole(s) IV Intermittent once      FAMILY HISTORY:  Family history of lung cancer  mother  age 40, hx lung cancer        REVIEW OF SYSTEMS:  Constitutional:     [ ] negative [x ] fevers [x ] chills [ ] weight loss [ ] weight gain  HEENT:                  [ ] negative [ ] dry eyes [ ] eye irritation [ ] postnasal drip [ ] nasal congestion  CV:                         [ ] negative  [ ] chest pain [ ] orthopnea [ ] palpitations [ ] murmur  Resp:                     [ ] negative [ ] cough [ ] shortness of breath [ ] dyspnea [ ] wheezing [ ] sputum [ ]hemoptysis  GI:                          [ ] negative [ ] nausea [ ] vomiting [ ] diarrhea [ ] constipation [ ] abd pain [ ] dysphagia   :                        [ ] negative [ ] dysuria [ ] nocturia [ ] hematuria [ ] increased urinary frequency  Musculoskeletal: [ ] negative [ ] back pain [ ] myalgias [ ] arthralgias [ ] fracture  Skin:                       [ ] negative [ ] rash [ ] itch  Neurological:        [ ] negative [ ] headache [ ] dizziness [ ] syncope [ ] weakness [ ] numbness  Psychiatric:           [ ] negative [ ] anxiety [ ] depression  Endocrine:            [ ] negative [ ] diabetes [ ] thyroid problem  Heme/Lymph:      [ ] negative [ ] anemia [ ] bleeding problem  Allergic/Immune: [ ] negative [ ] itchy eyes [ ] nasal discharge [ ] hives [ ] angioedema    [x ] All other systems negative  [ ] Unable to assess ROS due to      Physical Exam:  T(F): 102.9 (-), Max: 102.9 ()  HR: 102 () (91 - 107)  BP: 106/72 () (98/62 - 130/76)  RR: 18 (-)  SpO2: 93% ()    GENERAL: No acute distress, well-developed, obese, feverish  HEAD:  Atraumatic, Normocephalic  ENT: EOMI, PERRLA, conjunctiva and sclera clear, Neck supple, No JVD, moist mucosa  CHEST/LUNG: Clear to auscultation on R, Crackles on LLL No wheeze, equal breath sounds bilaterally   HEART: Regular rate and rhythm; No murmurs, rubs, or gallops  ABDOMEN: Soft, Nontender, Nondistended; Bowel sounds present  EXTREMITIES:  No clubbing, cyanosis, or edema  SKIN: Normal color, No rashes or lesions, feverish  LINES:                          13.0   16.82 )-----------( 180      ( 2021 07:13 )             42.1     07-18    133<L>  |  97  |  8   ----------------------------<  117<H>  3.9   |  23  |  1.43<H>    Ca    8.7      2021 07:08  Phos  1.5     07-18  Mg     1.9     07-18    < from: Xray Chest 1 View- PORTABLE-Urgent (Xray Chest 1 View- PORTABLE-Urgent .) (21 @ 09:27) >  New mid/lower left lung airspace haziness - may be due to atelectasis - but underlying pneumonia not excluded in the right clinical setting. No pneumothorax.    < end of copied text >    Urine Microscopic-Add On (NC) (21 @ 17:24)   Red Blood Cell - Urine: 5 /hpf   White Blood Cell - Urine: 5 /HPF   Hyaline Casts: 0 /LPF   Bacteria: Many   Epithelial Cells: 1   `< from: CT Abdomen and Pelvis w/ Oral Cont (21 @ 16:37) >  IMPRESSION:  Complex ventral hernia containing mildly dilated fluid filled small bowel is again noted. Multiple loops of dilated proximal small bowel are again noted with gradual transition to more collapsed distal small bowel. There is overall decreased small bowel dilatation as compared with prior study dated 2021, consistent with resolving small bowel obstruction.    Small bowel lipoma again noted..    < end of copied text >           Internal Medicine Consult  [facility]  PGY-3  Spectra [phone]    Reason for consult: leukocytosis, CXR findings    HPI/Course  56M with hx of HLD, CAD s/p MAINOR x 2 (2020) on DAPT, LBO 2/2 diverticulitis s/p John's procedure (2019) and reversal (2019) presenting with abdominal pain for 3 days. The pain worsened in the last 1-2 days and he has had multiple episodes of emesis since yesterday. He states he is still passing gas and has been having diarrhea today. Has not had anything to eat or drink in 3 days. Denies fevers, chills, chest pain, SOB, dysuria. He says that he has had several episodes of pain like this since his surgery but it has always resolved at home.    He takes aspirin, Brilinta and Lipitor. Last colonoscopy was in . He is scheduled for a hernia repair with Dr. Stein on .    In the ED he received 2L crystalloid. NG tube was placed with 1.7 L out on insertion. He is hemodynamically normal with WBC 7, Hgb/Hct 17/53, Cr 2.8, lactate 2.9. CT without contrast shows SBO with transition point at the right central aspect of the ventral hernia with some bowel wall thickening and mesenteric edema. Hernia is reducible.    JANELL now resolved. NG tube removed on 7/15. Botox injected to abdominal muscle on . On m, patient was Febrile(100.6F), Chest X-ray showed left side atelectasis vs. pneumonia, labs significant for leukocytosis(WBC 13), Blood culture sent, UA(+) and urine culture sent. Started patient on ceftriaxone on .    SUBJECTIVE/Overnight Events:  Febrile, tachycardic overnight. Only complains of feeling feverish currently. Does not feel SOB, denies cough, CP, weakness.            PMHx:   Sleep apnea    HLD (hyperlipidemia)    Morbid obesity    Diverticulitis    History of neuropathy    Colostomy present        PSHx:   No significant past surgical history    S/P repair of hydrocele        Allergies:  No Known Allergies      Home Meds:    Current Medications:   acetaminophen   Tablet .. 975 milliGRAM(s) Oral every 6 hours PRN  aspirin  chewable 81 milliGRAM(s) Oral daily  cefTRIAXone   IVPB 1000 milliGRAM(s) IV Intermittent every 24 hours  heparin   Injectable 5000 Unit(s) SubCutaneous every 8 hours  melatonin 5 milliGRAM(s) Oral at bedtime  onabotulinumtoxinA Injectable 300 Unit(s) IntraMuscular once  pantoprazole  Injectable 40 milliGRAM(s) IV Push two times a day  sertraline 50 milliGRAM(s) Oral daily  sodium phosphate IVPB 30 milliMole(s) IV Intermittent once      FAMILY HISTORY:  Family history of lung cancer  mother  age 40, hx lung cancer        REVIEW OF SYSTEMS:  Constitutional:     [ ] negative [x ] fevers [x ] chills [ ] weight loss [ ] weight gain  HEENT:                  [ ] negative [ ] dry eyes [ ] eye irritation [ ] postnasal drip [ ] nasal congestion  CV:                         [ ] negative  [ ] chest pain [ ] orthopnea [ ] palpitations [ ] murmur  Resp:                     [ ] negative [ ] cough [ ] shortness of breath [ ] dyspnea [ ] wheezing [ ] sputum [ ]hemoptysis  GI:                          [ ] negative [ ] nausea [ ] vomiting [ ] diarrhea [ ] constipation [ ] abd pain [ ] dysphagia   :                        [ ] negative [ ] dysuria [ ] nocturia [ ] hematuria [ ] increased urinary frequency  Musculoskeletal: [ ] negative [ ] back pain [ ] myalgias [ ] arthralgias [ ] fracture  Skin:                       [ ] negative [ ] rash [ ] itch  Neurological:        [ ] negative [ ] headache [ ] dizziness [ ] syncope [ ] weakness [ ] numbness  Psychiatric:           [ ] negative [ ] anxiety [ ] depression  Endocrine:            [ ] negative [ ] diabetes [ ] thyroid problem  Heme/Lymph:      [ ] negative [ ] anemia [ ] bleeding problem  Allergic/Immune: [ ] negative [ ] itchy eyes [ ] nasal discharge [ ] hives [ ] angioedema    [x ] All other systems negative  [ ] Unable to assess ROS due to      Physical Exam:  T(F): 102.9 (-), Max: 102.9 ()  HR: 102 () (91 - 107)  BP: 106/72 () (98/62 - 130/76)  RR: 18 (-)  SpO2: 93% ()    GENERAL: No acute distress, well-developed, obese, feverish  HEAD:  Atraumatic, Normocephalic  ENT: EOMI, PERRLA, conjunctiva and sclera clear, Neck supple, No JVD, moist mucosa  CHEST/LUNG: Clear to auscultation on R, Crackles on LLL No wheeze, equal breath sounds bilaterally   HEART: Regular rate and rhythm; No murmurs, rubs, or gallops  ABDOMEN: Soft, Nontender, Nondistended; Bowel sounds present  EXTREMITIES:  No clubbing, cyanosis, or edema  SKIN: Normal color, No rashes or lesions, feverish  LINES:                          13.0   16.82 )-----------( 180      ( 2021 07:13 )             42.1     07-18    133<L>  |  97  |  8   ----------------------------<  117<H>  3.9   |  23  |  1.43<H>    Ca    8.7      2021 07:08  Phos  1.5     07-18  Mg     1.9     -18    < from: Xray Chest 1 View- PORTABLE-Urgent (Xray Chest 1 View- PORTABLE-Urgent .) (21 @ 09:27) >  New mid/lower left lung airspace haziness - may be due to atelectasis - but underlying pneumonia not excluded in the right clinical setting. No pneumothorax.    < end of copied text >    Urine Microscopic-Add On (NC) (21 @ 17:24)   Red Blood Cell - Urine: 5 /hpf   White Blood Cell - Urine: 5 /HPF   Hyaline Casts: 0 /LPF   Bacteria: Many   Epithelial Cells: 1   `< from: CT Abdomen and Pelvis w/ Oral Cont (21 @ 16:37) >  IMPRESSION:  Complex ventral hernia containing mildly dilated fluid filled small bowel is again noted. Multiple loops of dilated proximal small bowel are again noted with gradual transition to more collapsed distal small bowel. There is overall decreased small bowel dilatation as compared with prior study dated 2021, consistent with resolving small bowel obstruction.    Small bowel lipoma again noted..    < end of copied text >           Internal Medicine Consult  [facility]  PGY-3  Spectra [phone]    Reason for consult: leukocytosis, CXR findings    HPI/Course  56M with hx of HLD, CAD s/p MAINOR x 2 (2020) on DAPT, LBO 2/2 diverticulitis s/p John's procedure (2019) and reversal (2019) presenting with abdominal pain for 3 days. The pain worsened in the last 1-2 days and he has had multiple episodes of emesis since yesterday. He states he is still passing gas and has been having diarrhea today. Has not had anything to eat or drink in 3 days. Denies fevers, chills, chest pain, SOB, dysuria. He says that he has had several episodes of pain like this since his surgery but it has always resolved at home.    He takes aspirin, Brilinta and Lipitor. Last colonoscopy was in . He is scheduled for a hernia repair with Dr. Stein on .    In the ED he received 2L crystalloid. NG tube was placed with 1.7 L out on insertion. He is hemodynamically normal with WBC 7, Hgb/Hct 17/53, Cr 2.8, lactate 2.9. CT without contrast shows SBO with transition point at the right central aspect of the ventral hernia with some bowel wall thickening and mesenteric edema. Hernia is reducible.    JANELL now resolved. NG tube removed on 7/15. Botox injected to abdominal muscle on . On m, patient was Febrile(100.6F), Chest X-ray showed left side atelectasis vs. pneumonia, labs significant for leukocytosis(WBC 13), Blood culture sent, UA(+) and urine culture sent. Started patient on ceftriaxone on .    SUBJECTIVE/Overnight Events:  Febrile, tachycardic overnight. Only complains of feeling feverish currently. Does not feel SOB, denies cough, CP, weakness.      REVIEW OF SYSTEMS:    CONSTITUTIONAL: No weakness, fevers or chills  EYES/ENT: No visual changes;  No vertigo or throat pain   NECK: No pain or stiffness  RESPIRATORY: No cough, wheezing, hemoptysis; No shortness of breath  CARDIOVASCULAR: No chest pain or palpitations  GASTROINTESTINAL: No abdominal or epigastric pain. No nausea, vomiting, or hematemesis; No diarrhea or constipation. No melena or hematochezia.  GENITOURINARY: No dysuria, frequency or hematuria but states he has had increased urgency recently  NEUROLOGICAL: No numbness or weakness  SKIN: No itching, burning, rashes, or lesions  MSK: No joint pain, no back pain  HEME: No easy bleeding, no easy bruising  All other review of systems is negative unless indicated above.      PMHx:   Sleep apnea    HLD (hyperlipidemia)    Morbid obesity    Diverticulitis    History of neuropathy    Colostomy present        Surgical History:   John's procedure and reversal    S/P repair of hydrocele        Allergies:  No Known Allergies      Home Meds: Lipitor, Brilinta, Aspirin    Current Medications:   acetaminophen   Tablet .. 975 milliGRAM(s) Oral every 6 hours PRN  aspirin  chewable 81 milliGRAM(s) Oral daily  cefTRIAXone   IVPB 1000 milliGRAM(s) IV Intermittent every 24 hours  heparin   Injectable 5000 Unit(s) SubCutaneous every 8 hours  melatonin 5 milliGRAM(s) Oral at bedtime  onabotulinumtoxinA Injectable 300 Unit(s) IntraMuscular once  pantoprazole  Injectable 40 milliGRAM(s) IV Push two times a day  sertraline 50 milliGRAM(s) Oral daily  sodium phosphate IVPB 30 milliMole(s) IV Intermittent once      FAMILY HISTORY:  Family history of lung cancer  mother  age 40, hx lung cancer    Social History: Cocaine use about twice weekly, 2 beers daily, smokes half PPD for 30 years      Physical Exam:  T(F): 102.9 (-), Max: 102.9 (-18)  HR: 102 (-18) (91 - 107)  BP: 106/72 (-18) (98/62 - 130/76)  RR: 18 (-18)  SpO2: 93% (-18)    GENERAL: No acute distress, well-developed, obese, diaphoretic  HEAD:  Atraumatic, Normocephalic  ENT: EOMI, PERRLA, conjunctiva and sclera clear, Neck supple, No JVD, moist mucosa  CHEST/LUNG: Clear to auscultation on R, Crackles on LLL No wheeze, equal breath sounds bilaterally   HEART: Regular rate and rhythm; No murmurs, rubs, or gallops  ABDOMEN: Soft, Obese, nontender, Nondistended; large ventral hernia present, bowel sounds present  EXTREMITIES:  No clubbing, cyanosis, or edema  SKIN: Normal color, No rashes or lesions, feverish  NEURO: AAOx3, no focal deficits, CN 2-12 intact                            13.0   16.82 )-----------( 180      ( 2021 07:13 )             42.1     07-18    133<L>  |  97  |  8   ----------------------------<  117<H>  3.9   |  23  |  1.43<H>    Ca    8.7      2021 07:08  Phos  1.5     07-18  Mg     1.9     -18    < from: Xray Chest 1 View- PORTABLE-Urgent (Xray Chest 1 View- PORTABLE-Urgent .) (21 @ 09:27) >  New mid/lower left lung airspace haziness - may be due to atelectasis - but underlying pneumonia not excluded in the right clinical setting. No pneumothorax.    < end of copied text >    Urine Microscopic-Add On (NC) (21 @ 17:24)   Red Blood Cell - Urine: 5 /hpf   White Blood Cell - Urine: 5 /HPF   Hyaline Casts: 0 /LPF   Bacteria: Many   Epithelial Cells: 1   `< from: CT Abdomen and Pelvis w/ Oral Cont (21 @ 16:37) >  IMPRESSION:  Complex ventral hernia containing mildly dilated fluid filled small bowel is again noted. Multiple loops of dilated proximal small bowel are again noted with gradual transition to more collapsed distal small bowel. There is overall decreased small bowel dilatation as compared with prior study dated 2021, consistent with resolving small bowel obstruction.    Small bowel lipoma again noted..    < end of copied text >

## 2021-07-18 NOTE — CHART NOTE - NSCHARTNOTEFT_GEN_A_CORE
CC: Febrile and Tachycardic     Subjective: Got paged by the nurse that pt is febrile to 101.9, tachycardic to 121, and BP 91/51. Went to evaluate the patient immediately. Pt denies chest pain, SOB, nausea or vomiting. A little tachypneic on exam but denies any symptoms of SOB. States "I'm feeling fine, I wanna go home". Fever workup sent earlier with a positive UA, and a CXR concerning for PNA. Started on Vancomycin and Zosyn per Medicine recommendations for a possible hospital acquired PNA.     Objective:  Vital Signs Last 24 Hrs  T(C): 38.2 (18 Jul 2021 23:07), Max: 39.4 (18 Jul 2021 10:11)  T(F): 100.8 (18 Jul 2021 23:07), Max: 102.9 (18 Jul 2021 10:11)  HR: 107 (19 Jul 2021 02:45) (79 - 133)  BP: 100/53 (19 Jul 2021 02:30) (91/55 - 170/93)  BP(mean): 71 (19 Jul 2021 02:30) (71 - 126)  RR: 38 (19 Jul 2021 02:45) (18 - 38)  SpO2: 92% (19 Jul 2021 02:45) (90% - 95%)      Physical Exam:  General: Alert, NAD  Cardio: Tachycardic  Resp: tachypneic   GI/Abd: Soft, large ventral hernia reducible, no focal tenderness at hernia, no guarding or rebound tenderness, well healed midline laparotomy scar  Ext: No edema, all 4 extremities moving spontaneously, no limitations      Assessment:   56M with hx of HLD, CAD s/p MAINOR x 2 (July 2020) on DAPT, LBO 2/2 diverticulitis s/p John's procedure (March 2019) and reversal (June 2019) presenting with SBO 2/2 large ventral hernia and JANELL 2/2 volume depletion. SBO was managed conservatively with an ultimate plan to go to the operating room during this admission.  Pt febrile to 101.9F, tachycardic to 120s, and hypotensive to 91/51 in the setting of a positive UA and CXR findings of PNA is concerning for sepsis.      Plan:  - STAT tylenol for fever   - STAT EKG, shows atrial flutter, possibly secondary to 3rd space fluid shifts  - STAT labs remarkable for an JANELL, Lactate and Troponin WNL  - 500cc bolus of LR   - F/U CT Chest results   - F/ U blood cultures   - SICU consult     Seen and Discussed with Chief Resident, Dr. Suze Goodson Surgery  [id number]

## 2021-07-18 NOTE — PROGRESS NOTE ADULT - ATTENDING COMMENTS
+ bowel function, no abdominal pain  Cr trending up  + L effusion  mild tachycardia    Abd soft, nontender    Needs medical optimization given cardiac hx    Juvenal Clark MD

## 2021-07-18 NOTE — PROGRESS NOTE ADULT - SUBJECTIVE AND OBJECTIVE BOX
Surgery Progress Note    SUBJECTIVE: Pt seen and examined at bedside. Patient comfortable and in no-apparent distress. No nausea, vomiting, diarrhea. Pain is controlled. +Gas/+BM. Tolerating diet.    Vital Signs Last 24 Hrs  T(C): 37.5 (2021 20:40), Max: 38.1 (2021 08:40)  T(F): 99.5 (2021 20:40), Max: 100.6 (2021 08:40)  HR: 106 (2021 20:40) (61 - 110)  BP: 130/76 (2021 20:40) (98/62 - 136/80)  BP(mean): --  RR: 18 (2021 20:40) (18 - 20)  SpO2: 94% (2021 20:40) (93% - 96%)    Physical Exam:  General Appearance: Appears well, NAD  Respiratory: No labored breathing  CV: Pulse regularly present  Abdomen: Soft, nontender, ***incision c/d/i    LABS:                        12.9   12.94 )-----------( 222      ( 2021 09:27 )             41.1     07-17    135  |  100  |  8   ----------------------------<  128<H>  3.6   |  23  |  1.36<H>    Ca    8.5      2021 09:27  Phos  2.2     17  Mg     1.6     17        Urinalysis Basic - ( 2021 17:24 )    Color: Yellow / Appearance: Slightly Turbid / S.015 / pH: x  Gluc: x / Ketone: Negative  / Bili: Negative / Urobili: Negative   Blood: x / Protein: Trace / Nitrite: Positive   Leuk Esterase: Negative / RBC: 5 /hpf / WBC 5 /HPF   Sq Epi: x / Non Sq Epi: 1 / Bacteria: Many        INs and OUTs:    21 @ 07:01  -  21 @ 07:00  --------------------------------------------------------  IN: 3950 mL / OUT: 200 mL / NET: 3750 mL    21 @ 07:01  -  21 @ 00:10  --------------------------------------------------------  IN: 540 mL / OUT: 950 mL / NET: -410 mL     Surgery Progress Note    SUBJECTIVE: Pt seen and examined at bedside. Patient was sleepy. no-apparent distress. No abdominal pain. Tolerated LRD.  No nausea, vomiting. Passing gas with multiple bowel movements.   Vital Signs Last 24 Hrs  T(C): 37.5 (2021 20:40), Max: 38.1 (2021 08:40)  T(F): 99.5 (2021 20:40), Max: 100.6 (2021 08:40)  HR: 106 (2021 20:40) (61 - 110)  BP: 130/76 (2021 20:40) (98/62 - 136/80)  BP(mean): --  RR: 18 (2021 20:40) (18 - 20)  SpO2: 94% (2021 20:40) (93% - 96%)    Physical Exam:  General: Alert, NAD  Cardio: Pulse regular, hemodynamically normal  Resp: Airway patent, unlabored breathing  GI/Abd: Soft, large ventral hernia reducible, no focal tenderness at hernia, no guarding or rebound tenderness  Ext: No edema, all 4 extremities moving spontaneously, no limitations    LABS:                        12.9   12.94 )-----------( 222      ( 2021 09:27 )             41.1         135  |  100  |  8   ----------------------------<  128<H>  3.6   |  23  |  1.36<H>    Ca    8.5      2021 09:27  Phos  2.2       Mg     1.6             Urinalysis Basic - ( 2021 17:24 )    Color: Yellow / Appearance: Slightly Turbid / S.015 / pH: x  Gluc: x / Ketone: Negative  / Bili: Negative / Urobili: Negative   Blood: x / Protein: Trace / Nitrite: Positive   Leuk Esterase: Negative / RBC: 5 /hpf / WBC 5 /HPF   Sq Epi: x / Non Sq Epi: 1 / Bacteria: Many        INs and OUTs:    21 @ 07:01  -  21 @ 07:00  --------------------------------------------------------  IN: 3950 mL / OUT: 200 mL / NET: 3750 mL    21 @ 07:01  -  21 @ 00:10  --------------------------------------------------------  IN: 540 mL / OUT: 950 mL / NET: -410 mL     Surgery Progress Note    SUBJECTIVE: Pt seen and examined at bedside. Patient was sleepy all day. No abdominal pain. Tolerated LRD.  No nausea, vomiting. Passing gas with multiple bowel movements.   Vital Signs Last 24 Hrs  T(C): 37.5 (2021 20:40), Max: 38.1 (2021 08:40)  T(F): 99.5 (2021 20:40), Max: 100.6 (2021 08:40)  HR: 106 (2021 20:40) (61 - 110)  BP: 130/76 (2021 20:40) (98/62 - 136/80)  BP(mean): --  RR: 18 (2021 20:40) (18 - 20)  SpO2: 94% (2021 20:40) (93% - 96%)    Physical Exam:  General: Alert, NAD  Cardio: Pulse regular, hemodynamically normal  Resp: Airway patent, unlabored breathing  GI/Abd: Soft, large ventral hernia reducible, no focal tenderness at hernia, no guarding or rebound tenderness  Ext: No edema, all 4 extremities moving spontaneously, no limitations    LABS:                        12.9   12.94 )-----------( 222      ( 2021 09:27 )             41.1     17    135  |  100  |  8   ----------------------------<  128<H>  3.6   |  23  |  1.36<H>    Ca    8.5      2021 09:27  Phos  2.2       Mg     1.6             Urinalysis Basic - ( 2021 17:24 )    Color: Yellow / Appearance: Slightly Turbid / S.015 / pH: x  Gluc: x / Ketone: Negative  / Bili: Negative / Urobili: Negative   Blood: x / Protein: Trace / Nitrite: Positive   Leuk Esterase: Negative / RBC: 5 /hpf / WBC 5 /HPF   Sq Epi: x / Non Sq Epi: 1 / Bacteria: Many        INs and OUTs:    21 @ 07:01  -  21 @ 07:00  --------------------------------------------------------  IN: 3950 mL / OUT: 200 mL / NET: 3750 mL    21 @ 07:01  -  21 @ 00:10  --------------------------------------------------------  IN: 540 mL / OUT: 950 mL / NET: -410 mL     Surgery Progress Note    SUBJECTIVE: Pt seen and examined at bedside. No abdominal pain. Tolerating LRD.  No nausea, vomiting. Passing gas with multiple bowel movements.      Vital Signs Last 24 Hrs  T(C): 37.5 (2021 20:40), Max: 38.1 (2021 08:40)  T(F): 99.5 (2021 20:40), Max: 100.6 (2021 08:40)  HR: 106 (2021 20:40) (61 - 110)  BP: 130/76 (2021 20:40) (98/62 - 136/80)  BP(mean): --  RR: 18 (2021 20:40) (18 - 20)  SpO2: 94% (2021 20:40) (93% - 96%)    Physical Exam:  General: Alert, NAD  Cardio: Pulse regular, hemodynamically normal  Resp: Airway patent, unlabored breathing  GI/Abd: Soft, large ventral hernia reducible, no focal tenderness at hernia, no guarding or rebound tenderness, well healed midline laparotomy scar  Ext: No edema, all 4 extremities moving spontaneously, no limitations    LABS:                        12.9   12.94 )-----------( 222      ( 2021 09:27 )             41.1         135  |  100  |  8   ----------------------------<  128<H>  3.6   |  23  |  1.36<H>    Ca    8.5      2021 09:27  Phos  2.2       Mg     1.6             Urinalysis Basic - ( 2021 17:24 )    Color: Yellow / Appearance: Slightly Turbid / S.015 / pH: x  Gluc: x / Ketone: Negative  / Bili: Negative / Urobili: Negative   Blood: x / Protein: Trace / Nitrite: Positive   Leuk Esterase: Negative / RBC: 5 /hpf / WBC 5 /HPF   Sq Epi: x / Non Sq Epi: 1 / Bacteria: Many        INs and OUTs:    21 @ 07:01  -  21 @ 07:00  --------------------------------------------------------  IN: 3950 mL / OUT: 200 mL / NET: 3750 mL    21 @ 07:01  -  21 @ 00:10  --------------------------------------------------------  IN: 540 mL / OUT: 950 mL / NET: -410 mL     Surgery Progress Note    SUBJECTIVE: Pt seen and examined at bedside. No abdominal pain. Tolerating LRD.  No nausea, vomiting. Passing gas with multiple bowel movements.      Vital Signs Last 24 Hrs  T(C): 37.5 (2021 20:40), Max: 38.1 (2021 08:40)  T(F): 99.5 (2021 20:40), Max: 100.6 (2021 08:40)  HR: 106 (2021 20:40) (61 - 110)  BP: 130/76 (2021 20:40) (98/62 - 136/80)  BP(mean): --  RR: 18 (2021 20:40) (18 - 20)  SpO2: 94% (2021 20:40) (93% - 96%)    Physical Exam:  General: Alert, NAD  Cardio: Tachycardic  Resp: Nonlabored breathing on rooma ir  GI/Abd: Soft, large ventral hernia reducible, no focal tenderness at hernia, no guarding or rebound tenderness, well healed midline laparotomy scar  Ext: No edema, all 4 extremities moving spontaneously, no limitations    LABS:                        12.9   12.94 )-----------( 222      ( 2021 09:27 )             41.1         135  |  100  |  8   ----------------------------<  128<H>  3.6   |  23  |  1.36<H>    Ca    8.5      2021 09:27  Phos  2.2       Mg     1.6             Urinalysis Basic - ( 2021 17:24 )    Color: Yellow / Appearance: Slightly Turbid / S.015 / pH: x  Gluc: x / Ketone: Negative  / Bili: Negative / Urobili: Negative   Blood: x / Protein: Trace / Nitrite: Positive   Leuk Esterase: Negative / RBC: 5 /hpf / WBC 5 /HPF   Sq Epi: x / Non Sq Epi: 1 / Bacteria: Many        INs and OUTs:    21 @ 07:01  -  21 @ 07:00  --------------------------------------------------------  IN: 3950 mL / OUT: 200 mL / NET: 3750 mL    21 @ 07:01  -  21 @ 00:10  --------------------------------------------------------  IN: 540 mL / OUT: 950 mL / NET: -410 mL

## 2021-07-19 LAB
ALBUMIN SERPL ELPH-MCNC: 2.3 G/DL — LOW (ref 3.3–5)
ALP SERPL-CCNC: 75 U/L — SIGNIFICANT CHANGE UP (ref 40–120)
ALT FLD-CCNC: 33 U/L — SIGNIFICANT CHANGE UP (ref 10–45)
ANION GAP SERPL CALC-SCNC: 12 MMOL/L — SIGNIFICANT CHANGE UP (ref 5–17)
AST SERPL-CCNC: 38 U/L — SIGNIFICANT CHANGE UP (ref 10–40)
BILIRUB SERPL-MCNC: 0.7 MG/DL — SIGNIFICANT CHANGE UP (ref 0.2–1.2)
BUN SERPL-MCNC: 8 MG/DL — SIGNIFICANT CHANGE UP (ref 7–23)
CALCIUM SERPL-MCNC: 8.2 MG/DL — LOW (ref 8.4–10.5)
CHLORIDE SERPL-SCNC: 99 MMOL/L — SIGNIFICANT CHANGE UP (ref 96–108)
CO2 SERPL-SCNC: 19 MMOL/L — LOW (ref 22–31)
CREAT SERPL-MCNC: 1.21 MG/DL — SIGNIFICANT CHANGE UP (ref 0.5–1.3)
GAS PNL BLDA: SIGNIFICANT CHANGE UP
GLUCOSE SERPL-MCNC: 136 MG/DL — HIGH (ref 70–99)
POTASSIUM SERPL-MCNC: 4.1 MMOL/L — SIGNIFICANT CHANGE UP (ref 3.5–5.3)
POTASSIUM SERPL-SCNC: 4.1 MMOL/L — SIGNIFICANT CHANGE UP (ref 3.5–5.3)
PROCALCITONIN SERPL-MCNC: 0.46 NG/ML — HIGH (ref 0.02–0.1)
PROT SERPL-MCNC: 5.8 G/DL — LOW (ref 6–8.3)
SODIUM SERPL-SCNC: 130 MMOL/L — LOW (ref 135–145)

## 2021-07-19 PROCEDURE — 99291 CRITICAL CARE FIRST HOUR: CPT

## 2021-07-19 PROCEDURE — 71045 X-RAY EXAM CHEST 1 VIEW: CPT | Mod: 26

## 2021-07-19 PROCEDURE — 93306 TTE W/DOPPLER COMPLETE: CPT | Mod: 26

## 2021-07-19 PROCEDURE — 71275 CT ANGIOGRAPHY CHEST: CPT | Mod: 26

## 2021-07-19 RX ORDER — DIGOXIN 250 MCG
250 TABLET ORAL EVERY 8 HOURS
Refills: 0 | Status: COMPLETED | OUTPATIENT
Start: 2021-07-19 | End: 2021-07-19

## 2021-07-19 RX ORDER — FUROSEMIDE 40 MG
20 TABLET ORAL ONCE
Refills: 0 | Status: COMPLETED | OUTPATIENT
Start: 2021-07-19 | End: 2021-07-19

## 2021-07-19 RX ORDER — METOPROLOL TARTRATE 50 MG
5 TABLET ORAL ONCE
Refills: 0 | Status: COMPLETED | OUTPATIENT
Start: 2021-07-19 | End: 2021-07-19

## 2021-07-19 RX ORDER — SODIUM CHLORIDE 9 MG/ML
1000 INJECTION, SOLUTION INTRAVENOUS ONCE
Refills: 0 | Status: COMPLETED | OUTPATIENT
Start: 2021-07-19 | End: 2021-07-19

## 2021-07-19 RX ORDER — ONDANSETRON 8 MG/1
4 TABLET, FILM COATED ORAL ONCE
Refills: 0 | Status: COMPLETED | OUTPATIENT
Start: 2021-07-19 | End: 2021-07-20

## 2021-07-19 RX ORDER — METOPROLOL TARTRATE 50 MG
25 TABLET ORAL EVERY 12 HOURS
Refills: 0 | Status: DISCONTINUED | OUTPATIENT
Start: 2021-07-19 | End: 2021-07-19

## 2021-07-19 RX ORDER — DIGOXIN 250 MCG
0.5 TABLET ORAL ONCE
Refills: 0 | Status: DISCONTINUED | OUTPATIENT
Start: 2021-07-19 | End: 2021-07-19

## 2021-07-19 RX ORDER — NICOTINE POLACRILEX 2 MG
1 GUM BUCCAL DAILY
Refills: 0 | Status: DISCONTINUED | OUTPATIENT
Start: 2021-07-19 | End: 2021-07-24

## 2021-07-19 RX ORDER — METOPROLOL TARTRATE 50 MG
5 TABLET ORAL EVERY 6 HOURS
Refills: 0 | Status: DISCONTINUED | OUTPATIENT
Start: 2021-07-19 | End: 2021-07-19

## 2021-07-19 RX ORDER — ENOXAPARIN SODIUM 100 MG/ML
40 INJECTION SUBCUTANEOUS EVERY 12 HOURS
Refills: 0 | Status: DISCONTINUED | OUTPATIENT
Start: 2021-07-19 | End: 2021-07-21

## 2021-07-19 RX ORDER — CALCIUM GLUCONATE 100 MG/ML
2 VIAL (ML) INTRAVENOUS ONCE
Refills: 0 | Status: COMPLETED | OUTPATIENT
Start: 2021-07-19 | End: 2021-07-19

## 2021-07-19 RX ORDER — DIGOXIN 250 MCG
500 TABLET ORAL ONCE
Refills: 0 | Status: COMPLETED | OUTPATIENT
Start: 2021-07-19 | End: 2021-07-19

## 2021-07-19 RX ORDER — LEVALBUTEROL 1.25 MG/.5ML
0.63 SOLUTION, CONCENTRATE RESPIRATORY (INHALATION) EVERY 6 HOURS
Refills: 0 | Status: DISCONTINUED | OUTPATIENT
Start: 2021-07-19 | End: 2021-07-24

## 2021-07-19 RX ORDER — DIGOXIN 250 MCG
0.25 TABLET ORAL EVERY 6 HOURS
Refills: 0 | Status: DISCONTINUED | OUTPATIENT
Start: 2021-07-19 | End: 2021-07-19

## 2021-07-19 RX ADMIN — Medication 20 MILLIGRAM(S): at 15:59

## 2021-07-19 RX ADMIN — PIPERACILLIN AND TAZOBACTAM 25 GRAM(S): 4; .5 INJECTION, POWDER, LYOPHILIZED, FOR SOLUTION INTRAVENOUS at 17:32

## 2021-07-19 RX ADMIN — SERTRALINE 50 MILLIGRAM(S): 25 TABLET, FILM COATED ORAL at 11:02

## 2021-07-19 RX ADMIN — Medication 400 MILLIGRAM(S): at 01:35

## 2021-07-19 RX ADMIN — SODIUM CHLORIDE 6000 MILLILITER(S): 9 INJECTION, SOLUTION INTRAVENOUS at 02:38

## 2021-07-19 RX ADMIN — Medication 250 MICROGRAM(S): at 15:19

## 2021-07-19 RX ADMIN — ENOXAPARIN SODIUM 40 MILLIGRAM(S): 100 INJECTION SUBCUTANEOUS at 05:23

## 2021-07-19 RX ADMIN — SODIUM CHLORIDE 50 MILLILITER(S): 9 INJECTION INTRAMUSCULAR; INTRAVENOUS; SUBCUTANEOUS at 00:53

## 2021-07-19 RX ADMIN — Medication 200 GRAM(S): at 15:19

## 2021-07-19 RX ADMIN — PIPERACILLIN AND TAZOBACTAM 25 GRAM(S): 4; .5 INJECTION, POWDER, LYOPHILIZED, FOR SOLUTION INTRAVENOUS at 10:00

## 2021-07-19 RX ADMIN — Medication 3 MILLILITER(S): at 05:28

## 2021-07-19 RX ADMIN — Medication 300 MILLIGRAM(S): at 17:45

## 2021-07-19 RX ADMIN — ENOXAPARIN SODIUM 40 MILLIGRAM(S): 100 INJECTION SUBCUTANEOUS at 17:32

## 2021-07-19 RX ADMIN — Medication 1000 MILLIGRAM(S): at 03:00

## 2021-07-19 RX ADMIN — PIPERACILLIN AND TAZOBACTAM 25 GRAM(S): 4; .5 INJECTION, POWDER, LYOPHILIZED, FOR SOLUTION INTRAVENOUS at 02:13

## 2021-07-19 RX ADMIN — Medication 500 MICROGRAM(S): at 08:13

## 2021-07-19 RX ADMIN — LEVALBUTEROL 0.63 MILLIGRAM(S): 1.25 SOLUTION, CONCENTRATE RESPIRATORY (INHALATION) at 18:02

## 2021-07-19 RX ADMIN — Medication 81 MILLIGRAM(S): at 11:02

## 2021-07-19 RX ADMIN — Medication 5 MILLIGRAM(S): at 00:48

## 2021-07-19 RX ADMIN — PANTOPRAZOLE SODIUM 40 MILLIGRAM(S): 20 TABLET, DELAYED RELEASE ORAL at 08:04

## 2021-07-19 RX ADMIN — Medication 300 MILLIGRAM(S): at 05:23

## 2021-07-19 RX ADMIN — LEVALBUTEROL 0.63 MILLIGRAM(S): 1.25 SOLUTION, CONCENTRATE RESPIRATORY (INHALATION) at 23:15

## 2021-07-19 RX ADMIN — Medication 250 MICROGRAM(S): at 22:20

## 2021-07-19 NOTE — PROGRESS NOTE ADULT - ATTENDING COMMENTS
55 yo m, on DAPT, s/p John's, now reserved. Resolved SBO. Last night hypotensive, responded to IVF bolus, a flutter digoxin load today.  N Multimodal pain management. On Zoloft. Psychiatry following.   P Sat >90 on NC 4L. LLL consolidation could represent pneumonia. CXR pulmonary edema, hold diuresis could worsen hypotension. ABG pending.  C Digoxin load, rate <120 on atrial flutter. Lactate 1.6. ASA for CAD/stent.  G Randal regular diet. PPI ppx.  R JANELL cr improved to 1.4. Na 130, IV lock.   H Hgb 11.9(13.0), monitor.  DVT UFH sq/SCDs.  I SIRS/sepsis with hypotension, possible from pneumonia. WBC 14. Cx pending. Procalcitonin. Continue zosyn/vancomycin.  E Monitor glycemia.    Has life threatening condition requiring SICU evaluation and management. 55 yo m, on DAPT, s/p John's, now reversed. Resolved SBO. Last night hypotensive, responded to IVF bolus, a flutter digoxin load today.  N Multimodal pain management. On Zoloft. Psychiatry following.   P Sat >90 on NC 4L. LLL consolidation could represent pneumonia. CXR pulmonary edema, hold diuresis could worsen hypotension. ABG pending.  C Digoxin load, rate <120 on atrial flutter. Lactate 1.6. ASA for CAD/stent.  G Randal regular diet. PPI ppx.  R JANELL cr improved to 1.4. Na 130, IV lock.   H Hgb 11.9(13.0), monitor.  DVT UFH sq/SCDs.  I SIRS/sepsis with hypotension, possible from pneumonia. WBC 14. Cx pending. Procalcitonin. Continue zosyn/vancomycin.  E Monitor glycemia.    Has life threatening condition requiring SICU evaluation and management.

## 2021-07-19 NOTE — DIETITIAN INITIAL EVALUATION ADULT. - ORAL INTAKE PTA/DIET HISTORY
Diet History: [pt interview pending] Pt reported N/V x 3 days PTA  No chewing/swallowing difficulty reported.   Allergies: NKFA  Home Nutrition Supplements: none noted Diet History: Pt follows a Regular diet with no restrictions. Pt reported N/V x 3 days PTA.  No chewing/swallowing difficulty reported.   Allergies: NKFA  Home Nutrition Supplements: none noted

## 2021-07-19 NOTE — PROGRESS NOTE ADULT - SUBJECTIVE AND OBJECTIVE BOX
SUBJECTIVE:   Seen and examined at bedside during AM rounds. Transferred to the SICU for possible sepsis. Tolerating LRD, no nausea or vomiting. Passing flatus.     OBJECTIVE: T(C): 38.1 (21 @ 03:00), Max: 39.4 (21 @ 10:11)  HR: 103 (21 @ 03:00) (79 - 133)  BP: 95/55 (21 @ 03:00) (91/55 - 170/93)  RR: 46 (21 @ 03:00) (18 - 46)  SpO2: 91% (21 @ 03:00) (90% - 95%)  Wt(kg): --  I&O's Summary    2021 07:  -  2021 07:00  --------------------------------------------------------  IN: 540 mL / OUT: 1350 mL / NET: -810 mL    2021 07:01  -  2021 03:37  --------------------------------------------------------  IN: 3105 mL / OUT: 800 mL / NET: 2305 mL      I&O's Detail    2021 07:  -  2021 07:00  --------------------------------------------------------  IN:    Oral Fluid: 540 mL  Total IN: 540 mL    OUT:    Voided (mL): 1350 mL  Total OUT: 1350 mL    Total NET: -810 mL      2021 07:  -  2021 03:37  --------------------------------------------------------  IN:    IV PiggyBack: 100 mL    IV PiggyBack: 500 mL    IV PiggyBack: 100 mL    IV PiggyBack: 225 mL    IV PiggyBack: 600 mL    Oral Fluid: 730 mL    sodium chloride 0.9%: 350 mL    Sodium Chloride 0.9% Bolus: 500 mL  Total IN: 3105 mL    OUT:    Voided (mL): 800 mL  Total OUT: 800 mL    Total NET: 2305 mL        Physical Exam:  General: Alert, NAD  Cardio: Tachycardic  Resp: tachypneic   GI/Abd: Soft, large ventral hernia reducible, no focal tenderness at hernia, no guarding or rebound tenderness, well healed midline laparotomy scar  Ext: No edema, all 4 extremities moving spontaneously, no limitations    MEDICATIONS  (STANDING):  albuterol/ipratropium for Nebulization 3 milliLiter(s) Nebulizer every 6 hours  aspirin  chewable 81 milliGRAM(s) Oral daily  enoxaparin Injectable 40 milliGRAM(s) SubCutaneous every 12 hours  melatonin 5 milliGRAM(s) Oral at bedtime  metoprolol tartrate 25 milliGRAM(s) Oral every 12 hours  nicotine -   7 mG/24Hr(s) Patch 1 patch Transdermal daily  onabotulinumtoxinA Injectable 300 Unit(s) IntraMuscular once  pantoprazole    Tablet 40 milliGRAM(s) Oral before breakfast  piperacillin/tazobactam IVPB.. 3.375 Gram(s) IV Intermittent every 8 hours  sertraline 50 milliGRAM(s) Oral daily  sodium chloride 0.9%. 1000 milliLiter(s) (50 mL/Hr) IV Continuous <Continuous>  vancomycin  IVPB 1500 milliGRAM(s) IV Intermittent every 12 hours    MEDICATIONS  (PRN):  acetaminophen   Tablet .. 975 milliGRAM(s) Oral every 6 hours PRN Temp greater or equal to 38C (100.4F), Mild Pain (1 - 3)      LABS:                        11.9   14.65 )-----------( 160      ( 2021 21:59 )             37.2     07-18    130<L>  |  96  |  9   ----------------------------<  119<H>  3.7   |  21<L>  |  1.36<H>    Ca    8.4      2021 21:59  Phos  2.5     07-18  Mg     1.9     07-18    TPro  6.1  /  Alb  2.4<L>  /  TBili  0.8  /  DBili  x   /  AST  22  /  ALT  24  /  AlkPhos  68  07-18      Urinalysis Basic - ( 2021 17:24 )    Color: Yellow / Appearance: Slightly Turbid / S.015 / pH: x  Gluc: x / Ketone: Negative  / Bili: Negative / Urobili: Negative   Blood: x / Protein: Trace / Nitrite: Positive   Leuk Esterase: Negative / RBC: 5 /hpf / WBC 5 /HPF   Sq Epi: x / Non Sq Epi: 1 / Bacteria: Many         SUBJECTIVE:   Seen and examined at bedside during AM rounds. Transferred to the SICU for possible sepsis. Tolerating LRD, no nausea or vomiting. Passing flatus, having BM.    OBJECTIVE: T(C): 38.1 (21 @ 03:00), Max: 39.4 (21 @ 10:11)  HR: 103 (21 @ 03:00) (79 - 133)  BP: 95/55 (21 @ 03:00) (91/55 - 170/93)  RR: 46 (21 @ 03:00) (18 - 46)  SpO2: 91% (21 @ 03:00) (90% - 95%)  Wt(kg): --  I&O's Summary    2021 07:  -  2021 07:00  --------------------------------------------------------  IN: 540 mL / OUT: 1350 mL / NET: -810 mL    2021 07:  -  2021 03:37  --------------------------------------------------------  IN: 3105 mL / OUT: 800 mL / NET: 2305 mL      I&O's Detail    2021 07:01  -  2021 07:00  --------------------------------------------------------  IN:    Oral Fluid: 540 mL  Total IN: 540 mL    OUT:    Voided (mL): 1350 mL  Total OUT: 1350 mL    Total NET: -810 mL      2021 07:  -  2021 03:37  --------------------------------------------------------  IN:    IV PiggyBack: 100 mL    IV PiggyBack: 500 mL    IV PiggyBack: 100 mL    IV PiggyBack: 225 mL    IV PiggyBack: 600 mL    Oral Fluid: 730 mL    sodium chloride 0.9%: 350 mL    Sodium Chloride 0.9% Bolus: 500 mL  Total IN: 3105 mL    OUT:    Voided (mL): 800 mL  Total OUT: 800 mL    Total NET: 2305 mL        Physical Exam:  General: Alert, NAD  Cardio: Tachycardic  Resp: tachypneic   GI/Abd: Soft, large ventral hernia reducible, no focal tenderness at hernia, no guarding or rebound tenderness, well healed midline laparotomy scar  Ext: No edema, all 4 extremities moving spontaneously, no limitations    MEDICATIONS  (STANDING):  albuterol/ipratropium for Nebulization 3 milliLiter(s) Nebulizer every 6 hours  aspirin  chewable 81 milliGRAM(s) Oral daily  enoxaparin Injectable 40 milliGRAM(s) SubCutaneous every 12 hours  melatonin 5 milliGRAM(s) Oral at bedtime  metoprolol tartrate 25 milliGRAM(s) Oral every 12 hours  nicotine -   7 mG/24Hr(s) Patch 1 patch Transdermal daily  onabotulinumtoxinA Injectable 300 Unit(s) IntraMuscular once  pantoprazole    Tablet 40 milliGRAM(s) Oral before breakfast  piperacillin/tazobactam IVPB.. 3.375 Gram(s) IV Intermittent every 8 hours  sertraline 50 milliGRAM(s) Oral daily  sodium chloride 0.9%. 1000 milliLiter(s) (50 mL/Hr) IV Continuous <Continuous>  vancomycin  IVPB 1500 milliGRAM(s) IV Intermittent every 12 hours    MEDICATIONS  (PRN):  acetaminophen   Tablet .. 975 milliGRAM(s) Oral every 6 hours PRN Temp greater or equal to 38C (100.4F), Mild Pain (1 - 3)      LABS:                        11.9   14.65 )-----------( 160      ( 2021 21:59 )             37.2     07-18    130<L>  |  96  |  9   ----------------------------<  119<H>  3.7   |  21<L>  |  1.36<H>    Ca    8.4      2021 21:59  Phos  2.5     -  Mg     1.9     -    TPro  6.1  /  Alb  2.4<L>  /  TBili  0.8  /  DBili  x   /  AST  22  /  ALT  24  /  AlkPhos  68  07-18      Urinalysis Basic - ( 2021 17:24 )    Color: Yellow / Appearance: Slightly Turbid / S.015 / pH: x  Gluc: x / Ketone: Negative  / Bili: Negative / Urobili: Negative   Blood: x / Protein: Trace / Nitrite: Positive   Leuk Esterase: Negative / RBC: 5 /hpf / WBC 5 /HPF   Sq Epi: x / Non Sq Epi: 1 / Bacteria: Many

## 2021-07-19 NOTE — DIETITIAN INITIAL EVALUATION ADULT. - OTHER INFO
GASTROINTESTINAL:  Last BM: 7/18 + flatus  Bowel Regimen: none    NUTRITION STATUS:  - Morbid obesity, current smoker  - JANELL, PNA  -IVF: NS @ 50 ml/hr    WEIGHT HISTORY:   - Weight gain per past RD notes and HIE: 137.9kg (6/28/19), 145.1kg (7/28/20), 152kg (9/16/20), 170.1kg (6/23/21), 163.3kg (7/12/21), 164.4kg (7/18/21)  - Per past RD notes, pt has refused nutrition/weight loss education GASTROINTESTINAL:  Last BM: 7/18 + flatus  Bowel Regimen: none    NUTRITION STATUS:  - Morbid obesity, current smoker  - JANELL, PNA  -IVF: NS @ 50 ml/hr    WEIGHT HISTORY:   - Weight gain per past RD notes and HIE: 137.9kg (6/28/19), 145.1kg (7/28/20), 152kg (9/16/20), 170.1kg (6/23/21), 163.3kg (7/12/21), 164.4kg (7/18/21)  - Per past RD notes, pt has refused nutrition/weight loss education    EDUCATION: Reviewed Low Fiber diet education. Pt accepted written materials: "Fiber Restricted Nutrition Therapy" and "USDA MyPlate Mini Poster"

## 2021-07-19 NOTE — DIETITIAN INITIAL EVALUATION ADULT. - OTHER CALCULATIONS
Calorie needs based on dosing wt 164.4; protein needs based on IBW 80.7kg, with consideration for BMI 49.1

## 2021-07-19 NOTE — DIETITIAN INITIAL EVALUATION ADULT. - PERTINENT LABORATORY DATA
07-18 @ 21:59: Sodium 130<L>, Potassium 3.7, Calcium 8.4, Magnesium 1.9, Phosphorus 2.5, BUN 9, Creatinine 1.36<H>, Glucose 119<H>  Hemoglobin : 11.9 g/dL  Hematocrit : 37.2 %    LIVER FUNCTIONS - ( 18 Jul 2021 21:59 )  Alb: 2.4 g/dL / Pro: 6.1 g/dL / ALK PHOS: 68 U/L / ALT: 24 U/L / AST: 22 U/L / GGT: x

## 2021-07-19 NOTE — PROGRESS NOTE ADULT - ASSESSMENT
56y M with Hx LBO s/p John's (03/2019) and reversal (06/2019), CAD (s/p drug-eluting stents x2 in 07/2020, on DAPT), and a large reducible ventral hernia who presented with abd pain for 3 days, found to have SBO with transition point at ventral hernia on CT scan on 7/12. His SBO resolved with medical management and his NGT was removed on 7/15. Botox was injected into the abdominal muscle on 7/16 in preparation for possible ventral hernia repair. On 7/17 AM, he became febrile. CXR demonstrated L sided atelectasis vs. pneumonia. UA was also (+) at that time. He was started on Ceftriaxone and pan-cultures were sent. In the evening on 7/18, he remained persistently febrile, and became hypotensive and tachycardic. SICU consulted for hemodynamic monitoring with concern for sepsis.     PLAN:    Neuro:  - Pain control: Tylenol 975 PO q6h PRN for mild pain  - Melatonin 6mg PO qHS PRN for insomnia  - Home Zoloft 50mg PO qD     Respiratory: active smoker  - Monitor respiratory status  - Duonebs q6h PRN  - AM CXR    Cardiovascular:  - Monitor vitals signs q1h  - Metoprolol 25mg PO BID    Gastrointestinal:  - Diet: LRD  - Protonix 40mg PO qD    Genitourinary/Renal:  - IVF: NS @ 125  - Monitor UOP q1h  - Trend electrolytes on BMP qD, replete PRN    Heme:  - Trend H/H on CBC qD  - VTE ppx: HSQ   - SCDs while in bed  - Home ASA 81 PO qD  - HOLDING home Brilinta, possible OR planning next week    ID:  - Vancomycin 1.5g BID  - Zosyn 3.375 q8h  - Trend WBC on CBC qD  - Monitor fever curve  - F/u BCx, UCx    Endocrine:  - Trend FS q6/on BMP  - ISS    Lines:   - PIV x 2    Dispo: SICU  Code Status: Full Code   56y M with Hx LBO s/p John's (03/2019) and reversal (06/2019), CAD (s/p drug-eluting stents x2 in 07/2020, on DAPT), and a large reducible ventral hernia who presented with abd pain for 3 days, found to have SBO with transition point at ventral hernia on CT scan on 7/12. His SBO resolved with medical management and his NGT was removed on 7/15. Botox was injected into the abdominal muscle on 7/16 in preparation for possible ventral hernia repair. On 7/17 AM, he became febrile. CXR demonstrated L sided atelectasis vs. pneumonia. UA was also (+) at that time. He was started on Ceftriaxone and pan-cultures were sent. In the evening on 7/18, he remained persistently febrile, and became hypotensive and tachycardic. SICU consulted for hemodynamic monitoring with concern for sepsis.     PLAN:    Neuro:  - Pain control: Tylenol 975 PO q6h PRN for mild pain  - Melatonin 6mg PO qHS PRN for insomnia  - Home Zoloft 50mg PO qD     Respiratory: active smoker  - Monitor respiratory status  - Duonebs q6h PRN  - AM CXR    Cardiovascular:  - Monitor vitals signs q1h  - Metoprolol 25mg PO BID    Gastrointestinal:  - Diet: LRD  - Protonix 40mg PO qD    Genitourinary/Renal:  - IVF: NS @ 125  - Monitor UOP q1h  - Trend electrolytes on BMP qD, replete PRN    Heme:  - Trend H/H on CBC qD  - VTE ppx: HSQ   - SCDs while in bed  - Home ASA 81 PO qD  - HOLDING home Brilinta, possible OR planning next week    ID:  - Vancomycin 1.5g BID  - Zosyn 3.375 q8h  - Trend WBC on CBC qD  - Monitor fever curve  - F/u BCx, UCx    Endocrine:  - Trend FS on BMP    Lines:   - PIV x 2    Dispo: SICU  Code Status: Full Code   56y M with Hx LBO s/p John's (03/2019) and reversal (06/2019), CAD (s/p drug-eluting stents x2 in 07/2020, on DAPT), and a large reducible ventral hernia who presented with abd pain for 3 days, found to have SBO with transition point at ventral hernia on CT scan on 7/12. His SBO resolved with medical management and his NGT was removed on 7/15. Botox was injected into the abdominal muscle on 7/16 in preparation for possible ventral hernia repair. On 7/17 AM, he became febrile. CXR demonstrated L sided atelectasis vs. pneumonia. UA was also (+) at that time. He was started on Ceftriaxone and pan-cultures were sent. In the evening on 7/18, he remained persistently febrile, and became hypotensive and tachycardic. SICU consulted for hemodynamic monitoring with concern for sepsis.     PLAN:    Neuro:  - Pain control: Tylenol 975 PO q6h PRN for mild pain  - Melatonin 6mg PO qHS PRN for insomnia  - Home Zoloft 50mg PO qD     Respiratory: active smoker  - Monitor respiratory status  - Duonebs q6h PRN  - AM CXR    Cardiovascular:  - Monitor vitals signs q1h    Gastrointestinal:  - Diet: LRD  - Protonix 40mg PO qD    Genitourinary/Renal:  - IVF: NS @ 125  - Monitor UOP q1h  - Trend electrolytes on BMP qD, replete PRN    Heme:  - Trend H/H on CBC qD  - VTE ppx: HSQ   - SCDs while in bed  - Home ASA 81 PO qD  - HOLDING home Brilinta, possible OR planning next week    ID:  - Vancomycin 1.5g BID  - Zosyn 3.375 q8h  - Trend WBC on CBC qD  - Monitor fever curve  - F/u BCx, UCx    Endocrine:  - Trend FS on BMP    Lines:   - PIV x 2    Dispo: SICU  Code Status: Full Code

## 2021-07-19 NOTE — DIETITIAN INITIAL EVALUATION ADULT. - REASON FOR ADMISSION
SBO    Per chart: "56y M with Hx LBO s/p John's (03/2019) and reversal (06/2019), CAD (s/p drug-eluting stents x2 in 07/2020, on DAPT), and a large reducible ventral hernia who presented with abd pain for 3 days, found to have SBO with transition point at ventral hernia on CT scan on 7/12. His SBO resolved with medical management and his NGT was removed on 7/15. Botox was injected into the abdominal muscle on 7/16 in preparation for possible ventral hernia repair. On 7/17 AM, he became febrile. CXR demonstrated L sided atelectasis vs. pneumonia. UA was also (+) at that time. He was started on Ceftriaxone and pan-cultures were sent. In the evening on 7/18, he remained persistently febrile, and became hypotensive and tachycardic. SICU consulted for hemodynamic monitoring with concern for sepsis."

## 2021-07-19 NOTE — PROGRESS NOTE ADULT - ASSESSMENT
Assessment:   56M with hx of HLD, CAD s/p MAINOR x 2 (July 2020) on DAPT, LBO 2/2 diverticulitis s/p John's procedure (March 2019) and reversal (June 2019) presenting with SBO 2/2 large ventral hernia and JANELL 2/2 volume depletion. SBO was managed conservatively with an ultimate plan to go to the operating room during this admission.  Transferred to the SICU for development of possible sepsis.     Plan:  - pain control prn  - diet: LRD  - IVF  - Continue Vancomycin and Zosyn  - Trend fevers, WBCs  - DVT ppx     [facility]  [id number]  Assessment:   56M with hx of HLD, CAD s/p MAINOR x 2 (July 2020) on DAPT, LBO 2/2 diverticulitis s/p John's procedure (March 2019) and reversal (June 2019) presenting with SBO 2/2 large ventral hernia and JANELL 2/2 volume depletion. SBO was managed conservatively with an ultimate plan to go to the operating room during this admission.  Transferred to the SICU for development of possible sepsis.     Plan:  - pain control prn  - diet: LRD  - IVF  - Continue Vancomycin and Zosyn  - Urology consult for RBC in UA  - f/u CT chest report  - Trend fevers, WBCs  - DVT ppx     [facility]  [id number]

## 2021-07-19 NOTE — PROGRESS NOTE ADULT - SUBJECTIVE AND OBJECTIVE BOX
HPI:  OBI GAMBLE is a 56y Male with Hx LBO s/p John's (03/2019) and reversal (06/2019), HLD, CAD (s/p drug-eluting stents x2 in 07/2020, on DAPT), morbid obesity, and a large reducible ventral hernia who presented with abd pain for 3 days, found to have SBO with transition point at ventral hernia on CT scan on 7/12. During this admission, his SBO resolved with medical management and his NGT was removed on 7/15. Botox was injected into the abdominal muscle on 7/16 in preparation for possible ventral hernia repair. On 7/17 AM, he became febrile. CXR demonstrated L sided atelectasis vs. pneumonia. UA was also (+) at that time. He was started on Ceftriaxone and pan-cultures were sent. In the evening on 7/18, he remained persistently febrile, and became hypotensive and tachycardic. 1x 500cc LR bolus given on the surgical floors without resolution of hypotension. SICU consulted for hemodynamic monitoring with concern for sepsis.    24 HOUR EVENTS:  - 1 additional 500cc bolus of NS given  - Hypotension resolved, now hypertensive to SBP 170s  - Given 1x metoprolol 5mg IV  - Started on metoprolol 25mg PO BID    SUBJECTIVE/ROS:  Patient seen at bedside this AM. Reports feeling well, without complaints. Pain is well-controlled. Denies headache, shortness of breath, chest pain, dizziness.     PHYSICAL EXAM:    NEURO  Exam: awake, alert, oriented  Meds: acetaminophen   Tablet .. 975 milliGRAM(s) Oral every 6 hours PRN Temp greater or equal to 38C (100.4F), Mild Pain (1 - 3)  acetaminophen  IVPB .. 1000 milliGRAM(s) IV Intermittent once  melatonin 5 milliGRAM(s) Oral at bedtime  onabotulinumtoxinA Injectable 300 Unit(s) IntraMuscular once  sertraline 50 milliGRAM(s) Oral daily  [x] Adequacy of sedation and pain control has been assessed and adjusted    RESPIRATORY  RR: 34 (07-19-21 @ 01:00) (18 - 35)  SpO2: 90% (07-19-21 @ 01:00) (90% - 95%)  Exam: no increased WOB on RA  Mechanical Ventilation:   Labs: ABG - ( 18 Jul 2021 21:59 )     Lactate: 1.6    Meds: albuterol/ipratropium for Nebulization 3 milliLiter(s) Nebulizer every 6 hours    CARDIOVASCULAR  HR: 115 (07-19-21 @ 01:00) (79 - 133)  BP: 170/93 (07-19-21 @ 00:30) (91/55 - 170/93)  BP(mean): 126 (07-19-21 @ 00:30) (88 - 126)  Lactate, Blood: 1.6 mmol/L (07-18 @ 21:59)  Exam: regular rate and rhythm noted on cardiac monitor   Cardiac Rhythm: normal sinus rhythm  Perfusion     [x]Adequate   [ ]Inadequate  Mentation   [x]Normal       [ ]Reduced  Extremities  [x]Warm         [ ]Cool  Volume Status [ ]Hypervolemic [x]Euvolemic [ ]Hypovolemic  Meds: metoprolol tartrate 25 milliGRAM(s) Oral every 12 hours    GI/NUTRITION  Exam: abd soft, non-distended, non-tender; ventral hernia noted, softly reducible  Meds: pantoprazole    Tablet 40 milliGRAM(s) Oral before breakfast    GENITOURINARY  I&O's Detail    07-17 @ 07:01  -  07-18 @ 07:00  --------------------------------------------------------  IN:    Oral Fluid: 540 mL  Total IN: 540 mL    OUT:    Voided (mL): 1350 mL  Total OUT: 1350 mL    Total NET: -810 mL      07-18 @ 07:01  -  07-19 @ 01:11  --------------------------------------------------------  IN:    IV PiggyBack: 100 mL    IV PiggyBack: 500 mL    IV PiggyBack: 200 mL    IV PiggyBack: 500 mL    IV PiggyBack: 100 mL    Oral Fluid: 580 mL    sodium chloride 0.9%: 250 mL    Sodium Chloride 0.9% Bolus: 500 mL  Total IN: 2730 mL    OUT:    Voided (mL): 800 mL  Total OUT: 800 mL    Total NET: 1930 mL    Labs: 07-18    130<L>  |  96  |  9   ----------------------------<  119<H>  3.7   |  21<L>  |  1.36<H>    Ca    8.4      18 Jul 2021 21:59  Phos  2.5     07-18  Mg     1.9     07-18    TPro  6.1  /  Alb  2.4<L>  /  TBili  0.8  /  DBili  x   /  AST  22  /  ALT  24  /  AlkPhos  68  07-18    [ ] Quispe catheter, indication: N/A  Meds: sodium chloride 0.9%. 1000 milliLiter(s) IV Continuous <Continuous>      HEMATOLOGIC  Meds: aspirin  chewable 81 milliGRAM(s) Oral daily  enoxaparin Injectable 40 milliGRAM(s) SubCutaneous every 12 hours  [x] VTE Prophylaxis: Lovenox  Labs:              11.9   14.65 )-----------( 160      ( 18 Jul 2021 21:59 )             37.2   Transfusion     [ ] PRBC   [ ] Platelets   [ ] FFP   [ ] Cryoprecipitate    INFECTIOUS DISEASES  Labs:   WBC Count: 14.65 K/uL (07-18 @ 21:59)  WBC Count: 16.82 K/uL (07-18 @ 07:13)    Recent Cultures:  Specimen Source: .Blood Blood-Peripheral  Date/Time: 07-17 @ 15:01  Culture Results: No growth to date.  Meds: piperacillin/tazobactam IVPB.. 3.375 Gram(s) IV Intermittent every 8 hours  vancomycin  IVPB 1500 milliGRAM(s) IV Intermittent every 12 hours      ENDOCRINE  Meds: none      ACCESS DEVICES:  [2] Peripheral IV  [ ] Central Venous Line	[ ] R	[ ] L	[ ] IJ	[ ] Fem	[ ] SC	Placed:   [ ] Arterial Line		[ ] R	[ ] L	[ ] Fem	[ ] Rad	[ ] Ax	Placed:   [ ] PICC:					[ ] Mediport  [ ] Urinary Catheter, Date Placed:   [x] Necessity of urinary, arterial, and venous catheters discussed    OTHER MEDICATIONS:      IMAGING:    CT CAP - 7/18:    ******** PENDING FINAL READ ***************    CXR - 7/18:   Heart is similar in size.  Interval improvement in pulmonary vascular congestion.  Right lung shows no focal consolidations.  Interval improvement in left basilar haziness.  No large pleural effusion.  No pneumothorax. HPI:  OBI GAMBLE is a 56y Male with Hx LBO s/p John's (03/2019) and reversal (06/2019), HLD, CAD (s/p drug-eluting stents x2 in 07/2020, on DAPT), morbid obesity, and a large reducible ventral hernia who presented with abd pain for 3 days, found to have SBO with transition point at ventral hernia on CT scan on 7/12. During this admission, his SBO resolved with medical management and his NGT was removed on 7/15. Botox was injected into the abdominal muscle on 7/16 in preparation for possible ventral hernia repair. On 7/17 AM, he became febrile. CXR demonstrated L sided atelectasis vs. pneumonia. UA was also (+) at that time. He was started on Ceftriaxone and pan-cultures were sent. In the evening on 7/18, he remained persistently febrile, and became hypotensive and tachycardic. 1x 500cc LR bolus given on the surgical floors without resolution of hypotension. SICU consulted for hemodynamic monitoring with concern for sepsis.    24 HOUR EVENTS:  - 2 additional 1L bolus of NS given  - Hypotension resolved, now hypertensive to SBP 170s  - Given 1x metoprolol 5mg IV  - Started on metoprolol 25mg PO BID    SUBJECTIVE/ROS:  Patient seen at bedside this AM. Reports feeling well, without complaints. Pain is well-controlled. Denies headache, shortness of breath, chest pain, dizziness.     PHYSICAL EXAM:    NEURO  Exam: awake, alert, oriented  Meds: acetaminophen   Tablet .. 975 milliGRAM(s) Oral every 6 hours PRN Temp greater or equal to 38C (100.4F), Mild Pain (1 - 3)  acetaminophen  IVPB .. 1000 milliGRAM(s) IV Intermittent once  melatonin 5 milliGRAM(s) Oral at bedtime  onabotulinumtoxinA Injectable 300 Unit(s) IntraMuscular once  sertraline 50 milliGRAM(s) Oral daily  [x] Adequacy of sedation and pain control has been assessed and adjusted    RESPIRATORY  RR: 34 (07-19-21 @ 01:00) (18 - 35)  SpO2: 90% (07-19-21 @ 01:00) (90% - 95%)  Exam: no increased WOB on RA  Mechanical Ventilation:   Labs: ABG - ( 18 Jul 2021 21:59 )     Lactate: 1.6    Meds: albuterol/ipratropium for Nebulization 3 milliLiter(s) Nebulizer every 6 hours    CARDIOVASCULAR  HR: 115 (07-19-21 @ 01:00) (79 - 133)  BP: 170/93 (07-19-21 @ 00:30) (91/55 - 170/93)  BP(mean): 126 (07-19-21 @ 00:30) (88 - 126)  Lactate, Blood: 1.6 mmol/L (07-18 @ 21:59)  Exam: regular rate and rhythm noted on cardiac monitor   Cardiac Rhythm: normal sinus rhythm  Perfusion     [x]Adequate   [ ]Inadequate  Mentation   [x]Normal       [ ]Reduced  Extremities  [x]Warm         [ ]Cool  Volume Status [ ]Hypervolemic [x]Euvolemic [ ]Hypovolemic  Meds: metoprolol tartrate 25 milliGRAM(s) Oral every 12 hours    GI/NUTRITION  Exam: abd soft, non-distended, non-tender; ventral hernia noted, softly reducible  Meds: pantoprazole    Tablet 40 milliGRAM(s) Oral before breakfast    GENITOURINARY  I&O's Detail    07-17 @ 07:01  -  07-18 @ 07:00  --------------------------------------------------------  IN:    Oral Fluid: 540 mL  Total IN: 540 mL    OUT:    Voided (mL): 1350 mL  Total OUT: 1350 mL    Total NET: -810 mL      07-18 @ 07:01  -  07-19 @ 01:11  --------------------------------------------------------  IN:    IV PiggyBack: 100 mL    IV PiggyBack: 500 mL    IV PiggyBack: 200 mL    IV PiggyBack: 500 mL    IV PiggyBack: 100 mL    Oral Fluid: 580 mL    sodium chloride 0.9%: 250 mL    Sodium Chloride 0.9% Bolus: 500 mL  Total IN: 2730 mL    OUT:    Voided (mL): 800 mL  Total OUT: 800 mL    Total NET: 1930 mL    Labs: 07-18    130<L>  |  96  |  9   ----------------------------<  119<H>  3.7   |  21<L>  |  1.36<H>    Ca    8.4      18 Jul 2021 21:59  Phos  2.5     07-18  Mg     1.9     07-18    TPro  6.1  /  Alb  2.4<L>  /  TBili  0.8  /  DBili  x   /  AST  22  /  ALT  24  /  AlkPhos  68  07-18    [ ] Quispe catheter, indication: N/A  Meds: sodium chloride 0.9%. 1000 milliLiter(s) IV Continuous <Continuous>      HEMATOLOGIC  Meds: aspirin  chewable 81 milliGRAM(s) Oral daily  enoxaparin Injectable 40 milliGRAM(s) SubCutaneous every 12 hours  [x] VTE Prophylaxis: Lovenox  Labs:              11.9   14.65 )-----------( 160      ( 18 Jul 2021 21:59 )             37.2   Transfusion     [ ] PRBC   [ ] Platelets   [ ] FFP   [ ] Cryoprecipitate    INFECTIOUS DISEASES  Labs:   WBC Count: 14.65 K/uL (07-18 @ 21:59)  WBC Count: 16.82 K/uL (07-18 @ 07:13)    Recent Cultures:  Specimen Source: .Blood Blood-Peripheral  Date/Time: 07-17 @ 15:01  Culture Results: No growth to date.  Meds: piperacillin/tazobactam IVPB.. 3.375 Gram(s) IV Intermittent every 8 hours  vancomycin  IVPB 1500 milliGRAM(s) IV Intermittent every 12 hours      ENDOCRINE  Meds: none      ACCESS DEVICES:  [2] Peripheral IV  [ ] Central Venous Line	[ ] R	[ ] L	[ ] IJ	[ ] Fem	[ ] SC	Placed:   [ ] Arterial Line		[ ] R	[ ] L	[ ] Fem	[ ] Rad	[ ] Ax	Placed:   [ ] PICC:					[ ] Mediport  [ ] Urinary Catheter, Date Placed:   [x] Necessity of urinary, arterial, and venous catheters discussed    OTHER MEDICATIONS:      IMAGING:    CT CAP - 7/18:    ******** PENDING FINAL READ ***************    CXR - 7/18:   Heart is similar in size.  Interval improvement in pulmonary vascular congestion.  Right lung shows no focal consolidations.  Interval improvement in left basilar haziness.  No large pleural effusion.  No pneumothorax. HPI:  OBI GAMBLE is a 56y Male with Hx LBO s/p John's (03/2019) and reversal (06/2019), HLD, CAD (s/p drug-eluting stents x2 in 07/2020, on DAPT), morbid obesity, and a large reducible ventral hernia who presented with abd pain for 3 days, found to have SBO with transition point at ventral hernia on CT scan on 7/12. During this admission, his SBO resolved with medical management and his NGT was removed on 7/15. Botox was injected into the abdominal muscle on 7/16 in preparation for possible ventral hernia repair. On 7/17 AM, he became febrile. CXR demonstrated L sided atelectasis vs. pneumonia. UA was also (+) at that time. He was started on Ceftriaxone and pan-cultures were sent. In the evening on 7/18, he remained persistently febrile, and became hypotensive and tachycardic. 1x 500cc LR bolus given on the surgical floors without resolution of hypotension. SICU consulted for hemodynamic monitoring with concern for sepsis.    24 HOUR EVENTS:  - Additional 1.5L bolus of NS given  - Hypotension resolved, now hypertensive to SBP 170s  - Given 1x metoprolol 5mg IV  - Started on metoprolol 25mg PO BID    SUBJECTIVE/ROS:  Patient seen at bedside this AM. Reports feeling well, without complaints. Pain is well-controlled. Denies headache, shortness of breath, chest pain, dizziness.     PHYSICAL EXAM:    NEURO  Exam: awake, alert, oriented  Meds: acetaminophen   Tablet .. 975 milliGRAM(s) Oral every 6 hours PRN Temp greater or equal to 38C (100.4F), Mild Pain (1 - 3)  acetaminophen  IVPB .. 1000 milliGRAM(s) IV Intermittent once  melatonin 5 milliGRAM(s) Oral at bedtime  onabotulinumtoxinA Injectable 300 Unit(s) IntraMuscular once  sertraline 50 milliGRAM(s) Oral daily  [x] Adequacy of sedation and pain control has been assessed and adjusted    RESPIRATORY  RR: 34 (07-19-21 @ 01:00) (18 - 35)  SpO2: 90% (07-19-21 @ 01:00) (90% - 95%)  Exam: no increased WOB on RA  Mechanical Ventilation:   Labs: ABG - ( 18 Jul 2021 21:59 )     Lactate: 1.6    Meds: albuterol/ipratropium for Nebulization 3 milliLiter(s) Nebulizer every 6 hours    CARDIOVASCULAR  HR: 115 (07-19-21 @ 01:00) (79 - 133)  BP: 170/93 (07-19-21 @ 00:30) (91/55 - 170/93)  BP(mean): 126 (07-19-21 @ 00:30) (88 - 126)  Lactate, Blood: 1.6 mmol/L (07-18 @ 21:59)  Exam: regular rate and rhythm noted on cardiac monitor   Cardiac Rhythm: normal sinus rhythm  Perfusion     [x]Adequate   [ ]Inadequate  Mentation   [x]Normal       [ ]Reduced  Extremities  [x]Warm         [ ]Cool  Volume Status [ ]Hypervolemic [x]Euvolemic [ ]Hypovolemic  Meds: metoprolol tartrate 25 milliGRAM(s) Oral every 12 hours    GI/NUTRITION  Exam: abd soft, non-distended, non-tender; ventral hernia noted, softly reducible  Meds: pantoprazole    Tablet 40 milliGRAM(s) Oral before breakfast    GENITOURINARY  I&O's Detail    07-17 @ 07:01  -  07-18 @ 07:00  --------------------------------------------------------  IN:    Oral Fluid: 540 mL  Total IN: 540 mL    OUT:    Voided (mL): 1350 mL  Total OUT: 1350 mL    Total NET: -810 mL      07-18 @ 07:01  -  07-19 @ 01:11  --------------------------------------------------------  IN:    IV PiggyBack: 100 mL    IV PiggyBack: 500 mL    IV PiggyBack: 200 mL    IV PiggyBack: 500 mL    IV PiggyBack: 100 mL    Oral Fluid: 580 mL    sodium chloride 0.9%: 250 mL    Sodium Chloride 0.9% Bolus: 500 mL  Total IN: 2730 mL    OUT:    Voided (mL): 800 mL  Total OUT: 800 mL    Total NET: 1930 mL    Labs: 07-18    130<L>  |  96  |  9   ----------------------------<  119<H>  3.7   |  21<L>  |  1.36<H>    Ca    8.4      18 Jul 2021 21:59  Phos  2.5     07-18  Mg     1.9     07-18    TPro  6.1  /  Alb  2.4<L>  /  TBili  0.8  /  DBili  x   /  AST  22  /  ALT  24  /  AlkPhos  68  07-18    [ ] Quispe catheter, indication: N/A  Meds: sodium chloride 0.9%. 1000 milliLiter(s) IV Continuous <Continuous>      HEMATOLOGIC  Meds: aspirin  chewable 81 milliGRAM(s) Oral daily  enoxaparin Injectable 40 milliGRAM(s) SubCutaneous every 12 hours  [x] VTE Prophylaxis: Lovenox  Labs:              11.9   14.65 )-----------( 160      ( 18 Jul 2021 21:59 )             37.2   Transfusion     [ ] PRBC   [ ] Platelets   [ ] FFP   [ ] Cryoprecipitate    INFECTIOUS DISEASES  Labs:   WBC Count: 14.65 K/uL (07-18 @ 21:59)  WBC Count: 16.82 K/uL (07-18 @ 07:13)    Recent Cultures:  Specimen Source: .Blood Blood-Peripheral  Date/Time: 07-17 @ 15:01  Culture Results: No growth to date.  Meds: piperacillin/tazobactam IVPB.. 3.375 Gram(s) IV Intermittent every 8 hours  vancomycin  IVPB 1500 milliGRAM(s) IV Intermittent every 12 hours      ENDOCRINE  Meds: none      ACCESS DEVICES:  [2] Peripheral IV  [ ] Central Venous Line	[ ] R	[ ] L	[ ] IJ	[ ] Fem	[ ] SC	Placed:   [ ] Arterial Line		[ ] R	[ ] L	[ ] Fem	[ ] Rad	[ ] Ax	Placed:   [ ] PICC:					[ ] Mediport  [ ] Urinary Catheter, Date Placed:   [x] Necessity of urinary, arterial, and venous catheters discussed    OTHER MEDICATIONS:      IMAGING:    CT CAP - 7/18:    ******** PENDING FINAL READ ***************    CXR - 7/18:   Heart is similar in size.  Interval improvement in pulmonary vascular congestion.  Right lung shows no focal consolidations.  Interval improvement in left basilar haziness.  No large pleural effusion.  No pneumothorax. HPI:  OBI GAMBLE is a 56y Male with Hx LBO s/p John's (03/2019) and reversal (06/2019), HLD, CAD (s/p drug-eluting stents x2 in 07/2020, on DAPT), morbid obesity, and a large reducible ventral hernia who presented with abd pain for 3 days, found to have SBO with transition point at ventral hernia on CT scan on 7/12. During this admission, his SBO resolved with medical management and his NGT was removed on 7/15. Botox was injected into the abdominal muscle on 7/16 in preparation for possible ventral hernia repair. On 7/17 AM, he became febrile. CXR demonstrated L sided atelectasis vs. pneumonia. UA was also (+) at that time. He was started on Ceftriaxone and pan-cultures were sent. In the evening on 7/18, he remained persistently febrile, and became hypotensive and tachycardic. EKG significant for atrial flutter. 1x 500cc LR bolus given on the surgical floors without resolution of hypotension. SICU consulted for hemodynamic monitoring with concern for sepsis.    24 HOUR EVENTS:  - Additional 500cc bolus of NS given  - Given 1x metoprolol 5mg IV, with tachycardia controlled  - Hypotensive to 80s again, given another 1L bolus of NS    SUBJECTIVE/ROS:  Patient seen at bedside this AM. Reports feeling well, without complaints. Pain is well-controlled. Denies headache, shortness of breath, chest pain, dizziness.     PHYSICAL EXAM:    NEURO  Exam: awake, alert, oriented  Meds: acetaminophen   Tablet .. 975 milliGRAM(s) Oral every 6 hours PRN Temp greater or equal to 38C (100.4F), Mild Pain (1 - 3)  acetaminophen  IVPB .. 1000 milliGRAM(s) IV Intermittent once  melatonin 5 milliGRAM(s) Oral at bedtime  onabotulinumtoxinA Injectable 300 Unit(s) IntraMuscular once  sertraline 50 milliGRAM(s) Oral daily  [x] Adequacy of sedation and pain control has been assessed and adjusted    RESPIRATORY  RR: 34 (07-19-21 @ 01:00) (18 - 35)  SpO2: 90% (07-19-21 @ 01:00) (90% - 95%)  Exam: no increased WOB on RA  Mechanical Ventilation:   Labs: ABG - ( 18 Jul 2021 21:59 )     Lactate: 1.6    Meds: albuterol/ipratropium for Nebulization 3 milliLiter(s) Nebulizer every 6 hours    CARDIOVASCULAR  HR: 115 (07-19-21 @ 01:00) (79 - 133)  BP: 170/93 (07-19-21 @ 00:30) (91/55 - 170/93)  BP(mean): 126 (07-19-21 @ 00:30) (88 - 126)  Lactate, Blood: 1.6 mmol/L (07-18 @ 21:59)  Exam: regular rate and rhythm noted on cardiac monitor   Cardiac Rhythm: normal sinus rhythm  Perfusion     [x]Adequate   [ ]Inadequate  Mentation   [x]Normal       [ ]Reduced  Extremities  [x]Warm         [ ]Cool  Volume Status [ ]Hypervolemic [x]Euvolemic [ ]Hypovolemic  Meds: metoprolol tartrate 25 milliGRAM(s) Oral every 12 hours    GI/NUTRITION  Exam: abd soft, non-distended, non-tender; ventral hernia noted, softly reducible  Meds: pantoprazole    Tablet 40 milliGRAM(s) Oral before breakfast    GENITOURINARY  I&O's Detail    07-17 @ 07:01  -  07-18 @ 07:00  --------------------------------------------------------  IN:    Oral Fluid: 540 mL  Total IN: 540 mL    OUT:    Voided (mL): 1350 mL  Total OUT: 1350 mL    Total NET: -810 mL      07-18 @ 07:01  -  07-19 @ 01:11  --------------------------------------------------------  IN:    IV PiggyBack: 100 mL    IV PiggyBack: 500 mL    IV PiggyBack: 200 mL    IV PiggyBack: 500 mL    IV PiggyBack: 100 mL    Oral Fluid: 580 mL    sodium chloride 0.9%: 250 mL    Sodium Chloride 0.9% Bolus: 500 mL  Total IN: 2730 mL    OUT:    Voided (mL): 800 mL  Total OUT: 800 mL    Total NET: 1930 mL    Labs: 07-18    130<L>  |  96  |  9   ----------------------------<  119<H>  3.7   |  21<L>  |  1.36<H>    Ca    8.4      18 Jul 2021 21:59  Phos  2.5     07-18  Mg     1.9     07-18    TPro  6.1  /  Alb  2.4<L>  /  TBili  0.8  /  DBili  x   /  AST  22  /  ALT  24  /  AlkPhos  68  07-18    [ ] Quispe catheter, indication: N/A  Meds: sodium chloride 0.9%. 1000 milliLiter(s) IV Continuous <Continuous>      HEMATOLOGIC  Meds: aspirin  chewable 81 milliGRAM(s) Oral daily  enoxaparin Injectable 40 milliGRAM(s) SubCutaneous every 12 hours  [x] VTE Prophylaxis: Lovenox  Labs:              11.9   14.65 )-----------( 160      ( 18 Jul 2021 21:59 )             37.2   Transfusion     [ ] PRBC   [ ] Platelets   [ ] FFP   [ ] Cryoprecipitate    INFECTIOUS DISEASES  Labs:   WBC Count: 14.65 K/uL (07-18 @ 21:59)  WBC Count: 16.82 K/uL (07-18 @ 07:13)    Recent Cultures:  Specimen Source: .Blood Blood-Peripheral  Date/Time: 07-17 @ 15:01  Culture Results: No growth to date.  Meds: piperacillin/tazobactam IVPB.. 3.375 Gram(s) IV Intermittent every 8 hours  vancomycin  IVPB 1500 milliGRAM(s) IV Intermittent every 12 hours      ENDOCRINE  Meds: none      ACCESS DEVICES:  [2] Peripheral IV  [ ] Central Venous Line	[ ] R	[ ] L	[ ] IJ	[ ] Fem	[ ] SC	Placed:   [ ] Arterial Line		[ ] R	[ ] L	[ ] Fem	[ ] Rad	[ ] Ax	Placed:   [ ] PICC:					[ ] Mediport  [ ] Urinary Catheter, Date Placed:   [x] Necessity of urinary, arterial, and venous catheters discussed    OTHER MEDICATIONS:      IMAGING:    CT CAP - 7/18:    ******** PENDING FINAL READ ***************    CXR - 7/18:   Heart is similar in size.  Interval improvement in pulmonary vascular congestion.  Right lung shows no focal consolidations.  Interval improvement in left basilar haziness.  No large pleural effusion.  No pneumothorax.

## 2021-07-19 NOTE — DIETITIAN INITIAL EVALUATION ADULT. - REASON INDICATOR FOR ASSESSMENT
Nutrition Assessment warranted for length of stay on SICU and BMI>40  Information obtained from: medical record, communication with team. Nutrition Assessment warranted for length of stay on SICU and BMI>40  Information obtained from: patient, medical record, communication with team.

## 2021-07-19 NOTE — DIETITIAN INITIAL EVALUATION ADULT. - PERTINENT MEDS FT
MEDICATIONS  (STANDING):  albuterol/ipratropium for Nebulization 3 milliLiter(s) Nebulizer every 6 hours  aspirin  chewable 81 milliGRAM(s) Oral daily  digoxin  Injectable 0.5 MICROGram(s) IV Push once  digoxin  Injectable 0.25 MICROGram(s) IV Push every 6 hours  enoxaparin Injectable 40 milliGRAM(s) SubCutaneous every 12 hours  melatonin 5 milliGRAM(s) Oral at bedtime  nicotine -   7 mG/24Hr(s) Patch 1 patch Transdermal daily  onabotulinumtoxinA Injectable 300 Unit(s) IntraMuscular once  pantoprazole    Tablet 40 milliGRAM(s) Oral before breakfast  piperacillin/tazobactam IVPB.. 3.375 Gram(s) IV Intermittent every 8 hours  sertraline 50 milliGRAM(s) Oral daily  sodium chloride 0.9%. 1000 milliLiter(s) (50 mL/Hr) IV Continuous <Continuous>  vancomycin  IVPB 1500 milliGRAM(s) IV Intermittent every 12 hours

## 2021-07-19 NOTE — PROGRESS NOTE ADULT - ATTENDING COMMENTS
Clinically worsened with fevers and tachycardia.  + bowel fx, no abdominal pain.  Abdomen soft, NT, ND, large incisional hernia.  Unclear source of fevers - pulmonary vs .  Urology consult for blood in urine.   Check final CT reading.   Awaiting Cx results.

## 2021-07-20 ENCOUNTER — APPOINTMENT (OUTPATIENT)
Dept: ULTRASOUND IMAGING | Facility: HOSPITAL | Age: 56
End: 2021-07-20

## 2021-07-20 LAB
ANION GAP SERPL CALC-SCNC: 10 MMOL/L — SIGNIFICANT CHANGE UP (ref 5–17)
APPEARANCE UR: CLEAR — SIGNIFICANT CHANGE UP
BILIRUB UR-MCNC: NEGATIVE — SIGNIFICANT CHANGE UP
BUN SERPL-MCNC: 9 MG/DL — SIGNIFICANT CHANGE UP (ref 7–23)
CALCIUM SERPL-MCNC: 8.4 MG/DL — SIGNIFICANT CHANGE UP (ref 8.4–10.5)
CHLORIDE SERPL-SCNC: 95 MMOL/L — LOW (ref 96–108)
CK MB CFR SERPL CALC: 1.4 NG/ML — SIGNIFICANT CHANGE UP (ref 0–6.7)
CK SERPL-CCNC: 76 U/L — SIGNIFICANT CHANGE UP (ref 30–200)
CO2 SERPL-SCNC: 24 MMOL/L — SIGNIFICANT CHANGE UP (ref 22–31)
COLOR SPEC: YELLOW — SIGNIFICANT CHANGE UP
CREAT ?TM UR-MCNC: 111 MG/DL — SIGNIFICANT CHANGE UP
CREAT SERPL-MCNC: 1.29 MG/DL — SIGNIFICANT CHANGE UP (ref 0.5–1.3)
DIFF PNL FLD: ABNORMAL
GAS PNL BLDV: SIGNIFICANT CHANGE UP
GLUCOSE SERPL-MCNC: 100 MG/DL — HIGH (ref 70–99)
GLUCOSE UR QL: NEGATIVE — SIGNIFICANT CHANGE UP
GRAM STN FLD: SIGNIFICANT CHANGE UP
GRAM STN FLD: SIGNIFICANT CHANGE UP
HCT VFR BLD CALC: 37.2 % — LOW (ref 39–50)
HGB BLD-MCNC: 11.9 G/DL — LOW (ref 13–17)
KETONES UR-MCNC: NEGATIVE — SIGNIFICANT CHANGE UP
LEUKOCYTE ESTERASE UR-ACNC: NEGATIVE — SIGNIFICANT CHANGE UP
MAGNESIUM SERPL-MCNC: 1.8 MG/DL — SIGNIFICANT CHANGE UP (ref 1.6–2.6)
MCHC RBC-ENTMCNC: 27 PG — SIGNIFICANT CHANGE UP (ref 27–34)
MCHC RBC-ENTMCNC: 32 GM/DL — SIGNIFICANT CHANGE UP (ref 32–36)
MCV RBC AUTO: 84.4 FL — SIGNIFICANT CHANGE UP (ref 80–100)
NITRITE UR-MCNC: NEGATIVE — SIGNIFICANT CHANGE UP
NRBC # BLD: 0 /100 WBCS — SIGNIFICANT CHANGE UP (ref 0–0)
OSMOLALITY UR: 400 MOS/KG — SIGNIFICANT CHANGE UP (ref 300–900)
PH UR: 6 — SIGNIFICANT CHANGE UP (ref 5–8)
PHOSPHATE SERPL-MCNC: 2.7 MG/DL — SIGNIFICANT CHANGE UP (ref 2.5–4.5)
PLATELET # BLD AUTO: 158 K/UL — SIGNIFICANT CHANGE UP (ref 150–400)
POTASSIUM SERPL-MCNC: 3.9 MMOL/L — SIGNIFICANT CHANGE UP (ref 3.5–5.3)
POTASSIUM SERPL-SCNC: 3.9 MMOL/L — SIGNIFICANT CHANGE UP (ref 3.5–5.3)
PROT UR-MCNC: ABNORMAL
RBC # BLD: 4.41 M/UL — SIGNIFICANT CHANGE UP (ref 4.2–5.8)
RBC # FLD: 15 % — HIGH (ref 10.3–14.5)
SODIUM SERPL-SCNC: 129 MMOL/L — LOW (ref 135–145)
SODIUM UR-SCNC: 46 MMOL/L — SIGNIFICANT CHANGE UP
SP GR SPEC: 1.03 — HIGH (ref 1.01–1.02)
TROPONIN T, HIGH SENSITIVITY RESULT: 32 NG/L — SIGNIFICANT CHANGE UP (ref 0–51)
TROPONIN T, HIGH SENSITIVITY RESULT: 41 NG/L — SIGNIFICANT CHANGE UP (ref 0–51)
UROBILINOGEN FLD QL: ABNORMAL
UUN UR-MCNC: 440 MG/DL — SIGNIFICANT CHANGE UP
VANCOMYCIN TROUGH SERPL-MCNC: 10.3 UG/ML — SIGNIFICANT CHANGE UP (ref 10–20)
WBC # BLD: 11.3 K/UL — HIGH (ref 3.8–10.5)
WBC # FLD AUTO: 11.3 K/UL — HIGH (ref 3.8–10.5)

## 2021-07-20 PROCEDURE — 93010 ELECTROCARDIOGRAM REPORT: CPT

## 2021-07-20 PROCEDURE — 71045 X-RAY EXAM CHEST 1 VIEW: CPT | Mod: 26

## 2021-07-20 PROCEDURE — 93010 ELECTROCARDIOGRAM REPORT: CPT | Mod: 77

## 2021-07-20 PROCEDURE — 99291 CRITICAL CARE FIRST HOUR: CPT

## 2021-07-20 RX ORDER — POLYETHYLENE GLYCOL 3350 17 G/17G
17 POWDER, FOR SOLUTION ORAL DAILY
Refills: 0 | Status: DISCONTINUED | OUTPATIENT
Start: 2021-07-20 | End: 2021-07-24

## 2021-07-20 RX ORDER — SENNA PLUS 8.6 MG/1
2 TABLET ORAL AT BEDTIME
Refills: 0 | Status: DISCONTINUED | OUTPATIENT
Start: 2021-07-20 | End: 2021-07-24

## 2021-07-20 RX ORDER — ASPIRIN/CALCIUM CARB/MAGNESIUM 324 MG
81 TABLET ORAL DAILY
Refills: 0 | Status: DISCONTINUED | OUTPATIENT
Start: 2021-07-20 | End: 2021-07-24

## 2021-07-20 RX ORDER — CHLORHEXIDINE GLUCONATE 213 G/1000ML
1 SOLUTION TOPICAL
Refills: 0 | Status: DISCONTINUED | OUTPATIENT
Start: 2021-07-20 | End: 2021-07-24

## 2021-07-20 RX ORDER — ATORVASTATIN CALCIUM 80 MG/1
40 TABLET, FILM COATED ORAL AT BEDTIME
Refills: 0 | Status: DISCONTINUED | OUTPATIENT
Start: 2021-07-20 | End: 2021-07-24

## 2021-07-20 RX ORDER — ACETAMINOPHEN 500 MG
975 TABLET ORAL EVERY 6 HOURS
Refills: 0 | Status: DISCONTINUED | OUTPATIENT
Start: 2021-07-20 | End: 2021-07-24

## 2021-07-20 RX ORDER — SODIUM CHLORIDE 9 MG/ML
500 INJECTION, SOLUTION INTRAVENOUS ONCE
Refills: 0 | Status: COMPLETED | OUTPATIENT
Start: 2021-07-20 | End: 2021-07-20

## 2021-07-20 RX ORDER — POTASSIUM PHOSPHATE, MONOBASIC POTASSIUM PHOSPHATE, DIBASIC 236; 224 MG/ML; MG/ML
15 INJECTION, SOLUTION INTRAVENOUS ONCE
Refills: 0 | Status: COMPLETED | OUTPATIENT
Start: 2021-07-20 | End: 2021-07-20

## 2021-07-20 RX ORDER — MAGNESIUM SULFATE 500 MG/ML
2 VIAL (ML) INJECTION ONCE
Refills: 0 | Status: COMPLETED | OUTPATIENT
Start: 2021-07-20 | End: 2021-07-20

## 2021-07-20 RX ORDER — DIGOXIN 250 MCG
125 TABLET ORAL DAILY
Refills: 0 | Status: DISCONTINUED | OUTPATIENT
Start: 2021-07-20 | End: 2021-07-24

## 2021-07-20 RX ORDER — SIMETHICONE 80 MG/1
80 TABLET, CHEWABLE ORAL EVERY 6 HOURS
Refills: 0 | Status: DISCONTINUED | OUTPATIENT
Start: 2021-07-20 | End: 2021-07-24

## 2021-07-20 RX ADMIN — PANTOPRAZOLE SODIUM 40 MILLIGRAM(S): 20 TABLET, DELAYED RELEASE ORAL at 07:50

## 2021-07-20 RX ADMIN — LEVALBUTEROL 0.63 MILLIGRAM(S): 1.25 SOLUTION, CONCENTRATE RESPIRATORY (INHALATION) at 23:31

## 2021-07-20 RX ADMIN — SERTRALINE 50 MILLIGRAM(S): 25 TABLET, FILM COATED ORAL at 12:15

## 2021-07-20 RX ADMIN — Medication 50 GRAM(S): at 02:51

## 2021-07-20 RX ADMIN — Medication 975 MILLIGRAM(S): at 08:30

## 2021-07-20 RX ADMIN — LEVALBUTEROL 0.63 MILLIGRAM(S): 1.25 SOLUTION, CONCENTRATE RESPIRATORY (INHALATION) at 11:52

## 2021-07-20 RX ADMIN — Medication 1 PATCH: at 12:14

## 2021-07-20 RX ADMIN — Medication 300 MILLIGRAM(S): at 06:04

## 2021-07-20 RX ADMIN — POTASSIUM PHOSPHATE, MONOBASIC POTASSIUM PHOSPHATE, DIBASIC 62.5 MILLIMOLE(S): 236; 224 INJECTION, SOLUTION INTRAVENOUS at 03:52

## 2021-07-20 RX ADMIN — Medication 1 PATCH: at 19:03

## 2021-07-20 RX ADMIN — ENOXAPARIN SODIUM 40 MILLIGRAM(S): 100 INJECTION SUBCUTANEOUS at 06:04

## 2021-07-20 RX ADMIN — PIPERACILLIN AND TAZOBACTAM 25 GRAM(S): 4; .5 INJECTION, POWDER, LYOPHILIZED, FOR SOLUTION INTRAVENOUS at 01:24

## 2021-07-20 RX ADMIN — SIMETHICONE 80 MILLIGRAM(S): 80 TABLET, CHEWABLE ORAL at 23:16

## 2021-07-20 RX ADMIN — Medication 5 MILLIGRAM(S): at 21:25

## 2021-07-20 RX ADMIN — SODIUM CHLORIDE 1000 MILLILITER(S): 9 INJECTION, SOLUTION INTRAVENOUS at 09:57

## 2021-07-20 RX ADMIN — ATORVASTATIN CALCIUM 40 MILLIGRAM(S): 80 TABLET, FILM COATED ORAL at 21:25

## 2021-07-20 RX ADMIN — Medication 125 MICROGRAM(S): at 21:26

## 2021-07-20 RX ADMIN — LEVALBUTEROL 0.63 MILLIGRAM(S): 1.25 SOLUTION, CONCENTRATE RESPIRATORY (INHALATION) at 05:56

## 2021-07-20 RX ADMIN — LEVALBUTEROL 0.63 MILLIGRAM(S): 1.25 SOLUTION, CONCENTRATE RESPIRATORY (INHALATION) at 18:13

## 2021-07-20 RX ADMIN — Medication 81 MILLIGRAM(S): at 12:15

## 2021-07-20 RX ADMIN — CHLORHEXIDINE GLUCONATE 1 APPLICATION(S): 213 SOLUTION TOPICAL at 06:04

## 2021-07-20 RX ADMIN — Medication 975 MILLIGRAM(S): at 07:50

## 2021-07-20 RX ADMIN — PIPERACILLIN AND TAZOBACTAM 25 GRAM(S): 4; .5 INJECTION, POWDER, LYOPHILIZED, FOR SOLUTION INTRAVENOUS at 09:52

## 2021-07-20 RX ADMIN — POLYETHYLENE GLYCOL 3350 17 GRAM(S): 17 POWDER, FOR SOLUTION ORAL at 12:15

## 2021-07-20 RX ADMIN — PIPERACILLIN AND TAZOBACTAM 25 GRAM(S): 4; .5 INJECTION, POWDER, LYOPHILIZED, FOR SOLUTION INTRAVENOUS at 17:20

## 2021-07-20 RX ADMIN — ENOXAPARIN SODIUM 40 MILLIGRAM(S): 100 INJECTION SUBCUTANEOUS at 17:20

## 2021-07-20 RX ADMIN — Medication 300 MILLIGRAM(S): at 17:20

## 2021-07-20 NOTE — PROGRESS NOTE ADULT - ASSESSMENT
Assessment:   56M with hx of HLD, CAD s/p MAINOR x 2 (July 2020) on DAPT, LBO 2/2 diverticulitis s/p John's procedure (March 2019) and reversal (June 2019) presenting with SBO 2/2 large ventral hernia and JANELL 2/2 volume depletion. SBO was managed conservatively with an ultimate plan to go to the operating room during this admission.  Transferred to the SICU for development of possible sepsis.     Plan:  - pain control prn  - diet: LRD  - IVF  - Continue Vancomycin and Zosyn  - f/u CT chest report  - Trend fevers, WBCs  - DVT ppx   - awaiting cx results   - appreciate excellent SICU care     [facility]  [id number]  Assessment:   56M with hx of HLD, CAD s/p MAINOR x 2 (July 2020) on DAPT, LBO 2/2 diverticulitis s/p John's procedure (March 2019) and reversal (June 2019) presenting with SBO 2/2 large ventral hernia and JANELL 2/2 volume depletion. SBO was managed conservatively. Abdominal CT 9/18 showed no SBO.  Transferred to the SICU on 7/18 for development of possible sepsis, tachycardia and atrial flutter. CTPE 7/19 negative.      Plan:  - pain control prn  - diet: LRD  - IVF  - Continue Vancomycin and Zosyn  - f/u duplex ultrasound of bilateral lower legs  - Trend fevers, WBCs  - DVT ppx   - awaiting cx results   - appreciate excellent SICU care     [facility]  [id number]

## 2021-07-20 NOTE — PROGRESS NOTE ADULT - ATTENDING COMMENTS
N Feeling a little dizzy, suspect from brisk diuresis yesterday, improving after 500 cc bolus.  P Very likely has DAMION, he has apneic spells during rounds and very groggy. CPAP at night. CXR and CT consistent with LLL pneumonia.  C Rate controlled on digoxin. SBP down to 80s with adrenergic response, responding to volume. Follow up on echocardiogram read.  G Bowel regimen. Randal PO.  R Hyponatremia, NaCl boluses.   I Spiking fevers, MRSA . GPC in blood cultures. Urine GNR, UA consistent with UTI. Mixed picture of pneumonia and UTI. Continue Zosyn/vancomycin. Findings consistent with sepsis.  DVT Lovenox/SCDs.    Has life threatening condition requiring SICU evaluation and management.

## 2021-07-20 NOTE — PHYSICAL THERAPY INITIAL EVALUATION ADULT - PRECAUTIONS/LIMITATIONS, REHAB EVAL
continued from above: CT Chest: No pulmonary embolus is noted within the main, right and left main and lobar pulmonary arteries bilaterally. Consolidation in the left lower lobe has increased when compared to previous exam.

## 2021-07-20 NOTE — PROGRESS NOTE ADULT - ASSESSMENT
PLAN:    Neuro: acute post-op traumatic pain  - Assess neurological neurovascular status every  hours in the setting of  - Multimodal pain control with    Resp:   - Out of bed to chair, ambulate as tolerated, and incentive spirometry to prevent atelectasis  - Mechanical ventilation    CV:  - Monitor vital signs    GI:   -   - Bowel regimen with senna & Miralax  - Protonix for stress ulcer prophylaxis    Renal:  - Monitor I&Os and electrolytes w/ repletions as necessary    Heme:  - Monitor CBC and coags  - Lovenox for VTE prophylaxis    ID:   - Monitor for clinical evidence of active infection  - Monitor WBC, temperature, and procalcitonin  - Empiric antibiotics    Endo:   - Monitor glucose ; HgbA1C  - for HLD  - for hypothyroidism    Code Status:    Disposition:    Emelina Warner PA-C     [id number] 57 y/o male w/ a PMHx of LBO secondary to diverticulitis s/p John's w/ reversal, CAD s/p MAINOR x2 (Jul 2020), HTN, HLD, DAMION, morbid obesity, and a large ventral hernia presenting w/ SBO w/ a transition point in the ventral hernia s/p medical management w/ hospital course c/b concern for sepsis secondary to PNA vs UTI and new onset atrial flutter    PLAN:    Neuro: no acute issues  - Pain control as needed with acetaminophen  - Home sertraline  - Melatonin PRN insomnia    Resp: DAMION, LLL PNA  - Out of bed to chair, ambulate as tolerated, and incentive spirometry to prevent atelectasis  - Levalbuterol for bronchodilatation in the setting of LLL consolidation concerning for PNA    CV: CAD, HTN, atrial flutter  - Monitor vital signs  - Digoxin for rate control of atrial flutter; HR still in 110s-120s  - Home ASA for CAD  - TTE ordered  - Will reach out to cardiology again    GI: resolved SBO  - Low residue diet as tolerated  - Protonix for indigestion    Renal: CKD stage II  - Monitor I&Os and electrolytes w/ repletions as necessary    Heme: no acute issues  - Lovenox for VTE prophylaxis    ID: LLL PNA  - Monitor WBC, temperature, and procalcitonin  - Empiric antibiotics with Zosyn and vancomycin for PNA  - Blood cultures from 7/17 w/ no growth to date and urine culture from 7/17 w/ GNRs    Endo: HLD  - Home atorvastatin for HLD    Code Status: Full code    Disposition: Will remain in SICU    Emelina Warner PA-C     [id number]

## 2021-07-20 NOTE — PHYSICAL THERAPY INITIAL EVALUATION ADULT - ADDITIONAL COMMENTS
As per pt, pt lives in an apartment alone and 3 steps to enter w/ UHR (elevator access). PTA pt was independent w/ all mobility and ADLs.

## 2021-07-20 NOTE — PROGRESS NOTE ADULT - SUBJECTIVE AND OBJECTIVE BOX
SUBJECTIVE:   Seen and examined at bedside during am rounds. Overnight, CT Chest negative for a PE.     OBJECTIVE: T(C): 36.5 (07-19-21 @ 23:00), Max: 38.1 (07-19-21 @ 03:00)  HR: 115 (07-20-21 @ 02:00) (95 - 135)  BP: 88/55 (07-20-21 @ 02:00) (88/55 - 130/60)  RR: 30 (07-20-21 @ 02:00) (20 - 46)  SpO2: 93% (07-20-21 @ 02:00) (88% - 96%)  Wt(kg): --  I&O's Summary    18 Jul 2021 07:01  -  19 Jul 2021 07:00  --------------------------------------------------------  IN: 3880 mL / OUT: 1150 mL / NET: 2730 mL    19 Jul 2021 07:01  -  20 Jul 2021 02:12  --------------------------------------------------------  IN: 2235 mL / OUT: 3300 mL / NET: -1065 mL      I&O's Detail    18 Jul 2021 07:01  -  19 Jul 2021 07:00  --------------------------------------------------------  IN:    IV PiggyBack: 100 mL    IV PiggyBack: 500 mL    IV PiggyBack: 100 mL    IV PiggyBack: 300 mL    IV PiggyBack: 1100 mL    Oral Fluid: 730 mL    sodium chloride 0.9%: 550 mL    Sodium Chloride 0.9% Bolus: 500 mL  Total IN: 3880 mL    OUT:    Voided (mL): 1150 mL  Total OUT: 1150 mL    Total NET: 2730 mL      19 Jul 2021 07:01  -  20 Jul 2021 02:12  --------------------------------------------------------  IN:    IV PiggyBack: 500 mL    IV PiggyBack: 125 mL    Oral Fluid: 1360 mL    sodium chloride 0.9%: 250 mL  Total IN: 2235 mL    OUT:    Voided (mL): 3300 mL  Total OUT: 3300 mL    Total NET: -1065 mL        Physical Exam:  General: Alert, NAD  Cardio: Tachycardic  Resp: tachypneic   GI/Abd: Soft, large ventral hernia reducible, no focal tenderness at hernia, no guarding or rebound tenderness, well healed midline laparotomy scar  Ext: No edema, all 4 extremities moving spontaneously, no limitations    MEDICATIONS  (STANDING):  aspirin enteric coated 81 milliGRAM(s) Oral daily  atorvastatin 40 milliGRAM(s) Oral at bedtime  chlorhexidine 2% Cloths 1 Application(s) Topical <User Schedule>  enoxaparin Injectable 40 milliGRAM(s) SubCutaneous every 12 hours  levalbuterol Inhalation 0.63 milliGRAM(s) Inhalation every 6 hours  melatonin 5 milliGRAM(s) Oral at bedtime  nicotine -   7 mG/24Hr(s) Patch 1 patch Transdermal daily  ondansetron Injectable 4 milliGRAM(s) IV Push once  pantoprazole    Tablet 40 milliGRAM(s) Oral before breakfast  piperacillin/tazobactam IVPB.. 3.375 Gram(s) IV Intermittent every 8 hours  sertraline 50 milliGRAM(s) Oral daily  vancomycin  IVPB 1500 milliGRAM(s) IV Intermittent every 12 hours    MEDICATIONS  (PRN):  acetaminophen   Tablet .. 975 milliGRAM(s) Oral every 6 hours PRN Mild Pain (1 - 3)      LABS:                        11.9   11.30 )-----------( 158      ( 20 Jul 2021 00:22 )             37.2     07-20    129<L>  |  95<L>  |  9   ----------------------------<  100<H>  3.9   |  24  |  1.29    Ca    8.4      20 Jul 2021 00:22  Phos  2.7     07-20  Mg     1.8     07-20    TPro  5.8<L>  /  Alb  2.3<L>  /  TBili  0.7  /  DBili  x   /  AST  38  /  ALT  33  /  AlkPhos  75  07-19

## 2021-07-20 NOTE — PHYSICAL THERAPY INITIAL EVALUATION ADULT - PERTINENT HX OF CURRENT PROBLEM, REHAB EVAL
Pt is a 57 y/o male w/ a PMHx of LBO secondary to diverticulitis s/p John's w/ reversal, CAD s/p MAINOR x2, HTN, HLD, DAMION, morbid obesity, and a large ventral hernia presenting w/ SBO w/ a transition point in the ventral hernia s/p medical management w/ hospital course c/b concern for sepsis secondary to PNA vs UTI and new onset atrial flutter

## 2021-07-20 NOTE — PROGRESS NOTE ADULT - SUBJECTIVE AND OBJECTIVE BOX
HISTORY  57 y/o male w/ a PMHx of LBO secondary to diverticulitis s/p John's (Mar 2019) w/ reversal (Jun 2019), CAD s/p MAINOR x2 (Jul 2020), HTN, HLD, DAMION, morbid obesity, and a large ventral hernia who presented on 7/12 with abdominal pain and was found to have an SBO w/ a transition point in the ventral hernia. Patient was medically managed and his SBO resolved w/ removal of the NGT on 7/15. Botox was injected into the abdominal muscle on 7/16 in preparation for a ventral hernia repair. On 7/17, patient became febrile w/ workup concerning for LLL atelectasis vs. PNA and a positive UA. Shortly afterwards, patient became hypotension and was found to be in atrial flutter. Patient was give fluid w/ no improvement in his BP so he was admitted to SICU for further management.   Patient currently denies headache, dizziness, weakness, fevers, chills, shortness of breath, chest pain, abdominal pain, or nausea/vomiting.    24 HOUR EVENTS:    SUBJECTIVE/ROS:  A ten-point review of systems was otherwise negative except as noted.  Due to altered mental status/intubation, subjective information were not able to be obtained from the patient. History was obtained, to the extent possible, from review of the chart and collateral sources of information.    NEURO  RASS:     GCS:     [facility] ICU:  Exam: awake, alert, oriented x4, no acute distress, no acute focal deficits, lethargy, follows complex commands, follows simple commands, moving all four extremities, does not follow commands  Meds: acetaminophen   Tablet .. 975 milliGRAM(s) Oral every 6 hours PRN Mild Pain (1 - 3)  melatonin 5 milliGRAM(s) Oral at bedtime  ondansetron Injectable 4 milliGRAM(s) IV Push once  sertraline 50 milliGRAM(s) Oral daily      RESPIRATORY  RR: 30 (07-20-21 @ 02:00) (20 - 46)  SpO2: 93% (07-20-21 @ 02:00) (88% - 96%)  Wt(kg): --  Exam: clear to auscultation bilaterally coarse rhonchi in all lung fields  Mechanical Ventilation:   ABG - ( 19 Jul 2021 13:22 )  pH: 7.47  /  pCO2: 31    /  pO2: 69    / HCO3: 22    / Base Excess: -.3   /  SaO2: 94      Lactate: x                Meds: levalbuterol Inhalation 0.63 milliGRAM(s) Inhalation every 6 hours      CARDIOVASCULAR  HR: 115 (07-20-21 @ 02:00) (95 - 135)  BP: 88/55 (07-20-21 @ 02:00) (88/55 - 130/60)  BP(mean): 67 (07-20-21 @ 02:00) (67 - 98)  ABP: --  ABP(mean): --  Wt(kg): --  CVP(cm H2O): --  VBG - ( 20 Jul 2021 00:00 )  pH: 7.44  /  pCO2: 41    /  pO2: 30    / HCO3: 28    / Base Excess: 3.4   /  SaO2: 55     Lactate: 1.0                Exam: regular rate and rhythm, regular rhythm, tachycardic, S1S2, irregularly irregular  Cardiac Rhythm: sinus sinus tachycardia atrial fibrillation  Perfusion     [ ]Adequate   [ ]Inadequate  Mentation   [ ]Normal       [ ]Reduced  Extremities  [ ]Warm         [ ]Cool  Volume Status [ ]Hypervolemic [ ]Euvolemic [ ]Hypovolemic  Meds:     GI/NUTRITION  Exam: soft, nontender, nondistended, softly distended, sandra-incisional tenderness, incision dressing C/D/I, NGT output, drain output  Diet:  Meds: pantoprazole    Tablet 40 milliGRAM(s) Oral before breakfast      GENITOURINARY  I&O's Detail    07-18 @ 07:01  -  07-19 @ 07:00  --------------------------------------------------------  IN:    IV PiggyBack: 100 mL    IV PiggyBack: 500 mL    IV PiggyBack: 100 mL    IV PiggyBack: 300 mL    IV PiggyBack: 1100 mL    Oral Fluid: 730 mL    sodium chloride 0.9%: 550 mL    Sodium Chloride 0.9% Bolus: 500 mL  Total IN: 3880 mL    OUT:    Voided (mL): 1150 mL  Total OUT: 1150 mL    Total NET: 2730 mL      07-19 @ 07:01  -  07-20 @ 02:32  --------------------------------------------------------  IN:    IV PiggyBack: 500 mL    IV PiggyBack: 125 mL    Oral Fluid: 1360 mL    sodium chloride 0.9%: 250 mL  Total IN: 2235 mL    OUT:    Voided (mL): 3300 mL  Total OUT: 3300 mL    Total NET: -1065 mL          07-20    129<L>  |  95<L>  |  9   ----------------------------<  100<H>  3.9   |  24  |  1.29    Ca    8.4      20 Jul 2021 00:22  Phos  2.7     07-20  Mg     1.8     07-20    TPro  5.8<L>  /  Alb  2.3<L>  /  TBili  0.7  /  DBili  x   /  AST  38  /  ALT  33  /  AlkPhos  75  07-19    [ ] Quispe catheter, indication:   Meds: magnesium sulfate  IVPB 2 Gram(s) IV Intermittent once  potassium phosphate IVPB 15 milliMole(s) IV Intermittent once      HEMATOLOGIC  Meds: aspirin enteric coated 81 milliGRAM(s) Oral daily  enoxaparin Injectable 40 milliGRAM(s) SubCutaneous every 12 hours    [ ] VTE Prophylaxis - held due to                         11.9   11.30 )-----------( 158      ( 20 Jul 2021 00:22 )             37.2         INFECTIOUS DISEASES  T(C): 36.5 (07-19-21 @ 23:00), Max: 38.1 (07-19-21 @ 03:00)  Wt(kg): --  WBC Count: 11.30 K/uL (07-20 @ 00:22)    Recent Cultures:  Specimen Source: .Urine Clean Catch (Midstream), 07-18 @ 06:16; Results   >100,000 CFU/ml Gram Negative Rods; Gram Stain: --; Organism: --  Specimen Source: .Blood Blood-Peripheral, 07-17 @ 15:01; Results   No growth to date.; Gram Stain: --; Organism: --    Meds: piperacillin/tazobactam IVPB.. 3.375 Gram(s) IV Intermittent every 8 hours  vancomycin  IVPB 1500 milliGRAM(s) IV Intermittent every 12 hours      ENDOCRINE  Capillary Blood Glucose    Meds: atorvastatin 40 milliGRAM(s) Oral at bedtime      ACCESS DEVICES:  [ ] Peripheral IV  [ ] Central Venous Line	[ ] R	[ ] L	[ ] IJ	[ ] Fem	[ ] SC	Placed:   [ ] Arterial Line		[ ] R	[ ] L	[ ] Fem	[ ] Rad	[ ] Ax	Placed:   [ ] PICC:					[ ] Mediport  [ ] Urinary Catheter, Date Placed:   [ ] Necessity of urinary, arterial, and venous catheters discussed    OTHER MEDICATIONS:  chlorhexidine 2% Cloths 1 Application(s) Topical <User Schedule>  nicotine -   7 mG/24Hr(s) Patch 1 patch Transdermal daily      IMAGING: HISTORY  57 y/o male w/ a PMHx of LBO secondary to diverticulitis s/p John's (Mar 2019) w/ reversal (Jun 2019), CAD s/p MAINOR x2 (Jul 2020), HTN, HLD, DAMION, morbid obesity, and a large ventral hernia who presented on 7/12 with abdominal pain and was found to have an SBO w/ a transition point in the ventral hernia. Patient was medically managed and his SBO resolved w/ removal of the NGT on 7/15. Botox was injected into the abdominal muscle on 7/16 in preparation for a ventral hernia repair. On 7/17, patient became febrile w/ workup concerning for LLL atelectasis vs. PNA and a positive UA. Shortly afterwards, patient became hypotension and was found to be in atrial flutter. Patient was given fluid w/ no improvement in his BP so he was admitted to SICU for further management. Patient currently denies headache, dizziness, weakness, fevers, chills, shortness of breath, chest pain, abdominal pain, or nausea/vomiting.    24 HOUR EVENTS:  - Given digoxin load  - Changed Duoneb -> levalbuterol  - IV locked and diuresis w/ Lasix 20 mg IV x1 dose  - Lactate cleared from 2.3 -> 1  - CTA chest obtained and negative for PE  - Vancomycin trough    SUBJECTIVE/ROS: A ten-point review of systems was otherwise negative except as noted.    NEURO  Exam: awake, alert, oriented x4, no acute distress, no acute focal deficits  Meds:  - acetaminophen   Tablet .. 975 milliGRAM(s) Oral every 6 hours PRN Mild Pain (1 - 3)  - melatonin 5 milliGRAM(s) Oral at bedtime  - sertraline 50 milliGRAM(s) Oral daily    RESPIRATORY  RR: 30 (07-20-21 @ 02:00) (20 - 46)  SpO2: 93% (07-20-21 @ 02:00) (88% - 96%)  Exam: decreased breath sounds at the left base  Meds: levalbuterol Inhalation 0.63 milliGRAM(s) Inhalation every 6 hours    CARDIOVASCULAR  HR: 115 (07-20-21 @ 02:00) (95 - 135)  BP: 88/55 (07-20-21 @ 02:00) (88/55 - 130/60)  BP(mean): 67 (07-20-21 @ 02:00) (67 - 98)  VBG - ( 20 Jul 2021 00:00 )  pH: 7.44  /  pCO2: 41    /  pO2: 30    / HCO3: 28    / Base Excess: 3.4   /  SaO2: 55   /    Lactate: 1.0    Exam: irregularly regular  Cardiac Rhythm: atrial flutter  Perfusion    [x]Adequate    [ ]Inadequate  Mentation   [x]Normal       [ ]Reduced  Extremities  [x]Warm         [ ]Cool  Volume Status [ ]Hypervolemic [x]Euvolemic [ ]Hypovolemic    GI/NUTRITION  Exam: soft, nontender, nondistended  Diet: low fiber  Meds: pantoprazole    Tablet 40 milliGRAM(s) Oral before breakfast    GENITOURINARY  I&O's Detail  07-19 @ 07:01  -  07-20 @ 02:32  --------------------------------------------------------  IN:    IV PiggyBack: 500 mL    IV PiggyBack: 125 mL    Oral Fluid: 1360 mL    sodium chloride 0.9%: 250 mL  Total IN: 2235 mL    OUT:    Voided (mL): 3300 mL  Total OUT: 3300 mL    Total NET: -1065 mL    129<L>  |  95<L>  |  9   ----------------------------<  100<H>  3.9   |  24  |  1.29    Ca    8.4      20 Jul 2021 00:22  Phos  2.7  Mg     1.8    HEMATOLOGIC  Meds:  - aspirin enteric coated 81 milliGRAM(s) Oral daily  - enoxaparin Injectable 40 milliGRAM(s) SubCutaneous every 12 hours                        11.9   11.30 )-----------( 158      ( 20 Jul 2021 00:22 )             37.2     INFECTIOUS DISEASES  T(C): 36.5 (07-19-21 @ 23:00), Max: 38.1 (07-19-21 @ 03:00)  WBC Count: 11.30 K/uL (07-20 @ 00:22)    Recent Cultures:  - Specimen Source: .Urine Clean Catch (Midstream), 07-18 @ 06:16  Results: >100,000 CFU/ml Gram Negative Rods  Gram Stain: --  Organism: --  - Specimen Source: .Blood Blood-Peripheral, 07-17 @ 15:01  Results: No growth to date.  Gram Stain: --  Organism: --    Meds:  - piperacillin/tazobactam IVPB.. 3.375 Gram(s) IV Intermittent every 8 hours  - vancomycin  IVPB 1500 milliGRAM(s) IV Intermittent every 12 hours    ENDOCRINE  Capillary Blood Glucose: 100 mg/dL (07-20-21 @ 00:22)  Meds: atorvastatin 40 milliGRAM(s) Oral at bedtime    ACCESS DEVICES:  [x] Peripheral IV  [ ] Central Venous Line	[ ] R	[ ] L	[ ] IJ	[ ] Fem	[ ] SC	Placed:   [ ] Arterial Line		[ ] R	[ ] L	[ ] Fem	[ ] Rad	[ ] Ax	Placed:   [ ] PICC:					[ ] Mediport  [ ] Urinary Catheter, Date Placed:   [x] Necessity of urinary, arterial, and venous catheters discussed    OTHER MEDICATIONS:  - chlorhexidine 2% Cloths 1 Application(s) Topical <User Schedule>  - nicotine -   7 mG/24Hr(s) Patch 1 patch Transdermal daily    IMAGING: HISTORY:  55 y/o male w/ a PMHx of LBO secondary to diverticulitis s/p John's (Mar 2019) w/ reversal (Jun 2019), CAD s/p MAINOR x2 (Jul 2020), HTN, HLD, DAMION, morbid obesity, and a large ventral hernia who presented on 7/12 with abdominal pain and was found to have an SBO w/ a transition point in the ventral hernia. Patient was medically managed and his SBO resolved w/ removal of the NGT on 7/15. Botox was injected into the abdominal muscle on 7/16 in preparation for a ventral hernia repair. On 7/17, patient became febrile w/ workup concerning for LLL atelectasis vs. PNA and a positive UA. Shortly afterwards, patient became hypotension and was found to be in atrial flutter. Patient was given fluid w/ no improvement in his BP so he was admitted to SICU for further management. Patient currently denies headache, dizziness, weakness, fevers, chills, shortness of breath, chest pain, abdominal pain, or nausea/vomiting.    24 HOUR EVENTS:  - Given digoxin load  - Changed Duoneb -> levalbuterol  - IV locked and diuresis w/ Lasix 20 mg IV x1 dose  - Lactate cleared from 2.3 -> 1  - CTA chest obtained and negative for PE  - Vancomycin trough    SUBJECTIVE/ROS: A ten-point review of systems was otherwise negative except as noted.    NEURO  Exam: awake, alert, oriented x4, no acute distress, no acute focal deficits  Meds:  - acetaminophen   Tablet .. 975 milliGRAM(s) Oral every 6 hours PRN Mild Pain (1 - 3)  - melatonin 5 milliGRAM(s) Oral at bedtime  - sertraline 50 milliGRAM(s) Oral daily    RESPIRATORY  RR: 30 (07-20-21 @ 02:00) (20 - 46)  SpO2: 93% (07-20-21 @ 02:00) (88% - 96%)  Exam: decreased breath sounds at the left base  Meds: levalbuterol Inhalation 0.63 milliGRAM(s) Inhalation every 6 hours    CARDIOVASCULAR  HR: 115 (07-20-21 @ 02:00) (95 - 135)  BP: 88/55 (07-20-21 @ 02:00) (88/55 - 130/60)  BP(mean): 67 (07-20-21 @ 02:00) (67 - 98)  VBG - ( 20 Jul 2021 00:00 )  pH: 7.44  /  pCO2: 41    /  pO2: 30    / HCO3: 28    / Base Excess: 3.4   /  SaO2: 55   /    Lactate: 1.0    Exam: irregularly regular  Cardiac Rhythm: atrial flutter  Perfusion    [x]Adequate    [ ]Inadequate  Mentation   [x]Normal       [ ]Reduced  Extremities  [x]Warm         [ ]Cool  Volume Status [ ]Hypervolemic [x]Euvolemic [ ]Hypovolemic  Meds:     GI/NUTRITION  Exam: soft, nontender, nondistended  Diet: low fiber  Meds: pantoprazole    Tablet 40 milliGRAM(s) Oral before breakfast    GENITOURINARY  I&O's Detail  07-19 @ 07:01  -  07-20 @ 02:32  --------------------------------------------------------  IN:    IV PiggyBack: 500 mL    IV PiggyBack: 125 mL    Oral Fluid: 1360 mL    sodium chloride 0.9%: 250 mL  Total IN: 2235 mL    OUT:    Voided (mL): 3300 mL  Total OUT: 3300 mL    Total NET: -1065 mL    129<L>  |  95<L>  |  9   ----------------------------<  100<H>  3.9   |  24  |  1.29    Ca    8.4      20 Jul 2021 00:22  Phos  2.7  Mg     1.8    HEMATOLOGIC  Meds:  - aspirin enteric coated 81 milliGRAM(s) Oral daily  - enoxaparin Injectable 40 milliGRAM(s) SubCutaneous every 12 hours                        11.9   11.30 )-----------( 158      ( 20 Jul 2021 00:22 )             37.2     INFECTIOUS DISEASES  T(C): 36.5 (07-19-21 @ 23:00), Max: 38.1 (07-19-21 @ 03:00)  WBC Count: 11.30 K/uL (07-20 @ 00:22)    Recent Cultures:  - Specimen Source: .Urine Clean Catch (Midstream), 07-18 @ 06:16  Results: >100,000 CFU/ml Gram Negative Rods  Gram Stain: --  Organism: --  - Specimen Source: .Blood Blood-Peripheral, 07-17 @ 15:01  Results: No growth to date.  Gram Stain: --  Organism: --    Meds:  - piperacillin/tazobactam IVPB.. 3.375 Gram(s) IV Intermittent every 8 hours  - vancomycin  IVPB 1500 milliGRAM(s) IV Intermittent every 12 hours    ENDOCRINE  Capillary Blood Glucose: 100 mg/dL (07-20-21 @ 00:22)  Meds: atorvastatin 40 milliGRAM(s) Oral at bedtime    ACCESS DEVICES:  [x] Peripheral IV  [ ] Central Venous Line	[ ] R	[ ] L	[ ] IJ	[ ] Fem	[ ] SC	Placed:   [ ] Arterial Line		[ ] R	[ ] L	[ ] Fem	[ ] Rad	[ ] Ax	Placed:   [ ] PICC:					[ ] Mediport  [ ] Urinary Catheter, Date Placed:   [x] Necessity of urinary, arterial, and venous catheters discussed    OTHER MEDICATIONS:  - chlorhexidine 2% Cloths 1 Application(s) Topical <User Schedule>  - nicotine -   7 mG/24Hr(s) Patch 1 patch Transdermal daily    IMAGING: HISTORY:  57 y/o male w/ a PMHx of LBO secondary to diverticulitis s/p Jonh's (Mar 2019) w/ reversal (Jun 2019), CAD s/p MAINOR x2 (Jul 2020), HTN, HLD, DAMION, morbid obesity, and a large ventral hernia who presented on 7/12 with abdominal pain and was found to have an SBO w/ a transition point in the ventral hernia. Patient was medically managed and his SBO resolved w/ removal of the NGT on 7/15. Botox was injected into the abdominal muscle on 7/16 in preparation for a ventral hernia repair. On 7/17, patient became febrile w/ workup concerning for LLL atelectasis vs. PNA and a positive UA. Shortly afterwards, patient became hypotension and was found to be in atrial flutter. Patient was given fluid w/ no improvement in his BP so he was admitted to SICU for further management. Patient currently denies headache, dizziness, weakness, fevers, chills, shortness of breath, chest pain, abdominal pain, or nausea/vomiting.    24 HOUR EVENTS:  - Given digoxin load  - Changed Duoneb -> levalbuterol  - IV locked and diuresis w/ Lasix 20 mg IV x1 dose  - Lactate cleared from 2.3 -> 1  - CTA chest obtained and negative for PE  - Vancomycin trough 10.3    SUBJECTIVE/ROS: A ten-point review of systems was otherwise negative except as noted.    NEURO  Exam: awake, alert, oriented x4, no acute distress, no acute focal deficits  Meds:  - acetaminophen   Tablet .. 975 milliGRAM(s) Oral every 6 hours PRN Mild Pain (1 - 3)  - melatonin 5 milliGRAM(s) Oral at bedtime  - sertraline 50 milliGRAM(s) Oral daily    RESPIRATORY  RR: 30 (07-20-21 @ 02:00) (20 - 46)  SpO2: 93% (07-20-21 @ 02:00) (88% - 96%)  Exam: decreased breath sounds at the left base  Meds: levalbuterol Inhalation 0.63 milliGRAM(s) Inhalation every 6 hours    CARDIOVASCULAR  HR: 115 (07-20-21 @ 02:00) (95 - 135)  BP: 88/55 (07-20-21 @ 02:00) (88/55 - 130/60)  BP(mean): 67 (07-20-21 @ 02:00) (67 - 98)  VBG - ( 20 Jul 2021 00:00 )  pH: 7.44  /  pCO2: 41    /  pO2: 30    / HCO3: 28    / Base Excess: 3.4   /  SaO2: 55   /    Lactate: 1.0    Exam: irregularly regular  Cardiac Rhythm: atrial flutter  Perfusion    [x]Adequate    [ ]Inadequate  Mentation   [x]Normal       [ ]Reduced  Extremities  [x]Warm         [ ]Cool  Volume Status [ ]Hypervolemic [x]Euvolemic [ ]Hypovolemic  Meds:     GI/NUTRITION  Exam: soft, nontender, nondistended  Diet: low fiber  Meds: pantoprazole    Tablet 40 milliGRAM(s) Oral before breakfast    GENITOURINARY  I&O's Detail  07-19 @ 07:01  -  07-20 @ 02:32  --------------------------------------------------------  IN:    IV PiggyBack: 500 mL    IV PiggyBack: 125 mL    Oral Fluid: 1360 mL    sodium chloride 0.9%: 250 mL  Total IN: 2235 mL    OUT:    Voided (mL): 3300 mL  Total OUT: 3300 mL    Total NET: -1065 mL    129<L>  |  95<L>  |  9   ----------------------------<  100<H>  3.9   |  24  |  1.29    Ca    8.4      20 Jul 2021 00:22  Phos  2.7  Mg     1.8    HEMATOLOGIC  Meds:  - aspirin enteric coated 81 milliGRAM(s) Oral daily  - enoxaparin Injectable 40 milliGRAM(s) SubCutaneous every 12 hours                        11.9   11.30 )-----------( 158      ( 20 Jul 2021 00:22 )             37.2     INFECTIOUS DISEASES  T(C): 36.5 (07-19-21 @ 23:00), Max: 38.1 (07-19-21 @ 03:00)  WBC Count: 11.30 K/uL (07-20 @ 00:22)    Recent Cultures:  - Specimen Source: .Urine Clean Catch (Midstream), 07-18 @ 06:16  Results: >100,000 CFU/ml Gram Negative Rods  Gram Stain: --  Organism: --  - Specimen Source: .Blood Blood-Peripheral, 07-17 @ 15:01  Results: No growth to date.  Gram Stain: --  Organism: --    Meds:  - piperacillin/tazobactam IVPB.. 3.375 Gram(s) IV Intermittent every 8 hours  - vancomycin  IVPB 1500 milliGRAM(s) IV Intermittent every 12 hours    ENDOCRINE  Capillary Blood Glucose: 100 mg/dL (07-20-21 @ 00:22)  Meds: atorvastatin 40 milliGRAM(s) Oral at bedtime    ACCESS DEVICES:  [x] Peripheral IV  [ ] Central Venous Line	[ ] R	[ ] L	[ ] IJ	[ ] Fem	[ ] SC	Placed:   [ ] Arterial Line		[ ] R	[ ] L	[ ] Fem	[ ] Rad	[ ] Ax	Placed:   [ ] PICC:					[ ] Mediport  [ ] Urinary Catheter, Date Placed:   [x] Necessity of urinary, arterial, and venous catheters discussed    OTHER MEDICATIONS:  - chlorhexidine 2% Cloths 1 Application(s) Topical <User Schedule>  - nicotine -   7 mG/24Hr(s) Patch 1 patch Transdermal daily    IMAGING:

## 2021-07-21 LAB
-  AMIKACIN: SIGNIFICANT CHANGE UP
-  AMOXICILLIN/CLAVULANIC ACID: SIGNIFICANT CHANGE UP
-  AMPICILLIN/SULBACTAM: SIGNIFICANT CHANGE UP
-  AMPICILLIN: SIGNIFICANT CHANGE UP
-  AZTREONAM: SIGNIFICANT CHANGE UP
-  CEFAZOLIN: SIGNIFICANT CHANGE UP
-  CEFEPIME: SIGNIFICANT CHANGE UP
-  CEFOXITIN: SIGNIFICANT CHANGE UP
-  CEFTRIAXONE: SIGNIFICANT CHANGE UP
-  CIPROFLOXACIN: SIGNIFICANT CHANGE UP
-  ERTAPENEM: SIGNIFICANT CHANGE UP
-  GENTAMICIN: SIGNIFICANT CHANGE UP
-  IMIPENEM: SIGNIFICANT CHANGE UP
-  LEVOFLOXACIN: SIGNIFICANT CHANGE UP
-  MEROPENEM: SIGNIFICANT CHANGE UP
-  NITROFURANTOIN: SIGNIFICANT CHANGE UP
-  PIPERACILLIN/TAZOBACTAM: SIGNIFICANT CHANGE UP
-  TIGECYCLINE: SIGNIFICANT CHANGE UP
-  TOBRAMYCIN: SIGNIFICANT CHANGE UP
-  TRIMETHOPRIM/SULFAMETHOXAZOLE: SIGNIFICANT CHANGE UP
ANION GAP SERPL CALC-SCNC: 13 MMOL/L — SIGNIFICANT CHANGE UP (ref 5–17)
BUN SERPL-MCNC: 10 MG/DL — SIGNIFICANT CHANGE UP (ref 7–23)
CALCIUM SERPL-MCNC: 8.5 MG/DL — SIGNIFICANT CHANGE UP (ref 8.4–10.5)
CHLORIDE SERPL-SCNC: 94 MMOL/L — LOW (ref 96–108)
CO2 SERPL-SCNC: 23 MMOL/L — SIGNIFICANT CHANGE UP (ref 22–31)
CREAT SERPL-MCNC: 1.19 MG/DL — SIGNIFICANT CHANGE UP (ref 0.5–1.3)
CULTURE RESULTS: SIGNIFICANT CHANGE UP
GLUCOSE SERPL-MCNC: 140 MG/DL — HIGH (ref 70–99)
HCT VFR BLD CALC: 35.3 % — LOW (ref 39–50)
HGB BLD-MCNC: 11.1 G/DL — LOW (ref 13–17)
MAGNESIUM SERPL-MCNC: 2 MG/DL — SIGNIFICANT CHANGE UP (ref 1.6–2.6)
MCHC RBC-ENTMCNC: 26.5 PG — LOW (ref 27–34)
MCHC RBC-ENTMCNC: 31.4 GM/DL — LOW (ref 32–36)
MCV RBC AUTO: 84.2 FL — SIGNIFICANT CHANGE UP (ref 80–100)
METHOD TYPE: SIGNIFICANT CHANGE UP
MRSA PCR RESULT.: DETECTED
NRBC # BLD: 0 /100 WBCS — SIGNIFICANT CHANGE UP (ref 0–0)
ORGANISM # SPEC MICROSCOPIC CNT: SIGNIFICANT CHANGE UP
ORGANISM # SPEC MICROSCOPIC CNT: SIGNIFICANT CHANGE UP
PHOSPHATE SERPL-MCNC: 3 MG/DL — SIGNIFICANT CHANGE UP (ref 2.5–4.5)
PLATELET # BLD AUTO: 193 K/UL — SIGNIFICANT CHANGE UP (ref 150–400)
POTASSIUM SERPL-MCNC: 3.8 MMOL/L — SIGNIFICANT CHANGE UP (ref 3.5–5.3)
POTASSIUM SERPL-SCNC: 3.8 MMOL/L — SIGNIFICANT CHANGE UP (ref 3.5–5.3)
PROCALCITONIN SERPL-MCNC: 0.4 NG/ML — HIGH (ref 0.02–0.1)
RBC # BLD: 4.19 M/UL — LOW (ref 4.2–5.8)
RBC # FLD: 15.4 % — HIGH (ref 10.3–14.5)
S AUREUS DNA NOSE QL NAA+PROBE: DETECTED
SODIUM SERPL-SCNC: 130 MMOL/L — LOW (ref 135–145)
SPECIMEN SOURCE: SIGNIFICANT CHANGE UP
WBC # BLD: 8.33 K/UL — SIGNIFICANT CHANGE UP (ref 3.8–10.5)
WBC # FLD AUTO: 8.33 K/UL — SIGNIFICANT CHANGE UP (ref 3.8–10.5)

## 2021-07-21 PROCEDURE — 99233 SBSQ HOSP IP/OBS HIGH 50: CPT

## 2021-07-21 PROCEDURE — 71045 X-RAY EXAM CHEST 1 VIEW: CPT | Mod: 26

## 2021-07-21 RX ORDER — SODIUM CHLORIDE 9 MG/ML
1000 INJECTION INTRAMUSCULAR; INTRAVENOUS; SUBCUTANEOUS
Refills: 0 | Status: DISCONTINUED | OUTPATIENT
Start: 2021-07-21 | End: 2021-07-22

## 2021-07-21 RX ORDER — POTASSIUM CHLORIDE 20 MEQ
20 PACKET (EA) ORAL ONCE
Refills: 0 | Status: COMPLETED | OUTPATIENT
Start: 2021-07-21 | End: 2021-07-21

## 2021-07-21 RX ORDER — TICAGRELOR 90 MG/1
90 TABLET ORAL EVERY 12 HOURS
Refills: 0 | Status: DISCONTINUED | OUTPATIENT
Start: 2021-07-21 | End: 2021-07-21

## 2021-07-21 RX ORDER — APIXABAN 2.5 MG/1
5 TABLET, FILM COATED ORAL EVERY 12 HOURS
Refills: 0 | Status: DISCONTINUED | OUTPATIENT
Start: 2021-07-22 | End: 2021-07-24

## 2021-07-21 RX ORDER — ENOXAPARIN SODIUM 100 MG/ML
40 INJECTION SUBCUTANEOUS EVERY 12 HOURS
Refills: 0 | Status: DISCONTINUED | OUTPATIENT
Start: 2021-07-21 | End: 2021-07-21

## 2021-07-21 RX ADMIN — PIPERACILLIN AND TAZOBACTAM 25 GRAM(S): 4; .5 INJECTION, POWDER, LYOPHILIZED, FOR SOLUTION INTRAVENOUS at 09:34

## 2021-07-21 RX ADMIN — PIPERACILLIN AND TAZOBACTAM 25 GRAM(S): 4; .5 INJECTION, POWDER, LYOPHILIZED, FOR SOLUTION INTRAVENOUS at 18:15

## 2021-07-21 RX ADMIN — LEVALBUTEROL 0.63 MILLIGRAM(S): 1.25 SOLUTION, CONCENTRATE RESPIRATORY (INHALATION) at 05:30

## 2021-07-21 RX ADMIN — Medication 300 MILLIGRAM(S): at 18:14

## 2021-07-21 RX ADMIN — Medication 81 MILLIGRAM(S): at 12:01

## 2021-07-21 RX ADMIN — ENOXAPARIN SODIUM 40 MILLIGRAM(S): 100 INJECTION SUBCUTANEOUS at 06:26

## 2021-07-21 RX ADMIN — Medication 1 PATCH: at 07:32

## 2021-07-21 RX ADMIN — Medication 20 MILLIEQUIVALENT(S): at 06:26

## 2021-07-21 RX ADMIN — SODIUM CHLORIDE 75 MILLILITER(S): 9 INJECTION INTRAMUSCULAR; INTRAVENOUS; SUBCUTANEOUS at 19:48

## 2021-07-21 RX ADMIN — LEVALBUTEROL 0.63 MILLIGRAM(S): 1.25 SOLUTION, CONCENTRATE RESPIRATORY (INHALATION) at 23:35

## 2021-07-21 RX ADMIN — LEVALBUTEROL 0.63 MILLIGRAM(S): 1.25 SOLUTION, CONCENTRATE RESPIRATORY (INHALATION) at 17:38

## 2021-07-21 RX ADMIN — Medication 1 PATCH: at 12:15

## 2021-07-21 RX ADMIN — Medication 300 MILLIGRAM(S): at 06:26

## 2021-07-21 RX ADMIN — PANTOPRAZOLE SODIUM 40 MILLIGRAM(S): 20 TABLET, DELAYED RELEASE ORAL at 08:29

## 2021-07-21 RX ADMIN — Medication 975 MILLIGRAM(S): at 20:32

## 2021-07-21 RX ADMIN — LEVALBUTEROL 0.63 MILLIGRAM(S): 1.25 SOLUTION, CONCENTRATE RESPIRATORY (INHALATION) at 11:27

## 2021-07-21 RX ADMIN — Medication 5 MILLIGRAM(S): at 21:46

## 2021-07-21 RX ADMIN — SERTRALINE 50 MILLIGRAM(S): 25 TABLET, FILM COATED ORAL at 12:02

## 2021-07-21 RX ADMIN — Medication 1 PATCH: at 19:35

## 2021-07-21 RX ADMIN — SENNA PLUS 2 TABLET(S): 8.6 TABLET ORAL at 21:46

## 2021-07-21 RX ADMIN — Medication 1 PATCH: at 12:01

## 2021-07-21 RX ADMIN — POLYETHYLENE GLYCOL 3350 17 GRAM(S): 17 POWDER, FOR SOLUTION ORAL at 12:02

## 2021-07-21 RX ADMIN — PIPERACILLIN AND TAZOBACTAM 25 GRAM(S): 4; .5 INJECTION, POWDER, LYOPHILIZED, FOR SOLUTION INTRAVENOUS at 01:02

## 2021-07-21 RX ADMIN — Medication 975 MILLIGRAM(S): at 21:02

## 2021-07-21 RX ADMIN — Medication 125 MICROGRAM(S): at 06:26

## 2021-07-21 RX ADMIN — ATORVASTATIN CALCIUM 40 MILLIGRAM(S): 80 TABLET, FILM COATED ORAL at 21:46

## 2021-07-21 RX ADMIN — SODIUM CHLORIDE 75 MILLILITER(S): 9 INJECTION INTRAMUSCULAR; INTRAVENOUS; SUBCUTANEOUS at 12:02

## 2021-07-21 RX ADMIN — CHLORHEXIDINE GLUCONATE 1 APPLICATION(S): 213 SOLUTION TOPICAL at 06:28

## 2021-07-21 NOTE — PROGRESS NOTE ADULT - ASSESSMENT
Assessment:   56M with hx of HLD, CAD s/p MAINOR x 2 (July 2020) on DAPT, LBO 2/2 diverticulitis s/p John's procedure (March 2019) and reversal (June 2019) presenting with SBO 2/2 large ventral hernia and JANELL 2/2 volume depletion. SBO was managed conservatively. Abdominal CT 9/18 showed no SBO.  Transferred to the SICU on 7/18 for development of possible sepsis, tachycardia and atrial flutter. CTPE 7/19 negative.      Plan:  - pain control prn  - diet: LRD  - IVF  - Continue Vancomycin and Zosyn  - f/u duplex ultrasound of bilateral lower legs  - Trend fevers, WBCs  - DVT ppx   - awaiting cx results   - appreciate excellent SICU care     [facility]  [id number]  Assessment:   56M with hx of HLD, CAD s/p MAINOR x 2 (July 2020) on DAPT, LBO 2/2 diverticulitis s/p John's procedure (March 2019) and reversal (June 2019) presenting with SBO 2/2 large ventral hernia and JANELL 2/2 volume depletion. SBO was managed conservatively. Abdominal CT 9/18 showed no SBO.  Transferred to the SICU on 7/18 for development of possible sepsis, tachycardia and atrial flutter. CTPE 7/19 negative.      Plan:  - pain control prn  - diet: LRD  - IVF  - Continue Vancomycin and Zosyn  - f/u duplex ultrasound of bilateral lower legs  - Trend fevers, WBCs  - DVT ppx   - appreciate excellent SICU care     [facility]  [id number]

## 2021-07-21 NOTE — PROGRESS NOTE ADULT - SUBJECTIVE AND OBJECTIVE BOX
HPI:  56y Male w/ a PMHx of LBO secondary to diverticulitis s/p John's (Mar 2019) w/ reversal (Jun 2019), CAD s/p MAINOR x2 (Jul 2020), HTN, HLD, DAMION, morbid obesity, and a large ventral hernia who presented on 7/12 with abdominal pain and was found to have an SBO w/ a transition point in the ventral hernia. Patient was medically managed and his SBO resolved w/ removal of the NGT on 7/15. Botox was injected into the abdominal muscle on 7/16 in preparation for a ventral hernia repair. On 7/17, patient became febrile w/ workup concerning for LLL atelectasis vs. PNA and a positive UA. Shortly afterwards, patient became hypotension and was found to be in atrial flutter. Patient was given fluid w/ no improvement in his BP so he was admitted to SICU for further management. Patient currently denies headache, dizziness, weakness, fevers, chills, shortness of breath, chest pain, abdominal pain, or nausea/vomiting.    24 HOUR EVENTS:  - hypotensive to 80s systolic, trops elevated 21 -> 47 (trending), given 500 LR  - EKG sinus  - continues to be febrile, UCx growing GNR, BCx with GPC, repeat BCx sent  - MRSA swab sent  - nocturnal CPAP/chest PT  - started on bowel regimen    SUBJECTIVE/ROS:  Patient seen at bedside this AM. Reports feeling well, without complaints. Pain is well-controlled. Denies headache, shortness of breath, chest pain, dizziness.     PHYSICAL EXAM:  NEURO  Exam: awake, alert, oriented x4  Meds: acetaminophen   Tablet .. 975 milliGRAM(s) Oral every 6 hours PRN Mild Pain (1 - 3)  melatonin 5 milliGRAM(s) Oral at bedtime  sertraline 50 milliGRAM(s) Oral daily  [x] Adequacy of sedation and pain control has been assessed and adjusted    RESPIRATORY  RR: 18 (07-20-21 @ 23:00) (18 - 33)  SpO2: 96% (07-21-21 @ 00:00) (89% - 98%)  Exam: decreased breath sounds at the left base  Labs: ABG - ( 19 Jul 2021 13:22 )  pH: 7.47  /  pCO2: 31    /  pO2: 69    / HCO3: 22    / Base Excess: -.3   /  SaO2: 94      Meds: levalbuterol Inhalation 0.63 milliGRAM(s) Inhalation every 6 hours    CARDIOVASCULAR  HR: 95 (07-21-21 @ 00:00) (80 - 115)  BP: 107/64 (07-20-21 @ 23:00) (80/50 - 128/73)  BP(mean): 80 (07-20-21 @ 23:00) (59 - 95)  VBG - ( 20 Jul 2021 00:00 )  pH: 7.44  /  pCO2: 41    /  pO2: 30    / HCO3: 28    / Base Excess: 3.4   /  SaO2: 55     Lactate: 1.0    Exam: iregularly regular  Cardiac Rhythm: atrial fib/flutter eventually converts to sinus  Perfusion     [x]Adequate   [ ]Inadequate  Mentation   [x]Normal       [ ]Reduced  Extremities  [x]Warm         [ ]Cool  Volume Status [ ]Hypervolemic [x]Euvolemic [ ]Hypovolemic  Meds: digoxin  Tablet 125 MICROGram(s) Oral daily    GI/NUTRITION  Exam: soft, nontender, nondistended  Meds: pantoprazole    Tablet 40 milliGRAM(s) Oral before breakfast  polyethylene glycol 3350 17 Gram(s) Oral daily  senna 2 Tablet(s) Oral at bedtime  simethicone 80 milliGRAM(s) Chew every 6 hours PRN Gas    GENITOURINARY  I&O's Detail  07-19 @ 07:01  -  07-20 @ 07:00  --------------------------------------------------------  IN:    IV PiggyBack: 200 mL    IV PiggyBack: 250 mL    IV PiggyBack: 1000 mL    Oral Fluid: 1480 mL    sodium chloride 0.9%: 250 mL  Total IN: 3180 mL    OUT:    Voided (mL): 3650 mL  Total OUT: 3650 mL    Total NET: -470 mL    07-20 @ 07:01  -  07-21 @ 01:03  --------------------------------------------------------  IN:    IV PiggyBack: 500 mL    IV PiggyBack: 200 mL    Lactated Ringers Bolus: 500 mL    Oral Fluid: 670 mL  Total IN: 1870 mL    OUT:    Voided (mL): 1275 mL  Total OUT: 1275 mL    Total NET: 595 mL    Labs: 07-21    130<L>  |  94<L>  |  10  ----------------------------<  140<H>  3.8   |  23  |  1.19    Ca    8.5      21 Jul 2021 00:15  Phos  3.0     07-21  Mg     2.0     07-21    TPro  5.8<L>  /  Alb  2.3<L>  /  TBili  0.7  /  DBili  x   /  AST  38  /  ALT  33  /  AlkPhos  75  07-19    [x] Quispe catheter  Meds:     HEMATOLOGIC  Meds: aspirin enteric coated 81 milliGRAM(s) Oral daily  enoxaparin Injectable 40 milliGRAM(s) SubCutaneous every 12 hours    [x] VTE Prophylaxis:   Labs:                         11.1   8.33  )-----------( 193      ( 21 Jul 2021 00:15 )             35.3     INFECTIOUS DISEASES  WBC Count: 8.33 K/uL (07-21 @ 00:15)    Recent Cultures:  RECENT CULTURES:  Specimen Source: .Urine Clean Catch (Midstream)  Date/Time: 07-18 @ 06:16  Culture Results:   >100,000 CFU/ml Gram Negative Rods  Gram Stain: --  Organism: --  Specimen Source: .Blood Blood-Peripheral  Date/Time: 07-17 @ 15:01  Culture Results:   Growth in anaerobic bottle: Gram Positive Cocci in Clusters  Growth in aerobic bottle: Gram Positive Cocci in Clusters  **BCID performed. No targets detected.**  ***Blood Panel PCR results on this specimen are available  approximately 3 hours afterthe Gram stain result.***  Gram stain, PCR, and/or culture results may not always  correspond due to difference in methodologies.  ************************************************************  This PCR assay was performed by multiplex PCR. This  Assay tests for 66 bacterial and resistance gene targets.  Please refer to the [facility] test directory  at [email or web address] for details.  Gram Stain:   Growth in anaerobic bottle: Gram Positive Cocci in Clusters  Growth in aerobic bottle: Gram Positive Cocci in Clusters  Organism: --    Meds: piperacillin/tazobactam IVPB.. 3.375 Gram(s) IV Intermittent every 8 hours  vancomycin  IVPB 1500 milliGRAM(s) IV Intermittent every 12 hours    ENDOCRINE  CAPILLARY BLOOD GLUCOSE: 140   Meds: atorvastatin 40 milliGRAM(s) Oral at bedtime    ACCESS DEVICES:  [x] Peripheral IV  [ ] Central Venous Line	[ ] R	[ ] L	[ ] IJ	[ ] Fem	[ ] SC	Placed:   [ ] Arterial Line		[ ] R	[ ] L	[ ] Fem	[ ] Rad	[ ] Ax	Placed:   [ ] PICC:					[ ] Mediport  [ ] Urinary Catheter, Date Placed:   [x] Necessity of urinary, arterial, and venous catheters discussed    OTHER MEDICATIONS:  chlorhexidine 2% Cloths 1 Application(s) Topical <User Schedule>  nicotine -   7 mG/24Hr(s) Patch 1 patch Transdermal daily   HPI:  56y Male w/ a PMHx of LBO secondary to diverticulitis s/p John's (Mar 2019) w/ reversal (Jun 2019), CAD s/p MAINOR x2 (Jul 2020), HTN, HLD, DAMION, morbid obesity, and a large ventral hernia who presented on 7/12 with abdominal pain and was found to have an SBO w/ a transition point in the ventral hernia. Patient was medically managed and his SBO resolved w/ removal of the NGT on 7/15. Botox was injected into the abdominal muscle on 7/16 in preparation for a ventral hernia repair. On 7/17, patient became febrile w/ workup concerning for LLL atelectasis vs. PNA and a positive UA. Shortly afterwards, patient became hypotension and was found to be in atrial flutter. Patient was given fluid w/ no improvement in his BP so he was admitted to SICU for further management. Patient currently denies headache, dizziness, weakness, fevers, chills, shortness of breath, chest pain, abdominal pain, or nausea/vomiting.    24 HOUR EVENTS:  - hypotensive to 80s systolic, trops elevated 21 -> 47 (trending), given 500ml LR bolus  - EKG sinus  - continues to be febrile, UCx growing GNR, BCx with GPC, repeat BCx sent  - MRSA swab sent  - nocturnal CPAP ordered for DAMION, pt refused  - chest PT for LLL PNA  - started on bowel regimen    SUBJECTIVE/ROS:  Patient seen at bedside this AM. Reports feeling well, without complaints. Pain is well-controlled. Denies headache, shortness of breath, chest pain, dizziness.     PHYSICAL EXAM:  NEURO  Exam: awake, alert, oriented x4  Meds: acetaminophen   Tablet .. 975 milliGRAM(s) Oral every 6 hours PRN Mild Pain (1 - 3)  melatonin 5 milliGRAM(s) Oral at bedtime  sertraline 50 milliGRAM(s) Oral daily  [x] Adequacy of sedation and pain control has been assessed and adjusted    RESPIRATORY  RR: 18 (07-20-21 @ 23:00) (18 - 33)  SpO2: 96% (07-21-21 @ 00:00) (89% - 98%)  Exam: decreased breath sounds at the left base  Labs: ABG - ( 19 Jul 2021 13:22 )  pH: 7.47  /  pCO2: 31    /  pO2: 69    / HCO3: 22    / Base Excess: -.3   /  SaO2: 94      Meds: levalbuterol Inhalation 0.63 milliGRAM(s) Inhalation every 6 hours    CARDIOVASCULAR  HR: 95 (07-21-21 @ 00:00) (80 - 115)  BP: 107/64 (07-20-21 @ 23:00) (80/50 - 128/73)  BP(mean): 80 (07-20-21 @ 23:00) (59 - 95)  VBG - ( 20 Jul 2021 00:00 )  pH: 7.44  /  pCO2: 41    /  pO2: 30    / HCO3: 28    / Base Excess: 3.4   /  SaO2: 55     Lactate: 1.0    Exam: iregularly regular  Cardiac Rhythm: atrial fib/flutter eventually converts to sinus  Perfusion     [x]Adequate   [ ]Inadequate  Mentation   [x]Normal       [ ]Reduced  Extremities  [x]Warm         [ ]Cool  Volume Status [ ]Hypervolemic [x]Euvolemic [ ]Hypovolemic  Meds: digoxin  Tablet 125 MICROGram(s) Oral daily    GI/NUTRITION  Exam: soft, nontender, nondistended  Meds: pantoprazole    Tablet 40 milliGRAM(s) Oral before breakfast  polyethylene glycol 3350 17 Gram(s) Oral daily  senna 2 Tablet(s) Oral at bedtime  simethicone 80 milliGRAM(s) Chew every 6 hours PRN Gas    GENITOURINARY  I&O's Detail  07-19 @ 07:01  -  07-20 @ 07:00  --------------------------------------------------------  IN:    IV PiggyBack: 200 mL    IV PiggyBack: 250 mL    IV PiggyBack: 1000 mL    Oral Fluid: 1480 mL    sodium chloride 0.9%: 250 mL  Total IN: 3180 mL    OUT:    Voided (mL): 3650 mL  Total OUT: 3650 mL    Total NET: -470 mL    07-20 @ 07:01  -  07-21 @ 01:03  --------------------------------------------------------  IN:    IV PiggyBack: 500 mL    IV PiggyBack: 200 mL    Lactated Ringers Bolus: 500 mL    Oral Fluid: 670 mL  Total IN: 1870 mL    OUT:    Voided (mL): 1275 mL  Total OUT: 1275 mL    Total NET: 595 mL    Labs: 07-21    130<L>  |  94<L>  |  10  ----------------------------<  140<H>  3.8   |  23  |  1.19    Ca    8.5      21 Jul 2021 00:15  Phos  3.0     07-21  Mg     2.0     07-21    TPro  5.8<L>  /  Alb  2.3<L>  /  TBili  0.7  /  DBili  x   /  AST  38  /  ALT  33  /  AlkPhos  75  07-19    [x] Quispe catheter  Meds:     HEMATOLOGIC  Meds: aspirin enteric coated 81 milliGRAM(s) Oral daily  enoxaparin Injectable 40 milliGRAM(s) SubCutaneous every 12 hours    [x] VTE Prophylaxis:   Labs:                         11.1   8.33  )-----------( 193      ( 21 Jul 2021 00:15 )             35.3     INFECTIOUS DISEASES  WBC Count: 8.33 K/uL (07-21 @ 00:15)    Recent Cultures:  RECENT CULTURES:  Specimen Source: .Urine Clean Catch (Midstream)  Date/Time: 07-18 @ 06:16  Culture Results:   >100,000 CFU/ml Gram Negative Rods  Gram Stain: --  Organism: --  Specimen Source: .Blood Blood-Peripheral  Date/Time: 07-17 @ 15:01  Culture Results:   Growth in anaerobic bottle: Gram Positive Cocci in Clusters  Growth in aerobic bottle: Gram Positive Cocci in Clusters  **BCID performed. No targets detected.**  ***Blood Panel PCR results on this specimen are available  approximately 3 hours afterthe Gram stain result.***  Gram stain, PCR, and/or culture results may not always  correspond due to difference in methodologies.  ************************************************************  This PCR assay was performed by multiplex PCR. This  Assay tests for 66 bacterial and resistance gene targets.  Please refer to the [facility] test directory  at [email or web address] for details.  Gram Stain:   Growth in anaerobic bottle: Gram Positive Cocci in Clusters  Growth in aerobic bottle: Gram Positive Cocci in Clusters  Organism: --    Meds: piperacillin/tazobactam IVPB.. 3.375 Gram(s) IV Intermittent every 8 hours  vancomycin  IVPB 1500 milliGRAM(s) IV Intermittent every 12 hours    ENDOCRINE  CAPILLARY BLOOD GLUCOSE: 140   Meds: atorvastatin 40 milliGRAM(s) Oral at bedtime    ACCESS DEVICES:  [x] Peripheral IV  [ ] Central Venous Line	[ ] R	[ ] L	[ ] IJ	[ ] Fem	[ ] SC	Placed:   [ ] Arterial Line		[ ] R	[ ] L	[ ] Fem	[ ] Rad	[ ] Ax	Placed:   [ ] PICC:					[ ] Mediport  [ ] Urinary Catheter, Date Placed:   [x] Necessity of urinary, arterial, and venous catheters discussed    OTHER MEDICATIONS:  chlorhexidine 2% Cloths 1 Application(s) Topical <User Schedule>  nicotine -   7 mG/24Hr(s) Patch 1 patch Transdermal daily

## 2021-07-21 NOTE — OCCUPATIONAL THERAPY INITIAL EVALUATION ADULT - DIAGNOSIS, OT EVAL
Pt presents with no physical or cognitive deficits impacting ability to perform ADL/mobility at this time.

## 2021-07-21 NOTE — PROGRESS NOTE ADULT - SUBJECTIVE AND OBJECTIVE BOX
Surgery Progress Note     Subjective/24hour Events:   Patient seen and examined.   No acute events overnight.   Pain controlled.     Vital Signs:  Vital Signs Last 24 Hrs  T(C): 36.8 (20 Jul 2021 23:00), Max: 38.4 (20 Jul 2021 07:00)  T(F): 98.2 (20 Jul 2021 23:00), Max: 101.1 (20 Jul 2021 07:00)  HR: 95 (21 Jul 2021 00:00) (80 - 115)  BP: 107/64 (20 Jul 2021 23:00) (80/50 - 128/73)  BP(mean): 80 (20 Jul 2021 23:00) (59 - 95)  RR: 18 (20 Jul 2021 23:00) (18 - 33)  SpO2: 96% (21 Jul 2021 00:00) (89% - 98%)    CAPILLARY BLOOD GLUCOSE          I&O's Detail    19 Jul 2021 07:01  -  20 Jul 2021 07:00  --------------------------------------------------------  IN:    IV PiggyBack: 200 mL    IV PiggyBack: 250 mL    IV PiggyBack: 1000 mL    Oral Fluid: 1480 mL    sodium chloride 0.9%: 250 mL  Total IN: 3180 mL    OUT:    Voided (mL): 3650 mL  Total OUT: 3650 mL    Total NET: -470 mL      20 Jul 2021 07:01  -  21 Jul 2021 01:54  --------------------------------------------------------  IN:    IV PiggyBack: 500 mL    IV PiggyBack: 225 mL    Lactated Ringers Bolus: 500 mL    Oral Fluid: 670 mL  Total IN: 1895 mL    OUT:    Voided (mL): 1275 mL  Total OUT: 1275 mL    Total NET: 620 mL          MEDICATIONS  (STANDING):  aspirin enteric coated 81 milliGRAM(s) Oral daily  atorvastatin 40 milliGRAM(s) Oral at bedtime  chlorhexidine 2% Cloths 1 Application(s) Topical <User Schedule>  digoxin     Tablet 125 MICROGram(s) Oral daily  enoxaparin Injectable 40 milliGRAM(s) SubCutaneous every 12 hours  levalbuterol Inhalation 0.63 milliGRAM(s) Inhalation every 6 hours  melatonin 5 milliGRAM(s) Oral at bedtime  nicotine -   7 mG/24Hr(s) Patch 1 patch Transdermal daily  pantoprazole    Tablet 40 milliGRAM(s) Oral before breakfast  piperacillin/tazobactam IVPB.. 3.375 Gram(s) IV Intermittent every 8 hours  polyethylene glycol 3350 17 Gram(s) Oral daily  potassium chloride    Tablet ER 20 milliEquivalent(s) Oral once  senna 2 Tablet(s) Oral at bedtime  sertraline 50 milliGRAM(s) Oral daily  vancomycin  IVPB 1500 milliGRAM(s) IV Intermittent every 12 hours    MEDICATIONS  (PRN):  acetaminophen   Tablet .. 975 milliGRAM(s) Oral every 6 hours PRN Mild Pain (1 - 3)  simethicone 80 milliGRAM(s) Chew every 6 hours PRN Gas      Physical Exam:  Gen: NAD.  Lungs: Non labored breathing.   Ab: Soft, nontender, nondistended.   Ext: Moves all 4 spontaneously.     Labs:    07-21    130<L>  |  94<L>  |  10  ----------------------------<  140<H>  3.8   |  23  |  1.19    Ca    8.5      21 Jul 2021 00:15  Phos  3.0     07-21  Mg     2.0     07-21    TPro  5.8<L>  /  Alb  2.3<L>  /  TBili  0.7  /  DBili  x   /  AST  38  /  ALT  33  /  AlkPhos  75  07-19    LIVER FUNCTIONS - ( 19 Jul 2021 13:23 )  Alb: 2.3 g/dL / Pro: 5.8 g/dL / ALK PHOS: 75 U/L / ALT: 33 U/L / AST: 38 U/L / GGT: x                                 11.1   8.33  )-----------( 193      ( 21 Jul 2021 00:15 )             35.3          Surgery Progress Note     Subjective/24hour Events:   Patient seen and examined.   No acute events overnight.   Pain controlled.     Vital Signs Last 24 Hrs  T(C): 37.6 (2021 07:00), Max: 37.6 (2021 03:00)  T(F): 99.6 (2021 07:00), Max: 99.7 (2021 03:00)  HR: 98 (2021 08:00) (80 - 98)  BP: 130/74 (2021 08:00) (80/50 - 132/73)  BP(mean): 97 (2021 08:00) (59 - 97)  RR: 29 (2021 08:00) (18 - 30)  SpO2: 95% (2021 08:00) (90% - 98%)    Physical Exam:  General Appearance: Appears well, NAD  Respiratory: No labored breathing  CV: Pulse regularly present  Abdomen: Soft, nontender, ***incision c/d/i    LABS:                        11.1   8.33  )-----------( 193      ( 2021 00:15 )             35.3     07-21    130<L>  |  94<L>  |  10  ----------------------------<  140<H>  3.8   |  23  |  1.19    Ca    8.5      2021 00:15  Phos  3.0     07-21  Mg     2.0     07-21    TPro  5.8<L>  /  Alb  2.3<L>  /  TBili  0.7  /  DBili  x   /  AST  38  /  ALT  33  /  AlkPhos  75  07-19      Urinalysis Basic - ( 2021 05:03 )    Color: Yellow / Appearance: Clear / S.028 / pH: x  Gluc: x / Ketone: Negative  / Bili: Negative / Urobili: 4 mg/dL   Blood: x / Protein: 30 mg/dL / Nitrite: Negative   Leuk Esterase: Negative / RBC: 8 /hpf / WBC 12 /HPF   Sq Epi: x / Non Sq Epi: 5 /hpf / Bacteria: Negative        INs and OUTs:    21 @ 07:01  -  21 @ 07:00  --------------------------------------------------------  IN: 2950 mL / OUT: 1775 mL / NET: 1175 mL     Surgery Progress Note     Subjective/24hour Events:   Patient seen and examined.   No acute events overnight.   Pain controlled.     Vital Signs Last 24 Hrs  T(C): 37.6 (2021 07:00), Max: 37.6 (2021 03:00)  T(F): 99.6 (2021 07:00), Max: 99.7 (2021 03:00)  HR: 98 (2021 08:00) (80 - 98)  BP: 130/74 (2021 08:00) (80/50 - 132/73)  BP(mean): 97 (2021 08:00) (59 - 97)  RR: 29 (2021 08:00) (18 - 30)  SpO2: 95% (2021 08:00) (90% - 98%)    Physical Exam:  General: Alert, NAD  Cardio: Tachycardic  Resp: tachypneic   GI/Abd: Soft, large ventral hernia reducible, no focal tenderness at hernia, no guarding or rebound tenderness, well healed midline laparotomy scar  Ext: No edema, all 4 extremities moving spontaneously, no limitations    LABS:                        11.1   8.33  )-----------( 193      ( 2021 00:15 )             35.3     07-21    130<L>  |  94<L>  |  10  ----------------------------<  140<H>  3.8   |  23  |  1.19    Ca    8.5      2021 00:15  Phos  3.0     07-21  Mg     2.0     07-21    TPro  5.8<L>  /  Alb  2.3<L>  /  TBili  0.7  /  DBili  x   /  AST  38  /  ALT  33  /  AlkPhos  75  07-19      Urinalysis Basic - ( 2021 05:03 )    Color: Yellow / Appearance: Clear / S.028 / pH: x  Gluc: x / Ketone: Negative  / Bili: Negative / Urobili: 4 mg/dL   Blood: x / Protein: 30 mg/dL / Nitrite: Negative   Leuk Esterase: Negative / RBC: 8 /hpf / WBC 12 /HPF   Sq Epi: x / Non Sq Epi: 5 /hpf / Bacteria: Negative        INs and OUTs:    21 @ 07:01  -  21 @ 07:00  --------------------------------------------------------  IN: 2950 mL / OUT: 1775 mL / NET: 1175 mL

## 2021-07-21 NOTE — OCCUPATIONAL THERAPY INITIAL EVALUATION ADULT - NS ASR OT EQUIP NEEDS DISCH
Pt would benefit from adaptive equipment for LB dressing (sock aide, reacher). Educated pt on how to purchase independently.

## 2021-07-21 NOTE — OCCUPATIONAL THERAPY INITIAL EVALUATION ADULT - PERTINENT HX OF CURRENT PROBLEM, REHAB EVAL
56M with hx of HLD, CAD s/p MAINOR x 2 (July 2020) on DAPT, LBO 2/2 diverticulitis s/p John's procedure (March 2019) and reversal (June 2019) presenting with SBO 2/2 large ventral hernia and JANELL 2/2 volume depletion. SBO was managed conservatively. Abdominal CT 9/18 showed no SBO.  Transferred to the SICU on 7/18 for development of possible sepsis, tachycardia and atrial flutter. CTPE 7/19 negative.

## 2021-07-21 NOTE — PROGRESS NOTE ADULT - ASSESSMENT
55 y/o male w/ a PMHx of LBO secondary to diverticulitis s/p John's w/ reversal, CAD s/p MAINOR x2 (Jul 2020), HTN, HLD, DAMION, morbid obesity, and a large ventral hernia presenting w/ SBO w/ a transition point in the ventral hernia s/p medical management w/ hospital course c/b concern for sepsis secondary to PNA vs UTI and new onset atrial flutter    PLAN:  Neuro: no acute issues  - Pain control as needed with acetaminophen  - Home sertraline  - Melatonin PRN insomnia    Resp: DAMION, LLL PNA  - Out of bed to chair, ambulate as tolerated, and incentive spirometry to prevent atelectasis  - Levalbuterol for bronchodilatation in the setting of LLL consolidation concerning for PNA    CV: CAD, HTN, atrial flutter  - Monitor vital signs  - Digoxin for rate control of atrial flutter; HR still in 110s-120s  - Home ASA for CAD  - Echo 70-75%, no motion abnormalities  - Will reach out to cardiology again    GI: resolved SBO  - Low residue diet as tolerated  - Protonix for indigestion  - Bowel regimen    Renal: CKD stage II  - Monitor I&Os and electrolytes w/ repletions as necessary    Heme: no acute issues  - Lovenox for VTE prophylaxis    ID: LLL PNA  - Monitor WBC, temperature, and procalcitonin  - Empiric antibiotics with Zosyn and vancomycin for PNA  - Blood cultures from 7/17 w/ no growth to date and urine culture from 7/17 w/ GNRs    Endo: HLD  - Home atorvastatin for HLD    Code Status: Full code    Disposition: Will remain in SICU

## 2021-07-21 NOTE — CONSULT NOTE ADULT - ASSESSMENT
56M with hx of HLD, CAD s/p MAINOR x 2 (July 2020) on DAPT, LBO 2/2 diverticulitis s/p John's procedure (March 2019) and reversal (June 2019) presenting with abdominal pain for 3 days admitted w/ concern for mesenteric ischemia w/ course complicated by mesenteric ischemia. Course additionally complicated by Aflut.    Plan  -c/w dig 125mcg qd    FOREST Noble MD  Cardiology Fellow  Text or Call: [phone]  For all New Consults and Questions:  [email or web address]   Login: [email or web address] 56M with hx of HLD, CAD s/p MAINOR x 2 (July 2020) on DAPT, LBO 2/2 diverticulitis s/p John's procedure (March 2019) and reversal (June 2019) presenting with abdominal pain for 3 days admitted w/ mesenteric ischemia w/ course additionally complicated by Aflut.    Plan  -c/w dig 125mcg qd; currently rate-controlled; can start toprol 50mg qd if additional rate control needed  -would start hep gtt with goal to transition to NOAC; would clarify surgical plan in order to know when NOAC will be safe to stop  -if surgery planned outpatient, can consider LAY/DCCV post-surgery    FOREST Noble MD  Cardiology Fellow  Text or Call: [phone]  For all New Consults and Questions:  [email or web address]   Login: [email or web address]

## 2021-07-22 LAB
ANION GAP SERPL CALC-SCNC: 13 MMOL/L — SIGNIFICANT CHANGE UP (ref 5–17)
BUN SERPL-MCNC: 10 MG/DL — SIGNIFICANT CHANGE UP (ref 7–23)
CALCIUM SERPL-MCNC: 8.7 MG/DL — SIGNIFICANT CHANGE UP (ref 8.4–10.5)
CHLORIDE SERPL-SCNC: 100 MMOL/L — SIGNIFICANT CHANGE UP (ref 96–108)
CO2 SERPL-SCNC: 25 MMOL/L — SIGNIFICANT CHANGE UP (ref 22–31)
CREAT SERPL-MCNC: 1.18 MG/DL — SIGNIFICANT CHANGE UP (ref 0.5–1.3)
CULTURE RESULTS: SIGNIFICANT CHANGE UP
CULTURE RESULTS: SIGNIFICANT CHANGE UP
GLUCOSE SERPL-MCNC: 117 MG/DL — HIGH (ref 70–99)
HCT VFR BLD CALC: 35.9 % — LOW (ref 39–50)
HGB BLD-MCNC: 11.4 G/DL — LOW (ref 13–17)
MAGNESIUM SERPL-MCNC: 2.1 MG/DL — SIGNIFICANT CHANGE UP (ref 1.6–2.6)
MCHC RBC-ENTMCNC: 27 PG — SIGNIFICANT CHANGE UP (ref 27–34)
MCHC RBC-ENTMCNC: 31.8 GM/DL — LOW (ref 32–36)
MCV RBC AUTO: 85.1 FL — SIGNIFICANT CHANGE UP (ref 80–100)
NRBC # BLD: 0 /100 WBCS — SIGNIFICANT CHANGE UP (ref 0–0)
PHOSPHATE SERPL-MCNC: 3.3 MG/DL — SIGNIFICANT CHANGE UP (ref 2.5–4.5)
PLATELET # BLD AUTO: 245 K/UL — SIGNIFICANT CHANGE UP (ref 150–400)
POTASSIUM SERPL-MCNC: 3.7 MMOL/L — SIGNIFICANT CHANGE UP (ref 3.5–5.3)
POTASSIUM SERPL-SCNC: 3.7 MMOL/L — SIGNIFICANT CHANGE UP (ref 3.5–5.3)
RBC # BLD: 4.22 M/UL — SIGNIFICANT CHANGE UP (ref 4.2–5.8)
RBC # FLD: 15.4 % — HIGH (ref 10.3–14.5)
SODIUM SERPL-SCNC: 138 MMOL/L — SIGNIFICANT CHANGE UP (ref 135–145)
SPECIMEN SOURCE: SIGNIFICANT CHANGE UP
SPECIMEN SOURCE: SIGNIFICANT CHANGE UP
VANCOMYCIN TROUGH SERPL-MCNC: 14.4 UG/ML — SIGNIFICANT CHANGE UP (ref 10–20)
WBC # BLD: 7.22 K/UL — SIGNIFICANT CHANGE UP (ref 3.8–10.5)
WBC # FLD AUTO: 7.22 K/UL — SIGNIFICANT CHANGE UP (ref 3.8–10.5)

## 2021-07-22 PROCEDURE — 99233 SBSQ HOSP IP/OBS HIGH 50: CPT

## 2021-07-22 PROCEDURE — 93970 EXTREMITY STUDY: CPT | Mod: 26

## 2021-07-22 PROCEDURE — 71045 X-RAY EXAM CHEST 1 VIEW: CPT | Mod: 26

## 2021-07-22 PROCEDURE — 99254 IP/OBS CNSLTJ NEW/EST MOD 60: CPT

## 2021-07-22 RX ORDER — VANCOMYCIN HCL 1 G
1500 VIAL (EA) INTRAVENOUS EVERY 12 HOURS
Refills: 0 | Status: DISCONTINUED | OUTPATIENT
Start: 2021-07-22 | End: 2021-07-24

## 2021-07-22 RX ORDER — METOPROLOL TARTRATE 50 MG
12.5 TABLET ORAL EVERY 12 HOURS
Refills: 0 | Status: DISCONTINUED | OUTPATIENT
Start: 2021-07-22 | End: 2021-07-24

## 2021-07-22 RX ORDER — POTASSIUM CHLORIDE 20 MEQ
40 PACKET (EA) ORAL ONCE
Refills: 0 | Status: COMPLETED | OUTPATIENT
Start: 2021-07-22 | End: 2021-07-22

## 2021-07-22 RX ORDER — CEFEPIME 1 G/1
2000 INJECTION, POWDER, FOR SOLUTION INTRAMUSCULAR; INTRAVENOUS EVERY 8 HOURS
Refills: 0 | Status: DISCONTINUED | OUTPATIENT
Start: 2021-07-22 | End: 2021-07-24

## 2021-07-22 RX ORDER — PIPERACILLIN AND TAZOBACTAM 4; .5 G/20ML; G/20ML
3.38 INJECTION, POWDER, LYOPHILIZED, FOR SOLUTION INTRAVENOUS EVERY 8 HOURS
Refills: 0 | Status: DISCONTINUED | OUTPATIENT
Start: 2021-07-22 | End: 2021-07-22

## 2021-07-22 RX ADMIN — PIPERACILLIN AND TAZOBACTAM 25 GRAM(S): 4; .5 INJECTION, POWDER, LYOPHILIZED, FOR SOLUTION INTRAVENOUS at 01:05

## 2021-07-22 RX ADMIN — PIPERACILLIN AND TAZOBACTAM 25 GRAM(S): 4; .5 INJECTION, POWDER, LYOPHILIZED, FOR SOLUTION INTRAVENOUS at 11:15

## 2021-07-22 RX ADMIN — Medication 1 PATCH: at 21:21

## 2021-07-22 RX ADMIN — Medication 125 MICROGRAM(S): at 05:15

## 2021-07-22 RX ADMIN — CEFEPIME 100 MILLIGRAM(S): 1 INJECTION, POWDER, FOR SOLUTION INTRAMUSCULAR; INTRAVENOUS at 21:17

## 2021-07-22 RX ADMIN — Medication 975 MILLIGRAM(S): at 11:27

## 2021-07-22 RX ADMIN — LEVALBUTEROL 0.63 MILLIGRAM(S): 1.25 SOLUTION, CONCENTRATE RESPIRATORY (INHALATION) at 18:16

## 2021-07-22 RX ADMIN — Medication 975 MILLIGRAM(S): at 12:00

## 2021-07-22 RX ADMIN — Medication 1 PATCH: at 12:00

## 2021-07-22 RX ADMIN — Medication 12.5 MILLIGRAM(S): at 05:15

## 2021-07-22 RX ADMIN — Medication 1 PATCH: at 12:13

## 2021-07-22 RX ADMIN — CHLORHEXIDINE GLUCONATE 1 APPLICATION(S): 213 SOLUTION TOPICAL at 05:16

## 2021-07-22 RX ADMIN — Medication 40 MILLIEQUIVALENT(S): at 06:09

## 2021-07-22 RX ADMIN — APIXABAN 5 MILLIGRAM(S): 2.5 TABLET, FILM COATED ORAL at 18:16

## 2021-07-22 RX ADMIN — Medication 81 MILLIGRAM(S): at 11:20

## 2021-07-22 RX ADMIN — SERTRALINE 50 MILLIGRAM(S): 25 TABLET, FILM COATED ORAL at 11:20

## 2021-07-22 RX ADMIN — ATORVASTATIN CALCIUM 40 MILLIGRAM(S): 80 TABLET, FILM COATED ORAL at 21:18

## 2021-07-22 RX ADMIN — PANTOPRAZOLE SODIUM 40 MILLIGRAM(S): 20 TABLET, DELAYED RELEASE ORAL at 08:06

## 2021-07-22 RX ADMIN — Medication 5 MILLIGRAM(S): at 21:17

## 2021-07-22 RX ADMIN — LEVALBUTEROL 0.63 MILLIGRAM(S): 1.25 SOLUTION, CONCENTRATE RESPIRATORY (INHALATION) at 12:50

## 2021-07-22 RX ADMIN — Medication 1 PATCH: at 08:06

## 2021-07-22 RX ADMIN — Medication 300 MILLIGRAM(S): at 05:16

## 2021-07-22 RX ADMIN — LEVALBUTEROL 0.63 MILLIGRAM(S): 1.25 SOLUTION, CONCENTRATE RESPIRATORY (INHALATION) at 05:14

## 2021-07-22 RX ADMIN — Medication 12.5 MILLIGRAM(S): at 18:45

## 2021-07-22 RX ADMIN — APIXABAN 5 MILLIGRAM(S): 2.5 TABLET, FILM COATED ORAL at 05:16

## 2021-07-22 RX ADMIN — Medication 300 MILLIGRAM(S): at 18:45

## 2021-07-22 NOTE — CHART NOTE - NSCHARTNOTEFT_GEN_A_CORE
TRANSFER NOTE     SUBJECTIVE:   Seen and examined upon transfer from the SICU. No acute events in the interim time. Patient reports no complaints. He expresses his wishes to go home as soon as possible. Afebrile, vitals stable, UOP adequate. Tolerating a regular diet. Passing flatus and having bowel movements. Duplex negative for a DVT. CXR still shows unchanged Left lobar PNA. MRSA positive.     OBJECTIVE: T(C): 36.4 (07-22-21 @ 16:58), Max: 36.9 (07-22-21 @ 07:00)  HR: 71 (07-22-21 @ 16:58) (65 - 92)  BP: 127/84 (07-22-21 @ 16:58) (99/55 - 147/80)  RR: 20 (07-22-21 @ 16:58) (19 - 42)  SpO2: 96% (07-22-21 @ 16:58) (92% - 98%)  Wt(kg): --  I&O's Summary    21 Jul 2021 07:01  -  22 Jul 2021 07:00  --------------------------------------------------------  IN: 2875 mL / OUT: 4510 mL / NET: -1635 mL    22 Jul 2021 07:01  -  22 Jul 2021 20:26  --------------------------------------------------------  IN: 1500 mL / OUT: 2250 mL / NET: -750 mL      I&O's Detail    21 Jul 2021 07:01  -  22 Jul 2021 07:00  --------------------------------------------------------  IN:    IV PiggyBack: 300 mL    IV PiggyBack: 1000 mL    Oral Fluid: 150 mL    sodium chloride 0.9%: 1425 mL  Total IN: 2875 mL    OUT:    Voided (mL): 4510 mL  Total OUT: 4510 mL    Total NET: -1635 mL      22 Jul 2021 07:01  -  22 Jul 2021 20:26  --------------------------------------------------------  IN:    IV PiggyBack: 500 mL    Oral Fluid: 1000 mL  Total IN: 1500 mL    OUT:    Voided (mL): 2250 mL  Total OUT: 2250 mL    Total NET: -750 mL        Physical Exam:  General: Alert, NAD  Cardio: Regular rate  Resp: nonlabored, on 2L NC  GI/Abd: Soft, large ventral hernia reducible, no focal tenderness at hernia, no guarding or rebound tenderness, well healed midline laparotomy scar  Ext: No edema, all 4 extremities moving spontaneously, no limitations    MEDICATIONS  (STANDING):  apixaban 5 milliGRAM(s) Oral every 12 hours  aspirin enteric coated 81 milliGRAM(s) Oral daily  atorvastatin 40 milliGRAM(s) Oral at bedtime  cefepime   IVPB 2000 milliGRAM(s) IV Intermittent every 8 hours  chlorhexidine 2% Cloths 1 Application(s) Topical <User Schedule>  digoxin     Tablet 125 MICROGram(s) Oral daily  levalbuterol Inhalation 0.63 milliGRAM(s) Inhalation every 6 hours  melatonin 5 milliGRAM(s) Oral at bedtime  metoprolol tartrate 12.5 milliGRAM(s) Oral every 12 hours  nicotine -   7 mG/24Hr(s) Patch 1 patch Transdermal daily  pantoprazole    Tablet 40 milliGRAM(s) Oral before breakfast  polyethylene glycol 3350 17 Gram(s) Oral daily  senna 2 Tablet(s) Oral at bedtime  sertraline 50 milliGRAM(s) Oral daily  vancomycin  IVPB 1500 milliGRAM(s) IV Intermittent every 12 hours    MEDICATIONS  (PRN):  acetaminophen   Tablet .. 975 milliGRAM(s) Oral every 6 hours PRN Mild Pain (1 - 3)  simethicone 80 milliGRAM(s) Chew every 6 hours PRN Gas      LABS:                        11.4   7.22  )-----------( 245      ( 22 Jul 2021 05:16 )             35.9     07-22    138  |  100  |  10  ----------------------------<  117<H>  3.7   |  25  |  1.18    Ca    8.7      22 Jul 2021 05:16  Phos  3.3     07-22  Mg     2.1     07-22            Assessment:   56M with hx of HLD, CAD s/p MAINOR x 2 (July 2020) on DAPT, LBO 2/2 diverticulitis s/p John's procedure (March 2019) and reversal (June 2019) presenting with SBO 2/2 large ventral hernia and JANELL 2/2 volume depletion. SBO was managed conservatively. Abdominal CT 9/18 showed no SBO.  Transferred to the SICU on 7/18 for development of possible sepsis, tachycardia and atrial flutter. CTPE 7/19 negative.  Started treatment Eliquis AC on 7/21. Transferred to the floor on 7/22.     Plan:  - Pain control  - Diet: LRD  - Continue Vancomycin and Cefepime x7d total per ID (no good po regimen for a +MRSA)  - Trend fevers, WBCs  - Continue Eliquis, ASA, and ok to stay off brilinta as pt is more than 1 yr post stents per Cards   - c/w digoxin and metoprolol   - Patient scheduled for surgery in August, would benefit from LAY/DCCV post-surgery  - FU am labs, replete prn      [facility]  [id number]

## 2021-07-22 NOTE — PROGRESS NOTE ADULT - SUBJECTIVE AND OBJECTIVE BOX
HISTORY:  57 y/o male w/ a PMHx of LBO secondary to diverticulitis s/p John's (Mar 2019) w/ reversal (Jun 2019), CAD s/p MAINOR x2 (Jul 2020), HTN, HLD, DAMION, morbid obesity, and a large ventral hernia who presented on 7/12 with abdominal pain and was found to have an SBO w/ a transition point in the ventral hernia. Patient was medically managed and his SBO resolved w/ removal of the NGT on 7/15. Botox was injected into the abdominal muscle on 7/16 in preparation for a ventral hernia repair. On 7/17, patient became febrile w/ workup concerning for LLL atelectasis vs. PNA and a positive UA. Shortly afterwards, patient became hypotensive and was found to be in atrial flutter. Patient was given fluid w/ no improvement in his BP so he was admitted to SICU for further management. Patient currently denies headache, dizziness, weakness, fevers, chills, shortness of breath, chest pain, abdominal pain, or nausea/vomiting.    24 HOUR EVENTS:  - Started on NS at 75 mL/hr due to concern for hypovolemic hyponatremia  - MRSA/MSSA PCR positive so kept on vancomycin  - Urine culture from 7/18 with pan-sensitive E. coli  - Blood cultures from 7/17 w/ GPC in clusters resembling Korcuria sepcies in one set of cultures but repeat cultures from 7/20 w/ no growth to date  - Cardiology consulted & following given patient's new onset atrial flutter  - Started on Eliquis for anticoagulation and metoprolol for rate control of patient's atrial flutter     HISTORY:  55 y/o male w/ a PMHx of LBO secondary to diverticulitis s/p John's (Mar 2019) w/ reversal (Jun 2019), CAD s/p MAINOR x2 (Jul 2020), HTN, HLD, DAMION, morbid obesity, and a large ventral hernia who presented on 7/12 with abdominal pain and was found to have an SBO w/ a transition point in the ventral hernia. Patient was medically managed and his SBO resolved w/ removal of the NGT on 7/15. Botox was injected into the abdominal muscle on 7/16 in preparation for a ventral hernia repair. On 7/17, patient became febrile w/ workup concerning for LLL atelectasis vs. PNA and a positive UA. Shortly afterwards, patient became hypotensive and was found to be in atrial flutter. Patient was given fluid w/ no improvement in his BP so he was admitted to SICU for further management. Patient currently denies headache, dizziness, weakness, fevers, chills, shortness of breath, chest pain, abdominal pain, or nausea/vomiting.    24 HOUR EVENTS:  - Started on NS at 75 mL/hr due to concern for hypovolemic hyponatremia w/ improvement in Na from 130 -> 138 overnight so IV locked  - MRSA/MSSA PCR positive so kept on vancomycin  - Urine culture from 7/18 with pan-sensitive E. coli  - Blood cultures from 7/17 w/ GPC in clusters resembling Korcuria sepcies in one set of cultures but repeat cultures from 7/20 w/ no growth to date  - Cardiology consulted & following given patient's new onset atrial flutter  - Started on Eliquis for anticoagulation and metoprolol for rate control of patient's atrial flutter  - Vancomycin trough 14.4    SUBJECTIVE/ROS: A ten-point review of systems was otherwise negative except as noted.    NEURO  Exam: awake, alert, oriented x4, no acute distress, no acute focal deficits  Meds:  - acetaminophen   Tablet .. 975 milliGRAM(s) Oral every 6 hours PRN Mild Pain (1 - 3)  - melatonin 5 milliGRAM(s) Oral at bedtime  - sertraline 50 milliGRAM(s) Oral daily    RESPIRATORY  RR: 21 (07-22-21 @ 05:00) (17 - 39)  SpO2: 96% (07-22-21 @ 05:14) (91% - 96%)  Exam: clear to auscultation bilaterally  Meds: levalbuterol Inhalation 0.63 milliGRAM(s) Inhalation every 6 hours    CARDIOVASCULAR  HR: 85 (07-22-21 @ 05:14) (70 - 104)  BP: 137/82 (07-22-21 @ 05:00) (99/55 - 137/82)  BP(mean): 103 (07-22-21 @ 05:00) (65 - 103)  Exam: irregularly regular  Cardiac Rhythm: atrial flutter  Perfusion    [x]Adequate    [ ]Inadequate  Mentation   [x]Normal       [ ]Reduced  Extremities  [x]Warm         [ ]Cool  Volume Status [ ]Hypervolemic [x]Euvolemic [ ]Hypovolemic  Meds:  - digoxin     Tablet 125 MICROGram(s) Oral daily  - metoprolol tartrate 12.5 milliGRAM(s) Oral every 12 hours    GI/NUTRITION  Exam: soft, nontender, nondistended  Diet: regular  Meds:  - pantoprazole    Tablet 40 milliGRAM(s) Oral before breakfast  - polyethylene glycol 3350 17 Gram(s) Oral daily  - senna 2 Tablet(s) Oral at bedtime  - simethicone 80 milliGRAM(s) Chew every 6 hours PRN Gas    GENITOURINARY  I&O's Detail  07-21 @ 07:01  -  07-22 @ 06:01  --------------------------------------------------------  IN:    IV PiggyBack: 300 mL    IV PiggyBack: 1000 mL    Oral Fluid: 150 mL    sodium chloride 0.9%: 1425 mL  Total IN: 2875 mL    OUT:    Voided (mL): 3960 mL  Total OUT: 3960 mL    Total NET: -1085 mL    138  |  100  |  10  ----------------------------<  117<H>  3.7   |  25  |  1.18    Ca    8.7      22 Jul 2021 05:16  Phos  3.3  Mg     2.1    HEMATOLOGIC  Meds:  - apixaban 5 milliGRAM(s) Oral every 12 hours  - aspirin enteric coated 81 milliGRAM(s) Oral daily               11.4   7.22  )-----------( 245      ( 22 Jul 2021 05:16 )             35.9    INFECTIOUS DISEASES  T(C): 36.7 (07-22-21 @ 03:00), Max: 37.7 (07-21-21 @ 11:00)  WBC Count: 7.22 K/uL (07-22 @ 05:16)    Recent Cultures:  - Specimen Source: .Blood Blood-Peripheral, 07-20 @ 14:20  Results: No growth to date.  Gram Stain: --  Organism: --  - Specimen Source: .Blood Blood-Peripheral, 07-20 @ 14:17  Results: No growth to date.  Gram Stain: --  Organism: --  - Specimen Source: .Urine Clean Catch (Midstream), 07-18 @ 06:16  Results: >100,000 CFU/ml Escherichia coli  Gram Stain: --  Organism: Escherichia coli  - Specimen Source: .Blood Blood-Peripheral, 07-17 @ 15:01  Results: Growth in anaerobic bottle: Gram Positive Cocci in Clusters. Growth in aerobic bottle: Most closely resembling Kocuria species  Gram Stain: Growth in aerobic & anaerobic bottle: Gram Positive Cocci in Clusters  Organism: --    Meds:  - piperacillin/tazobactam IVPB.. 3.375 Gram(s) IV Intermittent every 8 hours  - vancomycin  IVPB 1500 milliGRAM(s) IV Intermittent every 12 hours    ENDOCRINE  Capillary Blood Glucose: 117 mg/dL  Meds: atorvastatin 40 milliGRAM(s) Oral at bedtime    ACCESS DEVICES:  [x] Peripheral IV  [ ] Central Venous Line	[ ] R	[ ] L	[ ] IJ	[ ] Fem	[ ] SC	Placed:   [ ] Arterial Line		[ ] R	[ ] L	[ ] Fem	[ ] Rad	[ ] Ax	Placed:   [ ] PICC:					[ ] Mediport  [ ] Urinary Catheter, Date Placed:   [x] Necessity of urinary, arterial, and venous catheters discussed    OTHER MEDICATIONS:  - chlorhexidine 2% Cloths 1 Application(s) Topical <User Schedule>  - nicotine -   7 mG/24Hr(s) Patch 1 patch Transdermal daily    IMAGING:

## 2021-07-22 NOTE — PROGRESS NOTE ADULT - ASSESSMENT
57 y/o male w/ a PMHx of LBO secondary to diverticulitis s/p John's w/ reversal, CAD s/p MAINOR x2 (Jul 2020), HTN, HLD, DAMION, morbid obesity, and a large ventral hernia presenting w/ SBO w/ a transition point in the ventral hernia s/p medical management w/ hospital course c/b concern for sepsis secondary to PNA vs UTI and new onset atrial flutter    PLAN:    Neuro: no acute issues  - Pain control as needed with acetaminophen  - Home sertraline  - Melatonin PRN insomnia    Resp: DAMION, LLL PNA  - Out of bed to chair, ambulate as tolerated, and incentive spirometry to prevent atelectasis  - Levalbuterol for bronchodilatation in the setting of LLL consolidation concerning for PNA    CV: CAD, HTN, atrial flutter  - Monitor vital signs  - Digoxin and metoprolol for rate control of atrial flutter; patient rate controlled w/ HR in the 80s  - Home ASA for CAD; will clarify w/ cardiology on whether his ticagrelor should be restarted now that patient is on anticoagulation  - TTE from 7/19 with a hyperdynamic LV w/ EF of ~70-75%  - May need LAY/DCCV as outpatient  - Appreciate cardiology recommendations    GI: resolved SBO  - Low residue diet as tolerated  - Protonix for indigestion    Renal: CKD stage II, hyponatremia  - Monitor I&Os and electrolytes w/ repletions as necessary  - NS at 75 mL/hr due to concern for hypovolemic hyponatremia    Heme: no acute issues  - Eliquis for anticoagulation in the setting of atrial flutter    ID: LLL PNA  - Monitor WBC, temperature, and procalcitonin  - Empiric antibiotics with Zosyn and vancomycin for PNA; MRSA/MSSA PCR positive  - Urine culture from 7/17 w/ pan-sensitive E. coli and blood cultures from 7/17 w/ GPC in clusters resembling Korcuria sepcies in one set of cultures but repeat cultures from 7/20 w/ no growth to date    Endo: HLD  - Home atorvastatin for HLD    Code Status: Full code    Disposition: Will remain in [facility]    Emelina Warner PA-C     [id number] 55 y/o male w/ a PMHx of LBO secondary to diverticulitis s/p John's w/ reversal, CAD s/p MAINOR x2 (Jul 2020), HTN, HLD, DAMION, morbid obesity, and a large ventral hernia presenting w/ SBO w/ a transition point in the ventral hernia s/p medical management w/ hospital course c/b concern for sepsis secondary to PNA vs UTI and new onset atrial flutter    PLAN:    Neuro: no acute issues  - Pain control as needed with acetaminophen  - Home sertraline  - Melatonin PRN insomnia    Resp: DAMION, LLL PNA  - Out of bed to chair, ambulate as tolerated, and incentive spirometry to prevent atelectasis  - Levalbuterol for bronchodilatation in the setting of LLL consolidation concerning for PNA    CV: CAD, HTN, atrial flutter  - Monitor vital signs  - Digoxin and metoprolol for rate control of atrial flutter; patient rate controlled w/ HR in the 80s  - Home ASA for CAD; will clarify w/ cardiology on whether his ticagrelor should be restarted now that patient is on anticoagulation  - TTE from 7/19 with a hyperdynamic LV w/ EF of ~70-75%  - May need LAY/DCCV as outpatient  - Appreciate cardiology recommendations    GI: resolved SBO  - Low residue diet as tolerated  - Protonix for indigestion    Renal: CKD stage II, hyponatremia (improved)  - Monitor I&Os and electrolytes w/ repletions as necessary    Heme: no acute issues  - Eliquis for anticoagulation in the setting of atrial flutter    ID: LLL PNA  - Monitor WBC, temperature, and procalcitonin  - Empiric antibiotics with Zosyn and vancomycin for PNA; MRSA/MSSA PCR positive  - Urine culture from 7/17 w/ pan-sensitive E. coli and blood cultures from 7/17 w/ GPC in clusters resembling Korcuria sepcies in one set of cultures but repeat cultures from 7/20 w/ no growth to date    Endo: HLD  - Home atorvastatin for HLD    Code Status: Full code    Disposition: Will remain in [facility]    Emelina Warner PA-C     [id number]

## 2021-07-22 NOTE — PROGRESS NOTE ADULT - SUBJECTIVE AND OBJECTIVE BOX
SUBJECTIVE:   Patient seen and examined.   No acute events overnight.   Pain controlled.    OBJECTIVE: T(C): 36.7 (21 @ 03:00), Max: 37.7 (21 @ 11:00)  HR: 84 (21 @ 03:00) (70 - 104)  BP: 114/74 (21 @ 03:00) (99/55 - 132/73)  RR: 29 (21 @ 03:00) (17 - 39)  SpO2: 93% (21 @ 03:00) (91% - 96%)  Wt(kg): --  I&O's Summary    2021 07:01  -  2021 07:00  --------------------------------------------------------  IN: 2950 mL / OUT: 1775 mL / NET: 1175 mL    2021 07:  -  2021 03:50  --------------------------------------------------------  IN: 2225 mL / OUT: 3310 mL / NET: -1085 mL      I&O's Detail    2021 07:01  -  2021 07:00  --------------------------------------------------------  IN:    IV PiggyBack: 1000 mL    IV PiggyBack: 300 mL    Lactated Ringers Bolus: 500 mL    Oral Fluid: 1150 mL  Total IN: 2950 mL    OUT:    Voided (mL): 1775 mL  Total OUT: 1775 mL    Total NET: 1175 mL      2021 07:  -  2021 03:50  --------------------------------------------------------  IN:    IV PiggyBack: 500 mL    IV PiggyBack: 300 mL    Oral Fluid: 150 mL    sodium chloride 0.9%: 1275 mL  Total IN: 2225 mL    OUT:    Voided (mL): 3310 mL  Total OUT: 3310 mL    Total NET: -1085 mL        Physical Exam:  General: Alert, NAD  Cardio: Tachycardic  Resp: tachypneic   GI/Abd: Soft, large ventral hernia reducible, no focal tenderness at hernia, no guarding or rebound tenderness, well healed midline laparotomy scar  Ext: No edema, all 4 extremities moving spontaneously, no limitations    MEDICATIONS  (STANDING):  apixaban 5 milliGRAM(s) Oral every 12 hours  aspirin enteric coated 81 milliGRAM(s) Oral daily  atorvastatin 40 milliGRAM(s) Oral at bedtime  chlorhexidine 2% Cloths 1 Application(s) Topical <User Schedule>  digoxin     Tablet 125 MICROGram(s) Oral daily  levalbuterol Inhalation 0.63 milliGRAM(s) Inhalation every 6 hours  melatonin 5 milliGRAM(s) Oral at bedtime  nicotine -   7 mG/24Hr(s) Patch 1 patch Transdermal daily  pantoprazole    Tablet 40 milliGRAM(s) Oral before breakfast  piperacillin/tazobactam IVPB.. 3.375 Gram(s) IV Intermittent every 8 hours  polyethylene glycol 3350 17 Gram(s) Oral daily  senna 2 Tablet(s) Oral at bedtime  sertraline 50 milliGRAM(s) Oral daily  sodium chloride 0.9%. 1000 milliLiter(s) (75 mL/Hr) IV Continuous <Continuous>  vancomycin  IVPB 1500 milliGRAM(s) IV Intermittent every 12 hours    MEDICATIONS  (PRN):  acetaminophen   Tablet .. 975 milliGRAM(s) Oral every 6 hours PRN Mild Pain (1 - 3)  simethicone 80 milliGRAM(s) Chew every 6 hours PRN Gas      LABS:                        11.1   8.33  )-----------( 193      ( 2021 00:15 )             35.3     07-21    130<L>  |  94<L>  |  10  ----------------------------<  140<H>  3.8   |  23  |  1.19    Ca    8.5      2021 00:15  Phos  3.0     07-21  Mg     2.0     07-21        Urinalysis Basic - ( 2021 05:03 )    Color: Yellow / Appearance: Clear / S.028 / pH: x  Gluc: x / Ketone: Negative  / Bili: Negative / Urobili: 4 mg/dL   Blood: x / Protein: 30 mg/dL / Nitrite: Negative   Leuk Esterase: Negative / RBC: 8 /hpf / WBC 12 /HPF   Sq Epi: x / Non Sq Epi: 5 /hpf / Bacteria: Negative        RADIOLOGY & ADDITIONAL STUDIES:    Assessment/Plan: 56yMale s/p      SUBJECTIVE:   Patient seen and examined.   No acute events overnight.   Pain controlled.  MRSA+    OBJECTIVE: T(C): 36.7 (21 @ 03:00), Max: 37.7 (21 @ 11:00)  HR: 84 (21 @ 03:00) (70 - 104)  BP: 114/74 (21 @ 03:00) (99/55 - 132/73)  RR: 29 (21 @ 03:00) (17 - 39)  SpO2: 93% (21 @ 03:00) (91% - 96%)  Wt(kg): --  I&O's Summary    2021 07:01  -  2021 07:00  --------------------------------------------------------  IN: 2950 mL / OUT: 1775 mL / NET: 1175 mL    2021 07:  -  2021 03:50  --------------------------------------------------------  IN: 2225 mL / OUT: 3310 mL / NET: -1085 mL      I&O's Detail    2021 07:01  -  2021 07:00  --------------------------------------------------------  IN:    IV PiggyBack: 1000 mL    IV PiggyBack: 300 mL    Lactated Ringers Bolus: 500 mL    Oral Fluid: 1150 mL  Total IN: 2950 mL    OUT:    Voided (mL): 1775 mL  Total OUT: 1775 mL    Total NET: 1175 mL      2021 07:  -  2021 03:50  --------------------------------------------------------  IN:    IV PiggyBack: 500 mL    IV PiggyBack: 300 mL    Oral Fluid: 150 mL    sodium chloride 0.9%: 1275 mL  Total IN: 2225 mL    OUT:    Voided (mL): 3310 mL  Total OUT: 3310 mL    Total NET: -1085 mL        Physical Exam:  General: Alert, NAD  Cardio: Regular rate  Resp: tachypneic   GI/Abd: Soft, large ventral hernia reducible, no focal tenderness at hernia, no guarding or rebound tenderness, well healed midline laparotomy scar  Ext: No edema, all 4 extremities moving spontaneously, no limitations    MEDICATIONS  (STANDING):  apixaban 5 milliGRAM(s) Oral every 12 hours  aspirin enteric coated 81 milliGRAM(s) Oral daily  atorvastatin 40 milliGRAM(s) Oral at bedtime  chlorhexidine 2% Cloths 1 Application(s) Topical <User Schedule>  digoxin     Tablet 125 MICROGram(s) Oral daily  levalbuterol Inhalation 0.63 milliGRAM(s) Inhalation every 6 hours  melatonin 5 milliGRAM(s) Oral at bedtime  nicotine -   7 mG/24Hr(s) Patch 1 patch Transdermal daily  pantoprazole    Tablet 40 milliGRAM(s) Oral before breakfast  piperacillin/tazobactam IVPB.. 3.375 Gram(s) IV Intermittent every 8 hours  polyethylene glycol 3350 17 Gram(s) Oral daily  senna 2 Tablet(s) Oral at bedtime  sertraline 50 milliGRAM(s) Oral daily  sodium chloride 0.9%. 1000 milliLiter(s) (75 mL/Hr) IV Continuous <Continuous>  vancomycin  IVPB 1500 milliGRAM(s) IV Intermittent every 12 hours    MEDICATIONS  (PRN):  acetaminophen   Tablet .. 975 milliGRAM(s) Oral every 6 hours PRN Mild Pain (1 - 3)  simethicone 80 milliGRAM(s) Chew every 6 hours PRN Gas      LABS:                        11.1   8.33  )-----------( 193      ( 2021 00:15 )             35.3     07-21    130<L>  |  94<L>  |  10  ----------------------------<  140<H>  3.8   |  23  |  1.19    Ca    8.5      2021 00:15  Phos  3.0     07-21  Mg     2.0     07-21        Urinalysis Basic - ( 2021 05:03 )    Color: Yellow / Appearance: Clear / S.028 / pH: x  Gluc: x / Ketone: Negative  / Bili: Negative / Urobili: 4 mg/dL   Blood: x / Protein: 30 mg/dL / Nitrite: Negative   Leuk Esterase: Negative / RBC: 8 /hpf / WBC 12 /HPF   Sq Epi: x / Non Sq Epi: 5 /hpf / Bacteria: Negative        RADIOLOGY & ADDITIONAL STUDIES:    Assessment/Plan: 56yMale s/p

## 2021-07-22 NOTE — PROGRESS NOTE ADULT - SUBJECTIVE AND OBJECTIVE BOX
INTERVAL EVENTS: No o/n events. Denies CP, dyspnea, palpitations, presyncope, syncope, f/c/n/v.     REVIEW OF SYSTEMS:  Constitutional:     [X] negative [ ] fevers [ ] chills [ ] weight loss [ ] weight gain  HEENT:                  [X] negative [ ] dry eyes [ ] eye irritation [ ] postnasal drip [ ] nasal congestion  CV:                         [X] negative  [ ] chest pain [ ] orthopnea [ ] palpitations [ ] murmur  Resp:                     [X] negative [ ] cough [ ] shortness of breath [ ] wheezing [ ] sputum [ ] hemoptysis  GI:                          [X] negative [ ] nausea [ ] vomiting [ ] diarrhea [ ] constipation [ ] abd pain [ ] dysphagia   :                        [X] negative [ ] dysuria [ ] nocturia [ ] hematuria [ ] increased urinary frequency  MSK:                      [X] negative [ ] back pain [ ] myalgias [ ] arthralgias [ ] fracture  Skin:                       [X] negative [ ] rash [ ] itch  Neuro:                   [X] negative [ ] headache [ ] dizziness [ ] syncope [ ] weakness [ ] numbness  Psych:                    [X] negative [ ] anxiety [ ] depression  Endo:                     [X] negative [ ] diabetes [ ] thyroid problem  Heme/Lymph:      [X] negative [ ] anemia [ ] bleeding problem  Allergic/Immune: [X] negative [ ] itchy eyes [ ] nasal discharge [ ] hives [ ] angioedema    [X] All other systems negative or otherwise described above.  [ ] Unable to assess ROS because ________.    PAST MEDICAL & SURGICAL HISTORY:  Sleep apnea    HLD (hyperlipidemia)    Morbid obesity    Diverticulitis    History of neuropathy  big toes    Colostomy present    No significant past surgical history    S/P repair of hydrocele      MEDICATIONS  (STANDING):  apixaban 5 milliGRAM(s) Oral every 12 hours  aspirin enteric coated 81 milliGRAM(s) Oral daily  atorvastatin 40 milliGRAM(s) Oral at bedtime  chlorhexidine 2% Cloths 1 Application(s) Topical <User Schedule>  digoxin     Tablet 125 MICROGram(s) Oral daily  levalbuterol Inhalation 0.63 milliGRAM(s) Inhalation every 6 hours  melatonin 5 milliGRAM(s) Oral at bedtime  metoprolol tartrate 12.5 milliGRAM(s) Oral every 12 hours  nicotine -   7 mG/24Hr(s) Patch 1 patch Transdermal daily  pantoprazole    Tablet 40 milliGRAM(s) Oral before breakfast  piperacillin/tazobactam IVPB.. 3.375 Gram(s) IV Intermittent every 8 hours  polyethylene glycol 3350 17 Gram(s) Oral daily  senna 2 Tablet(s) Oral at bedtime  sertraline 50 milliGRAM(s) Oral daily  vancomycin  IVPB 1500 milliGRAM(s) IV Intermittent every 12 hours    MEDICATIONS  (PRN):  acetaminophen   Tablet .. 975 milliGRAM(s) Oral every 6 hours PRN Mild Pain (1 - 3)  simethicone 80 milliGRAM(s) Chew every 6 hours PRN Gas    ICU Vital Signs Last 24 Hrs  T(C): 36.9 (2021 07:00), Max: 37.6 (2021 15:00)  T(F): 98.5 (2021 07:00), Max: 99.7 (2021 15:00)  HR: 83 (2021 09:49) (70 - 92)  BP: 124/73 (2021 09:00) (99/55 - 147/80)  BP(mean): 91 (2021 09:00) (65 - 111)  ABP: --  ABP(mean): --  RR: 26 (2021 09:00) (17 - 32)  SpO2: 95% (2021 09:49) (92% - 98%)    Daily     Daily Weight in k.7 (2021 00:59)    PHYSICAL EXAM:  GEN: Awake, alert. NAD.   HEENT: NCAT, PERRL, EOMI. Mucosa moist. No JVD.  RESP: CTA b/l  CV: RRR. Normal S1/S2. No m/r/g.  ABD: Soft. NT/ND. BS+  EXT: Warm. No edema, clubbing, or cyanosis.   NEURO: AAOx3. No focal deficits.     LABS:                        11.4   7.22  )-----------( 245      ( 2021 05:16 )             35.9     07-22    138  |  100  |  10  ----------------------------<  117<H>  3.7   |  25  |  1.18    Ca    8.7      2021 05:16  Phos  3.3       Mg     2.1                   I&O's Summary    2021 07:  -  2021 07:00  --------------------------------------------------------  IN: 2875 mL / OUT: 4510 mL / NET: -1635 mL    2021 07:01  -  2021 11:42  --------------------------------------------------------  IN: 500 mL / OUT: 550 mL / NET: -50 mL      BNP    RADIOLOGY & ADDITIONAL STUDIES:    TELEMETRY: reviewed    EKG: reviewed    < from: TTE with Doppler (w/Cont) (21 @ 18:37) >  ------------------------------------------------------------------------  Conclusions:  Endocardial visualizationenhanced with intravenous  injection of Ultrasonic Enhancing Agent (Definity).  Images are suboptimal despite the use of Definity.  Normal left ventricular internal dimensions and wall  thicknesses.  Hyperdynamic left ventricle. Probably no regional  abnormalities.  ------------------------------------------------------------------------  Confirmed on  2021 - 15:29:28 by Garrison Quintana M.D.  ------------------------------------------------------------------------    < end of copied text >      < from: Cardiac Cath Lab - Adult (20 @ 13:17) >  CORONARY VESSELS: The coronary circulation is right dominant.  LM:   --  LM: Normal.  LAD:   --  Distal LAD: Angiography showed mild atherosclerosis with no flow  limiting lesions.  CX:   --  Proximal circumflex: There was a 30 % stenosis.  --  OM1: There was a 20 % stenosis.  RCA:   --  Proximal RCA: There was a 99 % stenosis.  COMPLICATIONS: There were no complications.  DIAGNOSTIC RECOMMENDATIONS: Successful PCI to the RCA in setting of ACS.  Continue DAPT for one year in setting of ACS.  Continue aggressive medical management and risk factor modifcation.  INTERVENTIONAL RECOMMENDATIONS: Successful PCI to the RCA in setting of  ACS.  Continue DAPT for one year in setting of ACS.  Continue aggressive medical management and risk factor modifcation.  Prepared and signed by  Miles Luevano D.O.  Signed 2020 15:04:38    < end of copied text >

## 2021-07-22 NOTE — CONSULT NOTE ADULT - ASSESSMENT
56y M with Hx LBO s/p John's (03/2019) and reversal (06/2019), CAD (s/p drug-eluting stents x2 in 07/2020, on DAPT), and a large reducible ventral hernia who presented with abd pain for 3 days, found to have SBO with transition point at ventral hernia on CT scan on 7/12.   Pt medically managed  s/p resolution of SBO  s/p botox injection for umbilical hernia repair   But developed fevers and leucocytosis  LLL pna  Cx negative(other than urine as above though denies urinary symptoms)  MRSA PCR positive  clinically better -D#4 of abx  Being transferred out of SICU      Rec:  A) Pneumonia  MRSA PCR +  clinically stable  Continue vanco  Monitor creatinine and vanco trough  In view of risk for nephrotoxicity with vanco + zosyn combination would recommend change to cefepime  No abd process at present so does not need anaerobic coverage  Monitor clinically  If stable could limit to 7 day course      B) Bacteriuria  ? Colonization  denies sympytoms  any case on abx    C) SBO  resolved  will defer to surgery   Monitor umbilical hernia      Will tailor plan for ID issues  per course,results.Will defer to primary team on management of other issues.  Assessment, plan and recommendations as detailed above were discussed with the SICU   team.  Will Follow.  Beeper [phone] MARY [id number].   Wknd/afterhours/No response-[phone] or Fellow on call

## 2021-07-22 NOTE — PROGRESS NOTE ADULT - ASSESSMENT
56M with hx of HLD, CAD s/p MAINOR x 2 (July 2020) on DAPT, LBO 2/2 diverticulitis s/p John's procedure (March 2019) and reversal (June 2019) presenting with abdominal pain for 3 days admitted w/ mesenteric ischemia w/ course additionally complicated by Aflut.    Plan  -c/w dig 125mcg qd; currently rate-controlled and NSR; can start toprol 50mg qd if additional rate control needed  -would start hep gtt with goal to transition to NOAC; would clarify surgical plan in order to know when NOAC will be safe to stop  -if surgery planned outpatient, can consider LAY/DCCV post-surgery  -re: prior DAPT, patient OK to c/w ASA and d/c brilinta as pt 1yr post stents    FOREST Noble MD  Cardiology Fellow  Text or Call: [phone]  For all New Consults and Questions:  [email or web address]   Login: [email or web address]

## 2021-07-22 NOTE — PROGRESS NOTE ADULT - ASSESSMENT
Assessment:   56M with hx of HLD, CAD s/p MAINOR x 2 (July 2020) on DAPT, LBO 2/2 diverticulitis s/p John's procedure (March 2019) and reversal (June 2019) presenting with SBO 2/2 large ventral hernia and JANELL 2/2 volume depletion. SBO was managed conservatively. Abdominal CT 9/18 showed no SBO.  Transferred to the SICU on 7/18 for development of possible sepsis, tachycardia and atrial flutter. CTPE 7/19 negative.      Plan:  - pain control prn  - diet: LRD  - IVF  - Continue Vancomycin and Zosyn  - f/u duplex ultrasound of bilateral lower legs  - Trend fevers, WBCs  - DVT ppx   - appreciate excellent SICU care     [facility]  [id number]  Assessment:   56M with hx of HLD, CAD s/p MAINOR x 2 (July 2020) on DAPT, LBO 2/2 diverticulitis s/p John's procedure (March 2019) and reversal (June 2019) presenting with SBO 2/2 large ventral hernia and JANELL 2/2 volume depletion. SBO was managed conservatively. Abdominal CT 9/18 showed no SBO.  Transferred to the SICU on 7/18 for development of possible sepsis, tachycardia and atrial flutter. CTPE 7/19 negative.  Started treatment Eliquis AC on 7/21.    Plan:  - pain control prn  - diet: LRD  - IVF  - Continue Vancomycin and Zosyn  - f/u duplex ultrasound of bilateral lower legs  - Trend fevers, WBCs  - Eliquis treatment  - appreciate excellent SICU care     [facility]  [id number]  Assessment:   56M with hx of HLD, CAD s/p MAINOR x 2 (July 2020) on DAPT, LBO 2/2 diverticulitis s/p John's procedure (March 2019) and reversal (June 2019) presenting with SBO 2/2 large ventral hernia and JANELL 2/2 volume depletion. SBO was managed conservatively. Abdominal CT 9/18 showed no SBO.  Transferred to the SICU on 7/18 for development of possible sepsis, tachycardia and atrial flutter. CTPE 7/19 negative.  Started treatment Eliquis AC on 7/21.    Plan:  - Pain control  - Diet: LRD  - Continue Vancomycin and Zosyn  - F/u duplex ultrasound of bilateral lower legs  - Trend fevers, WBCs  - Continue Eliquis, ASA  - Will f/u with cardiology for recommendations about restarting Brilinta  - Patient scheduled for surgery in August  - [facility] SICU care     [facility]  [id number]

## 2021-07-22 NOTE — PROGRESS NOTE ADULT - NUTRITIONAL ASSESSMENT
This patient has been assessed with a concern for Malnutrition and has been determined to have a diagnosis/diagnoses of Morbid obesity (BMI > 40).    This patient is being managed with:   Diet Low Fiber-  Entered: Jul 17 2021  9:43AM    

## 2021-07-22 NOTE — CONSULT NOTE ADULT - SUBJECTIVE AND OBJECTIVE BOX
Patient is a 56y old  Male who presents with a chief complaint of SBO    Per chart: "56y M with Hx LBO s/p John's (2019) and reversal (2019), CAD (s/p drug-eluting stents x2 in 2020, on DAPT), and a large reducible ventral hernia who presented with abd pain for 3 days, found to have SBO with transition point at ventral hernia on CT scan on . His SBO resolved with medical management and his NGT was removed on 7/15. Botox was injected into the abdominal muscle on  in preparation for possible ventral hernia repair. On  AM, he became febrile. CXR demonstrated L sided atelectasis vs. pneumonia. UA was also (+) at that time. He was started on Ceftriaxone and pan-cultures were sent. In the evening on , he remained persistently febrile, and became hypotensive and tachycardic. SICU consulted for hemodynamic monitoring with concern for sepsis." (2021 08:02)    From HPI" HPI:  56M with hx of HLD, CAD s/p MAINOR x 2 (2020) on DAPT, LBO 2/2 diverticulitis s/p John's procedure (2019) and reversal (2019) presenting with abdominal pain for 3 days. The pain worsened in the last 1-2 days and he has had multiple episodes of emesis since yesterday. He states he is still passing gas and has been having diarrhea today. Has not had anything to eat or drink in 3 days. Denies fevers, chills, chest pain, SOB, dysuria. He says that he has had several episodes of pain like this since his surgery but it has always resolved at home.    He takes aspirin, Brilinta and Lipitor. Last colonoscopy was in . He is scheduled for a hernia repair with Dr. Stein on .    In the ED he received 2L crystalloid. NG tube was placed with 1.7 L out on insertion. He is hemodynamically normal with WBC 7, Hgb/Hct 17/53, Cr 2.8, lactate 2.9. CT without contrast shows SBO with transition point at the right central aspect of the ventral hernia with some bowel wall thickening and mesenteric edema. Hernia is reducible.    Cardiologist is Dr. Harsh Lopez. (2021 01:55)  "    Above verified-agree with above unless noted below.    Hospital Course :    found to have an SBO w/ a transition point in the ventral hernia. Patient was medically managed and his SBO resolved w/ removal of the NGT on 7/15. Botox was injected into the abdominal muscle on  in preparation for a ventral hernia repair. On , patient became febrile w/ workup concerning for LLL atelectasis vs. PNA and a positive UA. Shortly afterwards, patient became hypotensive and was found to be in atrial flutter. Patient was given fluid w/ no improvement in his BP so he was admitted to SICU for further management.   started on abx for LLL pna    ID consulted     PAST MEDICAL & SURGICAL HISTORY:  Sleep apnea    HLD (hyperlipidemia)    Morbid obesity    Diverticulitis    History of neuropathy  big toes    Colostomy present    S/P repair of hydrocele        Social history: +    Smoking,no     ETOH,      IVDU       FAMILY HISTORY:  Family history of lung cancer  mother  age 40, hx lung cancer    - Family history of medical problems in all first degree relatives reviewed.None of these were found to be related to patients current illness.    REVIEW OF SYSTEMS  General:	Denies any malaise fatigue or chills. Fevers absent    Skin:No rash  	  Ophthalmologic:Denies any visual complaints,discharge redness or photophobia  	  ENMT:No nasal discharge,headache,sinus congestion or throat pain.No dental complaints    Respiratory and Thorax:+ improved  cough,minimal sputum no  chest pain.Denies shortness of breath  	  Cardiovascular:	No chest pain,palpitaions or dizziness    Gastrointestinal:	NO nausea,abdominal pain resolved no  diarrhea.    Genitourinary:	No dysuria,frequency. No flank pain    Musculoskeletal:	No joint swelling or pain.No weakness    Neurological:No confusion,diziness.No extremity weakness.No bladder or bowel incontinence	    Psychiatric:No delusions or hallucinations	    Hematology/Lymphatics:	No LN swelling.No gum bleeding     Endocrine:	No recent weight gain or loss.No abnormal heat/cold intolerance    Allergic/Immunologic:	No hives or rash   Allergies    No Known Allergies    Intolerances        Antimicrobials:    cefepime   IVPB 2000 milliGRAM(s) IV Intermittent every 8 hours  vancomycin  IVPB 1500 milliGRAM(s) IV Intermittent every 12 hours      Prior Antimicrobials:  MEDICATIONS  (STANDING):    cefTRIAXone   IVPB   100 mL/Hr IV Intermittent (21 @ 21:30)    piperacillin/tazobactam IVPB. ->  Vanco -> present      Other medications reviewed  MEDICATIONS  (STANDING):  apixaban 5 milliGRAM(s) Oral every 12 hours  aspirin enteric coated 81 milliGRAM(s) Oral daily  atorvastatin 40 milliGRAM(s) Oral at bedtime  cefepime   IVPB 2000 milliGRAM(s) IV Intermittent every 8 hours  chlorhexidine 2% Cloths 1 Application(s) Topical <User Schedule>  digoxin     Tablet 125 MICROGram(s) Oral daily  levalbuterol Inhalation 0.63 milliGRAM(s) Inhalation every 6 hours  melatonin 5 milliGRAM(s) Oral at bedtime  metoprolol tartrate 12.5 milliGRAM(s) Oral every 12 hours  nicotine -   7 mG/24Hr(s) Patch 1 patch Transdermal daily  pantoprazole    Tablet 40 milliGRAM(s) Oral before breakfast  polyethylene glycol 3350 17 Gram(s) Oral daily  senna 2 Tablet(s) Oral at bedtime  sertraline 50 milliGRAM(s) Oral daily  vancomycin  IVPB 1500 milliGRAM(s) IV Intermittent every 12 hours        Vital Signs Last 24 Hrs  T(C): 36.9 (2021 11:00), Max: 36.9 (2021 07:00)  T(F): 98.4 (2021 11:00), Max: 98.5 (2021 07:00)  HR: 65 (2021 15:00) (65 - 92)  BP: 124/74 (2021 15:00) (99/55 - 147/80)  BP(mean): 94 (2021 15:00) (65 - 111)  RR: 19 (2021 15:00) (17 - 34)  SpO2: 92% (2021 15:00) (92% - 98%)    PHYSICAL EXAM:Pleasant patient in no acute distress.      Constitutional:Comfortable.Awake and alert  No cachexia     Eyes:PERRL EOMI.NO discharge or conjunctival injection    ENMT:No sinus tenderness.No thrush.No pharyngeal exudate or erythema.Fair dental hygiene    Neck:Supple,No LN,no JVD      Respiratory:Good air entry bilaterally,diminished Breath sounds bilaterally    Cardiovascular:S1 S2 wnl, No murmurs,rub or gallops    Gastrointestinal:Soft BS(+) Obese- umbilical hernia no tenderness no masses ,No rebound or guarding  healed laparotomy  incision    Genitourinary:No CVA tendereness       Extremities:No cyanosis,clubbing or edema.    Vascular:peripheral pulses felt    Neurological:AAO X 3,No grossly focal deficits            Musculoskeletal:No joint swelling or LOM              Labs:                            11.4   7.22  )-----------( 245      ( 2021 05:16 )             35.9       WBC Count: 7.22 (21 @ 05:16)  WBC Count: 8.33 (21 @ 00:15)  WBC Count: 11.30 (21 @ 00:22)  WBC Count: 14.65 (21 @ 21:59)  WBC Count: 16.82 (21 @ 07:13)  WBC Count: 12.94 (21 @ 09:27)  WBC Count: 7.95 (21 @ 07:21)        138  |  100  |  10  ----------------------------<  117<H>  3.7   |  25  |  1.18    Ca    8.7      2021 05:16  Phos  3.3       Mg     2.1     22              Culture - Blood (collected 2021 14:20)  Source: .Blood Blood-Peripheral  Preliminary Report (2021 15:06):    No growth to date.    Culture - Blood (collected 2021 14:17)  Source: .Blood Blood-Peripheral  Preliminary Report (2021 15:06):    No growth to date.    Culture - Urine (collected 2021 06:16)  Source: .Urine Clean Catch (Midstream)  Final Report (2021 12:16):    >100,000 CFU/ml Escherichia coli  Organism: Escherichia coli (2021 12:16)  Organism: Escherichia coli (2021 12:16)      -  Amikacin: S <=16      -  Amoxicillin/Clavulanic Acid: S <=8/4      -  Ampicillin: S <=8 These ampicillin results predict results for amoxicillin      -  Ampicillin/Sulbactam: S <=4/2 Enterobacter, Citrobacter, and Serratia may develop resistance during prolonged therapy (3-4 days)      -  Aztreonam: S <=4      -  Cefazolin: S <=2 (MIC_CL_COM_ENTERIC_CEFAZU) For uncomplicated UTI with K. pneumoniae, E. coli, or P. mirablis: GAETANO <=16 is sensitive and GAETANO >=32 is resistant. This also predicts results for oral agents cefaclor, cefdinir, cefpodoxime, cefprozil, cefuroxime axetil, cephalexin and locarbef for uncomplicated UTI. Note that some isolates may be susceptible to these agents while testing resistant to cefazolin.      -  Cefepime: S <=2      -  Cefoxitin: S <=8      -  Ceftriaxone: S <=1 Enterobacter, Citrobacter, and Serratia may develop resistance during prolonged therapy      -  Ciprofloxacin: S <=0.25      -  Ertapenem: S <=0.5      -  Gentamicin: S <=2      -  Imipenem: S <=1      -  Levofloxacin: S <=0.5      -  Meropenem: S <=1      -  Nitrofurantoin: S <=32 Should not be used to treat pyelonephritis      -  Piperacillin/Tazobactam: S <=8      -  Tigecycline: S <=2      -  Tobramycin: S <=2      -  Trimethoprim/Sulfamethoxazole: S <=0.5/9.5      Method Type: GAETANO        Vancomycin Level, Trough: 14.4 ug/mL (21 @ 05:16)    MRSA/MSSA PCR (21 @ 21:45)   MRSA PCR Result.: Detected:    < from: Xray Chest 1 View- PORTABLE-Routine (Xray Chest 1 View- PORTABLE-Routine in AM.) (21 @ 06:56) >  IMPRESSION:    The heart is normal in size. Left lower lobe pneumonia which remain unchanged when compared to previous study done 2021 at 6:52 AM. Small left pleural effusion. The right lung is clear.      < end of copied text >  < from: CT Angio Chest PE Protocol w/ IV Cont (21 @ 22:08) >    IMPRESSION: No pulmonary embolus is noted within the main, right and left main and lobar pulmonary arteries bilaterally. Evaluation of the segmental andsubsegmental pulmonary arteries bilaterally is limited.    Consolidation in the left lower lobe has increased when compared to previous exam.      < end of copied text >        < from: Xray Abdomen 1 View PORTABLE -Urgent (Xray Abdomen 1 View PORTABLE -Urgent .) (21 @ 09:11) >  IMPRESSION:  Nonspecific, nonobstructive bowel gas pattern.    < end of copied text >  < from: CT Abdomen and Pelvis w/ Oral Cont and w/ IV Cont (19 @ 19:21) >  IMPRESSION:     Status post Blandon's and left lower quadrant colostomy. Mild wall   thickening involving the colon proximal to the colostomy which may be   reactive although underlying infection is not excluded.     No drainable fluid collection or bowel obstruction.    Mild wall thickening involving the urinary bladder; correlate with   urinalysis.    Ill-defined hypoattenuating lesions in the liver are indeterminate.   Further evaluation with MRI with contrast can be performed.    < end of copied text >      Imaging studies(films) were independently reviewed.Findings as detailed in report above

## 2021-07-22 NOTE — PROGRESS NOTE ADULT - ATTENDING COMMENTS
Surgery is scheduled for 3rd week of August.  OK for full AC and baby ASA, however would be very concerned to add a 3rd agent to the regimen.  Stable clinically.

## 2021-07-22 NOTE — PROGRESS NOTE ADULT - TIME BILLING
Evaluation and management of hyponatremia, pneumonia and UTI.
N Multimodal pain management. Melatonin qhs.  P Sat >90 on NC. DAMION, CPAP overnight. CXR LLL pneumonia.   C Cardiology following, on metoprolol and digoxin. On ASA and statin. D/w cardiology regarding antiplatelets.  G Randal PO, passing flatus. Having BM.  R Na 138. Monitor. Cr 1.18. UOP 4L.  DVT Started on eliquis for new onset a flutter. SCDs.  H Monitor.  I Pneumonia and UTI. Continue Zosyn and vancomycin. Clinically improving.  E Monitor glycemia.

## 2021-07-22 NOTE — CHART NOTE - NSCHARTNOTEFT_GEN_A_CORE
Patient was started on metoprolol for atrial flutter as recommended per cardiololgy. Patient reported a remote history of cocaine use, about 1 month ago was last use. He was educated on the risks of using beta blockers, such as metoprolol, along with cocaine, including increased risk of myocardial infarction.    Quinten Mitchlel  PGY-2  SICU  Spectra [id number]

## 2021-07-23 LAB
ANION GAP SERPL CALC-SCNC: 13 MMOL/L — SIGNIFICANT CHANGE UP (ref 5–17)
BUN SERPL-MCNC: 9 MG/DL — SIGNIFICANT CHANGE UP (ref 7–23)
CALCIUM SERPL-MCNC: 8.9 MG/DL — SIGNIFICANT CHANGE UP (ref 8.4–10.5)
CHLORIDE SERPL-SCNC: 101 MMOL/L — SIGNIFICANT CHANGE UP (ref 96–108)
CO2 SERPL-SCNC: 24 MMOL/L — SIGNIFICANT CHANGE UP (ref 22–31)
CREAT SERPL-MCNC: 1.04 MG/DL — SIGNIFICANT CHANGE UP (ref 0.5–1.3)
GLUCOSE SERPL-MCNC: 115 MG/DL — HIGH (ref 70–99)
HCT VFR BLD CALC: 35.1 % — LOW (ref 39–50)
HGB BLD-MCNC: 11.1 G/DL — LOW (ref 13–17)
MAGNESIUM SERPL-MCNC: 2.1 MG/DL — SIGNIFICANT CHANGE UP (ref 1.6–2.6)
MCHC RBC-ENTMCNC: 27.1 PG — SIGNIFICANT CHANGE UP (ref 27–34)
MCHC RBC-ENTMCNC: 31.6 GM/DL — LOW (ref 32–36)
MCV RBC AUTO: 85.6 FL — SIGNIFICANT CHANGE UP (ref 80–100)
NRBC # BLD: 0 /100 WBCS — SIGNIFICANT CHANGE UP (ref 0–0)
PHOSPHATE SERPL-MCNC: 3.4 MG/DL — SIGNIFICANT CHANGE UP (ref 2.5–4.5)
PLATELET # BLD AUTO: 384 K/UL — SIGNIFICANT CHANGE UP (ref 150–400)
POTASSIUM SERPL-MCNC: 4.1 MMOL/L — SIGNIFICANT CHANGE UP (ref 3.5–5.3)
POTASSIUM SERPL-SCNC: 4.1 MMOL/L — SIGNIFICANT CHANGE UP (ref 3.5–5.3)
RBC # BLD: 4.1 M/UL — LOW (ref 4.2–5.8)
RBC # FLD: 15.7 % — HIGH (ref 10.3–14.5)
SODIUM SERPL-SCNC: 138 MMOL/L — SIGNIFICANT CHANGE UP (ref 135–145)
VANCOMYCIN TROUGH SERPL-MCNC: 17.2 UG/ML — SIGNIFICANT CHANGE UP (ref 10–20)
WBC # BLD: 6.76 K/UL — SIGNIFICANT CHANGE UP (ref 3.8–10.5)
WBC # FLD AUTO: 6.76 K/UL — SIGNIFICANT CHANGE UP (ref 3.8–10.5)

## 2021-07-23 PROCEDURE — 99232 SBSQ HOSP IP/OBS MODERATE 35: CPT

## 2021-07-23 PROCEDURE — 93010 ELECTROCARDIOGRAM REPORT: CPT | Mod: 76

## 2021-07-23 RX ORDER — SIMETHICONE 80 MG/1
1 TABLET, CHEWABLE ORAL
Qty: 0 | Refills: 0 | DISCHARGE
Start: 2021-07-23

## 2021-07-23 RX ORDER — POLYETHYLENE GLYCOL 3350 17 G/17G
17 POWDER, FOR SOLUTION ORAL
Qty: 0 | Refills: 0 | DISCHARGE
Start: 2021-07-23

## 2021-07-23 RX ORDER — PANTOPRAZOLE SODIUM 20 MG/1
1 TABLET, DELAYED RELEASE ORAL
Qty: 30 | Refills: 0
Start: 2021-07-23 | End: 2021-08-21

## 2021-07-23 RX ORDER — ACETAMINOPHEN 500 MG
3 TABLET ORAL
Qty: 0 | Refills: 0 | DISCHARGE
Start: 2021-07-23

## 2021-07-23 RX ORDER — APIXABAN 2.5 MG/1
1 TABLET, FILM COATED ORAL
Qty: 60 | Refills: 0
Start: 2021-07-23 | End: 2021-08-21

## 2021-07-23 RX ORDER — SERTRALINE 25 MG/1
1 TABLET, FILM COATED ORAL
Qty: 30 | Refills: 0
Start: 2021-07-23 | End: 2021-08-21

## 2021-07-23 RX ORDER — METOPROLOL TARTRATE 50 MG
0.5 TABLET ORAL
Qty: 30 | Refills: 0
Start: 2021-07-23 | End: 2021-08-21

## 2021-07-23 RX ORDER — NICOTINE POLACRILEX 2 MG
1 GUM BUCCAL
Qty: 14 | Refills: 0
Start: 2021-07-23 | End: 2021-08-05

## 2021-07-23 RX ORDER — LANOLIN ALCOHOL/MO/W.PET/CERES
1 CREAM (GRAM) TOPICAL
Qty: 0 | Refills: 0 | DISCHARGE
Start: 2021-07-23

## 2021-07-23 RX ORDER — SENNA PLUS 8.6 MG/1
2 TABLET ORAL
Qty: 0 | Refills: 0 | DISCHARGE
Start: 2021-07-23

## 2021-07-23 RX ORDER — DIGOXIN 250 MCG
1 TABLET ORAL
Qty: 30 | Refills: 0
Start: 2021-07-23 | End: 2021-08-21

## 2021-07-23 RX ADMIN — Medication 125 MICROGRAM(S): at 05:31

## 2021-07-23 RX ADMIN — Medication 12.5 MILLIGRAM(S): at 18:04

## 2021-07-23 RX ADMIN — Medication 81 MILLIGRAM(S): at 11:56

## 2021-07-23 RX ADMIN — LEVALBUTEROL 0.63 MILLIGRAM(S): 1.25 SOLUTION, CONCENTRATE RESPIRATORY (INHALATION) at 18:04

## 2021-07-23 RX ADMIN — LEVALBUTEROL 0.63 MILLIGRAM(S): 1.25 SOLUTION, CONCENTRATE RESPIRATORY (INHALATION) at 11:55

## 2021-07-23 RX ADMIN — SENNA PLUS 2 TABLET(S): 8.6 TABLET ORAL at 21:35

## 2021-07-23 RX ADMIN — CEFEPIME 100 MILLIGRAM(S): 1 INJECTION, POWDER, FOR SOLUTION INTRAMUSCULAR; INTRAVENOUS at 21:35

## 2021-07-23 RX ADMIN — Medication 5 MILLIGRAM(S): at 21:35

## 2021-07-23 RX ADMIN — LEVALBUTEROL 0.63 MILLIGRAM(S): 1.25 SOLUTION, CONCENTRATE RESPIRATORY (INHALATION) at 05:34

## 2021-07-23 RX ADMIN — Medication 1 PATCH: at 12:13

## 2021-07-23 RX ADMIN — CHLORHEXIDINE GLUCONATE 1 APPLICATION(S): 213 SOLUTION TOPICAL at 15:41

## 2021-07-23 RX ADMIN — Medication 1 PATCH: at 11:55

## 2021-07-23 RX ADMIN — CEFEPIME 100 MILLIGRAM(S): 1 INJECTION, POWDER, FOR SOLUTION INTRAMUSCULAR; INTRAVENOUS at 15:41

## 2021-07-23 RX ADMIN — Medication 1 PATCH: at 20:04

## 2021-07-23 RX ADMIN — LEVALBUTEROL 0.63 MILLIGRAM(S): 1.25 SOLUTION, CONCENTRATE RESPIRATORY (INHALATION) at 01:15

## 2021-07-23 RX ADMIN — APIXABAN 5 MILLIGRAM(S): 2.5 TABLET, FILM COATED ORAL at 05:32

## 2021-07-23 RX ADMIN — APIXABAN 5 MILLIGRAM(S): 2.5 TABLET, FILM COATED ORAL at 18:04

## 2021-07-23 RX ADMIN — Medication 975 MILLIGRAM(S): at 12:26

## 2021-07-23 RX ADMIN — CEFEPIME 100 MILLIGRAM(S): 1 INJECTION, POWDER, FOR SOLUTION INTRAMUSCULAR; INTRAVENOUS at 05:31

## 2021-07-23 RX ADMIN — Medication 300 MILLIGRAM(S): at 05:35

## 2021-07-23 RX ADMIN — SERTRALINE 50 MILLIGRAM(S): 25 TABLET, FILM COATED ORAL at 11:57

## 2021-07-23 RX ADMIN — PANTOPRAZOLE SODIUM 40 MILLIGRAM(S): 20 TABLET, DELAYED RELEASE ORAL at 05:32

## 2021-07-23 RX ADMIN — Medication 975 MILLIGRAM(S): at 11:56

## 2021-07-23 RX ADMIN — ATORVASTATIN CALCIUM 40 MILLIGRAM(S): 80 TABLET, FILM COATED ORAL at 21:35

## 2021-07-23 RX ADMIN — Medication 300 MILLIGRAM(S): at 18:04

## 2021-07-23 NOTE — PROGRESS NOTE ADULT - SUBJECTIVE AND OBJECTIVE BOX
SUBJECTIVE:   Seen and examined at bedside during AM rounds. No acute events overnight. Transferred from the SICU to the floor. Tolerating a regular diet. Passing flatus and having bowel movements. Duplex negative for a DVT.    OBJECTIVE: T(C): 36.7 (07-23-21 @ 00:42), Max: 36.9 (07-22-21 @ 07:00)  HR: 72 (07-23-21 @ 00:42) (65 - 86)  BP: 113/78 (07-23-21 @ 00:42) (109/63 - 147/80)  RR: 18 (07-23-21 @ 00:42) (18 - 42)  SpO2: 96% (07-23-21 @ 00:42) (92% - 98%)  Wt(kg): --  I&O's Summary    21 Jul 2021 07:01  -  22 Jul 2021 07:00  --------------------------------------------------------  IN: 2875 mL / OUT: 4510 mL / NET: -1635 mL    22 Jul 2021 07:01  -  23 Jul 2021 01:44  --------------------------------------------------------  IN: 1550 mL / OUT: 3100 mL / NET: -1550 mL      I&O's Detail    21 Jul 2021 07:01  -  22 Jul 2021 07:00  --------------------------------------------------------  IN:    IV PiggyBack: 300 mL    IV PiggyBack: 1000 mL    Oral Fluid: 150 mL    sodium chloride 0.9%: 1425 mL  Total IN: 2875 mL    OUT:    Voided (mL): 4510 mL  Total OUT: 4510 mL    Total NET: -1635 mL      22 Jul 2021 07:01  -  23 Jul 2021 01:44  --------------------------------------------------------  IN:    IV PiggyBack: 500 mL    IV PiggyBack: 50 mL    Oral Fluid: 1000 mL  Total IN: 1550 mL    OUT:    Voided (mL): 3100 mL  Total OUT: 3100 mL    Total NET: -1550 mL        Physical Exam:  General: Alert, NAD  Cardio: Regular rate  Resp: nonlabored, on 2L NC  GI/Abd: Soft, large ventral hernia reducible, no focal tenderness at hernia, no guarding or rebound tenderness, well healed midline laparotomy scar  Ext: No edema, all 4 extremities moving spontaneously, no limitations    MEDICATIONS  (STANDING):  apixaban 5 milliGRAM(s) Oral every 12 hours  aspirin enteric coated 81 milliGRAM(s) Oral daily  atorvastatin 40 milliGRAM(s) Oral at bedtime  cefepime   IVPB 2000 milliGRAM(s) IV Intermittent every 8 hours  chlorhexidine 2% Cloths 1 Application(s) Topical <User Schedule>  digoxin     Tablet 125 MICROGram(s) Oral daily  levalbuterol Inhalation 0.63 milliGRAM(s) Inhalation every 6 hours  melatonin 5 milliGRAM(s) Oral at bedtime  metoprolol tartrate 12.5 milliGRAM(s) Oral every 12 hours  nicotine -   7 mG/24Hr(s) Patch 1 patch Transdermal daily  pantoprazole    Tablet 40 milliGRAM(s) Oral before breakfast  polyethylene glycol 3350 17 Gram(s) Oral daily  senna 2 Tablet(s) Oral at bedtime  sertraline 50 milliGRAM(s) Oral daily  vancomycin  IVPB 1500 milliGRAM(s) IV Intermittent every 12 hours    MEDICATIONS  (PRN):  acetaminophen   Tablet .. 975 milliGRAM(s) Oral every 6 hours PRN Mild Pain (1 - 3)  simethicone 80 milliGRAM(s) Chew every 6 hours PRN Gas      LABS:                        11.4   7.22  )-----------( 245      ( 22 Jul 2021 05:16 )             35.9     07-22    138  |  100  |  10  ----------------------------<  117<H>  3.7   |  25  |  1.18    Ca    8.7      22 Jul 2021 05:16  Phos  3.3     07-22  Mg     2.1     07-22               SUBJECTIVE:   Seen and examined at bedside during AM rounds. No acute events overnight. Transferred from the SICU to the floor. Tolerating a regular diet. Passing flatus and having bowel movements. Pain is controlled.    OBJECTIVE: T(C): 36.7 (07-23-21 @ 00:42), Max: 36.9 (07-22-21 @ 07:00)  HR: 72 (07-23-21 @ 00:42) (65 - 86)  BP: 113/78 (07-23-21 @ 00:42) (109/63 - 147/80)  RR: 18 (07-23-21 @ 00:42) (18 - 42)  SpO2: 96% (07-23-21 @ 00:42) (92% - 98%)  Wt(kg): --  I&O's Summary    21 Jul 2021 07:01  -  22 Jul 2021 07:00  --------------------------------------------------------  IN: 2875 mL / OUT: 4510 mL / NET: -1635 mL    22 Jul 2021 07:01  -  23 Jul 2021 01:44  --------------------------------------------------------  IN: 1550 mL / OUT: 3100 mL / NET: -1550 mL      I&O's Detail    21 Jul 2021 07:01  -  22 Jul 2021 07:00  --------------------------------------------------------  IN:    IV PiggyBack: 300 mL    IV PiggyBack: 1000 mL    Oral Fluid: 150 mL    sodium chloride 0.9%: 1425 mL  Total IN: 2875 mL    OUT:    Voided (mL): 4510 mL  Total OUT: 4510 mL    Total NET: -1635 mL      22 Jul 2021 07:01 - 23 Jul 2021 01:44  --------------------------------------------------------  IN:    IV PiggyBack: 500 mL    IV PiggyBack: 50 mL    Oral Fluid: 1000 mL  Total IN: 1550 mL    OUT:    Voided (mL): 3100 mL  Total OUT: 3100 mL    Total NET: -1550 mL        Physical Exam:  General: Alert, NAD  Cardio: Regular rate  Resp: nonlabored breathing on room air  GI/Abd: Soft, large ventral hernia reducible, no focal tenderness at hernia, no guarding or rebound tenderness, well healed midline laparotomy scar  Ext: No edema, all 4 extremities moving spontaneously, no limitations    MEDICATIONS  (STANDING):  apixaban 5 milliGRAM(s) Oral every 12 hours  aspirin enteric coated 81 milliGRAM(s) Oral daily  atorvastatin 40 milliGRAM(s) Oral at bedtime  cefepime   IVPB 2000 milliGRAM(s) IV Intermittent every 8 hours  chlorhexidine 2% Cloths 1 Application(s) Topical <User Schedule>  digoxin     Tablet 125 MICROGram(s) Oral daily  levalbuterol Inhalation 0.63 milliGRAM(s) Inhalation every 6 hours  melatonin 5 milliGRAM(s) Oral at bedtime  metoprolol tartrate 12.5 milliGRAM(s) Oral every 12 hours  nicotine -   7 mG/24Hr(s) Patch 1 patch Transdermal daily  pantoprazole    Tablet 40 milliGRAM(s) Oral before breakfast  polyethylene glycol 3350 17 Gram(s) Oral daily  senna 2 Tablet(s) Oral at bedtime  sertraline 50 milliGRAM(s) Oral daily  vancomycin  IVPB 1500 milliGRAM(s) IV Intermittent every 12 hours    MEDICATIONS  (PRN):  acetaminophen   Tablet .. 975 milliGRAM(s) Oral every 6 hours PRN Mild Pain (1 - 3)  simethicone 80 milliGRAM(s) Chew every 6 hours PRN Gas      LABS:                        11.4   7.22  )-----------( 245      ( 22 Jul 2021 05:16 )             35.9     07-22    138  |  100  |  10  ----------------------------<  117<H>  3.7   |  25  |  1.18    Ca    8.7      22 Jul 2021 05:16  Phos  3.3     07-22  Mg     2.1     07-22

## 2021-07-23 NOTE — PROGRESS NOTE ADULT - ASSESSMENT
56M with hx of HLD, CAD s/p MAINOR x 2 (July 2020) on DAPT, LBO 2/2 diverticulitis s/p John's procedure (March 2019) and reversal (June 2019) presenting with abdominal pain for 3 days admitted w/ mesenteric ischemia w/ course additionally complicated by Aflut.    Aflutter  -c/w dig 125mcg qd; currently rate-controlled and NSR; continue metoprolol  - continue eliquis  -if surgery planned outpatient, can consider LAY/DCCV post-surgery    CAD/Mesenteric Ischemia  - continue ASA, and Atorvastatin    For all New Consults and Questions:  [email or web address]  Login: [email or web address] 56M with hx of HLD, CAD s/p MAINOR x 2 (July 2020) on DAPT, LBO 2/2 diverticulitis s/p John's procedure (March 2019) and reversal (June 2019) presenting with abdominal pain for 3 days admitted w/ mesenteric ischemia w/ course additionally complicated by Aflut.  He was found to have SBO, treated conservatively and now resolved, per patient team planning for discharge tomorrow.    Aflutter  - c/w dig 125mcg qd; currently rate-controlled and NSR; continue metoprolol  - continue eliquis  - if surgery planned outpatient, can consider LAY/DCCV post-surgery  - would repeat EKG today to document current rhythm  - will need Cardiology follow up in 1-2 weeks  - if patient does not have Cardiologist, he can call [phone] to arrange appointment with our clinic    CAD/Mesenteric Ischemia  - continue ASA, and Atorvastatin    For all New Consults and Questions:  [email or web address]  Login: [email or web address]

## 2021-07-23 NOTE — PROVIDER CONTACT NOTE (OTHER) - ASSESSMENT
Pt refusing BiPap overnight. Pt A&Ox4. Oxygen saturation greater than 94% on room air. No s/s distress.
pt febrile 102.9. pt complaints of increased urine frequency. no c/c of chills. acetaminophen administered as ordered.
All other VSS. Pt denies pain, NV, SOB
All other VSS. Pt denies pain, NV, SOB. No s/s bleeding.
Pt c/o of generalized discomfort. Pt denies SOB, chest pain, NV, lightheadedness.
Pt found with NGT in hand
Pt denies SOB, chest pain, NV, lightheadedness. Pt in Atrial Flutter. Pt received 500cc NS bolus and is currently receiving NS at 125cc/hr.
pt resting in bed, denies chills, SOB and pain. all other vss.

## 2021-07-23 NOTE — PROVIDER CONTACT NOTE (OTHER) - RECOMMENDATIONS
fever workup?
beta blocker
MD ogden
MD to bedside to reinsert tube
MD notified
MD notified
MD notification.
EKG

## 2021-07-23 NOTE — PROVIDER CONTACT NOTE (OTHER) - ACTION/TREATMENT ORDERED:
MD notified and aware. Acetaminophen administered as ordered. Monitoring continues.
MD Velazquez aware - no intervention required. Cont to monitor respiratory status.
Retake vitals in one hour
MD aware. give tylenol as ordered. will continue to monitor?
CXR ordered to confirm placement.
EKG ordered. Tylenol 975 given stat as per MD order. Vitals to be retaken in one hour
EKG ordered.
lower IVF rate to 50cc/hr. Continue to monitor

## 2021-07-23 NOTE — PROVIDER CONTACT NOTE (OTHER) - SITUATION
Temp 101.9
temp 100.6
, /93, febrile 100.8
Pt refusing BiPap overnight
Patient had oral temp of 102.9 F.
Pt pulled out NGT accidently

## 2021-07-23 NOTE — PROGRESS NOTE ADULT - SUBJECTIVE AND OBJECTIVE BOX
All Cardiology service information can be found 24/7 on [email or web address], password: [email or web address]    No events overnight    Meds:  apixaban 5 milliGRAM(s) Oral every 12 hours  aspirin enteric coated 81 milliGRAM(s) Oral daily  atorvastatin 40 milliGRAM(s) Oral at bedtime  cefepime   IVPB 2000 milliGRAM(s) IV Intermittent every 8 hours  chlorhexidine 2% Cloths 1 Application(s) Topical <User Schedule>  digoxin     Tablet 125 MICROGram(s) Oral daily  levalbuterol Inhalation 0.63 milliGRAM(s) Inhalation every 6 hours  melatonin 5 milliGRAM(s) Oral at bedtime  metoprolol tartrate 12.5 milliGRAM(s) Oral every 12 hours  nicotine -   7 mG/24Hr(s) Patch 1 patch Transdermal daily  pantoprazole    Tablet 40 milliGRAM(s) Oral before breakfast  polyethylene glycol 3350 17 Gram(s) Oral daily  senna 2 Tablet(s) Oral at bedtime  sertraline 50 milliGRAM(s) Oral daily  vancomycin  IVPB 1500 milliGRAM(s) IV Intermittent every 12 hours      T(C): 36.7 (07-23-21 @ 05:17), Max: 36.9 (07-22-21 @ 07:00)  HR: 78 (07-23-21 @ 05:45) (65 - 86)  BP: 101/69 (07-23-21 @ 05:17) (101/69 - 147/80)  RR: 18 (07-23-21 @ 05:17) (18 - 42)  SpO2: 96% (07-23-21 @ 05:45) (92% - 98%)    07-21-21 @ 07:01  -  07-22-21 @ 07:00  --------------------------------------------------------  IN: 2875 mL / OUT: 4510 mL / NET: -1635 mL    07-22-21 @ 07:01  -  07-23-21 @ 06:19  --------------------------------------------------------  IN: 2100 mL / OUT: 3900 mL / NET: -1800 mL        Exam:  Appearance: No Acute Distress  HEENT: No JVD  Cardiovascular: Normal S1 S2, No murmurs/rubs/gallops  Respiratory: Clear to auscultation bilaterally  Gastrointestinal: Soft, Non-tender	  Skin: No cyanosis	  Neurologic: Non-focal    07-22    138  |  100  |  10  ----------------------------<  117<H>  3.7   |  25  |  1.18    Ca    8.7      22 Jul 2021 05:16  Phos  3.3     07-22  Mg     2.1     07-22                            11.4   7.22  )-----------( 245      ( 22 Jul 2021 05:16 )             35.9

## 2021-07-23 NOTE — PROGRESS NOTE ADULT - ASSESSMENT
Assessment:   56M with hx of HLD, CAD s/p MAINOR x 2 (July 2020) on DAPT, LBO 2/2 diverticulitis s/p John's procedure (March 2019) and reversal (June 2019) presenting with SBO 2/2 large ventral hernia and JANELL 2/2 volume depletion. SBO was managed conservatively. Abdominal CT 9/18 showed no SBO.  Transferred to the SICU on 7/18 for development of possible sepsis, tachycardia and atrial flutter. CTPE 7/19 negative.  Started treatment Eliquis AC on 7/21. Transferred to the floor on 7/22.     Plan:  - Pain control  - Diet: LRD  - Continue Vancomycin and Cefepime x7d total until 7/25 per ID (no good po regimen for a +MRSA)  - Trend fevers, WBCs  - Continue Eliquis, ASA, and ok to stay off brilinta as pt is more than 1 yr post stents per Cards   - c/w digoxin and metoprolol   - Patient scheduled for surgery in August, would benefit from LAY/DCCV post-surgery  - FU am labs, replete prn      [facility]  [id number]. Assessment:   56M with hx of HLD, CAD s/p MAINOR x 2 (July 2020) on DAPT, LBO 2/2 diverticulitis s/p John's procedure (March 2019) and reversal (June 2019) presenting with SBO 2/2 large ventral hernia and JANELL 2/2 volume depletion. SBO was managed conservatively. Abdominal CT 9/18 showed no SBO.  Transferred to the SICU on 7/18 for development of possible sepsis, tachycardia and atrial flutter. CTPE 7/19 negative.  Started treatment Eliquis AC on 7/21. Transferred to the floor on 7/22.     Plan:  - Pain control  - Diet: LRD  - Continue Vancomycin and Cefepime x7d total until 7/25 per ID (no good po regimen for a +MRSA)  - Trend fevers, WBCs  - Continue Eliquis, ASA, and ok to stay off brilinta as pt is more than 1 yr post stents per Cards   - c/w digoxin and metoprolol   - Patient scheduled for surgery in August, would benefit from LAY/DCCV post-surgery  - FU am labs, replete prn      [facility]  [id number]

## 2021-07-23 NOTE — PROVIDER CONTACT NOTE (OTHER) - DATE AND TIME:
17-Jul-2021 02:20
14-Jul-2021 23:00
18-Jul-2021 20:45
19-Jul-2021 00:35
18-Jul-2021 10:15
17-Jul-2021 09:00
23-Jul-2021 01:00
17-Jul-2021 01:40

## 2021-07-23 NOTE — PROGRESS NOTE ADULT - ASSESSMENT
56y M with Hx LBO s/p John's (03/2019) and reversal (06/2019), CAD (s/p drug-eluting stents x2 in 07/2020, on DAPT), and a large reducible ventral hernia who presented with abd pain for 3 days, found to have SBO with transition point at ventral hernia on CT scan on 7/12.   Pt medically managed  s/p resolution of SBO  s/p botox injection for umbilical hernia repair   But developed fevers and leucocytosis  LLL pna  Cx negative(other than urine as above though denies urinary symptoms)  MRSA PCR positive  clinically better -D#5 of abx  transferred out of SICU      Rec:  A) Pneumonia  MRSA PCR +  clinically stable  Continue vanco cefepime for now  If stable could Dc abx 7/24  aspiration precautions    B) Bacteriuria  ? Colonization  denies sympytoms  any case on abx    C) SBO  resolved  will defer to surgery   Monitor umbilical hernia      Will tailor plan for ID issues  per course,results.Will defer to primary team on management of other issues.  Assessment, plan and recommendations as detailed above were discussed with the surgery   team.  Infectious Diseases Service will cover over weekend.  Please call [phone] if issues

## 2021-07-23 NOTE — PROVIDER CONTACT NOTE (OTHER) - BACKGROUND
Admitted for SBO
Admitted w/ SBO. Pt has been tachycardic in low 100s. Chest xray shows Lower left lobe consolidation/probable pneumonia. UA +bacteremia
SBO
s/p 7/12 abd pain x 3 days and c/c N/V. CT +SBO. 7/17 +UA, CXR lung haziness
Dx: MYRNA
Admitted w/ SBO. Pt has been tachycardic in low 100s. Chest xray shows Lower left lobe consolidation/probable pneumonia. UA +bacteremia. Started on Vancomycin.
Dx: SBO/ abdominal hernias
pt admit with SBO on 7/12

## 2021-07-24 ENCOUNTER — TRANSCRIPTION ENCOUNTER (OUTPATIENT)
Age: 56
End: 2021-07-24

## 2021-07-24 VITALS
HEART RATE: 92 BPM | RESPIRATION RATE: 18 BRPM | OXYGEN SATURATION: 97 % | DIASTOLIC BLOOD PRESSURE: 66 MMHG | SYSTOLIC BLOOD PRESSURE: 148 MMHG | TEMPERATURE: 98 F

## 2021-07-24 LAB
ANION GAP SERPL CALC-SCNC: 14 MMOL/L — SIGNIFICANT CHANGE UP (ref 5–17)
BUN SERPL-MCNC: 10 MG/DL — SIGNIFICANT CHANGE UP (ref 7–23)
CALCIUM SERPL-MCNC: 8.8 MG/DL — SIGNIFICANT CHANGE UP (ref 8.4–10.5)
CHLORIDE SERPL-SCNC: 99 MMOL/L — SIGNIFICANT CHANGE UP (ref 96–108)
CO2 SERPL-SCNC: 22 MMOL/L — SIGNIFICANT CHANGE UP (ref 22–31)
CREAT SERPL-MCNC: 0.95 MG/DL — SIGNIFICANT CHANGE UP (ref 0.5–1.3)
GLUCOSE SERPL-MCNC: 219 MG/DL — HIGH (ref 70–99)
HCT VFR BLD CALC: 35.3 % — LOW (ref 39–50)
HGB BLD-MCNC: 11.1 G/DL — LOW (ref 13–17)
MAGNESIUM SERPL-MCNC: 1.9 MG/DL — SIGNIFICANT CHANGE UP (ref 1.6–2.6)
MCHC RBC-ENTMCNC: 27.1 PG — SIGNIFICANT CHANGE UP (ref 27–34)
MCHC RBC-ENTMCNC: 31.4 GM/DL — LOW (ref 32–36)
MCV RBC AUTO: 86.3 FL — SIGNIFICANT CHANGE UP (ref 80–100)
NRBC # BLD: 0 /100 WBCS — SIGNIFICANT CHANGE UP (ref 0–0)
PHOSPHATE SERPL-MCNC: 2.9 MG/DL — SIGNIFICANT CHANGE UP (ref 2.5–4.5)
PLATELET # BLD AUTO: 454 K/UL — HIGH (ref 150–400)
POTASSIUM SERPL-MCNC: 4.4 MMOL/L — SIGNIFICANT CHANGE UP (ref 3.5–5.3)
POTASSIUM SERPL-SCNC: 4.4 MMOL/L — SIGNIFICANT CHANGE UP (ref 3.5–5.3)
RBC # BLD: 4.09 M/UL — LOW (ref 4.2–5.8)
RBC # FLD: 15.5 % — HIGH (ref 10.3–14.5)
SODIUM SERPL-SCNC: 135 MMOL/L — SIGNIFICANT CHANGE UP (ref 135–145)
WBC # BLD: 8.27 K/UL — SIGNIFICANT CHANGE UP (ref 3.8–10.5)
WBC # FLD AUTO: 8.27 K/UL — SIGNIFICANT CHANGE UP (ref 3.8–10.5)

## 2021-07-24 PROCEDURE — 84145 PROCALCITONIN (PCT): CPT

## 2021-07-24 PROCEDURE — 86769 SARS-COV-2 COVID-19 ANTIBODY: CPT

## 2021-07-24 PROCEDURE — 83690 ASSAY OF LIPASE: CPT

## 2021-07-24 PROCEDURE — 87077 CULTURE AEROBIC IDENTIFY: CPT

## 2021-07-24 PROCEDURE — 87086 URINE CULTURE/COLONY COUNT: CPT

## 2021-07-24 PROCEDURE — 85018 HEMOGLOBIN: CPT

## 2021-07-24 PROCEDURE — 94660 CPAP INITIATION&MGMT: CPT

## 2021-07-24 PROCEDURE — 74018 RADEX ABDOMEN 1 VIEW: CPT

## 2021-07-24 PROCEDURE — 96374 THER/PROPH/DIAG INJ IV PUSH: CPT

## 2021-07-24 PROCEDURE — 80053 COMPREHEN METABOLIC PANEL: CPT

## 2021-07-24 PROCEDURE — 87640 STAPH A DNA AMP PROBE: CPT

## 2021-07-24 PROCEDURE — 84484 ASSAY OF TROPONIN QUANT: CPT

## 2021-07-24 PROCEDURE — 87641 MR-STAPH DNA AMP PROBE: CPT

## 2021-07-24 PROCEDURE — 85610 PROTHROMBIN TIME: CPT

## 2021-07-24 PROCEDURE — 85027 COMPLETE CBC AUTOMATED: CPT

## 2021-07-24 PROCEDURE — 82435 ASSAY OF BLOOD CHLORIDE: CPT

## 2021-07-24 PROCEDURE — U0005: CPT

## 2021-07-24 PROCEDURE — 84100 ASSAY OF PHOSPHORUS: CPT

## 2021-07-24 PROCEDURE — 82803 BLOOD GASES ANY COMBINATION: CPT

## 2021-07-24 PROCEDURE — 86901 BLOOD TYPING SEROLOGIC RH(D): CPT

## 2021-07-24 PROCEDURE — 93005 ELECTROCARDIOGRAM TRACING: CPT

## 2021-07-24 PROCEDURE — 74177 CT ABD & PELVIS W/CONTRAST: CPT

## 2021-07-24 PROCEDURE — 87186 SC STD MICRODIL/AGAR DIL: CPT

## 2021-07-24 PROCEDURE — 99232 SBSQ HOSP IP/OBS MODERATE 35: CPT | Mod: GC

## 2021-07-24 PROCEDURE — 71045 X-RAY EXAM CHEST 1 VIEW: CPT

## 2021-07-24 PROCEDURE — 82962 GLUCOSE BLOOD TEST: CPT

## 2021-07-24 PROCEDURE — 94640 AIRWAY INHALATION TREATMENT: CPT

## 2021-07-24 PROCEDURE — 96375 TX/PRO/DX INJ NEW DRUG ADDON: CPT

## 2021-07-24 PROCEDURE — 84300 ASSAY OF URINE SODIUM: CPT

## 2021-07-24 PROCEDURE — 83735 ASSAY OF MAGNESIUM: CPT

## 2021-07-24 PROCEDURE — 87040 BLOOD CULTURE FOR BACTERIA: CPT

## 2021-07-24 PROCEDURE — 85025 COMPLETE CBC W/AUTO DIFF WBC: CPT

## 2021-07-24 PROCEDURE — 93970 EXTREMITY STUDY: CPT

## 2021-07-24 PROCEDURE — 84295 ASSAY OF SERUM SODIUM: CPT

## 2021-07-24 PROCEDURE — 82570 ASSAY OF URINE CREATININE: CPT

## 2021-07-24 PROCEDURE — 82565 ASSAY OF CREATININE: CPT

## 2021-07-24 PROCEDURE — 74176 CT ABD & PELVIS W/O CONTRAST: CPT

## 2021-07-24 PROCEDURE — 86900 BLOOD TYPING SEROLOGIC ABO: CPT

## 2021-07-24 PROCEDURE — 82947 ASSAY GLUCOSE BLOOD QUANT: CPT

## 2021-07-24 PROCEDURE — 82330 ASSAY OF CALCIUM: CPT

## 2021-07-24 PROCEDURE — 80202 ASSAY OF VANCOMYCIN: CPT

## 2021-07-24 PROCEDURE — 86850 RBC ANTIBODY SCREEN: CPT

## 2021-07-24 PROCEDURE — 76942 ECHO GUIDE FOR BIOPSY: CPT

## 2021-07-24 PROCEDURE — 97161 PT EVAL LOW COMPLEX 20 MIN: CPT

## 2021-07-24 PROCEDURE — 71275 CT ANGIOGRAPHY CHEST: CPT

## 2021-07-24 PROCEDURE — 64646 CHEMODENERV TRUNK MUSC 1-5: CPT

## 2021-07-24 PROCEDURE — 82550 ASSAY OF CK (CPK): CPT

## 2021-07-24 PROCEDURE — 84132 ASSAY OF SERUM POTASSIUM: CPT

## 2021-07-24 PROCEDURE — 85014 HEMATOCRIT: CPT

## 2021-07-24 PROCEDURE — 81001 URINALYSIS AUTO W/SCOPE: CPT

## 2021-07-24 PROCEDURE — 83605 ASSAY OF LACTIC ACID: CPT

## 2021-07-24 PROCEDURE — 80048 BASIC METABOLIC PNL TOTAL CA: CPT

## 2021-07-24 PROCEDURE — 71260 CT THORAX DX C+: CPT

## 2021-07-24 PROCEDURE — 83935 ASSAY OF URINE OSMOLALITY: CPT

## 2021-07-24 PROCEDURE — 99285 EMERGENCY DEPT VISIT HI MDM: CPT

## 2021-07-24 PROCEDURE — 96376 TX/PRO/DX INJ SAME DRUG ADON: CPT

## 2021-07-24 PROCEDURE — 82553 CREATINE MB FRACTION: CPT

## 2021-07-24 PROCEDURE — 84540 ASSAY OF URINE/UREA-N: CPT

## 2021-07-24 PROCEDURE — 97165 OT EVAL LOW COMPLEX 30 MIN: CPT

## 2021-07-24 PROCEDURE — U0003: CPT

## 2021-07-24 PROCEDURE — 85730 THROMBOPLASTIN TIME PARTIAL: CPT

## 2021-07-24 PROCEDURE — C8929: CPT

## 2021-07-24 RX ORDER — SERTRALINE 25 MG/1
1 TABLET, FILM COATED ORAL
Qty: 30 | Refills: 0
Start: 2021-07-24

## 2021-07-24 RX ORDER — PANTOPRAZOLE SODIUM 20 MG/1
1 TABLET, DELAYED RELEASE ORAL
Qty: 30 | Refills: 0
Start: 2021-07-24

## 2021-07-24 RX ORDER — MAGNESIUM SULFATE 500 MG/ML
2 VIAL (ML) INJECTION ONCE
Refills: 0 | Status: COMPLETED | OUTPATIENT
Start: 2021-07-24 | End: 2021-07-24

## 2021-07-24 RX ORDER — DIGOXIN 250 MCG
1 TABLET ORAL
Qty: 30 | Refills: 0
Start: 2021-07-24

## 2021-07-24 RX ORDER — APIXABAN 2.5 MG/1
1 TABLET, FILM COATED ORAL
Qty: 60 | Refills: 0
Start: 2021-07-24

## 2021-07-24 RX ORDER — METOPROLOL TARTRATE 50 MG
0.5 TABLET ORAL
Qty: 30 | Refills: 0
Start: 2021-07-24

## 2021-07-24 RX ORDER — NICOTINE POLACRILEX 2 MG
1 GUM BUCCAL
Qty: 14 | Refills: 0
Start: 2021-07-24

## 2021-07-24 RX ADMIN — APIXABAN 5 MILLIGRAM(S): 2.5 TABLET, FILM COATED ORAL at 06:42

## 2021-07-24 RX ADMIN — Medication 62.5 MILLIMOLE(S): at 12:45

## 2021-07-24 RX ADMIN — LEVALBUTEROL 0.63 MILLIGRAM(S): 1.25 SOLUTION, CONCENTRATE RESPIRATORY (INHALATION) at 00:14

## 2021-07-24 RX ADMIN — APIXABAN 5 MILLIGRAM(S): 2.5 TABLET, FILM COATED ORAL at 17:14

## 2021-07-24 RX ADMIN — Medication 81 MILLIGRAM(S): at 12:45

## 2021-07-24 RX ADMIN — Medication 1 PATCH: at 06:37

## 2021-07-24 RX ADMIN — Medication 125 MICROGRAM(S): at 06:42

## 2021-07-24 RX ADMIN — Medication 12.5 MILLIGRAM(S): at 06:42

## 2021-07-24 RX ADMIN — Medication 1 PATCH: at 12:46

## 2021-07-24 RX ADMIN — LEVALBUTEROL 0.63 MILLIGRAM(S): 1.25 SOLUTION, CONCENTRATE RESPIRATORY (INHALATION) at 06:42

## 2021-07-24 RX ADMIN — PANTOPRAZOLE SODIUM 40 MILLIGRAM(S): 20 TABLET, DELAYED RELEASE ORAL at 06:42

## 2021-07-24 RX ADMIN — Medication 50 GRAM(S): at 12:44

## 2021-07-24 RX ADMIN — CEFEPIME 100 MILLIGRAM(S): 1 INJECTION, POWDER, FOR SOLUTION INTRAMUSCULAR; INTRAVENOUS at 14:58

## 2021-07-24 RX ADMIN — LEVALBUTEROL 0.63 MILLIGRAM(S): 1.25 SOLUTION, CONCENTRATE RESPIRATORY (INHALATION) at 12:45

## 2021-07-24 RX ADMIN — Medication 300 MILLIGRAM(S): at 16:00

## 2021-07-24 RX ADMIN — Medication 12.5 MILLIGRAM(S): at 17:14

## 2021-07-24 RX ADMIN — CEFEPIME 100 MILLIGRAM(S): 1 INJECTION, POWDER, FOR SOLUTION INTRAMUSCULAR; INTRAVENOUS at 06:40

## 2021-07-24 RX ADMIN — Medication 300 MILLIGRAM(S): at 06:42

## 2021-07-24 NOTE — PROGRESS NOTE ADULT - NSICDXPILOT_GEN_ALL_CORE
[place]

## 2021-07-24 NOTE — PROGRESS NOTE ADULT - SUBJECTIVE AND OBJECTIVE BOX
SUBJECTIVE:   Seen and examined at bedside during AM rounds. No acute events overnight. Tolerating a regular diet. Passing flatus and having bowel movements. Pain is controlled.    OBJECTIVE: T(C): 36.4 (07-24-21 @ 01:07), Max: 36.8 (07-23-21 @ 13:27)  HR: 69 (07-24-21 @ 01:07) (64 - 79)  BP: 116/76 (07-24-21 @ 01:07) (101/69 - 136/86)  RR: 18 (07-24-21 @ 01:07) (18 - 18)  SpO2: 94% (07-24-21 @ 01:07) (93% - 98%)  Wt(kg): --  I&O's Summary    22 Jul 2021 07:01  -  23 Jul 2021 07:00  --------------------------------------------------------  IN: 2100 mL / OUT: 3900 mL / NET: -1800 mL    23 Jul 2021 07:01  -  24 Jul 2021 01:23  --------------------------------------------------------  IN: 910 mL / OUT: 2500 mL / NET: -1590 mL      I&O's Detail    22 Jul 2021 07:01  -  23 Jul 2021 07:00  --------------------------------------------------------  IN:    IV PiggyBack: 1000 mL    IV PiggyBack: 100 mL    Oral Fluid: 1000 mL  Total IN: 2100 mL    OUT:    Voided (mL): 3900 mL  Total OUT: 3900 mL    Total NET: -1800 mL      23 Jul 2021 07:01  -  24 Jul 2021 01:23  --------------------------------------------------------  IN:    IV PiggyBack: 50 mL    IV PiggyBack: 500 mL    Oral Fluid: 360 mL  Total IN: 910 mL    OUT:    Voided (mL): 2500 mL  Total OUT: 2500 mL    Total NET: -1590 mL        Physical Exam:  General: Alert, NAD  Cardio: Regular rate  Resp: nonlabored breathing on room air  GI/Abd: Soft, large ventral hernia reducible, no focal tenderness at hernia, no guarding or rebound tenderness, well healed midline laparotomy scar  Ext: No edema, all 4 extremities moving spontaneously, no limitations    MEDICATIONS  (STANDING):  apixaban 5 milliGRAM(s) Oral every 12 hours  aspirin enteric coated 81 milliGRAM(s) Oral daily  atorvastatin 40 milliGRAM(s) Oral at bedtime  cefepime   IVPB 2000 milliGRAM(s) IV Intermittent every 8 hours  chlorhexidine 2% Cloths 1 Application(s) Topical <User Schedule>  digoxin     Tablet 125 MICROGram(s) Oral daily  levalbuterol Inhalation 0.63 milliGRAM(s) Inhalation every 6 hours  melatonin 5 milliGRAM(s) Oral at bedtime  metoprolol tartrate 12.5 milliGRAM(s) Oral every 12 hours  nicotine -   7 mG/24Hr(s) Patch 1 patch Transdermal daily  pantoprazole    Tablet 40 milliGRAM(s) Oral before breakfast  polyethylene glycol 3350 17 Gram(s) Oral daily  senna 2 Tablet(s) Oral at bedtime  sertraline 50 milliGRAM(s) Oral daily  vancomycin  IVPB 1500 milliGRAM(s) IV Intermittent every 12 hours    MEDICATIONS  (PRN):  acetaminophen   Tablet .. 975 milliGRAM(s) Oral every 6 hours PRN Mild Pain (1 - 3)  simethicone 80 milliGRAM(s) Chew every 6 hours PRN Gas      LABS:                        11.1   6.76  )-----------( 384      ( 23 Jul 2021 08:38 )             35.1     07-23    138  |  101  |  9   ----------------------------<  115<H>  4.1   |  24  |  1.04    Ca    8.9      23 Jul 2021 08:38  Phos  3.4     07-23  Mg     2.1     07-23

## 2021-07-24 NOTE — PROGRESS NOTE ADULT - PROVIDER SPECIALTY LIST ADULT
Infectious Disease
SICU
Surgery
Surgery
Cardiology
Cardiology
SICU
SICU
Surgery
SICU
Surgery

## 2021-07-24 NOTE — PROGRESS NOTE ADULT - ASSESSMENT
Assessment:   56M with hx of HLD, CAD s/p MAINOR x 2 (July 2020) on DAPT, LBO 2/2 diverticulitis s/p John's procedure (March 2019) and reversal (June 2019) presenting with SBO 2/2 large ventral hernia and JANELL 2/2 volume depletion. SBO was managed conservatively. Abdominal CT 9/18 showed no SBO.  Transferred to the SICU on 7/18 for development of possible sepsis, tachycardia and atrial flutter. CTPE 7/19 negative.  Started treatment Eliquis AC on 7/21. Transferred to the floor on 7/22.     Plan:  - Pain control  - Diet: LRD  - Continue Vancomycin and Cefepime x7d total until 7/25 per ID (no good po regimen for a +MRSA)  - Trend fevers, WBCs  - Continue Eliquis, ASA, and ok to stay off brilinta as pt is more than 1 yr post stents per Cards   - c/w digoxin and metoprolol   - Patient scheduled for surgery in August, would benefit from LAY/DCCV post-surgery  - FU am labs, replete prn      [facility]  [id number] Assessment:   56M with hx of HLD, CAD s/p MAINOR x 2 (July 2020) on DAPT, LBO 2/2 diverticulitis s/p John's procedure (March 2019) and reversal (June 2019) presenting with SBO 2/2 large ventral hernia and JANELL 2/2 volume depletion. SBO was managed conservatively. Abdominal CT 9/18 showed no SBO.  Transferred to the SICU on 7/18 for development of possible sepsis, tachycardia and atrial flutter. CTPE 7/19 negative.  Started treatment Eliquis AC on 7/21. Transferred to the floor on 7/22. Completing 7 days of IV abx treatment for UTI, pneumonia, and positive blood cx.    Plan:  - Pain control  - Diet: LRD  - Continue Vancomycin and Cefepime x7d total until 7/24 PM  - Trend fevers, WBCs  - Continue Eliquis, ASA, and ok to stay off brilinta as pt is more than 1 yr post stents per Cards   - C/w digoxin and metoprolol  - Patient scheduled for surgery in August, would benefit from LAY/DCCV post-surgery  - FU am labs, replete prn  - Dispo planning: likely dc home today after last dose of IV Vanc in PM today      [facility]  [id number]

## 2021-07-24 NOTE — DISCHARGE NOTE NURSING/CASE MANAGEMENT/SOCIAL WORK - PATIENT PORTAL LINK FT
You can access the FollowMyHealth Patient Portal offered by [facility] by registering at the following website: [email or web address]. By joining [facility]’s FollowMyHealth portal, you will also be able to view your health information using other applications (apps) compatible with our system.

## 2021-07-24 NOTE — PROGRESS NOTE ADULT - ATTENDING SUPERVISION STATEMENT
Resident
ACP/Resident
Resident
Resident
ACP/Resident
ACP/Resident
Resident

## 2021-07-25 LAB
CULTURE RESULTS: SIGNIFICANT CHANGE UP
CULTURE RESULTS: SIGNIFICANT CHANGE UP
SPECIMEN SOURCE: SIGNIFICANT CHANGE UP
SPECIMEN SOURCE: SIGNIFICANT CHANGE UP

## 2021-07-27 ENCOUNTER — APPOINTMENT (OUTPATIENT)
Dept: CARDIOLOGY | Facility: CLINIC | Age: 56
End: 2021-07-27

## 2021-07-27 ENCOUNTER — INPATIENT (INPATIENT)
Facility: HOSPITAL | Age: 56
LOS: 14 days | Discharge: HOME | DRG: 336 | End: 2021-08-11
Attending: COLON & RECTAL SURGERY | Admitting: COLON & RECTAL SURGERY
Payer: COMMERCIAL

## 2021-07-27 VITALS
TEMPERATURE: 96 F | RESPIRATION RATE: 20 BRPM | WEIGHT: 315 LBS | HEIGHT: 72 IN | HEART RATE: 92 BPM | DIASTOLIC BLOOD PRESSURE: 84 MMHG | OXYGEN SATURATION: 94 % | SYSTOLIC BLOOD PRESSURE: 144 MMHG

## 2021-07-27 DIAGNOSIS — K56.609 UNSPECIFIED INTESTINAL OBSTRUCTION, UNSPECIFIED AS TO PARTIAL VERSUS COMPLETE OBSTRUCTION: ICD-10-CM

## 2021-07-27 DIAGNOSIS — Z98.890 OTHER SPECIFIED POSTPROCEDURAL STATES: Chronic | ICD-10-CM

## 2021-07-27 LAB
ALBUMIN SERPL ELPH-MCNC: 3.1 G/DL — LOW (ref 3.3–5)
ALP SERPL-CCNC: 96 U/L — SIGNIFICANT CHANGE UP (ref 40–120)
ALT FLD-CCNC: 37 U/L — SIGNIFICANT CHANGE UP (ref 10–45)
ANION GAP SERPL CALC-SCNC: 9 MMOL/L — SIGNIFICANT CHANGE UP (ref 5–17)
APPEARANCE UR: CLEAR — SIGNIFICANT CHANGE UP
APTT BLD: 34.9 SEC — SIGNIFICANT CHANGE UP (ref 27.5–35.5)
AST SERPL-CCNC: 17 U/L — SIGNIFICANT CHANGE UP (ref 10–40)
BACTERIA # UR AUTO: NEGATIVE — SIGNIFICANT CHANGE UP
BASE EXCESS BLDV CALC-SCNC: 6.7 MMOL/L — HIGH (ref -2–2)
BASOPHILS # BLD AUTO: 0 K/UL — SIGNIFICANT CHANGE UP (ref 0–0.2)
BASOPHILS NFR BLD AUTO: 0 % — SIGNIFICANT CHANGE UP (ref 0–2)
BILIRUB SERPL-MCNC: 0.6 MG/DL — SIGNIFICANT CHANGE UP (ref 0.2–1.2)
BILIRUB UR-MCNC: NEGATIVE — SIGNIFICANT CHANGE UP
BLD GP AB SCN SERPL QL: NEGATIVE — SIGNIFICANT CHANGE UP
BUN SERPL-MCNC: 14 MG/DL — SIGNIFICANT CHANGE UP (ref 7–23)
CA-I SERPL-SCNC: 1.15 MMOL/L — SIGNIFICANT CHANGE UP (ref 1.12–1.3)
CALCIUM SERPL-MCNC: 9.8 MG/DL — SIGNIFICANT CHANGE UP (ref 8.4–10.5)
CHLORIDE BLDV-SCNC: 106 MMOL/L — SIGNIFICANT CHANGE UP (ref 96–108)
CHLORIDE SERPL-SCNC: 99 MMOL/L — SIGNIFICANT CHANGE UP (ref 96–108)
CO2 BLDV-SCNC: 33 MMOL/L — HIGH (ref 22–30)
CO2 SERPL-SCNC: 26 MMOL/L — SIGNIFICANT CHANGE UP (ref 22–31)
COLOR SPEC: SIGNIFICANT CHANGE UP
CREAT SERPL-MCNC: 1.24 MG/DL — SIGNIFICANT CHANGE UP (ref 0.5–1.3)
DIFF PNL FLD: NEGATIVE — SIGNIFICANT CHANGE UP
EOSINOPHIL # BLD AUTO: 0.14 K/UL — SIGNIFICANT CHANGE UP (ref 0–0.5)
EOSINOPHIL NFR BLD AUTO: 0.9 % — SIGNIFICANT CHANGE UP (ref 0–6)
EPI CELLS # UR: 0 /HPF — SIGNIFICANT CHANGE UP
GAS PNL BLDV: 133 MMOL/L — LOW (ref 135–145)
GAS PNL BLDV: SIGNIFICANT CHANGE UP
GIANT PLATELETS BLD QL SMEAR: PRESENT — SIGNIFICANT CHANGE UP
GLUCOSE BLDV-MCNC: 111 MG/DL — HIGH (ref 70–99)
GLUCOSE SERPL-MCNC: 108 MG/DL — HIGH (ref 70–99)
GLUCOSE UR QL: NEGATIVE — SIGNIFICANT CHANGE UP
HCO3 BLDV-SCNC: 32 MMOL/L — HIGH (ref 21–29)
HCT VFR BLD CALC: 40.9 % — SIGNIFICANT CHANGE UP (ref 39–50)
HCT VFR BLDA CALC: 37 % — LOW (ref 39–50)
HGB BLD CALC-MCNC: 12.1 G/DL — LOW (ref 13–17)
HGB BLD-MCNC: 12.3 G/DL — LOW (ref 13–17)
HYALINE CASTS # UR AUTO: 0 /LPF — SIGNIFICANT CHANGE UP (ref 0–2)
INR BLD: 1.27 RATIO — HIGH (ref 0.88–1.16)
KETONES UR-MCNC: NEGATIVE — SIGNIFICANT CHANGE UP
LACTATE BLDV-MCNC: 1.6 MMOL/L — SIGNIFICANT CHANGE UP (ref 0.7–2)
LEUKOCYTE ESTERASE UR-ACNC: NEGATIVE — SIGNIFICANT CHANGE UP
LYMPHOCYTES # BLD AUTO: 18.6 % — SIGNIFICANT CHANGE UP (ref 13–44)
LYMPHOCYTES # BLD AUTO: 2.8 K/UL — SIGNIFICANT CHANGE UP (ref 1–3.3)
MANUAL SMEAR VERIFICATION: SIGNIFICANT CHANGE UP
MCHC RBC-ENTMCNC: 26 PG — LOW (ref 27–34)
MCHC RBC-ENTMCNC: 30.1 GM/DL — LOW (ref 32–36)
MCV RBC AUTO: 86.5 FL — SIGNIFICANT CHANGE UP (ref 80–100)
MONOCYTES # BLD AUTO: 0.93 K/UL — HIGH (ref 0–0.9)
MONOCYTES NFR BLD AUTO: 6.2 % — SIGNIFICANT CHANGE UP (ref 2–14)
MYELOCYTES NFR BLD: 3.5 % — HIGH (ref 0–0)
NEUTROPHILS # BLD AUTO: 10.67 K/UL — HIGH (ref 1.8–7.4)
NEUTROPHILS NFR BLD AUTO: 70.8 % — SIGNIFICANT CHANGE UP (ref 43–77)
NITRITE UR-MCNC: NEGATIVE — SIGNIFICANT CHANGE UP
PCO2 BLDV: 48 MMHG — SIGNIFICANT CHANGE UP (ref 35–50)
PH BLDV: 7.43 — SIGNIFICANT CHANGE UP (ref 7.35–7.45)
PH UR: 6.5 — SIGNIFICANT CHANGE UP (ref 5–8)
PLAT MORPH BLD: NORMAL — SIGNIFICANT CHANGE UP
PLATELET # BLD AUTO: 671 K/UL — HIGH (ref 150–400)
PO2 BLDV: 32 MMHG — SIGNIFICANT CHANGE UP (ref 25–45)
POTASSIUM BLDV-SCNC: 4.2 MMOL/L — SIGNIFICANT CHANGE UP (ref 3.5–5.3)
POTASSIUM SERPL-MCNC: 4.4 MMOL/L — SIGNIFICANT CHANGE UP (ref 3.5–5.3)
POTASSIUM SERPL-SCNC: 4.4 MMOL/L — SIGNIFICANT CHANGE UP (ref 3.5–5.3)
PROT SERPL-MCNC: 6.9 G/DL — SIGNIFICANT CHANGE UP (ref 6–8.3)
PROT UR-MCNC: NEGATIVE — SIGNIFICANT CHANGE UP
PROTHROM AB SERPL-ACNC: 15.1 SEC — HIGH (ref 10.6–13.6)
RBC # BLD: 4.73 M/UL — SIGNIFICANT CHANGE UP (ref 4.2–5.8)
RBC # FLD: 15.8 % — HIGH (ref 10.3–14.5)
RBC BLD AUTO: SIGNIFICANT CHANGE UP
RBC CASTS # UR COMP ASSIST: 6 /HPF — HIGH (ref 0–4)
RH IG SCN BLD-IMP: POSITIVE — SIGNIFICANT CHANGE UP
SAO2 % BLDV: 60 % — LOW (ref 67–88)
SARS-COV-2 RNA SPEC QL NAA+PROBE: SIGNIFICANT CHANGE UP
SMUDGE CELLS # BLD: PRESENT — SIGNIFICANT CHANGE UP
SODIUM SERPL-SCNC: 134 MMOL/L — LOW (ref 135–145)
SP GR SPEC: 1.03 — HIGH (ref 1.01–1.02)
UROBILINOGEN FLD QL: NEGATIVE — SIGNIFICANT CHANGE UP
WBC # BLD: 15.07 K/UL — HIGH (ref 3.8–10.5)
WBC # FLD AUTO: 15.07 K/UL — HIGH (ref 3.8–10.5)
WBC UR QL: 1 /HPF — SIGNIFICANT CHANGE UP (ref 0–5)

## 2021-07-27 PROCEDURE — 71045 X-RAY EXAM CHEST 1 VIEW: CPT | Mod: 26,76

## 2021-07-27 PROCEDURE — G1004: CPT

## 2021-07-27 PROCEDURE — 99254 IP/OBS CNSLTJ NEW/EST MOD 60: CPT

## 2021-07-27 PROCEDURE — 99285 EMERGENCY DEPT VISIT HI MDM: CPT

## 2021-07-27 PROCEDURE — 74177 CT ABD & PELVIS W/CONTRAST: CPT | Mod: 26,ME

## 2021-07-27 PROCEDURE — 93010 ELECTROCARDIOGRAM REPORT: CPT

## 2021-07-27 RX ORDER — ACETAMINOPHEN 500 MG
1000 TABLET ORAL ONCE
Refills: 0 | Status: COMPLETED | OUTPATIENT
Start: 2021-07-27 | End: 2021-07-27

## 2021-07-27 RX ORDER — FENTANYL CITRATE 50 UG/ML
70 INJECTION INTRAVENOUS ONCE
Refills: 0 | Status: DISCONTINUED | OUTPATIENT
Start: 2021-07-27 | End: 2021-07-27

## 2021-07-27 RX ORDER — PANTOPRAZOLE SODIUM 20 MG/1
40 TABLET, DELAYED RELEASE ORAL DAILY
Refills: 0 | Status: DISCONTINUED | OUTPATIENT
Start: 2021-07-27 | End: 2021-08-02

## 2021-07-27 RX ORDER — MORPHINE SULFATE 50 MG/1
4 CAPSULE, EXTENDED RELEASE ORAL ONCE
Refills: 0 | Status: DISCONTINUED | OUTPATIENT
Start: 2021-07-27 | End: 2021-07-27

## 2021-07-27 RX ORDER — HEPARIN SODIUM 5000 [USP'U]/ML
5000 INJECTION INTRAVENOUS; SUBCUTANEOUS EVERY 8 HOURS
Refills: 0 | Status: DISCONTINUED | OUTPATIENT
Start: 2021-07-27 | End: 2021-08-01

## 2021-07-27 RX ORDER — ONDANSETRON 8 MG/1
4 TABLET, FILM COATED ORAL ONCE
Refills: 0 | Status: COMPLETED | OUTPATIENT
Start: 2021-07-27 | End: 2021-07-27

## 2021-07-27 RX ORDER — METOPROLOL TARTRATE 50 MG
5 TABLET ORAL EVERY 6 HOURS
Refills: 0 | Status: DISCONTINUED | OUTPATIENT
Start: 2021-07-27 | End: 2021-07-27

## 2021-07-27 RX ORDER — ASPIRIN/CALCIUM CARB/MAGNESIUM 324 MG
300 TABLET ORAL DAILY
Refills: 0 | Status: DISCONTINUED | OUTPATIENT
Start: 2021-07-27 | End: 2021-07-27

## 2021-07-27 RX ORDER — SODIUM CHLORIDE 9 MG/ML
1000 INJECTION INTRAMUSCULAR; INTRAVENOUS; SUBCUTANEOUS ONCE
Refills: 0 | Status: COMPLETED | OUTPATIENT
Start: 2021-07-27 | End: 2021-07-27

## 2021-07-27 RX ORDER — NICOTINE POLACRILEX 2 MG
1 GUM BUCCAL DAILY
Refills: 0 | Status: DISCONTINUED | OUTPATIENT
Start: 2021-07-27 | End: 2021-08-02

## 2021-07-27 RX ORDER — SODIUM CHLORIDE 9 MG/ML
1000 INJECTION, SOLUTION INTRAVENOUS
Refills: 0 | Status: DISCONTINUED | OUTPATIENT
Start: 2021-07-27 | End: 2021-07-29

## 2021-07-27 RX ORDER — DIGOXIN 250 MCG
125 TABLET ORAL DAILY
Refills: 0 | Status: DISCONTINUED | OUTPATIENT
Start: 2021-07-27 | End: 2021-08-02

## 2021-07-27 RX ORDER — ASPIRIN/CALCIUM CARB/MAGNESIUM 324 MG
81 TABLET ORAL DAILY
Refills: 0 | Status: DISCONTINUED | OUTPATIENT
Start: 2021-07-27 | End: 2021-08-02

## 2021-07-27 RX ORDER — BENZOCAINE AND MENTHOL 5; 1 G/100ML; G/100ML
1 LIQUID ORAL DAILY
Refills: 0 | Status: DISCONTINUED | OUTPATIENT
Start: 2021-07-27 | End: 2021-07-28

## 2021-07-27 RX ADMIN — ONDANSETRON 4 MILLIGRAM(S): 8 TABLET, FILM COATED ORAL at 07:18

## 2021-07-27 RX ADMIN — Medication 5 MILLIGRAM(S): at 08:09

## 2021-07-27 RX ADMIN — MORPHINE SULFATE 4 MILLIGRAM(S): 50 CAPSULE, EXTENDED RELEASE ORAL at 07:19

## 2021-07-27 RX ADMIN — HEPARIN SODIUM 5000 UNIT(S): 5000 INJECTION INTRAVENOUS; SUBCUTANEOUS at 14:43

## 2021-07-27 RX ADMIN — ONDANSETRON 4 MILLIGRAM(S): 8 TABLET, FILM COATED ORAL at 03:51

## 2021-07-27 RX ADMIN — BENZOCAINE AND MENTHOL 1 LOZENGE: 5; 1 LIQUID ORAL at 16:28

## 2021-07-27 RX ADMIN — MORPHINE SULFATE 4 MILLIGRAM(S): 50 CAPSULE, EXTENDED RELEASE ORAL at 07:00

## 2021-07-27 RX ADMIN — PANTOPRAZOLE SODIUM 40 MILLIGRAM(S): 20 TABLET, DELAYED RELEASE ORAL at 11:35

## 2021-07-27 RX ADMIN — FENTANYL CITRATE 70 MICROGRAM(S): 50 INJECTION INTRAVENOUS at 03:51

## 2021-07-27 RX ADMIN — Medication 1000 MILLIGRAM(S): at 09:21

## 2021-07-27 RX ADMIN — Medication 125 MICROGRAM(S): at 13:48

## 2021-07-27 RX ADMIN — Medication 400 MILLIGRAM(S): at 08:59

## 2021-07-27 RX ADMIN — Medication 1000 MILLIGRAM(S): at 17:00

## 2021-07-27 RX ADMIN — Medication 1 PATCH: at 14:37

## 2021-07-27 RX ADMIN — SODIUM CHLORIDE 125 MILLILITER(S): 9 INJECTION, SOLUTION INTRAVENOUS at 07:53

## 2021-07-27 RX ADMIN — FENTANYL CITRATE 70 MICROGRAM(S): 50 INJECTION INTRAVENOUS at 07:00

## 2021-07-27 RX ADMIN — Medication 400 MILLIGRAM(S): at 16:22

## 2021-07-27 RX ADMIN — HEPARIN SODIUM 5000 UNIT(S): 5000 INJECTION INTRAVENOUS; SUBCUTANEOUS at 23:15

## 2021-07-27 RX ADMIN — MORPHINE SULFATE 4 MILLIGRAM(S): 50 CAPSULE, EXTENDED RELEASE ORAL at 07:48

## 2021-07-27 RX ADMIN — SODIUM CHLORIDE 1000 MILLILITER(S): 9 INJECTION INTRAMUSCULAR; INTRAVENOUS; SUBCUTANEOUS at 03:51

## 2021-07-27 RX ADMIN — MORPHINE SULFATE 4 MILLIGRAM(S): 50 CAPSULE, EXTENDED RELEASE ORAL at 04:41

## 2021-07-27 NOTE — ED PROVIDER NOTE - CLINICAL SUMMARY MEDICAL DECISION MAKING FREE TEXT BOX
Attending Akila:  56M with hx of HLD, CAD s/p MAINOR x 2 (July 2020) on DAPT, LBO 2/2 diverticulitis s/p John's procedure (March 2019) and reversal (June 2019) recent admission for SBO w/ medical management with plans for potential future surgery no presenting to ed w/ abd pain that feels like previous sbo, severe, throughout abd, started earlier tonight. poss nausea, last bm yest. no bloody stools, no diarrhea. afebrile vitals stable,  appears unwell and in pain , NC/AT,  conjunctiva non conjected, sclera anicteric, moist mucous membranes, neck supple, heart sounds, normal, no mrg, lungs cta b/l no wrr, abd distended w/ pos tenderness throughout, pos hernias , no visual deformities of extremities, axox3, given unwell appearnace will get early surgical consult, pain meds, labs, ct scan

## 2021-07-27 NOTE — ED PROVIDER NOTE - OBJECTIVE STATEMENT
56M with hx of HLD, CAD s/p MAINOR x 2 (July 2020) on DAPT, LBO 2/2 diverticulitis s/p John's procedure (March 2019) and reversal (June 2019) recent admission for SBO w/ medical management with plans for potential future surgery no presenting to ed w/ abd pain that feels like previous sbo, severe, throughout abd, started earlier tonight. poss nausea, last bm yest. no bloody stools, no diarrhea.

## 2021-07-27 NOTE — ED ADULT NURSE REASSESSMENT NOTE - NS ED NURSE REASSESS COMMENT FT1
Report received from AD Gaffney in [place]. Pt remains in the ED. Resting comfortably in bed. A&Ox3. Breathing spontaneously and unlabored. NAD. Pt in 10/10 abdominal pain. Pt states that he typically doesn't take metoprolol at home. RN Teams MD Trejo to find out why he is ordered for that. Pt safety maintained. Will continue to monitor. Awaiting dispo.

## 2021-07-27 NOTE — ED PROVIDER NOTE - PHYSICAL EXAMINATION
Exam:   General: Non toxic, well appearing  HENT: No pharyngeal erythema/exudate, external ear exam normal  Eyes: PEERL, EOMI  Lungs: CTA B/L, no wheezing, no rales, no ronchi  Chest: Normal Heart sounds, no murmurs, no rubs no gallops  Abdomen: Large abdomen, distended, diffusely tender, pos hernia  MSK: FROM of joints, no tenderness to palpation  Skin: No new rashes or lesions  Neuro: Alert and oriented x 3, power 5/5 x 4, CN II-XII GI, no facial asymmetry, no cerebellar findings Exam:   General: Non toxic, well appearing  HENT: No pharyngeal erythema/exudate, external ear exam normal  Eyes: PEERL, EOMI  Lungs: CTA B/L, no wheezing, no rales, no ronchi  Chest: Normal Heart sounds, no murmurs, no rubs no gallops  Abdomen: Large abdomen, distended, diffusely tender, pos hernia  MSK: FROM of joints, no tenderness to palpation  Skin: No new rashes or lesions  Neuro: Alert and oriented x 3, power 5/5 x 4, CN II-XII GI, no facial asymmetry, no cerebellar findings    CONSTITUTIONAL: Uncomfortable  SKIN: Warm dry, normal skin turgor  HEAD: NCAT  EYES: EOMI, PERRLA, no scleral icterus, conjunctiva pink  ENT: normal pharynx with no erythema or exudates  NECK: Supple; non tender. Full ROM.  CARD: RRR, no murmurs.  RESP: clear to ausculation b/l. No crackles or wheezing.  ABD: Obese, midline surgical scar w/ b/l protruding hernias partially reducible, hyperactive bowel sounds, tender to palpation  PSYCH: Cooperative, appropriate

## 2021-07-27 NOTE — ED ADULT NURSE REASSESSMENT NOTE - NS ED NURSE REASSESS COMMENT FT1
RN paged surgery team. MD Alyssa Toscano states okay to given this dose of IV push 5mg Metoprolol and she is going to speak to the attending about it. Pt remains in the ED. Resting comfortably in bed. A&Ox3. Breathing spontaneously and unlabored. NAD. NG tube in place. Pt safety maintained. Will continue to monitor. Awaiting bed.

## 2021-07-27 NOTE — H&P ADULT - NSHPLABSRESULTS_GEN_ALL_CORE
Labs:                       12.3   15.07 )-----------( [id number]      ( 27 Jul 2021 03:48 )             40.9   07-27    134<L>  |  99  |  14  ----------------------------<  108<H>  4.4   |  26  |  1.24    Ca    9.8      27 Jul 2021 03:48    TPro  6.9  /  Alb  3.1<L>  /  TBili  0.6  /  DBili  x   /  AST  17  /  ALT  37  /  AlkPhos  96  07-27  PT/INR - ( 27 Jul 2021 03:48 )   PT: 15.1 sec;   INR: 1.27 ratio         PTT - ( 27 Jul 2021 03:48 )  PTT:34.9 sec    < from: CT Abdomen and Pelvis w/ IV Cont (07.27.21 @ 04:19) >    FINDINGS:  LOWER CHEST: Aortic valvular calcifications. Decreased left lower lobe consolidation compared to prior. Trace left pleural effusion.    LIVER: Left hepatic cyst, unchanged.  BILE DUCTS: Normal caliber.  GALLBLADDER: Within normal limits.  SPLEEN: Within normal limits.  PANCREAS: Within normal limits.  ADRENALS: Within normal limits.  KIDNEYS/URETERS: Within normal limits.    BLADDER: Within normal limits.  REPRODUCTIVEORGANS: Prostate is enlarged.    BOWEL: Redemonstration of dilated small bowel loops, with transition point in the right central aspect of the ventral hernia (4-79), in a configuration similar to the prior small bowel obstruction on 7/11/2021. However, the proximal small bowel is not is dilated with more collapsed slightly thickened loops in the left aspect of the multipart large ventral abdominal wall hernia. Rectosigmoid anastomosis. 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 Appendix is normal.  PERITONEUM: Trace nonspecific interloop mesenteric edema right lower quadrant. No significant ascites. No free air.  VESSELS: Within normal limits.  RETROPERITONEUM/LYMPH NODES: No lymphadenopathy.  ABDOMINAL WALL: Redemonstration of a large multipart ventral abdominal wall hernia. Small bilateral fat-containing inguinal hernias.  BONES: Degenerative changes.    IMPRESSION:  Small bowel obstruction with configuration similar to prior obstruction 7/11/2021 with transition point suggested in the rightside of the multipart complex large ventral abdominal wall hernia.    Resolving left lower lobe pneumonia with residual mild airspace opacification at the lung base and trace left pleural effusion.

## 2021-07-27 NOTE — CONSULT NOTE ADULT - ATTENDING COMMENTS
56 year old male with past medical history of recently diagnosed paroxysmal Aflutter (prior admission), HLD, CAD s/p MAINOR x 2 (7/29/2020) on monotherapy aspirin 81 mg daily, diverticulitis s/p John's procedure (March 2019) and reversal (June 2019), complicated by multiple bowel obstructions most recently 7/11/2021 conservatively managed but was found to have paroxysmal atrial flutter that spontaneously converted now presenting with similar GI symptoms and possible plans for surgery. Cardiology consulted for pre-operative optimization.     Pre-operative/procedural Optimization  RCRI 1; 6% 30-day risk of MI, cardiac arrest, and/or death [CAD]  Functional status METs >4  No absolute contraindications including active chest pain, decompensated heart failure, and/or life threatening arrythmia.    --Intermediate/High risk patient undergoing intermediate/high risk surgery/procedure OPTIMIZED to the lowest risk predicted by the RCRI base on co-morbidities and nature of operation/procedure.   --No further cardiac testing indicated prior to surgery.   --Continue perioperatively PO/rectal aspirin 81 mg daily, IV digoxin 125 mcg, IV metoprolol 5 mg q6h.  --hold rivaroxaban but resume post operatively when clinically stable. 56 year old male with past medical history of recently diagnosed paroxysmal Aflutter (prior admission), HLD, CAD s/p MAINOR x 2 (7/29/2020) on monotherapy aspirin 81 mg daily, diverticulitis s/p John's procedure (March 2019) and reversal (June 2019), complicated by multiple bowel obstructions most recently 7/11/2021 conservatively managed but was found to have paroxysmal atrial flutter that spontaneously converted now presenting with similar GI symptoms and possible plans for surgery. Cardiology consulted for pre-operative optimization.     Pre-operative/procedural Optimization  RCRI 1; 6% 30-day risk of MI, cardiac arrest, and/or death [CAD]  Functional status METs >4  No absolute contraindications including active chest pain, decompensated heart failure, and/or life threatening arrythmia.    --Intermediate/High risk patient undergoing intermediate/high risk surgery/procedure OPTIMIZED to the lowest risk predicted by the RCRI base on co-morbidities and nature of operation/procedure.   --No further cardiac testing indicated prior to surgery.   --Continue perioperatively PO/rectal aspirin 81 mg daily, IV digoxin 125 mcg, IV metoprolol 5 mg q6h.  --hold rivaroxaban but resume post operatively when clinically stable.    Edmund Shaffer MD, MPH, BRUNA, RPVI, [facility]  Inpatient Cardiovascular Specialist; [facility] Cedillo [facility], [facility] ([facility])  ; [employer] at hospitals/[facility]  c: [phone] (text preferred and/or call)  e: [email or web address]    For all [facility] Cardiology and Cardiovascular Surgery on-service contact/call information, go to [email or web address] and use "[email or web address]" to login.  For outpatient Cardiology appointments, call  [phone] to arrange with a colleague; I do not have outpatient Cardiology clinic.

## 2021-07-27 NOTE — CONSULT NOTE ADULT - ASSESSMENT
56 yoM with HLD, CAD s/p MAINOR x 2 to pRCA (July 2020) on DAPT, LBO 2/2 diverticulitis s/p John's procedure (March 2019) and reversal (June 2019) recently discharged 2 days ago after hospitalization for nonoperative small bowel obstruction notable for Botox injection for planned hernia repair 8/19, development of pneumonia requiring ICU care for hemodynamic instability    #CAD with recent MI  -Pt on home DAPT, atorvastatin 55 yo M with HLD, CAD s/p MAINOR x 2 to pRCA (July 2020) on DAPT, LBO 2/2 diverticulitis s/p John's procedure (March 2019) and reversal (June 2019) recently discharged 2 days ago after hospitalization for nonoperative small bowel obstruction, coming in for need for surgery not SBO    #CAD with recent MI  -Pt on home DAPT from PCI to 7/29/20  -If surgical bleed risk determined to be elevated as per primary surgical team, may hold [id number] now and continue after surgery, otherwise continue perioperatively    #HLD  -May continue atorvastatin 57 yo M with HLD, CAD s/p MAINOR x 2 to pRCA (July 2020) on DAPT, LBO 2/2 diverticulitis s/p John's procedure (March 2019) and reversal (June 2019) recently discharged 2 days ago after hospitalization for nonoperative small bowel obstruction, coming in for need for surgery not SBO    #CAD with recent MI  -Pt on home DAPT from PCI to 7/29/20  -If surgical bleed risk determined to be elevated as per primary surgical team, may hold [id number] now and continue after surgery, otherwise continue perioperatively    #HLD  -May continue atorvastatin    Contact info can be found on [email or web address], password: [email or web address] with PMH of recently diagnosed paroxysmal Aflutter, HLD, CAD s/p MAINOR x 2 (July 29 2020) on DAPT, LBO 2/2 diverticulitis s/p John's procedure (March 2019) and reversal (June 2019), recently discharged 2 days ago after hospitalization for nonoperative small bowel obstruction, presenting with need for gastrojejunostomy for SBO per surgical team    #CAD c/b MI  -Pt recently d/c'ed DAPT, now only on home ASA81, last PCI 7/29/20  -If surgical bleed risk determined to be elevated as per primary surgical team, may hold ASA81 now and continue after surgery, otherwise continue ASA81    #paroxysmal Aflutter  -Pt on home digoxin 125mcg PO QD, continue digoxin 125mcg IV QD for now until pt can tolerate oral meds  -Pt on home metoprolol tartrate 12.5mg BID, can continue IV metoprolol 5mg Q6 for now given stable VS  -Pt is on home Eliquis 5mg BID, last taken 7/26 at night  -Pt has [id number] score 1, may hold Eliquis for now per surgical team, to be resumed after surgery to minimize VTE risk    #HLD  -May continue atorvastatin 40mg QD home dose    Contact info can be found on [email or web address], password: [email or web address] with PMH of recently diagnosed paroxysmal Aflutter, HLD, CAD s/p MAINOR x 2 (7/29/2020) on monotherapy aspirin 81 mg daily, diverticulitis s/p John's procedure (March 2019) and reversal (June 2019), complicated by multiple bowel obstructions most recently 7/11/2021 conservatively managed but was found to have paroxysmal atrial flutter spontaneously converting now presenting with similar GI symptoms and possible plans for surgery.     #CAD c/b MI  -Pt recently d/c'ed DAPT, now only on home ASA81, last PCI 7/29/20  - continue aspirin    #paroxysmal Aflutter  -Pt on home digoxin 125mcg PO QD, continue digoxin 125mcg IV QD for now until pt can tolerate oral meds  -Pt on home metoprolol tartrate 12.5mg BID, can continue IV metoprolol 5mg Q6 for now given stable VS  -Pt is on home Eliquis 5mg BID, last taken 7/26 at night  -Pt has [id number] score 1, may hold Eliquis for now per surgical team, to be resumed after surgery to minimize VTE risk    #HLD  -May continue atorvastatin 40mg QD home dose    Contact info can be found on [email or web address], password: [email or web address]

## 2021-07-27 NOTE — H&P ADULT - HISTORY OF PRESENT ILLNESS
Mr. Rushing is a 56 Year-Old Gentleman very well known to [facility] with history of HLD, CAD s/p MAINOR x 2 (July 2020) on DAPT, LBO 2/2 diverticulitis s/p John's procedure (March 2019) and reversal (June 2019) recently discharged 2 days ago after hospitalization for nonoperative small bowel obstruction notable for Botox injection for planned hernia repair 8/19, development of pneumonia requiring ICU care for hemodynamic instability, now presenting with one day of severe abdominal pain, nausea, obstipation, found to have small bowel obstruction with similar transition point to last admission.

## 2021-07-27 NOTE — ED ADULT NURSE NOTE - HISTORY OF COVID-19 VACCINATION
This writer received patient at start of a.m. shift from [facility].  Patient sleeping undisturbed.  Patient continued to sleep until ambulance arrived to transport patient to [facility].  Patient left without incident.     Yes

## 2021-07-27 NOTE — ED ADULT NURSE REASSESSMENT NOTE - NS ED NURSE REASSESS COMMENT FT1
Pt reports that he is still uncomforable. RN gave pt IV Tylenol. RN contacted charge RN for hospital bed. Pt reports that he is still uncomfortable. RN gave pt IV Tylenol. RN contacted charge RN for hospital bed.

## 2021-07-27 NOTE — H&P ADULT - ATTENDING COMMENTS
I have seen and examined the patient.  I agree with the surgical resident's note.  The patient took is Xarelto last night so he needs 48 hours before surgery.  The hernia is incarcerated but soft.  The NG tube is draining profuse amounts of thick fluid - will irrigate the NG tube frequently.  Will need an abd wall reconstruction and a 4-5 hour time slot in the OR.  James Yarbrough contact information [phone]

## 2021-07-27 NOTE — CONSULT NOTE ADULT - SUBJECTIVE AND OBJECTIVE BOX
Patient seen and evaluated at bedside    Chief Complaint: SBO in need of surgery    HPI:  Mr. Rushing is a 56 Year-Old Gentleman very well known to [facility] with history of HLD, CAD s/p MAINOR x 2 (2020) on DAPT, LBO 2/2 diverticulitis s/p John's procedure (2019) and reversal (2019) recently discharged 2 days ago after hospitalization for nonoperative small bowel obstruction notable for Botox injection for planned hernia repair , development of pneumonia requiring ICU care for hemodynamic instability, now presenting with one day of severe abdominal pain, nausea, obstipation, found to have small bowel obstruction with similar transition point to last admission.    (2021 05:30)    In the ED, VS WNL. Patient s/p enteric tube placement, received digoxin 125mg IV push, PPI IV, morphine 4mg IV x2, IV Tylenol 1g x 1, and pt could not receive oral ASA81. Patient currently denying CP, dyspnea, palpitations, presyncope, syncope, HA, ab pain.    PMHx:   Sleep apnea  HLD (hyperlipidemia)  Morbid obesity  Diverticulitis  History of neuropathy  Colostomy present    PSHx:   No significant past surgical history  S/P repair of hydrocele    FAMILY HISTORY:  Family history of lung cancer  mother  age 40, hx lung cancer    Allergies:  No Known Allergies    Home Medications:  acetaminophen 325 mg oral tablet: 3 tab(s) orally every 6 hours, As needed, Mild Pain (1 - 3) (2021 16:46)  melatonin 5 mg oral tablet: 1 tab(s) orally once a day (at bedtime) (2021 16:46)  polyethylene glycol 3350 oral powder for reconstitution: 17 gram(s) orally once a day (2021 16:46)  senna oral tablet: 2 tab(s) orally once a day (at bedtime) (2021 16:46)  simethicone 80 mg oral tablet, chewable: 1 tab(s) orally every 6 hours, As needed, Gas (2021 16:46)    Current Medications:   acetaminophen  IVPB .. 1000 milliGRAM(s) IV Intermittent once  aspirin  chewable 81 milliGRAM(s) Oral daily  digoxin  Injectable 125 MICROGram(s) IV Push daily  heparin   Injectable 5000 Unit(s) SubCutaneous every 8 hours  lactated ringers. 1000 milliLiter(s) IV Continuous <Continuous>  nicotine -   7 mG/24Hr(s) Patch 1 patch Transdermal daily  pantoprazole  Injectable 40 milliGRAM(s) IV Push daily    Social History  Smoking History: denies  Alcohol Use: denies  Drug Use: denies    REVIEW OF SYSTEMS:  Constitutional:     [X] negative [ ] fevers [ ] chills [ ] weight loss [ ] weight gain  HEENT:                  [X] negative [ ] dry eyes [ ] eye irritation [ ] postnasal drip [ ] nasal congestion  CV:                         [X] negative  [ ] chest pain [ ] orthopnea [ ] palpitations [ ] murmur  Resp:                     [X] negative [ ] cough [ ] shortness of breath [ ] wheezing [ ] sputum [ ] hemoptysis  GI:                          [X] negative [ ] nausea [ ] vomiting [ ] diarrhea [ ] constipation [ ] abd pain [ ] dysphagia   :                        [X] negative [ ] dysuria [ ] nocturia [ ] hematuria [ ] increased urinary frequency  MSK:                      [X] negative [ ] back pain [ ] myalgias [ ] arthralgias [ ] fracture  Skin:                       [X] negative [ ] rash [ ] itch  Neuro:                   [X] negative [ ] headache [ ] dizziness [ ] syncope [ ] weakness [ ] numbness  Psych:                    [X] negative [ ] anxiety [ ] depression  Endo:                     [X] negative [ ] diabetes [ ] thyroid problem  Heme/Lymph:      [X] negative [ ] anemia [ ] bleeding problem  Allergic/Immune: [X] negative [ ] itchy eyes [ ] nasal discharge [ ] hives [ ] angioedema    [X] All other systems negative or otherwise described above.    ICU Vital Signs Last 24 Hrs  T(C): 36.9 (2021 14:43), Max: 36.9 (2021 04:43)  T(F): 98.4 (2021 14:43), Max: 98.4 (2021 04:43)  HR: 84 (2021 14:43) (76 - 92)  BP: 140/86 (2021 14:43) (130/96 - 168/110)  RR: 18 (2021 14:43) (18 - 20)  SpO2: 96% (2021 14:43) (94% - 96%)    Daily Height in cm: 182.88 (2021 02:56)    Daily     Physical Exam:  GENERAL: No acute distress, well-developed  HEAD:  Atraumatic, Normocephalic  ENT: EOMI, conjunctiva and sclera clear, Neck supple, No JVD, moist mucosa  CHEST/LUNG: Clear to auscultation bilaterally; No wheeze, equal breath sounds bilaterally   BACK: No spinal tenderness  HEART: Regular rate and rhythm; No murmurs, rubs, or gallops, radial and DP 2+ b/l, euvolemic  ABDOMEN: Soft, Nontender, Nondistended  EXTREMITIES:  No clubbing, cyanosis, or edema  PSYCH: Nl behavior, nl affect  NEUROLOGY: AAOx3, non-focal  SKIN: Normal color, No rashes or lesions  LINES: no central lines present    Cardiovascular Diagnostic Testing    ECG: Personally reviewed    Echo: Personally reviewed    Stress Testing: none  < from: TTE with Doppler (w/Cont) (21 @ 18:37) >  EF (Visual Estimate): 70-75 %  ------------------------------------------------------------------------  Observations:  Mitral Valve: Mitral valve not well visualized.  Aortic Valve/Aorta: Aortic valve not well visualized.  Normal aortic root size.  Left Atrium: Left atrium not well visualized.  Left Ventricle: Endocardial visualization enhanced with  intravenous injection of Ultrasonic Enhancing Agent  (Definity).  Images are suboptimaldespite the use of Definity.  Normal left ventricular internal dimensions and wall  thicknesses.  Hyperdynamic left ventricle. Probably no regional  abnormalities.  Right Heart: Suboptimal visualization of the right heart.  Tricuspid and pulmonic valves not well visualized.  Pericardium/Pleura: Normal pericardium with no pericardial  effusion. Pericardial fat pad noted.  Hemodynamic:  ------------------------------------------------------------------------  Conclusions:  Endocardial visualizationenhanced with intravenous  injection of Ultrasonic Enhancing Agent (Definity).  Images are suboptimal despite the use of Definity.  Normal left ventricular internal dimensions and wall  thicknesses.  Hyperdynamic left ventricle. Probably no regional  abnormalities.    Cath: < from: Cardiac Cath Lab - Adult (20 @ 13:17) >  CORONARY VESSELS: The coronary circulation is right dominant.  LM:   --  LM: Normal.  LAD:   --  Distal LAD: Angiography showed mild atherosclerosis with no flow  limiting lesions.  CX:   --  Proximal circumflex: There was a 30 % stenosis.  --  OM1: There was a 20 % stenosis.  RCA:   --  Proximal RCA: There was a 99 % stenosis.  COMPLICATIONS: There were no complications.  DIAGNOSTIC RECOMMENDATIONS: Successful PCI to the RCA in setting of ACS.  Continue DAPT for one year in setting of ACS.  Continue aggressive medical management and risk factor modification  INTERVENTIONAL RECOMMENDATIONS: Successful PCI to the RCA in setting of  ACS.  Continue DAPT for one year in setting of ACS.      Imaging: < from: CT Abdomen and Pelvis w/ IV Cont (21 @ 04:19) >  BOWEL: Redemonstration of dilated small bowel loops, with transition point in the right central aspect of the ventral hernia (4-79), in a configuration similar to the prior small bowel obstruction on 2021. However, the proximal small bowel is not is dilated with more collapsed slightly thickened loops in the left aspect of the multipart large ventral abdominal wall hernia. Rectosigmoid anastomosis. 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 Appendix is normal.  PERITONEUM: Trace nonspecific interloop mesenteric edema right lower quadrant. No significant ascites. No free air.  VESSELS: Within normal limits.  RETROPERITONEUM/LYMPH NODES: No lymphadenopathy.  ABDOMINAL WALL: Redemonstration of a large multipart ventral abdominal wall hernia. Small bilateral fat-containing inguinal hernias.  BONES: Degenerative changes.    IMPRESSION:  Small bowel obstruction with configuration similar to prior obstruction 2021 with transition point suggested in the rightside of the multipart complex large ventral abdominal wall hernia.    Resolving left lower lobe pneumonia with residual mild airspace opacification at the lung base and trace left pleural effusion.    CXR: Personally reviewed. No pleural effusion or pneumothorax, enteric tube in stomach    Labs: Personally reviewed                        12.3   15.07 )-----------( 671      ( 2021 03:48 )             40.9     07-27    134<L>  |  99  |  14  ----------------------------<  108<H>  4.4   |  26  |  1.24    Ca    9.8      2021 03:48    TPro  6.9  /  Alb  3.1<L>  /  TBili  0.6  /  DBili  x   /  AST  17  /  ALT  37  /  AlkPhos  96  07-27    PT/INR - ( 2021 03:48 )   PT: 15.1 sec;   INR: 1.27 ratio    PTT - ( 2021 03:48 )  PTT:34.9 sec Patient seen and evaluated at bedside    Chief Complaint: SBO    HPI:  Mr. Rushing is a 56 Year-Old Gentleman very well known to [facility] with history of HLD, CAD s/p MAINOR x 2 (2020) on DAPT, LBO 2/2 diverticulitis s/p John's procedure (2019) and reversal (2019) recently discharged 2 days ago after hospitalization for nonoperative small bowel obstruction notable for Botox injection for planned hernia repair , development of pneumonia requiring ICU care for hemodynamic instability, now presenting with one day of severe abdominal pain, nausea, obstipation, found to have inability to pas gas or stool, consistent with small bowel obstruction with similar transition point to last admission.     In the ED, VS WNL. Patient s/p enteric tube placement, received digoxin 125mg IV push, PPI IV, morphine 4mg IV x2, IV Tylenol 1g x 1, and pt could not receive oral ASA81. Patient currently denying CP, dyspnea, palpitations, presyncope, syncope, HA, ab pain.    PMHx:   Sleep apnea  HLD (hyperlipidemia)  Morbid obesity  Diverticulitis  History of neuropathy  Colostomy present    PSHx:   No significant past surgical history  S/P repair of hydrocele    FAMILY HISTORY:  Family history of lung cancer  mother  age 40, hx lung cancer    Allergies:  No Known Allergies    Home Medications:  acetaminophen 325 mg oral tablet: 3 tab(s) orally every 6 hours, As needed, Mild Pain (1 - 3) (2021 16:46)  melatonin 5 mg oral tablet: 1 tab(s) orally once a day (at bedtime) (2021 16:46)  polyethylene glycol 3350 oral powder for reconstitution: 17 gram(s) orally once a day (2021 16:46)  senna oral tablet: 2 tab(s) orally once a day (at bedtime) (2021 16:46)  simethicone 80 mg oral tablet, chewable: 1 tab(s) orally every 6 hours, As needed, Gas (2021 16:46)    Current Medications:   acetaminophen  IVPB .. 1000 milliGRAM(s) IV Intermittent once  aspirin  chewable 81 milliGRAM(s) Oral daily  digoxin  Injectable 125 MICROGram(s) IV Push daily  heparin   Injectable 5000 Unit(s) SubCutaneous every 8 hours  lactated ringers. 1000 milliLiter(s) IV Continuous <Continuous>  nicotine -   7 mG/24Hr(s) Patch 1 patch Transdermal daily  pantoprazole  Injectable 40 milliGRAM(s) IV Push daily    Home meds:   --acetaminophen 325 mg oral tablet: 3 tab(s) orally every 6 hours, As needed, Mild Pain (1 - 3)  --apixaban 5 mg oral tablet: 1 tab(s) orally 2 times a day MDD:2 tabs  --aspirin 81 mg oral delayed release tablet: 1 tab(s) orally once a day  --atorvastatin 40 mg oral tablet: 1 tab(s) orally once a day (at bedtime)  --digoxin 125 mcg (0.125 mg) oral tablet: 1 tab(s) orally once a day MDD:1 tab  --melatonin 5 mg oral tablet: 1 tab(s) orally once a day (at bedtime)  --metoprolol tartrate 25 mg oral tablet: 0.5 tab(s) orally 2 times a day MDD:1 tab  --nicotine 7 mg/24 hr transdermal film, extended release: 1 patch transdermally once a day   --pantoprazole 40 mg oral delayed release tablet: 1 tab(s) orally once a day (before a meal) MDD:1 tab  --polyethylene glycol 3350 oral powder for reconstitution: 17 gram(s) orally once a day  --senna oral tablet: 2 tab(s) orally once a day (at bedtime)  --sertraline 50 mg oral tablet: 1 tab(s) orally once a day MDD:1 tab  --simethicone 80 mg oral tablet, chewable: 1 tab(s) orally every 6 hours, As needed, Gas    Social History  He currently uses cocaine- last use a few days ago, did not elaborate  Smokes 1/2 PPD and has for many years.  Drinks alcohol socially.  He is going through a divorce.    REVIEW OF SYSTEMS:  Constitutional:     [X] negative [ ] fevers [ ] chills [ ] weight loss [ ] weight gain  HEENT:                  [X] negative [ ] dry eyes [ ] eye irritation [ ] postnasal drip [ ] nasal congestion  CV:                         [X] negative  [ ] chest pain [ ] orthopnea [ ] palpitations [ ] murmur  Resp:                     [X] negative [ ] cough [ ] shortness of breath [ ] wheezing [ ] sputum [ ] hemoptysis  GI:                          [ ] negative [X] nausea [ ] vomiting [ ] diarrhea [X] constipation [X] abd pain [ ] dysphagia   :                        [X] negative [ ] dysuria [ ] nocturia [ ] hematuria [ ] increased urinary frequency  MSK:                      [X] negative [ ] back pain [ ] myalgias [ ] arthralgias [ ] fracture  Skin:                       [X] negative [ ] rash [ ] itch  Neuro:                   [X] negative [ ] headache [ ] dizziness [ ] syncope [ ] weakness [ ] numbness  Psych:                    [X] negative [ ] anxiety [ ] depression  Endo:                     [X] negative [ ] diabetes [ ] thyroid problem  Heme/Lymph:      [X] negative [ ] anemia [ ] bleeding problem  Allergic/Immune: [X] negative [ ] itchy eyes [ ] nasal discharge [ ] hives [ ] angioedema    [X] All other systems negative or otherwise described above.    ICU Vital Signs Last 24 Hrs  T(C): 36.9 (2021 14:43), Max: 36.9 (2021 04:43)  T(F): 98.4 (2021 14:43), Max: 98.4 (2021 04:43)  HR: 84 (2021 14:43) (76 - 92)  BP: 140/86 (2021 14:43) (130/96 - 168/110)  RR: 18 (2021 14:43) (18 - 20)  SpO2: 96% (2021 14:43) (94% - 96%)    Daily Height in cm: 182.88 (2021 02:56)    Daily     Physical Exam:  GENERAL: No acute distress, well-developed, NG tube in place  HEAD:  Atraumatic, Normocephalic  ENT: EOMI, conjunctiva and sclera clear, Neck supple, No JVD, moist mucosa  CHEST/LUNG: Clear to auscultation bilaterally; No wheeze, equal breath sounds bilaterally   BACK: No spinal tenderness  HEART: Regular rate and rhythm; No murmurs, rubs, or gallops, radial and DP 2+ b/l, euvolemic  ABDOMEN: Large, soft, minimally tender in R hemiabdomen at site of hernia. No rebound tenderness, no acute peritoneal signs, no guarding  EXTREMITIES:  No clubbing, cyanosis, or edema  PSYCH: Nl behavior, nl affect  NEUROLOGY: AAOx3, non-focal  SKIN: Normal color, No rashes or lesions  LINES: no central lines present    Cardiovascular Diagnostic Testing    ECG: Personally reviewed    Echo: Personally reviewed    Stress Testing: none  < from: TTE with Doppler (w/Cont) (21 @ 18:37) >  EF (Visual Estimate): 70-75 %  ------------------------------------------------------------------------  Observations:  Mitral Valve: Mitral valve not well visualized.  Aortic Valve/Aorta: Aortic valve not well visualized.  Normal aortic root size.  Left Atrium: Left atrium not well visualized.  Left Ventricle: Endocardial visualization enhanced with  intravenous injection of Ultrasonic Enhancing Agent  (Definity).  Images are suboptimaldespite the use of Definity.  Normal left ventricular internal dimensions and wall  thicknesses.  Hyperdynamic left ventricle. Probably no regional  abnormalities.  Right Heart: Suboptimal visualization of the right heart.  Tricuspid and pulmonic valves not well visualized.  Pericardium/Pleura: Normal pericardium with no pericardial  effusion. Pericardial fat pad noted.  Hemodynamic:  ------------------------------------------------------------------------  Conclusions:  Endocardial visualizationenhanced with intravenous  injection of Ultrasonic Enhancing Agent (Definity).  Images are suboptimal despite the use of Definity.  Normal left ventricular internal dimensions and wall  thicknesses.  Hyperdynamic left ventricle. Probably no regional  abnormalities.    Cath: < from: Cardiac Cath Lab - Adult (20 @ 13:17) >  CORONARY VESSELS: The coronary circulation is right dominant.  LM:   --  LM: Normal.  LAD:   --  Distal LAD: Angiography showed mild atherosclerosis with no flow  limiting lesions.  CX:   --  Proximal circumflex: There was a 30 % stenosis.  --  OM1: There was a 20 % stenosis.  RCA:   --  Proximal RCA: There was a 99 % stenosis.  COMPLICATIONS: There were no complications.  DIAGNOSTIC RECOMMENDATIONS: Successful PCI to the RCA in setting of ACS.  Continue DAPT for one year in setting of ACS.  Continue aggressive medical management and risk factor modification  INTERVENTIONAL RECOMMENDATIONS: Successful PCI to the RCA in setting of  ACS.  Continue DAPT for one year in setting of ACS.      Imaging: < from: CT Abdomen and Pelvis w/ IV Cont (21 @ 04:19) >  BOWEL: Redemonstration of dilated small bowel loops, with transition point in the right central aspect of the ventral hernia (4-79), in a configuration similar to the prior small bowel obstruction on 2021. However, the proximal small bowel is not is dilated with more collapsed slightly thickened loops in the left aspect of the multipart large ventral abdominal wall hernia. Rectosigmoid anastomosis. 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 Appendix is normal.  PERITONEUM: Trace nonspecific interloop mesenteric edema right lower quadrant. No significant ascites. No free air.  VESSELS: Within normal limits.  RETROPERITONEUM/LYMPH NODES: No lymphadenopathy.  ABDOMINAL WALL: Redemonstration of a large multipart ventral abdominal wall hernia. Small bilateral fat-containing inguinal hernias.  BONES: Degenerative changes.    IMPRESSION:  Small bowel obstruction with configuration similar to prior obstruction 2021 with transition point suggested in the rightside of the multipart complex large ventral abdominal wall hernia.    Resolving left lower lobe pneumonia with residual mild airspace opacification at the lung base and trace left pleural effusion.    CXR: Personally reviewed. No pleural effusion or pneumothorax, enteric tube in stomach    Labs: Personally reviewed                        12.3   15.07 )-----------( 671      ( 2021 03:48 )             40.9     07-27    134<L>  |  99  |  14  ----------------------------<  108<H>  4.4   |  26  |  1.24    Ca    9.8      2021 03:48    TPro  6.9  /  Alb  3.1<L>  /  TBili  0.6  /  DBili  x   /  AST  17  /  ALT  37  /  AlkPhos  96  07-27    PT/INR - ( 2021 03:48 )   PT: 15.1 sec;   INR: 1.27 ratio    PTT - ( 2021 03:48 )  PTT:34.9 sec Patient seen and evaluated at bedside    Chief Complaint: SBO    HPI:  56yoM with PMH of recently diagnosed paroxysmal Aflutter, HLD, CAD s/p MAINOR x 2 (2020) on DAPT, LBO 2/2 diverticulitis s/p John's procedure (2019) and reversal (2019) recently discharged 2 days ago after hospitalization for nonoperative small bowel obstruction notable for Botox injection for planned hernia repair , development of pneumonia requiring ICU care for hemodynamic instability, now presenting with one day of severe abdominal pain, nausea, obstipation, found to have inability to pas gas or stool, consistent with small bowel obstruction with similar transition point to last admission.     In the ED, VS WNL. Patient s/p enteric tube placement, received digoxin 125mg IV push, PPI IV, morphine 4mg IV x2, IV Tylenol 1g x 1, and pt could not receive oral ASA81. Patient currently denying CP, dyspnea, palpitations, presyncope, syncope, HA, blurry vision.     PMHx:   Sleep apnea  HLD (hyperlipidemia)  Morbid obesity  Diverticulitis  History of neuropathy  Colostomy present    PSHx:   No significant past surgical history  S/P repair of hydrocele    FAMILY HISTORY:  Family history of lung cancer  mother  age 40, hx lung cancer    Allergies:  No Known Allergies    Current Medications:   acetaminophen  IVPB .. 1000 milliGRAM(s) IV Intermittent once  aspirin  chewable 81 milliGRAM(s) Oral daily  digoxin  Injectable 125 MICROGram(s) IV Push daily  heparin   Injectable 5000 Unit(s) SubCutaneous every 8 hours  lactated ringers. 1000 milliLiter(s) IV Continuous <Continuous>  nicotine -   7 mG/24Hr(s) Patch 1 patch Transdermal daily  pantoprazole  Injectable 40 milliGRAM(s) IV Push daily    Home meds:   --acetaminophen 325 mg oral tablet: 3 tab(s) orally every 6 hours, As needed, Mild Pain (1 - 3)  --apixaban 5 mg oral tablet: 1 tab(s) orally 2 times a day MDD:2 tabs  --aspirin 81 mg oral delayed release tablet: 1 tab(s) orally once a day  --atorvastatin 40 mg oral tablet: 1 tab(s) orally once a day (at bedtime)  --digoxin 125 mcg (0.125 mg) oral tablet: 1 tab(s) orally once a day MDD:1 tab  --melatonin 5 mg oral tablet: 1 tab(s) orally once a day (at bedtime)  --metoprolol tartrate 25 mg oral tablet: 0.5 tab(s) orally 2 times a day MDD:1 tab  --nicotine 7 mg/24 hr transdermal film, extended release: 1 patch transdermally once a day   --pantoprazole 40 mg oral delayed release tablet: 1 tab(s) orally once a day (before a meal) MDD:1 tab  --polyethylene glycol 3350 oral powder for reconstitution: 17 gram(s) orally once a day  --senna oral tablet: 2 tab(s) orally once a day (at bedtime)  --sertraline 50 mg oral tablet: 1 tab(s) orally once a day MDD:1 tab  --simethicone 80 mg oral tablet, chewable: 1 tab(s) orally every 6 hours, As needed, Gas    Social History  He currently uses cocaine- last use a "few days" ago, did not elaborate  Smokes 1/2 PPD and has for many years.  Drinks alcohol socially.  He is going through a divorce.    REVIEW OF SYSTEMS:  Constitutional:     [X] negative [ ] fevers [ ] chills [ ] weight loss [ ] weight gain  HEENT:                  [X] negative [ ] dry eyes [ ] eye irritation [ ] postnasal drip [ ] nasal congestion  CV:                         [X] negative  [ ] chest pain [ ] orthopnea [ ] palpitations [ ] murmur  Resp:                     [X] negative [ ] cough [ ] shortness of breath [ ] wheezing [ ] sputum [ ] hemoptysis  GI:                          [ ] negative [X] nausea [ ] vomiting [ ] diarrhea [X] constipation [X] abd pain [ ] dysphagia   :                        [X] negative [ ] dysuria [ ] nocturia [ ] hematuria [ ] increased urinary frequency  MSK:                      [X] negative [ ] back pain [ ] myalgias [ ] arthralgias [ ] fracture  Skin:                       [X] negative [ ] rash [ ] itch  Neuro:                   [X] negative [ ] headache [ ] dizziness [ ] syncope [ ] weakness [ ] numbness  Psych:                    [X] negative [ ] anxiety [ ] depression  Endo:                     [X] negative [ ] diabetes [ ] thyroid problem  Heme/Lymph:      [X] negative [ ] anemia [ ] bleeding problem  Allergic/Immune: [X] negative [ ] itchy eyes [ ] nasal discharge [ ] hives [ ] angioedema    [X] All other systems negative or otherwise described above.    ICU Vital Signs Last 24 Hrs  T(C): 36.9 (2021 14:43), Max: 36.9 (2021 04:43)  T(F): 98.4 (2021 14:43), Max: 98.4 (2021 04:43)  HR: 84 (2021 14:43) (76 - 92)  BP: 140/86 (2021 14:43) (130/96 - 168/110)  RR: 18 (2021 14:43) (18 - 20)  SpO2: 96% (2021 14:43) (94% - 96%)    Daily Height in cm: 182.88 (2021 02:56)    Daily     Physical Exam:  GENERAL: No acute distress, well-developed, NG tube in place  HEAD:  Atraumatic, Normocephalic  ENT: EOMI, conjunctiva and sclera clear, Neck supple, No JVD, moist mucosa  CHEST/LUNG: Clear to auscultation bilaterally; No wheeze, equal breath sounds bilaterally   BACK: No spinal tenderness  HEART: Regular rate and rhythm; No murmurs, rubs, or gallops, radial and DP 2+ b/l, euvolemic  ABDOMEN: Large, soft, minimally tender in R hemiabdomen at site of hernia. No rebound tenderness, no acute peritoneal signs, no guarding  EXTREMITIES:  No clubbing, cyanosis, or edema  PSYCH: Nl behavior, nl affect  NEUROLOGY: AAOx3, non-focal  SKIN: Normal color, No rashes or lesions  LINES: no central lines present    Cardiovascular Diagnostic Testing    ECG: Personally reviewed    Echo: Personally reviewed    Stress Testing: none  < from: TTE with Doppler (w/Cont) (21 @ 18:37) >  EF (Visual Estimate): 70-75 %  ------------------------------------------------------------------------  Observations:  Mitral Valve: Mitral valve not well visualized.  Aortic Valve/Aorta: Aortic valve not well visualized.  Normal aortic root size.  Left Atrium: Left atrium not well visualized.  Left Ventricle: Endocardial visualization enhanced with  intravenous injection of Ultrasonic Enhancing Agent  (Definity).  Images are suboptimaldespite the use of Definity.  Normal left ventricular internal dimensions and wall  thicknesses.  Hyperdynamic left ventricle. Probably no regional  abnormalities.  Right Heart: Suboptimal visualization of the right heart.  Tricuspid and pulmonic valves not well visualized.  Pericardium/Pleura: Normal pericardium with no pericardial  effusion. Pericardial fat pad noted.  Hemodynamic:  ------------------------------------------------------------------------  Conclusions:  Endocardial visualizationenhanced with intravenous  injection of Ultrasonic Enhancing Agent (Definity).  Images are suboptimal despite the use of Definity.  Normal left ventricular internal dimensions and wall  thicknesses.  Hyperdynamic left ventricle. Probably no regional  abnormalities.    Cath: < from: Cardiac Cath Lab - Adult (20 @ 13:17) >  CORONARY VESSELS: The coronary circulation is right dominant.  LM:   --  LM: Normal.  LAD:   --  Distal LAD: Angiography showed mild atherosclerosis with no flow  limiting lesions.  CX:   --  Proximal circumflex: There was a 30 % stenosis.  --  OM1: There was a 20 % stenosis.  RCA:   --  Proximal RCA: There was a 99 % stenosis.  COMPLICATIONS: There were no complications.  DIAGNOSTIC RECOMMENDATIONS: Successful PCI to the RCA in setting of ACS.  Continue DAPT for one year in setting of ACS.  Continue aggressive medical management and risk factor modification  INTERVENTIONAL RECOMMENDATIONS: Successful PCI to the RCA in setting of  ACS.  Continue DAPT for one year in setting of ACS.      Imaging: < from: CT Abdomen and Pelvis w/ IV Cont (21 @ 04:19) >  BOWEL: Redemonstration of dilated small bowel loops, with transition point in the right central aspect of the ventral hernia (4-79), in a configuration similar to the prior small bowel obstruction on 2021. However, the proximal small bowel is not is dilated with more collapsed slightly thickened loops in the left aspect of the multipart large ventral abdominal wall hernia. Rectosigmoid anastomosis. 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 Appendix is normal.  PERITONEUM: Trace nonspecific interloop mesenteric edema right lower quadrant. No significant ascites. No free air.  VESSELS: Within normal limits.  RETROPERITONEUM/LYMPH NODES: No lymphadenopathy.  ABDOMINAL WALL: Redemonstration of a large multipart ventral abdominal wall hernia. Small bilateral fat-containing inguinal hernias.  BONES: Degenerative changes.    IMPRESSION:  Small bowel obstruction with configuration similar to prior obstruction 2021 with transition point suggested in the rightside of the multipart complex large ventral abdominal wall hernia.    Resolving left lower lobe pneumonia with residual mild airspace opacification at the lung base and trace left pleural effusion.    CXR: Personally reviewed. No pleural effusion or pneumothorax, enteric tube in stomach    Labs: Personally reviewed                        12.3   15.07 )-----------( 671      ( 2021 03:48 )             40.9     07-27    134<L>  |  99  |  14  ----------------------------<  108<H>  4.4   |  26  |  1.24    Ca    9.8      2021 03:48    TPro  6.9  /  Alb  3.1<L>  /  TBili  0.6  /  DBili  x   /  AST  17  /  ALT  37  /  AlkPhos  96  07-27    PT/INR - ( 2021 03:48 )   PT: 15.1 sec;   INR: 1.27 ratio    PTT - ( 2021 03:48 )  PTT:34.9 sec

## 2021-07-27 NOTE — ED PROVIDER NOTE - NS ED ROS FT
General: No fevers, chills, malaise  Head: No headache  Eyes: No blurry vision, double vision, pain with eye movement  ENT: No runny nose, ear pain, difficulty hearing  Neck: No neck pain  Chest: No chest pain, sob, palpitations, wheezing  GI: Pos abd pain, nausea, decreased ouput  Extremities: No arthralgias, myalgias  Skin: No new rashes/lesions  Neuro: No headache, weakness, difficulty with ambulation

## 2021-07-27 NOTE — ED ADULT NURSE REASSESSMENT NOTE - NS ED NURSE REASSESS COMMENT FT1
Pt NG tube flushed. PT reports pain is coming back. RN paged admitting team for pain medication orders. Pt remains in the ED. Resting comfortably in bed. A&Ox3. Breathing spontaneously and unlabored. NAD. Pt safety maintained. Awaiting bed. Pt NG tube flushed. PT reports pain is coming back. RN paged admitting team for pain medication orders. Pt remains in the ED. Resting comfortably in bed. A&Ox3. Breathing spontaneously and unlabored. NAD. Pt safety maintained. Awaiting bed.  Pharmacy contacted for medications. Awaiting meds.

## 2021-07-27 NOTE — H&P ADULT - NSHPPHYSICALEXAM_GEN_ALL_CORE
General: Occasionally yelling in discomfort.  Neuro: Alert and oriented, no focal deficits, moves all extremities spontaneously.  HEENT: Extraocular movements intact. Mucosa moist.   Respiratory: Airway patent, respirations unlabored.  Cardiovascular: Pulse present.   Abdomen: Large, soft, minimally tender in R hemiabdomen at site of hernia.  Genitourinary: No obvious lesions.  Extremities: Warm, moves all four.  Musculoskeletal: No tenderness of extremities, mobile joints.   Integumentary: No obvious lesions.

## 2021-07-27 NOTE — H&P ADULT - ASSESSMENT
Mr. Rushing is a 56 Year-Old Gentleman with history of HLD, CAD s/p MAINOR x 2 (July 2020) on DAPT, LBO 2/2 diverticulitis s/p John's procedure (March 2019) and reversal (June 2019) recently discharged 2 days ago after hospitalization for nonoperative small bowel obstruction presenting with recurrent small bowel obstruction with similar transition point to last admission.    - Admit to Surgery.   - Nasogastric Tube, Nil Per Os, Intravenous Fluids Resuscitation Therapy.  - Will continue to hold home Brillinta, Full Anticoagulation while NPO.   - Start rectal ASA in lieu of home Per Os ASA.   - Continue home cardiac medications in IV formulation.     Green Team Surgery Pager #[phone]

## 2021-07-27 NOTE — ED PROVIDER NOTE - ATTENDING CONTRIBUTION TO CARE
MD Wilburn:  patient seen and evaluated personally.   I agree with the History & Physical,  Impression & Plan other than what was detailed in my note.  MD Wilburn  56M with hx of HLD, CAD s/p MAINOR x 2 (July 2020) on DAPT, LBO 2/2 diverticulitis s/p John's procedure (March 2019) and reversal (June 2019) recent admission for SBO w/ medical management with plans for potential future surgery no presenting to ed w/ abd pain MD Wilburn:  patient seen and evaluated personally.   I agree with the History & Physical,  Impression & Plan other than what was detailed in my note.  MD Wilburn  56M with hx of HLD, CAD s/p MAINOR x 2 (July 2020) on DAPT, LBO 2/2 diverticulitis s/p John's procedure (March 2019) and reversal (June 2019) recent admission for SBO w/ medical management with plans for potential future surgery no presenting to ed w/ abd pain that feels like previous sbo, severe, throughout abd, started earlier tonight. poss nausea, last bm yest. no bloody stools, no diarrhea. afebrile vitals stable,  appears unwell and in pain , NC/AT,  conjunctiva non conjected, sclera anicteric, moist mucous membranes, neck supple, heart sounds, normal, no mrg, lungs cta b/l no wrr, abd distended w/ pos tenderness throughout, pos hernias , no visual deformities of extremities, axox3, given unwell appearnace will get early surgical consult, pain meds, labs, ct scan MD Wilburn:  patient seen and evaluated personally.   I agree with the History & Physical,  Impression & Plan other than what was detailed in my note.  MD Wilburn  see mdm

## 2021-07-27 NOTE — ED ADULT NURSE NOTE - OBJECTIVE STATEMENT
55 y/o male coming in from home complaining of abdominal pain. AOx4, ambulatory, PMHx Diverticulitis, History of neuropathy, HLD, Morbid obesity, MI s/p 2 stents, Sleep apnea arrives to [facility] ED by car from home with c/o abdominal pain. Pt states 2019 obstruction due to diverticulitis, s/p colostomy and later resection. Pt. was discharged from [facility] 2 days ago after 2 week stay with bowel obstruction complicated with pneumonia and UTI and in ICU. Reports pain returned today, 9/10, near umbilicus. Reporting nausea. No vomiting at this time. Denies chest pain, fever, chills. Abdomen distended. Large hernia noted. Pt. 18G RAC. VSS. Will continue to reassess.

## 2021-07-28 LAB
ALBUMIN SERPL ELPH-MCNC: 2.8 G/DL — LOW (ref 3.3–5)
ALP SERPL-CCNC: 88 U/L — SIGNIFICANT CHANGE UP (ref 40–120)
ALT FLD-CCNC: 26 U/L — SIGNIFICANT CHANGE UP (ref 10–45)
ANION GAP SERPL CALC-SCNC: 10 MMOL/L — SIGNIFICANT CHANGE UP (ref 5–17)
AST SERPL-CCNC: 13 U/L — SIGNIFICANT CHANGE UP (ref 10–40)
BILIRUB SERPL-MCNC: 0.7 MG/DL — SIGNIFICANT CHANGE UP (ref 0.2–1.2)
BUN SERPL-MCNC: 14 MG/DL — SIGNIFICANT CHANGE UP (ref 7–23)
CALCIUM SERPL-MCNC: 9.7 MG/DL — SIGNIFICANT CHANGE UP (ref 8.4–10.5)
CHLORIDE SERPL-SCNC: 96 MMOL/L — SIGNIFICANT CHANGE UP (ref 96–108)
CO2 SERPL-SCNC: 29 MMOL/L — SIGNIFICANT CHANGE UP (ref 22–31)
COVID-19 SPIKE DOMAIN AB INTERP: POSITIVE
COVID-19 SPIKE DOMAIN ANTIBODY RESULT: >250 U/ML — HIGH
CREAT SERPL-MCNC: 1.18 MG/DL — SIGNIFICANT CHANGE UP (ref 0.5–1.3)
CULTURE RESULTS: SIGNIFICANT CHANGE UP
GLUCOSE SERPL-MCNC: 96 MG/DL — SIGNIFICANT CHANGE UP (ref 70–99)
HCT VFR BLD CALC: 40.2 % — SIGNIFICANT CHANGE UP (ref 39–50)
HGB BLD-MCNC: 12.1 G/DL — LOW (ref 13–17)
MAGNESIUM SERPL-MCNC: 2 MG/DL — SIGNIFICANT CHANGE UP (ref 1.6–2.6)
MCHC RBC-ENTMCNC: 26.8 PG — LOW (ref 27–34)
MCHC RBC-ENTMCNC: 30.1 GM/DL — LOW (ref 32–36)
MCV RBC AUTO: 88.9 FL — SIGNIFICANT CHANGE UP (ref 80–100)
NRBC # BLD: 0 /100 WBCS — SIGNIFICANT CHANGE UP (ref 0–0)
PHOSPHATE SERPL-MCNC: 4 MG/DL — SIGNIFICANT CHANGE UP (ref 2.5–4.5)
PLATELET # BLD AUTO: 626 K/UL — HIGH (ref 150–400)
POTASSIUM SERPL-MCNC: 4.4 MMOL/L — SIGNIFICANT CHANGE UP (ref 3.5–5.3)
POTASSIUM SERPL-SCNC: 4.4 MMOL/L — SIGNIFICANT CHANGE UP (ref 3.5–5.3)
PROT SERPL-MCNC: 6.3 G/DL — SIGNIFICANT CHANGE UP (ref 6–8.3)
RBC # BLD: 4.52 M/UL — SIGNIFICANT CHANGE UP (ref 4.2–5.8)
RBC # FLD: 15.7 % — HIGH (ref 10.3–14.5)
SARS-COV-2 IGG+IGM SERPL QL IA: >250 U/ML — HIGH
SARS-COV-2 IGG+IGM SERPL QL IA: POSITIVE
SODIUM SERPL-SCNC: 135 MMOL/L — SIGNIFICANT CHANGE UP (ref 135–145)
SPECIMEN SOURCE: SIGNIFICANT CHANGE UP
WBC # BLD: 8.41 K/UL — SIGNIFICANT CHANGE UP (ref 3.8–10.5)
WBC # FLD AUTO: 8.41 K/UL — SIGNIFICANT CHANGE UP (ref 3.8–10.5)

## 2021-07-28 RX ORDER — ACETAMINOPHEN 500 MG
1000 TABLET ORAL ONCE
Refills: 0 | Status: COMPLETED | OUTPATIENT
Start: 2021-07-28 | End: 2021-07-28

## 2021-07-28 RX ORDER — BENZOCAINE AND MENTHOL 5; 1 G/100ML; G/100ML
1 LIQUID ORAL EVERY 6 HOURS
Refills: 0 | Status: DISCONTINUED | OUTPATIENT
Start: 2021-07-28 | End: 2021-08-02

## 2021-07-28 RX ORDER — BENZOCAINE 10 %
1 GEL (GRAM) MUCOUS MEMBRANE EVERY 4 HOURS
Refills: 0 | Status: DISCONTINUED | OUTPATIENT
Start: 2021-07-28 | End: 2021-07-28

## 2021-07-28 RX ORDER — TETRACAINE/BENZOCAINE/BUTAMBEN 2%-14%-2%
1 OINTMENT (GRAM) TOPICAL EVERY 4 HOURS
Refills: 0 | Status: DISCONTINUED | OUTPATIENT
Start: 2021-07-28 | End: 2021-08-02

## 2021-07-28 RX ADMIN — Medication 1 PATCH: at 12:35

## 2021-07-28 RX ADMIN — Medication 1 SPRAY(S): at 22:24

## 2021-07-28 RX ADMIN — PANTOPRAZOLE SODIUM 40 MILLIGRAM(S): 20 TABLET, DELAYED RELEASE ORAL at 12:28

## 2021-07-28 RX ADMIN — BENZOCAINE AND MENTHOL 1 LOZENGE: 5; 1 LIQUID ORAL at 12:35

## 2021-07-28 RX ADMIN — Medication 1 SPRAY(S): at 12:27

## 2021-07-28 RX ADMIN — HEPARIN SODIUM 5000 UNIT(S): 5000 INJECTION INTRAVENOUS; SUBCUTANEOUS at 22:14

## 2021-07-28 RX ADMIN — SODIUM CHLORIDE 125 MILLILITER(S): 9 INJECTION, SOLUTION INTRAVENOUS at 05:08

## 2021-07-28 RX ADMIN — Medication 1000 MILLIGRAM(S): at 01:05

## 2021-07-28 RX ADMIN — BENZOCAINE AND MENTHOL 1 LOZENGE: 5; 1 LIQUID ORAL at 22:30

## 2021-07-28 RX ADMIN — Medication 125 MICROGRAM(S): at 12:27

## 2021-07-28 RX ADMIN — Medication 400 MILLIGRAM(S): at 12:27

## 2021-07-28 RX ADMIN — Medication 1 PATCH: at 12:28

## 2021-07-28 RX ADMIN — BENZOCAINE AND MENTHOL 1 LOZENGE: 5; 1 LIQUID ORAL at 17:00

## 2021-07-28 RX ADMIN — Medication 400 MILLIGRAM(S): at 00:29

## 2021-07-28 RX ADMIN — Medication 1 PATCH: at 00:08

## 2021-07-28 RX ADMIN — Medication 1 PATCH: at 19:00

## 2021-07-28 RX ADMIN — HEPARIN SODIUM 5000 UNIT(S): 5000 INJECTION INTRAVENOUS; SUBCUTANEOUS at 13:17

## 2021-07-28 RX ADMIN — Medication 1000 MILLIGRAM(S): at 13:19

## 2021-07-28 RX ADMIN — SODIUM CHLORIDE 125 MILLILITER(S): 9 INJECTION, SOLUTION INTRAVENOUS at 12:35

## 2021-07-28 RX ADMIN — HEPARIN SODIUM 5000 UNIT(S): 5000 INJECTION INTRAVENOUS; SUBCUTANEOUS at 05:08

## 2021-07-28 RX ADMIN — Medication 1 PATCH: at 10:39

## 2021-07-28 NOTE — PROGRESS NOTE ADULT - SUBJECTIVE AND OBJECTIVE BOX
SURGERY  Pager: #[phone]      INTERVAL EVENTS/SUBJECTIVE: No acute events overnight. On morning rounds...     ______________________________________________  OBJECTIVE:   T(C): 36.8 (07-27-21 @ 23:57), Max: 37.1 (07-27-21 @ 23:20)  HR: 89 (07-27-21 @ 23:57) (60 - 92)  BP: 138/90 (07-27-21 @ 23:57) (130/96 - 168/110)  RR: 18 (07-27-21 @ 23:57) (18 - 20)  SpO2: 95% (07-27-21 @ 23:57) (94% - 100%)  Wt(kg): --  CAPILLARY BLOOD GLUCOSE        I&O's Detail    27 Jul 2021 07:01  -  28 Jul 2021 01:54  --------------------------------------------------------  IN:    IV PiggyBack: 100 mL    Lactated Ringers: 375 mL  Total IN: 475 mL    OUT:    Oral Fluid: 0 mL  Total OUT: 0 mL    Total NET: 475 mL          Physical exam:  Gen: resting in bed comfortably in NAD  Chest: no increased WOB, regular inspiratory effort   Abdomen: Soft, nontender, nondistended with no rebound tenderness or guarding. Incisions clean, dry, intact, with no erythema.   Vascular: WWP, CARSON x4  NEURO: awake, alert  ______________________________________________  LABS:  CBC Full  -  ( 27 Jul 2021 03:48 )  WBC Count : 15.07 K/uL  RBC Count : 4.73 M/uL  Hemoglobin : 12.3 g/dL  Hematocrit : 40.9 %  Platelet Count - Automated : 671 K/uL  Mean Cell Volume : 86.5 fl  Mean Cell Hemoglobin : 26.0 pg  Mean Cell Hemoglobin Concentration : 30.1 gm/dL  Auto Neutrophil # : 10.67 K/uL  Auto Lymphocyte # : 2.80 K/uL  Auto Monocyte # : 0.93 K/uL  Auto Eosinophil # : 0.14 K/uL  Auto Basophil # : 0.00 K/uL  Auto Neutrophil % : 70.8 %  Auto Lymphocyte % : 18.6 %  Auto Monocyte % : 6.2 %  Auto Eosinophil % : 0.9 %  Auto Basophil % : 0.0 %    07-27    134<L>  |  99  |  14  ----------------------------<  108<H>  4.4   |  26  |  1.24    Ca    9.8      27 Jul 2021 03:48    TPro  6.9  /  Alb  3.1<L>  /  TBili  0.6  /  DBili  x   /  AST  17  /  ALT  37  /  AlkPhos  96  07-27    _____________________________________________  RADIOLOGY:       SURGERY  Pager: #[phone]      INTERVAL EVENTS/SUBJECTIVE: No acute events overnight. On morning rounds, he describes having abdominal pain. NG tube was flushed. He took his last dose of eliquis on Monday 7/26.    ______________________________________________  OBJECTIVE:   T(C): 36.8 (07-27-21 @ 23:57), Max: 37.1 (07-27-21 @ 23:20)  HR: 89 (07-27-21 @ 23:57) (60 - 92)  BP: 138/90 (07-27-21 @ 23:57) (130/96 - 168/110)  RR: 18 (07-27-21 @ 23:57) (18 - 20)  SpO2: 95% (07-27-21 @ 23:57) (94% - 100%)  Wt(kg): --  CAPILLARY BLOOD GLUCOSE        I&O's Detail    27 Jul 2021 07:01  -  28 Jul 2021 01:54  --------------------------------------------------------  IN:    IV PiggyBack: 100 mL    Lactated Ringers: 375 mL  Total IN: 475 mL    OUT:    Oral Fluid: 0 mL  Total OUT: 0 mL    Total NET: 475 mL          Physical exam:  Gen: resting in bed comfortably in NAD  Chest: no increased WOB, regular inspiratory effort   Abdomen: Soft, diffuse tenderness to palpation, distended.   Vascular: WWP, CARSON x4  NEURO: awake, alert  ______________________________________________  LABS:  CBC Full  -  ( 27 Jul 2021 03:48 )  WBC Count : 15.07 K/uL  RBC Count : 4.73 M/uL  Hemoglobin : 12.3 g/dL  Hematocrit : 40.9 %  Platelet Count - Automated : 671 K/uL  Mean Cell Volume : 86.5 fl  Mean Cell Hemoglobin : 26.0 pg  Mean Cell Hemoglobin Concentration : 30.1 gm/dL  Auto Neutrophil # : 10.67 K/uL  Auto Lymphocyte # : 2.80 K/uL  Auto Monocyte # : 0.93 K/uL  Auto Eosinophil # : 0.14 K/uL  Auto Basophil # : 0.00 K/uL  Auto Neutrophil % : 70.8 %  Auto Lymphocyte % : 18.6 %  Auto Monocyte % : 6.2 %  Auto Eosinophil % : 0.9 %  Auto Basophil % : 0.0 %    07-27    134<L>  |  99  |  14  ----------------------------<  108<H>  4.4   |  26  |  1.24    Ca    9.8      27 Jul 2021 03:48    TPro  6.9  /  Alb  3.1<L>  /  TBili  0.6  /  DBili  x   /  AST  17  /  ALT  37  /  AlkPhos  96  07-27    _____________________________________________  RADIOLOGY:

## 2021-07-28 NOTE — PROGRESS NOTE ADULT - ASSESSMENT
56M hx HLD, CAD s/p MAINOR x 2 (July 2020) on DAPT, LBO 2/2 diverticulitis s/p John's procedure (March 2019) and reversal (June 2019) recently discharged 2 days ago after hospitalization for nonoperative small bowel obstruction presenting with recurrent small bowel obstruction with similar transition point to last admission.    PLAN:  - NGT/NPO/IVF  - Will continue to hold home Brillinta, Full Anticoagulation while NPO  - Continue home cardiac medications in IV formulation, rectal ASA   - Pain control PRN  - OOB / ambulating as tolerated     Green Team Surgery  #[id number]  56M hx HLD, CAD s/p MAINOR x 2 (July 2020) on DAPT, LBO 2/2 diverticulitis s/p John's procedure (March 2019) and reversal (June 2019) recently discharged 2 days ago after hospitalization for nonoperative small bowel obstruction presenting with recurrent small bowel obstruction with similar transition point to last admission.    PLAN:  - NGT/NPO/IVF  - Will continue to hold home Brillinta, Full Anticoagulation while NPO  - Continue home cardiac medications in IV formulation, chewable ASA  - Pain control PRN  - OOB / ambulating as tolerated     Green Team Surgery  #[id number]  56M hx HLD, CAD s/p MAINOR x 2 (July 2020) on DAPT, LBO 2/2 diverticulitis s/p John's procedure (March 2019) and reversal (June 2019) recently discharged 2 days ago after hospitalization for nonoperative small bowel obstruction presenting with recurrent small bowel obstruction with similar transition point to last admission.    PLAN:  - OR Friday  - NGT/NPO/IVF  - Will continue to hold home Brillinta, Full Anticoagulation while NPO  - Continue home cardiac medications in IV formulation, chewable ASA  - Pain control PRN  - OOB / ambulating as tolerated     Green Team Surgery  #[id number]

## 2021-07-29 RX ORDER — DEXTROSE MONOHYDRATE, SODIUM CHLORIDE, AND POTASSIUM CHLORIDE 50; .745; 4.5 G/1000ML; G/1000ML; G/1000ML
1000 INJECTION, SOLUTION INTRAVENOUS
Refills: 0 | Status: DISCONTINUED | OUTPATIENT
Start: 2021-07-29 | End: 2021-08-02

## 2021-07-29 RX ORDER — LANOLIN ALCOHOL/MO/W.PET/CERES
10 CREAM (GRAM) TOPICAL AT BEDTIME
Refills: 0 | Status: DISCONTINUED | OUTPATIENT
Start: 2021-07-29 | End: 2021-07-29

## 2021-07-29 RX ADMIN — BENZOCAINE AND MENTHOL 1 LOZENGE: 5; 1 LIQUID ORAL at 09:28

## 2021-07-29 RX ADMIN — Medication 1 SPRAY(S): at 09:28

## 2021-07-29 RX ADMIN — Medication 125 MICROGRAM(S): at 12:23

## 2021-07-29 RX ADMIN — PANTOPRAZOLE SODIUM 40 MILLIGRAM(S): 20 TABLET, DELAYED RELEASE ORAL at 12:22

## 2021-07-29 RX ADMIN — BENZOCAINE AND MENTHOL 1 LOZENGE: 5; 1 LIQUID ORAL at 15:27

## 2021-07-29 RX ADMIN — Medication 81 MILLIGRAM(S): at 12:22

## 2021-07-29 RX ADMIN — Medication 1 PATCH: at 19:48

## 2021-07-29 RX ADMIN — DEXTROSE MONOHYDRATE, SODIUM CHLORIDE, AND POTASSIUM CHLORIDE 125 MILLILITER(S): 50; .745; 4.5 INJECTION, SOLUTION INTRAVENOUS at 17:36

## 2021-07-29 RX ADMIN — DEXTROSE MONOHYDRATE, SODIUM CHLORIDE, AND POTASSIUM CHLORIDE 125 MILLILITER(S): 50; .745; 4.5 INJECTION, SOLUTION INTRAVENOUS at 08:54

## 2021-07-29 RX ADMIN — HEPARIN SODIUM 5000 UNIT(S): 5000 INJECTION INTRAVENOUS; SUBCUTANEOUS at 21:11

## 2021-07-29 RX ADMIN — HEPARIN SODIUM 5000 UNIT(S): 5000 INJECTION INTRAVENOUS; SUBCUTANEOUS at 13:51

## 2021-07-29 RX ADMIN — Medication 1 PATCH: at 12:22

## 2021-07-29 RX ADMIN — Medication 1 PATCH: at 12:13

## 2021-07-29 RX ADMIN — HEPARIN SODIUM 5000 UNIT(S): 5000 INJECTION INTRAVENOUS; SUBCUTANEOUS at 05:54

## 2021-07-29 RX ADMIN — Medication 1 PATCH: at 07:10

## 2021-07-29 NOTE — PROGRESS NOTE ADULT - SUBJECTIVE AND OBJECTIVE BOX
SURGERY  Pager: #[phone]      INTERVAL EVENTS/SUBJECTIVE: No acute events overnight. On morning rounds...     ______________________________________________  OBJECTIVE:   T(C): 36.5 (07-28-21 @ 21:51), Max: 37 (07-28-21 @ 12:17)  HR: 86 (07-28-21 @ 21:51) (86 - 91)  BP: 109/75 (07-28-21 @ 21:51) (109/75 - 131/79)  RR: 18 (07-28-21 @ 21:51) (18 - 18)  SpO2: 94% (07-28-21 @ 21:51) (92% - 95%)  Wt(kg): --  CAPILLARY BLOOD GLUCOSE        I&O's Detail    27 Jul 2021 07:01  -  28 Jul 2021 07:00  --------------------------------------------------------  IN:    IV PiggyBack: 100 mL    Lactated Ringers: 1000 mL  Total IN: 1100 mL    OUT:    Nasogastric/Oral tube (mL): 400 mL    Oral Fluid: 0 mL    Voided (mL): 400 mL  Total OUT: 800 mL    Total NET: 300 mL      28 Jul 2021 07:01  -  29 Jul 2021 00:17  --------------------------------------------------------  IN:    Lactated Ringers: 2000 mL  Total IN: 2000 mL    OUT:    Nasogastric/Oral tube (mL): 1500 mL    Oral Fluid: 0 mL    Voided (mL): 580 mL  Total OUT: 2080 mL    Total NET: -80 mL          Physical exam:  Gen: resting in bed comfortably in NAD  Chest: no increased WOB, regular inspiratory effort   Abdomen: Soft, nontender, nondistended with no rebound tenderness or guarding. Incisions clean, dry, intact, with no erythema.   Vascular: WWP, CARSON x4  NEURO: awake, alert  ______________________________________________  LABS:  CBC Full  -  ( 28 Jul 2021 07:27 )  WBC Count : 8.41 K/uL  RBC Count : 4.52 M/uL  Hemoglobin : 12.1 g/dL  Hematocrit : 40.2 %  Platelet Count - Automated : 626 K/uL  Mean Cell Volume : 88.9 fl  Mean Cell Hemoglobin : 26.8 pg  Mean Cell Hemoglobin Concentration : 30.1 gm/dL  Auto Neutrophil # : x  Auto Lymphocyte # : x  Auto Monocyte # : x  Auto Eosinophil # : x  Auto Basophil # : x  Auto Neutrophil % : x  Auto Lymphocyte % : x  Auto Monocyte % : x  Auto Eosinophil % : x  Auto Basophil % : x    07-28    135  |  96  |  14  ----------------------------<  96  4.4   |  29  |  1.18    Ca    9.7      28 Jul 2021 07:25  Phos  4.0     07-28  Mg     2.0     07-28    TPro  6.3  /  Alb  2.8<L>  /  TBili  0.7  /  DBili  x   /  AST  13  /  ALT  26  /  AlkPhos  88  07-28    _____________________________________________  RADIOLOGY:

## 2021-07-29 NOTE — PROGRESS NOTE ADULT - ASSESSMENT
56M hx HLD, CAD s/p MAINOR x 2 (July 2020) on DAPT, LBO 2/2 diverticulitis s/p John's procedure (March 2019) and reversal (June 2019) recently discharged 2 days ago after hospitalization for nonoperative small bowel obstruction presenting with recurrent small bowel obstruction with similar transition point to last admission.    PLAN:  - OR Friday  - NGT/NPO/IVF  - Will continue to hold home Brillinta, Full Anticoagulation while NPO  - Continue home cardiac medications in IV formulation, chewable ASA  - Pain control PRN  - OOB / ambulating as tolerated     Green Team Surgery  #[id number]

## 2021-07-30 LAB
ANION GAP SERPL CALC-SCNC: 11 MMOL/L — SIGNIFICANT CHANGE UP (ref 5–17)
BUN SERPL-MCNC: 9 MG/DL — SIGNIFICANT CHANGE UP (ref 7–23)
CALCIUM SERPL-MCNC: 9.3 MG/DL — SIGNIFICANT CHANGE UP (ref 8.4–10.5)
CHLORIDE SERPL-SCNC: 103 MMOL/L — SIGNIFICANT CHANGE UP (ref 96–108)
CO2 SERPL-SCNC: 28 MMOL/L — SIGNIFICANT CHANGE UP (ref 22–31)
CREAT SERPL-MCNC: 1.12 MG/DL — SIGNIFICANT CHANGE UP (ref 0.5–1.3)
GLUCOSE SERPL-MCNC: 102 MG/DL — HIGH (ref 70–99)
HCT VFR BLD CALC: 38.2 % — LOW (ref 39–50)
HGB BLD-MCNC: 11.9 G/DL — LOW (ref 13–17)
MAGNESIUM SERPL-MCNC: 1.8 MG/DL — SIGNIFICANT CHANGE UP (ref 1.6–2.6)
MCHC RBC-ENTMCNC: 27.1 PG — SIGNIFICANT CHANGE UP (ref 27–34)
MCHC RBC-ENTMCNC: 31.2 GM/DL — LOW (ref 32–36)
MCV RBC AUTO: 87 FL — SIGNIFICANT CHANGE UP (ref 80–100)
NRBC # BLD: 0 /100 WBCS — SIGNIFICANT CHANGE UP (ref 0–0)
PHOSPHATE SERPL-MCNC: 3.2 MG/DL — SIGNIFICANT CHANGE UP (ref 2.5–4.5)
PLATELET # BLD AUTO: 464 K/UL — HIGH (ref 150–400)
POTASSIUM SERPL-MCNC: 4 MMOL/L — SIGNIFICANT CHANGE UP (ref 3.5–5.3)
POTASSIUM SERPL-SCNC: 4 MMOL/L — SIGNIFICANT CHANGE UP (ref 3.5–5.3)
RBC # BLD: 4.39 M/UL — SIGNIFICANT CHANGE UP (ref 4.2–5.8)
RBC # FLD: 15.2 % — HIGH (ref 10.3–14.5)
SODIUM SERPL-SCNC: 142 MMOL/L — SIGNIFICANT CHANGE UP (ref 135–145)
WBC # BLD: 8.42 K/UL — SIGNIFICANT CHANGE UP (ref 3.8–10.5)
WBC # FLD AUTO: 8.42 K/UL — SIGNIFICANT CHANGE UP (ref 3.8–10.5)

## 2021-07-30 RX ORDER — MAGNESIUM SULFATE 500 MG/ML
2 VIAL (ML) INJECTION ONCE
Refills: 0 | Status: COMPLETED | OUTPATIENT
Start: 2021-07-30 | End: 2021-07-30

## 2021-07-30 RX ADMIN — Medication 1 SPRAY(S): at 10:37

## 2021-07-30 RX ADMIN — Medication 1 PATCH: at 13:21

## 2021-07-30 RX ADMIN — Medication 125 MICROGRAM(S): at 10:36

## 2021-07-30 RX ADMIN — Medication 1 PATCH: at 19:00

## 2021-07-30 RX ADMIN — HEPARIN SODIUM 5000 UNIT(S): 5000 INJECTION INTRAVENOUS; SUBCUTANEOUS at 05:38

## 2021-07-30 RX ADMIN — Medication 1 PATCH: at 06:50

## 2021-07-30 RX ADMIN — PANTOPRAZOLE SODIUM 40 MILLIGRAM(S): 20 TABLET, DELAYED RELEASE ORAL at 10:35

## 2021-07-30 RX ADMIN — BENZOCAINE AND MENTHOL 1 LOZENGE: 5; 1 LIQUID ORAL at 18:33

## 2021-07-30 RX ADMIN — HEPARIN SODIUM 5000 UNIT(S): 5000 INJECTION INTRAVENOUS; SUBCUTANEOUS at 13:22

## 2021-07-30 RX ADMIN — HEPARIN SODIUM 5000 UNIT(S): 5000 INJECTION INTRAVENOUS; SUBCUTANEOUS at 21:03

## 2021-07-30 RX ADMIN — Medication 81 MILLIGRAM(S): at 10:33

## 2021-07-30 RX ADMIN — BENZOCAINE AND MENTHOL 1 LOZENGE: 5; 1 LIQUID ORAL at 10:37

## 2021-07-30 RX ADMIN — Medication 50 GRAM(S): at 10:35

## 2021-07-30 RX ADMIN — Medication 1 PATCH: at 12:00

## 2021-07-30 RX ADMIN — DEXTROSE MONOHYDRATE, SODIUM CHLORIDE, AND POTASSIUM CHLORIDE 125 MILLILITER(S): 50; .745; 4.5 INJECTION, SOLUTION INTRAVENOUS at 13:22

## 2021-07-30 NOTE — PROGRESS NOTE ADULT - SUBJECTIVE AND OBJECTIVE BOX
SURGERY  Pager: #[phone]      INTERVAL EVENTS/SUBJECTIVE: No acute events overnight. On morning rounds...     ______________________________________________  OBJECTIVE:   T(C): 36.3 (07-29-21 @ 21:19), Max: 36.7 (07-29-21 @ 01:20)  HR: 84 (07-29-21 @ 21:19) (81 - 94)  BP: 113/75 (07-29-21 @ 21:19) (105/71 - 136/90)  RR: 18 (07-29-21 @ 21:19) (18 - 20)  SpO2: 95% (07-29-21 @ 21:19) (92% - 96%)  Wt(kg): --  CAPILLARY BLOOD GLUCOSE        I&O's Detail    28 Jul 2021 07:01  -  29 Jul 2021 07:00  --------------------------------------------------------  IN:    Lactated Ringers: 3000 mL  Total IN: 3000 mL    OUT:    Nasogastric/Oral tube (mL): 2450 mL    Oral Fluid: 0 mL    Voided (mL): 880 mL  Total OUT: 3330 mL    Total NET: -330 mL      29 Jul 2021 07:01  -  30 Jul 2021 00:44  --------------------------------------------------------  IN:    dextrose 5% + sodium chloride 0.45% w/ Additives: 1875 mL    Lactated Ringers: 125 mL  Total IN: 2000 mL    OUT:    Nasogastric/Oral tube (mL): 1000 mL    Oral Fluid: 0 mL    Voided (mL): 1300 mL  Total OUT: 2300 mL    Total NET: -300 mL          Physical exam:  Gen: resting in bed comfortably in NAD  Chest: no increased WOB, regular inspiratory effort   Abdomen: Soft, nontender, nondistended with no rebound tenderness or guarding. Incisions clean, dry, intact, with no erythema.   Vascular: WWP, CARSON x4  NEURO: awake, alert  ______________________________________________  LABS:  CBC Full  -  ( 28 Jul 2021 07:27 )  WBC Count : 8.41 K/uL  RBC Count : 4.52 M/uL  Hemoglobin : 12.1 g/dL  Hematocrit : 40.2 %  Platelet Count - Automated : 626 K/uL  Mean Cell Volume : 88.9 fl  Mean Cell Hemoglobin : 26.8 pg  Mean Cell Hemoglobin Concentration : 30.1 gm/dL  Auto Neutrophil # : x  Auto Lymphocyte # : x  Auto Monocyte # : x  Auto Eosinophil # : x  Auto Basophil # : x  Auto Neutrophil % : x  Auto Lymphocyte % : x  Auto Monocyte % : x  Auto Eosinophil % : x  Auto Basophil % : x    07-28    135  |  96  |  14  ----------------------------<  96  4.4   |  29  |  1.18    Ca    9.7      28 Jul 2021 07:25  Phos  4.0     07-28  Mg     2.0     07-28    TPro  6.3  /  Alb  2.8<L>  /  TBili  0.7  /  DBili  x   /  AST  13  /  ALT  26  /  AlkPhos  88  07-28    _____________________________________________  RADIOLOGY:     SURGERY  Pager: #[phone]      INTERVAL EVENTS/SUBJECTIVE: No acute events overnight. On morning rounds endorses passing gas and having BM, pain controlled.     ______________________________________________  OBJECTIVE:   T(C): 36.3 (07-29-21 @ 21:19), Max: 36.7 (07-29-21 @ 01:20)  HR: 84 (07-29-21 @ 21:19) (81 - 94)  BP: 113/75 (07-29-21 @ 21:19) (105/71 - 136/90)  RR: 18 (07-29-21 @ 21:19) (18 - 20)  SpO2: 95% (07-29-21 @ 21:19) (92% - 96%)  Wt(kg): --  CAPILLARY BLOOD GLUCOSE        I&O's Detail    28 Jul 2021 07:01  -  29 Jul 2021 07:00  --------------------------------------------------------  IN:    Lactated Ringers: 3000 mL  Total IN: 3000 mL    OUT:    Nasogastric/Oral tube (mL): 2450 mL    Oral Fluid: 0 mL    Voided (mL): 880 mL  Total OUT: 3330 mL    Total NET: -330 mL      29 Jul 2021 07:01  -  30 Jul 2021 00:44  --------------------------------------------------------  IN:    dextrose 5% + sodium chloride 0.45% w/ Additives: 1875 mL    Lactated Ringers: 125 mL  Total IN: 2000 mL    OUT:    Nasogastric/Oral tube (mL): 1000 mL    Oral Fluid: 0 mL    Voided (mL): 1300 mL  Total OUT: 2300 mL    Total NET: -300 mL          Physical exam:  Gen: resting in bed comfortably in NAD  HEENT: NGT in place  Chest: no increased WOB, regular inspiratory effort   Abdomen: Soft, obese, nontender, nondistended with no rebound tenderness or guarding. Incisions clean, dry, intact, with no erythema. Large reducible ventral hernias present.   Vascular: WWP, CARSON x4  NEURO: awake, alert  ______________________________________________  LABS:  CBC Full  -  ( 28 Jul 2021 07:27 )  WBC Count : 8.41 K/uL  RBC Count : 4.52 M/uL  Hemoglobin : 12.1 g/dL  Hematocrit : 40.2 %  Platelet Count - Automated : 626 K/uL  Mean Cell Volume : 88.9 fl  Mean Cell Hemoglobin : 26.8 pg  Mean Cell Hemoglobin Concentration : 30.1 gm/dL  Auto Neutrophil # : x  Auto Lymphocyte # : x  Auto Monocyte # : x  Auto Eosinophil # : x  Auto Basophil # : x  Auto Neutrophil % : x  Auto Lymphocyte % : x  Auto Monocyte % : x  Auto Eosinophil % : x  Auto Basophil % : x    07-28    135  |  96  |  14  ----------------------------<  96  4.4   |  29  |  1.18    Ca    9.7      28 Jul 2021 07:25  Phos  4.0     07-28  Mg     2.0     07-28    TPro  6.3  /  Alb  2.8<L>  /  TBili  0.7  /  DBili  x   /  AST  13  /  ALT  26  /  AlkPhos  88  07-28    _____________________________________________  RADIOLOGY:

## 2021-07-30 NOTE — ED ADULT TRIAGE NOTE - BP NONINVASIVE SYSTOLIC (MM HG)
CHIEF COMPLAINT:  Chief Complaint   Patient presents with   • Office Visit     3 month follow up    • Other     HM: colon, td/tdap, osteo, ppsv23       HISTORY OF PRESENT ILLNESS:    GERD:  She complains of heartburn. .She denies trouble swallowing, a sticking sensation in the throat, acid taste in mouth in the mornings, epigastric pain, nausea.  The patient needs to use her prn medication occasionally.  Using omeprazole every morning.        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  Challenging.      Headaches:  Getting a lot of headaches lately.  Not stress.  Pain on the right side of the head.  Can be on either side.  Come and go quickly.  Severe pain.  Show up at home.  Can wake her up at night.  No vision changes.  No nausea, no vomiting.  Little wobbly.  No falling over.  No passing out.  Getting them a few times a week.  Does not know what brings them on.  No migraines.  Using extra strength tylenol at night.  Not much stress in life.      Syncope:  Walking with dog in the morning and in the afternoon.  No headaches with walking.  No chest pains.  No palpitations.  Not feeling dizzy.  No heart racing.  No skipping.      Bipolar:  Mood is doing well.  Positive.  Some enjoyable things.  Enjoying dog and neighbors.  A lot with bed bugs in his apartment.  Her apartment is clear.  Not worrying too much.  Worries about mom, having a lot of ramos issues.  Brother with health issues.  Sleeping well.    Constipation:  Bowels are working off and on.  Little constipated recently.  Had a BM yesterday.  Using stool softener.  No laxatives.      Asthma, Allergies:  Using inhaler when it is humid.  Using the dulera every night.  Breathing well.  Not having runny nose.  Using nasal spray as needed.  No claritin or antihistamines.      Hypercholesterolemia :  Taking cholesterol pill every night.  Not eating well.  Not eating much at all.  Does not like cooking for one.  Tries to get some protein and  vegetables.          Patient Active Problem List   Diagnosis   • Anxiety   • Gastroesophageal reflux disease without esophagitis   • Migraine headache   • Asthma   • Pure hypercholesterolemia   • Impaired fasting glucose   • Encounter to discuss colonoscopy results   • Edema   • Irritable bowel syndrome   • HTN (hypertension)   • Pneumonia due to anaerobic bacteria (CMS/HCC)   • Penicillin allergy (papular eruption)   • Chronic coughing   • Wheezing   • SOB (shortness of breath)   • Acquired hypothyroidism   • Essential hypertension, benign   • Cervicalgia   • Cervical myofascial pain syndrome   • Mixed hyperlipidemia   • Insomnia, unspecified   • Follow-up examination, following unspecified surgery   • Recurrent major depressive disorder, in partial remission (CMS/HCC)   • Generalized anxiety disorder   • Seasonal allergic rhinitis   • Moderate persistent asthma without complication   • Chronic bilateral low back pain with right-sided sciatica       MEDICATIONS:  Current Outpatient Medications   Medication Sig Dispense Refill   • tiZANidine (ZANAFLEX) 2 MG tablet Take 1-2 tablets by mouth every 6 hours as needed (as needed fo rmuscle spasms.). 80 tablet 1   • meloxicam (MOBIC) 15 MG tablet Take 0.5-1 tablets by mouth daily as needed for Pain. To help with aches and pains 90 tablet 1   • omeprazole (PriLOSEC) 40 MG capsule Take 1 capsule by mouth daily. 90 capsule 1   • potassium chloride (KLOR-CON) 10 MEQ ER tablet Take 1 tablet by mouth daily. 90 tablet 1   • pravastatin (PRAVACHOL) 20 MG tablet Take 1 tablet by mouth daily. 90 tablet 1   • Synthroid 175 MCG tablet Take 1 tablet by mouth daily. 90 tablet 1   • ALPRAZolam (XANAX) 1 MG tablet 1 Tablet(s) by mouth 3 times a day (as needed) 90 tablet 3   • fluoxetine (PROzac) 40 MG capsule Take 1 Capsule(s) by mouth 1 times a day. 30 capsule 3   • lamotrigine (LAMICTAL) 200 MG tablet Take 1 Tablet(s) by mouth 1 times a day (at bedtime). 30 tablet 3   •  mometasone-formoterol (Dulera) 200-5 MCG/ACT inhaler Inhale 2 puffs into the lungs 2 times daily. 13 g 2   • fluticasone (FLONASE) 50 MCG/ACT nasal spray Spray 1 spray in each nostril 2 times daily. 48 g 1   • dicyclomine (BENTYL) 20 MG tablet Take 1 tablet by mouth 4 times daily as needed (abdominal cramping). 60 tablet 1   • amitriptyline (ELAVIL) 50 MG tablet Take 1 tablet by mouth nightly. 90 tablet 1   • fluoxetine (PROzac) 40 MG capsule Take 1 capsule by mouth daily. 30 capsule 3   • albuterol 108 (90 Base) MCG/ACT inhaler Inhale 2 puffs into the lungs every 4 hours as needed for Shortness of Breath or Wheezing. 8.5 g 1   • amphetamine-dextroamphetamine (ADDERALL) 20 MG tablet Take 1 tablet by mouth daily. 30 tablet 0   • lidocaine (XYLOCAINE) 5 % ointment Apply topically 4 times daily as needed for Pain. 35.44 g 1   • fenofibrate micronized (LOFIBRA) 134 MG capsule Take 1 capsule by mouth daily (before breakfast). 90 capsule 1   • calcium carbonate (TUMS) 500 MG chewable tablet Chew 2 tablets by mouth every 8 hours. 90 tablet 4   • zoster vaccine recomb adjuvanted (SHINGRIX) 50 MCG/0.5ML injection Repeat dose in 2 to 6 months (unless 1 dose already given), for a total of 2 doses. 1 each 1     No current facility-administered medications for this visit.       ALLERGIES:  Allergies as of 07/30/2021 - Reviewed 07/30/2021   Allergen Reaction Noted   • Azithromycin DIARRHEA    • Erythromycin HIVES    • Penicillins HIVES    • Macrobid [nitrofurantoin monohydrate macrocrystals] HIVES    • Clindamycin Other (See Comments) 05/11/2015   • Sulfa antibiotics HIVES 03/28/2014        SOCIAL HISTORY  Social History     Tobacco Use   Smoking Status Never Smoker   Smokeless Tobacco Never Used     Health Maintenance   Topic Date Due   • Colonoscopy Risk  12/27/2018   • DTaP/Tdap/Td Vaccine (2 - Td or Tdap) 07/14/2019   • Medicare Wellness Visit  Never done   • Osteoporosis Screening  Never done   • Pneumococcal Vaccine 65+ (2  of 2 - PPSV23) 06/25/2021   • Shingles Vaccine (2 of 2) 08/12/2021   • Influenza Vaccine (1) 09/01/2021   • Breast Cancer Screening  11/13/2022   • Hepatitis C Screening  Completed   • COVID-19 Vaccine  Completed   • Hepatitis B Vaccine  Aged Out   • Meningococcal Vaccine  Aged Out   • HPV Vaccine  Aged Out       VITALS:  Visit Vitals  BP (!) 140/78   Pulse 83   Temp 96 °F (35.6 °C) (Temporal)   Ht 5' 7\" (1.702 m)   Wt 86.6 kg   SpO2 95%   BMI 29.91 kg/m²       PHYSICAL EXAM:  Constitutional:  Well developed, well nourished, no acute distress, non-toxic appearance   Eyes:  Pupils equal, round, reactive to light, conjunctivae normal   HENT:  Atraumatic, external ears normal, nose normal, oropharynx moist. Neck- normal range of motion, no tenderness, supple, no thyroid enlargement , nose without drainage  Respiratory:  Normal respiratory rate and effort, normal breath sounds, no rales, no wheezing   Cardiovascular:  Normal rate, normal rhythm, no murmurs, no gallops, no rubs   Gastrointestinal:  Soft, nondistended, normal bowel sounds, nontender, no organomegaly.  Musculoskeletal:  No edema  Integument:  Well hydrated, no rashes   Lymphatic:  No lymphadenopathy noted   Neurologic:  Alert & oriented x 3, cranial nerves 2-12 intact, muscle strength intact upper extremities and lower extremities bilaterally proximally distally, normal gait, normal mental status.  Patient was some right eyelid droop at times when her headache is more severe in the office.  Does not persist  Psychiatric:  Speech and behavior appropriate    LABS:  No visits with results within 1 Week(s) from this visit.   Latest known visit with results is:   Admission on 05/25/2021, Discharged on 05/25/2021   Component Date Value   • Sodium 05/25/2021 143    • Potassium 05/25/2021 3.7    • Chloride 05/25/2021 105    • Carbon Dioxide 05/25/2021 27    • Anion Gap 05/25/2021 15    • Glucose 05/25/2021 161*   • BUN 05/25/2021 19    • Creatinine 05/25/2021 0.70     • Glomerular Filtration Ra* 05/25/2021 >90    • BUN/ Creatinine Ratio 05/25/2021 27*   • Calcium 05/25/2021 8.7    • Bilirubin, Total 05/25/2021 0.2    • GOT/AST 05/25/2021 27    • GPT/ALT 05/25/2021 45    • Alkaline Phosphatase 05/25/2021 123*   • Albumin 05/25/2021 4.1    • Protein, Total 05/25/2021 7.1    • Globulin 05/25/2021 3.0    • A/G Ratio 05/25/2021 1.4    • Alcohol 05/25/2021 None Detected    • COLOR, URINALYSIS 05/25/2021 Yellow    • APPEARANCE, URINALYSIS 05/25/2021 Clear    • GLUCOSE, URINALYSIS 05/25/2021 Negative    • BILIRUBIN, URINALYSIS 05/25/2021 Negative    • KETONES, URINALYSIS 05/25/2021 Negative    • SPECIFIC GRAVITY, URINAL* 05/25/2021 1.020    • OCCULT BLOOD, URINALYSIS 05/25/2021 Negative    • PH, URINALYSIS 05/25/2021 5.0    • PROTEIN, URINALYSIS 05/25/2021 Negative    • UROBILINOGEN, URINALYSIS 05/25/2021 0.2    • NITRITE, URINALYSIS 05/25/2021 Negative    • LEUKOCYTE ESTERASE, URIN* 05/25/2021 Small*   • SQUAMOUS EPITHELIAL, URI* 05/25/2021 1 to 5    • ERYTHROCYTES, URINALYSIS 05/25/2021 1 to 2    • LEUKOCYTES, URINALYSIS 05/25/2021 11 to 25*   • BACTERIA, URINALYSIS 05/25/2021 None Seen    • HYALINE CASTS, URINALYSIS 05/25/2021 None Seen    • MUCUS 05/25/2021 Present    • TSH 05/25/2021 19.673*   • Lamotrigine 05/25/2021 4.4    • WBC 05/25/2021 7.0    • RBC 05/25/2021 4.82    • HGB 05/25/2021 14.4    • HCT 05/25/2021 42.3    • MCV 05/25/2021 87.8    • MCH 05/25/2021 29.9    • MCHC 05/25/2021 34.0    • RDW-CV 05/25/2021 13.0    • RDW-SD 05/25/2021 41.2    • PLT 05/25/2021 210    • NRBC 05/25/2021 0    • Neutrophil, Percent 05/25/2021 49    • Lymphocytes, Percent 05/25/2021 38    • Mono, Percent 05/25/2021 7    • Eosinophils, Percent 05/25/2021 4    • Basophils, Percent 05/25/2021 1    • Immature Granulocytes 05/25/2021 1    • Absolute Neutrophils 05/25/2021 3.5    • Absolute Lymphocytes 05/25/2021 2.6    • Absolute Monocytes 05/25/2021 0.5    • Absolute Eosinophils  05/25/2021 0.3     • Absolute Basophils 05/25/2021 0.1    • Absolute Immmature Granu* 05/25/2021 0.0    • LACTIC ACID, VENOUS - PO* 05/25/2021 1.6    • Urine, Bacterial Culture 05/25/2021 10,000 - 50,000 CFU/mL Mixed bacterial shanice with no predominating type    • Systolic Blood Pressure 05/25/2021 135    • Diastolic Blood Pressure 05/25/2021 78    • Ventricular Rate EKG/Min* 05/25/2021 79    • Atrial Rate (BPM) 05/25/2021 79    • MA-Interval (MSEC) 05/25/2021 172    • QRS-Interval (MSEC) 05/25/2021 84    • QT-Interval (MSEC) 05/25/2021 412    • QTc 05/25/2021 472    • P Axis (Degrees) 05/25/2021 63    • R Axis (Degrees) 05/25/2021 9    • T Axis (Degrees) 05/25/2021 -138    • REPORT TEXT 05/25/2021                      Value:Normal sinus rhythm  Anterior infarct  (cited on or before  06-FEB-2020)  Abnormal ECG  When compared with ECG of  06-FEB-2020 14:49,  QT has lengthened  Confirmed by PETE HUNT M.D. ([id number]),  Gypsy England ([id number]) on 5/26/2021 9:25:34 AM           ASSESSMENT AND PLAN:    Syncope and collapse  Patient was some syncope and collapse.  This is not occurred recently.  Encouraged to keep hydrated.  Avoid extreme heat.  Patient needs to avoid putting together sedating medicines such as amitriptyline alprazolam tizanidine.  If she has any palpitations or lightheadedness she should let us know.    Bipolar 2 disorder, major depressive episode (CMS/HCC)  Would is followed by Psychiatry at [facility] services.  Encouraged to follow up them to continue to work on mood and anxiety.  This will help with her other health issues.    Constipation, unspecified constipation type  Bowels are moving reasonably well.  Will continue with her current medicines.  Encourage adequate fiber, fluids, MiraLax for more severe constipation.    Chronic tension-type headache, not intractable  Patient was some chronic tension type of headaches, severe frontal headaches, unusual headaches.  Will refer to neurology for further  evaluation of her headaches, need for further imaging, diagnosis and treatment.  - SERVICE TO NEUROLOGY    Severe frontal headaches  Patient has some severe frontal headaches.  Unclear etiology.  Encouraged control stress and tension.  May use meloxicam, Tylenol, over-the-counter medicines as needed.  Patient on amitriptyline which may help prevent headaches.  Encouraged control any allergy symptoms.  - SERVICE TO NEUROLOGY    Drooping eyelid, right  Patient with drooping eyelid.  In combination with her severe frontal headaches in the area, syncope in the recent past, I am concerned about neurologic abnormalities.  Will refer to neurology for further evaluation.  Will continue with her current medicines to help treat her pain.  - SERVICE TO NEUROLOGY    Gastroesophageal reflux disease without esophagitis  Patient's acid reflux disease well controlled. Will continue current medicines at the current dose. Patient encouraged to watch lifestyle measures that worsen acid reflux.      Moderate persistent asthma without complication  Asthma is doing well.  Encouraged control environment, avoid airway irritants, continue with controlling inhaler regularly    Seasonal allergic rhinitis, unspecified trigger  Encouraged control allergies.  Flonase, antihistamines as needed, nasal saline.  Control and avoid allergens.    Pure hypercholesterolemia  Patient's hypercholesterolemia well controlled with current medications and/or lifestyle measures. Patient tolerating current treatment. We'll continue current treatment. Encouraged to watch the fat and cholesterol in their diet.  Will check lipid panel with upcoming labs.      Acquired hypothyroidism  Most recent thyroid test was abnormal.  Will follow-up with thyroid test in 2 months, adjust thyroid medicines further if labs are not at goal.    Impaired fasting glucose:  Will check a glucose and hemoglobin A1 c with upcoming labs.  Encouraged to watch carbohydrates in her  diet.      Patient Instructions   Dizziness    Drinking fluids well  Eat regularly - 3 meals a day  Stay active, regular exercse    Careful of tizanidine, alprazolam and amitriptyline together.      For constipation:    Good fluid intake.  More fiber in your diet.  Such as Bran foods, wheat or whole grain foods.  Fiber supplements like Citracel or Fibercon.  Stool softners (colace or docusate 100mg gel capsules) as needed for hard stool.  Bowels are typically moving more in the morning.  Having a good breakfast and then trying to have a bowel movement after breakfast can help to make bowels more regular.    If it has been over 2-3 days with no bowel movement, would use Miralax.    If still no results after two doses of Miralax, then use Dulcolax or Sennakot.      Asthma:    Avoid asthma triggers:  Avoid really hot and humid or really cold air.  Avoid daniella or moldy areas.   Avoid strong smells or perfumes.  Avoid chemicals like ammonia or bleach.  Avoid things that bring on allergy symptoms.  Control allergies with antihistamines like Claritin or Zyrtec and use nasal saline and gargling to rinse out the nose and throat.  Getting a cold or a viral infection is a big trigger for asthma, so avoid sick people and control cold symptoms if you get a cold.  Exercise can also trigger asthma.  Being in better shape and exercising regularly can help prevent exercise induced asthma.  Control heartburn.    Use albuterol as needed 2 puffs up to every 4 hours, for coughing, wheezing or tightness.  If you are needing your albuterol 2 or more times per week as a rescue inhaler, then use the dulera        Think about a supplement drink as a meal substitute.          Headaches    Get a good amount of sleep. Have a regular sleep routine.  Avoid bright lights or sunlight, avoid eye strain or prolonged time at a computer.  Be nice to your neck and avoid neck pain.  Control allergies and cold symptoms and keep your nose open.  Control  stress.  This is especially important for tension headaches.  Have good stress relievers such as exercise, talking to your support people, shower, fishing, music, or crafts  Avoid excessive caffeine or sudden stoppage of caffeine.  Get regular exercise.  Avoid extreme heat.  Keep hydrated.    Treat headaches as early as possible.   Use a variety of medicines to treat headaches, do not always use the same headache medicine.  Use tylenol  Use non-medicine treatments such as heat, massage, or sleep also.    If you have over 10 days per month in which you have to take a medicine for a headache, then you should consider a daily preventative medicine for a period of time to decrease the severity and frequency of the headaches.    See neurologist                  Return in about 2 months (around 9/30/2021) for MED CHECK, 30 min, fasting labs before next appt.       149

## 2021-07-30 NOTE — PROGRESS NOTE ADULT - ASSESSMENT
56M hx HLD, CAD s/p MAINOR x 2 (July 2020) on DAPT, LBO 2/2 diverticulitis s/p John's procedure (March 2019) and reversal (June 2019) recently discharged 2 days ago after hospitalization for nonoperative small bowel obstruction presenting with recurrent small bowel obstruction with similar transition point to last admission.    PLAN:  - OR Friday  - NGT/NPO/IVF  - Will continue to hold home Brillinta, Full Anticoagulation while NPO  - Continue home cardiac medications in IV formulation, chewable ASA  - Pain control PRN  - OOB / ambulating as tolerated     [facility]  #[id number]  56M hx HLD, CAD s/p MAINOR x 2 (July 2020) on DAPT, LBO 2/2 diverticulitis s/p John's procedure (March 2019) and reversal (June 2019) recently discharged 2 days ago after hospitalization for nonoperative small bowel obstruction presenting with recurrent small bowel obstruction with similar transition point to last admission.    PLAN:  - OR Monday  - NGT/NPO/IVF  - Will continue to hold home Brillinta  - Continue home cardiac medications in IV formulation, chewable ASA  - Pain control PRN  - OOB / ambulating as tolerated     [facility]  #[id number]

## 2021-07-31 LAB
ANION GAP SERPL CALC-SCNC: 11 MMOL/L — SIGNIFICANT CHANGE UP (ref 5–17)
BUN SERPL-MCNC: 6 MG/DL — LOW (ref 7–23)
CALCIUM SERPL-MCNC: 9.7 MG/DL — SIGNIFICANT CHANGE UP (ref 8.4–10.5)
CHLORIDE SERPL-SCNC: 102 MMOL/L — SIGNIFICANT CHANGE UP (ref 96–108)
CO2 SERPL-SCNC: 26 MMOL/L — SIGNIFICANT CHANGE UP (ref 22–31)
CREAT SERPL-MCNC: 1.18 MG/DL — SIGNIFICANT CHANGE UP (ref 0.5–1.3)
GLUCOSE SERPL-MCNC: 108 MG/DL — HIGH (ref 70–99)
HCT VFR BLD CALC: 41.4 % — SIGNIFICANT CHANGE UP (ref 39–50)
HGB BLD-MCNC: 12.4 G/DL — LOW (ref 13–17)
MAGNESIUM SERPL-MCNC: 2.1 MG/DL — SIGNIFICANT CHANGE UP (ref 1.6–2.6)
MCHC RBC-ENTMCNC: 26.4 PG — LOW (ref 27–34)
MCHC RBC-ENTMCNC: 30 GM/DL — LOW (ref 32–36)
MCV RBC AUTO: 88.3 FL — SIGNIFICANT CHANGE UP (ref 80–100)
NRBC # BLD: 0 /100 WBCS — SIGNIFICANT CHANGE UP (ref 0–0)
PHOSPHATE SERPL-MCNC: 3.7 MG/DL — SIGNIFICANT CHANGE UP (ref 2.5–4.5)
PLATELET # BLD AUTO: 457 K/UL — HIGH (ref 150–400)
POTASSIUM SERPL-MCNC: 4.4 MMOL/L — SIGNIFICANT CHANGE UP (ref 3.5–5.3)
POTASSIUM SERPL-SCNC: 4.4 MMOL/L — SIGNIFICANT CHANGE UP (ref 3.5–5.3)
RBC # BLD: 4.69 M/UL — SIGNIFICANT CHANGE UP (ref 4.2–5.8)
RBC # FLD: 15.1 % — HIGH (ref 10.3–14.5)
SODIUM SERPL-SCNC: 139 MMOL/L — SIGNIFICANT CHANGE UP (ref 135–145)
WBC # BLD: 9.04 K/UL — SIGNIFICANT CHANGE UP (ref 3.8–10.5)
WBC # FLD AUTO: 9.04 K/UL — SIGNIFICANT CHANGE UP (ref 3.8–10.5)

## 2021-07-31 RX ADMIN — HEPARIN SODIUM 5000 UNIT(S): 5000 INJECTION INTRAVENOUS; SUBCUTANEOUS at 21:24

## 2021-07-31 RX ADMIN — PANTOPRAZOLE SODIUM 40 MILLIGRAM(S): 20 TABLET, DELAYED RELEASE ORAL at 13:45

## 2021-07-31 RX ADMIN — Medication 1 PATCH: at 07:22

## 2021-07-31 RX ADMIN — Medication 81 MILLIGRAM(S): at 13:44

## 2021-07-31 RX ADMIN — Medication 125 MICROGRAM(S): at 13:46

## 2021-07-31 RX ADMIN — HEPARIN SODIUM 5000 UNIT(S): 5000 INJECTION INTRAVENOUS; SUBCUTANEOUS at 05:57

## 2021-07-31 RX ADMIN — HEPARIN SODIUM 5000 UNIT(S): 5000 INJECTION INTRAVENOUS; SUBCUTANEOUS at 13:45

## 2021-07-31 RX ADMIN — Medication 1 PATCH: at 17:38

## 2021-07-31 RX ADMIN — Medication 1 PATCH: at 13:46

## 2021-07-31 RX ADMIN — Medication 1 PATCH: at 13:29

## 2021-07-31 NOTE — PROGRESS NOTE ADULT - ASSESSMENT
56M hx HLD, CAD s/p MAINOR x 2 (July 2020) on DAPT, LBO 2/2 diverticulitis s/p John's procedure (March 2019) and reversal (June 2019) recently discharged 2 days ago after hospitalization for nonoperative small bowel obstruction presenting with recurrent small bowel obstruction with similar transition point to last admission.    PLAN:  - OR Monday  - NGT/NPO/IVF  - Will continue to hold home Brillinta  - Continue home cardiac medications in IV formulation, chewable ASA  - Pain control PRN  - OOB / ambulating as tolerated     [facility] Surgery  #[id number]

## 2021-07-31 NOTE — PROGRESS NOTE ADULT - SUBJECTIVE AND OBJECTIVE BOX
SURGERY  Pager: #[phone]    INTERVAL EVENTS/SUBJECTIVE: No acute events overnight. On morning rounds...     ______________________________________________  OBJECTIVE:   T(C): 36.6 (07-31-21 @ 01:09), Max: 37.1 (07-30-21 @ 09:26)  HR: 73 (07-31-21 @ 01:09) (72 - 81)  BP: 132/77 (07-31-21 @ 01:09) (124/88 - 145/87)  RR: 18 (07-31-21 @ 01:09) (16 - 18)  SpO2: 95% (07-31-21 @ 01:09) (94% - 95%)  Wt(kg): --  CAPILLARY BLOOD GLUCOSE        I&O's Detail    29 Jul 2021 07:01  -  30 Jul 2021 07:00  --------------------------------------------------------  IN:    dextrose 5% + sodium chloride 0.45% w/ Additives: 2875 mL    Lactated Ringers: 125 mL  Total IN: 3000 mL    OUT:    Nasogastric/Oral tube (mL): 1500 mL    Oral Fluid: 0 mL    Voided (mL): 1800 mL  Total OUT: 3300 mL    Total NET: -300 mL      30 Jul 2021 07:01  -  31 Jul 2021 02:08  --------------------------------------------------------  IN:    dextrose 5% + sodium chloride 0.45% w/ Additives: 1875 mL  Total IN: 1875 mL    OUT:    Nasogastric/Oral tube (mL): 1100 mL    Oral Fluid: 0 mL    Voided (mL): 2075 mL  Total OUT: 3175 mL    Total NET: -1300 mL          Physical exam:  Gen: resting in bed comfortably in NAD  Chest: no increased WOB, regular inspiratory effort   Abdomen: Soft, nontender, nondistended with no rebound tenderness or guarding. Incisions clean, dry, intact, with no erythema.   Vascular: WWP, CARSON x4  NEURO: awake, alert  ______________________________________________  LABS:  CBC Full  -  ( 30 Jul 2021 08:11 )  WBC Count : 8.42 K/uL  RBC Count : 4.39 M/uL  Hemoglobin : 11.9 g/dL  Hematocrit : 38.2 %  Platelet Count - Automated : 464 K/uL  Mean Cell Volume : 87.0 fl  Mean Cell Hemoglobin : 27.1 pg  Mean Cell Hemoglobin Concentration : 31.2 gm/dL  Auto Neutrophil # : x  Auto Lymphocyte # : x  Auto Monocyte # : x  Auto Eosinophil # : x  Auto Basophil # : x  Auto Neutrophil % : x  Auto Lymphocyte % : x  Auto Monocyte % : x  Auto Eosinophil % : x  Auto Basophil % : x    07-30    142  |  103  |  9   ----------------------------<  102<H>  4.0   |  28  |  1.12    Ca    9.3      30 Jul 2021 08:11  Phos  3.2     07-30  Mg     1.8     07-30      _____________________________________________  RADIOLOGY:     SURGERY  Pager: #[phone]    INTERVAL EVENTS/SUBJECTIVE: No acute events overnight. On morning rounds patient had no complaints and was resting comfortably. Is passing gas and having BM.     ______________________________________________  OBJECTIVE:   T(C): 36.6 (07-31-21 @ 01:09), Max: 37.1 (07-30-21 @ 09:26)  HR: 73 (07-31-21 @ 01:09) (72 - 81)  BP: 132/77 (07-31-21 @ 01:09) (124/88 - 145/87)  RR: 18 (07-31-21 @ 01:09) (16 - 18)  SpO2: 95% (07-31-21 @ 01:09) (94% - 95%)  Wt(kg): --  CAPILLARY BLOOD GLUCOSE        I&O's Detail    29 Jul 2021 07:01  -  30 Jul 2021 07:00  --------------------------------------------------------  IN:    dextrose 5% + sodium chloride 0.45% w/ Additives: 2875 mL    Lactated Ringers: 125 mL  Total IN: 3000 mL    OUT:    Nasogastric/Oral tube (mL): 1500 mL    Oral Fluid: 0 mL    Voided (mL): 1800 mL  Total OUT: 3300 mL    Total NET: -300 mL      30 Jul 2021 07:01  -  31 Jul 2021 02:08  --------------------------------------------------------  IN:    dextrose 5% + sodium chloride 0.45% w/ Additives: 1875 mL  Total IN: 1875 mL    OUT:    Nasogastric/Oral tube (mL): 1100 mL    Oral Fluid: 0 mL    Voided (mL): 2075 mL  Total OUT: 3175 mL    Total NET: -1300 mL          Physical exam:  Gen: resting in bed comfortably in NAD  HEENT: NGT in place  Chest: no increased WOB, regular inspiratory effort   Abdomen: Soft, nontender, nondistended with no rebound tenderness or guarding. Obvious large ventral hernias reducible.   Vascular: FRANKYP, YAMILETH x4  NEURO: awake, alert  ______________________________________________  LABS:  CBC Full  -  ( 30 Jul 2021 08:11 )  WBC Count : 8.42 K/uL  RBC Count : 4.39 M/uL  Hemoglobin : 11.9 g/dL  Hematocrit : 38.2 %  Platelet Count - Automated : 464 K/uL  Mean Cell Volume : 87.0 fl  Mean Cell Hemoglobin : 27.1 pg  Mean Cell Hemoglobin Concentration : 31.2 gm/dL  Auto Neutrophil # : x  Auto Lymphocyte # : x  Auto Monocyte # : x  Auto Eosinophil # : x  Auto Basophil # : x  Auto Neutrophil % : x  Auto Lymphocyte % : x  Auto Monocyte % : x  Auto Eosinophil % : x  Auto Basophil % : x    07-30    142  |  103  |  9   ----------------------------<  102<H>  4.0   |  28  |  1.12    Ca    9.3      30 Jul 2021 08:11  Phos  3.2     07-30  Mg     1.8     07-30      _____________________________________________  RADIOLOGY:

## 2021-08-01 ENCOUNTER — TRANSCRIPTION ENCOUNTER (OUTPATIENT)
Age: 56
End: 2021-08-01

## 2021-08-01 LAB
ANION GAP SERPL CALC-SCNC: 11 MMOL/L — SIGNIFICANT CHANGE UP (ref 5–17)
BUN SERPL-MCNC: 6 MG/DL — LOW (ref 7–23)
CALCIUM SERPL-MCNC: 9.7 MG/DL — SIGNIFICANT CHANGE UP (ref 8.4–10.5)
CHLORIDE SERPL-SCNC: 103 MMOL/L — SIGNIFICANT CHANGE UP (ref 96–108)
CO2 SERPL-SCNC: 26 MMOL/L — SIGNIFICANT CHANGE UP (ref 22–31)
CREAT SERPL-MCNC: 1.2 MG/DL — SIGNIFICANT CHANGE UP (ref 0.5–1.3)
GLUCOSE SERPL-MCNC: 104 MG/DL — HIGH (ref 70–99)
HCT VFR BLD CALC: 40.7 % — SIGNIFICANT CHANGE UP (ref 39–50)
HGB BLD-MCNC: 12.5 G/DL — LOW (ref 13–17)
MAGNESIUM SERPL-MCNC: 1.8 MG/DL — SIGNIFICANT CHANGE UP (ref 1.6–2.6)
MCHC RBC-ENTMCNC: 27.2 PG — SIGNIFICANT CHANGE UP (ref 27–34)
MCHC RBC-ENTMCNC: 30.7 GM/DL — LOW (ref 32–36)
MCV RBC AUTO: 88.5 FL — SIGNIFICANT CHANGE UP (ref 80–100)
NRBC # BLD: 0 /100 WBCS — SIGNIFICANT CHANGE UP (ref 0–0)
PHOSPHATE SERPL-MCNC: 3.8 MG/DL — SIGNIFICANT CHANGE UP (ref 2.5–4.5)
PLATELET # BLD AUTO: 365 K/UL — SIGNIFICANT CHANGE UP (ref 150–400)
POTASSIUM SERPL-MCNC: 4.3 MMOL/L — SIGNIFICANT CHANGE UP (ref 3.5–5.3)
POTASSIUM SERPL-SCNC: 4.3 MMOL/L — SIGNIFICANT CHANGE UP (ref 3.5–5.3)
RBC # BLD: 4.6 M/UL — SIGNIFICANT CHANGE UP (ref 4.2–5.8)
RBC # FLD: 15 % — HIGH (ref 10.3–14.5)
SODIUM SERPL-SCNC: 140 MMOL/L — SIGNIFICANT CHANGE UP (ref 135–145)
WBC # BLD: 8.41 K/UL — SIGNIFICANT CHANGE UP (ref 3.8–10.5)
WBC # FLD AUTO: 8.41 K/UL — SIGNIFICANT CHANGE UP (ref 3.8–10.5)

## 2021-08-01 RX ORDER — MAGNESIUM SULFATE 500 MG/ML
2 VIAL (ML) INJECTION ONCE
Refills: 0 | Status: COMPLETED | OUTPATIENT
Start: 2021-08-01 | End: 2021-08-01

## 2021-08-01 RX ORDER — ACETAMINOPHEN 500 MG
1000 TABLET ORAL ONCE
Refills: 0 | Status: COMPLETED | OUTPATIENT
Start: 2021-08-01 | End: 2021-08-01

## 2021-08-01 RX ADMIN — Medication 1 PATCH: at 13:08

## 2021-08-01 RX ADMIN — Medication 400 MILLIGRAM(S): at 21:44

## 2021-08-01 RX ADMIN — Medication 125 MICROGRAM(S): at 13:00

## 2021-08-01 RX ADMIN — BENZOCAINE AND MENTHOL 1 LOZENGE: 5; 1 LIQUID ORAL at 18:26

## 2021-08-01 RX ADMIN — PANTOPRAZOLE SODIUM 40 MILLIGRAM(S): 20 TABLET, DELAYED RELEASE ORAL at 12:59

## 2021-08-01 RX ADMIN — Medication 1000 MILLIGRAM(S): at 01:46

## 2021-08-01 RX ADMIN — Medication 1000 MILLIGRAM(S): at 22:14

## 2021-08-01 RX ADMIN — Medication 1 PATCH: at 07:15

## 2021-08-01 RX ADMIN — Medication 1 PATCH: at 19:10

## 2021-08-01 RX ADMIN — DEXTROSE MONOHYDRATE, SODIUM CHLORIDE, AND POTASSIUM CHLORIDE 125 MILLILITER(S): 50; .745; 4.5 INJECTION, SOLUTION INTRAVENOUS at 21:45

## 2021-08-01 RX ADMIN — HEPARIN SODIUM 5000 UNIT(S): 5000 INJECTION INTRAVENOUS; SUBCUTANEOUS at 16:31

## 2021-08-01 RX ADMIN — Medication 400 MILLIGRAM(S): at 01:16

## 2021-08-01 RX ADMIN — HEPARIN SODIUM 5000 UNIT(S): 5000 INJECTION INTRAVENOUS; SUBCUTANEOUS at 05:34

## 2021-08-01 RX ADMIN — Medication 1 PATCH: at 12:59

## 2021-08-01 RX ADMIN — Medication 81 MILLIGRAM(S): at 12:59

## 2021-08-01 RX ADMIN — Medication 50 GRAM(S): at 22:35

## 2021-08-01 NOTE — PROGRESS NOTE ADULT - SUBJECTIVE AND OBJECTIVE BOX
SURGERY  Pager: #[phone]    INTERVAL EVENTS/SUBJECTIVE: No acute events overnight. On morning rounds patient had no complaints and was resting comfortably. Is passing gas and having BM.      OBJECTIVE:  Vital Signs:  Afebrile, HDS  Vital Signs Last 24 Hrs  T(C): 36.7 (01 Aug 2021 01:22), Max: 36.9 (31 Jul 2021 13:44)  T(F): 98.1 (01 Aug 2021 01:22), Max: 98.5 (31 Jul 2021 13:44)  HR: 78 (01 Aug 2021 01:22) (69 - 83)  BP: 129/87 (01 Aug 2021 01:22) (119/75 - 137/78)  BP(mean): --  RR: 18 (01 Aug 2021 01:22) (18 - 18)  SpO2: 93% (01 Aug 2021 01:22) (93% - 97%)                        12.4   9.04  )-----------( 457      ( 31 Jul 2021 06:44 )             41.4     07-31    139  |  102  |  6<L>  ----------------------------<  108<H>  4.4   |  26  |  1.18    Ca    9.7      31 Jul 2021 06:40  Phos  3.7     07-31  Mg     2.1     07-31       I&O's Detail    30 Jul 2021 07:01  -  31 Jul 2021 07:00  --------------------------------------------------------  IN:    dextrose 5% + sodium chloride 0.45% w/ Additives: 2875 mL  Total IN: 2875 mL    OUT:    Nasogastric/Oral tube (mL): 1100 mL    Oral Fluid: 0 mL    Voided (mL): 2075 mL  Total OUT: 3175 mL    Total NET: -300 mL      31 Jul 2021 07:01  -  01 Aug 2021 02:55  --------------------------------------------------------  IN:    dextrose 5% + sodium chloride 0.45% w/ Additives: 2375 mL  Total IN: 2375 mL    OUT:    Nasogastric/Oral tube (mL): 650 mL    Oral Fluid: 0 mL    Voided (mL): 3000 mL  Total OUT: 3650 mL    Total NET: -1275 mL    PHYSICAL EXAM:  Gen: resting in bed comfortably in NAD  HEENT: NGT in place  Chest: no increased WOB, regular inspiratory effort   Abdomen: Soft, nontender, nondistended with no rebound tenderness or guarding. Obvious large ventral hernias reducible.   Vascular: WWP, CARSON x4  NEURO: awake, alert       SURGERY  Pager: #[phone]    INTERVAL EVENTS/SUBJECTIVE: No acute events overnight. On morning rounds patient had no complaints and was resting comfortably. Is passing gas and having BM.      OBJECTIVE:  Vital Signs:  Afebrile, HDS  Vital Signs Last 24 Hrs  T(C): 36.7 (01 Aug 2021 01:22), Max: 36.9 (31 Jul 2021 13:44)  T(F): 98.1 (01 Aug 2021 01:22), Max: 98.5 (31 Jul 2021 13:44)  HR: 78 (01 Aug 2021 01:22) (69 - 83)  BP: 129/87 (01 Aug 2021 01:22) (119/75 - 137/78)  BP(mean): --  RR: 18 (01 Aug 2021 01:22) (18 - 18)  SpO2: 93% (01 Aug 2021 01:22) (93% - 97%)                        12.4   9.04  )-----------( 457      ( 31 Jul 2021 06:44 )             41.4     07-31    139  |  102  |  6<L>  ----------------------------<  108<H>  4.4   |  26  |  1.18    Ca    9.7      31 Jul 2021 06:40  Phos  3.7     07-31  Mg     2.1     07-31       I&O's Detail    30 Jul 2021 07:01  -  31 Jul 2021 07:00  --------------------------------------------------------  IN:    dextrose 5% + sodium chloride 0.45% w/ Additives: 2875 mL  Total IN: 2875 mL    OUT:    Nasogastric/Oral tube (mL): 1100 mL    Oral Fluid: 0 mL    Voided (mL): 2075 mL  Total OUT: 3175 mL    Total NET: -300 mL      31 Jul 2021 07:01  -  01 Aug 2021 02:55  --------------------------------------------------------  IN:    dextrose 5% + sodium chloride 0.45% w/ Additives: 2375 mL  Total IN: 2375 mL    OUT:    Nasogastric/Oral tube (mL): 650 mL    Oral Fluid: 0 mL    Voided (mL): 3000 mL  Total OUT: 3650 mL    Total NET: -1275 mL    PHYSICAL EXAM:  Gen: resting in bed comfortably in NAD  HEENT: NGT in place  Chest: no increased WOB, regular inspiratory effort   Abdomen: Soft, nontender, distended with no rebound tenderness or guarding. Obvious large ventral hernias reducible.   Vascular: WWP, CARSON x4  NEURO: awake, alert

## 2021-08-01 NOTE — PROGRESS NOTE ADULT - ASSESSMENT
56M hx HLD, CAD s/p MAINOR x 2 (July 2020) on DAPT, LBO 2/2 diverticulitis s/p John's procedure (March 2019) and reversal (June 2019) recently discharged 2 days ago after hospitalization for nonoperative small bowel obstruction presenting with recurrent small bowel obstruction with similar transition point to last admission.    PLAN:  - OR Monday  - NGT/NPO/IVF  - Will continue to hold home Brillinta  - Continue home cardiac medications in IV formulation, chewable ASA  - Pain control PRN  - OOB / ambulating as tolerated     [facility] Team Surgery  #[id number]  56M hx HLD, CAD s/p MAINOR x 2 (July 2020) on DAPT, LBO 2/2 diverticulitis s/p John's procedure (March 2019) and reversal (June 2019) recently discharged 2 days ago after hospitalization for nonoperative small bowel obstruction presenting with recurrent small bowel obstruction with similar transition point to last admission.    PLAN:  - OR Monday  - Pre-op and Consent  - Sequential compression devices and DC heparin before last afternoon dose.  - NGT/NPO/IVF  - Will continue to hold home Brillinta  - Continue home cardiac medications in IV formulation, chewable ASA  - Pain control PRN  - OOB / ambulating as tolerated     [facility] Team Surgery  #[id number]

## 2021-08-02 ENCOUNTER — RESULT REVIEW (OUTPATIENT)
Age: 56
End: 2021-08-02

## 2021-08-02 ENCOUNTER — APPOINTMENT (OUTPATIENT)
Dept: SURGERY | Facility: HOSPITAL | Age: 56
End: 2021-08-02
Payer: COMMERCIAL

## 2021-08-02 LAB
ANION GAP SERPL CALC-SCNC: 13 MMOL/L — SIGNIFICANT CHANGE UP (ref 5–17)
APTT BLD: 28.6 SEC — SIGNIFICANT CHANGE UP (ref 27.5–35.5)
BLD GP AB SCN SERPL QL: NEGATIVE — SIGNIFICANT CHANGE UP
BUN SERPL-MCNC: 5 MG/DL — LOW (ref 7–23)
CALCIUM SERPL-MCNC: 9.9 MG/DL — SIGNIFICANT CHANGE UP (ref 8.4–10.5)
CHLORIDE SERPL-SCNC: 101 MMOL/L — SIGNIFICANT CHANGE UP (ref 96–108)
CO2 SERPL-SCNC: 20 MMOL/L — LOW (ref 22–31)
CREAT SERPL-MCNC: 1.08 MG/DL — SIGNIFICANT CHANGE UP (ref 0.5–1.3)
GLUCOSE SERPL-MCNC: 99 MG/DL — SIGNIFICANT CHANGE UP (ref 70–99)
HCT VFR BLD CALC: 44.4 % — SIGNIFICANT CHANGE UP (ref 39–50)
HGB BLD-MCNC: 13.6 G/DL — SIGNIFICANT CHANGE UP (ref 13–17)
INR BLD: 1.13 RATIO — SIGNIFICANT CHANGE UP (ref 0.88–1.16)
MAGNESIUM SERPL-MCNC: 2 MG/DL — SIGNIFICANT CHANGE UP (ref 1.6–2.6)
MCHC RBC-ENTMCNC: 27.1 PG — SIGNIFICANT CHANGE UP (ref 27–34)
MCHC RBC-ENTMCNC: 30.6 GM/DL — LOW (ref 32–36)
MCV RBC AUTO: 88.4 FL — SIGNIFICANT CHANGE UP (ref 80–100)
NRBC # BLD: 0 /100 WBCS — SIGNIFICANT CHANGE UP (ref 0–0)
PHOSPHATE SERPL-MCNC: 3.8 MG/DL — SIGNIFICANT CHANGE UP (ref 2.5–4.5)
PLATELET # BLD AUTO: 304 K/UL — SIGNIFICANT CHANGE UP (ref 150–400)
POTASSIUM SERPL-MCNC: 4.2 MMOL/L — SIGNIFICANT CHANGE UP (ref 3.5–5.3)
POTASSIUM SERPL-SCNC: 4.2 MMOL/L — SIGNIFICANT CHANGE UP (ref 3.5–5.3)
PROTHROM AB SERPL-ACNC: 13.5 SEC — SIGNIFICANT CHANGE UP (ref 10.6–13.6)
RBC # BLD: 5.02 M/UL — SIGNIFICANT CHANGE UP (ref 4.2–5.8)
RBC # FLD: 15 % — HIGH (ref 10.3–14.5)
RH IG SCN BLD-IMP: POSITIVE — SIGNIFICANT CHANGE UP
SARS-COV-2 RNA SPEC QL NAA+PROBE: SIGNIFICANT CHANGE UP
SODIUM SERPL-SCNC: 134 MMOL/L — LOW (ref 135–145)
WBC # BLD: 7.73 K/UL — SIGNIFICANT CHANGE UP (ref 3.8–10.5)
WBC # FLD AUTO: 7.73 K/UL — SIGNIFICANT CHANGE UP (ref 3.8–10.5)

## 2021-08-02 PROCEDURE — 49560: CPT | Mod: 82

## 2021-08-02 PROCEDURE — 88302 TISSUE EXAM BY PATHOLOGIST: CPT | Mod: 26

## 2021-08-02 PROCEDURE — 49560: CPT

## 2021-08-02 PROCEDURE — 15734 MUSCLE-SKIN GRAFT TRUNK: CPT | Mod: 82,59

## 2021-08-02 PROCEDURE — 15734 MUSCLE-SKIN GRAFT TRUNK: CPT

## 2021-08-02 PROCEDURE — 49568: CPT

## 2021-08-02 PROCEDURE — 49568: CPT | Mod: 82

## 2021-08-02 RX ORDER — BUTORPHANOL TARTRATE 2 MG/ML
0.25 INJECTION, SOLUTION INTRAMUSCULAR; INTRAVENOUS EVERY 6 HOURS
Refills: 0 | Status: DISCONTINUED | OUTPATIENT
Start: 2021-08-02 | End: 2021-08-05

## 2021-08-02 RX ORDER — TETRACAINE/BENZOCAINE/BUTAMBEN 2%-14%-2%
1 OINTMENT (GRAM) TOPICAL EVERY 4 HOURS
Refills: 0 | Status: DISCONTINUED | OUTPATIENT
Start: 2021-08-02 | End: 2021-08-11

## 2021-08-02 RX ORDER — HYDROMORPHONE HYDROCHLORIDE 2 MG/ML
0.5 INJECTION INTRAMUSCULAR; INTRAVENOUS; SUBCUTANEOUS
Refills: 0 | Status: DISCONTINUED | OUTPATIENT
Start: 2021-08-02 | End: 2021-08-02

## 2021-08-02 RX ORDER — DIGOXIN 250 MCG
125 TABLET ORAL DAILY
Refills: 0 | Status: DISCONTINUED | OUTPATIENT
Start: 2021-08-02 | End: 2021-08-05

## 2021-08-02 RX ORDER — SODIUM CHLORIDE 9 MG/ML
1000 INJECTION, SOLUTION INTRAVENOUS
Refills: 0 | Status: DISCONTINUED | OUTPATIENT
Start: 2021-08-02 | End: 2021-08-04

## 2021-08-02 RX ORDER — DIPHENHYDRAMINE HCL 50 MG
25 CAPSULE ORAL EVERY 4 HOURS
Refills: 0 | Status: DISCONTINUED | OUTPATIENT
Start: 2021-08-02 | End: 2021-08-05

## 2021-08-02 RX ORDER — HEPARIN SODIUM 5000 [USP'U]/ML
5000 INJECTION INTRAVENOUS; SUBCUTANEOUS EVERY 8 HOURS
Refills: 0 | Status: DISCONTINUED | OUTPATIENT
Start: 2021-08-02 | End: 2021-08-04

## 2021-08-02 RX ORDER — PANTOPRAZOLE SODIUM 20 MG/1
40 TABLET, DELAYED RELEASE ORAL DAILY
Refills: 0 | Status: DISCONTINUED | OUTPATIENT
Start: 2021-08-02 | End: 2021-08-05

## 2021-08-02 RX ORDER — NICOTINE POLACRILEX 2 MG
1 GUM BUCCAL DAILY
Refills: 0 | Status: DISCONTINUED | OUTPATIENT
Start: 2021-08-02 | End: 2021-08-11

## 2021-08-02 RX ORDER — ONDANSETRON 8 MG/1
4 TABLET, FILM COATED ORAL EVERY 6 HOURS
Refills: 0 | Status: DISCONTINUED | OUTPATIENT
Start: 2021-08-02 | End: 2021-08-05

## 2021-08-02 RX ORDER — BENZOCAINE AND MENTHOL 5; 1 G/100ML; G/100ML
1 LIQUID ORAL EVERY 6 HOURS
Refills: 0 | Status: DISCONTINUED | OUTPATIENT
Start: 2021-08-02 | End: 2021-08-11

## 2021-08-02 RX ORDER — ACETAMINOPHEN 500 MG
1000 TABLET ORAL ONCE
Refills: 0 | Status: COMPLETED | OUTPATIENT
Start: 2021-08-02 | End: 2021-08-02

## 2021-08-02 RX ORDER — ASPIRIN/CALCIUM CARB/MAGNESIUM 324 MG
81 TABLET ORAL DAILY
Refills: 0 | Status: DISCONTINUED | OUTPATIENT
Start: 2021-08-02 | End: 2021-08-10

## 2021-08-02 RX ORDER — CHLORHEXIDINE GLUCONATE 213 G/1000ML
1 SOLUTION TOPICAL DAILY
Refills: 0 | Status: DISCONTINUED | OUTPATIENT
Start: 2021-08-02 | End: 2021-08-02

## 2021-08-02 RX ORDER — NALOXONE HYDROCHLORIDE 4 MG/.1ML
0.1 SPRAY NASAL
Refills: 0 | Status: DISCONTINUED | OUTPATIENT
Start: 2021-08-02 | End: 2021-08-05

## 2021-08-02 RX ADMIN — HYDROMORPHONE HYDROCHLORIDE 0.5 MILLIGRAM(S): 2 INJECTION INTRAMUSCULAR; INTRAVENOUS; SUBCUTANEOUS at 18:00

## 2021-08-02 RX ADMIN — SODIUM CHLORIDE 125 MILLILITER(S): 9 INJECTION, SOLUTION INTRAVENOUS at 18:06

## 2021-08-02 RX ADMIN — PANTOPRAZOLE SODIUM 40 MILLIGRAM(S): 20 TABLET, DELAYED RELEASE ORAL at 17:47

## 2021-08-02 RX ADMIN — Medication 1 PATCH: at 18:17

## 2021-08-02 RX ADMIN — BENZOCAINE AND MENTHOL 1 LOZENGE: 5; 1 LIQUID ORAL at 06:32

## 2021-08-02 RX ADMIN — Medication 400 MILLIGRAM(S): at 22:45

## 2021-08-02 RX ADMIN — HYDROMORPHONE HYDROCHLORIDE 0.5 MILLIGRAM(S): 2 INJECTION INTRAMUSCULAR; INTRAVENOUS; SUBCUTANEOUS at 17:47

## 2021-08-02 RX ADMIN — HEPARIN SODIUM 5000 UNIT(S): 5000 INJECTION INTRAVENOUS; SUBCUTANEOUS at 21:15

## 2021-08-02 RX ADMIN — CHLORHEXIDINE GLUCONATE 1 APPLICATION(S): 213 SOLUTION TOPICAL at 08:23

## 2021-08-02 RX ADMIN — Medication 125 MICROGRAM(S): at 17:48

## 2021-08-02 RX ADMIN — Medication 81 MILLIGRAM(S): at 17:48

## 2021-08-02 RX ADMIN — Medication 1 PATCH: at 21:06

## 2021-08-02 RX ADMIN — Medication 1000 MILLIGRAM(S): at 23:10

## 2021-08-02 NOTE — PRE-ANESTHESIA EVALUATION ADULT - NSANTHAIRWAYFT_ENT_ALL_CORE
Free range of motion at neck without discomfort or paresthesias. Thyromental distance > 3 fingers. Able to comfortably bite upper lip.

## 2021-08-02 NOTE — PRE-ANESTHESIA EVALUATION ADULT - NSANTHPMHFT_GEN_ALL_CORE
56M with history of HLD, CAD s/p MAINOR x 2 (July 2020) on DAPT, LBO 2/2 diverticulitis s/p John's procedure (March 2019) and reversal (June 2019), DAMION, recent SBO, active smoker, chronic cocaine user (most recent 1 week ago) now presenting for incisional hernia repair. 56M with history of HLD, CAD s/p MAINOR x 2 (July 2020) on DAPT, LBO 2/2 diverticulitis s/p John's procedure (March 2019) and reversal (June 2019), DAMION, recent SBO, active smoker, chronic cocaine user (most recent 1 week ago) now presenting for incisional hernia repair.    Per patient, most recent cocaine use within 1 week (nasal). Patient continues to smoke. Patient denies current nausea/vomiting, states is having bowel movements and passing flatus.     Will plan for glydescope. 56M with history of HLD, CAD s/p MAINOR x 2 (July 2020) on DAPT, LBO 2/2 diverticulitis s/p John's procedure (March 2019) and reversal (June 2019), DAMION, recent SBO, active smoker, chronic cocaine user (most recent 1 week ago) now presenting for incisional hernia repair.    Per patient, most recent cocaine use was prior current hospitalization, uses intranasally, denies IV drug abuse. He continued to smoke till current hospitalization. Nicotine patches on to avoid withdrawl symptoms. He denies any chest or abdominal pain, nausea, passing flatus and BM. NGT in situ but with minimal output.   Pt has CPAP machine but does not use it, has not seen sleep doctor in a while. No change in cardiac condition since stents placement.

## 2021-08-02 NOTE — PRE-ANESTHESIA EVALUATION ADULT - NSANTHPEFT_GEN_ALL_CORE
PHYSICAL EXAM:  GENERAL: NAD, well-developed  CHEST/LUNG: Clear to auscultation bilaterally; No wheeze  HEART: Regular rate and rhythm  PSYCH/NEURO: AAOx3

## 2021-08-02 NOTE — BRIEF OPERATIVE NOTE - OPERATION/FINDINGS
Multiple fascial defects identified along the midline. Adhesions lysed and all bowel was viable. Anterior sheath  from abdominal wall, underlay mesh placed, and fascia closed over mesh. 19 Fr jocelynn drain placed above fascia in the subcutaneous space. Epidural placed by anesthesia. Multiple fascial defects identified along the midline. Adhesions lysed and all bowel was viable. Anterior sheath  from abdominal wall, underlay mesh placed, and fascia closed over mesh. 19 Fr jocelynn drain placed above fascia in the subcutaneous space.

## 2021-08-02 NOTE — CHART NOTE - NSCHARTNOTEFT_GEN_A_CORE
POST-OPERATIVE NOTE    Subjective:  Patient is s/p ventral hernia repair with mesh, 19Fr jocelynn drain placed above fascia. Recovering appropriately. Patient denies chest pain, SOB, palpitations, nausea, vomiting. Not yet passing gas or having bowel movements. Reports some pain in abdomen, patient educated about using PCEA.     Vital Signs Last 24 Hrs  T(C): 36.6 (02 Aug 2021 21:30), Max: 36.8 (01 Aug 2021 21:54)  T(F): 97.9 (02 Aug 2021 21:30), Max: 98.2 (01 Aug 2021 21:54)  HR: 74 (02 Aug 2021 21:30) (69 - 114)  BP: 109/74 (02 Aug 2021 21:30) (103/55 - 134/83)  BP(mean): 91 (02 Aug 2021 19:15) (77 - 97)  RR: 18 (02 Aug 2021 21:30) (14 - 20)  SpO2: 92% (02 Aug 2021 21:30) (91% - 99%)  I&O's Detail    01 Aug 2021 07:01  -  02 Aug 2021 07:00  --------------------------------------------------------  IN:    dextrose 5% + sodium chloride 0.45% w/ Additives: 3000 mL    IV PiggyBack: 150 mL  Total IN: 3150 mL    OUT:    Nasogastric/Oral tube (mL): 900 mL    Oral Fluid: 0 mL    Voided (mL): 2200 mL  Total OUT: 3100 mL    Total NET: 50 mL      02 Aug 2021 07:01  -  02 Aug 2021 21:47  --------------------------------------------------------  IN:    Lactated Ringers: 250 mL  Total IN: 250 mL    OUT:    Bulb (mL): 40 mL    Indwelling Catheter - Urethral (mL): 145 mL    Nasogastric/Oral tube (mL): 25 mL    Oral Fluid: 0 mL  Total OUT: 210 mL    Total NET: 40 mL        aspirin  chewable 81  digoxin  Injectable 125  heparin   Injectable 5000    PAST MEDICAL & SURGICAL HISTORY:  Sleep apnea    HLD (hyperlipidemia)    Morbid obesity    Diverticulitis    History of neuropathy  big toes    Colostomy present    S/P repair of hydrocele          Physical Exam:  General: NAD, resting comfortably in bed; NGT in place and connected to LCWS  Pulmonary: Nonlabored breathing, no respiratory distress  Abdominal: soft, mildly distended, appropriately tender to palpation in epigastric area around midline incision; abthera in place and connected to suction, jocelynn drain with serosanguinous output   Extremities: WWP      LABS:                        13.6   7.73  )-----------( 304      ( 02 Aug 2021 07:23 )             44.4     08-02    134<L>  |  101  |  5<L>  ----------------------------<  99  4.2   |  20<L>  |  1.08    Ca    9.9      02 Aug 2021 07:18  Phos  3.8     08-02  Mg     2.0     08-02      PT/INR - ( 02 Aug 2021 07:23 )   PT: 13.5 sec;   INR: 1.13 ratio         PTT - ( 02 Aug 2021 07:23 )  PTT:28.6 sec  CAPILLARY BLOOD GLUCOSE          Radiology and Additional Studies:    Assessment:  The patient is a 56y Male who is now several hours post-op from a ventral hernia repair of multiple fascial defects with mesh.     Plan:  - Pain control as needed; PCEA in place  - Quispe overnight   - DVT ppx  - LR @ 125cc/hr  - Diet, NPO except meds; NGT to LCWS   - OOB and ambulating as tolerated  - F/u AM labs    [facility] Surgery  #[id number]

## 2021-08-02 NOTE — PROGRESS NOTE ADULT - SUBJECTIVE AND OBJECTIVE BOX
SURGERY  Pager: #[phone]    INTERVAL EVENTS/SUBJECTIVE: No acute events overnight. On morning rounds...     ______________________________________________  OBJECTIVE:   T(C): 36.4 (08-02-21 @ 01:27), Max: 36.8 (08-01-21 @ 21:54)  HR: 69 (08-02-21 @ 01:27) (66 - 78)  BP: 132/89 (08-02-21 @ 01:27) (111/75 - 135/86)  RR: 18 (08-02-21 @ 01:27) (16 - 18)  SpO2: 94% (08-02-21 @ 01:27) (94% - 97%)  Wt(kg): --  CAPILLARY BLOOD GLUCOSE        I&O's Detail    31 Jul 2021 07:01  -  01 Aug 2021 07:00  --------------------------------------------------------  IN:    dextrose 5% + sodium chloride 0.45% w/ Additives: 2875 mL  Total IN: 2875 mL    OUT:    Nasogastric/Oral tube (mL): 650 mL    Oral Fluid: 0 mL    Voided (mL): 3000 mL  Total OUT: 3650 mL    Total NET: -775 mL      01 Aug 2021 07:01  -  02 Aug 2021 02:09  --------------------------------------------------------  IN:    dextrose 5% + sodium chloride 0.45% w/ Additives: 2375 mL    IV PiggyBack: 150 mL  Total IN: 2525 mL    OUT:    Nasogastric/Oral tube (mL): 800 mL    Oral Fluid: 0 mL    Voided (mL): 1240 mL  Total OUT: 2040 mL    Total NET: 485 mL          Physical exam:  Gen: resting in bed comfortably in NAD  Chest: no increased WOB, regular inspiratory effort   Abdomen: Soft, nontender, nondistended with no rebound tenderness or guarding. Incisions clean, dry, intact, with no erythema.   Vascular: WWP, CARSON x4  NEURO: awake, alert  ______________________________________________  LABS:  CBC Full  -  ( 01 Aug 2021 11:58 )  WBC Count : 8.41 K/uL  RBC Count : 4.60 M/uL  Hemoglobin : 12.5 g/dL  Hematocrit : 40.7 %  Platelet Count - Automated : 365 K/uL  Mean Cell Volume : 88.5 fl  Mean Cell Hemoglobin : 27.2 pg  Mean Cell Hemoglobin Concentration : 30.7 gm/dL  Auto Neutrophil # : x  Auto Lymphocyte # : x  Auto Monocyte # : x  Auto Eosinophil # : x  Auto Basophil # : x  Auto Neutrophil % : x  Auto Lymphocyte % : x  Auto Monocyte % : x  Auto Eosinophil % : x  Auto Basophil % : x    08-01    140  |  103  |  6<L>  ----------------------------<  104<H>  4.3   |  26  |  1.20    Ca    9.7      01 Aug 2021 11:58  Phos  3.8     08-01  Mg     1.8     08-01      _____________________________________________  RADIOLOGY:

## 2021-08-02 NOTE — PRE-ANESTHESIA EVALUATION ADULT - NSANTHTOBACCOSD_GEN_ALL_CORE
8/21/2018      RE: Loy Limon  [address]  [facility] [address]       [facility]  Transplant Infectious Disease Consultation note  Today's Date: 8/21/18    Recommendations:  - start rifampin 600 mg daily x 4 months   -Discussed to take on an empty stomach, side effects and warning symptoms of liver toxicity  -prevnar 13, tdap and shingrix vaccination today   -LFT next visit along with strongyloides antibody, toxoplasma ab and neurocysticercus ab   -Messaged the liver transplant team about the Work restriction letter and got a reply that they will take care of it. Thanks     RTC 1 month    Thank you for involving me in the care of this patient. Please do not hesitate to contact me with any questions.    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     Latent TB: with a positive quant and being from [place] where TB incidence is high and in the absence of symptoms or signs of active TB, would consider him to have latent TB. Recommend rifampin for 4 months. No major drug interactions but due to liver cirrhosis, will follow LFTS closely. Discussed symptoms of liver toxicity and side effects of rifampin and answered all questions about the concept of latent Tb that patient had a little difficulty in understanding.    - Serostatus: CMV R+, EBV R+  - Immunization status: not received vaccinations, prevnar, tdap and shingrix today. shingrix and pneumovax in 2 months  - Prophylaxis:none      Attestation: I have reviewed today's vital signs, medications, labs and imaging. I personally reviewed the imaging. Reviewed medical history, surgical history, and family history in Epic History tab. No updates needed to be made.  Face to face time 45 mins. >50% spent coordinating care and counseling the above.     Pauline  Julieth  , [facility]  Pager - [phone]    -------------------------------------------------------------------------------------------------------------------   Reason for consult / Chief complaint:   Consulted by Dr. Carballo for latent TB    History of presenting illness: Patient presents with   Loy Braxton is a 66 year old with medical history significant for cirrhosis of liver from alcohol use, HCC being evaluated for liver transplantation. As part of that work up, quantiferon was done and found to be positive at a value of around 1 and was thus referred to me.     He was born and raised in [place]. Came to the [place] around 35 years ago, goes back to [place] every year. He does not remember being exposed to anybody with TB. He does not recall having PPD or quant before. He reports, his wifes brother had tuberculosis, but he not closely associated with him. He denies symptoms of active TB. Ct chest done 5/2018 does not show evidence of active pulmn TB.     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 He is requesting an extension of work restriction letter for hernia (for heavy load lifting) and 36 hour work limit per week. He does not remember who gave the letter before and needs it tomorrow to continue working.     Social Hx:  Social History   Substance Use Topics     Smoking status: Former Smoker     Packs/day: 0.03     Years: 20.00     Types: Cigarettes, Cigars     Start date: 8/14/1970     Quit date: 3/14/2018     Smokeless tobacco: Never Used      Comment: Quit smoking Feb 2018, one cigarette after dinner     Alcohol use No      Comment: Quit drinking Feb 2018       Immunizations:  Immunization History   Administered Date(s) Administered     Pneumo Conj 13-V (2010&after) 08/21/2018     TDAP Vaccine (Boostrix) 08/21/2018     Zoster vaccine recombinant adjuvanted (SHINGRIX) 08/21/2018  "      Allergies:   No Known Allergies    Medications:  Current Outpatient Prescriptions   Medication     alfuzosin (UROXATRAL) 10 MG 24 hr tablet     furosemide (LASIX) 20 MG tablet     NADOLOL PO     omeprazole (PRILOSEC) 20 MG CR capsule     rifampin (RIFADIN) 300 MG capsule     spironolactone (ALDACTONE) 25 MG tablet     XIFAXAN 550 MG TABS tablet     No current facility-administered medications for this visit.        Past Medical Hx:  Past Medical History:   Diagnosis Date     Cancer (H)     hepatocellualr carcinoma     Cirrhosis of liver (H) 5/8/2018     Enlarged prostate      Inguinal hernia      Liver lesion 5/8/2018         Family History:  Family History   Problem Relation Age of Onset     Liver Disease Brother          Review of Systems:  10 systems reviewed, pertinent positives noted in my HPI.      Examination:  Vital signs:   /81  Pulse 54  Temp 97.6  F (36.4  C) (Oral)  Ht 1.702 m (5' 7\")  Wt 81.6 kg (179 lb 12.8 oz)  SpO2 100%  BMI 28.16 kg/m2    Constitutional: Patient in no distress  Eyes: not pale, mildly jaundiced  Neck: no lymphadenopathy  CVS: no added sounds  RS: clear  Abdomen: soft, BS+  Skin: no rash  Extremities: mild pedal edema  Psych: Alert and oriented x 3    Laboratory:  Hematology:  Recent Labs   Lab Test  08/21/18   1050  07/19/18   1132  07/11/18   1302   WBC  2.7*  2.6*  3.4*   RBC  3.53*  3.41*  3.48*   HGB  11.9*  11.6*  11.8*   HCT  34.7*  33.6*  34.2*   MCV  98  99  98   MCH  33.7*  34.0*  33.9*   MCHC  34.3  34.5  34.5   RDW  13.6  14.5  13.9   PLT  76*  72*  81*       Chemistry:  Recent Labs   Lab Test  08/21/18   1050  08/15/18   0829  07/25/18   0848   NA  139  141  138   POTASSIUM  4.4  4.4  3.9   CHLORIDE  107  113*  107   CO2  26  23  23   ANIONGAP  6  6  8   GLC  139*  125*  130*   BUN  12  12  12   CR  0.62*  0.54*  0.48*   YELENA  8.6  8.3*  7.6*       Liver Function Studies:     Recent Labs   Lab Test  08/21/18   1050  07/25/18   0848  07/19/18   1132 "   PROTTOTAL  7.2  5.7*  6.9   ALBUMIN  2.7*  2.2*  2.5*   BILITOTAL  2.4*  2.1*  2.3*   ALKPHOS  332*  230*  344*   AST  63*  48*  65*   ALT  52  39  52       Microbiology:  18   Quant positive       Imagin2018   CT chest   Multifocal area of groundglass opacities of the bilateral  lower lobes, to lesser extent left upper lobes. Differentials  including focal atelectasis versus infection. Short-term follow-up to  make sure resolution is recommended.   1.  4 mm part solid pulmonary nodule within the right lower lobe  posterior medially, attention on follow-up is recommended.  3. Right hepatic lobe hypoattenuating lesion measures 3.8 x 3.0 cm.  Hypoattenuating lesion in the posterior right hepatic lobe measures  1.9 cm. Limited evaluation due to single phase study. Please refer to  same-day MRI study for further characterization.  4. Gallbladder wall thickening. Consider ultrasound.         Pauline Clancy MD       Active smoker/Yes

## 2021-08-02 NOTE — PROGRESS NOTE ADULT - ASSESSMENT
56M hx HLD, CAD s/p MAINOR x 2 (July 2020) on DAPT, LBO 2/2 diverticulitis s/p John's procedure (March 2019) and reversal (June 2019) recently discharged 2 days ago after hospitalization for nonoperative small bowel obstruction presenting with recurrent small bowel obstruction with similar transition point to last admission.    PLAN:  - OR Monday  - Pre-op and Consent  - Sequential compression devices and DC heparin before last afternoon dose.  - NGT/NPO/IVF  - Will continue to hold home Brillinta  - Continue home cardiac medications in IV formulation, chewable ASA  - Pain control PRN  - OOB / ambulating as tolerated     [facility] Team Surgery  #[id number]

## 2021-08-03 LAB
ANION GAP SERPL CALC-SCNC: 10 MMOL/L — SIGNIFICANT CHANGE UP (ref 5–17)
APTT BLD: 28.8 SEC — SIGNIFICANT CHANGE UP (ref 27.5–35.5)
BUN SERPL-MCNC: 9 MG/DL — SIGNIFICANT CHANGE UP (ref 7–23)
CALCIUM SERPL-MCNC: 9.1 MG/DL — SIGNIFICANT CHANGE UP (ref 8.4–10.5)
CHLORIDE SERPL-SCNC: 102 MMOL/L — SIGNIFICANT CHANGE UP (ref 96–108)
CO2 SERPL-SCNC: 26 MMOL/L — SIGNIFICANT CHANGE UP (ref 22–31)
CREAT SERPL-MCNC: 1.09 MG/DL — SIGNIFICANT CHANGE UP (ref 0.5–1.3)
GLUCOSE SERPL-MCNC: 93 MG/DL — SIGNIFICANT CHANGE UP (ref 70–99)
HCT VFR BLD CALC: 39.8 % — SIGNIFICANT CHANGE UP (ref 39–50)
HGB BLD-MCNC: 12.3 G/DL — LOW (ref 13–17)
INR BLD: 1.11 RATIO — SIGNIFICANT CHANGE UP (ref 0.88–1.16)
MAGNESIUM SERPL-MCNC: 1.9 MG/DL — SIGNIFICANT CHANGE UP (ref 1.6–2.6)
MCHC RBC-ENTMCNC: 27.3 PG — SIGNIFICANT CHANGE UP (ref 27–34)
MCHC RBC-ENTMCNC: 30.9 GM/DL — LOW (ref 32–36)
MCV RBC AUTO: 88.2 FL — SIGNIFICANT CHANGE UP (ref 80–100)
NRBC # BLD: 0 /100 WBCS — SIGNIFICANT CHANGE UP (ref 0–0)
PHOSPHATE SERPL-MCNC: 4.7 MG/DL — HIGH (ref 2.5–4.5)
PLATELET # BLD AUTO: 315 K/UL — SIGNIFICANT CHANGE UP (ref 150–400)
POTASSIUM SERPL-MCNC: 4.3 MMOL/L — SIGNIFICANT CHANGE UP (ref 3.5–5.3)
POTASSIUM SERPL-SCNC: 4.3 MMOL/L — SIGNIFICANT CHANGE UP (ref 3.5–5.3)
PROTHROM AB SERPL-ACNC: 13.2 SEC — SIGNIFICANT CHANGE UP (ref 10.6–13.6)
RBC # BLD: 4.51 M/UL — SIGNIFICANT CHANGE UP (ref 4.2–5.8)
RBC # FLD: 15 % — HIGH (ref 10.3–14.5)
SODIUM SERPL-SCNC: 138 MMOL/L — SIGNIFICANT CHANGE UP (ref 135–145)
WBC # BLD: 12.59 K/UL — HIGH (ref 3.8–10.5)
WBC # FLD AUTO: 12.59 K/UL — HIGH (ref 3.8–10.5)

## 2021-08-03 RX ORDER — ACETAMINOPHEN 500 MG
1000 TABLET ORAL ONCE
Refills: 0 | Status: COMPLETED | OUTPATIENT
Start: 2021-08-03 | End: 2021-08-03

## 2021-08-03 RX ORDER — SIMETHICONE 80 MG/1
80 TABLET, CHEWABLE ORAL DAILY
Refills: 0 | Status: DISCONTINUED | OUTPATIENT
Start: 2021-08-03 | End: 2021-08-11

## 2021-08-03 RX ADMIN — Medication 1 PATCH: at 12:21

## 2021-08-03 RX ADMIN — Medication 400 MILLIGRAM(S): at 16:40

## 2021-08-03 RX ADMIN — HEPARIN SODIUM 5000 UNIT(S): 5000 INJECTION INTRAVENOUS; SUBCUTANEOUS at 22:04

## 2021-08-03 RX ADMIN — Medication 1000 MILLIGRAM(S): at 17:10

## 2021-08-03 RX ADMIN — Medication 1 PATCH: at 19:33

## 2021-08-03 RX ADMIN — Medication 400 MILLIGRAM(S): at 22:02

## 2021-08-03 RX ADMIN — PANTOPRAZOLE SODIUM 40 MILLIGRAM(S): 20 TABLET, DELAYED RELEASE ORAL at 12:16

## 2021-08-03 RX ADMIN — HEPARIN SODIUM 5000 UNIT(S): 5000 INJECTION INTRAVENOUS; SUBCUTANEOUS at 13:56

## 2021-08-03 RX ADMIN — Medication 125 MICROGRAM(S): at 12:16

## 2021-08-03 RX ADMIN — Medication 1000 MILLIGRAM(S): at 22:32

## 2021-08-03 RX ADMIN — Medication 1 PATCH: at 06:27

## 2021-08-03 RX ADMIN — Medication 81 MILLIGRAM(S): at 12:16

## 2021-08-03 RX ADMIN — HEPARIN SODIUM 5000 UNIT(S): 5000 INJECTION INTRAVENOUS; SUBCUTANEOUS at 05:25

## 2021-08-03 NOTE — PROGRESS NOTE ADULT - SUBJECTIVE AND OBJECTIVE BOX
SURGERY  Pager: #[phone]    STATUS POST: ventral hernia repair with mesh    POST OPERATIVE DAY #1    INTERVAL EVENTS/SUBJECTIVE: No acute events overnight. On morning rounds...     ______________________________________________  OBJECTIVE:   T(C): 36.8 (08-03-21 @ 01:16), Max: 36.9 (08-02-21 @ 22:30)  HR: 75 (08-03-21 @ 01:16) (69 - 114)  BP: 109/73 (08-03-21 @ 01:16) (103/55 - 134/83)  RR: 18 (08-03-21 @ 01:16) (14 - 20)  SpO2: 95% (08-03-21 @ 01:16) (91% - 99%)  Wt(kg): --  CAPILLARY BLOOD GLUCOSE        I&O's Detail    01 Aug 2021 07:01  -  02 Aug 2021 07:00  --------------------------------------------------------  IN:    dextrose 5% + sodium chloride 0.45% w/ Additives: 3000 mL    IV PiggyBack: 150 mL  Total IN: 3150 mL    OUT:    Nasogastric/Oral tube (mL): 900 mL    Oral Fluid: 0 mL    Voided (mL): 2200 mL  Total OUT: 3100 mL    Total NET: 50 mL      02 Aug 2021 07:01  -  03 Aug 2021 01:54  --------------------------------------------------------  IN:    Lactated Ringers: 750 mL  Total IN: 750 mL    OUT:    Bulb (mL): 40 mL    Indwelling Catheter - Urethral (mL): 545 mL    Nasogastric/Oral tube (mL): 25 mL    Oral Fluid: 0 mL  Total OUT: 610 mL    Total NET: 140 mL          Physical exam:  Gen: resting in bed comfortably in NAD  Chest: no increased WOB, regular inspiratory effort   Abdomen: Soft, nontender, nondistended with no rebound tenderness or guarding. Incisions clean, dry, intact, with no erythema.   Vascular: WWP, CARSON x4  NEURO: awake, alert  ______________________________________________  LABS:  CBC Full  -  ( 02 Aug 2021 07:23 )  WBC Count : 7.73 K/uL  RBC Count : 5.02 M/uL  Hemoglobin : 13.6 g/dL  Hematocrit : 44.4 %  Platelet Count - Automated : 304 K/uL  Mean Cell Volume : 88.4 fl  Mean Cell Hemoglobin : 27.1 pg  Mean Cell Hemoglobin Concentration : 30.6 gm/dL  Auto Neutrophil # : x  Auto Lymphocyte # : x  Auto Monocyte # : x  Auto Eosinophil # : x  Auto Basophil # : x  Auto Neutrophil % : x  Auto Lymphocyte % : x  Auto Monocyte % : x  Auto Eosinophil % : x  Auto Basophil % : x    08-02    134<L>  |  101  |  5<L>  ----------------------------<  99  4.2   |  20<L>  |  1.08    Ca    9.9      02 Aug 2021 07:18  Phos  3.8     08-02  Mg     2.0     08-02      _____________________________________________  RADIOLOGY:

## 2021-08-03 NOTE — PROGRESS NOTE ADULT - SUBJECTIVE AND OBJECTIVE BOX
Day 1 of Anesthesia Pain Management Service    SUBJECTIVE: Some pain    Pain Scale Score:   Refer to charted pain scores    THERAPY:  [X] Epidural Bupivacaine 0.0625% and Hydromorphone         [X] 10 micrograms/mL 	[ ] 5 micrograms/mL  [ ] Epidural Ropivacaine 0.2% plain – 1 mg/mL    Demand dose: 3 mL  Lockout: 15 minutes  Continuous Rate: 6 mL/hr    MEDICATIONS  (STANDING):  aspirin  chewable 81 milliGRAM(s) Oral daily  digoxin  Injectable 125 MICROGram(s) IV Push daily  heparin   Injectable 5000 Unit(s) SubCutaneous every 8 hours  hydromorphone (10 MICROgram(s)/mL) + bupivacaine 0.0625% in 0.9% Sodium Chloride PCEA 250 milliLiter(s) Epidural PCA Continuous  lactated ringers. 1000 milliLiter(s) (125 mL/Hr) IV Continuous <Continuous>  nicotine -   7 mG/24Hr(s) Patch 1 patch Transdermal daily  pantoprazole  Injectable 40 milliGRAM(s) IV Push daily    MEDICATIONS  (PRN):  benzocaine 15 mG/menthol 3.6 mG (Sugar-Free) Lozenge 1 Lozenge Oral every 6 hours PRN Sore Throat  butorphanol Injectable 0.25 milliGRAM(s) IV Push every 6 hours PRN Pruritus  diphenhydrAMINE   Injectable 25 milliGRAM(s) IV Push every 4 hours PRN Pruritus  hydromorphone (10 MICROgram(s)/mL) + bupivacaine 0.0625% in 0.9% Sodium Chloride PCEA Rescue Clinician  Bolus 5 milliLiter(s) Epidural every 15 minutes PRN for Pain Score greater than 6  naloxone Injectable 0.1 milliGRAM(s) IV Push every 3 minutes PRN For ANY of the following changes in patient status:  A. RR LESS THAN 10 breaths per minute, B. Oxygen saturation LESS THAN 90%, C. Sedation score of 6  ondansetron Injectable 4 milliGRAM(s) IV Push every 6 hours PRN Nausea  tetracaine/benzocaine/butamben Spray 1 Spray(s) Topical every 4 hours PRN NGtube throat discomfort      OBJECTIVE:    Assessment of Catheter Site:    [X] Epidural 	  [X] Dressing intact	[X] Site non-tender	[X] Site without erythema, discharge, edema  [X] Epidural tubing and connection checked	[X] Gross neurological exam within normal limits  [ ] Catheter removed – tip intact		[X] Afebrile	            [ ] Febrile: ___    PT/INR - ( 03 Aug 2021 06:25 )   PT: 13.2 sec;   INR: 1.11 ratio         PTT - ( 03 Aug 2021 06:25 )  PTT:28.8 sec                      12.3   12.59 )-----------( 315      ( 03 Aug 2021 06:25 )             39.8     Vital Signs Last 24 Hrs  T(C): 36.8 (08-03-21 @ 09:09), Max: 37.1 (08-03-21 @ 05:21)  T(F): 98.2 (08-03-21 @ 09:09), Max: 98.7 (08-03-21 @ 05:21)  HR: 85 (08-03-21 @ 09:09) (71 - 114)  BP: 102/67 (08-03-21 @ 09:09) (102/67 - 130/87)  BP(mean): 91 (08-02-21 @ 19:15) (77 - 97)  RR: 18 (08-03-21 @ 09:09) (14 - 20)  SpO2: 91% (08-03-21 @ 09:09) (91% - 99%)      Sedation Score:	[X] Alert	[ ] Drowsy	[ ] Arousable  [ ] Asleep     [ ] Unresponsive    Side Effects:	[X] None	[ ] Nausea	[ ] Vomiting   [ ] Pruritus  		[ ] Weakness     [ ] Numbness	[ ] Other:    ASSESSMENT/ PLAN:    Therapy:	[X] Continue   [ ] Discontinue   [ ] Change to PRN Analgesics   [ ] Change to PCA    Documentation and Verification of current medications:  [X] Done	[ ] Not done, not eligible, reason:    COMMENTS: Endorsing incisional pain. PCEA use 0-2x/hr, reached four hour limit. Increased to 50mL. +NGT-clamping trial. Anticoagulation status reviewed. Please hold home anticoagulation po medication until after epidural removed.  Currently on Heparin SQ TID

## 2021-08-03 NOTE — DIETITIAN INITIAL EVALUATION ADULT. - CHIEF COMPLAINT
This is a 56M hx HLD, CAD s/p MAINOR x 2 (July 2020) on DAPT, LBO 2/2 diverticulitis s/p John's procedure (March 2019) and reversal (June 2019) recently discharged 2 days ago after hospitalization for nonoperative small bowel obstruction presenting with recurrent small bowel obstruction with similar transition point to last admission, now s/p ventral hernia repair of multiple fascial defects with mesh (8/2).

## 2021-08-03 NOTE — DIETITIAN INITIAL EVALUATION ADULT. - OTHER INFO
Weight: pt previously being ~ 375lbs, was able to lose ~ 125lbs but over the last regain the weight back due social stressors. Per past RD notes and HIE: 137.9kg (6/28/19), 145.1kg (7/28/20), 152kg (9/16/20), 170.1kg (6/23/21), 163.3kg (7/12/21), 164.4kg (7/18/21),  current dosing weight 158.8Kg (8/2/21)    Since admission pt has been NPO x 7 days, now s/p hernia repair. NGT to suction discontinued today. Pt reports feeling well, eager for diet advancement. Denies any nausea. last BM 7/29. Pt reports being advised by MD to lose weight, expresses motivation to do so after discharge. Reports plan to work out in home gym regularly once cleared by MD and watch portions sizes. Pt declining need for diet education at this time, will follow-up with need for education pending further diet advancement. Patient with no nutrition-related questions at this time. Made aware RD remains available as needed.

## 2021-08-03 NOTE — DIETITIAN INITIAL EVALUATION ADULT. - ADD RECOMMEND
1) Medical team to advance diet when medically feasible via tolerated route. Consider advancing to clear liquid, followed by low fiber diet as tolerated. If NPO status > 7 days, consider alternate means of nutrition. 2) Consider addition of multivitamin if no contraindications. 3) Diet education deferred, follow up as able/requested 4) BMI < 40kg/m2 alert placed in EMR

## 2021-08-03 NOTE — PROGRESS NOTE ADULT - ASSESSMENT
56M hx HLD, CAD s/p MAINOR x 2 (July 2020) on DAPT, LBO 2/2 diverticulitis s/p John's procedure (March 2019) and reversal (June 2019) recently discharged 2 days ago after hospitalization for nonoperative small bowel obstruction presenting with recurrent small bowel obstruction with similar transition point to last admission, now s/p ventral hernia repair of multiple fascial defects with mesh (8/2), recovering appropriately on floors.     PLAN:  - Will continue to hold home Brillinta  - Continue home cardiac medications in IV formulation, chewable ASA  - Pain control as needed; PCEA in place  - Quispe overnight   - DVT ppx  - LR @ 125cc/hr  - Diet, NPO except meds; NGT to LCWS   - OOB and ambulating as tolerated  - F/u AM labs      [place] [facility]  #[id number]

## 2021-08-03 NOTE — DIETITIAN INITIAL EVALUATION ADULT. - ORAL INTAKE PTA/DIET HISTORY
Pt recently admitted for SBO last month, was discharged on low fiber diet over developed abdominal pain 2 days afterwards. Pt denies chewing/swallowing difficulty, admitted with 1 day of nausea, constipation. NKFA. No micronutrient supplementation

## 2021-08-04 LAB
ANION GAP SERPL CALC-SCNC: 9 MMOL/L — SIGNIFICANT CHANGE UP (ref 5–17)
APTT BLD: 29.1 SEC — SIGNIFICANT CHANGE UP (ref 27.5–35.5)
BUN SERPL-MCNC: 10 MG/DL — SIGNIFICANT CHANGE UP (ref 7–23)
CALCIUM SERPL-MCNC: 9 MG/DL — SIGNIFICANT CHANGE UP (ref 8.4–10.5)
CHLORIDE SERPL-SCNC: 99 MMOL/L — SIGNIFICANT CHANGE UP (ref 96–108)
CO2 SERPL-SCNC: 26 MMOL/L — SIGNIFICANT CHANGE UP (ref 22–31)
CREAT SERPL-MCNC: 1.07 MG/DL — SIGNIFICANT CHANGE UP (ref 0.5–1.3)
GLUCOSE SERPL-MCNC: 98 MG/DL — SIGNIFICANT CHANGE UP (ref 70–99)
HCT VFR BLD CALC: 38.4 % — LOW (ref 39–50)
HGB BLD-MCNC: 11.8 G/DL — LOW (ref 13–17)
INR BLD: 1.22 RATIO — HIGH (ref 0.88–1.16)
MAGNESIUM SERPL-MCNC: 1.6 MG/DL — SIGNIFICANT CHANGE UP (ref 1.6–2.6)
MCHC RBC-ENTMCNC: 27 PG — SIGNIFICANT CHANGE UP (ref 27–34)
MCHC RBC-ENTMCNC: 30.7 GM/DL — LOW (ref 32–36)
MCV RBC AUTO: 87.9 FL — SIGNIFICANT CHANGE UP (ref 80–100)
NRBC # BLD: 0 /100 WBCS — SIGNIFICANT CHANGE UP (ref 0–0)
PHOSPHATE SERPL-MCNC: 3.3 MG/DL — SIGNIFICANT CHANGE UP (ref 2.5–4.5)
PLATELET # BLD AUTO: 248 K/UL — SIGNIFICANT CHANGE UP (ref 150–400)
POTASSIUM SERPL-MCNC: 4.5 MMOL/L — SIGNIFICANT CHANGE UP (ref 3.5–5.3)
POTASSIUM SERPL-SCNC: 4.5 MMOL/L — SIGNIFICANT CHANGE UP (ref 3.5–5.3)
PROTHROM AB SERPL-ACNC: 14.5 SEC — HIGH (ref 10.6–13.6)
RBC # BLD: 4.37 M/UL — SIGNIFICANT CHANGE UP (ref 4.2–5.8)
RBC # FLD: 15.3 % — HIGH (ref 10.3–14.5)
SODIUM SERPL-SCNC: 134 MMOL/L — LOW (ref 135–145)
WBC # BLD: 12.58 K/UL — HIGH (ref 3.8–10.5)
WBC # FLD AUTO: 12.58 K/UL — HIGH (ref 3.8–10.5)

## 2021-08-04 RX ORDER — MAGNESIUM SULFATE 500 MG/ML
2 VIAL (ML) INJECTION ONCE
Refills: 0 | Status: COMPLETED | OUTPATIENT
Start: 2021-08-04 | End: 2021-08-04

## 2021-08-04 RX ORDER — KETOROLAC TROMETHAMINE 30 MG/ML
15 SYRINGE (ML) INJECTION EVERY 6 HOURS
Refills: 0 | Status: DISCONTINUED | OUTPATIENT
Start: 2021-08-04 | End: 2021-08-05

## 2021-08-04 RX ORDER — KETOROLAC TROMETHAMINE 30 MG/ML
15 SYRINGE (ML) INJECTION EVERY 6 HOURS
Refills: 0 | Status: DISCONTINUED | OUTPATIENT
Start: 2021-08-04 | End: 2021-08-04

## 2021-08-04 RX ADMIN — Medication 1 PATCH: at 13:20

## 2021-08-04 RX ADMIN — Medication 81 MILLIGRAM(S): at 13:24

## 2021-08-04 RX ADMIN — HEPARIN SODIUM 5000 UNIT(S): 5000 INJECTION INTRAVENOUS; SUBCUTANEOUS at 13:25

## 2021-08-04 RX ADMIN — Medication 125 MICROGRAM(S): at 13:25

## 2021-08-04 RX ADMIN — Medication 15 MILLIGRAM(S): at 13:26

## 2021-08-04 RX ADMIN — Medication 50 GRAM(S): at 13:25

## 2021-08-04 RX ADMIN — SIMETHICONE 80 MILLIGRAM(S): 80 TABLET, CHEWABLE ORAL at 01:42

## 2021-08-04 RX ADMIN — Medication 1 PATCH: at 09:02

## 2021-08-04 RX ADMIN — HEPARIN SODIUM 5000 UNIT(S): 5000 INJECTION INTRAVENOUS; SUBCUTANEOUS at 06:17

## 2021-08-04 RX ADMIN — PANTOPRAZOLE SODIUM 40 MILLIGRAM(S): 20 TABLET, DELAYED RELEASE ORAL at 13:24

## 2021-08-04 RX ADMIN — Medication 1 PATCH: at 13:24

## 2021-08-04 RX ADMIN — Medication 15 MILLIGRAM(S): at 18:55

## 2021-08-04 RX ADMIN — Medication 1 PATCH: at 19:15

## 2021-08-04 NOTE — PROGRESS NOTE ADULT - SUBJECTIVE AND OBJECTIVE BOX
SURGERY  Pager: #[phone]    STATUS POST: ventral hernia repair with mesh    POST OPERATIVE DAY #2    INTERVAL EVENTS/SUBJECTIVE: No acute events overnight. On morning rounds...     ______________________________________________  OBJECTIVE:   T(C): 36.8 (08-04-21 @ 01:34), Max: 37.1 (08-03-21 @ 05:21)  HR: 86 (08-04-21 @ 01:34) (71 - 86)  BP: 139/71 (08-04-21 @ 01:34) (96/62 - 139/71)  RR: 18 (08-04-21 @ 01:34) (18 - 18)  SpO2: 92% (08-03-21 @ 23:12) (91% - 98%)  Wt(kg): --  CAPILLARY BLOOD GLUCOSE        I&O's Detail    02 Aug 2021 07:01  -  03 Aug 2021 07:00  --------------------------------------------------------  IN:    Lactated Ringers: 1250 mL  Total IN: 1250 mL    OUT:    Bulb (mL): 70 mL    Indwelling Catheter - Urethral (mL): 945 mL    Nasogastric/Oral tube (mL): 25 mL    Oral Fluid: 0 mL  Total OUT: 1040 mL    Total NET: 210 mL      03 Aug 2021 07:01  -  04 Aug 2021 03:24  --------------------------------------------------------  IN:    IV PiggyBack: 100 mL    Lactated Ringers: 1875 mL    Oral Fluid: 480 mL  Total IN: 2455 mL    OUT:    Bulb (mL): 80 mL    Indwelling Catheter - Urethral (mL): 1125 mL    Nasogastric/Oral tube (mL): 0 mL  Total OUT: 1205 mL    Total NET: 1250 mL          Physical exam:  Gen: resting in bed comfortably in NAD  Chest: no increased WOB, regular inspiratory effort   Abdomen: Soft, nontender, nondistended with no rebound tenderness or guarding. Incisions clean, dry, intact, with no erythema.   Vascular: WWP, CARSON x4  NEURO: awake, alert  ______________________________________________  LABS:  CBC Full  -  ( 03 Aug 2021 06:25 )  WBC Count : 12.59 K/uL  RBC Count : 4.51 M/uL  Hemoglobin : 12.3 g/dL  Hematocrit : 39.8 %  Platelet Count - Automated : 315 K/uL  Mean Cell Volume : 88.2 fl  Mean Cell Hemoglobin : 27.3 pg  Mean Cell Hemoglobin Concentration : 30.9 gm/dL  Auto Neutrophil # : x  Auto Lymphocyte # : x  Auto Monocyte # : x  Auto Eosinophil # : x  Auto Basophil # : x  Auto Neutrophil % : x  Auto Lymphocyte % : x  Auto Monocyte % : x  Auto Eosinophil % : x  Auto Basophil % : x    08-03    138  |  102  |  9   ----------------------------<  93  4.3   |  26  |  1.09    Ca    9.1      03 Aug 2021 06:24  Phos  4.7     08-03  Mg     1.9     08-03      _____________________________________________  RADIOLOGY:     SURGERY  Pager: #[phone]    STATUS POST: ventral hernia repair with mesh    POST OPERATIVE DAY #2    INTERVAL EVENTS/SUBJECTIVE: No acute events overnight. On morning rounds, patient describes some discomfort with the allison. Otherwise, he is recovering appropriately. He is passing gas and stool. Bulb fluid is serous. NGT is out and he is on clear diet, which he is tolerating.    ______________________________________________  OBJECTIVE:   T(C): 36.8 (08-04-21 @ 01:34), Max: 37.1 (08-03-21 @ 05:21)  HR: 86 (08-04-21 @ 01:34) (71 - 86)  BP: 139/71 (08-04-21 @ 01:34) (96/62 - 139/71)  RR: 18 (08-04-21 @ 01:34) (18 - 18)  SpO2: 92% (08-03-21 @ 23:12) (91% - 98%)  Wt(kg): --  CAPILLARY BLOOD GLUCOSE        I&O's Detail    02 Aug 2021 07:01  -  03 Aug 2021 07:00  --------------------------------------------------------  IN:    Lactated Ringers: 1250 mL  Total IN: 1250 mL    OUT:    Bulb (mL): 70 mL    Indwelling Catheter - Urethral (mL): 945 mL    Nasogastric/Oral tube (mL): 25 mL    Oral Fluid: 0 mL  Total OUT: 1040 mL    Total NET: 210 mL      03 Aug 2021 07:01  -  04 Aug 2021 03:24  --------------------------------------------------------  IN:    IV PiggyBack: 100 mL    Lactated Ringers: 1875 mL    Oral Fluid: 480 mL  Total IN: 2455 mL    OUT:    Bulb (mL): 80 mL    Indwelling Catheter - Urethral (mL): 1125 mL    Nasogastric/Oral tube (mL): 0 mL  Total OUT: 1205 mL    Total NET: 1250 mL          Physical exam:  Gen: resting in bed comfortably in NAD  Chest: no increased WOB, regular inspiratory effort   Abdomen: Soft, nontender, nondistended with no rebound tenderness or guarding. Incisions clean, dry, intact, with no erythema. Bulb fluid is serous.  Vascular: WWP, CARSON x4  NEURO: awake, alert  ______________________________________________  LABS:  CBC Full  -  ( 03 Aug 2021 06:25 )  WBC Count : 12.59 K/uL  RBC Count : 4.51 M/uL  Hemoglobin : 12.3 g/dL  Hematocrit : 39.8 %  Platelet Count - Automated : 315 K/uL  Mean Cell Volume : 88.2 fl  Mean Cell Hemoglobin : 27.3 pg  Mean Cell Hemoglobin Concentration : 30.9 gm/dL  Auto Neutrophil # : x  Auto Lymphocyte # : x  Auto Monocyte # : x  Auto Eosinophil # : x  Auto Basophil # : x  Auto Neutrophil % : x  Auto Lymphocyte % : x  Auto Monocyte % : x  Auto Eosinophil % : x  Auto Basophil % : x    08-03    138  |  102  |  9   ----------------------------<  93  4.3   |  26  |  1.09    Ca    9.1      03 Aug 2021 06:24  Phos  4.7     08-03  Mg     1.9     08-03      _____________________________________________  RADIOLOGY:     SURGERY  Pager: #[phone]    STATUS POST: ventral hernia repair with mesh    POST OPERATIVE DAY #2    INTERVAL EVENTS/SUBJECTIVE: No acute events overnight. On morning rounds, patient describes some discomfort with the allison. Otherwise, he is recovering appropriately. He is passing gas. Bulb fluid is serous. NGT is out and he is on clear diet, which he is tolerating.    ______________________________________________  OBJECTIVE:   T(C): 36.8 (08-04-21 @ 01:34), Max: 37.1 (08-03-21 @ 05:21)  HR: 86 (08-04-21 @ 01:34) (71 - 86)  BP: 139/71 (08-04-21 @ 01:34) (96/62 - 139/71)  RR: 18 (08-04-21 @ 01:34) (18 - 18)  SpO2: 92% (08-03-21 @ 23:12) (91% - 98%)  Wt(kg): --  CAPILLARY BLOOD GLUCOSE        I&O's Detail    02 Aug 2021 07:01  -  03 Aug 2021 07:00  --------------------------------------------------------  IN:    Lactated Ringers: 1250 mL  Total IN: 1250 mL    OUT:    Bulb (mL): 70 mL    Indwelling Catheter - Urethral (mL): 945 mL    Nasogastric/Oral tube (mL): 25 mL    Oral Fluid: 0 mL  Total OUT: 1040 mL    Total NET: 210 mL      03 Aug 2021 07:01  -  04 Aug 2021 03:24  --------------------------------------------------------  IN:    IV PiggyBack: 100 mL    Lactated Ringers: 1875 mL    Oral Fluid: 480 mL  Total IN: 2455 mL    OUT:    Bulb (mL): 80 mL    Indwelling Catheter - Urethral (mL): 1125 mL    Nasogastric/Oral tube (mL): 0 mL  Total OUT: 1205 mL    Total NET: 1250 mL          Physical exam:  Gen: resting in bed comfortably in NAD  Chest: no increased WOB, regular inspiratory effort   Abdomen: Soft, nontender, nondistended with no rebound tenderness or guarding. Incisions clean, dry, intact, with no erythema. Bulb fluid is serous.  Vascular: WWP, CARSON x4  NEURO: awake, alert  ______________________________________________  LABS:  CBC Full  -  ( 03 Aug 2021 06:25 )  WBC Count : 12.59 K/uL  RBC Count : 4.51 M/uL  Hemoglobin : 12.3 g/dL  Hematocrit : 39.8 %  Platelet Count - Automated : 315 K/uL  Mean Cell Volume : 88.2 fl  Mean Cell Hemoglobin : 27.3 pg  Mean Cell Hemoglobin Concentration : 30.9 gm/dL  Auto Neutrophil # : x  Auto Lymphocyte # : x  Auto Monocyte # : x  Auto Eosinophil # : x  Auto Basophil # : x  Auto Neutrophil % : x  Auto Lymphocyte % : x  Auto Monocyte % : x  Auto Eosinophil % : x  Auto Basophil % : x    08-03    138  |  102  |  9   ----------------------------<  93  4.3   |  26  |  1.09    Ca    9.1      03 Aug 2021 06:24  Phos  4.7     08-03  Mg     1.9     08-03      _____________________________________________  RADIOLOGY:

## 2021-08-04 NOTE — PROGRESS NOTE ADULT - SUBJECTIVE AND OBJECTIVE BOX
Day 2 of Anesthesia Pain Management Service    SUBJECTIVE: Having pain    Pain Scale Score:   Refer to charted pain scores    THERAPY:  [X] Epidural Bupivacaine 0.0625% and Hydromorphone         [X] 10 micrograms/mL 	[ ] 5 micrograms/mL  [ ] Epidural Ropivacaine 0.2% plain – 1 mg/mL    Demand dose: 3 mL  Lockout: 15 minutes  Continuous Rate: 6 mL/hr    MEDICATIONS  (STANDING):  aspirin  chewable 81 milliGRAM(s) Oral daily  digoxin  Injectable 125 MICROGram(s) IV Push daily  heparin   Injectable 5000 Unit(s) SubCutaneous every 8 hours  hydromorphone (10 MICROgram(s)/mL) + bupivacaine 0.0625% in 0.9% Sodium Chloride PCEA 250 milliLiter(s) Epidural PCA Continuous  ketorolac   Injectable 15 milliGRAM(s) IntraMuscular every 6 hours  magnesium sulfate  IVPB 2 Gram(s) IV Intermittent once  nicotine -   7 mG/24Hr(s) Patch 1 patch Transdermal daily  pantoprazole  Injectable 40 milliGRAM(s) IV Push daily    MEDICATIONS  (PRN):  benzocaine 15 mG/menthol 3.6 mG (Sugar-Free) Lozenge 1 Lozenge Oral every 6 hours PRN Sore Throat  butorphanol Injectable 0.25 milliGRAM(s) IV Push every 6 hours PRN Pruritus  diphenhydrAMINE   Injectable 25 milliGRAM(s) IV Push every 4 hours PRN Pruritus  hydromorphone (10 MICROgram(s)/mL) + bupivacaine 0.0625% in 0.9% Sodium Chloride PCEA Rescue Clinician  Bolus 5 milliLiter(s) Epidural every 15 minutes PRN for Pain Score greater than 6  naloxone Injectable 0.1 milliGRAM(s) IV Push every 3 minutes PRN For ANY of the following changes in patient status:  A. RR LESS THAN 10 breaths per minute, B. Oxygen saturation LESS THAN 90%, C. Sedation score of 6  ondansetron Injectable 4 milliGRAM(s) IV Push every 6 hours PRN Nausea  simethicone 80 milliGRAM(s) Chew daily PRN Gas  tetracaine/benzocaine/butamben Spray 1 Spray(s) Topical every 4 hours PRN NGtube throat discomfort      OBJECTIVE:    Assessment of Catheter Site:    [X] Epidural 	  [X] Dressing intact	[X] Site non-tender	[X] Site without erythema, discharge, edema  [X] Epidural tubing and connection checked	[X] Gross neurological exam within normal limits  [ ] Catheter removed – tip intact		[X] Afebrile	            [ ] Febrile: ___    PT/INR - ( 04 Aug 2021 08:03 )   PT: 14.5 sec;   INR: 1.22 ratio         PTT - ( 04 Aug 2021 08:03 )  PTT:29.1 sec                      11.8   12.58 )-----------( 248      ( 04 Aug 2021 08:03 )             38.4     Vital Signs Last 24 Hrs  T(C): 36.9 (08-04-21 @ 05:26), Max: 37.1 (08-03-21 @ 13:30)  T(F): 98.4 (08-04-21 @ 05:26), Max: 98.8 (08-03-21 @ 21:06)  HR: 95 (08-04-21 @ 06:30) (78 - 96)  BP: 111/67 (08-04-21 @ 05:26) (96/62 - 139/71)  BP(mean): --  RR: 18 (08-04-21 @ 05:26) (18 - 18)  SpO2: 92% (08-04-21 @ 06:30) (92% - 98%)      Sedation Score:	[X] Alert	[ ] Drowsy	[ ] Arousable  [ ] Asleep     [ ] Unresponsive    Side Effects:	[X] None	[ ] Nausea	[ ] Vomiting   [ ] Pruritus  		[ ] Weakness     [ ] Numbness	[ ] Other:    ASSESSMENT/ PLAN:    Therapy:	[X] Continue   [ ] Discontinue   [ ] Change to PRN Analgesics   [ ] Change to PCA    Documentation and Verification of current medications:  [X] Done	[ ] Not done, not eligible, reason:    COMMENTS: Endorsing pain to upper portion of incision. PCEA four hour limit reached. Infusion increased to 8mL, 60mL four hour limit. Ketorolac iv added x 4 doses ATC.  If pain not improved with increased PCEA dosing, will add hydromorphone 0.25mg ivp prn

## 2021-08-04 NOTE — PROGRESS NOTE ADULT - ASSESSMENT
56M hx HLD, CAD s/p MAINOR x 2 (July 2020) on DAPT, LBO 2/2 diverticulitis s/p John's procedure (March 2019) and reversal (June 2019) recently discharged 2 days ago after hospitalization for nonoperative small bowel obstruction presenting with recurrent small bowel obstruction with similar transition point to last admission, now s/p ventral hernia repair of multiple fascial defects with mesh (8/2), recovering appropriately on floors.     PLAN:  - Will continue to hold home Brillinta  - Continue home cardiac medications in IV formulation, chewable ASA  - Pain control as needed; PCEA in place  - Quispe overnight   - DVT ppx  - LR @ 125cc/hr  - Diet, NPO except meds; NGT to LCWS   - OOB and ambulating as tolerated  - F/u AM labs      [place] [facility]  #[id number]  56M hx HLD, CAD s/p MAINOR x 2 (July 2020) on DAPT, LBO 2/2 diverticulitis s/p John's procedure (March 2019) and reversal (June 2019) recently discharged 2 days ago after hospitalization for nonoperative small bowel obstruction presenting with recurrent small bowel obstruction with similar transition point to last admission, now s/p ventral hernia repair of multiple fascial defects with mesh (8/2), recovering appropriately on floors.     PLAN:  - Will continue to hold home Brillinta  - Continue home cardiac medications in IV formulation, chewable ASA  - Pain control as needed; PCEA in place. Toradol added per pain service recommendation  - Quispe   - DVT ppx  - LR @ 125cc/hr  - Diet, CLD  - OOB and ambulating as tolerated  - F/u AM labs      [facility]  #[id number]

## 2021-08-05 LAB
ANION GAP SERPL CALC-SCNC: 11 MMOL/L — SIGNIFICANT CHANGE UP (ref 5–17)
APTT BLD: 30.8 SEC — SIGNIFICANT CHANGE UP (ref 27.5–35.5)
BUN SERPL-MCNC: 10 MG/DL — SIGNIFICANT CHANGE UP (ref 7–23)
CALCIUM SERPL-MCNC: 8.9 MG/DL — SIGNIFICANT CHANGE UP (ref 8.4–10.5)
CHLORIDE SERPL-SCNC: 96 MMOL/L — SIGNIFICANT CHANGE UP (ref 96–108)
CO2 SERPL-SCNC: 25 MMOL/L — SIGNIFICANT CHANGE UP (ref 22–31)
CREAT SERPL-MCNC: 1.01 MG/DL — SIGNIFICANT CHANGE UP (ref 0.5–1.3)
GLUCOSE SERPL-MCNC: 107 MG/DL — HIGH (ref 70–99)
HCT VFR BLD CALC: 34.6 % — LOW (ref 39–50)
HGB BLD-MCNC: 10.5 G/DL — LOW (ref 13–17)
INR BLD: 1.23 RATIO — HIGH (ref 0.88–1.16)
MAGNESIUM SERPL-MCNC: 1.8 MG/DL — SIGNIFICANT CHANGE UP (ref 1.6–2.6)
MCHC RBC-ENTMCNC: 26.9 PG — LOW (ref 27–34)
MCHC RBC-ENTMCNC: 30.3 GM/DL — LOW (ref 32–36)
MCV RBC AUTO: 88.7 FL — SIGNIFICANT CHANGE UP (ref 80–100)
NRBC # BLD: 0 /100 WBCS — SIGNIFICANT CHANGE UP (ref 0–0)
PHOSPHATE SERPL-MCNC: 3.2 MG/DL — SIGNIFICANT CHANGE UP (ref 2.5–4.5)
PLATELET # BLD AUTO: 233 K/UL — SIGNIFICANT CHANGE UP (ref 150–400)
POTASSIUM SERPL-MCNC: 4.5 MMOL/L — SIGNIFICANT CHANGE UP (ref 3.5–5.3)
POTASSIUM SERPL-SCNC: 4.5 MMOL/L — SIGNIFICANT CHANGE UP (ref 3.5–5.3)
PROTHROM AB SERPL-ACNC: 14.6 SEC — HIGH (ref 10.6–13.6)
RBC # BLD: 3.9 M/UL — LOW (ref 4.2–5.8)
RBC # FLD: 15.3 % — HIGH (ref 10.3–14.5)
SODIUM SERPL-SCNC: 132 MMOL/L — LOW (ref 135–145)
SURGICAL PATHOLOGY STUDY: SIGNIFICANT CHANGE UP
WBC # BLD: 9.66 K/UL — SIGNIFICANT CHANGE UP (ref 3.8–10.5)
WBC # FLD AUTO: 9.66 K/UL — SIGNIFICANT CHANGE UP (ref 3.8–10.5)

## 2021-08-05 RX ORDER — HEPARIN SODIUM 5000 [USP'U]/ML
5000 INJECTION INTRAVENOUS; SUBCUTANEOUS EVERY 8 HOURS
Refills: 0 | Status: DISCONTINUED | OUTPATIENT
Start: 2021-08-05 | End: 2021-08-09

## 2021-08-05 RX ORDER — LANOLIN ALCOHOL/MO/W.PET/CERES
10 CREAM (GRAM) TOPICAL AT BEDTIME
Refills: 0 | Status: DISCONTINUED | OUTPATIENT
Start: 2021-08-05 | End: 2021-08-11

## 2021-08-05 RX ORDER — OXYCODONE HYDROCHLORIDE 5 MG/1
10 TABLET ORAL EVERY 4 HOURS
Refills: 0 | Status: DISCONTINUED | OUTPATIENT
Start: 2021-08-05 | End: 2021-08-11

## 2021-08-05 RX ORDER — DIGOXIN 250 MCG
125 TABLET ORAL DAILY
Refills: 0 | Status: DISCONTINUED | OUTPATIENT
Start: 2021-08-05 | End: 2021-08-11

## 2021-08-05 RX ORDER — OXYCODONE HYDROCHLORIDE 5 MG/1
5 TABLET ORAL EVERY 4 HOURS
Refills: 0 | Status: DISCONTINUED | OUTPATIENT
Start: 2021-08-05 | End: 2021-08-11

## 2021-08-05 RX ORDER — PANTOPRAZOLE SODIUM 20 MG/1
40 TABLET, DELAYED RELEASE ORAL
Refills: 0 | Status: DISCONTINUED | OUTPATIENT
Start: 2021-08-05 | End: 2021-08-11

## 2021-08-05 RX ORDER — ACETAMINOPHEN 500 MG
975 TABLET ORAL EVERY 6 HOURS
Refills: 0 | Status: DISCONTINUED | OUTPATIENT
Start: 2021-08-05 | End: 2021-08-11

## 2021-08-05 RX ADMIN — PANTOPRAZOLE SODIUM 40 MILLIGRAM(S): 20 TABLET, DELAYED RELEASE ORAL at 13:01

## 2021-08-05 RX ADMIN — Medication 125 MICROGRAM(S): at 13:16

## 2021-08-05 RX ADMIN — HEPARIN SODIUM 5000 UNIT(S): 5000 INJECTION INTRAVENOUS; SUBCUTANEOUS at 13:16

## 2021-08-05 RX ADMIN — Medication 975 MILLIGRAM(S): at 18:51

## 2021-08-05 RX ADMIN — Medication 1 PATCH: at 19:24

## 2021-08-05 RX ADMIN — Medication 1 PATCH: at 13:01

## 2021-08-05 RX ADMIN — Medication 10 MILLIGRAM(S): at 21:56

## 2021-08-05 RX ADMIN — OXYCODONE HYDROCHLORIDE 10 MILLIGRAM(S): 5 TABLET ORAL at 20:10

## 2021-08-05 RX ADMIN — OXYCODONE HYDROCHLORIDE 10 MILLIGRAM(S): 5 TABLET ORAL at 23:43

## 2021-08-05 RX ADMIN — Medication 15 MILLIGRAM(S): at 00:09

## 2021-08-05 RX ADMIN — Medication 15 MILLIGRAM(S): at 06:10

## 2021-08-05 RX ADMIN — Medication 1 PATCH: at 13:05

## 2021-08-05 RX ADMIN — Medication 81 MILLIGRAM(S): at 13:01

## 2021-08-05 RX ADMIN — HEPARIN SODIUM 5000 UNIT(S): 5000 INJECTION INTRAVENOUS; SUBCUTANEOUS at 21:36

## 2021-08-05 RX ADMIN — OXYCODONE HYDROCHLORIDE 10 MILLIGRAM(S): 5 TABLET ORAL at 19:40

## 2021-08-05 RX ADMIN — Medication 1 PATCH: at 09:00

## 2021-08-05 NOTE — PROGRESS NOTE ADULT - SUBJECTIVE AND OBJECTIVE BOX
Anesthesia Pain Management Service- Attending Addendum    SUBJECTIVE: Pt doing well with PCEA without problems reported.    Therapy:	  [ ] IV PCA	   [ X] Epidural           [ ] s/p Spinal Opoid              [ ] Postpartum infusion	  [ ] Patient controlled regional anesthesia (PCRA)    [ ] prn Analgesics    OBJECTIVE:   [X] No new signs     [ ] Other:    Side Effects:  [X ] None			[ ] Other:    Assessment of Catheter Site:		[ X] Intact		[ ] Other:    ASSESSMENT/PLAN  [ ] Continue current therapy    [X] Therapy changed to:    [ ] IV PCA       [ ] Epidural     [X] prn Analgesics     Comments: PCEA to be d/c'd w/ transition to PRN analgesics as per primary team. Awaiting coags and will remove.

## 2021-08-05 NOTE — PROGRESS NOTE ADULT - ASSESSMENT
56M hx HLD, CAD s/p MAINOR x 2 (July 2020) on DAPT, LBO 2/2 diverticulitis s/p John's procedure (March 2019) and reversal (June 2019) recently discharged 2 days ago after hospitalization for nonoperative small bowel obstruction presenting with recurrent small bowel obstruction with similar transition point to last admission, now s/p ventral hernia repair of multiple fascial defects with mesh (8/2), recovering appropriately on floors.     PLAN:  - Will continue to hold home Brillinta  - Continue home cardiac medications in IV formulation, chewable ASA  - Pain control as needed; PCEA in place. Toradol added per pain service recommendation  - Quispe   - DVT ppx  - LR @ 125cc/hr  - Diet, CLD  - OOB and ambulating as tolerated  - F/u AM labs      [facility]  #[id number]  56M hx HLD, CAD s/p MAINOR x 2 (July 2020) on DAPT, LBO 2/2 diverticulitis s/p John's procedure (March 2019) and reversal (June 2019) recently discharged 2 days ago after hospitalization for nonoperative small bowel obstruction presenting with recurrent small bowel obstruction with similar transition point to last admission, now s/p ventral hernia repair of multiple fascial defects with mesh (8/2), recovering appropriately on floors.     PLAN:  - FU coags and DC PCEA and switch to PO pain meds.  - Will continue to hold home Brillinta  - Continue home cardiac medications in IV formulation, chewable ASA  - DC Quispe after PCEA removed   - DVT ppx  - Diet, LRD  - OOB and ambulating as tolerated  - F/u AM labs      [facility]  #[id number]

## 2021-08-05 NOTE — PROGRESS NOTE ADULT - SUBJECTIVE AND OBJECTIVE BOX
Day 3 of Anesthesia Pain Management Service    SUBJECTIVE: Doing a little better    Pain Scale Score:   Refer to charted pain scores    THERAPY:  [X] Epidural Bupivacaine 0.0625% and Hydromorphone         [X] 10 micrograms/mL 	[ ] 5 micrograms/mL  [ ] Epidural Ropivacaine 0.2% plain – 1 mg/mL    Demand dose: 3 mL  Lockout: 15 minutes  Continuous Rate: 8 mL/hr    MEDICATIONS  (STANDING):  aspirin  chewable 81 milliGRAM(s) Oral daily  digoxin  Injectable 125 MICROGram(s) IV Push daily  hydromorphone (10 MICROgram(s)/mL) + bupivacaine 0.0625% in 0.9% Sodium Chloride PCEA 250 milliLiter(s) Epidural PCA Continuous  nicotine -   7 mG/24Hr(s) Patch 1 patch Transdermal daily  pantoprazole  Injectable 40 milliGRAM(s) IV Push daily    MEDICATIONS  (PRN):  benzocaine 15 mG/menthol 3.6 mG (Sugar-Free) Lozenge 1 Lozenge Oral every 6 hours PRN Sore Throat  butorphanol Injectable 0.25 milliGRAM(s) IV Push every 6 hours PRN Pruritus  diphenhydrAMINE   Injectable 25 milliGRAM(s) IV Push every 4 hours PRN Pruritus  hydromorphone (10 MICROgram(s)/mL) + bupivacaine 0.0625% in 0.9% Sodium Chloride PCEA Rescue Clinician  Bolus 5 milliLiter(s) Epidural every 15 minutes PRN for Pain Score greater than 6  naloxone Injectable 0.1 milliGRAM(s) IV Push every 3 minutes PRN For ANY of the following changes in patient status:  A. RR LESS THAN 10 breaths per minute, B. Oxygen saturation LESS THAN 90%, C. Sedation score of 6  ondansetron Injectable 4 milliGRAM(s) IV Push every 6 hours PRN Nausea  simethicone 80 milliGRAM(s) Chew daily PRN Gas  tetracaine/benzocaine/butamben Spray 1 Spray(s) Topical every 4 hours PRN NGtube throat discomfort      OBJECTIVE:    Assessment of Catheter Site:    [X] Epidural 	  [X] Dressing intact	[X] Site non-tender	[X] Site without erythema, discharge, edema  [X] Epidural tubing and connection checked	[X] Gross neurological exam within normal limits  [ ] Catheter removed – tip intact		[X] Afebrile	            [ ] Febrile: ___    PT/INR - ( 04 Aug 2021 08:03 )   PT: 14.5 sec;   INR: 1.22 ratio         PTT - ( 04 Aug 2021 08:03 )  PTT:29.1 sec                      10.5   9.66  )-----------( 233      ( 05 Aug 2021 07:51 )             34.6     Vital Signs Last 24 Hrs  T(C): 37.5 (08-05-21 @ 09:25), Max: 37.5 (08-05-21 @ 09:25)  T(F): 99.5 (08-05-21 @ 09:25), Max: 99.5 (08-05-21 @ 09:25)  HR: 83 (08-05-21 @ 09:25) (83 - 99)  BP: 95/61 (08-05-21 @ 09:25) (91/60 - 112/67)  BP(mean): --  RR: 18 (08-05-21 @ 09:25) (17 - 18)  SpO2: 92% (08-05-21 @ 09:25) (92% - 96%)      Sedation Score:	[X] Alert	[ ] Drowsy	[ ] Arousable  [ ] Asleep     [ ] Unresponsive    Side Effects:	[X] None	[ ] Nausea	[ ] Vomiting   [ ] Pruritus  		[ ] Weakness     [ ] Numbness	[ ] Other:    ASSESSMENT/ PLAN:    Therapy:	[X] Continue   [ ] Discontinue   [ ] Change to PRN Analgesics   [ ] Change to PCA    Documentation and Verification of current medications:  [X] Done	[ ] Not done, not eligible, reason:    COMMENTS: Plan to remove epidural pending coagulation studies. Anticoagulation status reviewed. Transition  to prn analgesics once PCEA removed

## 2021-08-05 NOTE — PROGRESS NOTE ADULT - SUBJECTIVE AND OBJECTIVE BOX
SURGERY  Pager: #[phone]    STATUS POST: ventral hernia repair with mesh    POST OPERATIVE DAY #3      INTERVAL EVENTS/SUBJECTIVE: No acute events overnight. On morning rounds...     ______________________________________________  OBJECTIVE:   T(C): 37.1 (08-05-21 @ 01:20), Max: 37.2 (08-04-21 @ 13:15)  HR: 86 (08-05-21 @ 01:20) (84 - 99)  BP: 91/60 (08-05-21 @ 01:20) (91/60 - 112/67)  RR: 18 (08-05-21 @ 01:20) (17 - 18)  SpO2: 95% (08-05-21 @ 01:20) (92% - 96%)  Wt(kg): --  CAPILLARY BLOOD GLUCOSE        I&O's Detail    03 Aug 2021 07:01  -  04 Aug 2021 07:00  --------------------------------------------------------  IN:    IV PiggyBack: 100 mL    Lactated Ringers: 2500 mL    Oral Fluid: 480 mL  Total IN: 3080 mL    OUT:    Bulb (mL): 125 mL    Indwelling Catheter - Urethral (mL): 1475 mL    Nasogastric/Oral tube (mL): 0 mL  Total OUT: 1600 mL    Total NET: 1480 mL      04 Aug 2021 07:01  -  05 Aug 2021 02:59  --------------------------------------------------------  IN:    Oral Fluid: 800 mL  Total IN: 800 mL    OUT:    Bulb (mL): 60 mL    Indwelling Catheter - Urethral (mL): 1000 mL  Total OUT: 1060 mL    Total NET: -260 mL          Physical exam:  Gen: resting in bed comfortably in NAD  Chest: no increased WOB, regular inspiratory effort   Abdomen: Soft, nontender, nondistended with no rebound tenderness or guarding. Incisions clean, dry, intact, with no erythema.   Vascular: WWP, CARSON x4  NEURO: awake, alert  ______________________________________________  LABS:  CBC Full  -  ( 04 Aug 2021 08:03 )  WBC Count : 12.58 K/uL  RBC Count : 4.37 M/uL  Hemoglobin : 11.8 g/dL  Hematocrit : 38.4 %  Platelet Count - Automated : 248 K/uL  Mean Cell Volume : 87.9 fl  Mean Cell Hemoglobin : 27.0 pg  Mean Cell Hemoglobin Concentration : 30.7 gm/dL  Auto Neutrophil # : x  Auto Lymphocyte # : x  Auto Monocyte # : x  Auto Eosinophil # : x  Auto Basophil # : x  Auto Neutrophil % : x  Auto Lymphocyte % : x  Auto Monocyte % : x  Auto Eosinophil % : x  Auto Basophil % : x    08-04    134<L>  |  99  |  10  ----------------------------<  98  4.5   |  26  |  1.07    Ca    9.0      04 Aug 2021 08:03  Phos  3.3     08-04  Mg     1.6     08-04      _____________________________________________  RADIOLOGY:     SURGERY  Pager: #[phone]    STATUS POST: ventral hernia repair with mesh    POST OPERATIVE DAY #3      INTERVAL EVENTS/SUBJECTIVE: No acute events overnight. On morning rounds, patient has not complaints. Is passing gas. No N/V.    ______________________________________________  OBJECTIVE:   T(C): 37.1 (08-05-21 @ 01:20), Max: 37.2 (08-04-21 @ 13:15)  HR: 86 (08-05-21 @ 01:20) (84 - 99)  BP: 91/60 (08-05-21 @ 01:20) (91/60 - 112/67)  RR: 18 (08-05-21 @ 01:20) (17 - 18)  SpO2: 95% (08-05-21 @ 01:20) (92% - 96%)  Wt(kg): --  CAPILLARY BLOOD GLUCOSE        I&O's Detail    03 Aug 2021 07:01  -  04 Aug 2021 07:00  --------------------------------------------------------  IN:    IV PiggyBack: 100 mL    Lactated Ringers: 2500 mL    Oral Fluid: 480 mL  Total IN: 3080 mL    OUT:    Bulb (mL): 125 mL    Indwelling Catheter - Urethral (mL): 1475 mL    Nasogastric/Oral tube (mL): 0 mL  Total OUT: 1600 mL    Total NET: 1480 mL      04 Aug 2021 07:01  -  05 Aug 2021 02:59  --------------------------------------------------------  IN:    Oral Fluid: 800 mL  Total IN: 800 mL    OUT:    Bulb (mL): 60 mL    Indwelling Catheter - Urethral (mL): 1000 mL  Total OUT: 1060 mL    Total NET: -260 mL          Physical exam:  Gen: resting in bed comfortably in NAD  Chest: no increased WOB, regular inspiratory effort   Abdomen: Soft, nontender, nondistended with no rebound tenderness or guarding. Incisions clean, dry, intact, with no erythema.   Vascular: WWP, CARSON x4  NEURO: awake, alert  ______________________________________________  LABS:  CBC Full  -  ( 04 Aug 2021 08:03 )  WBC Count : 12.58 K/uL  RBC Count : 4.37 M/uL  Hemoglobin : 11.8 g/dL  Hematocrit : 38.4 %  Platelet Count - Automated : 248 K/uL  Mean Cell Volume : 87.9 fl  Mean Cell Hemoglobin : 27.0 pg  Mean Cell Hemoglobin Concentration : 30.7 gm/dL  Auto Neutrophil # : x  Auto Lymphocyte # : x  Auto Monocyte # : x  Auto Eosinophil # : x  Auto Basophil # : x  Auto Neutrophil % : x  Auto Lymphocyte % : x  Auto Monocyte % : x  Auto Eosinophil % : x  Auto Basophil % : x    08-04    134<L>  |  99  |  10  ----------------------------<  98  4.5   |  26  |  1.07    Ca    9.0      04 Aug 2021 08:03  Phos  3.3     08-04  Mg     1.6     08-04      _____________________________________________  RADIOLOGY:

## 2021-08-06 ENCOUNTER — TRANSCRIPTION ENCOUNTER (OUTPATIENT)
Age: 56
End: 2021-08-06

## 2021-08-06 LAB
ANION GAP SERPL CALC-SCNC: 10 MMOL/L — SIGNIFICANT CHANGE UP (ref 5–17)
BUN SERPL-MCNC: 10 MG/DL — SIGNIFICANT CHANGE UP (ref 7–23)
CALCIUM SERPL-MCNC: 9 MG/DL — SIGNIFICANT CHANGE UP (ref 8.4–10.5)
CHLORIDE SERPL-SCNC: 99 MMOL/L — SIGNIFICANT CHANGE UP (ref 96–108)
CO2 SERPL-SCNC: 26 MMOL/L — SIGNIFICANT CHANGE UP (ref 22–31)
CREAT SERPL-MCNC: 0.97 MG/DL — SIGNIFICANT CHANGE UP (ref 0.5–1.3)
GLUCOSE SERPL-MCNC: 101 MG/DL — HIGH (ref 70–99)
HCT VFR BLD CALC: 35 % — LOW (ref 39–50)
HGB BLD-MCNC: 10.5 G/DL — LOW (ref 13–17)
MAGNESIUM SERPL-MCNC: 1.8 MG/DL — SIGNIFICANT CHANGE UP (ref 1.6–2.6)
MCHC RBC-ENTMCNC: 26.7 PG — LOW (ref 27–34)
MCHC RBC-ENTMCNC: 30 GM/DL — LOW (ref 32–36)
MCV RBC AUTO: 89.1 FL — SIGNIFICANT CHANGE UP (ref 80–100)
NRBC # BLD: 0 /100 WBCS — SIGNIFICANT CHANGE UP (ref 0–0)
PHOSPHATE SERPL-MCNC: 3.2 MG/DL — SIGNIFICANT CHANGE UP (ref 2.5–4.5)
PLATELET # BLD AUTO: 243 K/UL — SIGNIFICANT CHANGE UP (ref 150–400)
POTASSIUM SERPL-MCNC: 4 MMOL/L — SIGNIFICANT CHANGE UP (ref 3.5–5.3)
POTASSIUM SERPL-SCNC: 4 MMOL/L — SIGNIFICANT CHANGE UP (ref 3.5–5.3)
RBC # BLD: 3.93 M/UL — LOW (ref 4.2–5.8)
RBC # FLD: 15.1 % — HIGH (ref 10.3–14.5)
SODIUM SERPL-SCNC: 135 MMOL/L — SIGNIFICANT CHANGE UP (ref 135–145)
WBC # BLD: 7.52 K/UL — SIGNIFICANT CHANGE UP (ref 3.8–10.5)
WBC # FLD AUTO: 7.52 K/UL — SIGNIFICANT CHANGE UP (ref 3.8–10.5)

## 2021-08-06 RX ORDER — MAGNESIUM SULFATE 500 MG/ML
2 VIAL (ML) INJECTION ONCE
Refills: 0 | Status: COMPLETED | OUTPATIENT
Start: 2021-08-06 | End: 2021-08-06

## 2021-08-06 RX ORDER — ONDANSETRON 8 MG/1
4 TABLET, FILM COATED ORAL ONCE
Refills: 0 | Status: COMPLETED | OUTPATIENT
Start: 2021-08-06 | End: 2021-08-06

## 2021-08-06 RX ORDER — METOCLOPRAMIDE HCL 10 MG
10 TABLET ORAL ONCE
Refills: 0 | Status: COMPLETED | OUTPATIENT
Start: 2021-08-06 | End: 2021-08-06

## 2021-08-06 RX ADMIN — Medication 1 PATCH: at 12:57

## 2021-08-06 RX ADMIN — OXYCODONE HYDROCHLORIDE 10 MILLIGRAM(S): 5 TABLET ORAL at 05:19

## 2021-08-06 RX ADMIN — OXYCODONE HYDROCHLORIDE 10 MILLIGRAM(S): 5 TABLET ORAL at 22:11

## 2021-08-06 RX ADMIN — HEPARIN SODIUM 5000 UNIT(S): 5000 INJECTION INTRAVENOUS; SUBCUTANEOUS at 05:19

## 2021-08-06 RX ADMIN — OXYCODONE HYDROCHLORIDE 10 MILLIGRAM(S): 5 TABLET ORAL at 11:28

## 2021-08-06 RX ADMIN — Medication 1 PATCH: at 13:04

## 2021-08-06 RX ADMIN — HEPARIN SODIUM 5000 UNIT(S): 5000 INJECTION INTRAVENOUS; SUBCUTANEOUS at 13:04

## 2021-08-06 RX ADMIN — Medication 81 MILLIGRAM(S): at 13:02

## 2021-08-06 RX ADMIN — ONDANSETRON 4 MILLIGRAM(S): 8 TABLET, FILM COATED ORAL at 17:03

## 2021-08-06 RX ADMIN — Medication 975 MILLIGRAM(S): at 13:01

## 2021-08-06 RX ADMIN — Medication 10 MILLIGRAM(S): at 20:51

## 2021-08-06 RX ADMIN — SIMETHICONE 80 MILLIGRAM(S): 80 TABLET, CHEWABLE ORAL at 16:47

## 2021-08-06 RX ADMIN — Medication 1 PATCH: at 06:01

## 2021-08-06 RX ADMIN — Medication 975 MILLIGRAM(S): at 13:31

## 2021-08-06 RX ADMIN — HEPARIN SODIUM 5000 UNIT(S): 5000 INJECTION INTRAVENOUS; SUBCUTANEOUS at 20:51

## 2021-08-06 RX ADMIN — Medication 1 PATCH: at 17:52

## 2021-08-06 RX ADMIN — OXYCODONE HYDROCHLORIDE 10 MILLIGRAM(S): 5 TABLET ORAL at 00:13

## 2021-08-06 RX ADMIN — OXYCODONE HYDROCHLORIDE 10 MILLIGRAM(S): 5 TABLET ORAL at 05:49

## 2021-08-06 RX ADMIN — Medication 50 GRAM(S): at 11:07

## 2021-08-06 RX ADMIN — Medication 125 MICROGRAM(S): at 05:19

## 2021-08-06 RX ADMIN — PANTOPRAZOLE SODIUM 40 MILLIGRAM(S): 20 TABLET, DELAYED RELEASE ORAL at 06:01

## 2021-08-06 RX ADMIN — OXYCODONE HYDROCHLORIDE 10 MILLIGRAM(S): 5 TABLET ORAL at 22:41

## 2021-08-06 RX ADMIN — OXYCODONE HYDROCHLORIDE 10 MILLIGRAM(S): 5 TABLET ORAL at 10:58

## 2021-08-06 NOTE — PROGRESS NOTE ADULT - SUBJECTIVE AND OBJECTIVE BOX
SURGERY  Pager: #[phone]    STATUS POST: ventral hernia repair with mesh    POST OPERATIVE DAY #4    INTERVAL EVENTS/SUBJECTIVE: No acute events overnight. On morning rounds...     ______________________________________________  OBJECTIVE:   T(C): 36.6 (08-06-21 @ 01:13), Max: 37.5 (08-05-21 @ 09:25)  HR: 88 (08-06-21 @ 01:13) (83 - 98)  BP: 117/69 (08-06-21 @ 01:13) (93/61 - 117/69)  RR: 18 (08-06-21 @ 01:13) (17 - 18)  SpO2: 95% (08-06-21 @ 01:13) (92% - 95%)  Wt(kg): --  CAPILLARY BLOOD GLUCOSE        I&O's Detail    04 Aug 2021 07:01  -  05 Aug 2021 07:00  --------------------------------------------------------  IN:    Oral Fluid: 880 mL  Total IN: 880 mL    OUT:    Bulb (mL): 65 mL    Indwelling Catheter - Urethral (mL): 1150 mL  Total OUT: 1215 mL    Total NET: -335 mL      05 Aug 2021 07:01  -  06 Aug 2021 02:06  --------------------------------------------------------  IN:    Oral Fluid: 875 mL  Total IN: 875 mL    OUT:    Bulb (mL): 60 mL    Indwelling Catheter - Urethral (mL): 250 mL    Voided (mL): 900 mL  Total OUT: 1210 mL    Total NET: -335 mL          Physical exam:  Gen: resting in bed comfortably in NAD  Chest: no increased WOB, regular inspiratory effort   Abdomen: Soft, nontender, nondistended with no rebound tenderness or guarding. Incisions clean, dry, intact, with no erythema.   Vascular: WWP, CARSON x4  NEURO: awake, alert  ______________________________________________  LABS:  CBC Full  -  ( 05 Aug 2021 07:51 )  WBC Count : 9.66 K/uL  RBC Count : 3.90 M/uL  Hemoglobin : 10.5 g/dL  Hematocrit : 34.6 %  Platelet Count - Automated : 233 K/uL  Mean Cell Volume : 88.7 fl  Mean Cell Hemoglobin : 26.9 pg  Mean Cell Hemoglobin Concentration : 30.3 gm/dL  Auto Neutrophil # : x  Auto Lymphocyte # : x  Auto Monocyte # : x  Auto Eosinophil # : x  Auto Basophil # : x  Auto Neutrophil % : x  Auto Lymphocyte % : x  Auto Monocyte % : x  Auto Eosinophil % : x  Auto Basophil % : x    08-05    132<L>  |  96  |  10  ----------------------------<  107<H>  4.5   |  25  |  1.01    Ca    8.9      05 Aug 2021 07:51  Phos  3.2     08-05  Mg     1.8     08-05      _____________________________________________  RADIOLOGY:     SURGERY  Pager: #[phone]    STATUS POST: ventral hernia repair with mesh    POST OPERATIVE DAY #4    INTERVAL EVENTS/SUBJECTIVE: No acute events overnight. Patient tolerating regular diet, passing gas, pain controlled, has not had bowel movements yet     ______________________________________________  OBJECTIVE:   T(C): 36.6 (08-06-21 @ 01:13), Max: 37.5 (08-05-21 @ 09:25)  HR: 88 (08-06-21 @ 01:13) (83 - 98)  BP: 117/69 (08-06-21 @ 01:13) (93/61 - 117/69)  RR: 18 (08-06-21 @ 01:13) (17 - 18)  SpO2: 95% (08-06-21 @ 01:13) (92% - 95%)  Wt(kg): --  CAPILLARY BLOOD GLUCOSE        I&O's Detail    04 Aug 2021 07:01  -  05 Aug 2021 07:00  --------------------------------------------------------  IN:    Oral Fluid: 880 mL  Total IN: 880 mL    OUT:    Bulb (mL): 65 mL    Indwelling Catheter - Urethral (mL): 1150 mL  Total OUT: 1215 mL    Total NET: -335 mL      05 Aug 2021 07:01  -  06 Aug 2021 02:06  --------------------------------------------------------  IN:    Oral Fluid: 875 mL  Total IN: 875 mL    OUT:    Bulb (mL): 60 mL    Indwelling Catheter - Urethral (mL): 250 mL    Voided (mL): 900 mL  Total OUT: 1210 mL    Total NET: -335 mL          Physical exam:  Gen: resting in bed comfortably in NAD  Chest: no increased WOB, regular inspiratory effort   Abdomen: Soft, nontender, nondistended with no rebound tenderness or guarding. Incisions clean, dry, intact, with no erythema.   Vascular: WWP, CARSON x4  NEURO: awake, alert  ______________________________________________  LABS:  CBC Full  -  ( 05 Aug 2021 07:51 )  WBC Count : 9.66 K/uL  RBC Count : 3.90 M/uL  Hemoglobin : 10.5 g/dL  Hematocrit : 34.6 %  Platelet Count - Automated : 233 K/uL  Mean Cell Volume : 88.7 fl  Mean Cell Hemoglobin : 26.9 pg  Mean Cell Hemoglobin Concentration : 30.3 gm/dL  Auto Neutrophil # : x  Auto Lymphocyte # : x  Auto Monocyte # : x  Auto Eosinophil # : x  Auto Basophil # : x  Auto Neutrophil % : x  Auto Lymphocyte % : x  Auto Monocyte % : x  Auto Eosinophil % : x  Auto Basophil % : x    08-05    132<L>  |  96  |  10  ----------------------------<  107<H>  4.5   |  25  |  1.01    Ca    8.9      05 Aug 2021 07:51  Phos  3.2     08-05  Mg     1.8     08-05      _____________________________________________  RADIOLOGY:

## 2021-08-06 NOTE — DISCHARGE NOTE PROVIDER - NSDCFUADDAPPT_GEN_ALL_CORE_FT
Please make an appointment with IR by calling the IR booking office at [phone]; recommend IR follow in 1 week for tube evaluation    Please make an appointment and follow up outpatient with Dr. Yarbrough in 1-2 weeks  Please make an appointment and follow up with your Primary Care Physician in 1-2 weeks

## 2021-08-06 NOTE — DISCHARGE NOTE PROVIDER - CARE PROVIDER_API CALL
Félix Chanel)  ColonRectal Surgery; Surgery  [address], [address]  [place] [address]  Phone: [phone]  Fax: [phone]  Follow Up Time: 2 weeks   James Yarbrough)  Surgery  [address], [address]  [place] [address]  Phone: [phone]  Fax: [phone]  Follow Up Time: 2 weeks

## 2021-08-06 NOTE — DISCHARGE NOTE PROVIDER - HOSPITAL COURSE
Mr. Rushing is a 56 Year-Old Gentleman with history of HLD, CAD s/p MAINOR x 2 (July 2020) on DAPT, LBO 2/2 diverticulitis s/p John's procedure (March 2019) and reversal (June 2019) recently discharged 2 days ago after hospitalization for nonoperative small bowel obstruction presenting with recurrent small bowel obstruction with similar transition point to last admission.  Patient admitted to surgery team. NGT placed/NPO/IVF.     8/2 patient taken to the operating room. Patient underwent from a ventral hernia repair of multiple fascial defects with mesh. The patient tolerated the procedure well without complications, was extubated, and transferred to the PACU in stable condition. Patient was treated with pain control, physical therapy. Diet was advanced as tolerated. On day of discharge, the patient was tolerating diet, ambulating well and pain controlled.        Mr. Rushing is a 56 Year-Old Gentleman with history of HLD, CAD s/p MAINOR x 2 (July 2020) on DAPT, LBO 2/2 diverticulitis s/p John's procedure (March 2019) and reversal (June 2019) recently discharged 2 days ago after hospitalization for nonoperative small bowel obstruction presenting with recurrent small bowel obstruction with similar transition point to last admission.  Patient admitted to surgery team. NGT placed/NPO/IVF.     8/2 patient taken to the operating room. Patient underwent from a ventral hernia repair of multiple fascial defects with mesh. The patient tolerated the procedure well without complications, was extubated, and transferred to the PACU in stable condition. Patient was treated with pain control, physical therapy. Diet was advanced as tolerated. 8/3: home brillinita will be held, home cardiac meds were continued, PCEA in place for pain management, allison in place, IVF running, continue NGT. Dietary saw the patient in which they gave their recs. 8/4: Toradol was added to his pain regimen. 8/5: allison and PCEA removed, patient diet was advanced to a regular diet. 8/6: patient passed his trial of void, tolerating a regular diet, having bowel function. 8/7: overnight the patient had episodes of watery diarrhea, nausea and one episode of emesis. He received antinausea meds.8/8: The patient became nauseous and received zofran. He had 2 counts of watery diarrhea overnight. 8/9: c.diff was sent out and came back positive, He was started on po vancomycin. He had a repeat CT A/P which showed postsurgical changes of ventral hernia mesh repair. There is a fluid collection subjacent to the hernia surgical site measuring 3.8 x 14.3 x 18.2 cm, potentially a postoperative seroma, with infection not entirely excluded. Confluent edema within the anterior abdominal wall with a small pocket of fluid measuring 5.5 x 2.5 cm. IR was consulted for drainage of abdominal collections. 8/10 IR drained his abdominal collection and placed a 10 F drain. The procedure went well without any complications. The patient was transferred to the floor in stable condition. 8/11; The patient is doing well , his pain is controlled, his diarrhea improved, he is tolerating a regular diet, having positive bowel function, voiding adequately and is oob and ambulating. ID was consulted for abx therapy. He will continue po vaco until 8/23.On day of discharge, the patient is stable and ready to go home. He was taught at bedside how to take care of the IR drain. Will f/u with Dr. Yarbrough in 1-2 weeks. Will f/u with his pcp in 1-2 weeks.

## 2021-08-06 NOTE — DISCHARGE NOTE PROVIDER - NSDCCPCAREPLAN_GEN_ALL_CORE_FT
PRINCIPAL DISCHARGE DIAGNOSIS  Diagnosis: SBO (small bowel obstruction)  Assessment and Plan of Treatment: WOUND CARE: Staples will be removed at follow up office visit.    you have a KAY drain, monitor and record outputs  BATHING: Please do not submerge wound underwater. You may shower and/or sponge bathe.  ACTIVITY: No heavy lifting anything more than 10-15lbs or straining. Otherwise, you may return to your usual level of physical activity. If you are taking narcotic pain medication (such as Percocet), do NOT drive a car, operate machinery or make important decisions.  DIET: Low fiber diet  NOTIFY YOUR SURGEON IF: You have any bleeding that does not stop, any pus draining from your wound, any fever (over 100.4 F) or chills, persistent nausea/vomiting with inability to tolerate food or liquids, persistent diarrhea, or if your pain is not controlled on your discharge pain medications.  FOLLOW-UP:  1. Please call to make a follow-up appointment within one week of discharge   2. Please follow up with your primary care physician in one week regarding your hospitalization.         PRINCIPAL DISCHARGE DIAGNOSIS  Diagnosis: SBO (small bowel obstruction)  Assessment and Plan of Treatment: WOUND CARE: Staples will be removed at follow up office visit.  you have an IR drain in place. Instructions for drain: Flush drain with 5cc NS daily forward only; DO NOT aspirate. please Change dressing every 3 days or when dressing is saturated.  BATHING: Please do not submerge wound underwater. You may shower and/or sponge bathe.  ACTIVITY: No heavy lifting anything more than 10-15lbs or straining. Otherwise, you may return to your usual level of physical activity. If you are taking narcotic pain medication (such as Percocet), do NOT drive a car, operate machinery or make important decisions.  DIET: Low fiber diet  NOTIFY YOUR SURGEON IF: You have any bleeding that does not stop, any pus draining from your wound, any fever (over 100.4 F) or chills, persistent nausea/vomiting with inability to tolerate food or liquids, persistent diarrhea, or if your pain is not controlled on your discharge pain medications.  FOLLOW-UP:  1. Please call to make a follow-up appointment within one week of discharge   2. Please follow up with your primary care physician in one week regarding your hospitalization.

## 2021-08-06 NOTE — DISCHARGE NOTE PROVIDER - CARE PROVIDERS DIRECT ADDRESSES
[email or web address]

## 2021-08-06 NOTE — DISCHARGE NOTE PROVIDER - NSDCMRMEDTOKEN_GEN_ALL_CORE_FT
acetaminophen 325 mg oral tablet: 3 tab(s) orally every 6 hours, As needed, Mild Pain (1 - 3)  apixaban 5 mg oral tablet: 1 tab(s) orally 2 times a day MDD:2 tabs  aspirin 81 mg oral delayed release tablet: 1 tab(s) orally once a day  atorvastatin 40 mg oral tablet: 1 tab(s) orally once a day (at bedtime)  digoxin 125 mcg (0.125 mg) oral tablet: 1 tab(s) orally once a day MDD:1 tab  melatonin 5 mg oral tablet: 1 tab(s) orally once a day (at bedtime)  metoprolol tartrate 25 mg oral tablet: 0.5 tab(s) orally 2 times a day MDD:1 tab  nicotine 7 mg/24 hr transdermal film, extended release: 1 patch transdermally once a day   pantoprazole 40 mg oral delayed release tablet: 1 tab(s) orally once a day (before a meal) MDD:1 tab  polyethylene glycol 3350 oral powder for reconstitution: 17 gram(s) orally once a day  senna oral tablet: 2 tab(s) orally once a day (at bedtime)  sertraline 50 mg oral tablet: 1 tab(s) orally once a day MDD:1 tab  simethicone 80 mg oral tablet, chewable: 1 tab(s) orally every 6 hours, As needed, Gas   acetaminophen 325 mg oral tablet: 3 tab(s) orally every 6 hours, As needed, Mild Pain (1 - 3)  apixaban 5 mg oral tablet: 1 tab(s) orally 2 times a day MDD:2 tabs  aspirin 81 mg oral delayed release tablet: 1 tab(s) orally once a day  atorvastatin 40 mg oral tablet: 1 tab(s) orally once a day (at bedtime)  digoxin 125 mcg (0.125 mg) oral tablet: 1 tab(s) orally once a day MDD:1 tab  melatonin 5 mg oral tablet: 1 tab(s) orally once a day (at bedtime)  metoprolol tartrate 25 mg oral tablet: 0.5 tab(s) orally 2 times a day MDD:1 tab  nicotine 7 mg/24 hr transdermal film, extended release: 1 patch transdermally once a day   oxyCODONE 10 mg oral tablet: 1 tab(s) orally every 6 hours, As Needed -Severe Pain (7 - 10) MDD:4 tabs  pantoprazole 40 mg oral delayed release tablet: 1 tab(s) orally once a day (before a meal) MDD:1 tab  polyethylene glycol 3350 oral powder for reconstitution: 17 gram(s) orally once a day  senna oral tablet: 2 tab(s) orally once a day (at bedtime)  sertraline 50 mg oral tablet: 1 tab(s) orally once a day MDD:1 tab  simethicone 80 mg oral tablet, chewable: 1 tab(s) orally every 6 hours, As needed, Gas  vancomycin 50 mg/mL oral liquid: 125 milliliter(s) orally once a day until 8/23/21   acetaminophen 325 mg oral tablet: 3 tab(s) orally every 6 hours, As needed, Mild Pain (1 - 3)  apixaban 5 mg oral tablet: 1 tab(s) orally 2 times a day  aspirin 81 mg oral delayed release tablet: 1 tab(s) orally once a day  atorvastatin 40 mg oral tablet: 1 tab(s) orally once a day (at bedtime)  digoxin 125 mcg (0.125 mg) oral tablet: 1 tab(s) orally once a day MDD:1 tab  melatonin 5 mg oral tablet: 1 tab(s) orally once a day (at bedtime)  metoprolol tartrate 25 mg oral tablet: 0.5 tab(s) orally 2 times a day MDD:1 tab  nicotine 7 mg/24 hr transdermal film, extended release: 1 patch transdermally once a day   oxyCODONE 10 mg oral tablet: 1 tab(s) orally every 6 hours, As Needed -Severe Pain (7 - 10) MDD:4 tabs  pantoprazole 40 mg oral delayed release tablet: 1 tab(s) orally once a day (before a meal) MDD:1 tab  polyethylene glycol 3350 oral powder for reconstitution: 17 gram(s) orally once a day  senna oral tablet: 2 tab(s) orally once a day (at bedtime)  sertraline 50 mg oral tablet: 1 tab(s) orally once a day MDD:1 tab  simethicone 80 mg oral tablet, chewable: 1 tab(s) orally every 6 hours, As needed, Gas  vancomycin 50 mg/mL oral liquid: 125 milliliter(s) orally once a day until 8/23/21

## 2021-08-06 NOTE — PROGRESS NOTE ADULT - ASSESSMENT
56M hx HLD, CAD s/p MAINOR x 2 (July 2020) on DAPT, LBO 2/2 diverticulitis s/p John's procedure (March 2019) and reversal (June 2019) recently discharged 2 days ago after hospitalization for nonoperative small bowel obstruction presenting with recurrent small bowel obstruction with similar transition point to last admission, now s/p ventral hernia repair of multiple fascial defects with mesh (8/2), recovering appropriately on floors.     PLAN:  - FU coags and DC PCEA and switch to PO pain meds.  - Will continue to hold home Brillinta  - Continue home cardiac medications in IV formulation, chewable ASA  - DC Quispe after PCEA removed   - DVT ppx  - Diet, LRD  - OOB and ambulating as tolerated  - F/u AM labs      [facility]  #[id number]  56M hx HLD, CAD s/p MAINOR x 2 (July 2020) on DAPT, LBO 2/2 diverticulitis s/p John's procedure (March 2019) and reversal (June 2019) recently discharged 2 days ago after hospitalization for nonoperative small bowel obstruction presenting with recurrent small bowel obstruction with similar transition point to last admission, now s/p ventral hernia repair of multiple fascial defects with mesh (8/2), recovering appropriately on floors.     PLAN:    - Continue home cardiac medications in IV formulation, chewable ASA  - Passed TOV  - DVT ppx  - Diet, LRD  - OOB and ambulating as tolerated  - F/u AM labs  - Discharge when has bowel movements       [facility]  #[id number]

## 2021-08-06 NOTE — DISCHARGE NOTE PROVIDER - DETAILS OF MALNUTRITION DIAGNOSIS/DIAGNOSES
This patient has been assessed with a concern for Malnutrition and was treated during this hospitalization for the following Nutrition diagnosis/diagnoses:     -  08/03/2021: Mild protein-calorie malnutrition   -  08/03/2021: Morbid obesity (BMI > 40)

## 2021-08-06 NOTE — DISCHARGE NOTE PROVIDER - PROVIDER TOKENS
PROVIDER:[TOKEN:[[id number]],FOLLOWUP:[2 weeks]] PROVIDER:[TOKEN:[[id number]],FOLLOWUP:[2 weeks]]

## 2021-08-06 NOTE — DISCHARGE NOTE PROVIDER - NSDCHHCONTACT_GEN_ALL_CORE_FT
TODAY:  - start lantus 5U daily   + inject daily  - return in 1 week for RN visit with glucose logs  - take atorvastatin every 1-2 days  - labs on Monday     NEXT:  - consider deborah  
As certified below, I, or a nurse practitioner or physician assistant working with me, had a face-to-face encounter that meets the physician face-to-face encounter requirements.

## 2021-08-06 NOTE — DISCHARGE NOTE PROVIDER - NSDCFUADDINST_GEN_ALL_CORE_FT
Please take tylenol around the clock every 4-6 hours as needed for pain. Do not exceed 4,000mg in 24 hours.    Oxycodone will be sent to your pharmacy. Please take only for severe pain    Interventional Radiology Instructions:  - Flush drain with 5cc NS daily forward only; DO NOT aspirate  - Change dressing q3 days or when dressing is saturated.  make an appointment with IR by calling the IR booking office at [phone]; recommend IR follow in 1 week for tube evaluation.

## 2021-08-06 NOTE — DISCHARGE NOTE PROVIDER - NSDCACTIVITY_GEN_ALL_CORE
No heavy lifting/straining Showering allowed/Stairs allowed/Walking - Indoors allowed/No heavy lifting/straining/Walking - Outdoors allowed

## 2021-08-07 LAB
ANION GAP SERPL CALC-SCNC: 12 MMOL/L — SIGNIFICANT CHANGE UP (ref 5–17)
BUN SERPL-MCNC: 11 MG/DL — SIGNIFICANT CHANGE UP (ref 7–23)
CALCIUM SERPL-MCNC: 9.2 MG/DL — SIGNIFICANT CHANGE UP (ref 8.4–10.5)
CHLORIDE SERPL-SCNC: 102 MMOL/L — SIGNIFICANT CHANGE UP (ref 96–108)
CO2 SERPL-SCNC: 25 MMOL/L — SIGNIFICANT CHANGE UP (ref 22–31)
CREAT SERPL-MCNC: 0.96 MG/DL — SIGNIFICANT CHANGE UP (ref 0.5–1.3)
GLUCOSE SERPL-MCNC: 105 MG/DL — HIGH (ref 70–99)
HCT VFR BLD CALC: 38.5 % — LOW (ref 39–50)
HGB BLD-MCNC: 11.6 G/DL — LOW (ref 13–17)
MAGNESIUM SERPL-MCNC: 1.7 MG/DL — SIGNIFICANT CHANGE UP (ref 1.6–2.6)
MCHC RBC-ENTMCNC: 26.7 PG — LOW (ref 27–34)
MCHC RBC-ENTMCNC: 30.1 GM/DL — LOW (ref 32–36)
MCV RBC AUTO: 88.5 FL — SIGNIFICANT CHANGE UP (ref 80–100)
NRBC # BLD: 0 /100 WBCS — SIGNIFICANT CHANGE UP (ref 0–0)
PHOSPHATE SERPL-MCNC: 3 MG/DL — SIGNIFICANT CHANGE UP (ref 2.5–4.5)
PLATELET # BLD AUTO: 287 K/UL — SIGNIFICANT CHANGE UP (ref 150–400)
POTASSIUM SERPL-MCNC: 4 MMOL/L — SIGNIFICANT CHANGE UP (ref 3.5–5.3)
POTASSIUM SERPL-SCNC: 4 MMOL/L — SIGNIFICANT CHANGE UP (ref 3.5–5.3)
RBC # BLD: 4.35 M/UL — SIGNIFICANT CHANGE UP (ref 4.2–5.8)
RBC # FLD: 15.2 % — HIGH (ref 10.3–14.5)
SODIUM SERPL-SCNC: 139 MMOL/L — SIGNIFICANT CHANGE UP (ref 135–145)
WBC # BLD: 6.31 K/UL — SIGNIFICANT CHANGE UP (ref 3.8–10.5)
WBC # FLD AUTO: 6.31 K/UL — SIGNIFICANT CHANGE UP (ref 3.8–10.5)

## 2021-08-07 PROCEDURE — 74018 RADEX ABDOMEN 1 VIEW: CPT | Mod: 26

## 2021-08-07 RX ORDER — MAGNESIUM SULFATE 500 MG/ML
2 VIAL (ML) INJECTION ONCE
Refills: 0 | Status: COMPLETED | OUTPATIENT
Start: 2021-08-07 | End: 2021-08-07

## 2021-08-07 RX ORDER — ONDANSETRON 8 MG/1
4 TABLET, FILM COATED ORAL ONCE
Refills: 0 | Status: COMPLETED | OUTPATIENT
Start: 2021-08-07 | End: 2021-08-07

## 2021-08-07 RX ADMIN — Medication 1 PATCH: at 17:41

## 2021-08-07 RX ADMIN — OXYCODONE HYDROCHLORIDE 10 MILLIGRAM(S): 5 TABLET ORAL at 20:06

## 2021-08-07 RX ADMIN — Medication 81 MILLIGRAM(S): at 12:11

## 2021-08-07 RX ADMIN — OXYCODONE HYDROCHLORIDE 10 MILLIGRAM(S): 5 TABLET ORAL at 05:41

## 2021-08-07 RX ADMIN — HEPARIN SODIUM 5000 UNIT(S): 5000 INJECTION INTRAVENOUS; SUBCUTANEOUS at 21:13

## 2021-08-07 RX ADMIN — OXYCODONE HYDROCHLORIDE 10 MILLIGRAM(S): 5 TABLET ORAL at 09:44

## 2021-08-07 RX ADMIN — OXYCODONE HYDROCHLORIDE 10 MILLIGRAM(S): 5 TABLET ORAL at 06:11

## 2021-08-07 RX ADMIN — OXYCODONE HYDROCHLORIDE 10 MILLIGRAM(S): 5 TABLET ORAL at 23:28

## 2021-08-07 RX ADMIN — HEPARIN SODIUM 5000 UNIT(S): 5000 INJECTION INTRAVENOUS; SUBCUTANEOUS at 05:46

## 2021-08-07 RX ADMIN — OXYCODONE HYDROCHLORIDE 10 MILLIGRAM(S): 5 TABLET ORAL at 14:24

## 2021-08-07 RX ADMIN — OXYCODONE HYDROCHLORIDE 10 MILLIGRAM(S): 5 TABLET ORAL at 23:58

## 2021-08-07 RX ADMIN — PANTOPRAZOLE SODIUM 40 MILLIGRAM(S): 20 TABLET, DELAYED RELEASE ORAL at 05:46

## 2021-08-07 RX ADMIN — OXYCODONE HYDROCHLORIDE 10 MILLIGRAM(S): 5 TABLET ORAL at 02:24

## 2021-08-07 RX ADMIN — Medication 125 MICROGRAM(S): at 05:43

## 2021-08-07 RX ADMIN — OXYCODONE HYDROCHLORIDE 10 MILLIGRAM(S): 5 TABLET ORAL at 14:54

## 2021-08-07 RX ADMIN — OXYCODONE HYDROCHLORIDE 10 MILLIGRAM(S): 5 TABLET ORAL at 10:14

## 2021-08-07 RX ADMIN — HEPARIN SODIUM 5000 UNIT(S): 5000 INJECTION INTRAVENOUS; SUBCUTANEOUS at 14:14

## 2021-08-07 RX ADMIN — Medication 50 GRAM(S): at 14:13

## 2021-08-07 RX ADMIN — Medication 1 PATCH: at 07:22

## 2021-08-07 RX ADMIN — ONDANSETRON 4 MILLIGRAM(S): 8 TABLET, FILM COATED ORAL at 01:58

## 2021-08-07 RX ADMIN — OXYCODONE HYDROCHLORIDE 10 MILLIGRAM(S): 5 TABLET ORAL at 01:54

## 2021-08-07 RX ADMIN — Medication 1 PATCH: at 12:05

## 2021-08-07 RX ADMIN — OXYCODONE HYDROCHLORIDE 10 MILLIGRAM(S): 5 TABLET ORAL at 19:36

## 2021-08-07 RX ADMIN — Medication 1 PATCH: at 12:11

## 2021-08-07 NOTE — PROGRESS NOTE ADULT - ASSESSMENT
56M hx HLD, CAD s/p MAINOR x 2 (July 2020) on DAPT, LBO 2/2 diverticulitis s/p John's procedure (March 2019) and reversal (June 2019) recently discharged 2 days ago after hospitalization for nonoperative small bowel obstruction presenting with recurrent small bowel obstruction with similar transition point to last admission, now s/p ventral hernia repair of multiple fascial defects with mesh (8/2), recovering appropriately on floors.     PLAN:    - Continue home cardiac medications in IV formulation, chewable ASA  - Passed TOV  - DVT ppx  - Diet, LRD  - OOB and ambulating as tolerated  - F/u AM labs  - Discharge when has bowel movements       [facility]  #[id number]  56M hx HLD, CAD s/p MAINOR x 2 (July 2020) on DAPT, LBO 2/2 diverticulitis s/p John's procedure (March 2019) and reversal (June 2019) recently discharged 2 days ago after hospitalization for nonoperative small bowel obstruction presenting with recurrent small bowel obstruction with similar transition point to last admission, now s/p ventral hernia repair of multiple fascial defects with mesh (8/2). He had 60cc vomiting and diarrhea overnight (8/6). Wound vac removed (8/7).    PLAN:  - FU bowel function  - Continue home cardiac medications in IV formulation, chewable ASA  - Check on patient to see if he is nauseas before giving medication (zofran)  - DVT ppx  - Diet, LRD  - OOB and ambulating as tolerated  - F/u AM labs        [facility]  #[id number]

## 2021-08-07 NOTE — PROGRESS NOTE ADULT - SUBJECTIVE AND OBJECTIVE BOX
SURGERY  Pager: #[phone]    STATUS POST: ventral hernia repair with mesh    POST OPERATIVE DAY #5      INTERVAL EVENTS/SUBJECTIVE: Overnight patient had several episodes of watery stools, nausea with 1x emesis. On morning rounds...     ______________________________________________  OBJECTIVE:   T(C): 36.7 (08-07-21 @ 01:24), Max: 37.5 (08-06-21 @ 21:29)  HR: 104 (08-07-21 @ 01:24) (75 - 105)  BP: 122/85 (08-07-21 @ 01:24) (110/72 - 134/71)  RR: 18 (08-07-21 @ 01:24) (16 - 20)  SpO2: 94% (08-07-21 @ 01:24) (92% - 95%)  Wt(kg): --  CAPILLARY BLOOD GLUCOSE        I&O's Detail    05 Aug 2021 07:01  -  06 Aug 2021 07:00  --------------------------------------------------------  IN:    Oral Fluid: 995 mL  Total IN: 995 mL    OUT:    Bulb (mL): 70 mL    Indwelling Catheter - Urethral (mL): 250 mL    Voided (mL): 1650 mL  Total OUT: 1970 mL    Total NET: -975 mL      06 Aug 2021 07:01  -  07 Aug 2021 01:45  --------------------------------------------------------  IN:    Oral Fluid: 1140 mL  Total IN: 1140 mL    OUT:    Bulb (mL): 5 mL    Emesis (mL): 60 mL  Total OUT: 65 mL    Total NET: 1075 mL          Physical exam:  Gen: resting in bed comfortably in NAD  Chest: no increased WOB, regular inspiratory effort   Abdomen: Soft, nontender, nondistended with no rebound tenderness or guarding. Incisions clean, dry, intact, with no erythema.   Vascular: WWP, CARSON x4  NEURO: awake, alert  ______________________________________________  LABS:  CBC Full  -  ( 06 Aug 2021 07:11 )  WBC Count : 7.52 K/uL  RBC Count : 3.93 M/uL  Hemoglobin : 10.5 g/dL  Hematocrit : 35.0 %  Platelet Count - Automated : 243 K/uL  Mean Cell Volume : 89.1 fl  Mean Cell Hemoglobin : 26.7 pg  Mean Cell Hemoglobin Concentration : 30.0 gm/dL  Auto Neutrophil # : x  Auto Lymphocyte # : x  Auto Monocyte # : x  Auto Eosinophil # : x  Auto Basophil # : x  Auto Neutrophil % : x  Auto Lymphocyte % : x  Auto Monocyte % : x  Auto Eosinophil % : x  Auto Basophil % : x    08-06    135  |  99  |  10  ----------------------------<  101<H>  4.0   |  26  |  0.97    Ca    9.0      06 Aug 2021 07:05  Phos  3.2     08-06  Mg     1.8     08-06      _____________________________________________  RADIOLOGY:     SURGERY  Pager: #[phone]    STATUS POST: ventral hernia repair with mesh    POST OPERATIVE DAY #5      INTERVAL EVENTS/SUBJECTIVE: Overnight patient had several episodes of watery stools, nausea with 1x emesis. On morning rounds, patient is not currently nauseas or vomiting. Says he has passed gas and had diarrhea. Tolerating low fiber diet.    ______________________________________________  OBJECTIVE:   T(C): 36.7 (08-07-21 @ 01:24), Max: 37.5 (08-06-21 @ 21:29)  HR: 104 (08-07-21 @ 01:24) (75 - 105)  BP: 122/85 (08-07-21 @ 01:24) (110/72 - 134/71)  RR: 18 (08-07-21 @ 01:24) (16 - 20)  SpO2: 94% (08-07-21 @ 01:24) (92% - 95%)  Wt(kg): --  CAPILLARY BLOOD GLUCOSE        I&O's Detail    05 Aug 2021 07:01  -  06 Aug 2021 07:00  --------------------------------------------------------  IN:    Oral Fluid: 995 mL  Total IN: 995 mL    OUT:    Bulb (mL): 70 mL    Indwelling Catheter - Urethral (mL): 250 mL    Voided (mL): 1650 mL  Total OUT: 1970 mL    Total NET: -975 mL      06 Aug 2021 07:01  -  07 Aug 2021 01:45  --------------------------------------------------------  IN:    Oral Fluid: 1140 mL  Total IN: 1140 mL    OUT:    Bulb (mL): 5 mL    Emesis (mL): 60 mL  Total OUT: 65 mL    Total NET: 1075 mL          Physical exam:  Gen: resting in bed comfortably in NAD  Chest: no increased WOB, regular inspiratory effort   Abdomen: Soft, nontender, nondistended with no rebound tenderness or guarding. Incisions clean, dry, intact, with no erythema.   Vascular: WWP, CARSON x4  NEURO: awake, alert  ______________________________________________  LABS:  CBC Full  -  ( 06 Aug 2021 07:11 )  WBC Count : 7.52 K/uL  RBC Count : 3.93 M/uL  Hemoglobin : 10.5 g/dL  Hematocrit : 35.0 %  Platelet Count - Automated : 243 K/uL  Mean Cell Volume : 89.1 fl  Mean Cell Hemoglobin : 26.7 pg  Mean Cell Hemoglobin Concentration : 30.0 gm/dL  Auto Neutrophil # : x  Auto Lymphocyte # : x  Auto Monocyte # : x  Auto Eosinophil # : x  Auto Basophil # : x  Auto Neutrophil % : x  Auto Lymphocyte % : x  Auto Monocyte % : x  Auto Eosinophil % : x  Auto Basophil % : x    08-06    135  |  99  |  10  ----------------------------<  101<H>  4.0   |  26  |  0.97    Ca    9.0      06 Aug 2021 07:05  Phos  3.2     08-06  Mg     1.8     08-06      _____________________________________________  RADIOLOGY:

## 2021-08-08 LAB
ANION GAP SERPL CALC-SCNC: 13 MMOL/L — SIGNIFICANT CHANGE UP (ref 5–17)
BUN SERPL-MCNC: 9 MG/DL — SIGNIFICANT CHANGE UP (ref 7–23)
CALCIUM SERPL-MCNC: 9.5 MG/DL — SIGNIFICANT CHANGE UP (ref 8.4–10.5)
CHLORIDE SERPL-SCNC: 100 MMOL/L — SIGNIFICANT CHANGE UP (ref 96–108)
CO2 SERPL-SCNC: 24 MMOL/L — SIGNIFICANT CHANGE UP (ref 22–31)
CREAT SERPL-MCNC: 0.99 MG/DL — SIGNIFICANT CHANGE UP (ref 0.5–1.3)
GLUCOSE SERPL-MCNC: 104 MG/DL — HIGH (ref 70–99)
HCT VFR BLD CALC: 38 % — LOW (ref 39–50)
HGB BLD-MCNC: 11.4 G/DL — LOW (ref 13–17)
MAGNESIUM SERPL-MCNC: 1.8 MG/DL — SIGNIFICANT CHANGE UP (ref 1.6–2.6)
MCHC RBC-ENTMCNC: 26.5 PG — LOW (ref 27–34)
MCHC RBC-ENTMCNC: 30 GM/DL — LOW (ref 32–36)
MCV RBC AUTO: 88.4 FL — SIGNIFICANT CHANGE UP (ref 80–100)
NRBC # BLD: 0 /100 WBCS — SIGNIFICANT CHANGE UP (ref 0–0)
PHOSPHATE SERPL-MCNC: 2.8 MG/DL — SIGNIFICANT CHANGE UP (ref 2.5–4.5)
PLATELET # BLD AUTO: 310 K/UL — SIGNIFICANT CHANGE UP (ref 150–400)
POTASSIUM SERPL-MCNC: 4.1 MMOL/L — SIGNIFICANT CHANGE UP (ref 3.5–5.3)
POTASSIUM SERPL-SCNC: 4.1 MMOL/L — SIGNIFICANT CHANGE UP (ref 3.5–5.3)
RBC # BLD: 4.3 M/UL — SIGNIFICANT CHANGE UP (ref 4.2–5.8)
RBC # FLD: 15.2 % — HIGH (ref 10.3–14.5)
SODIUM SERPL-SCNC: 137 MMOL/L — SIGNIFICANT CHANGE UP (ref 135–145)
WBC # BLD: 6.21 K/UL — SIGNIFICANT CHANGE UP (ref 3.8–10.5)
WBC # FLD AUTO: 6.21 K/UL — SIGNIFICANT CHANGE UP (ref 3.8–10.5)

## 2021-08-08 PROCEDURE — 74018 RADEX ABDOMEN 1 VIEW: CPT | Mod: 26

## 2021-08-08 RX ORDER — MAGNESIUM SULFATE 500 MG/ML
2 VIAL (ML) INJECTION ONCE
Refills: 0 | Status: COMPLETED | OUTPATIENT
Start: 2021-08-08 | End: 2021-08-08

## 2021-08-08 RX ORDER — ONDANSETRON 8 MG/1
4 TABLET, FILM COATED ORAL ONCE
Refills: 0 | Status: COMPLETED | OUTPATIENT
Start: 2021-08-08 | End: 2021-08-08

## 2021-08-08 RX ORDER — SODIUM,POTASSIUM PHOSPHATES 278-250MG
1 POWDER IN PACKET (EA) ORAL ONCE
Refills: 0 | Status: COMPLETED | OUTPATIENT
Start: 2021-08-08 | End: 2021-08-08

## 2021-08-08 RX ADMIN — OXYCODONE HYDROCHLORIDE 10 MILLIGRAM(S): 5 TABLET ORAL at 22:13

## 2021-08-08 RX ADMIN — OXYCODONE HYDROCHLORIDE 10 MILLIGRAM(S): 5 TABLET ORAL at 12:09

## 2021-08-08 RX ADMIN — Medication 1 PATCH: at 11:52

## 2021-08-08 RX ADMIN — Medication 1 PACKET(S): at 10:02

## 2021-08-08 RX ADMIN — Medication 81 MILLIGRAM(S): at 11:27

## 2021-08-08 RX ADMIN — HEPARIN SODIUM 5000 UNIT(S): 5000 INJECTION INTRAVENOUS; SUBCUTANEOUS at 21:43

## 2021-08-08 RX ADMIN — Medication 1 PATCH: at 06:09

## 2021-08-08 RX ADMIN — OXYCODONE HYDROCHLORIDE 10 MILLIGRAM(S): 5 TABLET ORAL at 16:28

## 2021-08-08 RX ADMIN — HEPARIN SODIUM 5000 UNIT(S): 5000 INJECTION INTRAVENOUS; SUBCUTANEOUS at 13:05

## 2021-08-08 RX ADMIN — ONDANSETRON 4 MILLIGRAM(S): 8 TABLET, FILM COATED ORAL at 03:27

## 2021-08-08 RX ADMIN — Medication 1 PATCH: at 20:40

## 2021-08-08 RX ADMIN — OXYCODONE HYDROCHLORIDE 10 MILLIGRAM(S): 5 TABLET ORAL at 07:34

## 2021-08-08 RX ADMIN — Medication 50 GRAM(S): at 10:03

## 2021-08-08 RX ADMIN — OXYCODONE HYDROCHLORIDE 10 MILLIGRAM(S): 5 TABLET ORAL at 03:20

## 2021-08-08 RX ADMIN — PANTOPRAZOLE SODIUM 40 MILLIGRAM(S): 20 TABLET, DELAYED RELEASE ORAL at 06:20

## 2021-08-08 RX ADMIN — OXYCODONE HYDROCHLORIDE 10 MILLIGRAM(S): 5 TABLET ORAL at 21:43

## 2021-08-08 RX ADMIN — ONDANSETRON 4 MILLIGRAM(S): 8 TABLET, FILM COATED ORAL at 09:44

## 2021-08-08 RX ADMIN — SIMETHICONE 80 MILLIGRAM(S): 80 TABLET, CHEWABLE ORAL at 02:43

## 2021-08-08 RX ADMIN — HEPARIN SODIUM 5000 UNIT(S): 5000 INJECTION INTRAVENOUS; SUBCUTANEOUS at 06:22

## 2021-08-08 RX ADMIN — OXYCODONE HYDROCHLORIDE 10 MILLIGRAM(S): 5 TABLET ORAL at 11:39

## 2021-08-08 RX ADMIN — OXYCODONE HYDROCHLORIDE 10 MILLIGRAM(S): 5 TABLET ORAL at 03:50

## 2021-08-08 RX ADMIN — Medication 1 PATCH: at 11:53

## 2021-08-08 RX ADMIN — OXYCODONE HYDROCHLORIDE 10 MILLIGRAM(S): 5 TABLET ORAL at 08:04

## 2021-08-08 RX ADMIN — OXYCODONE HYDROCHLORIDE 10 MILLIGRAM(S): 5 TABLET ORAL at 16:58

## 2021-08-08 RX ADMIN — Medication 125 MICROGRAM(S): at 06:20

## 2021-08-08 NOTE — PROGRESS NOTE ADULT - SUBJECTIVE AND OBJECTIVE BOX
SURGERY  Pager: #[phone]    STATUS POST: ventral hernia repair with mesh    POST OPERATIVE DAY #6    INTERVAL EVENTS/SUBJECTIVE: No acute events overnight. On morning rounds...      OBJECTIVE:    Vital Signs Last 24 Hrs  T(C): 36.8 (08 Aug 2021 01:12), Max: 37 (07 Aug 2021 10:29)  T(F): 98.3 (08 Aug 2021 01:12), Max: 98.6 (07 Aug 2021 10:29)  HR: 94 (08 Aug 2021 01:12) (85 - 100)  BP: 110/72 (08 Aug 2021 01:12) (110/72 - 137/85)  BP(mean): --  RR: 18 (08 Aug 2021 01:12) (18 - 20)  SpO2: 96% (08 Aug 2021 01:12) (93% - 96%)I&O's Detail    06 Aug 2021 07:01  -  07 Aug 2021 07:00  --------------------------------------------------------  IN:    Oral Fluid: 1380 mL  Total IN: 1380 mL    OUT:    Bulb (mL): 25 mL    Emesis (mL): 60 mL    Voided (mL): 0 mL  Total OUT: 85 mL    Total NET: 1295 mL      07 Aug 2021 07:01  -  08 Aug 2021 01:39  --------------------------------------------------------  IN:    IV PiggyBack: 50 mL    Oral Fluid: 1160 mL  Total IN: 1210 mL    OUT:    Bulb (mL): 25 mL    Voided (mL): 950 mL  Total OUT: 975 mL    Total NET: 235 mL      MEDICATIONS  (STANDING):  aspirin  chewable 81 milliGRAM(s) Oral daily  digoxin     Tablet 125 MICROGram(s) Oral daily  heparin   Injectable 5000 Unit(s) SubCutaneous every 8 hours  nicotine -   7 mG/24Hr(s) Patch 1 patch Transdermal daily  pantoprazole    Tablet 40 milliGRAM(s) Oral before breakfast    MEDICATIONS  (PRN):  acetaminophen   Tablet .. 975 milliGRAM(s) Oral every 6 hours PRN Mild Pain (1 - 3)  benzocaine 15 mG/menthol 3.6 mG (Sugar-Free) Lozenge 1 Lozenge Oral every 6 hours PRN Sore Throat  melatonin 10 milliGRAM(s) Oral at bedtime PRN Sleep  oxyCODONE    IR 5 milliGRAM(s) Oral every 4 hours PRN Moderate Pain (4 - 6)  oxyCODONE    IR 10 milliGRAM(s) Oral every 4 hours PRN Severe Pain (7 - 10)  simethicone 80 milliGRAM(s) Chew daily PRN Gas  tetracaine/benzocaine/butamben Spray 1 Spray(s) Topical every 4 hours PRN NGtube throat discomfort      PHYSICAL EXAM:  Constitutional: A&Ox3, NAD  Respiratory: Unlabored breathing  Abdomen: Soft, nondistended, NTTP. No rebound or guarding.  Extremities: WWP, CARSON spontaneously    LABS:                        11.6   6.31  )-----------( 287      ( 07 Aug 2021 12:24 )             38.5     08-07    139  |  102  |  11  ----------------------------<  105<H>  4.0   |  25  |  0.96    Ca    9.2      07 Aug 2021 12:24  Phos  3.0     08-07  Mg     1.7     08-07                IMAGING:     SURGERY  Pager: #[phone]    STATUS POST: ventral hernia repair with mesh    POST OPERATIVE DAY #6    INTERVAL EVENTS/SUBJECTIVE: No acute events overnight. Zofran given for one episode of nausea. No vomiting. On morning rounds...      OBJECTIVE:    Vital Signs Last 24 Hrs  T(C): 36.8 (08 Aug 2021 01:12), Max: 37 (07 Aug 2021 10:29)  T(F): 98.3 (08 Aug 2021 01:12), Max: 98.6 (07 Aug 2021 10:29)  HR: 94 (08 Aug 2021 01:12) (85 - 100)  BP: 110/72 (08 Aug 2021 01:12) (110/72 - 137/85)  BP(mean): --  RR: 18 (08 Aug 2021 01:12) (18 - 20)  SpO2: 96% (08 Aug 2021 01:12) (93% - 96%)I&O's Detail    06 Aug 2021 07:01  -  07 Aug 2021 07:00  --------------------------------------------------------  IN:    Oral Fluid: 1380 mL  Total IN: 1380 mL    OUT:    Bulb (mL): 25 mL    Emesis (mL): 60 mL    Voided (mL): 0 mL  Total OUT: 85 mL    Total NET: 1295 mL      07 Aug 2021 07:01  -  08 Aug 2021 01:39  --------------------------------------------------------  IN:    IV PiggyBack: 50 mL    Oral Fluid: 1160 mL  Total IN: 1210 mL    OUT:    Bulb (mL): 25 mL    Voided (mL): 950 mL  Total OUT: 975 mL    Total NET: 235 mL      MEDICATIONS  (STANDING):  aspirin  chewable 81 milliGRAM(s) Oral daily  digoxin     Tablet 125 MICROGram(s) Oral daily  heparin   Injectable 5000 Unit(s) SubCutaneous every 8 hours  nicotine -   7 mG/24Hr(s) Patch 1 patch Transdermal daily  pantoprazole    Tablet 40 milliGRAM(s) Oral before breakfast    MEDICATIONS  (PRN):  acetaminophen   Tablet .. 975 milliGRAM(s) Oral every 6 hours PRN Mild Pain (1 - 3)  benzocaine 15 mG/menthol 3.6 mG (Sugar-Free) Lozenge 1 Lozenge Oral every 6 hours PRN Sore Throat  melatonin 10 milliGRAM(s) Oral at bedtime PRN Sleep  oxyCODONE    IR 5 milliGRAM(s) Oral every 4 hours PRN Moderate Pain (4 - 6)  oxyCODONE    IR 10 milliGRAM(s) Oral every 4 hours PRN Severe Pain (7 - 10)  simethicone 80 milliGRAM(s) Chew daily PRN Gas  tetracaine/benzocaine/butamben Spray 1 Spray(s) Topical every 4 hours PRN NGtube throat discomfort      PHYSICAL EXAM:  Constitutional: A&Ox3, NAD  Respiratory: Unlabored breathing  Abdomen: Soft, nondistended, NTTP. No rebound or guarding.  Extremities: WWP, CARSON spontaneously    LABS:                        11.6   6.31  )-----------( 287      ( 07 Aug 2021 12:24 )             38.5     08-07    139  |  102  |  11  ----------------------------<  105<H>  4.0   |  25  |  0.96    Ca    9.2      07 Aug 2021 12:24  Phos  3.0     08-07  Mg     1.7     08-07                IMAGING:     SURGERY  Pager: #[phone]    STATUS POST: ventral hernia repair with mesh    POST OPERATIVE DAY #6    INTERVAL EVENTS/SUBJECTIVE: No acute events overnight. Zofran given for one episode of nausea. No vomiting. He had two counts of watery diarrhea overnight. On morning rounds...      OBJECTIVE:    Vital Signs Last 24 Hrs  T(C): 36.8 (08 Aug 2021 01:12), Max: 37 (07 Aug 2021 10:29)  T(F): 98.3 (08 Aug 2021 01:12), Max: 98.6 (07 Aug 2021 10:29)  HR: 94 (08 Aug 2021 01:12) (85 - 100)  BP: 110/72 (08 Aug 2021 01:12) (110/72 - 137/85)  BP(mean): --  RR: 18 (08 Aug 2021 01:12) (18 - 20)  SpO2: 96% (08 Aug 2021 01:12) (93% - 96%)I&O's Detail    06 Aug 2021 07:01  -  07 Aug 2021 07:00  --------------------------------------------------------  IN:    Oral Fluid: 1380 mL  Total IN: 1380 mL    OUT:    Bulb (mL): 25 mL    Emesis (mL): 60 mL    Voided (mL): 0 mL  Total OUT: 85 mL    Total NET: 1295 mL      07 Aug 2021 07:01  -  08 Aug 2021 01:39  --------------------------------------------------------  IN:    IV PiggyBack: 50 mL    Oral Fluid: 1160 mL  Total IN: 1210 mL    OUT:    Bulb (mL): 25 mL    Voided (mL): 950 mL  Total OUT: 975 mL    Total NET: 235 mL      MEDICATIONS  (STANDING):  aspirin  chewable 81 milliGRAM(s) Oral daily  digoxin     Tablet 125 MICROGram(s) Oral daily  heparin   Injectable 5000 Unit(s) SubCutaneous every 8 hours  nicotine -   7 mG/24Hr(s) Patch 1 patch Transdermal daily  pantoprazole    Tablet 40 milliGRAM(s) Oral before breakfast    MEDICATIONS  (PRN):  acetaminophen   Tablet .. 975 milliGRAM(s) Oral every 6 hours PRN Mild Pain (1 - 3)  benzocaine 15 mG/menthol 3.6 mG (Sugar-Free) Lozenge 1 Lozenge Oral every 6 hours PRN Sore Throat  melatonin 10 milliGRAM(s) Oral at bedtime PRN Sleep  oxyCODONE    IR 5 milliGRAM(s) Oral every 4 hours PRN Moderate Pain (4 - 6)  oxyCODONE    IR 10 milliGRAM(s) Oral every 4 hours PRN Severe Pain (7 - 10)  simethicone 80 milliGRAM(s) Chew daily PRN Gas  tetracaine/benzocaine/butamben Spray 1 Spray(s) Topical every 4 hours PRN NGtube throat discomfort      PHYSICAL EXAM:  Constitutional: A&Ox3, NAD  Respiratory: Unlabored breathing  Abdomen: Soft, nondistended, NTTP. No rebound or guarding.  Extremities: WWP, CARSON spontaneously    LABS:                        11.6   6.31  )-----------( 287      ( 07 Aug 2021 12:24 )             38.5     08-07    139  |  102  |  11  ----------------------------<  105<H>  4.0   |  25  |  0.96    Ca    9.2      07 Aug 2021 12:24  Phos  3.0     08-07  Mg     1.7     08-07                IMAGING:     SURGERY  Pager: #[phone]    STATUS POST: ventral hernia repair with mesh    POST OPERATIVE DAY #6    INTERVAL EVENTS/SUBJECTIVE: No acute events overnight. Zofran given for one episode of nausea. No vomiting. He had two counts of watery diarrhea overnight. On morning rounds no acute events, tolerating diet, +/+, no more episodes of emesis      OBJECTIVE:    Vital Signs Last 24 Hrs  T(C): 36.8 (08 Aug 2021 01:12), Max: 37 (07 Aug 2021 10:29)  T(F): 98.3 (08 Aug 2021 01:12), Max: 98.6 (07 Aug 2021 10:29)  HR: 94 (08 Aug 2021 01:12) (85 - 100)  BP: 110/72 (08 Aug 2021 01:12) (110/72 - 137/85)  BP(mean): --  RR: 18 (08 Aug 2021 01:12) (18 - 20)  SpO2: 96% (08 Aug 2021 01:12) (93% - 96%)I&O's Detail    06 Aug 2021 07:01  -  07 Aug 2021 07:00  --------------------------------------------------------  IN:    Oral Fluid: 1380 mL  Total IN: 1380 mL    OUT:    Bulb (mL): 25 mL    Emesis (mL): 60 mL    Voided (mL): 0 mL  Total OUT: 85 mL    Total NET: 1295 mL      07 Aug 2021 07:01  -  08 Aug 2021 01:39  --------------------------------------------------------  IN:    IV PiggyBack: 50 mL    Oral Fluid: 1160 mL  Total IN: 1210 mL    OUT:    Bulb (mL): 25 mL    Voided (mL): 950 mL  Total OUT: 975 mL    Total NET: 235 mL      MEDICATIONS  (STANDING):  aspirin  chewable 81 milliGRAM(s) Oral daily  digoxin     Tablet 125 MICROGram(s) Oral daily  heparin   Injectable 5000 Unit(s) SubCutaneous every 8 hours  nicotine -   7 mG/24Hr(s) Patch 1 patch Transdermal daily  pantoprazole    Tablet 40 milliGRAM(s) Oral before breakfast    MEDICATIONS  (PRN):  acetaminophen   Tablet .. 975 milliGRAM(s) Oral every 6 hours PRN Mild Pain (1 - 3)  benzocaine 15 mG/menthol 3.6 mG (Sugar-Free) Lozenge 1 Lozenge Oral every 6 hours PRN Sore Throat  melatonin 10 milliGRAM(s) Oral at bedtime PRN Sleep  oxyCODONE    IR 5 milliGRAM(s) Oral every 4 hours PRN Moderate Pain (4 - 6)  oxyCODONE    IR 10 milliGRAM(s) Oral every 4 hours PRN Severe Pain (7 - 10)  simethicone 80 milliGRAM(s) Chew daily PRN Gas  tetracaine/benzocaine/butamben Spray 1 Spray(s) Topical every 4 hours PRN NGtube throat discomfort      PHYSICAL EXAM:  Constitutional: A&Ox3, NAD  Respiratory: Unlabored breathing  Abdomen: Soft, nondistended, NTTP. No rebound or guarding.  Extremities: WWP, CARSON spontaneously    LABS:                        11.6   6.31  )-----------( 287      ( 07 Aug 2021 12:24 )             38.5     08-07    139  |  102  |  11  ----------------------------<  105<H>  4.0   |  25  |  0.96    Ca    9.2      07 Aug 2021 12:24  Phos  3.0     08-07  Mg     1.7     08-07                IMAGING:

## 2021-08-08 NOTE — PROGRESS NOTE ADULT - ASSESSMENT
· Assessment	  56M hx HLD, CAD s/p MAINOR x 2 (July 2020) on DAPT, LBO 2/2 diverticulitis s/p John's procedure (March 2019) and reversal (June 2019) recently discharged 2 days ago after hospitalization for nonoperative small bowel obstruction presenting with recurrent small bowel obstruction with similar transition point to last admission, now s/p ventral hernia repair of multiple fascial defects with mesh (8/2). He had 60cc vomiting and diarrhea overnight (8/6). Wound vac removed (8/7).    PLAN: (to be updated upon morning rounds with attending)  - FU bowel function  - Continue home cardiac medications in IV formulation, chewable ASA  - Check on patient to see if he is nauseas before giving medication (zofran)  - DVT ppx  - Diet, LRD  - OOB and ambulating as tolerated  - F/u AM labs        [facility]  #[id number]

## 2021-08-09 LAB
ANION GAP SERPL CALC-SCNC: 13 MMOL/L — SIGNIFICANT CHANGE UP (ref 5–17)
BUN SERPL-MCNC: 9 MG/DL — SIGNIFICANT CHANGE UP (ref 7–23)
C DIFF BY PCR RESULT: DETECTED
C DIFF GDH STL QL: SIGNIFICANT CHANGE UP
C DIFF GDH STL QL: SIGNIFICANT CHANGE UP
C DIFF TOX GENS STL QL NAA+PROBE: SIGNIFICANT CHANGE UP
CALCIUM SERPL-MCNC: 9.2 MG/DL — SIGNIFICANT CHANGE UP (ref 8.4–10.5)
CHLORIDE SERPL-SCNC: 99 MMOL/L — SIGNIFICANT CHANGE UP (ref 96–108)
CO2 SERPL-SCNC: 24 MMOL/L — SIGNIFICANT CHANGE UP (ref 22–31)
CREAT SERPL-MCNC: 1.07 MG/DL — SIGNIFICANT CHANGE UP (ref 0.5–1.3)
GLUCOSE SERPL-MCNC: 97 MG/DL — SIGNIFICANT CHANGE UP (ref 70–99)
HCT VFR BLD CALC: 36.4 % — LOW (ref 39–50)
HGB BLD-MCNC: 11 G/DL — LOW (ref 13–17)
MAGNESIUM SERPL-MCNC: 1.8 MG/DL — SIGNIFICANT CHANGE UP (ref 1.6–2.6)
MCHC RBC-ENTMCNC: 26.7 PG — LOW (ref 27–34)
MCHC RBC-ENTMCNC: 30.2 GM/DL — LOW (ref 32–36)
MCV RBC AUTO: 88.3 FL — SIGNIFICANT CHANGE UP (ref 80–100)
NRBC # BLD: 0 /100 WBCS — SIGNIFICANT CHANGE UP (ref 0–0)
PHOSPHATE SERPL-MCNC: 3.5 MG/DL — SIGNIFICANT CHANGE UP (ref 2.5–4.5)
PLATELET # BLD AUTO: 322 K/UL — SIGNIFICANT CHANGE UP (ref 150–400)
POTASSIUM SERPL-MCNC: 3.7 MMOL/L — SIGNIFICANT CHANGE UP (ref 3.5–5.3)
POTASSIUM SERPL-SCNC: 3.7 MMOL/L — SIGNIFICANT CHANGE UP (ref 3.5–5.3)
RBC # BLD: 4.12 M/UL — LOW (ref 4.2–5.8)
RBC # FLD: 15 % — HIGH (ref 10.3–14.5)
SODIUM SERPL-SCNC: 136 MMOL/L — SIGNIFICANT CHANGE UP (ref 135–145)
WBC # BLD: 5.09 K/UL — SIGNIFICANT CHANGE UP (ref 3.8–10.5)
WBC # FLD AUTO: 5.09 K/UL — SIGNIFICANT CHANGE UP (ref 3.8–10.5)

## 2021-08-09 PROCEDURE — 74177 CT ABD & PELVIS W/CONTRAST: CPT | Mod: 26

## 2021-08-09 RX ORDER — MAGNESIUM SULFATE 500 MG/ML
2 VIAL (ML) INJECTION ONCE
Refills: 0 | Status: COMPLETED | OUTPATIENT
Start: 2021-08-09 | End: 2021-08-09

## 2021-08-09 RX ORDER — VANCOMYCIN HCL 1 G
125 VIAL (EA) INTRAVENOUS EVERY 6 HOURS
Refills: 0 | Status: DISCONTINUED | OUTPATIENT
Start: 2021-08-09 | End: 2021-08-11

## 2021-08-09 RX ORDER — POTASSIUM CHLORIDE 20 MEQ
40 PACKET (EA) ORAL ONCE
Refills: 0 | Status: COMPLETED | OUTPATIENT
Start: 2021-08-09 | End: 2021-08-09

## 2021-08-09 RX ADMIN — Medication 1 PATCH: at 11:43

## 2021-08-09 RX ADMIN — HEPARIN SODIUM 5000 UNIT(S): 5000 INJECTION INTRAVENOUS; SUBCUTANEOUS at 13:37

## 2021-08-09 RX ADMIN — Medication 125 MICROGRAM(S): at 05:52

## 2021-08-09 RX ADMIN — OXYCODONE HYDROCHLORIDE 10 MILLIGRAM(S): 5 TABLET ORAL at 13:37

## 2021-08-09 RX ADMIN — Medication 1 PATCH: at 20:22

## 2021-08-09 RX ADMIN — OXYCODONE HYDROCHLORIDE 10 MILLIGRAM(S): 5 TABLET ORAL at 17:41

## 2021-08-09 RX ADMIN — Medication 81 MILLIGRAM(S): at 11:44

## 2021-08-09 RX ADMIN — OXYCODONE HYDROCHLORIDE 10 MILLIGRAM(S): 5 TABLET ORAL at 05:52

## 2021-08-09 RX ADMIN — OXYCODONE HYDROCHLORIDE 10 MILLIGRAM(S): 5 TABLET ORAL at 01:26

## 2021-08-09 RX ADMIN — OXYCODONE HYDROCHLORIDE 10 MILLIGRAM(S): 5 TABLET ORAL at 21:22

## 2021-08-09 RX ADMIN — Medication 1 PATCH: at 08:00

## 2021-08-09 RX ADMIN — HEPARIN SODIUM 5000 UNIT(S): 5000 INJECTION INTRAVENOUS; SUBCUTANEOUS at 21:22

## 2021-08-09 RX ADMIN — OXYCODONE HYDROCHLORIDE 10 MILLIGRAM(S): 5 TABLET ORAL at 17:12

## 2021-08-09 RX ADMIN — HEPARIN SODIUM 5000 UNIT(S): 5000 INJECTION INTRAVENOUS; SUBCUTANEOUS at 05:52

## 2021-08-09 RX ADMIN — OXYCODONE HYDROCHLORIDE 10 MILLIGRAM(S): 5 TABLET ORAL at 10:36

## 2021-08-09 RX ADMIN — Medication 1 PATCH: at 11:44

## 2021-08-09 RX ADMIN — Medication 50 GRAM(S): at 08:32

## 2021-08-09 RX ADMIN — PANTOPRAZOLE SODIUM 40 MILLIGRAM(S): 20 TABLET, DELAYED RELEASE ORAL at 05:53

## 2021-08-09 RX ADMIN — OXYCODONE HYDROCHLORIDE 10 MILLIGRAM(S): 5 TABLET ORAL at 06:22

## 2021-08-09 RX ADMIN — OXYCODONE HYDROCHLORIDE 10 MILLIGRAM(S): 5 TABLET ORAL at 22:00

## 2021-08-09 RX ADMIN — OXYCODONE HYDROCHLORIDE 10 MILLIGRAM(S): 5 TABLET ORAL at 10:06

## 2021-08-09 RX ADMIN — OXYCODONE HYDROCHLORIDE 10 MILLIGRAM(S): 5 TABLET ORAL at 01:56

## 2021-08-09 RX ADMIN — OXYCODONE HYDROCHLORIDE 10 MILLIGRAM(S): 5 TABLET ORAL at 14:07

## 2021-08-09 RX ADMIN — Medication 40 MILLIEQUIVALENT(S): at 08:32

## 2021-08-09 NOTE — PROGRESS NOTE ADULT - ASSESSMENT
· Assessment	  56M hx HLD, CAD s/p MAINOR x 2 (July 2020) on DAPT, LBO 2/2 diverticulitis s/p John's procedure (March 2019) and reversal (June 2019) recently discharged 2 days ago after hospitalization for nonoperative small bowel obstruction presenting with recurrent small bowel obstruction with similar transition point to last admission, now s/p ventral hernia repair of multiple fascial defects with mesh (8/2). He had 60cc vomiting and diarrhea overnight (8/6). Wound vac removed (8/7).    PLAN: (to be updated upon morning rounds with attending)  - FU bowel function  - Continue home cardiac medications in IV formulation, chewable ASA  - Check on patient to see if he is nauseas before giving medication (zofran)  - DVT ppx  - Diet, LRD  - OOB and ambulating as tolerated  - F/u AM labs      [place]  #[id number] · Assessment	  56M hx HLD, CAD s/p MAINOR x 2 (July 2020) on ASA, hx of atrial flutter on Eliquis, LBO 2/2 diverticulitis s/p John's procedure (March 2019) and reversal (June 2019) recently discharged 2 days ago after hospitalization for nonoperative small bowel obstruction presenting with recurrent small bowel obstruction with similar transition point to last admission, now s/p ventral hernia repair of multiple fascial defects with mesh (8/2). He had 60cc vomiting and diarrhea overnight (8/6). Wound vac removed (8/7).    PLAN: (to be updated upon morning rounds with attending)  - FU bowel function  - Continue home cardiac medications in IV formulation, chewable ASA  - Check on patient to see if he is nauseas before giving medication (zofran)  - Restart home eliquis  - repeat CT A/P   - C. diff assay   - DVT ppx  - Diet, LRD  - OOB and ambulating as tolerated  - F/u AM labs      [facility]  #[id number]

## 2021-08-09 NOTE — PROGRESS NOTE ADULT - SUBJECTIVE AND OBJECTIVE BOX
SURGERY  Pager: #[phone]    STATUS POST: ventral hernia repair with mesh    POST OPERATIVE DAY #7      INTERVAL EVENTS/SUBJECTIVE: No acute events overnight. On morning rounds...     ______________________________________________  OBJECTIVE:   T(C): 36.5 (08-08-21 @ 21:37), Max: 36.8 (08-08-21 @ 06:24)  HR: 86 (08-08-21 @ 21:37) (83 - 94)  BP: 129/88 (08-08-21 @ 21:37) (105/70 - 129/88)  RR: 20 (08-08-21 @ 21:37) (18 - 20)  SpO2: 96% (08-08-21 @ 21:37) (91% - 96%)  Wt(kg): --  CAPILLARY BLOOD GLUCOSE        I&O's Detail    07 Aug 2021 07:01  -  08 Aug 2021 07:00  --------------------------------------------------------  IN:    IV PiggyBack: 50 mL    Oral Fluid: 1400 mL  Total IN: 1450 mL    OUT:    Bulb (mL): 30 mL    Voided (mL): 950 mL  Total OUT: 980 mL    Total NET: 470 mL      08 Aug 2021 07:01  -  09 Aug 2021 01:17  --------------------------------------------------------  IN:    Oral Fluid: 1760 mL  Total IN: 1760 mL    OUT:    Bulb (mL): 15 mL  Total OUT: 15 mL    Total NET: 1745 mL          Physical exam:  Gen: resting in bed comfortably in NAD  Chest: no increased WOB, regular inspiratory effort   Abdomen: Soft, nontender, nondistended with no rebound tenderness or guarding. Incisions clean, dry, intact, with no erythema.   Vascular: WWP, CARSON x4  NEURO: awake, alert  ______________________________________________  LABS:  CBC Full  -  ( 08 Aug 2021 08:02 )  WBC Count : 6.21 K/uL  RBC Count : 4.30 M/uL  Hemoglobin : 11.4 g/dL  Hematocrit : 38.0 %  Platelet Count - Automated : 310 K/uL  Mean Cell Volume : 88.4 fl  Mean Cell Hemoglobin : 26.5 pg  Mean Cell Hemoglobin Concentration : 30.0 gm/dL  Auto Neutrophil # : x  Auto Lymphocyte # : x  Auto Monocyte # : x  Auto Eosinophil # : x  Auto Basophil # : x  Auto Neutrophil % : x  Auto Lymphocyte % : x  Auto Monocyte % : x  Auto Eosinophil % : x  Auto Basophil % : x    08-08    137  |  100  |  9   ----------------------------<  104<H>  4.1   |  24  |  0.99    Ca    9.5      08 Aug 2021 08:02  Phos  2.8     08-08  Mg     1.8     08-08      _____________________________________________  RADIOLOGY:     SURGERY  Pager: #[phone]    STATUS POST: ventral hernia repair with mesh    POST OPERATIVE DAY #7      INTERVAL EVENTS/SUBJECTIVE: No acute events overnight. On morning rounds has no acute complaints, reports nausea is improving.     ______________________________________________  OBJECTIVE:   T(C): 36.5 (08-08-21 @ 21:37), Max: 36.8 (08-08-21 @ 06:24)  HR: 86 (08-08-21 @ 21:37) (83 - 94)  BP: 129/88 (08-08-21 @ 21:37) (105/70 - 129/88)  RR: 20 (08-08-21 @ 21:37) (18 - 20)  SpO2: 96% (08-08-21 @ 21:37) (91% - 96%)  Wt(kg): --  CAPILLARY BLOOD GLUCOSE        I&O's Detail    07 Aug 2021 07:01  -  08 Aug 2021 07:00  --------------------------------------------------------  IN:    IV PiggyBack: 50 mL    Oral Fluid: 1400 mL  Total IN: 1450 mL    OUT:    Bulb (mL): 30 mL    Voided (mL): 950 mL  Total OUT: 980 mL    Total NET: 470 mL      08 Aug 2021 07:01  -  09 Aug 2021 01:17  --------------------------------------------------------  IN:    Oral Fluid: 1760 mL  Total IN: 1760 mL    OUT:    Bulb (mL): 15 mL  Total OUT: 15 mL    Total NET: 1745 mL          Physical exam:  Gen: resting in bed comfortably in NAD  Chest: no increased WOB, regular inspiratory effort   Abdomen: Obses, Soft, nontender, nondistended with no rebound tenderness or guarding. Incision clean, dry, intact, with no erythema.   Vascular: WWP, CARSON x4  NEURO: awake, alert  ______________________________________________  LABS:  CBC Full  -  ( 08 Aug 2021 08:02 )  WBC Count : 6.21 K/uL  RBC Count : 4.30 M/uL  Hemoglobin : 11.4 g/dL  Hematocrit : 38.0 %  Platelet Count - Automated : 310 K/uL  Mean Cell Volume : 88.4 fl  Mean Cell Hemoglobin : 26.5 pg  Mean Cell Hemoglobin Concentration : 30.0 gm/dL  Auto Neutrophil # : x  Auto Lymphocyte # : x  Auto Monocyte # : x  Auto Eosinophil # : x  Auto Basophil # : x  Auto Neutrophil % : x  Auto Lymphocyte % : x  Auto Monocyte % : x  Auto Eosinophil % : x  Auto Basophil % : x    08-08    137  |  100  |  9   ----------------------------<  104<H>  4.1   |  24  |  0.99    Ca    9.5      08 Aug 2021 08:02  Phos  2.8     08-08  Mg     1.8     08-08      _____________________________________________  RADIOLOGY:     SURGERY  Pager: #[phone]    STATUS POST: ventral hernia repair with mesh    POST OPERATIVE DAY #7      INTERVAL EVENTS/SUBJECTIVE: No acute events overnight. On morning rounds has no acute complaints, reports nausea is improving.     ______________________________________________  OBJECTIVE:   T(C): 36.5 (08-08-21 @ 21:37), Max: 36.8 (08-08-21 @ 06:24)  HR: 86 (08-08-21 @ 21:37) (83 - 94)  BP: 129/88 (08-08-21 @ 21:37) (105/70 - 129/88)  RR: 20 (08-08-21 @ 21:37) (18 - 20)  SpO2: 96% (08-08-21 @ 21:37) (91% - 96%)  Wt(kg): --  CAPILLARY BLOOD GLUCOSE        I&O's Detail    07 Aug 2021 07:01  -  08 Aug 2021 07:00  --------------------------------------------------------  IN:    IV PiggyBack: 50 mL    Oral Fluid: 1400 mL  Total IN: 1450 mL    OUT:    Bulb (mL): 30 mL    Voided (mL): 950 mL  Total OUT: 980 mL    Total NET: 470 mL      08 Aug 2021 07:01  -  09 Aug 2021 01:17  --------------------------------------------------------  IN:    Oral Fluid: 1760 mL  Total IN: 1760 mL    OUT:    Bulb (mL): 15 mL  Total OUT: 15 mL    Total NET: 1745 mL          Physical exam:  Gen: resting in bed comfortably in NAD  Chest: no increased WOB, regular inspiratory effort   Abdomen: Obese, Soft, nontender, nondistended with no rebound tenderness or guarding. Incision clean, dry, intact, with no erythema.   Vascular: WWP, CARSON x4  NEURO: awake, alert  ______________________________________________  LABS:  CBC Full  -  ( 08 Aug 2021 08:02 )  WBC Count : 6.21 K/uL  RBC Count : 4.30 M/uL  Hemoglobin : 11.4 g/dL  Hematocrit : 38.0 %  Platelet Count - Automated : 310 K/uL  Mean Cell Volume : 88.4 fl  Mean Cell Hemoglobin : 26.5 pg  Mean Cell Hemoglobin Concentration : 30.0 gm/dL  Auto Neutrophil # : x  Auto Lymphocyte # : x  Auto Monocyte # : x  Auto Eosinophil # : x  Auto Basophil # : x  Auto Neutrophil % : x  Auto Lymphocyte % : x  Auto Monocyte % : x  Auto Eosinophil % : x  Auto Basophil % : x    08-08    137  |  100  |  9   ----------------------------<  104<H>  4.1   |  24  |  0.99    Ca    9.5      08 Aug 2021 08:02  Phos  2.8     08-08  Mg     1.8     08-08      _____________________________________________  RADIOLOGY:

## 2021-08-09 NOTE — CONSULT NOTE ADULT - ASSESSMENT
Vascular & Interventional Radiology Consult Note    Evaluate for Procedure: Drainage of Abdominal Collections s/p Ventral Hernia Repair    HPI: 56M hx HLD, CAD s/p MAINOR x 2 (July 2020) on ASA, hx of atrial flutter on Eliquis, LBO 2/2 diverticulitis s/p John's procedure (March 2019) and reversal (June 2019) recently discharged 2 days ago after hospitalization for nonoperative small bowel obstruction presenting with recurrent small bowel obstruction with similar transition point to last admission, now s/p ventral hernia repair of multiple fascial defects with mesh (8/2). He had 60cc vomiting and diarrhea overnight (8/6). Wound vac removed (8/7).    Allergies:   Medications (Abx/Cardiac/Anticoagulation/Blood Products)    aspirin  chewable: 81 milliGRAM(s) Oral (08-09 @ 11:44)  digoxin     Tablet: 125 MICROGram(s) Oral (08-09 @ 05:52)  heparin   Injectable: 5000 Unit(s) SubCutaneous (08-09 @ 05:52)    Data:    T(C): 37.1  HR: 87  BP: 123/53  RR: 19  SpO2: 95%    -WBC 5.09 / HgB 11.0 / Hct 36.4 / Plt 322  -Na 136 / Cl 99 / BUN 9 / Glucose 97  -K 3.7 / CO2 24 / Cr 1.07  -ALT -- / Alk Phos -- / T.Bili --  -INR1.23    Imaging: _______    Assessment:   56y Male with_______    Plan: Vascular & Interventional Radiology Consult Note    Evaluate for Procedure: Drainage of Abdominal Collections s/p Ventral Hernia Repair    HPI: 56M hx HLD, CAD s/p MAINOR x 2 (July 2020) on ASA, hx of atrial flutter on Eliquis, LBO 2/2 diverticulitis s/p John's procedure (March 2019) and reversal (June 2019) recently discharged 2 days ago after hospitalization for nonoperative small bowel obstruction presenting with recurrent small bowel obstruction with similar transition point to last admission, now s/p ventral hernia repair of multiple fascial defects with mesh (8/2). He had 60cc vomiting and diarrhea overnight (8/6). Wound vac removed (8/7).    Allergies:   Medications (Abx/Cardiac/Anticoagulation/Blood Products)    aspirin  chewable: 81 milliGRAM(s) Oral (08-09 @ 11:44)  digoxin     Tablet: 125 MICROGram(s) Oral (08-09 @ 05:52)  heparin   Injectable: 5000 Unit(s) SubCutaneous (08-09 @ 05:52)    Data:    T(C): 37.1  HR: 87  BP: 123/53  RR: 19  SpO2: 95%    -WBC 5.09 / HgB 11.0 / Hct 36.4 / Plt 322  -Na 136 / Cl 99 / BUN 9 / Glucose 97  -K 3.7 / CO2 24 / Cr 1.07  -ALT -- / Alk Phos -- / T.Bili --  -INR1.23    Imaging:   < from: CT Abdomen and Pelvis w/ IV Cont (07.27.21 @ 04:19) >  FINDINGS:  LOWER CHEST: Aortic valvular calcifications. Decreased left lower lobe consolidation compared to prior. Trace left pleural effusion.    LIVER: Left hepatic cyst, unchanged.  BILE DUCTS: Normal caliber.  GALLBLADDER: Within normal limits.  SPLEEN: Within normal limits.  PANCREAS: Within normal limits.  ADRENALS: Within normal limits.  KIDNEYS/URETERS: Within normal limits.    BLADDER: Within normal limits.  REPRODUCTIVEORGANS: Prostate is enlarged.    BOWEL: Redemonstration of dilated small bowel loops, with transition point in the right central aspect of the ventral hernia (4-79), in a configuration similar to the prior small bowel obstruction on 7/11/2021. However, the proximal small bowel is not is dilated with more collapsed slightly thickened loops in the left aspect of the multipart large ventral abdominal wall hernia. Rectosigmoid anastomosis. 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 Appendix is normal.  PERITONEUM: Trace nonspecific interloop mesenteric edema right lower quadrant. No significant ascites. No free air.  VESSELS: Within normal limits.  RETROPERITONEUM/LYMPH NODES: No lymphadenopathy.  ABDOMINAL WALL: Redemonstration of a large multipart ventral abdominal wall hernia. Small bilateral fat-containing inguinal hernias.  BONES: Degenerative changes.    IMPRESSION:  Small bowel obstruction with configuration similar to prior obstruction 7/11/2021 with transition point suggested in the rightside of the multipart complex large ventral abdominal wall hernia.    Resolving left lower lobe pneumonia with residual mild airspace opacification at the lung base and trace left pleural effusion.    < end of copied text >      Assessment:   56y Male with_______    Plan:  - Plan to drain abd collection on 8/10/21 Vascular & Interventional Radiology Consult Note    Evaluate for Procedure: Drainage of Abdominal Collections s/p Ventral Hernia Repair    HPI: 56M hx HLD, CAD s/p MAINOR x 2 (July 2020) on ASA, hx of atrial flutter on Eliquis, LBO 2/2 diverticulitis s/p John's procedure (March 2019) and reversal (June 2019) recently discharged 2 days ago after hospitalization for nonoperative small bowel obstruction presenting with recurrent small bowel obstruction with similar transition point to last admission, now s/p ventral hernia repair of multiple fascial defects with mesh (8/2). IR c/s for drainage of abdominal collection seen on CTAP.    Allergies:   Medications (Abx/Cardiac/Anticoagulation/Blood Products)    aspirin  chewable: 81 milliGRAM(s) Oral (08-09 @ 11:44)  digoxin     Tablet: 125 MICROGram(s) Oral (08-09 @ 05:52)  heparin   Injectable: 5000 Unit(s) SubCutaneous (08-09 @ 05:52)    Data:    T(C): 37.1  HR: 87  BP: 123/53  RR: 19  SpO2: 95%    -WBC 5.09 / HgB 11.0 / Hct 36.4 / Plt 322  -Na 136 / Cl 99 / BUN 9 / Glucose 97  -K 3.7 / CO2 24 / Cr 1.07  -ALT -- / Alk Phos -- / T.Bili --  -INR1.23    Imaging:   < from: CT Abdomen and Pelvis w/ IV Cont (07.27.21 @ 04:19) >  FINDINGS:  LOWER CHEST: Aortic valvular calcifications. Decreased left lower lobe consolidation compared to prior. Trace left pleural effusion.    LIVER: Left hepatic cyst, unchanged.  BILE DUCTS: Normal caliber.  GALLBLADDER: Within normal limits.  SPLEEN: Within normal limits.  PANCREAS: Within normal limits.  ADRENALS: Within normal limits.  KIDNEYS/URETERS: Within normal limits.    BLADDER: Within normal limits.  REPRODUCTIVEORGANS: Prostate is enlarged.    BOWEL: Redemonstration of dilated small bowel loops, with transition point in the right central aspect of the ventral hernia (4-79), in a configuration similar to the prior small bowel obstruction on 7/11/2021. However, the proximal small bowel is not is dilated with more collapsed slightly thickened loops in the left aspect of the multipart large ventral abdominal wall hernia. Rectosigmoid anastomosis. 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 Appendix is normal.  PERITONEUM: Trace nonspecific interloop mesenteric edema right lower quadrant. No significant ascites. No free air.  VESSELS: Within normal limits.  RETROPERITONEUM/LYMPH NODES: No lymphadenopathy.  ABDOMINAL WALL: Redemonstration of a large multipart ventral abdominal wall hernia. Small bilateral fat-containing inguinal hernias.  BONES: Degenerative changes.    IMPRESSION:  Small bowel obstruction with configuration similar to prior obstruction 7/11/2021 with transition point suggested in the rightside of the multipart complex large ventral abdominal wall hernia.    Resolving left lower lobe pneumonia with residual mild airspace opacification at the lung base and trace left pleural effusion.    < end of copied text >      Assessment:   56M hx HLD, CAD s/p MAINOR x 2 (July 2020) on ASA, hx of atrial flutter on Eliquis, LBO 2/2 diverticulitis s/p John's procedure (March 2019) and reversal (June 2019) recently discharged 2 days ago after hospitalization for nonoperative small bowel obstruction presenting with recurrent small bowel obstruction with similar transition point to last admission, now s/p ventral hernia repair of multiple fascial defects with mesh (8/2). IR c/s for drainage of abdominal collection seen on CTAP.    Plan:  - Plan to drain abd collection on 8/10/21.  - AM labs (CBC, CMP, coags)  - hold AM anticoagulation until procedure.  - NPO@MN  - place IR order under IR attending Dr. Alen Mcclendon

## 2021-08-10 LAB
ANION GAP SERPL CALC-SCNC: 13 MMOL/L — SIGNIFICANT CHANGE UP (ref 5–17)
APTT BLD: 29.2 SEC — SIGNIFICANT CHANGE UP (ref 27.5–35.5)
BUN SERPL-MCNC: 9 MG/DL — SIGNIFICANT CHANGE UP (ref 7–23)
CALCIUM SERPL-MCNC: 8.8 MG/DL — SIGNIFICANT CHANGE UP (ref 8.4–10.5)
CHLORIDE SERPL-SCNC: 102 MMOL/L — SIGNIFICANT CHANGE UP (ref 96–108)
CO2 SERPL-SCNC: 24 MMOL/L — SIGNIFICANT CHANGE UP (ref 22–31)
CREAT SERPL-MCNC: 1.01 MG/DL — SIGNIFICANT CHANGE UP (ref 0.5–1.3)
GLUCOSE SERPL-MCNC: 95 MG/DL — SIGNIFICANT CHANGE UP (ref 70–99)
GRAM STN FLD: SIGNIFICANT CHANGE UP
HCT VFR BLD CALC: 33.8 % — LOW (ref 39–50)
HGB BLD-MCNC: 10.2 G/DL — LOW (ref 13–17)
INR BLD: 1.07 RATIO — SIGNIFICANT CHANGE UP (ref 0.88–1.16)
MAGNESIUM SERPL-MCNC: 1.7 MG/DL — SIGNIFICANT CHANGE UP (ref 1.6–2.6)
MCHC RBC-ENTMCNC: 26.6 PG — LOW (ref 27–34)
MCHC RBC-ENTMCNC: 30.2 GM/DL — LOW (ref 32–36)
MCV RBC AUTO: 88.3 FL — SIGNIFICANT CHANGE UP (ref 80–100)
NRBC # BLD: 0 /100 WBCS — SIGNIFICANT CHANGE UP (ref 0–0)
PHOSPHATE SERPL-MCNC: 3.6 MG/DL — SIGNIFICANT CHANGE UP (ref 2.5–4.5)
PLATELET # BLD AUTO: 298 K/UL — SIGNIFICANT CHANGE UP (ref 150–400)
POTASSIUM SERPL-MCNC: 4.1 MMOL/L — SIGNIFICANT CHANGE UP (ref 3.5–5.3)
POTASSIUM SERPL-SCNC: 4.1 MMOL/L — SIGNIFICANT CHANGE UP (ref 3.5–5.3)
PROTHROM AB SERPL-ACNC: 12.8 SEC — SIGNIFICANT CHANGE UP (ref 10.6–13.6)
RBC # BLD: 3.83 M/UL — LOW (ref 4.2–5.8)
RBC # FLD: 15 % — HIGH (ref 10.3–14.5)
SODIUM SERPL-SCNC: 139 MMOL/L — SIGNIFICANT CHANGE UP (ref 135–145)
SPECIMEN SOURCE: SIGNIFICANT CHANGE UP
WBC # BLD: 5.12 K/UL — SIGNIFICANT CHANGE UP (ref 3.8–10.5)
WBC # FLD AUTO: 5.12 K/UL — SIGNIFICANT CHANGE UP (ref 3.8–10.5)

## 2021-08-10 PROCEDURE — 49406 IMAGE CATH FLUID PERI/RETRO: CPT

## 2021-08-10 RX ORDER — DEXTROSE MONOHYDRATE, SODIUM CHLORIDE, AND POTASSIUM CHLORIDE 50; .745; 4.5 G/1000ML; G/1000ML; G/1000ML
1000 INJECTION, SOLUTION INTRAVENOUS
Refills: 0 | Status: DISCONTINUED | OUTPATIENT
Start: 2021-08-10 | End: 2021-08-10

## 2021-08-10 RX ORDER — MAGNESIUM SULFATE 500 MG/ML
2 VIAL (ML) INJECTION ONCE
Refills: 0 | Status: COMPLETED | OUTPATIENT
Start: 2021-08-10 | End: 2021-08-10

## 2021-08-10 RX ORDER — HEPARIN SODIUM 5000 [USP'U]/ML
5000 INJECTION INTRAVENOUS; SUBCUTANEOUS EVERY 8 HOURS
Refills: 0 | Status: DISCONTINUED | OUTPATIENT
Start: 2021-08-10 | End: 2021-08-11

## 2021-08-10 RX ORDER — ASPIRIN/CALCIUM CARB/MAGNESIUM 324 MG
81 TABLET ORAL DAILY
Refills: 0 | Status: DISCONTINUED | OUTPATIENT
Start: 2021-08-10 | End: 2021-08-11

## 2021-08-10 RX ADMIN — PANTOPRAZOLE SODIUM 40 MILLIGRAM(S): 20 TABLET, DELAYED RELEASE ORAL at 05:15

## 2021-08-10 RX ADMIN — OXYCODONE HYDROCHLORIDE 10 MILLIGRAM(S): 5 TABLET ORAL at 06:10

## 2021-08-10 RX ADMIN — Medication 50 GRAM(S): at 09:32

## 2021-08-10 RX ADMIN — Medication 125 MICROGRAM(S): at 05:15

## 2021-08-10 RX ADMIN — OXYCODONE HYDROCHLORIDE 10 MILLIGRAM(S): 5 TABLET ORAL at 05:14

## 2021-08-10 RX ADMIN — OXYCODONE HYDROCHLORIDE 10 MILLIGRAM(S): 5 TABLET ORAL at 18:37

## 2021-08-10 RX ADMIN — OXYCODONE HYDROCHLORIDE 10 MILLIGRAM(S): 5 TABLET ORAL at 17:37

## 2021-08-10 RX ADMIN — HEPARIN SODIUM 5000 UNIT(S): 5000 INJECTION INTRAVENOUS; SUBCUTANEOUS at 14:53

## 2021-08-10 RX ADMIN — Medication 125 MILLIGRAM(S): at 13:26

## 2021-08-10 RX ADMIN — OXYCODONE HYDROCHLORIDE 10 MILLIGRAM(S): 5 TABLET ORAL at 22:30

## 2021-08-10 RX ADMIN — OXYCODONE HYDROCHLORIDE 10 MILLIGRAM(S): 5 TABLET ORAL at 10:30

## 2021-08-10 RX ADMIN — OXYCODONE HYDROCHLORIDE 10 MILLIGRAM(S): 5 TABLET ORAL at 13:26

## 2021-08-10 RX ADMIN — Medication 125 MILLIGRAM(S): at 23:10

## 2021-08-10 RX ADMIN — Medication 125 MILLIGRAM(S): at 17:37

## 2021-08-10 RX ADMIN — OXYCODONE HYDROCHLORIDE 10 MILLIGRAM(S): 5 TABLET ORAL at 02:20

## 2021-08-10 RX ADMIN — Medication 1 PATCH: at 19:28

## 2021-08-10 RX ADMIN — Medication 125 MILLIGRAM(S): at 05:14

## 2021-08-10 RX ADMIN — OXYCODONE HYDROCHLORIDE 10 MILLIGRAM(S): 5 TABLET ORAL at 09:33

## 2021-08-10 RX ADMIN — OXYCODONE HYDROCHLORIDE 10 MILLIGRAM(S): 5 TABLET ORAL at 21:37

## 2021-08-10 RX ADMIN — Medication 81 MILLIGRAM(S): at 14:53

## 2021-08-10 RX ADMIN — DEXTROSE MONOHYDRATE, SODIUM CHLORIDE, AND POTASSIUM CHLORIDE 125 MILLILITER(S): 50; .745; 4.5 INJECTION, SOLUTION INTRAVENOUS at 02:16

## 2021-08-10 RX ADMIN — HEPARIN SODIUM 5000 UNIT(S): 5000 INJECTION INTRAVENOUS; SUBCUTANEOUS at 21:37

## 2021-08-10 RX ADMIN — OXYCODONE HYDROCHLORIDE 10 MILLIGRAM(S): 5 TABLET ORAL at 14:30

## 2021-08-10 RX ADMIN — Medication 125 MILLIGRAM(S): at 01:27

## 2021-08-10 RX ADMIN — Medication 1 PATCH: at 11:00

## 2021-08-10 RX ADMIN — Medication 1 PATCH: at 13:25

## 2021-08-10 RX ADMIN — Medication 1 PATCH: at 08:06

## 2021-08-10 RX ADMIN — OXYCODONE HYDROCHLORIDE 10 MILLIGRAM(S): 5 TABLET ORAL at 01:27

## 2021-08-10 NOTE — PROVIDER CONTACT NOTE (OTHER) - RECOMMENDATIONS
Please come tell patient the result.  Contact isolation precautions.   PO vanco q6hr for tx of cdiff.

## 2021-08-10 NOTE — PROGRESS NOTE ADULT - SUBJECTIVE AND OBJECTIVE BOX
SURGERY  Pager: #[phone]    STATUS POST: ventral hernia repair with mesh (8/2)    INTERVAL EVENTS/SUBJECTIVE: No acute events overnight. C diff results positive, patient started on PO vanco    ______________________________________________  OBJECTIVE:   T(C): 36.9 (08-09-21 @ 20:59), Max: 37.1 (08-09-21 @ 10:33)  HR: 89 (08-09-21 @ 20:59) (79 - 93)  BP: 109/70 (08-09-21 @ 20:59) (102/66 - 123/53)  RR: 17 (08-09-21 @ 20:59) (17 - 19)  SpO2: 93% (08-09-21 @ 20:59) (93% - 96%)  Wt(kg): --  CAPILLARY BLOOD GLUCOSE        I&O's Detail    08 Aug 2021 07:01  -  09 Aug 2021 07:00  --------------------------------------------------------  IN:    Oral Fluid: 2120 mL  Total IN: 2120 mL    OUT:    Bulb (mL): 20 mL    Voided (mL): 0 mL  Total OUT: 20 mL    Total NET: 2100 mL      09 Aug 2021 07:01  -  10 Aug 2021 01:13  --------------------------------------------------------  IN:    Oral Fluid: 1080 mL  Total IN: 1080 mL    OUT:    Bulb (mL): 15 mL  Total OUT: 15 mL    Total NET: 1065 mL          Physical exam:  Gen: resting in bed comfortably in NAD  Chest: no increased WOB, regular inspiratory effort   Abdomen: Soft, nontender, nondistended with no rebound tenderness or guarding. Incisions clean, dry, intact, with no erythema.   Vascular: WWP, CARSON x4  NEURO: awake, alert  ______________________________________________  LABS:  CBC Full  -  ( 09 Aug 2021 07:15 )  WBC Count : 5.09 K/uL  RBC Count : 4.12 M/uL  Hemoglobin : 11.0 g/dL  Hematocrit : 36.4 %  Platelet Count - Automated : 322 K/uL  Mean Cell Volume : 88.3 fl  Mean Cell Hemoglobin : 26.7 pg  Mean Cell Hemoglobin Concentration : 30.2 gm/dL  Auto Neutrophil # : x  Auto Lymphocyte # : x  Auto Monocyte # : x  Auto Eosinophil # : x  Auto Basophil # : x  Auto Neutrophil % : x  Auto Lymphocyte % : x  Auto Monocyte % : x  Auto Eosinophil % : x  Auto Basophil % : x    08-09    136  |  99  |  9   ----------------------------<  97  3.7   |  24  |  1.07    Ca    9.2      09 Aug 2021 07:14  Phos  3.5     08-09  Mg     1.8     08-09      _____________________________________________  RADIOLOGY:     SURGERY  Pager: #[phone]    STATUS POST: ventral hernia repair with mesh (8/2)    INTERVAL EVENTS/SUBJECTIVE: No acute events overnight. C diff results positive, patient started on PO vanco. Pain controlled. 3 bowel movements in last 24 hours.     ______________________________________________  OBJECTIVE:   T(C): 36.9 (08-09-21 @ 20:59), Max: 37.1 (08-09-21 @ 10:33)  HR: 89 (08-09-21 @ 20:59) (79 - 93)  BP: 109/70 (08-09-21 @ 20:59) (102/66 - 123/53)  RR: 17 (08-09-21 @ 20:59) (17 - 19)  SpO2: 93% (08-09-21 @ 20:59) (93% - 96%)  Wt(kg): --  CAPILLARY BLOOD GLUCOSE        I&O's Detail    08 Aug 2021 07:01  -  09 Aug 2021 07:00  --------------------------------------------------------  IN:    Oral Fluid: 2120 mL  Total IN: 2120 mL    OUT:    Bulb (mL): 20 mL    Voided (mL): 0 mL  Total OUT: 20 mL    Total NET: 2100 mL      09 Aug 2021 07:01  -  10 Aug 2021 01:13  --------------------------------------------------------  IN:    Oral Fluid: 1080 mL  Total IN: 1080 mL    OUT:    Bulb (mL): 15 mL  Total OUT: 15 mL    Total NET: 1065 mL          Physical exam:  Gen: resting in bed comfortably in NAD  Chest: no increased WOB, regular inspiratory effort   Abdomen: Soft, nontender, nondistended with no rebound tenderness or guarding. Incisions clean, dry, intact, with no erythema.   Vascular: WWP, CARSON x4  NEURO: awake, alert  ______________________________________________  LABS:  CBC Full  -  ( 09 Aug 2021 07:15 )  WBC Count : 5.09 K/uL  RBC Count : 4.12 M/uL  Hemoglobin : 11.0 g/dL  Hematocrit : 36.4 %  Platelet Count - Automated : 322 K/uL  Mean Cell Volume : 88.3 fl  Mean Cell Hemoglobin : 26.7 pg  Mean Cell Hemoglobin Concentration : 30.2 gm/dL  Auto Neutrophil # : x  Auto Lymphocyte # : x  Auto Monocyte # : x  Auto Eosinophil # : x  Auto Basophil # : x  Auto Neutrophil % : x  Auto Lymphocyte % : x  Auto Monocyte % : x  Auto Eosinophil % : x  Auto Basophil % : x    08-09    136  |  99  |  9   ----------------------------<  97  3.7   |  24  |  1.07    Ca    9.2      09 Aug 2021 07:14  Phos  3.5     08-09  Mg     1.8     08-09      _____________________________________________  RADIOLOGY:

## 2021-08-10 NOTE — CHART NOTE - NSCHARTNOTEFT_GEN_A_CORE
SUBJECTIVE: Pt seen and examined at the bedside.  Is doing well post IR guided drainage of abdominal collection.  Pain is controlled, has no complaints.     Vital Signs Last 24 Hrs  T(C): 36.3 (10 Aug 2021 13:17), Max: 36.9 (09 Aug 2021 20:59)  T(F): 97.4 (10 Aug 2021 13:17), Max: 98.4 (09 Aug 2021 20:59)  HR: 87 (10 Aug 2021 13:17) (82 - 94)  BP: 126/77 (10 Aug 2021 13:17) (109/70 - 131/80)  BP(mean): --  RR: 18 (10 Aug 2021 13:17) (17 - 19)  SpO2: 97% (10 Aug 2021 13:17) (93% - 97%)    Physical Exam:  General Appearance: Appears well, NAD  Respiratory: No labored breathing  CV: Pulse regularly present  Abdomen: Obese, soft, nontender, IR drain in place c/d/i.     LABS:                        10.2   5.12  )-----------( 298      ( 10 Aug 2021 07:05 )             33.8     08-10    139  |  102  |  9   ----------------------------<  95  4.1   |  24  |  1.01    Ca    8.8      10 Aug 2021 07:02  Phos  3.6     08-10  Mg     1.7     08-10      PT/INR - ( 10 Aug 2021 07:11 )   PT: 12.8 sec;   INR: 1.07 ratio         PTT - ( 10 Aug 2021 07:11 )  PTT:29.2 sec      INs and OUTs:    08-09-21 @ 07:01  -  08-10-21 @ 07:00  --------------------------------------------------------  IN: 2030 mL / OUT: 15 mL / NET: 2015 mL    08-10-21 @ 07:01  -  08-10-21 @ 17:04  --------------------------------------------------------  IN: 180 mL / OUT: 250 mL / NET: -70 mL    Assessment:   S/p IR drainage of abdominal collection, KAY Drain removed, IR drain in place.     Plan:  - resume DVT ppx  - resume diet  - resume Eliquis  - Drain teaching

## 2021-08-10 NOTE — PROGRESS NOTE ADULT - ASSESSMENT
· Assessment	  56M hx HLD, CAD s/p MAINOR x 2 (July 2020) on ASA, hx of atrial flutter on Eliquis, LBO 2/2 diverticulitis s/p John's procedure (March 2019) and reversal (June 2019) recently discharged 2 days ago after hospitalization for nonoperative small bowel obstruction presenting with recurrent small bowel obstruction with similar transition point to last admission, now s/p ventral hernia repair of multiple fascial defects with mesh (8/2). He had 60cc vomiting and diarrhea overnight (8/6). Wound vac removed (8/7). C diff positive (8/9).     PLAN:  - Holding DVT ppx / NPO after mn for IR procedure  - Started on PO vancomycin for c diff+  - FU bowel function  - Continue home cardiac medications in IV formulation, chewable ASA  - Check on patient to see if he is nauseas before giving medication (zofran)  - Restart home eliquis  - C. diff assay   - DVT ppx  - Diet, LRD  - OOB and ambulating as tolerated  - F/u AM labs      [facility] Surgery  #[id number] · Assessment	  56M hx HLD, CAD s/p MAINOR x 2 (July 2020) on ASA, hx of atrial flutter on Eliquis, LBO 2/2 diverticulitis s/p John's procedure (March 2019) and reversal (June 2019) recently discharged 2 days ago after hospitalization for nonoperative small bowel obstruction presenting with recurrent small bowel obstruction with similar transition point to last admission, now s/p ventral hernia repair of multiple fascial defects with mesh (8/2). He had 60cc vomiting and diarrhea overnight (8/6). Wound vac removed (8/7). C diff positive (8/9).     PLAN:  - Holding DVT ppx / NPO after mn for IR procedure  - Started on PO vancomycin for c diff+  - FU bowel function  - Continue home cardiac medications in IV formulation, chewable ASA  - Check on patient to see if he is nauseas before giving medication (zofran)  - Restart home eliquis after IR  - C. diff assay   - DVT ppx  - Diet, LRD, resume after IR  - OOB and ambulating as tolerated  - F/u AM labs      [facility] Surgery  #[id number]

## 2021-08-10 NOTE — PROCEDURE NOTE - PROCEDURE FINDINGS AND DETAILS
Successful placement of a 10 Slovak drain into the anterior abdomen fluid collection via a path through the mesh. 170 mL serosanginous fluid removed.

## 2021-08-10 NOTE — PRE PROCEDURE NOTE - PRE PROCEDURE EVALUATION
Interventional Radiology Pre Procedure Note    HPI: 56y Male with post operative fluid collection. Drainage is requested.     Allergies:   Medications (Abx/Cardiac/Anticoagulation/Blood Products)    aspirin  chewable: 81 milliGRAM(s) Oral (08-09 @ 11:44)  digoxin     Tablet: 125 MICROGram(s) Oral (08-10 @ 05:15)  heparin   Injectable: 5000 Unit(s) SubCutaneous (08-09 @ 21:22)  vancomycin    Solution: 125 milliGRAM(s) Oral (08-10 @ 05:14)    Data:  182.9  158  T(C): 36.8  HR: 82  BP: 124/74  RR: 18  SpO2: 97%    Exam  General: No acute distress  Chest: Non labored breathing  Abdomen: Non-distended    -WBC 5.12 / HgB 10.2 / Hct 33.8 / Plt 298  -Na 139 / Cl 102 / BUN 9 / Glucose 95  -K 4.1 / CO2 24 / Cr 1.01  -ALT -- / Alk Phos -- / T.Bili --  -INR1.07    Plan: 56y Male presents for abdominal fluid drainage. Procedure and risks discussed with patient and he is agreeable to proceed.   -Risks/Benefits/alternatives explained with the patient and/or healthcare proxy and witnessed informed consent obtained.

## 2021-08-10 NOTE — PROGRESS NOTE ADULT - ATTENDING COMMENTS
Pain Management Attending Addendum    SUBJECTIVE: Patient doing well with PCEA    Therapy:    [X] PCEA    OBJECTIVE:   [ ] Pain appropriately controlled    [X ] Other: pain present at top of incision (T6).      Side Effects:  [X] None	             [ ] Nausea              [ ] Pruritis        [ ] Weakness          [ ] Numbness        	[ ] Other:    ASSESSMENT/PLAN:    [X] Continue current therapy    [ ] Therapy changed to:    [ ] PRN Analgesics   [ ] IV PCA    Comments: Will increase rate to 8 ml/hr
see above
I have seen and examined the patient.  I agree with the surgical resident's note.  Discussed the abdominal wall reconstruction with the patient - he is on OR fjor noon tomorrow    James Yarbrough contact information [phone]
I have seen and examined the patient. I agree with the above surgery resident's note.
POD#5 s/p ventral hernia repair  C/o nausea last night, resolved this morning  + flatus and BMs  Incisional pain well controlled    Vitals normal  Abdomen soft, obese, nondistended  Incision healing well, no erythema    PO trial  Reassess    Juvenal Clark MD
Patient seen/examined.  Agree w above note and plan and have discussed plan w house staff.  Comfortable, tolerating diet.  Abdomen soft, Provena in place.  D/c SANCHEZ allison MD
Will discharge with GI fx including flatus and or BM - to office in one week with the drain - to take off Provena on discharge
I have discussed in detail the finding at surgery and the mesh used.  Will leave in the spinal for another 24 hours and also leave on clears.
Patient seen/examined.  Agree w above note and plan and have discussed plan w house staff.  No complaints.  Treat for c dif, IR today for drainage collection    Yariel Chowdhury MD
I have seen and examined the patient.  I agree with the surgical resident's note.  The patient's incisional hernia is much softer - OR time pending but no available this week - I tld patient OR will be next week as we need 5 hour OR block    James Yarbrough contact information [phone]
C/o some nausea  Passing flatus and soft stools (too formed for C. diff)    VSS afebrile  Abdomen soft, nondistended, inc clean and dry, healing well    Impression  POD#6 s/p ventral hernia repair of multiple fascial defects with mesh  Nausea 2/2 mild ileus vs opioid medications  + loose stools  WBC normal    Check AXR  Zofran PRN  Will try to minimize opioids    Juvenal Clark MD
Still complaining for nausea and some watery diarrhea - to get a CT dif and abd/pelvic CT with contrast.

## 2021-08-11 ENCOUNTER — TRANSCRIPTION ENCOUNTER (OUTPATIENT)
Age: 56
End: 2021-08-11

## 2021-08-11 VITALS
RESPIRATION RATE: 18 BRPM | OXYGEN SATURATION: 94 % | DIASTOLIC BLOOD PRESSURE: 70 MMHG | SYSTOLIC BLOOD PRESSURE: 112 MMHG | TEMPERATURE: 98 F | HEART RATE: 86 BPM

## 2021-08-11 LAB
ANION GAP SERPL CALC-SCNC: 15 MMOL/L — SIGNIFICANT CHANGE UP (ref 5–17)
BUN SERPL-MCNC: 8 MG/DL — SIGNIFICANT CHANGE UP (ref 7–23)
CALCIUM SERPL-MCNC: 9.7 MG/DL — SIGNIFICANT CHANGE UP (ref 8.4–10.5)
CHLORIDE SERPL-SCNC: 101 MMOL/L — SIGNIFICANT CHANGE UP (ref 96–108)
CO2 SERPL-SCNC: 22 MMOL/L — SIGNIFICANT CHANGE UP (ref 22–31)
CREAT SERPL-MCNC: 1.02 MG/DL — SIGNIFICANT CHANGE UP (ref 0.5–1.3)
GLUCOSE SERPL-MCNC: 99 MG/DL — SIGNIFICANT CHANGE UP (ref 70–99)
HCT VFR BLD CALC: 38.8 % — LOW (ref 39–50)
HGB BLD-MCNC: 12 G/DL — LOW (ref 13–17)
MAGNESIUM SERPL-MCNC: 1.8 MG/DL — SIGNIFICANT CHANGE UP (ref 1.6–2.6)
MCHC RBC-ENTMCNC: 27 PG — SIGNIFICANT CHANGE UP (ref 27–34)
MCHC RBC-ENTMCNC: 30.9 GM/DL — LOW (ref 32–36)
MCV RBC AUTO: 87.4 FL — SIGNIFICANT CHANGE UP (ref 80–100)
NRBC # BLD: 0 /100 WBCS — SIGNIFICANT CHANGE UP (ref 0–0)
PHOSPHATE SERPL-MCNC: 3.5 MG/DL — SIGNIFICANT CHANGE UP (ref 2.5–4.5)
PLATELET # BLD AUTO: 411 K/UL — HIGH (ref 150–400)
POTASSIUM SERPL-MCNC: 4.3 MMOL/L — SIGNIFICANT CHANGE UP (ref 3.5–5.3)
POTASSIUM SERPL-SCNC: 4.3 MMOL/L — SIGNIFICANT CHANGE UP (ref 3.5–5.3)
RBC # BLD: 4.44 M/UL — SIGNIFICANT CHANGE UP (ref 4.2–5.8)
RBC # FLD: 14.9 % — HIGH (ref 10.3–14.5)
SODIUM SERPL-SCNC: 138 MMOL/L — SIGNIFICANT CHANGE UP (ref 135–145)
WBC # BLD: 7.24 K/UL — SIGNIFICANT CHANGE UP (ref 3.8–10.5)
WBC # FLD AUTO: 7.24 K/UL — SIGNIFICANT CHANGE UP (ref 3.8–10.5)

## 2021-08-11 PROCEDURE — U0003: CPT

## 2021-08-11 PROCEDURE — 86900 BLOOD TYPING SEROLOGIC ABO: CPT

## 2021-08-11 PROCEDURE — 85018 HEMOGLOBIN: CPT

## 2021-08-11 PROCEDURE — 87324 CLOSTRIDIUM AG IA: CPT

## 2021-08-11 PROCEDURE — C1769: CPT

## 2021-08-11 PROCEDURE — 99231 SBSQ HOSP IP/OBS SF/LOW 25: CPT

## 2021-08-11 PROCEDURE — 49406 IMAGE CATH FLUID PERI/RETRO: CPT

## 2021-08-11 PROCEDURE — 86769 SARS-COV-2 COVID-19 ANTIBODY: CPT

## 2021-08-11 PROCEDURE — 80048 BASIC METABOLIC PNL TOTAL CA: CPT

## 2021-08-11 PROCEDURE — 71045 X-RAY EXAM CHEST 1 VIEW: CPT

## 2021-08-11 PROCEDURE — 88302 TISSUE EXAM BY PATHOLOGIST: CPT

## 2021-08-11 PROCEDURE — 82330 ASSAY OF CALCIUM: CPT

## 2021-08-11 PROCEDURE — 84100 ASSAY OF PHOSPHORUS: CPT

## 2021-08-11 PROCEDURE — 86850 RBC ANTIBODY SCREEN: CPT

## 2021-08-11 PROCEDURE — 99285 EMERGENCY DEPT VISIT HI MDM: CPT | Mod: 25

## 2021-08-11 PROCEDURE — 96375 TX/PRO/DX INJ NEW DRUG ADDON: CPT

## 2021-08-11 PROCEDURE — 85025 COMPLETE CBC W/AUTO DIFF WBC: CPT

## 2021-08-11 PROCEDURE — 87205 SMEAR GRAM STAIN: CPT

## 2021-08-11 PROCEDURE — 87186 SC STD MICRODIL/AGAR DIL: CPT

## 2021-08-11 PROCEDURE — C1729: CPT

## 2021-08-11 PROCEDURE — 74018 RADEX ABDOMEN 1 VIEW: CPT

## 2021-08-11 PROCEDURE — 99222 1ST HOSP IP/OBS MODERATE 55: CPT

## 2021-08-11 PROCEDURE — 87070 CULTURE OTHR SPECIMN AEROBIC: CPT

## 2021-08-11 PROCEDURE — 85730 THROMBOPLASTIN TIME PARTIAL: CPT

## 2021-08-11 PROCEDURE — U0005: CPT

## 2021-08-11 PROCEDURE — 82435 ASSAY OF BLOOD CHLORIDE: CPT

## 2021-08-11 PROCEDURE — 83605 ASSAY OF LACTIC ACID: CPT

## 2021-08-11 PROCEDURE — 87075 CULTR BACTERIA EXCEPT BLOOD: CPT

## 2021-08-11 PROCEDURE — 87086 URINE CULTURE/COLONY COUNT: CPT

## 2021-08-11 PROCEDURE — 85014 HEMATOCRIT: CPT

## 2021-08-11 PROCEDURE — 96374 THER/PROPH/DIAG INJ IV PUSH: CPT | Mod: XU

## 2021-08-11 PROCEDURE — 81001 URINALYSIS AUTO W/SCOPE: CPT

## 2021-08-11 PROCEDURE — 85027 COMPLETE CBC AUTOMATED: CPT

## 2021-08-11 PROCEDURE — 84132 ASSAY OF SERUM POTASSIUM: CPT

## 2021-08-11 PROCEDURE — 87493 C DIFF AMPLIFIED PROBE: CPT

## 2021-08-11 PROCEDURE — 82803 BLOOD GASES ANY COMBINATION: CPT

## 2021-08-11 PROCEDURE — 83735 ASSAY OF MAGNESIUM: CPT

## 2021-08-11 PROCEDURE — C1889: CPT

## 2021-08-11 PROCEDURE — C1781: CPT

## 2021-08-11 PROCEDURE — 80053 COMPREHEN METABOLIC PANEL: CPT

## 2021-08-11 PROCEDURE — 85610 PROTHROMBIN TIME: CPT

## 2021-08-11 PROCEDURE — 74177 CT ABD & PELVIS W/CONTRAST: CPT

## 2021-08-11 PROCEDURE — 86901 BLOOD TYPING SEROLOGIC RH(D): CPT

## 2021-08-11 PROCEDURE — 94660 CPAP INITIATION&MGMT: CPT

## 2021-08-11 PROCEDURE — 82947 ASSAY GLUCOSE BLOOD QUANT: CPT

## 2021-08-11 PROCEDURE — 84295 ASSAY OF SERUM SODIUM: CPT

## 2021-08-11 PROCEDURE — 87449 NOS EACH ORGANISM AG IA: CPT

## 2021-08-11 RX ORDER — APIXABAN 2.5 MG/1
5 TABLET, FILM COATED ORAL EVERY 12 HOURS
Refills: 0 | Status: DISCONTINUED | OUTPATIENT
Start: 2021-08-11 | End: 2021-08-11

## 2021-08-11 RX ORDER — VANCOMYCIN HCL 1 G
125 VIAL (EA) INTRAVENOUS
Qty: 1500 | Refills: 0
Start: 2021-08-11 | End: 2021-08-22

## 2021-08-11 RX ORDER — OXYCODONE HYDROCHLORIDE 5 MG/1
1 TABLET ORAL
Qty: 16 | Refills: 0
Start: 2021-08-11 | End: 2021-08-14

## 2021-08-11 RX ORDER — APIXABAN 2.5 MG/1
1 TABLET, FILM COATED ORAL
Qty: 60 | Refills: 0
Start: 2021-08-11 | End: 2021-09-09

## 2021-08-11 RX ORDER — MAGNESIUM SULFATE 500 MG/ML
2 VIAL (ML) INJECTION ONCE
Refills: 0 | Status: COMPLETED | OUTPATIENT
Start: 2021-08-11 | End: 2021-08-11

## 2021-08-11 RX ADMIN — OXYCODONE HYDROCHLORIDE 10 MILLIGRAM(S): 5 TABLET ORAL at 06:50

## 2021-08-11 RX ADMIN — PANTOPRAZOLE SODIUM 40 MILLIGRAM(S): 20 TABLET, DELAYED RELEASE ORAL at 06:17

## 2021-08-11 RX ADMIN — Medication 81 MILLIGRAM(S): at 12:33

## 2021-08-11 RX ADMIN — Medication 125 MILLIGRAM(S): at 06:19

## 2021-08-11 RX ADMIN — Medication 1 PATCH: at 12:25

## 2021-08-11 RX ADMIN — OXYCODONE HYDROCHLORIDE 10 MILLIGRAM(S): 5 TABLET ORAL at 09:25

## 2021-08-11 RX ADMIN — OXYCODONE HYDROCHLORIDE 10 MILLIGRAM(S): 5 TABLET ORAL at 01:33

## 2021-08-11 RX ADMIN — Medication 125 MILLIGRAM(S): at 13:19

## 2021-08-11 RX ADMIN — Medication 125 MICROGRAM(S): at 06:17

## 2021-08-11 RX ADMIN — OXYCODONE HYDROCHLORIDE 10 MILLIGRAM(S): 5 TABLET ORAL at 02:30

## 2021-08-11 RX ADMIN — OXYCODONE HYDROCHLORIDE 10 MILLIGRAM(S): 5 TABLET ORAL at 13:49

## 2021-08-11 RX ADMIN — OXYCODONE HYDROCHLORIDE 10 MILLIGRAM(S): 5 TABLET ORAL at 09:55

## 2021-08-11 RX ADMIN — Medication 50 GRAM(S): at 09:22

## 2021-08-11 RX ADMIN — OXYCODONE HYDROCHLORIDE 10 MILLIGRAM(S): 5 TABLET ORAL at 13:19

## 2021-08-11 RX ADMIN — Medication 1 PATCH: at 07:34

## 2021-08-11 RX ADMIN — Medication 1 PATCH: at 13:20

## 2021-08-11 RX ADMIN — HEPARIN SODIUM 5000 UNIT(S): 5000 INJECTION INTRAVENOUS; SUBCUTANEOUS at 06:17

## 2021-08-11 RX ADMIN — OXYCODONE HYDROCHLORIDE 10 MILLIGRAM(S): 5 TABLET ORAL at 06:16

## 2021-08-11 NOTE — PROGRESS NOTE ADULT - ASSESSMENT
· Assessment	  56M hx HLD, CAD s/p MAINOR x 2 (July 2020) on ASA, hx of atrial flutter on Eliquis, LBO 2/2 diverticulitis s/p John's procedure (March 2019) and reversal (June 2019) recently discharged 2 days ago after hospitalization for nonoperative small bowel obstruction presenting with recurrent small bowel obstruction with similar transition point to last admission, now s/p ventral hernia repair of multiple fascial defects with mesh (8/2). He had 60cc vomiting and diarrhea overnight (8/6). Wound vac removed (8/7). C diff positive (8/9).     PLAN:  - Started on PO vancomycin for c diff+  - FU bowel function  - Continue home cardiac medications in IV formulation, chewable ASA  - Check on patient to see if he is nauseas before giving medication (zofran)  - Restart home eliquis after IR  - C. diff assay   - DVT ppx  - Diet, LRD  - OOB and ambulating as tolerated  - F/u AM labs      [facility]  #[id number] · Assessment	  56M hx HLD, CAD s/p MAINOR x 2 (July 2020) on ASA, hx of atrial flutter on Eliquis, LBO 2/2 diverticulitis s/p John's procedure (March 2019) and reversal (June 2019) recently discharged 2 days ago after hospitalization for nonoperative small bowel obstruction presenting with recurrent small bowel obstruction with similar transition point to last admission, now s/p ventral hernia repair of multiple fascial defects with mesh (8/2). He had 60cc vomiting and diarrhea overnight (8/6). Wound vac removed (8/7). C diff positive (8/9).     PLAN:  - Consult ID for abx plan for cdif; Currently on PO vancomycin   - FU bowel function  - Restart home eliquis after IR  - DVT ppx  - Diet, LRD  - OOB and ambulating as tolerated  - F/u AM labs      [place] [facility]  #[id number]

## 2021-08-11 NOTE — CONSULT NOTE ADULT - SUBJECTIVE AND OBJECTIVE BOX
Patient is a 56y old  Male who presents with a chief complaint of Small bowel obstruction (11 Aug 2021 08:04)    HPI:  Mr. Rushing is a 56 Year-Old Gentleman very well known to [facility] with history of HLD, CAD s/p MAINOR x 2 (2020) on DAPT, LBO 2/2 diverticulitis s/p John's procedure (2019) and reversal (2019), obesity (BMI = 47.2)  recently discharged 2 days pta after hospitalization for nonoperative small bowel obstruction notable for Botox injection for planned hernia repair , development of pneumonia requiring ICU care for hemodynamic instability, now presenting with one day of severe abdominal pain, nausea, obstipation, found to have small bowel obstruction with similar transition point to last admission.    (2021 05:30)  7.12 --> 21 [facility] hospitalized for abd pain, found to have SBO, deyhdration. Responded to NGT suctioning and fluid resuscitation. Botox injection to ant abd wall musculature in anticipation of ventral hernia repair. : fever LLL pneumonia Rx: Cetriaxone, Zosyn then Cefepime with Vanco for 7 day course of abx  Admitted 21 with abd pain, recurrent SBO, NGT decompression   OR:  Incisional hernia repair with mesh. Multiple fascial defects identified along the midline. Adhesions lysed and all bowel was viable. Anterior sheath  from abdominal wall, underlay mesh placed, and fascia closed over mesh. 19 Fr jocelynn drain placed above fascia in the subcutaneous space.  8/10: IR drainage of anterior abd collection with immediate drainage of 170cc serous sanguinous fluid  Mr Rushing had abd discomfort with profuse diarrhea  +Cdiff  PO Vanco begun -->  He notes diarrhea has subsided - partially formed stool last night. He is tolerating po       PAST MEDICAL & SURGICAL HISTORY:  Sleep apnea    HLD (hyperlipidemia)    Morbid obesity    Diverticulitis    History of neuropathy  big toes    Colostomy present    S/P repair of hydrocele      Social history: , lives alone in apartment, depression noted at previous hospitalizations  +tobacco 1/2 ppd x years, previous cocaine use  unemployed    FAMILY HISTORY:  Family history of lung cancer  mother  age 40, hx lung cancer        REVIEW OF SYSTEMS:  CONSTITUTIONAL: No weakness, fevers or chills  EYES/ENT: No visual changes;  No vertigo or throat pain   NECK: No pain or stiffness  RESPIRATORY: No cough, wheezing, hemoptysis; No shortness of breath  CARDIOVASCULAR: No chest pain or palpitations  GASTROINTESTINAL: + abdominal or epigastric pain. resolved nausea, vomiting, or hematemesis; decreased diarrhea   GENITOURINARY: No dysuria, frequency or hematuria  NEUROLOGICAL: No numbness or weakness  SKIN: No itching, burning, rashes, or lesions   All other review of systems is negative unless indicated above    Allergies  No Known Allergies      Antimicrobials:  vancomycin    Solution 125 milliGRAM(s) Oral every 6 hours      Vital Signs Last 24 Hrs  T(C): 36.9 (11 Aug 2021 09:18), Max: 36.9 (11 Aug 2021 09:18)  T(F): 98.5 (11 Aug 2021 09:18), Max: 98.5 (11 Aug 2021 09:18)  HR: 88 (11 Aug 2021 09:18) (84 - 100)  BP: 103/71 (11 Aug 2021 09:18) (102/73 - 127/84)  BP(mean): --  RR: 18 (11 Aug 2021 09:18) (18 - 20)  SpO2: 92% (11 Aug 2021 09:18) (92% - 97%)    PHYSICAL EXAM:  General: WN/WD NAD, Non-toxic  Neurology: A&Ox3, nonfocal  Respiratory: Clear to auscultation bilaterally  CV: RRR, S1S2, no murmurs, rubs or gallops  Abdominal: Soft, Non-tender, non-distended,intact staple line, ant abd drain: serous sanguinous fluid  Extremities: No edema,  Line Sites: Clear  Skin: No rash                        12.0   7.24  )-----------( 411      ( 11 Aug 2021 07:13 )             38.8   WBC Count: 7.24 ( @ 07:13)  WBC Count: 5.12 (08-10 @ 07:05)  WBC Count: 5.09 ( @ 07:15)  WBC Count: 6.21 ( @ 08:02)  WBC Count: 6.31 ( @ 12:24)        138  |  101  |  8   ----------------------------<  99  4.3   |  22  |  1.02    Ca    9.7      11 Aug 2021 07:17  Phos  3.5       Mg     1.8           MICROBIOLOGY:  .Body Fluid Abdominal Fluid  08-10-21 --  --    polymorphonuclear leukocytes  No organisms seen  by cytocentrifuge      Clean Catch Clean Catch (Midstream)  21   <10,000 CFU/mL Normal Urogenital Celia  --  --      .Blood Blood-Peripheral  21   No Growth Final  --  --      .Blood Blood-Peripheral  21   No Growth Final  --  --      .Urine Clean Catch (Midstream)  21   >100,000 CFU/ml Escherichia coli  --  Escherichia coli      .Blood Blood-Peripheral  21   Growth in aerobic and anaerobic bottles: Gram Positive Cocci in Clusters  Most closely resembling Kocuria species "Susceptibilities not performed"      C Diff by PCR Result: Detected ( @ 15:02)    Radiology:  < from: CT Abdomen and Pelvis w/ Oral Cont and w/ IV Cont (21 @ 11:20) >  PROCEDURE:  CT of the Abdomen and Pelvis was performed.  Sagittal and coronal reformats were performed.    FINDINGS:  LOWER CHEST: Interval decreased left lower lobe opacity, likely atelectasis. Mild atelectatic changes in the dependent lung bases. Prominence of the extrapleural fat at the left posterior lung base. Coronary artery and aortic root calcification.    LIVER: Mildly enlarged, measures 18.2 cm in craniocaudal dimension, stable. Unchanged left hepatic lobe cyst measuring 4.8 x 3.0 cm.  BILE DUCTS: Normal caliber.  GALLBLADDER: Within normal limits.  SPLEEN: Top normal in size, measures 13.7 cm in length, unchanged. Accessory splenule.  PANCREAS: Within normal limits.  ADRENALS: Within normal limits.  KIDNEYS/URETERS: Within normal limits.    BLADDER: Distended without gross abnormality.  REPRODUCTIVE ORGANS: Prostate within normal limits.    BOWEL: Colorectalanastomosis. No bowel obstruction. Appendix is normal. Small bowel lipoma measuring 2.1 cm (series 3 image 106).  PERITONEUM: Subjacent to the anterior abdominal wall hernia repair and abutting loops of small bowel posteriorly, there is a simple attenuating fluid collection with a thin rim measuring 3.8 x 14.3 x 18.2 cm (AP X TRV X CC). No intraperitoneal free air. No ascites. Surgical clips in the left upper quadrant.  VESSELS: Atherosclerotic changes.  RETROPERITONEUM/LYMPH NODES: No lymphadenopathy.  ABDOMINAL WALL: Postsurgical change to the ventral abdominal wall status post hernia repair including soft tissue infiltration, small foci of soft tissue air, and overlying skin staples. Confluent edema along the surgical site, with a pocket of fluid measuring 2.5 x 5.5 cm (series image 91). Left approach anterior abdominal wall drainage catheter. Subcutaneous nodule along the right medial buttock measuring 2.2 x 2.0 cm (series 3 image 128), unchanged and may represent an epidermal inclusion cyst.  BONES: A lucent lesion with a thin sclerotic rim within the left femoral neck measuring 2 cm is not significantly changed since 3/6/2019, and is likely benign. Degenerative changes of the spine.      IMPRESSION:    Postsurgical changes of ventral hernia mesh repair. There is a fluid collection subjacent to the hernia surgical site measuring 3.8 x 14.3 x 18.2 cm, potentially a postoperative seroma, with infection not entirely excluded.    Confluent edema within the anterior abdominal wallwith a small pocket of fluid measuring 5.5 x 2.5 cm.    < end of copied text >      Petros Collins MD; Division of Infectious Disease; Pager: [phone]; nights and weekends: [phone]

## 2021-08-11 NOTE — PROGRESS NOTE ADULT - SUBJECTIVE AND OBJECTIVE BOX
SURGERY  Pager: #[phone]    STATUS POST: ventral hernia repair with mesh (8/2)    INTERVAL EVENTS/SUBJECTIVE: No acute events overnight. Patient reporting some drainage from dressing around new IR placed drain, reinforced with tegaderm.     ______________________________________________  OBJECTIVE:   T(C): 36.7 (08-11-21 @ 01:30), Max: 36.8 (08-10-21 @ 10:51)  HR: 94 (08-11-21 @ 01:30) (82 - 94)  BP: 116/73 (08-11-21 @ 01:30) (116/73 - 131/80)  RR: 19 (08-11-21 @ 01:30) (18 - 19)  SpO2: 94% (08-11-21 @ 01:30) (94% - 97%)  Wt(kg): --  CAPILLARY BLOOD GLUCOSE        I&O's Detail    09 Aug 2021 07:01  -  10 Aug 2021 07:00  --------------------------------------------------------  IN:    dextrose 5% + sodium chloride 0.45% w/ Additives: 750 mL    Oral Fluid: 1280 mL  Total IN: 2030 mL    OUT:    Bulb (mL): 15 mL  Total OUT: 15 mL    Total NET: 2015 mL      10 Aug 2021 07:01  -  11 Aug 2021 03:30  --------------------------------------------------------  IN:    Oral Fluid: 1140 mL  Total IN: 1140 mL    OUT:    Bulb (mL): 0 mL    Drain (mL): 450 mL    Voided (mL): 775 mL  Total OUT: 1225 mL    Total NET: -85 mL          Physical exam:  Gen: resting in bed comfortably in NAD  Chest: no increased WOB, regular inspiratory effort   Abdomen: Soft, nontender, nondistended with no rebound tenderness or guarding. Incisions clean, dry, intact, with no erythema.   Vascular: WWP, CARSON x4  NEURO: awake, alert  ______________________________________________  LABS:  CBC Full  -  ( 10 Aug 2021 07:05 )  WBC Count : 5.12 K/uL  RBC Count : 3.83 M/uL  Hemoglobin : 10.2 g/dL  Hematocrit : 33.8 %  Platelet Count - Automated : 298 K/uL  Mean Cell Volume : 88.3 fl  Mean Cell Hemoglobin : 26.6 pg  Mean Cell Hemoglobin Concentration : 30.2 gm/dL  Auto Neutrophil # : x  Auto Lymphocyte # : x  Auto Monocyte # : x  Auto Eosinophil # : x  Auto Basophil # : x  Auto Neutrophil % : x  Auto Lymphocyte % : x  Auto Monocyte % : x  Auto Eosinophil % : x  Auto Basophil % : x    08-10    139  |  102  |  9   ----------------------------<  95  4.1   |  24  |  1.01    Ca    8.8      10 Aug 2021 07:02  Phos  3.6     08-10  Mg     1.7     08-10      _____________________________________________  RADIOLOGY:     SURGERY  Pager: #[phone]    STATUS POST: ventral hernia repair with mesh (8/2)    INTERVAL EVENTS/SUBJECTIVE: No acute events overnight. Patient reporting some drainage from dressing around new IR placed drain, reinforced with tegaderm. Has some nausea this morning and says he had one episode of diarrhea overnight. IR drain with serosanguinous output.    ______________________________________________  OBJECTIVE:   T(C): 36.7 (08-11-21 @ 01:30), Max: 36.8 (08-10-21 @ 10:51)  HR: 94 (08-11-21 @ 01:30) (82 - 94)  BP: 116/73 (08-11-21 @ 01:30) (116/73 - 131/80)  RR: 19 (08-11-21 @ 01:30) (18 - 19)  SpO2: 94% (08-11-21 @ 01:30) (94% - 97%)  Wt(kg): --  CAPILLARY BLOOD GLUCOSE        I&O's Detail    09 Aug 2021 07:01  -  10 Aug 2021 07:00  --------------------------------------------------------  IN:    dextrose 5% + sodium chloride 0.45% w/ Additives: 750 mL    Oral Fluid: 1280 mL  Total IN: 2030 mL    OUT:    Bulb (mL): 15 mL  Total OUT: 15 mL    Total NET: 2015 mL      10 Aug 2021 07:01  -  11 Aug 2021 03:30  --------------------------------------------------------  IN:    Oral Fluid: 1140 mL  Total IN: 1140 mL    OUT:    Bulb (mL): 0 mL    Drain (mL): 450 mL    Voided (mL): 775 mL  Total OUT: 1225 mL    Total NET: -85 mL          Physical exam:  Gen: resting in bed comfortably in NAD  Chest: no increased WOB, regular inspiratory effort   Abdomen: Soft, nontender, nondistended with no rebound tenderness or guarding. Incisions clean, dry, intact, with no erythema. IR drain with serosanguinous output  Vascular: WWP, CARSON x4  NEURO: awake, alert  ______________________________________________  LABS:  CBC Full  -  ( 10 Aug 2021 07:05 )  WBC Count : 5.12 K/uL  RBC Count : 3.83 M/uL  Hemoglobin : 10.2 g/dL  Hematocrit : 33.8 %  Platelet Count - Automated : 298 K/uL  Mean Cell Volume : 88.3 fl  Mean Cell Hemoglobin : 26.6 pg  Mean Cell Hemoglobin Concentration : 30.2 gm/dL  Auto Neutrophil # : x  Auto Lymphocyte # : x  Auto Monocyte # : x  Auto Eosinophil # : x  Auto Basophil # : x  Auto Neutrophil % : x  Auto Lymphocyte % : x  Auto Monocyte % : x  Auto Eosinophil % : x  Auto Basophil % : x    08-10    139  |  102  |  9   ----------------------------<  95  4.1   |  24  |  1.01    Ca    8.8      10 Aug 2021 07:02  Phos  3.6     08-10  Mg     1.7     08-10      _____________________________________________  RADIOLOGY:

## 2021-08-11 NOTE — CONSULT NOTE ADULT - CONSULT REASON
Drainage of Abdominal Collections s/p Ventral Hernia Repair
C diff
Cardiac medication management for surgery

## 2021-08-11 NOTE — PROGRESS NOTE ADULT - SUBJECTIVE AND OBJECTIVE BOX
Interventional Radiology Follow-Up Note.     Patient seen and examined @ bedside between 7-730am    This is a 56y Male s/p Abdominal fluid drain placed on 8/11 in Interventional Radiology with Dr. Mcclendon.     S: NO complaint offered when examined at bedside. No acute overnight events.       Medication:     aspirin  chewable: (08-09)  aspirin  chewable: (08-10)  digoxin     Tablet: (08-11)  heparin   Injectable: (08-09)  heparin   Injectable: (08-11)  vancomycin    Solution: (08-11)    Vitals:   T(F): 98.2, Max: 98.3 (10:51)  HR: 100  BP: 102/73  RR: 20  SpO2: 95%    Physical Exam:  General: Nontoxic, in NAD.  Abdomen: soft, NTND.  Incisions clean, dry, intact, with no erythema.     Drain Device: Drain intact attached to gravity bag.  Flushed with 5cc NS w/o difficulty. Dressing clean, dry, intact.   24hr Drain output: Abdominal drain:  500cc ; serosanginous      LABS:        LABS:  Na: 139 (08-10 @ 07:02), 136 (08-09 @ 07:14)  K: 4.1 (08-10 @ 07:02), 3.7 (08-09 @ 07:14)  Cl: 102 (08-10 @ 07:02), 99 (08-09 @ 07:14)  CO2: 24 (08-10 @ 07:02), 24 (08-09 @ 07:14)  BUN: 9 (08-10 @ 07:02), 9 (08-09 @ 07:14)  Cr: 1.01 (08-10 @ 07:02), 1.07 (08-09 @ 07:14)  Glu: 95(08-10 @ 07:02), 97(08-09 @ 07:14)    Hgb: 12.0 (08-11 @ 07:13), 10.2 (08-10 @ 07:05), 11.0 (08-09 @ 07:15)  Hct: 38.8 (08-11 @ 07:13), 33.8 (08-10 @ 07:05), 36.4 (08-09 @ 07:15)  WBC: 7.24 (08-11 @ 07:13), 5.12 (08-10 @ 07:05), 5.09 (08-09 @ 07:15)  Plt: 411 (08-11 @ 07:13), 298 (08-10 @ 07:05), 322 (08-09 @ 07:15)    INR: 1.07 08-10-21 @ 07:11  PTT: 29.2 08-10-21 @ 07:11        Culture - Body Fluid with Gram Stain (08.10.21 @ 21:47)    Gram Stain:   polymorphonuclear leukocytes  No organisms seen  by cytocentrifuge    Specimen Source: .Body Fluid Abdominal Fluid              Assessment/Plan:  56M hx HLD, CAD s/p MAINOR x 2 (July 2020) on ASA, hx of atrial flutter on Eliquis, LBO 2/2 diverticulitis s/p John's procedure (March 2019) and reversal (June 2019) recently discharged 2 days ago after hospitalization for nonoperative small bowel obstruction presenting with recurrent small bowel obstruction with similar transition point to last admission, now s/p ventral hernia repair of multiple fascial defects with mesh (8/2). IR c/s for drainage of abdominal collection seen on CTAP.    Pt most recently s/p Abdominal fluid drain placed on 8/11 in Interventional Radiology with Dr. Mcclendon.       - Flush drain with 5cc NS daily forward only; DO NOT aspirate  - Change dressing q3 days or when dressing is saturated.  -  Monitor h/h; transfuse as needed  - Trend vs/labs  - Continue global management per primary team  - case discussed with Dr. Mcclendon       Please call IR at [phone] or [phone] with any questions, concerns, or issues regarding above.       Interventional Radiology Follow-Up Note.     Patient seen and examined @ bedside between 7-730am    This is a 56y Male s/p Abdominal fluid drain placed on 8/11 in Interventional Radiology with Dr. Mcclendon.     S: NO complaint offered when examined at bedside. No acute overnight events.       Medication:     aspirin  chewable: (08-09)  aspirin  chewable: (08-10)  digoxin     Tablet: (08-11)  heparin   Injectable: (08-09)  heparin   Injectable: (08-11)  vancomycin    Solution: (08-11)    Vitals:   T(F): 98.2, Max: 98.3 (10:51)  HR: 100  BP: 102/73  RR: 20  SpO2: 95%    Physical Exam:  General: Nontoxic, in NAD.  Abdomen: soft, NTND.  Incisions clean, dry, intact, with no erythema.     Drain Device: Drain intact attached to gravity bag.  Flushed with 5cc NS w/o difficulty. Dressing clean, dry, intact.   24hr Drain output: Abdominal drain:  500cc ; serosanginous      LABS:        LABS:  Na: 139 (08-10 @ 07:02), 136 (08-09 @ 07:14)  K: 4.1 (08-10 @ 07:02), 3.7 (08-09 @ 07:14)  Cl: 102 (08-10 @ 07:02), 99 (08-09 @ 07:14)  CO2: 24 (08-10 @ 07:02), 24 (08-09 @ 07:14)  BUN: 9 (08-10 @ 07:02), 9 (08-09 @ 07:14)  Cr: 1.01 (08-10 @ 07:02), 1.07 (08-09 @ 07:14)  Glu: 95(08-10 @ 07:02), 97(08-09 @ 07:14)    Hgb: 12.0 (08-11 @ 07:13), 10.2 (08-10 @ 07:05), 11.0 (08-09 @ 07:15)  Hct: 38.8 (08-11 @ 07:13), 33.8 (08-10 @ 07:05), 36.4 (08-09 @ 07:15)  WBC: 7.24 (08-11 @ 07:13), 5.12 (08-10 @ 07:05), 5.09 (08-09 @ 07:15)  Plt: 411 (08-11 @ 07:13), 298 (08-10 @ 07:05), 322 (08-09 @ 07:15)    INR: 1.07 08-10-21 @ 07:11  PTT: 29.2 08-10-21 @ 07:11        Culture - Body Fluid with Gram Stain (08.10.21 @ 21:47)    Gram Stain:   polymorphonuclear leukocytes  No organisms seen  by cytocentrifuge    Specimen Source: .Body Fluid Abdominal Fluid              Assessment/Plan:  56M hx HLD, CAD s/p MAINOR x 2 (July 2020) on ASA, hx of atrial flutter on Eliquis, LBO 2/2 diverticulitis s/p John's procedure (March 2019) and reversal (June 2019) recently discharged 2 days ago after hospitalization for nonoperative small bowel obstruction presenting with recurrent small bowel obstruction with similar transition point to last admission, now s/p ventral hernia repair of multiple fascial defects with mesh (8/2). IR c/s for drainage of abdominal collection seen on CTAP.    Pt most recently s/p Abdominal fluid drain placed on 8/11 in Interventional Radiology with Dr. Mcclendon.       - Flush drain with 5cc NS daily forward only; DO NOT aspirate  - Change dressing q3 days or when dressing is saturated.  -  Monitor h/h; transfuse as needed  - Trend vs/labs  - Continue global management per primary team  - case discussed with Dr. Mcclendon  -  If the patient is d/c home with drainage catheter, pt can make an appointment with IR by calling the IR booking office at [phone]; recommend IR follow in 1 week for tube evaluation.  - Pt will benefit from VNS service to help with drainage catheter care; Pt should continue same drainage catheter care as an outpatient.     Please call IR at [phone] or [phone] with any questions, concerns, or issues regarding above.

## 2021-08-11 NOTE — DISCHARGE NOTE NURSING/CASE MANAGEMENT/SOCIAL WORK - PATIENT PORTAL LINK FT
You can access the FollowMyHealth Patient Portal offered by [facility] by registering at the following website: [email or web address]. By joining [facility]’s FollowMyHealth portal, you will also be able to view your health information using other applications (apps) compatible with our system.

## 2021-08-11 NOTE — PROGRESS NOTE ADULT - REASON FOR ADMISSION
Small bowel obstruction

## 2021-08-11 NOTE — CONSULT NOTE ADULT - ASSESSMENT
56M with  HLD, CAD s/p MAINOR x 2 (July 2020) on DAPT, LBO 2/2 diverticulitis s/p John's procedure (March 2019) and reversal (June 2019), obesity (BMI = 47.2), recent antibiotic treatment for pneumonia readmitted 7/27/21 with SBO and on 8/2 underwent incisional hernia repair with mesh placement. He developed Cdiff and po Vanco was begun on 8/9- He currently reports symptomatic improvement.  There is no leukocytosis, He is tolerating po  No indication of severe Cdiff    Recent Antibiotics   Ceftriaxone 7/17  IV Vanco 7/18 --> 7/24  Zosyn 7/18 -->22  Cefepime 7/18; 7/22-->24  PO Vanco 8/9-->    Problems  Cdiff  s/p operative incisional hernia repair with mesh 8/2/21  post op fluid collection - IR drainage 8/10  obesity (BMI- 47.2)    Suggest  Continue po Vanco 14 day course through 8/23  probiotics discussed with patient  discussed adding small amount of fiber to diet after discharge    discussed with PA

## 2021-08-11 NOTE — PROGRESS NOTE ADULT - PROVIDER SPECIALTY LIST ADULT
Anesthesia
Surgery
Anesthesia
Anesthesia
Intervent Radiology
Surgery

## 2021-08-11 NOTE — PROGRESS NOTE ADULT - NUTRITIONAL ASSESSMENT
This patient has been assessed with a concern for Malnutrition and has been determined to have a diagnosis/diagnoses of Mild protein-calorie malnutrition and Morbid obesity (BMI > 40).    This patient is being managed with:   Diet Low Fiber-  Entered: Aug  5 2021  8:28AM    
This patient has been assessed with a concern for Malnutrition and has been determined to have a diagnosis/diagnoses of Mild protein-calorie malnutrition and Morbid obesity (BMI > 40).    This patient is being managed with:   Diet Clear Liquid-  Entered: Aug  3 2021  4:36PM    
This patient has been assessed with a concern for Malnutrition and has been determined to have a diagnosis/diagnoses of Mild protein-calorie malnutrition and Morbid obesity (BMI > 40).    This patient is being managed with:   Diet Low Fiber-  Entered: Aug  5 2021  8:28AM    
This patient has been assessed with a concern for Malnutrition and has been determined to have a diagnosis/diagnoses of Mild protein-calorie malnutrition and Morbid obesity (BMI > 40).    This patient is being managed with:   Diet Clear Liquid-  Entered: Aug  3 2021  4:36PM    
This patient has been assessed with a concern for Malnutrition and has been determined to have a diagnosis/diagnoses of Mild protein-calorie malnutrition and Morbid obesity (BMI > 40).    This patient is being managed with:   Diet Low Fiber-  Entered: Aug  5 2021  8:28AM    
This patient has been assessed with a concern for Malnutrition and has been determined to have a diagnosis/diagnoses of Mild protein-calorie malnutrition and Morbid obesity (BMI > 40).    This patient is being managed with:   Diet NPO after Midnight-     NPO Start Date: 09-Aug-2021   NPO Start Time: 23:59  Entered: Aug  9 2021  2:04PM    Diet Low Fiber-  Entered: Aug  5 2021  8:28AM

## 2021-08-15 LAB
-  AMPICILLIN/SULBACTAM: SIGNIFICANT CHANGE UP
-  CEFAZOLIN: SIGNIFICANT CHANGE UP
-  CLINDAMYCIN: SIGNIFICANT CHANGE UP
-  ERYTHROMYCIN: SIGNIFICANT CHANGE UP
-  GENTAMICIN: SIGNIFICANT CHANGE UP
-  OXACILLIN: SIGNIFICANT CHANGE UP
-  PENICILLIN: SIGNIFICANT CHANGE UP
-  RIFAMPIN: SIGNIFICANT CHANGE UP
-  TETRACYCLINE: SIGNIFICANT CHANGE UP
-  TRIMETHOPRIM/SULFAMETHOXAZOLE: SIGNIFICANT CHANGE UP
-  VANCOMYCIN: SIGNIFICANT CHANGE UP
CULTURE RESULTS: SIGNIFICANT CHANGE UP
METHOD TYPE: SIGNIFICANT CHANGE UP
ORGANISM # SPEC MICROSCOPIC CNT: SIGNIFICANT CHANGE UP
ORGANISM # SPEC MICROSCOPIC CNT: SIGNIFICANT CHANGE UP
SPECIMEN SOURCE: SIGNIFICANT CHANGE UP

## 2021-08-17 ENCOUNTER — APPOINTMENT (OUTPATIENT)
Dept: SURGERY | Facility: CLINIC | Age: 56
End: 2021-08-17
Payer: COMMERCIAL

## 2021-08-17 VITALS
TEMPERATURE: 97.7 F | RESPIRATION RATE: 17 BRPM | DIASTOLIC BLOOD PRESSURE: 68 MMHG | HEART RATE: 76 BPM | OXYGEN SATURATION: 98 % | SYSTOLIC BLOOD PRESSURE: 102 MMHG

## 2021-08-17 PROCEDURE — 99024 POSTOP FOLLOW-UP VISIT: CPT

## 2021-08-17 RX ORDER — TICAGRELOR 90 MG/1
90 TABLET ORAL
Qty: 180 | Refills: 1 | Status: DISCONTINUED | COMMUNITY
Start: 2020-08-20 | End: 2021-08-17

## 2021-08-17 NOTE — HISTORY OF PRESENT ILLNESS
[de-identified] : Del is a 56 year old male here for post op appointment s/p incisional hernia repair with mesh with myofascial flap advancement, preservation of neurovascular bundles along with a one-sided anterior component separation 8/2/21. Pathology; skin and hernia sac excision- skin and subcutaneous tissue and mesothelium -lined fibroconnective and mature adipose tissue, consistent with hernia sac. Today pt reports abdominal discomfort. Pt reports taking Oxycodone for pain post surgery. Pt reports pulling feeling. Pt reports 2 soft/lose BMs daily, no straining, no pain, no rectal bleeding. Pt reports small appetite, occasional nausea, no vomiting. Pt on 81 Aspirin mg.

## 2021-08-17 NOTE — PHYSICAL EXAM
[Alert] : alert [Oriented to Person] : oriented to person [Oriented to Place] : oriented to place [Oriented to Time] : oriented to time [Calm] : calm [de-identified] : The incisional hernia repair is intact the surgical wound is clean and dry

## 2021-08-17 NOTE — ASSESSMENT
[FreeTextEntry1] : I told the patient he is to return in 7 to 10 days and I will remove the remainder of the staples. The patient states that the drain spontaneously came out while he was sleeping and at that time was draining minimal amount. The patient does not have a primary care doctor and I have given him the name of Shaista Jr in [place] which is near his home to get a general physical examination. Oriented - self; Oriented - place; Oriented - time

## 2021-08-19 ENCOUNTER — APPOINTMENT (OUTPATIENT)
Dept: SURGERY | Facility: HOSPITAL | Age: 56
End: 2021-08-19

## 2021-08-19 ENCOUNTER — APPOINTMENT (OUTPATIENT)
Dept: CT IMAGING | Facility: HOSPITAL | Age: 56
End: 2021-08-19

## 2021-08-26 ENCOUNTER — APPOINTMENT (OUTPATIENT)
Dept: SURGERY | Facility: CLINIC | Age: 56
End: 2021-08-26
Payer: COMMERCIAL

## 2021-08-26 PROCEDURE — 99024 POSTOP FOLLOW-UP VISIT: CPT

## 2021-08-26 NOTE — HISTORY OF PRESENT ILLNESS
[de-identified] : Del is a 56 year old male s/p incisional hernia repair with mesh with myofascial flap advancement, preservation of neurovascular bundles along with a one-sided anterior component separation 8/2/21. Pathology; skin and hernia sac excision-patient states that he has had some drainage from the midportion of his incision.  There is been no evidence of fever or chills.  He also complains of some left-sided abdominal discomfort.

## 2021-08-26 NOTE — PHYSICAL EXAM
[de-identified] : There is subcutaneous fluid collection measuring 5 x 3 when probed with a Q-tip.  Only serous fluid was drained.  The area of discomfort on the left side is in the area of the previous stoma site.  No evidence of any recurrence at the present time.

## 2021-08-26 NOTE — ASSESSMENT
[FreeTextEntry1] : Wound care and activity were discussed with the patient.  He is to return in 1 week

## 2021-09-07 ENCOUNTER — APPOINTMENT (OUTPATIENT)
Dept: SURGERY | Facility: CLINIC | Age: 56
End: 2021-09-07
Payer: COMMERCIAL

## 2021-09-09 ENCOUNTER — NON-APPOINTMENT (OUTPATIENT)
Age: 56
End: 2021-09-09

## 2021-09-09 ENCOUNTER — APPOINTMENT (OUTPATIENT)
Dept: INTERNAL MEDICINE | Facility: CLINIC | Age: 56
End: 2021-09-09
Payer: COMMERCIAL

## 2021-09-09 VITALS
HEART RATE: 97 BPM | OXYGEN SATURATION: 98 % | DIASTOLIC BLOOD PRESSURE: 89 MMHG | RESPIRATION RATE: 16 BRPM | TEMPERATURE: 96.9 F | WEIGHT: 315 LBS | SYSTOLIC BLOOD PRESSURE: 124 MMHG | BODY MASS INDEX: 42.66 KG/M2 | HEIGHT: 72 IN

## 2021-09-09 DIAGNOSIS — Z87.891 PERSONAL HISTORY OF NICOTINE DEPENDENCE: ICD-10-CM

## 2021-09-09 DIAGNOSIS — Z00.00 ENCOUNTER FOR GENERAL ADULT MEDICAL EXAMINATION W/OUT ABNORMAL FINDINGS: ICD-10-CM

## 2021-09-09 DIAGNOSIS — F14.90 COCAINE USE, UNSPECIFIED, UNCOMPLICATED: ICD-10-CM

## 2021-09-09 DIAGNOSIS — F41.9 ANXIETY DISORDER, UNSPECIFIED: ICD-10-CM

## 2021-09-09 DIAGNOSIS — F17.200 NICOTINE DEPENDENCE, UNSPECIFIED, UNCOMPLICATED: ICD-10-CM

## 2021-09-09 DIAGNOSIS — Z63.5 DISRUPTION OF FAMILY BY SEPARATION AND DIVORCE: ICD-10-CM

## 2021-09-09 PROCEDURE — 93000 ELECTROCARDIOGRAM COMPLETE: CPT

## 2021-09-09 PROCEDURE — 99406 BEHAV CHNG SMOKING 3-10 MIN: CPT

## 2021-09-09 PROCEDURE — 99386 PREV VISIT NEW AGE 40-64: CPT | Mod: 25

## 2021-09-09 SDOH — SOCIAL STABILITY - SOCIAL INSECURITY: DISRUPTION OF FAMILY BY SEPARATION AND DIVORCE: Z63.5

## 2021-09-09 NOTE — COUNSELING
[Cessation strategies including cessation program discussed] : Cessation strategies including cessation program discussed [Encouraged to pick a quit date and identify support needed to quit] : Encouraged to pick a quit date and identify support needed to quit [Tobacco Use Cessation Intermediate Greater Than 3 Minutes Up to 10 Minutes] : Tobacco Use Cessation Intermediate Greater Than 3 Minutes Up to 10 Minutes [Benefits of weight loss discussed] : Benefits of weight loss discussed [Encouraged to increase physical activity] : Encouraged to increase physical activity [FreeTextEntry1] : 3

## 2021-09-09 NOTE — ASSESSMENT
[FreeTextEntry1] : INITIAL CPE OF 56 Y OLD MALE WITH PMX OF ACUTE DIVERTICULITIS S/P SURGERY X3 FOR COLOSTOMY,REVERSAL OF COLOSTOMY NAD INCISIONAL HERNIA Repair = AFTER 2ND SURGERY HAD NSTEMI FOLLOWED BY CATH AND STENT ;LAST SURGERY 1 M AGO \par SMOKING 1/2 PPD AND USES COCAINE FEW TIMES A MONTH \par PERIPHERAL NEUROPATHY= GABAPENTIN 100 MG AT HS ,REFER TO SEE NEURO \par ERN AND COCAINE USE = TO SEE PSYCH \par SMOKING CESSATION COUNSELING \par PT TSTAES THAT HE TAKES BRILINTA AND ELIQUIS BID BOTH;TOLD TO STOP BRILINTA  AND F/U WITH CARDIO ASAP

## 2021-09-09 NOTE — PHYSICAL EXAM
[No Acute Distress] : no acute distress [Well Nourished] : well nourished [Well Developed] : well developed [Well-Appearing] : well-appearing [Normal Sclera/Conjunctiva] : normal sclera/conjunctiva [PERRL] : pupils equal round and reactive to light [EOMI] : extraocular movements intact [No JVD] : no jugular venous distention [No Lymphadenopathy] : no lymphadenopathy [Supple] : supple [Thyroid Normal, No Nodules] : the thyroid was normal and there were no nodules present [No Respiratory Distress] : no respiratory distress  [No Accessory Muscle Use] : no accessory muscle use [Clear to Auscultation] : lungs were clear to auscultation bilaterally [Normal Rate] : normal rate  [Regular Rhythm] : with a regular rhythm [Normal S1, S2] : normal S1 and S2 [No Murmur] : no murmur heard [No Carotid Bruits] : no carotid bruits [No Abdominal Bruit] : a ~M bruit was not heard ~T in the abdomen [No Varicosities] : no varicosities [Pedal Pulses Present] : the pedal pulses are present [No Palpable Aorta] : no palpable aorta [No Extremity Clubbing/Cyanosis] : no extremity clubbing/cyanosis [___ +] : bilateral [unfilled]U+ pitting edema to the ankles [Soft] : abdomen soft [Non Tender] : non-tender [Non-distended] : non-distended [No Masses] : no abdominal mass palpated [Normal Bowel Sounds] : normal bowel sounds [Obese] : obese [Normal] : normal [Scar] : a scar was noted [Soft, Nontender] : the abdomen was soft and nontender [No Mass] : no masses were palpated [No HSM] : no hepatosplenomegaly noted [None] : no CVA tenderness [Normal Posterior Cervical Nodes] : no posterior cervical lymphadenopathy [Normal Anterior Cervical Nodes] : no anterior cervical lymphadenopathy [No CVA Tenderness] : no CVA  tenderness [No Spinal Tenderness] : no spinal tenderness [No Joint Swelling] : no joint swelling [Grossly Normal Strength/Tone] : grossly normal strength/tone [No Rash] : no rash [Coordination Grossly Intact] : coordination grossly intact [No Focal Deficits] : no focal deficits [Normal Gait] : normal gait [Deep Tendon Reflexes (DTR)] : deep tendon reflexes were 2+ and symmetric [Normal Affect] : the affect was normal [Normal Insight/Judgement] : insight and judgment were intact [Anxious] : anxious [Depressed] : depressed [Labile] : labile

## 2021-09-09 NOTE — REVIEW OF SYSTEMS
[Fatigue] : fatigue [Insomnia] : insomnia [Anxiety] : anxiety [Depression] : depression [Negative] : Heme/Lymph [FreeTextEntry6] : DAMION [de-identified] : PARESTHESIAS

## 2021-09-09 NOTE — HISTORY OF PRESENT ILLNESS
[de-identified] : PT COMES FOR INITIAL CPE \par S/P INCISIONAL HERNIA REPAIR 5 WEEKS AGO \par CC OF WORSENING PARESTHESIAS UPPER AND LOWER EXTR\par CC OF WORSENING DEPRESSION ,ON SERTRALINE

## 2021-09-10 LAB
ALBUMIN SERPL ELPH-MCNC: 3.7 G/DL
ALP BLD-CCNC: 86 U/L
ALT SERPL-CCNC: 28 U/L
ANION GAP SERPL CALC-SCNC: 14 MMOL/L
AST SERPL-CCNC: 30 U/L
BASOPHILS # BLD AUTO: 0.07 K/UL
BASOPHILS NFR BLD AUTO: 0.7 %
BILIRUB SERPL-MCNC: 0.6 MG/DL
BUN SERPL-MCNC: 10 MG/DL
CALCIUM SERPL-MCNC: 10 MG/DL
CHLORIDE SERPL-SCNC: 102 MMOL/L
CHOLEST SERPL-MCNC: 159 MG/DL
CO2 SERPL-SCNC: 24 MMOL/L
CREAT SERPL-MCNC: 1.21 MG/DL
EOSINOPHIL # BLD AUTO: 0.55 K/UL
EOSINOPHIL NFR BLD AUTO: 5.2 %
GLUCOSE SERPL-MCNC: 85 MG/DL
HCT VFR BLD CALC: 41 %
HDLC SERPL-MCNC: 36 MG/DL
HGB BLD-MCNC: 12.2 G/DL
IMM GRANULOCYTES NFR BLD AUTO: 1.5 %
LDLC SERPL CALC-MCNC: 100 MG/DL
LYMPHOCYTES # BLD AUTO: 1.85 K/UL
LYMPHOCYTES NFR BLD AUTO: 17.6 %
MAN DIFF?: NORMAL
MCHC RBC-ENTMCNC: 26.1 PG
MCHC RBC-ENTMCNC: 29.8 GM/DL
MCV RBC AUTO: 87.8 FL
MONOCYTES # BLD AUTO: 0.8 K/UL
MONOCYTES NFR BLD AUTO: 7.6 %
NEUTROPHILS # BLD AUTO: 7.11 K/UL
NEUTROPHILS NFR BLD AUTO: 67.4 %
NONHDLC SERPL-MCNC: 123 MG/DL
PLATELET # BLD AUTO: 546 K/UL
POTASSIUM SERPL-SCNC: 3.9 MMOL/L
PROT SERPL-MCNC: 7.7 G/DL
RBC # BLD: 4.67 M/UL
RBC # FLD: 15.9 %
SODIUM SERPL-SCNC: 140 MMOL/L
TRIGL SERPL-MCNC: 113 MG/DL
TSH SERPL-ACNC: 0.61 UIU/ML
WBC # FLD AUTO: 10.54 K/UL

## 2021-09-14 ENCOUNTER — APPOINTMENT (OUTPATIENT)
Dept: SURGERY | Facility: CLINIC | Age: 56
End: 2021-09-14
Payer: COMMERCIAL

## 2021-09-14 PROCEDURE — 99024 POSTOP FOLLOW-UP VISIT: CPT

## 2021-09-14 NOTE — PHYSICAL EXAM
[de-identified] : In the midportion of his midline incision there is an area of skin separation with some serous fluid drainage.  This area was probed with a Q-tip and did not go anywhere the excess granulation tissue that was present was treated with silver nitrate

## 2021-09-14 NOTE — ASSESSMENT
[FreeTextEntry1] : Wound care was discussed with the patient.  He is to continue to lose weight.  He is to return in 2 weeks.

## 2021-09-14 NOTE — HISTORY OF PRESENT ILLNESS
[de-identified] : Del is a 56 year old male s/p incisional hernia repair with mesh with myofascial flap advancement, preservation of neurovascular bundles along with a one-sided anterior component separation 8/2/21.  The patient has some serous fluid from the midportion of the wound where the skin has .

## 2021-09-20 ENCOUNTER — APPOINTMENT (OUTPATIENT)
Dept: CARDIOLOGY | Facility: CLINIC | Age: 56
End: 2021-09-20
Payer: COMMERCIAL

## 2021-09-20 VITALS
RESPIRATION RATE: 16 BRPM | TEMPERATURE: 96.9 F | SYSTOLIC BLOOD PRESSURE: 123 MMHG | HEART RATE: 110 BPM | DIASTOLIC BLOOD PRESSURE: 78 MMHG | OXYGEN SATURATION: 95 %

## 2021-09-20 DIAGNOSIS — E55.9 VITAMIN D DEFICIENCY, UNSPECIFIED: ICD-10-CM

## 2021-09-20 PROCEDURE — 99205 OFFICE O/P NEW HI 60 MIN: CPT

## 2021-09-20 PROCEDURE — 93000 ELECTROCARDIOGRAM COMPLETE: CPT

## 2021-09-20 PROCEDURE — 99215 OFFICE O/P EST HI 40 MIN: CPT

## 2021-09-20 RX ORDER — METOPROLOL TARTRATE 25 MG/1
25 TABLET, FILM COATED ORAL
Refills: 0 | Status: DISCONTINUED | COMMUNITY
End: 2021-09-20

## 2021-09-20 NOTE — DISCUSSION/SUMMARY
[FreeTextEntry1] : The patient is a 56-year-old anxious gentleman prior cocaine use, CAD, DAMION, PAF, s/p Blandon pouch with reversal and recent incisional hernia repair who is complaining of lower extremity nerve pains. \par #1 CV- no angina, normal EF, continue aspirin for RCA stent\par #2 Lipids- continue atorvastatin\par #3 PAF- continue digoxin, eliquis, and change to toprol 25mg\par #4 Anxiety- on sertraline, f/u psych\par #5 Pulm- DAMION\par #6 Surgery- f/u Dr. Yarbrough\par #7 Neuro- refer for LE nerve symptoms, refuses to take gabapentin for fear of addiction

## 2021-10-04 LAB — 25(OH)D3 SERPL-MCNC: 23.2 NG/ML

## 2021-10-05 ENCOUNTER — APPOINTMENT (OUTPATIENT)
Dept: SURGERY | Facility: CLINIC | Age: 56
End: 2021-10-05

## 2021-10-07 ENCOUNTER — APPOINTMENT (OUTPATIENT)
Dept: SURGERY | Facility: CLINIC | Age: 56
End: 2021-10-07
Payer: COMMERCIAL

## 2021-10-19 ENCOUNTER — APPOINTMENT (OUTPATIENT)
Dept: SURGERY | Facility: CLINIC | Age: 56
End: 2021-10-19
Payer: COMMERCIAL

## 2021-10-19 VITALS
HEART RATE: 98 BPM | OXYGEN SATURATION: 99 % | RESPIRATION RATE: 17 BRPM | SYSTOLIC BLOOD PRESSURE: 155 MMHG | DIASTOLIC BLOOD PRESSURE: 101 MMHG | TEMPERATURE: 94.9 F

## 2021-10-19 DIAGNOSIS — Z09 ENCOUNTER FOR FOLLOW-UP EXAMINATION AFTER COMPLETED TREATMENT FOR CONDITIONS OTHER THAN MALIGNANT NEOPLASM: ICD-10-CM

## 2021-10-19 PROCEDURE — 99024 POSTOP FOLLOW-UP VISIT: CPT

## 2021-10-19 NOTE — ASSESSMENT
[FreeTextEntry1] : Wound care and activity were discussed with the patient.  He is to return in 1 week.

## 2021-10-19 NOTE — PHYSICAL EXAM
[de-identified] : In the midportion of the midline incision the right tube small draining sinuses that are connected underneath the intact piece of skin this piece of skin was removed and the base of this subcutaneous area was treated with silver nitrate.

## 2021-10-19 NOTE — HISTORY OF PRESENT ILLNESS
[de-identified] : Del is a 56 year old male s/p incisional hernia repair with mesh with myofascial flap advancement, preservation of neurovascular bundles along with a one-sided anterior component separation 8/2/21. He was last seen 9/14/21- In the midportion of his midline incision there is an area of skin separation with some serous fluid drainage. This area was probed with a Q-tip and did not go anywhere the excess granulation tissue that was present was treated with silver nitrate. \par  \par

## 2021-10-26 ENCOUNTER — APPOINTMENT (OUTPATIENT)
Dept: SURGERY | Facility: CLINIC | Age: 56
End: 2021-10-26

## 2022-01-03 ENCOUNTER — APPOINTMENT (OUTPATIENT)
Dept: NEUROLOGY | Facility: CLINIC | Age: 57
End: 2022-01-03

## 2022-01-06 ENCOUNTER — NON-APPOINTMENT (OUTPATIENT)
Age: 57
End: 2022-01-06

## 2022-01-06 ENCOUNTER — APPOINTMENT (OUTPATIENT)
Dept: CARDIOLOGY | Facility: CLINIC | Age: 57
End: 2022-01-06
Payer: COMMERCIAL

## 2022-01-06 VITALS
HEIGHT: 72 IN | RESPIRATION RATE: 12 BRPM | SYSTOLIC BLOOD PRESSURE: 149 MMHG | TEMPERATURE: 96.9 F | OXYGEN SATURATION: 97 % | DIASTOLIC BLOOD PRESSURE: 106 MMHG | HEART RATE: 91 BPM | BODY MASS INDEX: 42.66 KG/M2 | WEIGHT: 315 LBS

## 2022-01-06 VITALS — SYSTOLIC BLOOD PRESSURE: 120 MMHG | DIASTOLIC BLOOD PRESSURE: 84 MMHG

## 2022-01-06 PROCEDURE — 99214 OFFICE O/P EST MOD 30 MIN: CPT

## 2022-01-06 PROCEDURE — 93000 ELECTROCARDIOGRAM COMPLETE: CPT

## 2022-01-06 RX ORDER — PHENYLEPHRINE HCL 10 MG
7 TABLET ORAL
Qty: 14 | Refills: 0 | Status: DISCONTINUED | COMMUNITY
Start: 2021-07-24

## 2022-01-06 RX ORDER — OXYCODONE 10 MG/1
10 TABLET ORAL
Qty: 16 | Refills: 0 | Status: DISCONTINUED | COMMUNITY
Start: 2021-08-11

## 2022-01-06 RX ORDER — PANTOPRAZOLE 40 MG/1
40 TABLET, DELAYED RELEASE ORAL
Qty: 30 | Refills: 0 | Status: DISCONTINUED | COMMUNITY
Start: 2021-07-24

## 2022-01-06 RX ORDER — VANCOMYCIN HYDROCHLORIDE 125 MG/1
125 CAPSULE ORAL
Qty: 48 | Refills: 0 | Status: DISCONTINUED | COMMUNITY
Start: 2021-08-11

## 2022-01-06 NOTE — HISTORY OF PRESENT ILLNESS
[FreeTextEntry1] : Del is extremely anxious today. He is off sertraline because concerned about side effects. Having hard time finding new therapist. Now has appt next week. No HA, lightheadedness, dizziness or SOB>

## 2022-01-06 NOTE — DISCUSSION/SUMMARY
[FreeTextEntry1] : The patient is a 56-year-old anxious gentleman prior cocaine use, CAD, DAMION, PAF, s/p Blandon pouch with reversal and recent incisional hernia repair who is extremely anxious\par #1 CV- no angina, normal EF, continue aspirin for RCA stent\par #2 Lipids- continue atorvastatin\par #3 PAF- continue digoxin, eliquis, and toprol 25mg\par #4 Anxiety- recommend taking his sertraline, f/u psych\par #5 Pulm- DAMION\par #6 Surgery- f/u Dr. Yarbrough\par #7 PAD- lymphedema, trial lasix daily for one month, refer to Dr. Miranda

## 2022-01-27 ENCOUNTER — APPOINTMENT (OUTPATIENT)
Dept: CT IMAGING | Facility: CLINIC | Age: 57
End: 2022-01-27
Payer: COMMERCIAL

## 2022-01-27 ENCOUNTER — OUTPATIENT (OUTPATIENT)
Dept: OUTPATIENT SERVICES | Facility: HOSPITAL | Age: 57
LOS: 1 days | End: 2022-01-27
Payer: MEDICAID

## 2022-01-27 DIAGNOSIS — M54.16 RADICULOPATHY, LUMBAR REGION: ICD-10-CM

## 2022-01-27 DIAGNOSIS — Z98.890 OTHER SPECIFIED POSTPROCEDURAL STATES: Chronic | ICD-10-CM

## 2022-01-27 PROCEDURE — 72131 CT LUMBAR SPINE W/O DYE: CPT | Mod: 26

## 2022-01-27 PROCEDURE — 72131 CT LUMBAR SPINE W/O DYE: CPT

## 2022-01-27 PROCEDURE — 76376 3D RENDER W/INTRP POSTPROCES: CPT | Mod: 26

## 2022-01-27 PROCEDURE — 76376 3D RENDER W/INTRP POSTPROCES: CPT

## 2022-02-10 ENCOUNTER — APPOINTMENT (OUTPATIENT)
Dept: SURGERY | Facility: CLINIC | Age: 57
End: 2022-02-10
Payer: COMMERCIAL

## 2022-02-24 ENCOUNTER — APPOINTMENT (OUTPATIENT)
Dept: SURGERY | Facility: CLINIC | Age: 57
End: 2022-02-24
Payer: COMMERCIAL

## 2022-02-24 PROCEDURE — 99215 OFFICE O/P EST HI 40 MIN: CPT

## 2022-02-24 NOTE — ASSESSMENT
[FreeTextEntry1] : I have discussed with the patient that he needs an abdominal and pelvic CT scan to evaluate the size of this recurrent hernia.  I told him I cannot answer any questions about how this would be fixed or how much time it would take.  I also told him that would be done as an inpatient and whether he would need to stay 1-3 or 4 days depending on what we had to do to fix this hernia which all depends on the images from the CT scan.

## 2022-02-24 NOTE — PHYSICAL EXAM
[Normal Breath Sounds] : Normal breath sounds [Normal Heart Sounds] : normal heart sounds [Abdominal Masses] : No abdominal masses [Abdomen Tenderness] : ~T ~M No abdominal tenderness [No HSM] : no hepatosplenomegaly [No Rash or Lesion] : No rash or lesion [Alert] : alert [Oriented to Person] : oriented to person [Oriented to Place] : oriented to place [Oriented to Time] : oriented to time [Calm] : calm [de-identified] : Pioneer obese (BMI 44) male in no acute distress [de-identified] : Within normal limits-annual nerves II through XII intact  [de-identified] : There is a recurrent incisional hernia in the lower abdomen off the right side which is reducible and nontender [de-identified] : Normal strength and gait

## 2022-02-24 NOTE — HISTORY OF PRESENT ILLNESS
[de-identified] : Del is a 57 year old male here for follow up visit for abdominal bulge.  s/p incisional hernia repair with mesh with myofascial flap advancement, preservation of neurovascular bundles along with a one-sided anterior component separation 8/2/21. s/p Open John's reversal, repair of incisional parastomal hernia 6/28/19 by Dr. Stein. \par s/p Flexible sigmoidoscopy, Diagnostic laparoscopy, open John's resection, Cystorrhaphy 3/12/19 by Dr. Stein. Last seen 10/19/21- In the midportion of the midline incision the right tube small draining sinuses that are connected underneath the intact piece of skin this piece of skin was removed and the base of this subcutaneous area was treated with silver nitrate.  The patient presents today with a bulge on the right side of the abdomen below the previous incisional hernia repair.\par Wound care and activity were discussed with the patient. He is to return in 1 week. \par \par Today pt reports feeling well, no pain. Abdominal bulge, no pain. abdominal drainage 1 week ago, currently no drainage. Formed BMs daily. Good appetite, no nausea, no vomiting. Denies fever and chills. Pt on Aspirin 81 mgv and Eliquis for cardiac stents placed July 2021.

## 2022-02-24 NOTE — PROVIDER CONTACT NOTE (OTHER) - REASON
/76. Held IV Lopressor for parameter
PT NGT accidentally removed by patient
pt bp elevated
pt complaining of R/L abd pain
Implemented All Fall with Harm Risk Interventions:  [place] to call system. Call bell, personal items and telephone within reach. Instruct patient to call for assistance. Room bathroom lighting operational. Non-slip footwear when patient is off stretcher. Physically safe environment: no spills, clutter or unnecessary equipment. Stretcher in lowest position, wheels locked, appropriate side rails in place. Provide visual cue, wrist band, yellow gown, etc. Monitor gait and stability. Monitor for mental status changes and reorient to person, place, and time. Review medications for side effects contributing to fall risk. Reinforce activity limits and safety measures with patient and family. Provide visual clues: red socks.

## 2022-02-25 ENCOUNTER — RESULT REVIEW (OUTPATIENT)
Age: 57
End: 2022-02-25

## 2022-03-01 ENCOUNTER — RX RENEWAL (OUTPATIENT)
Age: 57
End: 2022-03-01

## 2022-03-03 ENCOUNTER — RESULT CHARGE (OUTPATIENT)
Age: 57
End: 2022-03-03

## 2022-03-04 ENCOUNTER — APPOINTMENT (OUTPATIENT)
Dept: CARDIOLOGY | Facility: CLINIC | Age: 57
End: 2022-03-04
Payer: MEDICAID

## 2022-03-04 VITALS
RESPIRATION RATE: 16 BRPM | BODY MASS INDEX: 42.66 KG/M2 | HEART RATE: 94 BPM | OXYGEN SATURATION: 96 % | SYSTOLIC BLOOD PRESSURE: 126 MMHG | WEIGHT: 315 LBS | HEIGHT: 72 IN | TEMPERATURE: 96.9 F | DIASTOLIC BLOOD PRESSURE: 78 MMHG

## 2022-03-04 PROCEDURE — 99244 OFF/OP CNSLTJ NEW/EST MOD 40: CPT

## 2022-03-04 PROCEDURE — 99204 OFFICE O/P NEW MOD 45 MIN: CPT

## 2022-03-04 NOTE — PHYSICAL EXAM
[Normal Oral Mucosa] : normal oral mucosa [No Oral Pallor] : no oral pallor [No Oral Cyanosis] : no oral cyanosis [Normal Jugular Venous A Waves Present] : normal jugular venous A waves present [Normal Jugular Venous V Waves Present] : normal jugular venous V waves present [No Jugular Venous Ramirez A Waves] : no jugular venous ramirez A waves [Heart Rate And Rhythm] : heart rate and rhythm were normal [Heart Sounds] : normal S1 and S2 [Murmurs] : no murmurs present [Respiration, Rhythm And Depth] : normal respiratory rhythm and effort [Exaggerated Use Of Accessory Muscles For Inspiration] : no accessory muscle use [Auscultation Breath Sounds / Voice Sounds] : lungs were clear to auscultation bilaterally [Abdomen Soft] : soft [Abdomen Tenderness] : non-tender [] : no hepato-splenomegaly [Abdomen Mass (___ Cm)] : no abdominal mass palpated [FreeTextEntry1] : bilateral edema, soft pitting edema.  lipodermatosclerosis.  + stemmers

## 2022-03-04 NOTE — ASSESSMENT
[FreeTextEntry1] : Assessment:\par 1.  bilateral LE edema\par 2.  Overweight\par 3.  CAD\par 4. Afib\par 5.  Hernia\par 6.  Prior abdominal surgeries\par \par Plan\par 1.  Referral to hospitals lymphedema therapy\par 2.  Continue lasix 40mg daily .  uptitrate if more edema.  \par 3. If remains symptomatic despite lymphedema therapy , will consider a pneumatic pump\par 4.  Cant put on compression due to abdominal pains, and spinal stenosis.  \par 5.  Return in 8 weeks to re-assess\par

## 2022-03-04 NOTE — REASON FOR VISIT
[Initial Evaluation] : an initial evaluation of [FreeTextEntry2] : Edema [FreeTextEntry1] : 57M, CAD, DAMION, afib.  Here for evaluation of lymphedema.  Prior hernia repair.  Prior Open hartmanns w reversal.  Repair of parastomal hernia.  Lasst echo July 2021:  normal aortic root size, normal EF.  Hyperdynamic LV.  Seen by Dr. Contreras. Trial of lasix.  Venous duplex Jly 2021 negative for DVT.\par \par Leg swelling for 2 months\par Prior to that his ankles would swell \par \par Lasix helped a bit 40mg daily .\par \par His weight is elevated. 370. \par Cant put on compression garment\par \par He is being followed by Dr. Yarbrough - needs CT abdomen soon\par

## 2022-03-08 ENCOUNTER — APPOINTMENT (OUTPATIENT)
Dept: CT IMAGING | Facility: CLINIC | Age: 57
End: 2022-03-08
Payer: MEDICAID

## 2022-03-08 ENCOUNTER — OUTPATIENT (OUTPATIENT)
Dept: OUTPATIENT SERVICES | Facility: HOSPITAL | Age: 57
LOS: 1 days | End: 2022-03-08
Payer: MEDICAID

## 2022-03-08 DIAGNOSIS — K43.2 INCISIONAL HERNIA WITHOUT OBSTRUCTION OR GANGRENE: ICD-10-CM

## 2022-03-08 DIAGNOSIS — Z00.8 ENCOUNTER FOR OTHER GENERAL EXAMINATION: ICD-10-CM

## 2022-03-08 DIAGNOSIS — Z98.890 OTHER SPECIFIED POSTPROCEDURAL STATES: Chronic | ICD-10-CM

## 2022-03-08 PROCEDURE — 74177 CT ABD & PELVIS W/CONTRAST: CPT | Mod: 26

## 2022-03-08 PROCEDURE — 74177 CT ABD & PELVIS W/CONTRAST: CPT

## 2022-03-24 ENCOUNTER — APPOINTMENT (OUTPATIENT)
Dept: SURGERY | Facility: CLINIC | Age: 57
End: 2022-03-24
Payer: SELF-PAY

## 2022-03-29 ENCOUNTER — APPOINTMENT (OUTPATIENT)
Dept: SURGERY | Facility: CLINIC | Age: 57
End: 2022-03-29
Payer: SELF-PAY

## 2022-03-29 VITALS
RESPIRATION RATE: 17 BRPM | SYSTOLIC BLOOD PRESSURE: 132 MMHG | TEMPERATURE: 95 F | DIASTOLIC BLOOD PRESSURE: 84 MMHG | HEART RATE: 102 BPM | OXYGEN SATURATION: 98 %

## 2022-03-29 PROCEDURE — 99214 OFFICE O/P EST MOD 30 MIN: CPT

## 2022-03-29 NOTE — HISTORY OF PRESENT ILLNESS
[de-identified] : Del is a 57 year old male here for follow up visit. s/p incisional hernia repair with mesh with myofascial flap advancement, preservation of neurovascular bundles along with a one-sided anterior component separation 8/2/21. s/p Open John's reversal, repair of incisional parastomal hernia 6/28/19 by Dr. Stein. \par s/p Flexible sigmoidoscopy, Diagnostic laparoscopy, open John's resection, Cystorrhaphy 3/12/19 by Dr. Stein. Last seen 2/24/22- I have discussed with the patient that he needs an abdominal and pelvic CT scan to evaluate the size of this recurrent hernia. I told him I cannot answer any questions about how this would be fixed or how much time it would take. I also told him that would be done as an inpatient and whether he would need to stay 1-3 or 4 days depending on what we had to do to fix this hernia which all depends on the images from the CT scan. \par CT abdomen pelvis 3/8/22- small right paraumbilical ventral hernia containing unobstructed bowel. Tiny fat containing right inguinal hernia. Stable small bowel lipoma measures 1.7 cm.

## 2022-03-29 NOTE — CONSULT LETTER
[Dear  ___] : Dear  [unfilled], [Consult Letter:] : I had the pleasure of evaluating your patient, [unfilled]. [Please see my note below.] : Please see my note below. [Consult Closing:] : Thank you very much for allowing me to participate in the care of this patient.  If you have any questions, please do not hesitate to contact me. [Sincerely,] : Sincerely, [FreeTextEntry3] : I have reviewed all the documentation for this encounter with the patient and have edited where appropriate\par \par Dr. James Yarbrough

## 2022-03-29 NOTE — PHYSICAL EXAM
[No Rash or Lesion] : No rash or lesion [de-identified] : Recurrent incisional hernia which is just below the umbilicus has increased somewhat in size but is still reducible. [de-identified] : Normal strength and gait

## 2022-04-08 ENCOUNTER — APPOINTMENT (OUTPATIENT)
Dept: INTERNAL MEDICINE | Facility: CLINIC | Age: 57
End: 2022-04-08
Payer: MEDICAID

## 2022-04-08 ENCOUNTER — NON-APPOINTMENT (OUTPATIENT)
Age: 57
End: 2022-04-08

## 2022-04-08 VITALS
RESPIRATION RATE: 16 BRPM | DIASTOLIC BLOOD PRESSURE: 86 MMHG | SYSTOLIC BLOOD PRESSURE: 130 MMHG | HEIGHT: 72 IN | BODY MASS INDEX: 42.66 KG/M2 | HEART RATE: 94 BPM | WEIGHT: 315 LBS | TEMPERATURE: 96.9 F | OXYGEN SATURATION: 97 %

## 2022-04-08 DIAGNOSIS — Z01.818 ENCOUNTER FOR OTHER PREPROCEDURAL EXAMINATION: ICD-10-CM

## 2022-04-08 PROCEDURE — 93000 ELECTROCARDIOGRAM COMPLETE: CPT

## 2022-04-08 PROCEDURE — 99215 OFFICE O/P EST HI 40 MIN: CPT | Mod: 25

## 2022-04-08 NOTE — ASSESSMENT
[Patient Optimized for Surgery] : Patient optimized for surgery [Modify anticoagulant treatment prior to procedure] : Modify anticoagulant treatment prior to procedure [Continue medications as is] : Continue current medications [As per surgery] : as per surgery [FreeTextEntry4] : 57 Y OLD MALE WITH PMX OF CAD,PAF,REN,SEVERE OBESITY ,DAMION,LYMPHEDEMA  AND HTN AT ACCEPTABLE BUT INCREASED RISK FOR SURGERY  [FreeTextEntry5] : AS PER CARDIOLOGY STOP 3 DAYS PRIOR TO SURGERY AND RESUME AS PER SURGEON WITHIN 12 HS POST OP

## 2022-04-08 NOTE — PHYSICAL EXAM
[No Acute Distress] : no acute distress [Normal Sclera/Conjunctiva] : normal sclera/conjunctiva [No JVD] : no jugular venous distention [Normal Rhythm/Effort] : normal respiratory rhythm and effort [Decreased Breath Sounds] : breath sounds were decreased diffusely [Normal to Percussion] : the lungs were normal to percussion [Normal Rate] : normal rate  [Regular Rhythm] : with a regular rhythm [___ +] : bilateral [unfilled]U+ pretibial pitting edema [Obese] : obese [Scar] : a scar was noted [Subcutaneous] : a subcutaneous drain [Periumbilical] : located in the periumbilical region [Serous] : serous [Normal] : normal [Soft, Nontender] : the abdomen was soft and nontender

## 2022-04-08 NOTE — HISTORY OF PRESENT ILLNESS
[Atrial Fibrillation] : atrial fibrillation [Coronary Artery Disease] : coronary artery disease [COPD] : COPD [Sleep Apnea] : sleep apnea [Chronic Anticoagulation] : chronic anticoagulation [FreeTextEntry1] : ABD WALL SEROMA - INCISIONAL HERNIA [FreeTextEntry2] : 4/20/22

## 2022-04-08 NOTE — REVIEW OF SYSTEMS
[Fatigue] : fatigue [Lower Ext Edema] : lower extremity edema [Dyspnea on Exertion] : dyspnea on exertion [Joint Pain] : joint pain [Back Pain] : back pain [Anxiety] : anxiety [Depression] : depression [Negative] : Heme/Lymph [de-identified] : NEUROPATHY AND RADICULOPATHY

## 2022-04-09 LAB
APTT BLD: 36.5 SEC
BASOPHILS # BLD AUTO: 0.06 K/UL
BASOPHILS NFR BLD AUTO: 0.7 %
EOSINOPHIL # BLD AUTO: 0.42 K/UL
EOSINOPHIL NFR BLD AUTO: 5 %
HCT VFR BLD CALC: 48.4 %
HGB BLD-MCNC: 14 G/DL
IMM GRANULOCYTES NFR BLD AUTO: 1 %
INR PPP: 1.04 RATIO
LYMPHOCYTES # BLD AUTO: 2.07 K/UL
LYMPHOCYTES NFR BLD AUTO: 24.7 %
MAN DIFF?: NORMAL
MCHC RBC-ENTMCNC: 25.3 PG
MCHC RBC-ENTMCNC: 28.9 GM/DL
MCV RBC AUTO: 87.5 FL
MONOCYTES # BLD AUTO: 0.81 K/UL
MONOCYTES NFR BLD AUTO: 9.7 %
NEUTROPHILS # BLD AUTO: 4.93 K/UL
NEUTROPHILS NFR BLD AUTO: 58.9 %
PLATELET # BLD AUTO: 314 K/UL
PT BLD: 12.1 SEC
RBC # BLD: 5.53 M/UL
RBC # FLD: 17 %
WBC # FLD AUTO: 8.37 K/UL

## 2022-04-11 LAB
ALBUMIN SERPL ELPH-MCNC: 3.8 G/DL
ALP BLD-CCNC: 101 U/L
ALT SERPL-CCNC: 9 U/L
ANION GAP SERPL CALC-SCNC: 13 MMOL/L
AST SERPL-CCNC: 13 U/L
BILIRUB SERPL-MCNC: 0.4 MG/DL
BUN SERPL-MCNC: 16 MG/DL
CALCIUM SERPL-MCNC: 9.6 MG/DL
CHLORIDE SERPL-SCNC: 102 MMOL/L
CO2 SERPL-SCNC: 29 MMOL/L
CREAT SERPL-MCNC: 1.4 MG/DL
EGFR: 59 ML/MIN/1.73M2
GLUCOSE SERPL-MCNC: 74 MG/DL
POTASSIUM SERPL-SCNC: 4.6 MMOL/L
PROT SERPL-MCNC: 6.9 G/DL
SODIUM SERPL-SCNC: 144 MMOL/L

## 2022-04-15 ENCOUNTER — OUTPATIENT (OUTPATIENT)
Dept: OUTPATIENT SERVICES | Facility: HOSPITAL | Age: 57
LOS: 1 days | End: 2022-04-15
Payer: MEDICAID

## 2022-04-15 VITALS
OXYGEN SATURATION: 96 % | HEART RATE: 102 BPM | SYSTOLIC BLOOD PRESSURE: 142 MMHG | RESPIRATION RATE: 18 BRPM | WEIGHT: 315 LBS | DIASTOLIC BLOOD PRESSURE: 89 MMHG | HEIGHT: 72 IN | TEMPERATURE: 97 F

## 2022-04-15 DIAGNOSIS — I48.91 UNSPECIFIED ATRIAL FIBRILLATION: ICD-10-CM

## 2022-04-15 DIAGNOSIS — Z90.49 ACQUIRED ABSENCE OF OTHER SPECIFIED PARTS OF DIGESTIVE TRACT: Chronic | ICD-10-CM

## 2022-04-15 DIAGNOSIS — K43.2 INCISIONAL HERNIA WITHOUT OBSTRUCTION OR GANGRENE: ICD-10-CM

## 2022-04-15 DIAGNOSIS — Z95.5 PRESENCE OF CORONARY ANGIOPLASTY IMPLANT AND GRAFT: ICD-10-CM

## 2022-04-15 DIAGNOSIS — Z98.890 OTHER SPECIFIED POSTPROCEDURAL STATES: Chronic | ICD-10-CM

## 2022-04-15 DIAGNOSIS — Z01.818 ENCOUNTER FOR OTHER PREPROCEDURAL EXAMINATION: ICD-10-CM

## 2022-04-15 LAB
ANION GAP SERPL CALC-SCNC: 15 MMOL/L — SIGNIFICANT CHANGE UP (ref 5–17)
BLD GP AB SCN SERPL QL: NEGATIVE — SIGNIFICANT CHANGE UP
BUN SERPL-MCNC: 23 MG/DL — SIGNIFICANT CHANGE UP (ref 7–23)
CALCIUM SERPL-MCNC: 9.3 MG/DL — SIGNIFICANT CHANGE UP (ref 8.4–10.5)
CHLORIDE SERPL-SCNC: 102 MMOL/L — SIGNIFICANT CHANGE UP (ref 96–108)
CO2 SERPL-SCNC: 26 MMOL/L — SIGNIFICANT CHANGE UP (ref 22–31)
CREAT SERPL-MCNC: 1.33 MG/DL — HIGH (ref 0.5–1.3)
EGFR: 63 ML/MIN/1.73M2 — SIGNIFICANT CHANGE UP
GLUCOSE SERPL-MCNC: 91 MG/DL — SIGNIFICANT CHANGE UP (ref 70–99)
HCT VFR BLD CALC: 48.9 % — SIGNIFICANT CHANGE UP (ref 39–50)
HGB BLD-MCNC: 14.5 G/DL — SIGNIFICANT CHANGE UP (ref 13–17)
MCHC RBC-ENTMCNC: 25.6 PG — LOW (ref 27–34)
MCHC RBC-ENTMCNC: 29.7 GM/DL — LOW (ref 32–36)
MCV RBC AUTO: 86.4 FL — SIGNIFICANT CHANGE UP (ref 80–100)
NRBC # BLD: 0 /100 WBCS — SIGNIFICANT CHANGE UP (ref 0–0)
PLATELET # BLD AUTO: 330 K/UL — SIGNIFICANT CHANGE UP (ref 150–400)
POTASSIUM SERPL-MCNC: 4.1 MMOL/L — SIGNIFICANT CHANGE UP (ref 3.5–5.3)
POTASSIUM SERPL-SCNC: 4.1 MMOL/L — SIGNIFICANT CHANGE UP (ref 3.5–5.3)
RBC # BLD: 5.66 M/UL — SIGNIFICANT CHANGE UP (ref 4.2–5.8)
RBC # FLD: 16.3 % — HIGH (ref 10.3–14.5)
RH IG SCN BLD-IMP: POSITIVE — SIGNIFICANT CHANGE UP
SODIUM SERPL-SCNC: 143 MMOL/L — SIGNIFICANT CHANGE UP (ref 135–145)
WBC # BLD: 9.83 K/UL — SIGNIFICANT CHANGE UP (ref 3.8–10.5)
WBC # FLD AUTO: 9.83 K/UL — SIGNIFICANT CHANGE UP (ref 3.8–10.5)

## 2022-04-15 PROCEDURE — 36415 COLL VENOUS BLD VENIPUNCTURE: CPT

## 2022-04-15 PROCEDURE — 85027 COMPLETE CBC AUTOMATED: CPT

## 2022-04-15 PROCEDURE — 86901 BLOOD TYPING SEROLOGIC RH(D): CPT

## 2022-04-15 PROCEDURE — 87641 MR-STAPH DNA AMP PROBE: CPT

## 2022-04-15 PROCEDURE — 87640 STAPH A DNA AMP PROBE: CPT

## 2022-04-15 PROCEDURE — G0480: CPT

## 2022-04-15 PROCEDURE — 80048 BASIC METABOLIC PNL TOTAL CA: CPT

## 2022-04-15 PROCEDURE — 83036 HEMOGLOBIN GLYCOSYLATED A1C: CPT

## 2022-04-15 PROCEDURE — 86900 BLOOD TYPING SEROLOGIC ABO: CPT

## 2022-04-15 PROCEDURE — 86850 RBC ANTIBODY SCREEN: CPT

## 2022-04-15 PROCEDURE — G0463: CPT

## 2022-04-15 RX ORDER — GABAPENTIN 400 MG/1
600 CAPSULE ORAL ONCE
Refills: 0 | Status: COMPLETED | OUTPATIENT
Start: 2022-04-22 | End: 2022-04-22

## 2022-04-15 RX ORDER — CELECOXIB 200 MG/1
400 CAPSULE ORAL ONCE
Refills: 0 | Status: COMPLETED | OUTPATIENT
Start: 2022-04-22 | End: 2022-04-22

## 2022-04-15 RX ORDER — LIDOCAINE HCL 20 MG/ML
0.2 VIAL (ML) INJECTION ONCE
Refills: 0 | Status: DISCONTINUED | OUTPATIENT
Start: 2022-04-22 | End: 2022-04-22

## 2022-04-15 RX ORDER — CHLORHEXIDINE GLUCONATE 213 G/1000ML
1 SOLUTION TOPICAL ONCE
Refills: 0 | Status: DISCONTINUED | OUTPATIENT
Start: 2022-04-22 | End: 2022-04-22

## 2022-04-15 RX ORDER — CEFAZOLIN SODIUM 1 G
3000 VIAL (EA) INJECTION ONCE
Refills: 0 | Status: DISCONTINUED | OUTPATIENT
Start: 2022-04-22 | End: 2022-04-22

## 2022-04-15 RX ORDER — SODIUM CHLORIDE 9 MG/ML
3 INJECTION INTRAMUSCULAR; INTRAVENOUS; SUBCUTANEOUS EVERY 8 HOURS
Refills: 0 | Status: DISCONTINUED | OUTPATIENT
Start: 2022-04-22 | End: 2022-04-22

## 2022-04-15 NOTE — H&P PST ADULT - ASSESSMENT
TONJAI VTE 2.0 SCORE [CLOT updated 2019]    AGE RELATED RISK FACTORS                                                       MOBILITY RELATED FACTORS  [x ] Age 41-60 years                                            (1 Point)                    [ ] Bed rest                                                        (1 Point)  [ ] Age: 61-74 years                                           (2 Points)                  [ ] Plaster cast                                                   (2 Points)  [ ] Age= 75 years                                              (3 Points)                    [ ] Bed bound for more than 72 hours                 (2 Points)    DISEASE RELATED RISK FACTORS                                               GENDER SPECIFIC FACTORS  [x ] Edema in the lower extremities                       (1 Point)              [ ] Pregnancy                                                     (1 Point)  [ ] Varicose veins                                               (1 Point)                     [ ] Post-partum < 6 weeks                                   (1 Point)             [x ] BMI > 25 Kg/m2                                            (1 Point)                     [ ] Hormonal therapy  or oral contraception          (1 Point)                 [ ] Sepsis (in the previous month)                        (1 Point)               [ ] History of pregnancy complications                 (1 point)  [ ] Pneumonia or serious lung disease                                               [ ] Unexplained or recurrent                     (1 Point)           (in the previous month)                               (1 Point)  [ ] Abnormal pulmonary function test                     (1 Point)                 SURGERY RELATED RISK FACTORS  [ ] Acute myocardial infarction                              (1 Point)               [ ]  Section                                             (1 Point)  [ ] Congestive heart failure (in the previous month)  (1 Point)      [ ] Minor surgery                                                  (1 Point)   [ ] Inflammatory bowel disease                             (1 Point)               [ ] Arthroscopic surgery                                        (2 Points)  [ ] Central venous access                                      (2 Points)                [x ] General surgery lasting more than 45 minutes (2 points)  [ ] Malignancy- Present or previous                   (2 Points)                [ ] Elective arthroplasty                                         (5 points)    [ ] Stroke (in the previous month)                          (5 Points)                                                                                                                                                           HEMATOLOGY RELATED FACTORS                                                 TRAUMA RELATED RISK FACTORS  [ ] Prior episodes of VTE                                     (3 Points)                [ ] Fracture of the hip, pelvis, or leg                       (5 Points)  [ ] Positive family history for VTE                         (3 Points)             [ ] Acute spinal cord injury (in the previous month)  (5 Points)  [ ] Prothrombin 75884 A                                     (3 Points)               [ ] Paralysis  (less than 1 month)                             (5 Points)  [ ] Factor V Leiden                                             (3 Points)                  [ ] Multiple Trauma within 1 month                        (5 Points)  [ ] Lupus anticoagulants                                     (3 Points)                                                           [ ] Anticardiolipin antibodies                               (3 Points)                                                       [ ] High homocysteine in the blood                      (3 Points)                                             [ ] Other congenital or acquired thrombophilia      (3 Points)                                                [ ] Heparin induced thrombocytopenia                  (3 Points)                                     Total Score [  5        ]

## 2022-04-15 NOTE — H&P PST ADULT - NS MD HP SKIN WOUND STATUS
area that was incised by Dr. Yarbrough proximal incision and distal incision draining serosanguinous drainage

## 2022-04-15 NOTE — H&P PST ADULT - HISTORY OF PRESENT ILLNESS
This is a 56 y/o male PMH MI, S/P PCI with MAINOR to the RCA 2020, Afib on eliquis, morbid obesity, DAMION non-compliant, PCD,  incisional hernia, S/P repair 8/02/2021 with recurrence of incisional hernia and small dehiscence of incision.  Presents today for recurrent incisional hernia repair. This is a 58 y/o male PMH MI, S/P PCI with MAINOR to the RCA 2020, Afib on eliquis, morbid obesity, DAMION non-compliant, PVD, lower extremity edema, depression,  incisional hernia, S/P repair 8/02/2021 with recurrence of incisional hernia and small dehiscence of incision.  Presents today for recurrent incisional hernia repair.      No H/O COVID infection, COVID swab 4/19/22

## 2022-04-15 NOTE — H&P PST ADULT - CIGARETTES, NUMBER OF YRS
VACCINE ADMINISTRATION RECORD  PARENT / GUARDIAN APPROVAL  Date: 10/18/2017  Vaccine administered to: Brodie Orozco     : 2002    MRN: [id number]    A copy of the appropriate Centers for Disease Control and Prevention Vaccine Information statem 30

## 2022-04-15 NOTE — H&P PST ADULT - NSICDXPASTSURGICALHX_GEN_ALL_CORE_FT
PAST SURGICAL HISTORY:  S/P colon resection 2019 with colostomy and reversal of colostomy    S/P repair of hydrocele

## 2022-04-16 LAB
A1C WITH ESTIMATED AVERAGE GLUCOSE RESULT: 6.3 % — HIGH (ref 4–5.6)
ESTIMATED AVERAGE GLUCOSE: 134 MG/DL — HIGH (ref 68–114)
MRSA PCR RESULT.: DETECTED
S AUREUS DNA NOSE QL NAA+PROBE: DETECTED

## 2022-04-18 ENCOUNTER — APPOINTMENT (OUTPATIENT)
Dept: CARDIOLOGY | Facility: CLINIC | Age: 57
End: 2022-04-18
Payer: MEDICAID

## 2022-04-18 VITALS
RESPIRATION RATE: 14 BRPM | HEART RATE: 102 BPM | WEIGHT: 315 LBS | SYSTOLIC BLOOD PRESSURE: 165 MMHG | DIASTOLIC BLOOD PRESSURE: 115 MMHG | BODY MASS INDEX: 42.66 KG/M2 | OXYGEN SATURATION: 98 % | HEIGHT: 72 IN

## 2022-04-18 VITALS — DIASTOLIC BLOOD PRESSURE: 86 MMHG | SYSTOLIC BLOOD PRESSURE: 130 MMHG

## 2022-04-18 PROCEDURE — 99214 OFFICE O/P EST MOD 30 MIN: CPT

## 2022-04-18 RX ORDER — SERTRALINE HYDROCHLORIDE 50 MG/1
50 TABLET, FILM COATED ORAL
Refills: 0 | Status: DISCONTINUED | COMMUNITY
End: 2022-04-18

## 2022-04-18 RX ORDER — PANTOPRAZOLE SODIUM 40 MG/10ML
40 INJECTION, POWDER, FOR SOLUTION INTRAVENOUS
Refills: 0 | Status: DISCONTINUED | COMMUNITY
End: 2022-04-18

## 2022-04-18 RX ORDER — GABAPENTIN 100 MG/1
100 CAPSULE ORAL
Qty: 30 | Refills: 0 | Status: DISCONTINUED | COMMUNITY
Start: 2021-09-09 | End: 2022-04-18

## 2022-04-18 RX ORDER — VANCOMYCIN HCL 1 G
2000 VIAL (EA) INTRAVENOUS ONCE
Refills: 0 | Status: DISCONTINUED | OUTPATIENT
Start: 2022-04-22 | End: 2022-04-22

## 2022-04-18 RX ORDER — MULTIVIT-MIN/FOLIC/VIT K/LYCOP 400-300MCG
50 MCG TABLET ORAL
Qty: 90 | Refills: 1 | Status: DISCONTINUED | COMMUNITY
Start: 2021-09-20 | End: 2022-04-18

## 2022-04-18 NOTE — REVIEW OF SYSTEMS
[SOB] : no shortness of breath [Dyspnea on exertion] : not dyspnea during exertion [Chest Discomfort] : no chest discomfort [Leg Claudication] : no intermittent leg claudication [Palpitations] : no palpitations [Orthopnea] : no orthopnea [Negative] : Heme/Lymph

## 2022-04-18 NOTE — PHYSICAL EXAM
[General Appearance - Well Developed] : well developed [Normal Appearance] : normal appearance [Well Groomed] : well groomed [General Appearance - Well Nourished] : well nourished [No Deformities] : no deformities [General Appearance - In No Acute Distress] : no acute distress [Normal Conjunctiva] : the conjunctiva exhibited no abnormalities [Eyelids - No Xanthelasma] : the eyelids demonstrated no xanthelasmas [Normal Oral Mucosa] : normal oral mucosa [No Oral Pallor] : no oral pallor [No Oral Cyanosis] : no oral cyanosis [Normal Jugular Venous A Waves Present] : normal jugular venous A waves present [Normal Jugular Venous V Waves Present] : normal jugular venous V waves present [No Jugular Venous Ramirez A Waves] : no jugular venous ramirez A waves [Respiration, Rhythm And Depth] : normal respiratory rhythm and effort [Exaggerated Use Of Accessory Muscles For Inspiration] : no accessory muscle use [Auscultation Breath Sounds / Voice Sounds] : lungs were clear to auscultation bilaterally [Heart Sounds] : normal S1 and S2 [Heart Rate And Rhythm] : heart rate and rhythm were normal [Murmurs] : no murmurs present [Abdomen Soft] : soft [Abdomen Tenderness] : non-tender [Abdomen Mass (___ Cm)] : no abdominal mass palpated [Abnormal Walk] : normal gait [Gait - Sufficient For Exercise Testing] : the gait was sufficient for exercise testing [Nail Clubbing] : no clubbing of the fingernails [Cyanosis, Localized] : no localized cyanosis [Petechial Hemorrhages (___cm)] : no petechial hemorrhages [Skin Color & Pigmentation] : normal skin color and pigmentation [] : no rash [No Venous Stasis] : no venous stasis [Skin Lesions] : no skin lesions [No Skin Ulcers] : no skin ulcer [No Xanthoma] : no  xanthoma was observed [Oriented To Time, Place, And Person] : oriented to person, place, and time [Mood] : the mood was normal [Affect] : the affect was normal [No Anxiety] : not feeling anxious

## 2022-04-18 NOTE — DISCUSSION/SUMMARY
[Procedure Intermediate Risk] : the procedure risk is intermediate [Patient Intermediate Risk] : the patient is an intermediate risk [Optimized for Surgery] : the patient is optimized for surgery [FreeTextEntry1] : The patient is a 57-year-old anxious gentleman prior cocaine use, CAD, DAMION, PAF, s/p Blandon pouch with reversal and recent incisional hernia repair awaiting abdominal surgery this week who is extremely anxious\par #1 CV- no angina, normal EF, continue aspirin for RCA stent\par #2 Lipids- continue atorvastatin\par #3 PAF- continue digoxin and toprol 25mg\par #4 Anxiety- recommend taking his sertraline, f/u psych\par #5 Pulm- DAMION\par #6 Surgery- f/u Dr. Yarbrough, THere are no cardiac contraindications to abdominal surgery off eliquis two days prior to surgery. \par #7 PAD- lymphedema, lasix daily\par

## 2022-04-18 NOTE — HISTORY OF PRESENT ILLNESS
[Preoperative Visit] : for a medical evaluation prior to surgery [Scheduled Procedure ___] : a [unfilled] [Date of Surgery ___] : on [unfilled] [Surgeon Name ___] : surgeon: [unfilled] [FreeTextEntry1] : Del is very anxious about surgery this week. No CP< palpitations, dizziness and SOB at baseline.

## 2022-04-19 ENCOUNTER — OUTPATIENT (OUTPATIENT)
Dept: OUTPATIENT SERVICES | Facility: HOSPITAL | Age: 57
LOS: 1 days | End: 2022-04-19
Payer: MEDICAID

## 2022-04-19 DIAGNOSIS — Z98.890 OTHER SPECIFIED POSTPROCEDURAL STATES: Chronic | ICD-10-CM

## 2022-04-19 DIAGNOSIS — Z90.49 ACQUIRED ABSENCE OF OTHER SPECIFIED PARTS OF DIGESTIVE TRACT: Chronic | ICD-10-CM

## 2022-04-19 DIAGNOSIS — Z11.52 ENCOUNTER FOR SCREENING FOR COVID-19: ICD-10-CM

## 2022-04-19 LAB — SARS-COV-2 RNA SPEC QL NAA+PROBE: SIGNIFICANT CHANGE UP

## 2022-04-19 PROCEDURE — C9803: CPT

## 2022-04-19 PROCEDURE — U0003: CPT

## 2022-04-19 PROCEDURE — U0005: CPT

## 2022-04-21 ENCOUNTER — TRANSCRIPTION ENCOUNTER (OUTPATIENT)
Age: 57
End: 2022-04-21

## 2022-04-21 ENCOUNTER — APPOINTMENT (OUTPATIENT)
Dept: INTERNAL MEDICINE | Facility: CLINIC | Age: 57
End: 2022-04-21

## 2022-04-22 ENCOUNTER — OUTPATIENT (OUTPATIENT)
Dept: INPATIENT UNIT | Facility: HOSPITAL | Age: 57
LOS: 1 days | Discharge: HOME | End: 2022-04-22
Payer: MEDICAID

## 2022-04-22 ENCOUNTER — APPOINTMENT (OUTPATIENT)
Dept: SURGERY | Facility: HOSPITAL | Age: 57
End: 2022-04-22

## 2022-04-22 VITALS
RESPIRATION RATE: 20 BRPM | TEMPERATURE: 98 F | WEIGHT: 315 LBS | HEART RATE: 96 BPM | HEIGHT: 72 IN | SYSTOLIC BLOOD PRESSURE: 131 MMHG | OXYGEN SATURATION: 97 % | DIASTOLIC BLOOD PRESSURE: 82 MMHG

## 2022-04-22 DIAGNOSIS — Z98.890 OTHER SPECIFIED POSTPROCEDURAL STATES: Chronic | ICD-10-CM

## 2022-04-22 DIAGNOSIS — K43.2 INCISIONAL HERNIA WITHOUT OBSTRUCTION OR GANGRENE: ICD-10-CM

## 2022-04-22 DIAGNOSIS — Z90.49 ACQUIRED ABSENCE OF OTHER SPECIFIED PARTS OF DIGESTIVE TRACT: Chronic | ICD-10-CM

## 2022-04-22 LAB
GLUCOSE BLDC GLUCOMTR-MCNC: 100 MG/DL — HIGH (ref 70–99)
GLUCOSE BLDC GLUCOMTR-MCNC: 105 MG/DL — HIGH (ref 70–99)

## 2022-04-22 RX ORDER — MUPIROCIN 20 MG/G
1 OINTMENT TOPICAL
Refills: 0 | Status: DISCONTINUED | OUTPATIENT
Start: 2022-04-22 | End: 2022-04-23

## 2022-04-22 RX ORDER — ATORVASTATIN CALCIUM 80 MG/1
80 TABLET, FILM COATED ORAL AT BEDTIME
Refills: 0 | Status: DISCONTINUED | OUTPATIENT
Start: 2022-04-22 | End: 2022-04-23

## 2022-04-22 RX ORDER — ASPIRIN/CALCIUM CARB/MAGNESIUM 324 MG
81 TABLET ORAL DAILY
Refills: 0 | Status: DISCONTINUED | OUTPATIENT
Start: 2022-04-22 | End: 2022-04-23

## 2022-04-22 RX ORDER — METOPROLOL TARTRATE 50 MG
12.5 TABLET ORAL
Refills: 0 | Status: DISCONTINUED | OUTPATIENT
Start: 2022-04-22 | End: 2022-04-23

## 2022-04-22 RX ORDER — INSULIN LISPRO 100/ML
VIAL (ML) SUBCUTANEOUS
Refills: 0 | Status: DISCONTINUED | OUTPATIENT
Start: 2022-04-22 | End: 2022-04-23

## 2022-04-22 RX ORDER — DIGOXIN 250 MCG
125 TABLET ORAL DAILY
Refills: 0 | Status: DISCONTINUED | OUTPATIENT
Start: 2022-04-22 | End: 2022-04-23

## 2022-04-22 RX ORDER — APIXABAN 2.5 MG/1
5 TABLET, FILM COATED ORAL
Refills: 0 | Status: DISCONTINUED | OUTPATIENT
Start: 2022-04-22 | End: 2022-04-23

## 2022-04-22 RX ORDER — HEPARIN SODIUM 5000 [USP'U]/ML
5000 INJECTION INTRAVENOUS; SUBCUTANEOUS EVERY 8 HOURS
Refills: 0 | Status: DISCONTINUED | OUTPATIENT
Start: 2022-04-22 | End: 2022-04-22

## 2022-04-22 RX ORDER — INSULIN LISPRO 100/ML
VIAL (ML) SUBCUTANEOUS AT BEDTIME
Refills: 0 | Status: DISCONTINUED | OUTPATIENT
Start: 2022-04-22 | End: 2022-04-23

## 2022-04-22 RX ORDER — CHLORHEXIDINE GLUCONATE 213 G/1000ML
1 SOLUTION TOPICAL
Refills: 0 | Status: DISCONTINUED | OUTPATIENT
Start: 2022-04-22 | End: 2022-04-23

## 2022-04-22 RX ORDER — ONDANSETRON 8 MG/1
4 TABLET, FILM COATED ORAL ONCE
Refills: 0 | Status: DISCONTINUED | OUTPATIENT
Start: 2022-04-22 | End: 2022-04-22

## 2022-04-22 RX ADMIN — APIXABAN 5 MILLIGRAM(S): 2.5 TABLET, FILM COATED ORAL at 23:50

## 2022-04-22 RX ADMIN — CELECOXIB 400 MILLIGRAM(S): 200 CAPSULE ORAL at 06:47

## 2022-04-22 RX ADMIN — ATORVASTATIN CALCIUM 80 MILLIGRAM(S): 80 TABLET, FILM COATED ORAL at 23:50

## 2022-04-22 RX ADMIN — SODIUM CHLORIDE 3 MILLILITER(S): 9 INJECTION INTRAMUSCULAR; INTRAVENOUS; SUBCUTANEOUS at 07:16

## 2022-04-22 RX ADMIN — GABAPENTIN 600 MILLIGRAM(S): 400 CAPSULE ORAL at 06:48

## 2022-04-22 NOTE — CHART NOTE - NSCHARTNOTEFT_GEN_A_CORE
MD called to bedside after a Patient-Nurse discussion of SpO2 desaturations. Patient has a known history of sleep apnea and was found by the Nurse to be loudly snoring and having apneic events. Nurse recorded the SpO2 range of: 80-90%. Patient states he has a CPAP at home but do not use it. Upon room entry, patient was found asleep in the chair, apneic breathing was obviously seen, and snoring was heard from outside the room. I explained to the patient the hospital floor's concerns that his baseline SpO2 appears to be approximately 80%. Patient stated he remains uninterested in the hospital's concerns and that he sleeps this way every night.  He also clarified that when he has oxygen on in the hospital he is satting > 90%. Therefore he will refuse CPAP.     Outcome:   Patient, Nursing Team, and Provider Team reached agreement patient will remain on O2 and will not use CPAP for now.   However, if patient begins to Desat on O2 (e.g., <90%) further discussions about CPAP will be indicated. MD called to bedside after a Patient-Nurse discussion of SpO2 desaturations. Patient has a known history of sleep apnea and was found by the Nurse to be loudly snoring and having apneic events. Nurse recorded the SpO2 range of: 80-90%. Patient states he has a CPAP at home but do not use it. Upon room entry, patient was found asleep in the chair, apneic breathing was obviously seen, and snoring was heard from outside the room. I explained to the patient the hospital floor's concerns that his baseline SpO2 appears to be approximately 80%. Patient stated he remains uninterested in the hospital's concerns and that he sleeps this way every night.  He also clarified that when he has oxygen on in the hospital he is satting > 90%. Therefore he will refuse CPAP.     Outcome:   Patient, Nursing Team, and Provider Team reached agreement patient will remain on O2 and will not use CPAP for now.   If patient SpO2 on O2 becomes <90%, further discussions about CPAP will be indicated. Surgery called to bedside after a Patient-Nurse discussion of SpO2 desaturations. Patient has a known history of sleep apnea and was found by the Nurse to be loudly snoring and having apneic events. Nurse recorded the SpO2 range of: 70-90%. Patient states he has a CPAP at home but do not use it. Upon room entry, patient was found asleep in the chair, apneic breathing was obviously seen, and snoring was heard from outside the room. I explained to the patient the hospital floor's concerns that his baseline SpO2 appears to be approximately 80%. Patient stated he remains uninterested in the hospital's concerns and that he sleeps this way every night.  He also clarified that when he has oxygen on in the hospital he is satting > 90%. Therefore he will refuse CPAP.     Outcome:   Patient, Nursing Team, and Provider Team reached agreement patient will remain on O2 and will not use CPAP for now.   If patient SpO2 on O2 becomes <90%, further discussions about CPAP will be indicated.

## 2022-04-22 NOTE — BRIEF OPERATIVE NOTE - OPERATION/FINDINGS
Scheduled recurrent incision hernia repair terminated due to discovery of new drainage from prior midline incision draining into umbilicus.

## 2022-04-23 ENCOUNTER — TRANSCRIPTION ENCOUNTER (OUTPATIENT)
Age: 57
End: 2022-04-23

## 2022-04-23 VITALS
DIASTOLIC BLOOD PRESSURE: 69 MMHG | HEART RATE: 84 BPM | OXYGEN SATURATION: 94 % | TEMPERATURE: 98 F | SYSTOLIC BLOOD PRESSURE: 106 MMHG | RESPIRATION RATE: 18 BRPM

## 2022-04-23 RX ADMIN — Medication 12.5 MILLIGRAM(S): at 07:39

## 2022-04-23 RX ADMIN — MUPIROCIN 1 APPLICATION(S): 20 OINTMENT TOPICAL at 07:40

## 2022-04-23 RX ADMIN — Medication 125 MICROGRAM(S): at 07:40

## 2022-04-23 RX ADMIN — APIXABAN 5 MILLIGRAM(S): 2.5 TABLET, FILM COATED ORAL at 07:39

## 2022-04-23 NOTE — DISCHARGE NOTE PROVIDER - HOSPITAL COURSE
This is a 56 y/o male PMH MI, S/P PCI with MAINOR to the RCA 2020, Afib on eliquis, morbid obesity, DAMION non-compliant, PVD, lower extremity edema, depression,  incisional hernia, S/P repair 8/02/2021 with recurrence of incisional hernia and small dehiscence of incision. Presents for recurrent incisional hernia repair.      Operation was aborted due to drainage from small area of dehisced wound in midline. CTAP performed showing anterior abdominal wall collection containing few locules of air suspicious for an abscess. At this time, patient is stable for discharge home with outpatient follow up for further rescheduling of operation. Patient will see Dr. Yarbrough in the office on Tuesday 4/26.

## 2022-04-23 NOTE — PROGRESS NOTE ADULT - ATTENDING COMMENTS
S/p aborted incisional hernia repair due to fluid seen draining from old wound.  CT shows no abscess  Normal WBC, afebrile  Plan to d/c home, patient to f.u with Dr. Yarbrough next week to reschedule surgery.    Juvenal Clark MD

## 2022-04-23 NOTE — DISCHARGE NOTE NURSING/CASE MANAGEMENT/SOCIAL WORK - NSDCPEFALRISK_GEN_ALL_CORE
For information on Fall & Injury Prevention, visit: [email or web address] OR  [email or web address] OR  https://www.cdc.gov/steadi/patient.html

## 2022-04-23 NOTE — DISCHARGE NOTE PROVIDER - CARE PROVIDER_API CALL
James Yarbrough)  Surgery  [address], [address]  [place] [address]  Phone: [phone]  Fax: [phone]  Established Patient  Scheduled Appointment: 04/26/2022

## 2022-04-23 NOTE — DISCHARGE NOTE NURSING/CASE MANAGEMENT/SOCIAL WORK - PATIENT PORTAL LINK FT
You can access the FollowMyHealth Patient Portal offered by [facility] by registering at the following website: [email or web address]. By joining [facility]’s FollowMyHealth portal, you will also be able to view your health information using other applications (apps) compatible with our system.

## 2022-04-23 NOTE — PROGRESS NOTE ADULT - ASSESSMENT
56 y/o male PMH MI, S/P PCI with MAINOR to the RCA 2020, Afib on eliquis, morbid obesity, DAMION non-compliant, PVD, lower extremity edema, depression,  incisional hernia, S/P repair 8/02/2021 with recurrence of incisional hernia. OR aborted due to finding of purulent umbilical drain suggestive of abscess.   - apixaban  - ASA81   - ISS   - Atorvastatin   - Digoxin  - Metoprolol  - CC    58 y/o male PMH MI, S/P PCI with MAINOR to the RCA 2020, Afib on eliquis, morbid obesity, DAMION non-compliant, PVD, lower extremity edema, depression,  incisional hernia, S/P repair 8/02/2021 with recurrence of incisional hernia. OR aborted due to finding of purulent umbilical drain suggestive of abscess.     PLAN:  - apixaban  - ASA81   - ISS   - Atorvastatin   - Digoxin  - Metoprolol  - Diet: CC w snack  - Can d/c today and f/u in office with Dr. Yarbrough on Tuesday    Green  [phone]

## 2022-04-23 NOTE — DISCHARGE NOTE PROVIDER - NSDCMRMEDTOKEN_GEN_ALL_CORE_FT
apixaban 5 mg oral tablet: 1 tab(s) orally 2 times a day  aspirin 81 mg oral delayed release tablet: 1 tab(s) orally once a day  atorvastatin 80 mg oral tablet: 1 tab(s) orally once a day  digoxin 125 mcg (0.125 mg) oral tablet: 1 tab(s) orally once a day MDD:1 tab  metoprolol tartrate 25 mg oral tablet: 0.5 tab(s) orally 2 times a day MDD:1 tab

## 2022-04-23 NOTE — DISCHARGE NOTE PROVIDER - NSDCADMDATE_GEN_ALL_CORE_FT
Problem: Falls - Risk of  Goal: *Absence of Falls  Description: Document Júnior Godinez Fall Risk and appropriate interventions in the flowsheet. 1/12/2021 1322 by Linh Camp RN  Outcome: Resolved/Met  Note: Fall Risk Interventions:       Mentation Interventions: Adequate sleep, hydration, pain control, Bed/chair exit alarm, Door open when patient unattended, More frequent rounding, Room close to nurse's station    Medication Interventions: Bed/chair exit alarm, Patient to call before getting OOB    Elimination Interventions: Bed/chair exit alarm, Call light in reach, Toileting schedule/hourly rounds           1/12/2021 1013 by Linh Camp RN  Outcome: Progressing Towards Goal  Note: Fall Risk Interventions:       Mentation Interventions: Adequate sleep, hydration, pain control, Bed/chair exit alarm, Door open when patient unattended, More frequent rounding, Room close to nurse's station    Medication Interventions: Bed/chair exit alarm, Patient to call before getting OOB    Elimination Interventions: Bed/chair exit alarm, Call light in reach, Toileting schedule/hourly rounds              Problem: Patient Education: Go to Patient Education Activity  Goal: Patient/Family Education  1/12/2021 1322 by Linh Camp RN  Outcome: Resolved/Met  1/12/2021 1013 by Linh Camp RN  Outcome: Progressing Towards Goal     Problem: Pressure Injury - Risk of  Goal: *Prevention of pressure injury  Description: Document Torito Scale and appropriate interventions in the flowsheet. 1/12/2021 1322 by Linh Camp RN  Outcome: Resolved/Met  Note: Pressure Injury Interventions:  Sensory Interventions: Assess changes in LOC, Avoid rigorous massage over bony prominences, Keep linens dry and wrinkle-free, Maintain/enhance activity level, Minimize linen layers, Turn and reposition approx.  every two hours (pillows and wedges if needed)    Moisture Interventions: Absorbent underpads, Assess need for specialty bed    Activity Interventions: Assess need for specialty bed, Increase time out of bed    Mobility Interventions: Assess need for specialty bed, Float heels, HOB 30 degrees or less, Turn and reposition approx. every two hours(pillow and wedges)    Nutrition Interventions: Document food/fluid/supplement intake    Friction and Shear Interventions: Apply protective barrier, creams and emollients, HOB 30 degrees or less, Transferring/repositioning devices             1/12/2021 1013 by Stefani Eduardo RN  Outcome: Progressing Towards Goal  Note: Pressure Injury Interventions:  Sensory Interventions: Assess changes in LOC, Avoid rigorous massage over bony prominences, Keep linens dry and wrinkle-free, Maintain/enhance activity level, Minimize linen layers, Turn and reposition approx. every two hours (pillows and wedges if needed)    Moisture Interventions: Absorbent underpads, Assess need for specialty bed    Activity Interventions: Assess need for specialty bed, Increase time out of bed    Mobility Interventions: Assess need for specialty bed, Float heels, HOB 30 degrees or less, Turn and reposition approx.  every two hours(pillow and wedges)    Nutrition Interventions: Document food/fluid/supplement intake    Friction and Shear Interventions: Apply protective barrier, creams and emollients, HOB 30 degrees or less, Transferring/repositioning devices                Problem: Patient Education: Go to Patient Education Activity  Goal: Patient/Family Education  Outcome: Resolved/Met 22-Apr-2022 06:13

## 2022-04-25 LAB
COTINE: 112.3 NG/ML — SIGNIFICANT CHANGE UP
COTININE SERPL-MCNC: 7.6 NG/ML — SIGNIFICANT CHANGE UP

## 2022-04-26 ENCOUNTER — NON-APPOINTMENT (OUTPATIENT)
Age: 57
End: 2022-04-26

## 2022-04-26 ENCOUNTER — APPOINTMENT (OUTPATIENT)
Dept: SURGERY | Facility: CLINIC | Age: 57
End: 2022-04-26

## 2022-04-27 ENCOUNTER — OUTPATIENT (OUTPATIENT)
Dept: OUTPATIENT SERVICES | Facility: HOSPITAL | Age: 57
LOS: 1 days | End: 2022-04-27
Payer: MEDICAID

## 2022-04-27 ENCOUNTER — LABORATORY RESULT (OUTPATIENT)
Age: 57
End: 2022-04-27

## 2022-04-27 ENCOUNTER — APPOINTMENT (OUTPATIENT)
Dept: INFECTIOUS DISEASE | Facility: CLINIC | Age: 57
End: 2022-04-27
Payer: MEDICAID

## 2022-04-27 ENCOUNTER — NON-APPOINTMENT (OUTPATIENT)
Age: 57
End: 2022-04-27

## 2022-04-27 VITALS
HEART RATE: 112 BPM | HEIGHT: 72 IN | WEIGHT: 315 LBS | TEMPERATURE: 98.3 F | BODY MASS INDEX: 42.66 KG/M2 | RESPIRATION RATE: 14 BRPM | DIASTOLIC BLOOD PRESSURE: 108 MMHG | OXYGEN SATURATION: 96 % | SYSTOLIC BLOOD PRESSURE: 154 MMHG

## 2022-04-27 DIAGNOSIS — Z98.890 OTHER SPECIFIED POSTPROCEDURAL STATES: Chronic | ICD-10-CM

## 2022-04-27 DIAGNOSIS — Z90.49 ACQUIRED ABSENCE OF OTHER SPECIFIED PARTS OF DIGESTIVE TRACT: Chronic | ICD-10-CM

## 2022-04-27 DIAGNOSIS — B97.89 OTHER VIRAL AGENTS AS THE CAUSE OF DISEASES CLASSIFIED ELSEWHERE: ICD-10-CM

## 2022-04-27 PROCEDURE — C9399: CPT

## 2022-04-27 PROCEDURE — 94660 CPAP INITIATION&MGMT: CPT

## 2022-04-27 PROCEDURE — 87186 SC STD MICRODIL/AGAR DIL: CPT

## 2022-04-27 PROCEDURE — 99204 OFFICE O/P NEW MOD 45 MIN: CPT

## 2022-04-27 PROCEDURE — 74177 CT ABD & PELVIS W/CONTRAST: CPT

## 2022-04-27 PROCEDURE — 82962 GLUCOSE BLOOD TEST: CPT

## 2022-04-27 PROCEDURE — G0463: CPT

## 2022-04-27 PROCEDURE — 87070 CULTURE OTHR SPECIMN AEROBIC: CPT

## 2022-04-27 PROCEDURE — 49565: CPT | Mod: 74

## 2022-04-27 NOTE — PHYSICAL EXAM
[General Appearance - Alert] : alert [Sclera] : the sclera and conjunctiva were normal [Neck Appearance] : the appearance of the neck was normal [Heart Rate And Rhythm] : heart rate was normal and rhythm regular [Heart Sounds] : normal S1 and S2 [Bowel Sounds] : normal bowel sounds [Abdomen Soft] : soft [No Palpable Adenopathy] : no palpable adenopathy [Musculoskeletal - Swelling] : no joint swelling [FreeTextEntry1] : see above [Oriented To Time, Place, And Person] : oriented to person, place, and time [Affect] : the affect was normal

## 2022-04-27 NOTE — ASSESSMENT
[FreeTextEntry1] : 57 year old with CAD with a prior colon rection for diveritculitis and a hernia repair with mesh who presents with a draining wound from the anterior abdomen and a culture growing MRSA.\par \par 1) MRSA of abdominal wound\par Had Periumbilical hernia with plan for surgery last week.  Superior to umbilicus- he has had drainage since the fall.\par OR cultures grew MRSA - sensitive to doxycycline.\par \par Can treat with doxycycline 100 mg po q 12 for 7 days\par \par My concern given the chronicity of the the drainage is that the underlying mesh may be involved with the MRSA infection. \par \par If drainage does not resolve with a course of doxycycline, I would have a high concern that the mesh is involved. \par \par Take doxycycline with food, do not lie down for 45 minutes post dose, avoid direct sunlight while on doxycycline\par \par Case discussed with surgery, patient will follow up with surgery\par \par 2) History of C Diff\par Monitor for diarrhea\par \par 3) Notes cough\par concern for bronchitis\par Had COVID vaccine times two\par Follow up with his pmd\par I offered the patient a COVID test, he stated he wanted to see his pmd tomorrow for evaluation.\par \par Follow up in 2-3 weeks\par \par \par

## 2022-04-27 NOTE — HISTORY OF PRESENT ILLNESS
[FreeTextEntry1] : 57 year old with prior bowel resection for diverticulitis with subseqeunt development of a hernia. \par \par S/p hernia repair with mesh in August.\par \par Since that time, he has noted some drainage superior to the umbilicus. \par \par He recently developed a new hernia near the umbilicus.\par \par He had surgery planned- but it was cancelled when the wound superior to the umbilicus was noted. \par He denies fever. \par \par

## 2022-04-27 NOTE — DATA REVIEWED
[FreeTextEntry1] :  4/22\par \par ACC: [id number] EXAM:  CT ABDOMEN AND PELVIS IC                      \par \par PROCEDURE DATE:  04/22/2022  \par \par \par \par INTERPRETATION:  CLINICAL INFORMATION: Abdominal wall collection\par \par COMPARISON: CT abdomen and pelvis 3/8/2022\par \par CONTRAST/COMPLICATIONS:\par IV Contrast: Omnipaque 350  90 cc administered   10 cc discarded\par Oral Contrast: NONE\par Complications: None reported at time of study completion\par \par PROCEDURE:\par CT of the Abdomen and Pelvis was performed.\par Sagittal and coronal reformats were performed.\par \par FINDINGS: Motion artifacts degrade some of the imaging.\par LOWER CHEST: Coronary artery calcification. Mild bibasilar subsegmental \par atelectasis.\par Mild asymmetric elevation of the right hemidiaphragm.\par \par LIVER: Hepatomegaly measures 18 cm. Lobular left lobe subcapsular 5.6 cm \par cyst\par BILE DUCTS: Normal caliber.\par GALLBLADDER: Nondistended. Contains sludge versus noncalcified gallstones.\par SPLEEN: Within normal limits.\par PANCREAS: Within normal limits.\par ADRENALS: Within normal limits.\par KIDNEYS/URETERS: Symmetric renal enhancement without hydronephrosis.\par Multiple small areas of right renal cortical scarring. Subcentimeter \par hypodensity suggested anteriorly within the left mid kidney is too small \par for definitive characterization\par \par BLADDER: Within normal limits.\par REPRODUCTIVE ORGANS: Prostate within normal limits.\par \par BOWEL: No bowel obstruction. Appendix is normal. Submucosal lipoma \par approximately measures 2.3 cm involving an upper central pelvic small \par bowel loop. Partial sigmoidectomy. Anterior herniation of small bowel \par into [place] periumbilical hernia\par PERITONEUM: No ascites.\par VESSELS: Atherosclerotic changes.\par RETROPERITONEUM/LYMPH NODES: Mild lower aortocaval and right common iliac \par lymphadenopathy, nonspecific\par ABDOMINAL WALL: Left groin surgical clip. Anterior abdominal wall \par postoperative change.\par Mild decrease in thickness of the preperitoneal anterior wall fluid \par collection now measuring 1.4 cm in thickness, previously 1.8 cm in \par thickness, extending for a craniocaudal length of approximately 11 cm (4, \par 100). There are a few locules of air within the superior aspect of this \par collection (3, 98)\par Small fat-containing hernia along the superior aspect of the \par postoperative change.\par Right periumbilical widemouthed hernia contains a small bowel loop \par without obstruction\par BONES: Multilevel degenerative changes throughout the spine.\par \par IMPRESSION:\par \par Thin elongated anterior abdominal wall collection containing a few \par locules of air as described suspicious for an abscess\par \par \par     Specimen  Surgical Swab  Last Updated At  [id number]  \par     Observation Date & Time  04- 10:05  Lab Number  [id number]  \par     Specimen Received Date & Time    04- 22:05  Order Number  [id number]  \par        Age at Time of Test  57 Years  \par \par \par  \par \par \par Test Item\par \par Value\par \par Comments\par \par Test Item Status\par \par Sensitivities\par \par \par  Culture Result \par Numerous Methicillin Resistant Staphylococcus aureus     Corrected   \par  ORGANISM \par Methicillin resistant Staphylococcus aureus       Yes \par   \par Methicillin resistant Staphylococcus aureus\par \par Minimum Inhibitory Concentration\par \par Ampicillin/Sulbactam\par \par R (<=8/4) \par \par \par Cefazolin\par \par R (8) \par \par \par Clindamycin\par \par R (>4) \par \par \par Daptomycin\par \par S (1) \par \par \par Erythromycin\par \par R (>4) \par \par \par Gentamicin\par \par S (<=1) \par \par \par Linezolid\par \par S (2) \par \par \par Oxacillin\par \par R (>2) \par \par \par Penicillin\par \par R (>8) \par \par \par Rifampin\par \par S (<=1) \par \par \par Tetra/Doxy\par \par S (<=1) \par \par \par Trimethoprim/Sulfamethoxazole\par \par S (<=0.5/9.5) \par \par \par Vancomycin\par \par S (2) \par \par  \par      \par

## 2022-04-28 ENCOUNTER — APPOINTMENT (OUTPATIENT)
Dept: INTERNAL MEDICINE | Facility: CLINIC | Age: 57
End: 2022-04-28

## 2022-04-29 DIAGNOSIS — A49.02 METHICILLIN RESISTANT STAPHYLOCOCCUS AUREUS INFECTION, UNSPECIFIED SITE: ICD-10-CM

## 2022-04-29 DIAGNOSIS — L02.211 CUTANEOUS ABSCESS OF ABDOMINAL WALL: ICD-10-CM

## 2022-05-05 ENCOUNTER — APPOINTMENT (OUTPATIENT)
Dept: INTERNAL MEDICINE | Facility: CLINIC | Age: 57
End: 2022-05-05

## 2022-05-11 ENCOUNTER — APPOINTMENT (OUTPATIENT)
Dept: INFECTIOUS DISEASE | Facility: CLINIC | Age: 57
End: 2022-05-11

## 2022-05-16 ENCOUNTER — APPOINTMENT (OUTPATIENT)
Dept: CARDIOLOGY | Facility: CLINIC | Age: 57
End: 2022-05-16
Payer: MEDICAID

## 2022-05-16 ENCOUNTER — APPOINTMENT (OUTPATIENT)
Dept: INFECTIOUS DISEASE | Facility: CLINIC | Age: 57
End: 2022-05-16
Payer: MEDICAID

## 2022-05-16 ENCOUNTER — OUTPATIENT (OUTPATIENT)
Dept: OUTPATIENT SERVICES | Facility: HOSPITAL | Age: 57
LOS: 1 days | End: 2022-05-16
Payer: MEDICAID

## 2022-05-16 VITALS
HEART RATE: 97 BPM | HEIGHT: 72 IN | OXYGEN SATURATION: 98 % | SYSTOLIC BLOOD PRESSURE: 125 MMHG | TEMPERATURE: 95.2 F | DIASTOLIC BLOOD PRESSURE: 88 MMHG

## 2022-05-16 VITALS
OXYGEN SATURATION: 96 % | HEART RATE: 106 BPM | HEIGHT: 72 IN | SYSTOLIC BLOOD PRESSURE: 132 MMHG | DIASTOLIC BLOOD PRESSURE: 85 MMHG | TEMPERATURE: 97.3 F

## 2022-05-16 DIAGNOSIS — Z98.890 OTHER SPECIFIED POSTPROCEDURAL STATES: Chronic | ICD-10-CM

## 2022-05-16 DIAGNOSIS — B97.89 OTHER VIRAL AGENTS AS THE CAUSE OF DISEASES CLASSIFIED ELSEWHERE: ICD-10-CM

## 2022-05-16 DIAGNOSIS — Z90.49 ACQUIRED ABSENCE OF OTHER SPECIFIED PARTS OF DIGESTIVE TRACT: Chronic | ICD-10-CM

## 2022-05-16 PROCEDURE — 99213 OFFICE O/P EST LOW 20 MIN: CPT

## 2022-05-16 PROCEDURE — 99214 OFFICE O/P EST MOD 30 MIN: CPT

## 2022-05-16 PROCEDURE — G0463: CPT

## 2022-05-16 NOTE — PHYSICAL EXAM
[Normal Oral Mucosa] : normal oral mucosa [No Oral Pallor] : no oral pallor [No Oral Cyanosis] : no oral cyanosis [Normal Jugular Venous A Waves Present] : normal jugular venous A waves present [Normal Jugular Venous V Waves Present] : normal jugular venous V waves present [No Jugular Venous Ramirez A Waves] : no jugular venous ramirez A waves [Respiration, Rhythm And Depth] : normal respiratory rhythm and effort [Exaggerated Use Of Accessory Muscles For Inspiration] : no accessory muscle use [Auscultation Breath Sounds / Voice Sounds] : lungs were clear to auscultation bilaterally [Heart Rate And Rhythm] : heart rate and rhythm were normal [Heart Sounds] : normal S1 and S2 [Murmurs] : no murmurs present [Abdomen Soft] : soft [Abdomen Tenderness] : non-tender [] : no hepato-splenomegaly [Abdomen Mass (___ Cm)] : no abdominal mass palpated [FreeTextEntry1] : bilateral edema, soft pitting edema.  lipodermatosclerosis.  + stemmers

## 2022-05-16 NOTE — HISTORY OF PRESENT ILLNESS
[FreeTextEntry1] : Pt with abd wall hernia repaired with mesh with recent ulcer draining mrsa.\par \par He completed a 7 d course of antibiotics with doxycycline- At the end of the course, his wound opened and drained purulent material.\par \par Since that time about two weeks ago, he has not had any more drainage from the midline incision.\par He denies fever or pain. \par \par

## 2022-05-16 NOTE — REASON FOR VISIT
[Follow-Up - Clinic] : a clinic follow-up of [FreeTextEntry2] : Edema [FreeTextEntry1] : 5/16/2022\par Here for followup visit. \par Dr. Contreras is cardiology \par Seen by ID for MRSA infection - seeing ID later today\par Went to lymphedema therapy one time, he was measured.\par He was given compression garments.\par He wears compression sometimes. \par Legs are improved a bit, but still swollen.\par The left is a bit more swollen than the right.\par He remains on eliquis\par There is no date for surgery yet, seeing ID later today  \par \par He is being followed by Dr. Yarbrough - needs CT abdomen soon\par

## 2022-05-16 NOTE — ASSESSMENT
[FreeTextEntry1] : 57 year old with CAD with a prior colon resection for diverticulitis and a hernia repair with mesh who rina s/p treatment for an MRSA abscess to the anterior abdomen.\par \par At this time, the wound is not draining.  There is not any surrounding erythema.  He is without fever or pain.\par \par I hope that this was a superficial abscess that is now drained / treated. \par \par The presence of mesh in the abdominal wall is still concerning.  If the wound recurs, suspicion for mesh involvement would be high. \par \par Patient was advised to call with any new pain, erythema or drainage. \par \par \par

## 2022-05-16 NOTE — ASSESSMENT
[FreeTextEntry1] : Assessment:\par 1.  bilateral LE edema\par 2.  Overweight\par 3.  CAD\par 4. Afib\par 5.  Hernia\par 6.  Prior abdominal surgeries\par \par Plan\par 1.  He should go back to [facility] lymphedema therapy\par 2.  Continue lasix  can up-titrate if more edema.  \par 3. Patient has  lymphedema and has tried conservative therapies such as compression stockings and leg elevation for over one month, but still remains symptomatic.  There is hyperpigmentation and lipodermatosclerosis on exam.  Will arrange for a pneumatic pump. \par 4.  Continue compression garments as much as possible\par 5.  Followup with ID\par 6.  Appreciate cardiac care with Dr. Contreras.

## 2022-05-19 DIAGNOSIS — A49.02 METHICILLIN RESISTANT STAPHYLOCOCCUS AUREUS INFECTION, UNSPECIFIED SITE: ICD-10-CM

## 2022-05-19 DIAGNOSIS — L02.211 CUTANEOUS ABSCESS OF ABDOMINAL WALL: ICD-10-CM

## 2022-05-19 NOTE — DISCHARGE NOTE NURSING/CASE MANAGEMENT/SOCIAL WORK - DATE OF LAST VACCINATION
-Mgmt as per primary team/critical care    5/19/22  -TTE yesterday showed significant right to left interatrial shunt  -Patient not candidate for invasive procedures given frailty/unstable sats  -Palliative on board   15-Mar-2021

## 2022-05-24 ENCOUNTER — RX RENEWAL (OUTPATIENT)
Age: 57
End: 2022-05-24

## 2022-05-24 RX ORDER — METOPROLOL SUCCINATE 25 MG/1
25 TABLET, EXTENDED RELEASE ORAL DAILY
Qty: 30 | Refills: 11 | Status: ACTIVE | COMMUNITY
Start: 2021-09-20 | End: 1900-01-01

## 2022-06-14 ENCOUNTER — APPOINTMENT (OUTPATIENT)
Dept: SURGERY | Facility: CLINIC | Age: 57
End: 2022-06-14
Payer: MEDICAID

## 2022-06-14 VITALS
OXYGEN SATURATION: 97 % | HEART RATE: 99 BPM | RESPIRATION RATE: 16 BRPM | TEMPERATURE: 97.6 F | DIASTOLIC BLOOD PRESSURE: 95 MMHG | SYSTOLIC BLOOD PRESSURE: 157 MMHG

## 2022-06-14 DIAGNOSIS — Z86.14 PERSONAL HISTORY OF METHICILLIN RESISTANT STAPHYLOCOCCUS AUREUS INFECTION: ICD-10-CM

## 2022-06-14 PROCEDURE — 99213 OFFICE O/P EST LOW 20 MIN: CPT

## 2022-06-14 NOTE — HISTORY OF PRESENT ILLNESS
[de-identified] : Del is a 57 year old male here to discuss surgery. \par s/p incisional hernia repair with mesh with myofascial flap advancement, preservation of neurovascular bundles along with a one-sided anterior component separation 8/2/21. s/p Open John's reversal, repair of incisional parastomal hernia 6/28/19 by Dr. Stein. \par s/p Flexible sigmoidoscopy, Diagnostic laparoscopy, open John's resection, Cystorrhaphy 3/12/19 by Dr. Stein. \par CT abdomen pelvis 3/8/22- small right paraumbilical ventral hernia containing unobstructed bowel. Tiny fat containing right inguinal hernia. Stable small bowel lipoma measures 1.7 cm. \par Last seen 3/29/22- Recurrent incisional hernia which is just below the umbilicus has increased somewhat in size but is still reducible. \par Culture 4/27/22- Few Methicillin Resistant Staphylococcus aureus. \par \par Pt reports no drainage from abdomen for over two weeks until today 6/14. Formed BMs daily. Decreased appetite, no nausea, no vomiting. Denies fever and chills.

## 2022-06-14 NOTE — ASSESSMENT
[FreeTextEntry1] : I again discussed with the patient the issues with infected mesh.  He understands that take this mesh out is a major operation and to close the abdomen we would have to use a biologic which sets him up for another incisional hernia.\par \par I have told the patient that if the only thing he has is a small occasionally draining sinus that he should live with it until he loses weight.  The patient continues to refuse to understand that a good percentage of his obesity was secondary to caloric intake and though exercise would help in taking the weight off he should see a nutritionist to help him with his diet.  He again says he does not eat much anyhow.\par \par The patient is to return in 1 week

## 2022-06-14 NOTE — PHYSICAL EXAM
[de-identified] : Just today there was some serous drainage from the mid-point of the \par midline incision - culture was taken

## 2022-06-17 ENCOUNTER — NON-APPOINTMENT (OUTPATIENT)
Age: 57
End: 2022-06-17

## 2022-06-20 LAB — BACTERIA WND CULT: ABNORMAL

## 2022-06-20 NOTE — HISTORY OF PRESENT ILLNESS
[de-identified] : Del is a 57 year old male here for follow up. \par s/p incisional hernia repair with mesh with myofascial flap advancement, preservation of neurovascular bundles along with a one-sided anterior component separation 8/2/21. s/p Open John's reversal, repair of incisional parastomal hernia 6/28/19 by Dr. Stein. \par s/p Flexible sigmoidoscopy, Diagnostic laparoscopy, open John's resection, Cystorrhaphy 3/12/19 by Dr. Stein. \par

## 2022-06-21 ENCOUNTER — APPOINTMENT (OUTPATIENT)
Dept: SURGERY | Facility: CLINIC | Age: 57
End: 2022-06-21

## 2022-06-23 ENCOUNTER — APPOINTMENT (OUTPATIENT)
Dept: SURGERY | Facility: CLINIC | Age: 57
End: 2022-06-23
Payer: MEDICAID

## 2022-06-23 VITALS
HEART RATE: 103 BPM | RESPIRATION RATE: 17 BRPM | TEMPERATURE: 96.4 F | DIASTOLIC BLOOD PRESSURE: 100 MMHG | SYSTOLIC BLOOD PRESSURE: 153 MMHG | OXYGEN SATURATION: 96 %

## 2022-06-23 PROCEDURE — 99214 OFFICE O/P EST MOD 30 MIN: CPT

## 2022-06-23 NOTE — PHYSICAL EXAM
[JVD] : no jugular venous distention  [de-identified] : Morbidly obese white male in no acute distress [de-identified] : Thin normal limits [de-identified] : The infraumbilical recurrence of the incisional hernia is easily reducible and nontender

## 2022-06-23 NOTE — HISTORY OF PRESENT ILLNESS
[de-identified] : Del is a 57 year old male here to discuss surgery.  s/p incisional hernia repair with mesh with myofascial flap advancement, preservation of neurovascular bundles along with a one-sided anterior component separation 8/2/21. s/p Open John's reversal, repair of incisional parastomal hernia 6/28/19 by Dr. Stein. \par s/p Flexible sigmoidoscopy, Diagnostic laparoscopy, open John's resection, Cystorrhaphy 3/12/19 by Dr. Stein.  The patient had an incisional hernia repair with an intra-abdominal ventral EO ST mesh.  CT abdomen pelvis 3/8/22- small right paraumbilical ventral hernia containing unobstructed bowel. Tiny fat containing right inguinal hernia. Stable small bowel lipoma measures 1.7 cm.  Patient is here today to discuss the small infraumbilical recurrence.  And the indications for surgery.\par Last seen 3/29/22- Recurrent incisional hernia which is just below the umbilicus has increased somewhat in size but is still reducible. \par Culture 4/27/22- Few Methicillin Resistant Staphylococcus aureus. \par

## 2022-06-23 NOTE — ASSESSMENT
[FreeTextEntry1] : I had a long and detailed discussion with the patient that due to his abdominal girth and morbid obesity that any surgical correction of this recurrence would lead to another recurrence.  I have told the patient that his best options are to lose weight.  I have also told the patient that that small area that had a's serous drainage from the midportion of the wound which is now completely healed showed a few MRSA bacteria.  I have given the patient a copy of that report.\par \par As the patient agrees that he has no discomfort from this and that the problems with any surgery due to his morbid obesity we will delay any repair of this.  The patient will call me if he starts to develop any symptoms from this hernia.

## 2022-07-28 ENCOUNTER — RX RENEWAL (OUTPATIENT)
Age: 57
End: 2022-07-28

## 2022-07-28 RX ORDER — ATORVASTATIN CALCIUM 80 MG/1
80 TABLET, FILM COATED ORAL
Qty: 30 | Refills: 8 | Status: ACTIVE | COMMUNITY
Start: 2020-08-20 | End: 1900-01-01

## 2022-08-03 NOTE — H&P ADULT - NEGATIVE GENERAL SYMPTOMS
no fatigue/no weight gain/no polyphagia/no polyuria/no weight loss/no chills/no sweating/no polydipsia/no malaise/no anorexia/no fever - - -

## 2022-09-21 ENCOUNTER — APPOINTMENT (OUTPATIENT)
Dept: SURGERY | Facility: CLINIC | Age: 57
End: 2022-09-21

## 2022-09-21 VITALS
WEIGHT: 315 LBS | RESPIRATION RATE: 23 BRPM | BODY MASS INDEX: 42.66 KG/M2 | OXYGEN SATURATION: 98 % | HEIGHT: 72 IN | SYSTOLIC BLOOD PRESSURE: 147 MMHG | TEMPERATURE: 97.2 F | HEART RATE: 98 BPM | DIASTOLIC BLOOD PRESSURE: 97 MMHG

## 2022-09-21 DIAGNOSIS — L02.211 CUTANEOUS ABSCESS OF ABDOMINAL WALL: ICD-10-CM

## 2022-09-21 PROCEDURE — 99213 OFFICE O/P EST LOW 20 MIN: CPT

## 2022-09-21 NOTE — PATIENT PROFILE ADULT - LAST TOBACCO USE (DD-MM-YY)
[FreeTextEntry1] : CAROLINE ROBERTO is a 70 year old left hand dominant male with PMH of macular degeneration (no vision loss) and EtOH use (daily 6-7 beers/day), recently returned from [facility] on 5/21 “not feeling well,” presented to [facility] with acute expressive aphasia/one word answers, CTH showed L MCA territory infarct in L frontal lobe, CTA showed severe focal stenosis of left supraclinoid ICA, transferred to [facility] for tertiary stroke treatment on 5/22. Diagnostic Cerebral angiogram on 5/23/2022 demonstrated severe stenosis with near complete occlusion of the supraclinoid left ICA causing intracranial flow limitation. On 5/26, acute worsening right sided weakness s/p intracranial stenting of the left supraclinoid ICA using Resolute drug eluting stent resulting in significant improvement in vessel caliber and flow throughout the left MCA territory. There is residual moderate to severe narrowing within the proximal M1 segment of the left MCA. Discharged home on aspirin 81mg daily and Brilinta 60mg BID.\par \par \par Today, he is doing overall neurologically well. He no longer uses a cane to ambulate for assistance. States his speech has improved, still doing speech therapy. Denies weakness, numbness, tingling on the right side. Wife reports swelling of his right foot, has followed up with rheumatologist.  26-Jul-2020

## 2022-09-21 NOTE — REASON FOR VISIT
[Follow-Up: _____] : a [unfilled] follow-up visit [FreeTextEntry1] : Recurrent incisional hernia and abdominal wall abscess

## 2022-09-21 NOTE — HISTORY OF PRESENT ILLNESS
[FreeTextEntry1] : Del is a 57 year old male here to follow up .\par \par S/p Open John's reversal, repair of incisional parastomal hernia 6/28/19 by Dr. Stein. \par \par S/P Incisional hernia repair with mesh with myofascial flap advancement, preservation of neurovascular bundles along with a one-sided anterior component separation on 8/2/21 by Dr. Yarbrough. \par \par Two cardiac stents placed in 2020 July.\par \par Today pt reports no pain, has had 2 days of pain of incision area and below the umbical site. Feels two bulges, one where pt had sx before and below umbical area (right side). Does have drainage, swelling, and redness of surgical incision. Denies fever and chills. Taking baby aspirin for stent placement in 2020. \par \par \par

## 2022-09-23 ENCOUNTER — RESULT REVIEW (OUTPATIENT)
Age: 57
End: 2022-09-23

## 2022-09-26 ENCOUNTER — APPOINTMENT (OUTPATIENT)
Dept: CARDIOLOGY | Facility: CLINIC | Age: 57
End: 2022-09-26

## 2022-09-27 ENCOUNTER — OUTPATIENT (OUTPATIENT)
Dept: OUTPATIENT SERVICES | Facility: HOSPITAL | Age: 57
LOS: 1 days | End: 2022-09-27
Payer: MEDICAID

## 2022-09-27 ENCOUNTER — APPOINTMENT (OUTPATIENT)
Dept: CT IMAGING | Facility: IMAGING CENTER | Age: 57
End: 2022-09-27

## 2022-09-27 DIAGNOSIS — Z00.8 ENCOUNTER FOR OTHER GENERAL EXAMINATION: ICD-10-CM

## 2022-09-27 DIAGNOSIS — Z09 ENCOUNTER FOR FOLLOW-UP EXAMINATION AFTER COMPLETED TREATMENT FOR CONDITIONS OTHER THAN MALIGNANT NEOPLASM: ICD-10-CM

## 2022-09-27 DIAGNOSIS — Z98.890 OTHER SPECIFIED POSTPROCEDURAL STATES: Chronic | ICD-10-CM

## 2022-09-27 DIAGNOSIS — Z90.49 ACQUIRED ABSENCE OF OTHER SPECIFIED PARTS OF DIGESTIVE TRACT: Chronic | ICD-10-CM

## 2022-09-27 PROCEDURE — 74176 CT ABD & PELVIS W/O CONTRAST: CPT | Mod: 26

## 2022-09-27 PROCEDURE — 74176 CT ABD & PELVIS W/O CONTRAST: CPT

## 2022-09-28 ENCOUNTER — APPOINTMENT (OUTPATIENT)
Dept: CARDIOLOGY | Facility: CLINIC | Age: 57
End: 2022-09-28

## 2022-09-28 ENCOUNTER — NON-APPOINTMENT (OUTPATIENT)
Age: 57
End: 2022-09-28

## 2022-09-28 VITALS — SYSTOLIC BLOOD PRESSURE: 140 MMHG | DIASTOLIC BLOOD PRESSURE: 78 MMHG

## 2022-09-28 VITALS
OXYGEN SATURATION: 92 % | HEART RATE: 104 BPM | TEMPERATURE: 97.4 F | WEIGHT: 315 LBS | DIASTOLIC BLOOD PRESSURE: 104 MMHG | SYSTOLIC BLOOD PRESSURE: 170 MMHG | RESPIRATION RATE: 12 BRPM | BODY MASS INDEX: 55.47 KG/M2

## 2022-09-28 PROCEDURE — 93000 ELECTROCARDIOGRAM COMPLETE: CPT

## 2022-09-28 PROCEDURE — 99214 OFFICE O/P EST MOD 30 MIN: CPT | Mod: 25

## 2022-09-28 RX ORDER — FUROSEMIDE 40 MG/1
40 TABLET ORAL
Qty: 180 | Refills: 3 | Status: ACTIVE | COMMUNITY
Start: 2022-01-06 | End: 1900-01-01

## 2022-09-30 ENCOUNTER — NON-APPOINTMENT (OUTPATIENT)
Age: 57
End: 2022-09-30

## 2022-09-30 ENCOUNTER — RX RENEWAL (OUTPATIENT)
Age: 57
End: 2022-09-30

## 2022-09-30 NOTE — DISCUSSION/SUMMARY
[FreeTextEntry1] : The patient is a 57-year-old anxious gentleman prior cocaine use, CAD, DAMION, PAF, s/p Blandon pouch with reversal and s/p incisional hernia repair who is anxious and dyspneic. Refusing hospitalization.\par #1 CV- no angina, normal EF, continue aspirin for RCA stent, possible diastolic heart failure, increase lasix to bid and reevaluate\par #2 Lipids- continue atorvastatin\par #3 PAF- continue digoxin and toprol 25mg\par #4 Anxiety- recommend taking his sertraline, f/u psych\par #5 Pulm- DAMION\par #6 Surgery- f/u Dr. Yarbrough/Emil\par #7 PAD- lymphedema, lasix increased as well\par  [EKG obtained to assist in diagnosis and management of assessed problem(s)] : EKG obtained to assist in diagnosis and management of assessed problem(s)

## 2022-09-30 NOTE — REVIEW OF SYSTEMS
[SOB] : shortness of breath [Dyspnea on exertion] : dyspnea during exertion [Chest Discomfort] : no chest discomfort [Lower Ext Edema] : lower extremity edema [Leg Claudication] : no intermittent leg claudication [Palpitations] : no palpitations [Syncope] : no syncope [Negative] : Heme/Lymph

## 2022-09-30 NOTE — HISTORY OF PRESENT ILLNESS
[FreeTextEntry1] : Del had his surgery but has been very swollen and SOB. TOld he needs a CXR. No CP, palpitaitons or dizziness. Very agitated and trouble breathing.

## 2022-09-30 NOTE — PHYSICAL EXAM
[Well Developed] : well developed [Well Nourished] : well nourished [No Acute Distress] : no acute distress [Normal Conjunctiva] : normal conjunctiva [Normal Venous Pressure] : normal venous pressure [No Carotid Bruit] : no carotid bruit [Normal S1, S2] : normal S1, S2 [No Murmur] : no murmur [No Rub] : no rub [No Gallop] : no gallop [Good Air Entry] : good air entry [No Respiratory Distress] : no respiratory distress  [Soft] : abdomen soft [Non Tender] : non-tender [No Masses/organomegaly] : no masses/organomegaly [Normal Bowel Sounds] : normal bowel sounds [Normal Gait] : normal gait [No Cyanosis] : no cyanosis [No Clubbing] : no clubbing [No Varicosities] : no varicosities [Edema ___] : edema [unfilled] [No Rash] : no rash [No Skin Lesions] : no skin lesions [Moves all extremities] : moves all extremities [No Focal Deficits] : no focal deficits [Normal Speech] : normal speech [Alert and Oriented] : alert and oriented [Normal memory] : normal memory [de-identified] : difficult to ascultate

## 2022-10-20 ENCOUNTER — OUTPATIENT (OUTPATIENT)
Dept: OUTPATIENT SERVICES | Facility: HOSPITAL | Age: 57
LOS: 1 days | Discharge: STILL ADMITTED OR ADMINISTRATIVE | End: 2022-10-20

## 2022-10-20 DIAGNOSIS — Z98.890 OTHER SPECIFIED POSTPROCEDURAL STATES: Chronic | ICD-10-CM

## 2022-10-20 DIAGNOSIS — Z90.49 ACQUIRED ABSENCE OF OTHER SPECIFIED PARTS OF DIGESTIVE TRACT: Chronic | ICD-10-CM

## 2022-10-20 PROCEDURE — 99214 OFFICE O/P EST MOD 30 MIN: CPT | Mod: 95

## 2022-10-21 DIAGNOSIS — F32.2 MAJOR DEPRESSIVE DISORDER, SINGLE EPISODE, SEVERE WITHOUT PSYCHOTIC FEATURES: ICD-10-CM

## 2022-11-07 ENCOUNTER — APPOINTMENT (OUTPATIENT)
Dept: CARDIOLOGY | Facility: CLINIC | Age: 57
End: 2022-11-07

## 2022-11-10 NOTE — ED ADULT NURSE NOTE - TEMPLATE
39808/1     Jackelyn Grider MRN: [id number]  AGE: 86 year old  ADMIT DATE: 11/8/2022    CODE STATUS: Full Resuscitation  ISOLATION STATUS: Contact and Droplet   DIET: Consistent Carb Moderate (45-75 Gm/meal) Diet    ALLERGIES:  Codeine, Contrast media, No name available, and Seasonal     DX:COVID-19 virus infection  (primary encounter diagnosis)  Moderate persistent asthma with exacerbation     Att: Eulogio Traore MD  PCP: Bethanie Otto MD  IP Consult Orders (From admission, onward)                 Start     Ordered    11/08/22 1415  Inpatient consult to Pulmonology  ONE TIME        Provider:  Dano Ahmadi MD    11/08/22 1415                        BP: 120/66  Temp: 97.7 °F (36.5 °C)  Temp src: Oral  Heart Rate: (!) 51  Resp: 18  SpO2: 99 %  Height: 5' 4\" (162.6 cm)  Weight: 74.2 kg (163 lb 9.3 oz)     Weight change:      Recent Labs   Lab 11/09/22  0710 11/09/22  1200 11/09/22  1645 11/09/22  2141   GLUCOSE BEDSIDE 122* 88 95 115*        Creatinine (mg/dL)   Date Value   11/09/2022 0.80   11/08/2022 0.80     WBC (K/mcL)   Date Value   11/09/2022 6.8   11/08/2022 4.7        No intake/output data recorded.                         IMPORTANT EVENTS THIS SHIFT:  - No acute events this shift. All needs tended to. Safety maintained. Possible discharge tomorrow.   - Telemetry monitoring discontinued  - Patient had elevated BP with headache and blurred vision. PRN Hydralazine order received from MD and given, see MAR. BP and symptoms improved. Vitals WNL in AM.  IMPORTANT EVENTS COMING UP/GOALS (PLEASE INCLUDE WHITE BOARD AND DISCHARGE BOARD UPDATES):     PATIENT SPECIAL NEEDS/ACCOMMODATIONS:              Problem: At Risk for Falls  Goal: # Patient does not fall  Outcome: Outcome Met, Continue evaluating goal progress toward completion  Goal: # Takes action to control fall-related risks  Outcome: Outcome Met, Continue evaluating goal progress toward completion  Goal: # Verbalizes understanding of fall  risk/precautions  Description: Document education using the patient education activity  Outcome: Outcome Met, Continue evaluating goal progress toward completion     Problem: At Risk for Injury Due to Fall  Goal: # Patient does not fall  Outcome: Outcome Met, Continue evaluating goal progress toward completion  Goal: # Takes action to control condition specific risks  Outcome: Outcome Met, Continue evaluating goal progress toward completion  Goal: # Verbalizes understanding of fall-related injury personal risks  Description: Document education using the patient education activity  Outcome: Outcome Met, Continue evaluating goal progress toward completion     Problem: Breathing Pattern Ineffective  Goal: Air exchange is effective, demonstrated by Sp02 sat of greater then or = 92% (or as ordered)  Outcome: Outcome Met, Continue evaluating goal progress toward completion  Goal: Respiratory pattern is quiet and regular without report of SOB  Outcome: Outcome Met, Continue evaluating goal progress toward completion  Goal: Breathing pattern demonstrates minimal apnea during sleep with appropriate use of airway pressure support devices  Outcome: Outcome Met, Continue evaluating goal progress toward completion  Goal: Verbalizes/demonstrates effective breathing management strategies  Description: Document education using the patient education activity.   Outcome: Outcome Met, Continue evaluating goal progress toward completion     Problem: Pain  Goal: #Acceptable pain level achieved/maintained at rest using NRS/Faces  Description: This goal is used for patients who can self-report.  Acceptable means the level is at or below the identified comfort/function goal.  Outcome: Outcome Met, Continue evaluating goal progress toward completion  Goal: # Acceptable pain level achieved/maintained with activity using NRS/Faces  Description: This goal is used for patients who can self-report and are not achieving acceptable pain control  during activity.  Outcome: Outcome Met, Continue evaluating goal progress toward completion  Goal: # Verbalizes understanding of pain management  Description: Documented in Patient Education Activity  Outcome: Outcome Met, Continue evaluating goal progress toward completion      Abdominal Pain, N/V/D

## 2022-11-21 ENCOUNTER — APPOINTMENT (OUTPATIENT)
Dept: SURGERY | Facility: CLINIC | Age: 57
End: 2022-11-21

## 2022-11-23 ENCOUNTER — APPOINTMENT (OUTPATIENT)
Dept: ORTHOPEDIC SURGERY | Facility: CLINIC | Age: 57
End: 2022-11-23

## 2022-11-23 ENCOUNTER — NON-APPOINTMENT (OUTPATIENT)
Age: 57
End: 2022-11-23

## 2022-11-23 DIAGNOSIS — M79.641 PAIN IN RIGHT HAND: ICD-10-CM

## 2022-11-23 PROCEDURE — 73110 X-RAY EXAM OF WRIST: CPT | Mod: RT

## 2022-11-23 PROCEDURE — 99204 OFFICE O/P NEW MOD 45 MIN: CPT

## 2022-11-23 PROCEDURE — 76881 US COMPL JOINT R-T W/IMG: CPT | Mod: RT

## 2022-11-23 PROCEDURE — 73130 X-RAY EXAM OF HAND: CPT | Mod: RT

## 2022-11-23 NOTE — REVIEW OF SYSTEMS
[Right] : right [Negative] : Allergic/Immunologic [FreeTextEntry5] : See HPI [de-identified] : See HPI

## 2022-11-23 NOTE — HISTORY OF PRESENT ILLNESS
[Right] : right hand dominant [FreeTextEntry1] : He comes in today for evaluation of bilateral hand numbness and tingling and burning.  He is quite bothered by this symptom.\par \par He also has complaints of a growth at his right wrist which is painful.\par \par He has a complex past medical history which includes coronary artery disease, status post stents.  He has had an MI in the past.  He takes Eliquis.  He also has a history of atrial fibrillation and spinal stenosis.\par \par He is disabled.\par \par He was referred by Dr. Bassett.\par \par EMGs dated 11/10/2022 demonstrated severe bilateral carpal tunnel syndrome.\par \par

## 2022-11-23 NOTE — DISCUSSION/SUMMARY
[FreeTextEntry1] : He has findings consistent with severe right great and left carpal tunnel syndrome as well as a symptomatic right wrist multiloculated volar ganglion cyst.\par \par I had a discussion regarding today's visit, the prognosis of this diagnosis and treatment recommendations and options. \par \par Given his symptoms as well as EMG results, he would like to go ahead and schedule surgery.  He will be initially scheduled for a right open carpal tunnel release.  At the same time, I will remove the ganglion cyst which is bothering him.  He will see his cardiologist for preoperative clearance.  Once he is recovered from the right side, then he will schedule left open carpal tunnel release.\par \par -  The nature and purposes of open carpal tunnel release, in addition to ganglion cyst excision, was discussed in detail with the patient. I discussed with the patient that I will be performing an open carpal tunnel release, as opposed to an endoscopic release.  We discussed the surgical procedures in detail, as well as expected postoperative recovery and outcome.\par -  Possible risks, benefits and complications (from known and unknown causes) of the procedure were discussed in detail.  \par -  Possible non-operative alternatives to the proposed treatment were discussed in detail.  \par -  He / She was told that possible risks/complications include, but are not limited to:  Infection, nerve or vessel injury, stiffness, painful scar, poor outcome, need for additional surgical procedures, and other unforeseen complications.  \par -  In addition, the possibility of an "unsuccessful outcome," despite "successful surgery," was discussed with the patient.  The patient understands that nerve recovery after surgical release can be unpredictable, and there are no "guarantees" that the preoperative symptoms will completely resolve.  This is particularly true in his case, as he has severe carpal tunnel syndrome with evidence of severity on EMGs.  We also discussed the potential of recurrence of the ganglion cyst.\par -  The patient fully understands these risks and wishes to proceed.  \par -  I had a lengthy discussion with the patient regarding today's visit, the diagnosis, and my surgical treatment recommendations.  The patient has agreed to this plan of management and has expressed full understanding.  All questions were fully answered to the patient's satisfaction.\par \par My cumulative time spent on this patient's visit included: Preparation for the visit, review of the medical records, review of pertinent diagnostic studies, examination and counseling of the patient on the above diagnosis, treatment plan and prognosis, orders of diagnostic tests, medications and/or appropriate procedures and documentation in the medical records of today's visit.

## 2022-11-23 NOTE — CONSULT LETTER
[Dear  ___] : Dear  [unfilled], [Consult Letter:] : I had the pleasure of evaluating your patient, [unfilled]. [Please see my note below.] : Please see my note below. [Consult Closing:] : Thank you very much for allowing me to participate in the care of this patient.  If you have any questions, please do not hesitate to contact me. [Sincerely,] : Sincerely, [FreeTextEntry3] : Trev Templeton M.D.\par Surgery of the Hand & Upper Extremity\par Orthopaedic Surgery\par Chief, Hand Service, [facility]\par Director, Hand Service, [facility]\par  of Orthopedic Surgery, [employer] [employer] at [facility] \par [facility]\par \par Email: [email or web address] Office Phone: [phone]\par

## 2022-11-23 NOTE — PHYSICAL EXAM
[de-identified] : - Constitutional: This is an obese male in no obvious distress.  \par - Psych: Patient is alert and oriented to person, place and time.  Patient has a normal mood and affect.\par - Cardiovascular: Normal pulses throughout the upper extremities.  No significant varicosities are noted in the upper extremities. \par - Respiratory:  Patient exhibits no evidence of shortness of breath or difficulty breathing.\par - Skin: No rashes, lesions, or other abnormalities are noted in the upper extremities.\par \par ---\par \par Examination of his hands demonstrates no obvious atrophy.  He has decreased sensation to light touch as well as dysesthesias along the median nerve distribution with sensation intact along the radial and ulnar nerve distributions.  There is no evidence of trigger finger.  Provocative signs for carpal tunnel syndrome are equivocal.  He has what appears to be a multiloculated ganglion cyst volarly and radially.  It is tender.  There is negative Finkelstein sign. [de-identified] : A diagnostic ultrasound of his right wrist demonstrate findings consistent with multiloculated ganglion cyst.\par \par PA, lateral oblique radiographs of his right wrist and hand demonstrate no obvious abnormal soft tissue calcifications or arthritis.

## 2022-12-01 NOTE — PACU DISCHARGE NOTE - NSPTMEETSDISCHCRITERIADT_GEN_A_CORE
Lab Results   Component Value Date    HGBA1C 6 5 12/01/2022    HGBA1C 9 0 (H) 06/29/2022    HGBA1C 7 8 (H) 06/28/2021     Lab Results   Component Value Date    GLUF 154 (H) 06/29/2022    LDLCALC 106 (H) 06/29/2022    CREATININE 1 61 (H) 06/29/2022     Significant improvement in A1C with addition of glimepiride  He does feel like th medication has increased appetite, advised he try to control portions/snacking  Recommended checking blood sugar if he feels he might be low  Sample of one touch verio given today  Will continue current management though consider deescalating if he has lows, taking glimepiride on days with greater carbohydrate intake, ect  02-Aug-2021 18:57

## 2022-12-05 ENCOUNTER — APPOINTMENT (OUTPATIENT)
Dept: UROLOGY | Facility: CLINIC | Age: 57
End: 2022-12-05

## 2022-12-05 VITALS
DIASTOLIC BLOOD PRESSURE: 79 MMHG | HEIGHT: 72 IN | HEART RATE: 116 BPM | RESPIRATION RATE: 12 BRPM | OXYGEN SATURATION: 97 % | SYSTOLIC BLOOD PRESSURE: 134 MMHG

## 2022-12-05 PROCEDURE — 99204 OFFICE O/P NEW MOD 45 MIN: CPT

## 2022-12-05 NOTE — PHYSICAL EXAM
[General Appearance - Well Developed] : well developed [General Appearance - Well Nourished] : well nourished [Normal Appearance] : normal appearance [Well Groomed] : well groomed [General Appearance - In No Acute Distress] : no acute distress [Edema] : no peripheral edema [Respiration, Rhythm And Depth] : normal respiratory rhythm and effort [Exaggerated Use Of Accessory Muscles For Inspiration] : no accessory muscle use [Abdomen Soft] : soft [Abdomen Tenderness] : non-tender [Costovertebral Angle Tenderness] : no ~M costovertebral angle tenderness [Urethral Meatus] : meatus normal [Urinary Bladder Findings] : the bladder was normal on palpation [Scrotum] : the scrotum was normal [Testes Mass (___cm)] : there were no testicular masses [FreeTextEntry1] : Severe scrotal edema.  Retracted penis [Normal Station and Gait] : the gait and station were normal for the patient's age [] : no rash [No Focal Deficits] : no focal deficits [Oriented To Time, Place, And Person] : oriented to person, place, and time [Affect] : the affect was normal [Mood] : the mood was normal [Not Anxious] : not anxious [No Palpable Adenopathy] : no palpable adenopathy

## 2022-12-05 NOTE — ASSESSMENT
[FreeTextEntry1] : Very pleasant 57-year-old gentleman who presents for evaluation of severe scrotal edema\par -We discussed potential etiologies of severe scrotal edema, including fluid overload and lymphedema\par -Scrotal ultrasound\par -Scrotal elevation\par -Discussed the benefit of wearing tighter fitting briefs\par -Diuretics.  We discussed the importance of taking diuretics as prescribed\par -Follow-up in 1 week\par -We discussed the importance of monitoring for signs and symptoms of infection.  Though not present currently, we discussed the possibility of developing a infection and need for emergent incision and drainage if this were to occur

## 2022-12-05 NOTE — HISTORY OF PRESENT ILLNESS
[FreeTextEntry1] : Very pleasant 57-year-old gentleman with multiple medical problems presents for evaluation of scrotal edema.  He reports that over the last few weeks his scrotum initially became significantly swollen, however over the last 2 weeks it has decreased in size.  He reports a history of lymphedema for which he uses compression devices to treat lymphedema.  He is very bothered by the size of his scrotum.  He denies dysuria.  No difficulty urinating.

## 2022-12-12 ENCOUNTER — APPOINTMENT (OUTPATIENT)
Dept: UROLOGY | Facility: CLINIC | Age: 57
End: 2022-12-12

## 2022-12-28 ENCOUNTER — OUTPATIENT (OUTPATIENT)
Dept: OUTPATIENT SERVICES | Facility: HOSPITAL | Age: 57
LOS: 1 days | End: 2022-12-28
Payer: MEDICAID

## 2022-12-28 VITALS
OXYGEN SATURATION: 95 % | TEMPERATURE: 98 F | RESPIRATION RATE: 16 BRPM | HEART RATE: 106 BPM | SYSTOLIC BLOOD PRESSURE: 126 MMHG | DIASTOLIC BLOOD PRESSURE: 72 MMHG | WEIGHT: 315 LBS | HEIGHT: 70 IN

## 2022-12-28 DIAGNOSIS — M67.431 GANGLION, RIGHT WRIST: ICD-10-CM

## 2022-12-28 DIAGNOSIS — G47.30 SLEEP APNEA, UNSPECIFIED: ICD-10-CM

## 2022-12-28 DIAGNOSIS — I89.0 LYMPHEDEMA, NOT ELSEWHERE CLASSIFIED: ICD-10-CM

## 2022-12-28 DIAGNOSIS — Z90.49 ACQUIRED ABSENCE OF OTHER SPECIFIED PARTS OF DIGESTIVE TRACT: Chronic | ICD-10-CM

## 2022-12-28 DIAGNOSIS — G56.01 CARPAL TUNNEL SYNDROME, RIGHT UPPER LIMB: ICD-10-CM

## 2022-12-28 DIAGNOSIS — Z98.890 OTHER SPECIFIED POSTPROCEDURAL STATES: Chronic | ICD-10-CM

## 2022-12-28 DIAGNOSIS — Z79.01 LONG TERM (CURRENT) USE OF ANTICOAGULANTS: ICD-10-CM

## 2022-12-28 DIAGNOSIS — I25.10 ATHEROSCLEROTIC HEART DISEASE OF NATIVE CORONARY ARTERY WITHOUT ANGINA PECTORIS: Chronic | ICD-10-CM

## 2022-12-28 DIAGNOSIS — A49.02 METHICILLIN RESISTANT STAPHYLOCOCCUS AUREUS INFECTION, UNSPECIFIED SITE: ICD-10-CM

## 2022-12-28 DIAGNOSIS — Z01.818 ENCOUNTER FOR OTHER PREPROCEDURAL EXAMINATION: ICD-10-CM

## 2022-12-28 LAB
ALBUMIN SERPL ELPH-MCNC: 3 G/DL — LOW (ref 3.3–5)
ALP SERPL-CCNC: 101 U/L — SIGNIFICANT CHANGE UP (ref 30–120)
ALT FLD-CCNC: 15 U/L DA — SIGNIFICANT CHANGE UP (ref 10–60)
ANION GAP SERPL CALC-SCNC: 3 MMOL/L — LOW (ref 5–17)
AST SERPL-CCNC: 12 U/L — SIGNIFICANT CHANGE UP (ref 10–40)
BILIRUB SERPL-MCNC: 0.5 MG/DL — SIGNIFICANT CHANGE UP (ref 0.2–1.2)
BUN SERPL-MCNC: 15 MG/DL — SIGNIFICANT CHANGE UP (ref 7–23)
CALCIUM SERPL-MCNC: 8.6 MG/DL — SIGNIFICANT CHANGE UP (ref 8.4–10.5)
CHLORIDE SERPL-SCNC: 105 MMOL/L — SIGNIFICANT CHANGE UP (ref 96–108)
CO2 SERPL-SCNC: 34 MMOL/L — HIGH (ref 22–31)
CREAT SERPL-MCNC: 1.2 MG/DL — SIGNIFICANT CHANGE UP (ref 0.5–1.3)
EGFR: 71 ML/MIN/1.73M2 — SIGNIFICANT CHANGE UP
GLUCOSE SERPL-MCNC: 134 MG/DL — HIGH (ref 70–99)
HCT VFR BLD CALC: 40.5 % — SIGNIFICANT CHANGE UP (ref 39–50)
HGB BLD-MCNC: 11.9 G/DL — LOW (ref 13–17)
MCHC RBC-ENTMCNC: 23.6 PG — LOW (ref 27–34)
MCHC RBC-ENTMCNC: 29.4 GM/DL — LOW (ref 32–36)
MCV RBC AUTO: 80.2 FL — SIGNIFICANT CHANGE UP (ref 80–100)
NRBC # BLD: 0 /100 WBCS — SIGNIFICANT CHANGE UP (ref 0–0)
PLATELET # BLD AUTO: 285 K/UL — SIGNIFICANT CHANGE UP (ref 150–400)
POTASSIUM SERPL-MCNC: 4 MMOL/L — SIGNIFICANT CHANGE UP (ref 3.5–5.3)
POTASSIUM SERPL-SCNC: 4 MMOL/L — SIGNIFICANT CHANGE UP (ref 3.5–5.3)
PROT SERPL-MCNC: 7.2 G/DL — SIGNIFICANT CHANGE UP (ref 6–8.3)
RBC # BLD: 5.05 M/UL — SIGNIFICANT CHANGE UP (ref 4.2–5.8)
RBC # FLD: 16.8 % — HIGH (ref 10.3–14.5)
SODIUM SERPL-SCNC: 142 MMOL/L — SIGNIFICANT CHANGE UP (ref 135–145)
WBC # BLD: 9.23 K/UL — SIGNIFICANT CHANGE UP (ref 3.8–10.5)
WBC # FLD AUTO: 9.23 K/UL — SIGNIFICANT CHANGE UP (ref 3.8–10.5)

## 2022-12-28 PROCEDURE — 85027 COMPLETE CBC AUTOMATED: CPT

## 2022-12-28 PROCEDURE — 80053 COMPREHEN METABOLIC PANEL: CPT

## 2022-12-28 PROCEDURE — 36415 COLL VENOUS BLD VENIPUNCTURE: CPT

## 2022-12-28 PROCEDURE — G0463: CPT

## 2022-12-28 NOTE — H&P PST ADULT - HISTORY OF PRESENT ILLNESS
this is a 58 y/o male who has had numbness and pain right hand for about 1 yr and a cyst right wrist, to have right carpal tunnel release and removal of cyst right wrist

## 2022-12-28 NOTE — H&P PST ADULT - PROBLEM SELECTOR PLAN 1
right open carpal tunnel release, excision of right wrist ganglion cyst, covid test at outside lab 1/14-, preop instructions given, to go for medical, cardiac, vascular and GI clearance, ekg done by cardio

## 2022-12-28 NOTE — H&P PST ADULT - NSICDXPASTMEDICALHX_GEN_ALL_CORE_FT
PAST MEDICAL HISTORY:  AF (atrial fibrillation)     Diverticulitis     H/O ventral hernia     Heart attack     History of neck pain     History of neuropathy big toes    HLD (hyperlipidemia)     Lumbar spinal stenosis     Lymphedema     Morbid obesity     Sleep apnea

## 2022-12-28 NOTE — H&P PST ADULT - NSICDXPASTSURGICALHX_GEN_ALL_CORE_FT
PAST SURGICAL HISTORY:  Coronary heart disease     S/P colon resection 2019 with colostomy and reversal of colostomy    S/P hernia repair     S/P repair of hydrocele

## 2022-12-29 NOTE — PHYSICAL THERAPY INITIAL EVALUATION ADULT - DID THE PATIENT HAVE SURGERY?
yes/lap resection of sigmoid mass and sigmoidectomy Anesthesia Volume In Cc (Will Not Render If 0): 0.5 hypophonic

## 2023-01-01 NOTE — PROVIDER CONTACT NOTE (OTHER) - RECOMMENDATIONS
PFO noted on  echo. Family is aware that the PFO is a small hole between the left and right atria.  The PFO is necessary for fetal survival, and it usually closes after birth. However, approximately, 25% of adults have a PFO. Usually, there are no associated symptoms, and children with a PFO do not need activity restrictions or special care. In some patients, usually older adults, there is a small risk for migraine headaches and neurological sequelae that can occur with PFO if it remains patent. We emphasized the importance of regular follow-up and an echocardiogram in the future to document closure of the PFO. I will plan to repeat echo sometime between 2 and 4 years of age. I have instructed them to notify us of any concerning symptoms.  
no pain medication ordered
Hold Lopressor
MD Ashraf made aware
Tylenol?

## 2023-01-01 NOTE — ED ADULT NURSE REASSESSMENT NOTE - NS ED NURSE REASSESS COMMENT FT1
Pt to go to CT. Placed on 2L nasal cannula because patient says he feels short of breath and anxious, sating 100% on room air, placed on 2L nasal cannula for comfort. Pt to not received 3rd dose of Nitro due to pressure as per MD Diaz Statement Selected

## 2023-01-01 NOTE — ED ADULT NURSE NOTE - CAS EDN INTEG ASSESS
Cranial shape/Hawthorne(s) - size and tension/Scalp free of abrasions, defects, masses and swelling/Hair pattern normal
- - -

## 2023-01-05 PROBLEM — Z87.39 PERSONAL HISTORY OF OTHER DISEASES OF THE MUSCULOSKELETAL SYSTEM AND CONNECTIVE TISSUE: Chronic | Status: ACTIVE | Noted: 2022-12-28

## 2023-01-05 PROBLEM — I21.9 ACUTE MYOCARDIAL INFARCTION, UNSPECIFIED: Chronic | Status: ACTIVE | Noted: 2022-12-28

## 2023-01-05 PROBLEM — Z87.19 PERSONAL HISTORY OF OTHER DISEASES OF THE DIGESTIVE SYSTEM: Chronic | Status: ACTIVE | Noted: 2022-12-28

## 2023-01-05 PROBLEM — I48.91 UNSPECIFIED ATRIAL FIBRILLATION: Chronic | Status: ACTIVE | Noted: 2022-12-28

## 2023-01-05 PROBLEM — M48.061 SPINAL STENOSIS, LUMBAR REGION WITHOUT NEUROGENIC CLAUDICATION: Chronic | Status: ACTIVE | Noted: 2022-12-28

## 2023-01-05 PROBLEM — I89.0 LYMPHEDEMA, NOT ELSEWHERE CLASSIFIED: Chronic | Status: ACTIVE | Noted: 2022-12-28

## 2023-01-09 ENCOUNTER — APPOINTMENT (OUTPATIENT)
Dept: CARDIOLOGY | Facility: CLINIC | Age: 58
End: 2023-01-09
Payer: MEDICAID

## 2023-01-09 ENCOUNTER — NON-APPOINTMENT (OUTPATIENT)
Age: 58
End: 2023-01-09

## 2023-01-09 VITALS
HEIGHT: 72 IN | OXYGEN SATURATION: 95 % | BODY MASS INDEX: 42.66 KG/M2 | DIASTOLIC BLOOD PRESSURE: 80 MMHG | RESPIRATION RATE: 16 BRPM | TEMPERATURE: 97.3 F | WEIGHT: 315 LBS | SYSTOLIC BLOOD PRESSURE: 121 MMHG | HEART RATE: 100 BPM

## 2023-01-09 DIAGNOSIS — I50.31 ACUTE DIASTOLIC (CONGESTIVE) HEART FAILURE: ICD-10-CM

## 2023-01-09 PROCEDURE — 99214 OFFICE O/P EST MOD 30 MIN: CPT | Mod: 25

## 2023-01-09 PROCEDURE — 93000 ELECTROCARDIOGRAM COMPLETE: CPT

## 2023-01-09 NOTE — PHYSICAL EXAM
[General Appearance - Well Developed] : well developed [Normal Appearance] : normal appearance [Well Groomed] : well groomed [General Appearance - Well Nourished] : well nourished [No Deformities] : no deformities [General Appearance - In No Acute Distress] : no acute distress [Normal Conjunctiva] : the conjunctiva exhibited no abnormalities [Eyelids - No Xanthelasma] : the eyelids demonstrated no xanthelasmas [Normal Oral Mucosa] : normal oral mucosa [No Oral Pallor] : no oral pallor [No Oral Cyanosis] : no oral cyanosis [Normal Jugular Venous A Waves Present] : normal jugular venous A waves present [Normal Jugular Venous V Waves Present] : normal jugular venous V waves present [No Jugular Venous Ramirez A Waves] : no jugular venous ramirez A waves [Respiration, Rhythm And Depth] : normal respiratory rhythm and effort [Exaggerated Use Of Accessory Muscles For Inspiration] : no accessory muscle use [Auscultation Breath Sounds / Voice Sounds] : lungs were clear to auscultation bilaterally [Heart Rate And Rhythm] : heart rate and rhythm were normal [Heart Sounds] : normal S1 and S2 [Murmurs] : no murmurs present [Abdomen Soft] : soft [Abdomen Tenderness] : non-tender [Abdomen Mass (___ Cm)] : no abdominal mass palpated [Abnormal Walk] : normal gait [Gait - Sufficient For Exercise Testing] : the gait was sufficient for exercise testing [Nail Clubbing] : no clubbing of the fingernails [Cyanosis, Localized] : no localized cyanosis [Petechial Hemorrhages (___cm)] : no petechial hemorrhages [Skin Color & Pigmentation] : normal skin color and pigmentation [] : no rash [No Venous Stasis] : no venous stasis [Skin Lesions] : no skin lesions [No Skin Ulcers] : no skin ulcer [No Xanthoma] : no  xanthoma was observed [Oriented To Time, Place, And Person] : oriented to person, place, and time [Affect] : the affect was normal [Mood] : the mood was normal [No Anxiety] : not feeling anxious

## 2023-01-09 NOTE — HISTORY OF PRESENT ILLNESS
[Preoperative Visit] : for a medical evaluation prior to surgery [Scheduled Procedure ___] : a [unfilled] [Date of Surgery ___] : on [unfilled] [Surgeon Name ___] : surgeon: [unfilled] [FreeTextEntry1] : Del did well with increase diuretic and compression for LE edema. Breathing back to baseline. Bilateral carpal tunnel but the right hand has nerve damage and he has a mass/cyst that will be removed at the same time.

## 2023-01-09 NOTE — DISCUSSION/SUMMARY
[Procedure Low Risk] : the procedure risk is low [Patient Intermediate Risk] : the patient is an intermediate risk [Optimized for Surgery] : the patient is optimized for surgery [FreeTextEntry1] : The patient is a 57-year-old anxious gentleman prior cocaine use, CAD, DAMION, PAF, s/p Blandon pouch with reversal and s/p incisional hernia repair awaiting carpal tunnel surgery\par #1 CV- no angina, normal EF, c/w aspirin, c/w lasix bid\par #2 Lipids- continue atorvastatin\par #3 PAF- continue digoxin and toprol 25mg\par #4 Anxiety- sertraline, f/u psych\par #5 Pulm- DAMION\par #6 GI- f/u Dr. Yarbrough/Emil, weight  loss surgery discussed.\par #7 PAD- lymphedema, using pneumatic compression\par #8 Ortho- There are no cardiac contraindications to right hand surgery off eliquis for two days prior. \par

## 2023-01-10 ENCOUNTER — APPOINTMENT (OUTPATIENT)
Dept: BARIATRICS/WEIGHT MGMT | Facility: CLINIC | Age: 58
End: 2023-01-10
Payer: MEDICAID

## 2023-01-10 PROCEDURE — 99214 OFFICE O/P EST MOD 30 MIN: CPT | Mod: 95

## 2023-01-10 PROCEDURE — 99204 OFFICE O/P NEW MOD 45 MIN: CPT | Mod: 95

## 2023-01-11 ENCOUNTER — APPOINTMENT (OUTPATIENT)
Dept: SURGERY | Facility: CLINIC | Age: 58
End: 2023-01-11
Payer: MEDICAID

## 2023-01-11 VITALS
RESPIRATION RATE: 20 BRPM | DIASTOLIC BLOOD PRESSURE: 94 MMHG | OXYGEN SATURATION: 95 % | TEMPERATURE: 97.4 F | HEART RATE: 86 BPM | SYSTOLIC BLOOD PRESSURE: 164 MMHG

## 2023-01-11 DIAGNOSIS — K43.2 INCISIONAL HERNIA W/OUT OBSTRUCTION OR GANGRENE: ICD-10-CM

## 2023-01-11 PROCEDURE — 17250 CHEM CAUT OF GRANLTJ TISSUE: CPT

## 2023-01-11 NOTE — PHYSICAL EXAM
[FreeTextEntry1] : Comfortable and in better spirits.  \par Has not lost weight.  \par Severe lymphedema.  \par Abdomen severely obese with a supraumbilical and a right periumbilical hernia, both reducible and nontender.  There is purulent discharge from scar in the epigastric area.  There is half a centimeter of tunneling in the area of discharge.  \par No erythema of the abdomen.  \par The patient reports he had good results with application of silver nitrate in the past in the wound.  Silver nitrate was applied in the small skin opening.\par

## 2023-01-11 NOTE — HISTORY OF PRESENT ILLNESS
[FreeTextEntry1] : YesterdayDel is a 57yr. old male is being seen for a follow up visit\par \par S/p Open John's reversal, repair of incisional parastomal hernia 6/28/19 by Dr. Stein. \par \par S/P Incisional hernia repair with mesh with myofascial flap advancement, preservation of neurovascular bundles along with a one-sided anterior component separation on 8/2/21 by Dr. Yarbrough. \par \par Two cardiac stents placed in 2020 July.\par \par CT A&P 9/27/22 - Non drainable intra abdominal or abdominal wall collection,. Interval resolution of air and fluid with in the abdominal incision and region of hernia repair. There is a right paraumbilical ventral hernia again containing un obstructed small bowel.\par \par Last seen on 9/21/22 - Returns complaining of drainage from the supraumbilical incision scar. He had an episode of pain last week that lasted for 2 days along the epigastric area and the area to the right of the umbilicus. \par On exam he is visibly short of breath. BMI 55.5. Severe lymphedema. \par Abdomen severely obese with a right reducible periumbilical hernia. There is purulent discharge from scar in the epigastric area.\par Very depressed. Crying at times.\par He is interested in weight loss surgery.\par It would be impossible to repair his recurrent hernia at this point.\par Will obtain CT of the abdomen pelvis.\par Will ask the bariatric team to see to give us opinion what they can do to help him lose weight.\par I asked him to see his medical doctor and his cardiologist. \par \par Today pt reports he saw the medical weight loss specialist, states he was told he is not a candidate for medication induced weight loss.  He complains of back pain leg pain due to his spinal stenosis.  He has not been able to exercise.  He would like to have physical therapy which in the past has helped him to alleviate his back pain so he can exercise. Denies abdominal pain.  Has unchanged small amount of yellow discharge from small spot in the supraumbilical midline scar. Denies nausea and vomiting. Denies fever and chills. Daily BM, formed. Good appetite. Not taking anticoagulants. Pt will be having carpal tunnel sx in the near future. \par \par

## 2023-01-11 NOTE — ASSESSMENT
[FreeTextEntry1] : Patient with recurrent incisional hernia morbid obesity and chronic discharge from his midline abdominal scar.\par He has multiple other medical problems essentially all weight related.\par He has been successful in losing weight on his own in the past and he feels he could do this again if he has physical therapy.  \par He thinks that he cannot have physical therapy because of the prior wound cultures of MRSA.\par He does not have an active infection at this point and he does not need to isolate just because of his history of MRSA and from my standpoint he is able to have physical therapy.\par I see no contraindications from my standpoint for him to have his carpal tunnel surgery.\par RTO for any new issues and after weight loss.

## 2023-01-12 ENCOUNTER — NON-APPOINTMENT (OUTPATIENT)
Age: 58
End: 2023-01-12

## 2023-01-13 ENCOUNTER — APPOINTMENT (OUTPATIENT)
Dept: CARDIOLOGY | Facility: CLINIC | Age: 58
End: 2023-01-13
Payer: MEDICAID

## 2023-01-13 ENCOUNTER — APPOINTMENT (OUTPATIENT)
Dept: INTERNAL MEDICINE | Facility: CLINIC | Age: 58
End: 2023-01-13
Payer: MEDICAID

## 2023-01-13 VITALS — DIASTOLIC BLOOD PRESSURE: 93 MMHG | HEART RATE: 81 BPM | SYSTOLIC BLOOD PRESSURE: 139 MMHG

## 2023-01-13 VITALS
WEIGHT: 315 LBS | TEMPERATURE: 97.5 F | RESPIRATION RATE: 20 BRPM | HEIGHT: 72 IN | SYSTOLIC BLOOD PRESSURE: 156 MMHG | HEART RATE: 79 BPM | DIASTOLIC BLOOD PRESSURE: 89 MMHG | BODY MASS INDEX: 42.66 KG/M2 | OXYGEN SATURATION: 96 %

## 2023-01-13 VITALS
WEIGHT: 315 LBS | OXYGEN SATURATION: 97 % | DIASTOLIC BLOOD PRESSURE: 110 MMHG | SYSTOLIC BLOOD PRESSURE: 156 MMHG | HEIGHT: 72 IN | HEART RATE: 88 BPM | BODY MASS INDEX: 42.66 KG/M2

## 2023-01-13 DIAGNOSIS — Z01.818 ENCOUNTER FOR OTHER PREPROCEDURAL EXAMINATION: ICD-10-CM

## 2023-01-13 DIAGNOSIS — M25.531 PAIN IN RIGHT WRIST: ICD-10-CM

## 2023-01-13 DIAGNOSIS — A49.02 METHICILLIN RESISTANT STAPHYLOCOCCUS AUREUS INFECTION, UNSPECIFIED SITE: ICD-10-CM

## 2023-01-13 DIAGNOSIS — I21.4 NON-ST ELEVATION (NSTEMI) MYOCARDIAL INFARCTION: ICD-10-CM

## 2023-01-13 PROCEDURE — 99215 OFFICE O/P EST HI 40 MIN: CPT

## 2023-01-13 PROCEDURE — 99214 OFFICE O/P EST MOD 30 MIN: CPT

## 2023-01-13 NOTE — REVIEW OF SYSTEMS
[Fatigue] : fatigue [Lower Ext Edema] : lower extremity edema [Dyspnea on Exertion] : dyspnea on exertion [Joint Pain] : joint pain [Joint Stiffness] : joint stiffness [Back Pain] : back pain [Unsteady Walk] : ataxia [Negative] : Heme/Lymph

## 2023-01-13 NOTE — REASON FOR VISIT
[FreeTextEntry2] : Edema [FreeTextEntry1] : 1/13/2023\par He is using pneumatic compression pump\par He complains of scrotal edema\par Legs are better\par He was seen by Dr. Contreras, his cardiologist\par No wounds on the legs\par Plans for carpel tunnel sx\par He is smoking\par \par \par 5/16/2022\par Here for followup visit. \par Dr. Contreras is cardiology \par Seen by ID for MRSA infection - seeing ID later today\par Went to lymphedema therapy one time, he was measured.\par He was given compression garments.\par He wears compression sometimes. \par Legs are improved a bit, but still swollen.\par The left is a bit more swollen than the right.\par He remains on eliquis\par There is no date for surgery yet, seeing ID later today  \par \par He is being followed by Dr. Yarbrough - needs CT abdomen soon\par

## 2023-01-13 NOTE — HISTORY OF PRESENT ILLNESS
[Atrial Fibrillation] : atrial fibrillation [Coronary Artery Disease] : coronary artery disease [Sleep Apnea] : sleep apnea [Smoker] : smoker [FreeTextEntry1] : R CTS AND GANGLION CYST  [FreeTextEntry2] : 1/17/23 [FreeTextEntry4] : PT PRESENTS FOR PREOP EVAL FOR CTS IN 4 DAYS \par SEEN BY CARDIOLOGY ,BARIATRIC INTERNIST AND VASC CARDIOLOGIST IN LAST 4 DAYS  01-Mar-2019

## 2023-01-13 NOTE — PHYSICAL EXAM
[No Acute Distress] : no acute distress [No JVD] : no jugular venous distention [No Respiratory Distress] : no respiratory distress  [Normal Rate] : normal rate  [Normal S1, S2] : normal S1 and S2 [Obese] : obese [Scar] : a scar was noted [Abdomen Drain] : a drain was noted [Subcutaneous] : a subcutaneous drain [Serous] : serous [Normal] : normal [Soft, Nontender] : the abdomen was soft and nontender [No Mass] : no masses were palpated [No HSM] : no hepatosplenomegaly noted [Alert and Oriented x3] : oriented to person, place, and time [de-identified] : LOWER EXTR EDEMA  [de-identified] : WALKS WITH CANE

## 2023-01-13 NOTE — ASSESSMENT
[Patient Optimized for Surgery] : Patient optimized for surgery [No Further Testing Recommended] : no further testing recommended [Continue medications as is] : Continue current medications [As per surgery] : as per surgery [FreeTextEntry4] : 57 Y OLD MALE WITH PMX OF ASHD ,HTN ,SEVERE OBESITY ,SMOKER ,DJD SPINE ,RADICULOPATHY AND LYMPHEDEMA AT MODERATE RISK FOR SURGERY WITH NO ABSOLUTE CONTRAINDICATIONS \par PLEASE SEE CARDIOLOGY AND CARDIAC VASCULAR EVAL \par DUE TO SHORT NOTICE PLEASE SEE LABS DONE TODAY AND BE AWARE I MAY NOT ADD ANY RECOMM IN THIS NOTE

## 2023-01-13 NOTE — ASSESSMENT
[FreeTextEntry1] : Assessment:\par 1.  bilateral LE edema\par 2.  Overweight\par 3.  CAD\par 4. Afib\par 5.  Hernia\par 6.  Prior abdominal surgeries\par \par Plan\par 1.  Continue lasix 40mg BID can up-titrate if more edema.    Would increase to lasix 80 in AM and 40 in PM for the next few days given his scrotal edema. \par 3. Patient has  lymphedema and has tried conservative therapies such as compression stockings and leg elevation for over one month, but still remains symptomatic.  There is hyperpigmentation and lipodermatosclerosis on exam.  Continue pneumatic pump. \par 4.  Continue compression garments as much as possible\par 5.  No vascular contraindication prior to carpal tunnel.  \par 6.  Appreciate cardiac care with Dr. Contreras. \par 7.  stop smoking\par 8.  Return in 1 year

## 2023-01-14 LAB
ALBUMIN SERPL ELPH-MCNC: 3.8 G/DL
ALP BLD-CCNC: 116 U/L
ALT SERPL-CCNC: 13 U/L
ANION GAP SERPL CALC-SCNC: 11 MMOL/L
APTT BLD: 32.4 SEC
AST SERPL-CCNC: 16 U/L
BILIRUB SERPL-MCNC: 0.5 MG/DL
BUN SERPL-MCNC: 14 MG/DL
CALCIUM SERPL-MCNC: 9.9 MG/DL
CHLORIDE SERPL-SCNC: 100 MMOL/L
CO2 SERPL-SCNC: 28 MMOL/L
CREAT SERPL-MCNC: 1.13 MG/DL
EGFR: 76 ML/MIN/1.73M2
GLUCOSE SERPL-MCNC: 103 MG/DL
INR PPP: 1.03 RATIO
POTASSIUM SERPL-SCNC: 4.8 MMOL/L
PROT SERPL-MCNC: 7.1 G/DL
PT BLD: 11.9 SEC
SODIUM SERPL-SCNC: 139 MMOL/L

## 2023-01-15 LAB — SARS-COV-2 N GENE NPH QL NAA+PROBE: NOT DETECTED

## 2023-01-16 ENCOUNTER — TRANSCRIPTION ENCOUNTER (OUTPATIENT)
Age: 58
End: 2023-01-16

## 2023-01-17 ENCOUNTER — APPOINTMENT (OUTPATIENT)
Dept: ORTHOPEDIC SURGERY | Facility: HOSPITAL | Age: 58
End: 2023-01-17

## 2023-01-17 ENCOUNTER — RESULT REVIEW (OUTPATIENT)
Age: 58
End: 2023-01-17

## 2023-01-17 ENCOUNTER — TRANSCRIPTION ENCOUNTER (OUTPATIENT)
Age: 58
End: 2023-01-17

## 2023-01-17 ENCOUNTER — OUTPATIENT (OUTPATIENT)
Dept: OUTPATIENT SERVICES | Facility: HOSPITAL | Age: 58
LOS: 1 days | End: 2023-01-17
Payer: MEDICAID

## 2023-01-17 VITALS
RESPIRATION RATE: 20 BRPM | TEMPERATURE: 97 F | HEART RATE: 94 BPM | HEIGHT: 70 IN | DIASTOLIC BLOOD PRESSURE: 74 MMHG | OXYGEN SATURATION: 95 % | WEIGHT: 315 LBS | SYSTOLIC BLOOD PRESSURE: 127 MMHG

## 2023-01-17 VITALS
HEART RATE: 88 BPM | DIASTOLIC BLOOD PRESSURE: 70 MMHG | OXYGEN SATURATION: 96 % | SYSTOLIC BLOOD PRESSURE: 102 MMHG | RESPIRATION RATE: 25 BRPM

## 2023-01-17 DIAGNOSIS — Z98.890 OTHER SPECIFIED POSTPROCEDURAL STATES: Chronic | ICD-10-CM

## 2023-01-17 DIAGNOSIS — I25.10 ATHEROSCLEROTIC HEART DISEASE OF NATIVE CORONARY ARTERY WITHOUT ANGINA PECTORIS: Chronic | ICD-10-CM

## 2023-01-17 DIAGNOSIS — M67.431 GANGLION, RIGHT WRIST: ICD-10-CM

## 2023-01-17 DIAGNOSIS — Z98.890 OTHER SPECIFIED POSTPROCEDURAL STATES: ICD-10-CM

## 2023-01-17 DIAGNOSIS — Z90.49 ACQUIRED ABSENCE OF OTHER SPECIFIED PARTS OF DIGESTIVE TRACT: Chronic | ICD-10-CM

## 2023-01-17 DIAGNOSIS — G56.01 CARPAL TUNNEL SYNDROME, RIGHT UPPER LIMB: ICD-10-CM

## 2023-01-17 LAB
BASOPHILS # BLD AUTO: 0.05 K/UL
BASOPHILS NFR BLD AUTO: 0.5 %
EOSINOPHIL # BLD AUTO: 0.36 K/UL
EOSINOPHIL NFR BLD AUTO: 3.7 %
HCT VFR BLD CALC: 41.6 %
HGB BLD-MCNC: 12.6 G/DL
IMM GRANULOCYTES NFR BLD AUTO: 0.7 %
LYMPHOCYTES # BLD AUTO: 2.07 K/UL
LYMPHOCYTES NFR BLD AUTO: 21.2 %
MAN DIFF?: NORMAL
MCHC RBC-ENTMCNC: 23.5 PG
MCHC RBC-ENTMCNC: 30.3 GM/DL
MCV RBC AUTO: 77.6 FL
MONOCYTES # BLD AUTO: 0.69 K/UL
MONOCYTES NFR BLD AUTO: 7.1 %
NEUTROPHILS # BLD AUTO: 6.51 K/UL
NEUTROPHILS NFR BLD AUTO: 66.8 %
PLATELET # BLD AUTO: 410 K/UL
RBC # BLD: 5.36 M/UL
RBC # FLD: 18.5 %
WBC # FLD AUTO: 9.75 K/UL

## 2023-01-17 PROCEDURE — 64721 CARPAL TUNNEL SURGERY: CPT | Mod: RT

## 2023-01-17 PROCEDURE — 88304 TISSUE EXAM BY PATHOLOGIST: CPT | Mod: 26

## 2023-01-17 PROCEDURE — 88304 TISSUE EXAM BY PATHOLOGIST: CPT

## 2023-01-17 PROCEDURE — 25071 EXC FOREARM LES SC 3 CM/>: CPT | Mod: RT

## 2023-01-17 PROCEDURE — 25073 EXC FOREARM TUM DEEP 3 CM/>: CPT | Mod: RT

## 2023-01-17 RX ORDER — METOPROLOL TARTRATE 50 MG
1 TABLET ORAL
Qty: 0 | Refills: 0 | DISCHARGE

## 2023-01-17 RX ORDER — FUROSEMIDE 40 MG
1 TABLET ORAL
Qty: 0 | Refills: 0 | DISCHARGE

## 2023-01-17 RX ORDER — ONDANSETRON 8 MG/1
4 TABLET, FILM COATED ORAL ONCE
Refills: 0 | Status: DISCONTINUED | OUTPATIENT
Start: 2023-01-17 | End: 2023-01-31

## 2023-01-17 RX ORDER — CHLORHEXIDINE GLUCONATE 213 G/1000ML
1 SOLUTION TOPICAL ONCE
Refills: 0 | Status: COMPLETED | OUTPATIENT
Start: 2023-01-17 | End: 2023-01-17

## 2023-01-17 RX ORDER — ASPIRIN/CALCIUM CARB/MAGNESIUM 324 MG
325 TABLET ORAL ONCE
Refills: 0 | Status: COMPLETED | OUTPATIENT
Start: 2023-01-17 | End: 2023-01-17

## 2023-01-17 RX ORDER — SODIUM CHLORIDE 9 MG/ML
1000 INJECTION, SOLUTION INTRAVENOUS
Refills: 0 | Status: DISCONTINUED | OUTPATIENT
Start: 2023-01-17 | End: 2023-01-31

## 2023-01-17 RX ORDER — CEFAZOLIN SODIUM 1 G
3000 VIAL (EA) INJECTION ONCE
Refills: 0 | Status: COMPLETED | OUTPATIENT
Start: 2023-01-17 | End: 2023-01-17

## 2023-01-17 RX ORDER — ATORVASTATIN CALCIUM 80 MG/1
1 TABLET, FILM COATED ORAL
Qty: 0 | Refills: 0 | DISCHARGE

## 2023-01-17 RX ORDER — APREPITANT 80 MG/1
40 CAPSULE ORAL ONCE
Refills: 0 | Status: COMPLETED | OUTPATIENT
Start: 2023-01-17 | End: 2023-01-17

## 2023-01-17 RX ORDER — ACETAMINOPHEN WITH CODEINE 300MG-30MG
1 TABLET ORAL
Qty: 10 | Refills: 0
Start: 2023-01-17

## 2023-01-17 RX ADMIN — CHLORHEXIDINE GLUCONATE 1 APPLICATION(S): 213 SOLUTION TOPICAL at 11:04

## 2023-01-17 RX ADMIN — SODIUM CHLORIDE 75 MILLILITER(S): 9 INJECTION, SOLUTION INTRAVENOUS at 15:27

## 2023-01-17 RX ADMIN — APREPITANT 40 MILLIGRAM(S): 80 CAPSULE ORAL at 11:01

## 2023-01-17 RX ADMIN — Medication 325 MILLIGRAM(S): at 11:42

## 2023-01-17 NOTE — BRIEF OPERATIVE NOTE - NSICDXBRIEFPROCEDURE_GEN_ALL_CORE_FT
PROCEDURES:  Open release of right carpal tunnel 17-Jan-2023 11:59:26  Lalito Allen  Excision, ganglion cyst, wrist, volar 17-Jan-2023 11:59:50  Lalito Allen   PROCEDURES:  Open release of right carpal tunnel 17-Jan-2023 11:59:26  Lalito Allen  Excision, lipoma, wrist 17-Jan-2023 13:39:17  Lalito Allen

## 2023-01-17 NOTE — ASU PATIENT PROFILE, ADULT - FALL HARM RISK - FACTORS
pts obese and has difficulty moving and reaching distal extremities pts obese and has difficulty moving and reaching distal extremities, BMI 57.8

## 2023-01-17 NOTE — ASU DISCHARGE PLAN (ADULT/PEDIATRIC) - PROCEDURE
right carpal tunnel release, excision of right wrist volar ganglion cyst right carpal tunnel release, excision of right wrist volar lipoma

## 2023-01-17 NOTE — ASU DISCHARGE PLAN (ADULT/PEDIATRIC) - ASU DC SPECIAL INSTRUCTIONSFT
Follow-up with Dr. Templeton next Friday,1/27. Call office to confirm appointment.  - Do not remove splint  - Keep dressing clean and dry, may use cast bag/plastic bag to shower  - Elevate extremity  - Ice 20 minutes on, 20 minutes off   - Patient can resume Plavix 1/18 Follow-up with Dr. Templeton next Friday,1/27. Call office to confirm appointment.  - Do not remove dressing  - Keep dressing clean and dry, may use cast bag/plastic bag to shower  - Elevate extremity  - Ice 20 minutes on, 20 minutes off

## 2023-01-17 NOTE — ASU DISCHARGE PLAN (ADULT/PEDIATRIC) - NS MD DC FALL RISK RISK
For information on Fall & Injury Prevention, visit: [email or web address] OR  [email or web address] OR  https://www.cdc.gov/steadi/patient.html

## 2023-01-17 NOTE — BRIEF OPERATIVE NOTE - NSICDXBRIEFPREOP_GEN_ALL_CORE_FT
PRE-OP DIAGNOSIS:  Carpal tunnel syndrome, right 17-Jan-2023 12:00:00  Lalito Allen  Ganglion cyst of wrist, right 17-Jan-2023 12:00:12  Lalito Allen

## 2023-01-17 NOTE — BRIEF OPERATIVE NOTE - NSICDXBRIEFPOSTOP_GEN_ALL_CORE_FT
POST-OP DIAGNOSIS:  Carpal tunnel syndrome, right 17-Jan-2023 12:00:03  Lalito Allen  Ganglion cyst of wrist, right 17-Jan-2023 12:00:15  Lalito Allen   POST-OP DIAGNOSIS:  Carpal tunnel syndrome, right 17-Jan-2023 12:00:03  Lalito Allen  Lipoma of forearm 17-Jan-2023 13:39:02  Lalito Allen

## 2023-01-17 NOTE — ASU PATIENT PROFILE, ADULT - FALL HARM RISK - RISK INTERVENTIONS

## 2023-01-17 NOTE — ASU DISCHARGE PLAN (ADULT/PEDIATRIC) - CARE PROVIDER_API CALL
Trev Templeton)  Orthopaedic Surgery; Surgery of the Hand  [address] [facility], [facility] [address]  [place] [address]  Phone: [phone]  Fax: [phone]  Follow Up Time:

## 2023-01-18 PROBLEM — Z98.890 S/P CARPAL TUNNEL RELEASE: Status: ACTIVE | Noted: 2023-01-18

## 2023-01-18 RX ORDER — OXYCODONE AND ACETAMINOPHEN 5; 325 MG/1; MG/1
5-325 TABLET ORAL
Qty: 10 | Refills: 0 | Status: ACTIVE | COMMUNITY
Start: 2023-01-18 | End: 1900-01-01

## 2023-01-19 ENCOUNTER — APPOINTMENT (OUTPATIENT)
Dept: BARIATRICS/WEIGHT MGMT | Facility: CLINIC | Age: 58
End: 2023-01-19
Payer: MEDICAID

## 2023-01-19 PROCEDURE — 99214 OFFICE O/P EST MOD 30 MIN: CPT | Mod: 95

## 2023-01-23 ENCOUNTER — APPOINTMENT (OUTPATIENT)
Dept: UROLOGY | Facility: CLINIC | Age: 58
End: 2023-01-23
Payer: MEDICAID

## 2023-01-23 VITALS
SYSTOLIC BLOOD PRESSURE: 135 MMHG | OXYGEN SATURATION: 95 % | DIASTOLIC BLOOD PRESSURE: 81 MMHG | HEART RATE: 94 BPM | TEMPERATURE: 97.7 F | WEIGHT: 315 LBS | RESPIRATION RATE: 20 BRPM | BODY MASS INDEX: 55.61 KG/M2

## 2023-01-23 DIAGNOSIS — N50.89 OTHER SPECIFIED DISORDERS OF THE MALE GENITAL ORGANS: ICD-10-CM

## 2023-01-23 PROCEDURE — 99214 OFFICE O/P EST MOD 30 MIN: CPT

## 2023-01-23 NOTE — PHYSICAL EXAM
[General Appearance - Well Developed] : well developed [General Appearance - Well Nourished] : well nourished [Normal Appearance] : normal appearance [Well Groomed] : well groomed [General Appearance - In No Acute Distress] : no acute distress [Abdomen Soft] : soft [Abdomen Tenderness] : non-tender [Costovertebral Angle Tenderness] : no ~M costovertebral angle tenderness [Urethral Meatus] : meatus normal [Urinary Bladder Findings] : the bladder was normal on palpation [Scrotum] : the scrotum was normal [Testes Mass (___cm)] : there were no testicular masses [FreeTextEntry1] : Moderate scrotal edema, significantly improved from prior exam.  Buried penis [Edema] : no peripheral edema [] : no respiratory distress [Respiration, Rhythm And Depth] : normal respiratory rhythm and effort [Exaggerated Use Of Accessory Muscles For Inspiration] : no accessory muscle use [Oriented To Time, Place, And Person] : oriented to person, place, and time [Affect] : the affect was normal [Mood] : the mood was normal [Not Anxious] : not anxious [Normal Station and Gait] : the gait and station were normal for the patient's age [No Focal Deficits] : no focal deficits [No Palpable Adenopathy] : no palpable adenopathy

## 2023-01-23 NOTE — ASSESSMENT
[FreeTextEntry1] : Very pleasant 58-year-old gentleman who presents for follow-up of scrotal swelling, new complaint of erectile dysfunction\par -We had an extensive discussion regarding possible management options for erectile dysfunction, including PDE 5 inhibitors, OZZIE, ICI, intraurethral alprostadil, penile prosthesis\par -After a thorough discussion of the risks and benefits of the aforementioned options he would like to try a trial of a PDE 5 inhibitor\par -Trial of Cialis 5 mg\par -I discussed the risks, benefits, alternatives, and possible side effects of Cialis (tadalafil) therapy with the patient, including but not limited to headache, flushing, upset stomach, blurry vision, change in color vision, vision loss, and priapism with the patient.  We also discussed cardiac risk factors associated with PDE 5 inhibitor use\par -Creatinine 1.13\par -H/H 12.6/41.6\par -COVID-19 negative\par -Follow-up in 1 month

## 2023-01-23 NOTE — HISTORY OF PRESENT ILLNESS
[FreeTextEntry1] : Very pleasant 58-year-old gentleman who presents for follow-up of scrotal swelling and new complaint of erectile dysfunction.  He reports a significant improvement in scrotal swelling on diuretics.  He is very happy with this, and continues to report an improvement in scrotal swelling over time.  Today he also reports erectile dysfunction.  He reports that he recently became intimately involved with a new partner, and wishes to discuss options for erectile dysfunction at this time.

## 2023-01-24 NOTE — HISTORY OF PRESENT ILLNESS
[FreeTextEntry1] : This is a 58 year old male  being seen obesity followup visit. \par \par Patient had initial appointment 2 weeks ago, has not made any changes yet. \par Will discuss [place] plan today \par \par Patient states he typically has 1-2 meals a day and dinner is his largest meal \par B: honey nut cheerios, egg and cheese and toast\par L: sandwiches\par D: pizza, chinese, steak/chicken and veg \par \par Patient just had carpel tunnel surgery 2 days ago, unable to to prepare food at this time \par \par He is unable to work- waiting for disability  \par waiting to start PT\par \par

## 2023-01-24 NOTE — ASSESSMENT
[FreeTextEntry1] : BARIATRIC SURGERY HISTORY: none\par OBESITY CO-MORBIDITIES: cad, hld, elsa\par ANTI-OBESITY MEDICATIONS: none\par  OBESITY MEDICATION SIDE EFFECTS: n/a \par \par Plan :\par \par Reviewed plate\par recommended whole food, high fiber , lean animal protein\par avoid added fat, sugar, refined carbs \par avoid eating out\par recommended frozen vegetables- whole foods he can cook in microwave or toaster oven with little prep\par keep food journal \par wear cpap \par plan is to start pt \par  \par f/u 2-3 weeks

## 2023-01-25 NOTE — ED PROVIDER NOTE - NS ED ROS FT
No fever, no chills, no change in vision, no throat pain, no chest pain, no joint pain, no rashes, no focal neurologic complaints,  all ROS otherwise as per HPI or negative.
Responsibility to children, family, or others/Identifies reasons for living/Supportive social network of family or friends/Fear of death or the actual act of killing self/Cultural, spiritual and/or moral attitudes against suicide

## 2023-01-25 NOTE — REASON FOR VISIT
[Home] : at home, [unfilled] , at the time of the visit. [Medical Office: ([facility])___] : at the medical office located in  [Patient] : the patient [This encounter was initiated by telehealth (audio with video) and converted to telephone (audio only) due to technical difficulties.] : This encounter was initiated by telehealth (audio with video) and converted to telephone (audio only) due to technical difficulties. [Initial Evaluation] : an initial evaluation [FreeTextEntry1] : obesity

## 2023-01-25 NOTE — ASSESSMENT
[FreeTextEntry1] : BARIATRIC SURGERY HISTORY:\par \par OBESITY COMORBIDITIES:\par \par ANTI-OBESITY MEDICATIONS:\par \par OBESITY MEDICATION SIDE EFFECTS:\par \par \par Recommend the following:\par reviewed metabolic labs\par whole foods based eating strategy limiting refined carbs and added fats\par currently has bad DAMION not being treated and likely pulm htn to some degree, not a candidate for phentermine\par cannot get coverage for GLP-1 agonist\par recommend treating DAMION\par might need adequate diuresis\par can consider metformin though will not have large weight effect\par recommend strict dietary changes and can work with NP on easy to make meals or food delivery\par rtc in 4 weeks.\par

## 2023-01-25 NOTE — HISTORY OF PRESENT ILLNESS
[FreeTextEntry1] : 59 yo man with class III obesity, DAMION, severe spinal stenosis, CAD presents for initial obesity medicine eval\par \par Dealing with poorly healing wound following hernia surgery\par sever carpal tunnel of both hands\par \par current weight  = 424 lbs\par \par \par SOCIAL:\par applying for disability\par recently \par a variety of jobs\par then got sick with \par also had an MI after 2 colon surgeries - put 2 stents in\par \par FOOD: orders out, issues standing and holding pots and pans and cooking utensils at home\par \par PHYSICAL ACTIVITY:\par lmited by balance, weakness from spinal stenosis\par no feeling in toes\par walks with cane when he has to\par \par SLEEP:\par DAMION, severely disrupted, sleeps upright in chair\par \par STRESS:\par currently no income, very little mobility, recently \par

## 2023-01-25 NOTE — REVIEW OF SYSTEMS
[MED-ROS-Cons-FT] : weight gain [MED-ROS-Integum-FT] : no healing wound after GI surgery [MED-ROS-Neuro-FT] : per HPI

## 2023-01-27 ENCOUNTER — APPOINTMENT (OUTPATIENT)
Dept: ORTHOPEDIC SURGERY | Facility: CLINIC | Age: 58
End: 2023-01-27
Payer: MEDICAID

## 2023-01-31 ENCOUNTER — APPOINTMENT (OUTPATIENT)
Dept: BARIATRICS/WEIGHT MGMT | Facility: CLINIC | Age: 58
End: 2023-01-31
Payer: MEDICAID

## 2023-01-31 PROCEDURE — 99213 OFFICE O/P EST LOW 20 MIN: CPT | Mod: 95

## 2023-01-31 RX ORDER — METFORMIN HYDROCHLORIDE 850 MG/1
850 TABLET, COATED ORAL
Qty: 60 | Refills: 5 | Status: ACTIVE | COMMUNITY
Start: 2023-01-31 | End: 1900-01-01

## 2023-01-31 NOTE — HISTORY OF PRESENT ILLNESS
[FreeTextEntry1] : 59 yo man with class III obesity, DAMION, severe spinal stenosis, CAD presents for initial obesity medicine f/u\par \par Dealing with poorly healing wound following hernia surgery\par sever carpal tunnel of both hands - had surgery a few weeks ago, still numbness but less pain and more mobility\par \par current weight  = 399 lbs - 25 lb weight loss (large amounts of fluid) over past 3 weeks\par \par still having issues with social security and getting money\par eats on $20/day, though often in one meal\par \par reports edema has been improving \par \par still has not contacted sleep specialist regarding CPAP

## 2023-01-31 NOTE — ASSESSMENT
[FreeTextEntry1] : BARIATRIC SURGERY HISTORY:\par \par OBESITY COMORBIDITIES:\par \par ANTI-OBESITY MEDICATIONS:\par \par OBESITY MEDICATION SIDE EFFECTS:\par \par \par Recommend the following:\par \par whole foods based eating strategy limiting refined carbs and added fats\par currently has bad DAMION not being treated and likely pulm htn to some degree, not a candidate for phentermine\par cannot get coverage for GLP-1 agonist\par will start metformin 850mg and increase to BID after 1 week as tolerated\par recommend treating DAMION\par cont diuresis as needd\par recommend strict dietary changes and continue to work with NP on easy to make meals or food delivery\par rtc in 4 weeks.\par

## 2023-01-31 NOTE — REASON FOR VISIT
[Initial Evaluation] : an initial evaluation [Home] : at home, [unfilled] , at the time of the visit. [Medical Office: ([facility])___] : at the medical office located in  [Patient] : the patient [This encounter was initiated by telehealth (audio with video) and converted to telephone (audio only) due to technical difficulties.] : This encounter was initiated by telehealth (audio with video) and converted to telephone (audio only) due to technical difficulties. [FreeTextEntry1] : obesity

## 2023-02-01 ENCOUNTER — APPOINTMENT (OUTPATIENT)
Dept: ORTHOPEDIC SURGERY | Facility: CLINIC | Age: 58
End: 2023-02-01
Payer: MEDICAID

## 2023-02-01 PROCEDURE — 99024 POSTOP FOLLOW-UP VISIT: CPT

## 2023-02-13 ENCOUNTER — APPOINTMENT (OUTPATIENT)
Dept: BARIATRICS/WEIGHT MGMT | Facility: CLINIC | Age: 58
End: 2023-02-13

## 2023-02-19 ENCOUNTER — NON-APPOINTMENT (OUTPATIENT)
Age: 58
End: 2023-02-19

## 2023-02-19 NOTE — ADDENDUM
[FreeTextEntry1] : I, Marium Richardson, acted solely as a scribe for Dr. Templeton on this date on 02/01/2023.

## 2023-02-19 NOTE — HISTORY OF PRESENT ILLNESS
[de-identified] : 15 days postoperative. [de-identified] : Examination of his right wrist and hand after the dressing was removed demonstrates his incisions to be clean and dry.  There is no drainage or evidence of infection.  There is some swelling.  He has some residual numbness along the median nerve distribution with normal sensation along the radial and ulnar nerve distributions. [de-identified] : 15 days status post right open carpal tunnel release and excision of right wrist mass.  Date of surgery: 1/17/2023.\par \par Pathology demonstrated:  Eccrine Spiradenom.\par \par He notes overall improvements in numbness and tingling as compared to prior to the surgery but with that being said, his symptoms have not fully resolved. He complains of swelling, tenderness and itchiness to the wrist along the incision site. He rates his pain as a 4 out of 10 at this time.  [de-identified] : Stable, 15 days postoperative. [de-identified] : The sutures were removed and Steri-Strips were applied to both incisions.  He was instructed on local wound care, gentle range of motion exercises and when to begin scar massage and desensitization.  He will follow-up in 3-4 weeks.  He will follow-up before then if he is having any problems or concerns.

## 2023-02-19 NOTE — END OF VISIT
[FreeTextEntry3] : This note was written by Marium Richardson on 02/01/2023 acting solely as a scribe for Dr. Trev Templeton.\par  \par All medical record entries made by the Scribe were at my, Dr. Trev Templeton, direction and personally dictated by me on 02/01/2023. I have personally reviewed the chart and agree that the record accurately reflects my personal performance of the history, physical exam, assessment and plan.

## 2023-02-27 ENCOUNTER — APPOINTMENT (OUTPATIENT)
Dept: UROLOGY | Facility: CLINIC | Age: 58
End: 2023-02-27

## 2023-03-01 ENCOUNTER — APPOINTMENT (OUTPATIENT)
Dept: ORTHOPEDIC SURGERY | Facility: CLINIC | Age: 58
End: 2023-03-01

## 2023-03-04 NOTE — ED ADULT NURSE NOTE - NS ED NOTE ABUSE SUSPICION NEGLECT YN
"Pt resting in ED stretcher, BP hypertensive, Pt states she has not taken her home medications for BP tonight and is c/o "mild headache." MD informed. VS otherwise stable, Skin warm and dry, rr even and unlabored. NADN.   " No

## 2023-03-06 NOTE — ASSESSMENT
[FreeTextEntry1] : Returns complaining of drainage from the supraumbilical incision scar.  He had an episode of pain last week that lasted for 2 days along the epigastric area and the area to the right of the umbilicus.  \par On exam he is visibly short of breath.  BMI 55.5.  Severe lymphedema.  \par Abdomen severely obese with a right reducible periumbilical hernia.  There is purulent discharge from scar in the epigastric area.\par Very depressed.  Crying at times.\par He is interested in weight loss surgery.\par It would be impossible to repair his recurrent hernia at this point.\par Will obtain CT of the abdomen pelvis.\par Will ask the bariatric team to see to give us opinion what they can do to help him lose weight.\par I asked him to see his medical doctor and his cardiologist.
Vaccine status unknown

## 2023-03-12 NOTE — ASSESSMENT
[FreeTextEntry1] : Doing well postop overall. Continue close monitoring of the wounds. Give Bactrim pending final Cx results.\par RTO in 7-10 days. \par \par 
12-Mar-2023 22:29

## 2023-03-27 NOTE — PROGRESS NOTE ADULT - SUBJECTIVE AND OBJECTIVE BOX
This note was copied from a baby's chart. Mother states nursing is going well, without discomfort, and she is offering the breast to early cues of hunger. Normal  behaviors and output expectations reviewed. Mother encouraged to call next feed. Chart shows numerous feedings, void, stool WNL. Discussed importance of monitoring outputs and feedings on first week of life. Discussed ways to tell if baby is  getting enough breast milk, ie  voids and stools, change in color of stool, and return to birth wt within 2 weeks. Follow up with pediatrician visit for weight check in 1-2 days (per AAP guidelines.)  Encouraged to call Warm Line  [phone]  for any questions/problems that arise. Mother also given breastfeeding support group dates and times for any future needs     Discussed with mother her plan for feeding. Reviewed the benefits of exclusive breast milk feeding during the hospital stay. Informed her of the risks of using formula to supplement in the first few days of life as well as the benefits of successful breast milk feeding; referred her to the Breastfeeding booklet about this information. She acknowledges understanding of information reviewed and states that it is her plan to breastfeed her infant. Will support her choice and offer additional information as needed. Reviewed breastfeeding basics:  How milk is made and normal  breastfeeding behaviors discussed. Supply and demand,  stomach size, early feeding cues, skin to skin bonding with comfortable positioning and baby led latch-on reviewed. How to identify signs of successful breastfeeding sessions reviewed; education on asymetrical latch, signs of effective latching vs shallow, in-effective latching, normal  feeding frequency and duration and expected infant output discussed.   Normal course of breastfeeding discussed including the AAP's recommendation that children receive exclusive breast milk feedings for the first six months of life with breast milk feedings to continue through the first year of life and/or beyond as complimentary table foods are added. Breastfeeding Booklet and Warm line information provided with discussion. Discussed typical  weight loss and the importance of pediatrician appointment within 24-48 hours of discharge, at 2 weeks of life and normalcy of requesting pediatric weight checks as needed in between visits. Engorgement Care Guidelines:  Reviewed how milk is made and normal phases of milk production. Taught care of engorged breasts - physiologic breastfeeding encouraged with use of cool packs (no ice directly on skin). Consider use of NSAIDS where appropriate for discomfort and inflammation. Can employ light touch, lymphatic drainage techniques on tender grandular tissues. Anticipatory guidance shared. Pt will successfully establish breastfeeding by feeding in response to early feeding cues   or wake every 3h, will obtain deep latch, and will keep log of feedings/output. Taught to BF at hunger cues and or q 2-3 hrs and to offer 10-20 drops of hand expressed colostrum at any non-feeds.       Breast Assessment  Left Breast: Medium  Left Nipple: Everted, Intact  Right Breast: Medium  Right Nipple: Everted, Intact  Breast- Feeding Assessment  Breast-Feeding Experience: Yes (18 month with previous child)  Breast Trauma/Surgery: No  Type/Quality: Good (per mother)  Lactation Consultant Visits  Breast-Feedings:  (did not see baby at breast)  Mother/Infant Observation  Mother Observation: Breast comfortable, Recognizes feeding cues  Infant Observation: Opens mouth  LATCH Documentation  Latch: Grasps breast, tongue down, lips flanged, rhythmic sucking  Audible Swallowing: A few with stimulation  Type of Nipple: Everted (after stimulation)  Comfort (Breast/Nipple): Soft/non-tender  Hold (Positioning): No assist from staff, mother able to position/hold infant  LATCH Score: 9 SURGERY PROGRESS NOTE  Hospital Day #04-22-22 (1d)    Overnight, no acute events. Patient's O2 sats remained 90+ while receiving supplemental oxygen.   Pt seen and examined at bedside.      Vital Signs Last 24 Hrs  T(C): 36.6 (23 Apr 2022 00:42), Max: 37.1 (22 Apr 2022 13:45)  T(F): 97.9 (23 Apr 2022 00:42), Max: 98.8 (22 Apr 2022 13:45)  HR: 86 (23 Apr 2022 00:42) (77 - 96)  BP: 112/85 (23 Apr 2022 00:42) (112/85 - 150/85)  BP(mean): 86 (22 Apr 2022 12:30) (81 - 101)  RR: 18 (23 Apr 2022 00:42) (14 - 20)  SpO2: 95% (23 Apr 2022 00:42) (93% - 99%)    PHYSICAL EXAM   General:  AAOx3 in NAD  Chest:  Symmetrical chest rise bilaterally, breathing comfortably on RA   Abdomen:  Soft, nondistended, nontender                 SURGERY PROGRESS NOTE  Hospital Day #04-22-22 (1d)    Overnight, no acute events. Patient's O2 sats remained 90+ while receiving supplemental oxygen.   Pt seen and examined at bedside. Satting 90% this AM. No pain. Dressing changed with some drainage on 4x4.     Vital Signs Last 24 Hrs  T(C): 36.6 (23 Apr 2022 00:42), Max: 37.1 (22 Apr 2022 13:45)  T(F): 97.9 (23 Apr 2022 00:42), Max: 98.8 (22 Apr 2022 13:45)  HR: 86 (23 Apr 2022 00:42) (77 - 96)  BP: 112/85 (23 Apr 2022 00:42) (112/85 - 150/85)  BP(mean): 86 (22 Apr 2022 12:30) (81 - 101)  RR: 18 (23 Apr 2022 00:42) (14 - 20)  SpO2: 95% (23 Apr 2022 00:42) (93% - 99%)    PHYSICAL EXAM   General:  AAOx3 in NAD  Chest:  Symmetrical chest rise bilaterally, breathing comfortably on RA   Abdomen:  Soft, nondistended, nontender; old surgical scars with good healing

## 2023-03-27 NOTE — ED PROVIDER NOTE - CROS ED ROS STATEMENT
Benzoyl Peroxide Pregnancy And Lactation Text: This medication is Pregnancy Category C. It is unknown if benzoyl peroxide is excreted in breast milk. all other ROS negative except as per HPI

## 2023-04-04 NOTE — OCCUPATIONAL THERAPY INITIAL EVALUATION ADULT - SIT-TO-STAND BALANCE
LABS:                        6.5    9.07  )-----------( 299      ( 04 Apr 2023 15:03 )             20.7     04-04    136  |  102  |  30<H>  ----------------------------<  135<H>  4.8   |  25  |  1.47<H>    Ca    8.2<L>      04 Apr 2023 15:03    TPro  5.5<L>  /  Alb  3.2<L>  /  TBili  1.1  /  DBili  x   /  AST  16  /  ALT  8<L>  /  AlkPhos  99  04-04    PT/INR - ( 04 Apr 2023 15:03 )   PT: 14.9 sec;   INR: 1.29 ratio         PTT - ( 04 Apr 2023 15:03 )  PTT:36.0 sec            04-04 @ 15:03  5.0  44 good minus

## 2023-04-18 NOTE — DIETITIAN INITIAL EVALUATION ADULT. - OTHER INFO
Detail Level: Zone
Reason for admission : chief complaint of cp  Diet PTA : pt reports consuming 1 sandwich daily  Intake : >75%  Denies nausea/vomit :  Denies difficulty chewing /swallow :  Denies diarrhea/constipation:  Last BM : last night  NKFA  IBW +/- 10%= 178pounds  Ht: 72"  Ht taken from pt  Usual Weight PTA: 280-300pounds  BMI: 47.4  BMI calculated using wt from flow sheet  BMI calculated using ht from pt  Education Provided : pt not a candidate for weight loss diet ed -he reports that he will be eating whatever he wants  pressure injury: none  edema: none

## 2023-07-13 NOTE — HISTORY OF PRESENT ILLNESS
Impression: Presence of intraocular lens: Z96.1.  Plan: Stable [FreeTextEntry1] : yuval is a 56-year-old anxious gentleman cocaine use?, CAD with RCA stents 2019, Blandon pouch with reversal 2019 and now incisional hernia repair who presents to establish care. Very anxious, recent divorce, trying to get disability, had to move, nerve pains in his legs. No chest pain or symptoms similar to prior to stent procedure.

## 2023-08-24 NOTE — PATIENT PROFILE ADULT - CENTRAL VENOUS CATHETER/PICC LINE
Writer left 3rd message for patient to schedule HF 6 Month Follow up w/ Dr. Aguilera Parents, MV02/Echo (Separate Days) and labs in Sept. Writer left call back [phone] to schedule. no

## 2023-09-26 ENCOUNTER — APPOINTMENT (OUTPATIENT)
Dept: INTERNAL MEDICINE | Facility: CLINIC | Age: 58
End: 2023-09-26
Payer: MEDICARE

## 2023-09-26 VITALS
OXYGEN SATURATION: 95 % | SYSTOLIC BLOOD PRESSURE: 135 MMHG | WEIGHT: 315 LBS | TEMPERATURE: 97.6 F | DIASTOLIC BLOOD PRESSURE: 88 MMHG | HEART RATE: 107 BPM | RESPIRATION RATE: 22 BRPM | HEIGHT: 72 IN | BODY MASS INDEX: 42.66 KG/M2

## 2023-09-26 DIAGNOSIS — R20.2 PARESTHESIA OF SKIN: ICD-10-CM

## 2023-09-26 DIAGNOSIS — I89.0 LYMPHEDEMA, NOT ELSEWHERE CLASSIFIED: ICD-10-CM

## 2023-09-26 PROCEDURE — 99215 OFFICE O/P EST HI 40 MIN: CPT

## 2023-09-27 LAB
25(OH)D3 SERPL-MCNC: 14.8 NG/ML
ALBUMIN SERPL ELPH-MCNC: 4 G/DL
ALP BLD-CCNC: 103 U/L
ALT SERPL-CCNC: 6 U/L
ANION GAP SERPL CALC-SCNC: 13 MMOL/L
AST SERPL-CCNC: 11 U/L
BILIRUB SERPL-MCNC: 0.6 MG/DL
BUN SERPL-MCNC: 14 MG/DL
CALCIUM SERPL-MCNC: 9.3 MG/DL
CHLORIDE SERPL-SCNC: 102 MMOL/L
CHOLEST SERPL-MCNC: 190 MG/DL
CO2 SERPL-SCNC: 28 MMOL/L
CREAT SERPL-MCNC: 1.02 MG/DL
EGFR: 86 ML/MIN/1.73M2
GLUCOSE SERPL-MCNC: 73 MG/DL
HDLC SERPL-MCNC: 56 MG/DL
LDLC SERPL CALC-MCNC: 110 MG/DL
NONHDLC SERPL-MCNC: 134 MG/DL
NT-PROBNP SERPL-MCNC: 84 PG/ML
POTASSIUM SERPL-SCNC: 4.7 MMOL/L
PROT SERPL-MCNC: 7 G/DL
PSA FREE FLD-MCNC: 21 %
PSA FREE SERPL-MCNC: 0.23 NG/ML
PSA SERPL-MCNC: 1.06 NG/ML
SODIUM SERPL-SCNC: 143 MMOL/L
TRIGL SERPL-MCNC: 136 MG/DL
TSH SERPL-ACNC: 1.19 UIU/ML

## 2023-09-28 LAB
HCT VFR BLD CALC: 44.7 %
HGB BLD-MCNC: 13.1 G/DL
MCHC RBC-ENTMCNC: 22.5 PG
MCHC RBC-ENTMCNC: 29.3 GM/DL
MCV RBC AUTO: 76.8 FL
PLATELET # BLD AUTO: 365 K/UL
RBC # BLD: 5.82 M/UL
RBC # FLD: 19 %
WBC # FLD AUTO: 10.45 K/UL

## 2023-10-18 NOTE — H&P PST ADULT - SKIN COMMENTS
Warfarin/Coumadin increases your risk for bleeding. Notify your doctor if you see any bleeding or any of the side effects listed in the Warfarin/Coumadin Booklet. Diet and medications can affect the PT/INR blood level. When Warfarin/Coumadin is taken with other medicines it can change the way other medicines work. Other medicines can also change the way Warfarin/Coumadin works. It is very important to tell your health care provider about all other medicines, including over-the-counter medications, herbs, diet supplements, or products containing vitamin K. Call your doctor before starting, stopping, or changing the dose of any prescription or over-the-counter medications. Any product containing aspirin lessens the blood's ability to form clots and adds to the effect of Warfarin/Coumadin. Never take aspirin without speaking with your health care provider. major edema lower legs and feet, also reportedly he has scrotal edema

## 2023-11-08 ENCOUNTER — APPOINTMENT (OUTPATIENT)
Dept: CARDIOLOGY | Facility: CLINIC | Age: 58
End: 2023-11-08

## 2023-11-08 ENCOUNTER — APPOINTMENT (OUTPATIENT)
Dept: PULMONOLOGY | Facility: CLINIC | Age: 58
End: 2023-11-08

## 2023-11-16 NOTE — PROGRESS NOTE ADULT - SUBJECTIVE AND OBJECTIVE BOX
X 3 Patient is a 56y old  Male who presents with a chief complaint of diverticulitis (23 Jul 2021 06:18)    Being followed by ID for pna    Interval history:feels well  minimal cough  No sob  no other complaints   No acute events      ROS:  No ,SOB,CP  No N/V/D./abd pain  No other complaints      Antimicrobials:    cefepime   IVPB 2000 milliGRAM(s) IV Intermittent every 8 hours  vancomycin  IVPB 1500 milliGRAM(s) IV Intermittent every 12 hours    Other medications reviewed    Vital Signs Last 24 Hrs  T(C): 36.6 (07-23-21 @ 09:37), Max: 36.8 (07-22-21 @ 15:00)  T(F): 97.9 (07-23-21 @ 09:37), Max: 98.2 (07-22-21 @ 15:00)  HR: 66 (07-23-21 @ 09:37) (65 - 82)  BP: 118/81 (07-23-21 @ 09:37) (101/69 - 135/82)  BP(mean): 102 (07-22-21 @ 16:00) (94 - 104)  RR: 18 (07-23-21 @ 09:37) (18 - 42)  SpO2: 96% (07-23-21 @ 09:37) (92% - 98%)    Physical Exam:        HEENT PERRLA EOMI    No oral exudate or erythema    Chest Good AE,CTA    CVS RRR S1 S2 WNl No murmur or rub or gallop    Abd soft obese umbilical hernia   BS normal No tenderness no masses    IV site no erythema tenderness or discharge    CNS AAO X 3 no focal    Lab Data:                          11.1   6.76  )-----------( 384      ( 23 Jul 2021 08:38 )             35.1       07-23    138  |  101  |  9   ----------------------------<  115<H>  4.1   |  24  |  1.04    Ca    8.9      23 Jul 2021 08:38  Phos  3.4     07-23  Mg     2.1     07-23          Culture - Blood (collected 20 Jul 2021 14:20)  Source: .Blood Blood-Peripheral  Preliminary Report (21 Jul 2021 15:06):    No growth to date.    Culture - Blood (collected 20 Jul 2021 14:17)  Source: .Blood Blood-Peripheral  Preliminary Report (21 Jul 2021 15:06):    No growth to date.            Vancomycin Level, Trough: 14.4 ug/mL (07-22-21 @ 05:16)        < from: Xray Chest 1 View- PORTABLE-Routine (Xray Chest 1 View- PORTABLE-Routine in AM.) (07.22.21 @ 06:56) >  IMPRESSION:    The heart is normal in size. Left lower lobe pneumonia which remain unchanged when compared to previous study done July 21, 2021 at 6:52 AM. Small left pleural effusion. The right lung is clear.      < end of copied text >

## 2023-11-25 NOTE — ED ADULT NURSE NOTE - NS_SISCREENINGSR_GEN_ALL_ED
Patient: Symone Davis Date: 2023   : 1955 Attending: Caity Johns*   68 year old female        pSBO  Subjective: had some nausea with NG tube clamping yesterday, output clear and non bilious. Denies nausea or emesis. Feels hungry and wanting to eat Pain controlled. No other events or complaints.       Allergies:   ALLERGIES:   Allergen Reactions   • Strawberry   (Food Or Med) VOMITING       Vitals:    Vital Last Value 24 Hour Range   Temperature 98.3 °F (36.8 °C) (23) Temp  Min: 97.6 °F (36.4 °C)  Max: 98.6 °F (37 °C)   Pulse 70 (23) Pulse  Min: 63  Max: 71   Respiratory 16 (23) Resp  Min: 16  Max: 18   Non-Invasive  Blood Pressure (!) 146/84 (23) BP  Min: 128/77  Max: 148/79   Pulse Oximetry 94 % (23) SpO2  Min: 91 %  Max: 98 %   CVP   No data recorded     Vital Today Admit   Weight 80.6 kg (177 lb 11.1 oz) (23) Weight: 79.4 kg (175 lb 0.7 oz) (23)   Height N/A Height: 5' 4\" (162.6 cm) (23)   BMI N/A BMI (Calculated): 30.05 (23)     Weight over the past 48 Hours:  Patient Vitals for the past 48 hrs:   Weight   23 80.6 kg (177 lb 11.1 oz)        Intake/Output:    I/O last 3 completed shifts:  In: -   Out: 650 [Drains:600; Stool:50]      Physical Exam:   General: alert, flat affect  HENT; NG to LIS, output dilute/water  Abd: soft, ostomy with green appearing stool, stoma pink       Laboratory Results:    Lab Results   Component Value Date    WBC 5.6 2023    HCT 34.2 (L) 2023    HGB 10.6 (L) 2023     2023    INR 1.1 2023    PTT 67 (H) 2023    BUN 9 2023    CREATININE 1.38 (H) 2023    SODIUM 140 2023    POTASSIUM 3.8 2023    CHLORIDE 107 2023    AST 16 2023    ALKPT 106 2023    BILIRUBIN 0.2 2023    CO2 28 2023    GPT 15 2023    GLUCOSE 119 (H) 2023    LIPA 12 (L)  11/21/2023    ALBUMIN 2.9 (L) 11/23/2023    MG 2.0 11/22/2023       Diagnosis:  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  PAB 28.1  Continue IVF D5..45 at 100ml per hr, continue until taking po   Heparin gtt per medicine team   Therapy as tolerated.     Peggy Benz NP   General Surgery   [facility]   11/25/2023  Pager [phone]    Patient was seen with Dr. Simon  who completed the above physical exam and assisted in the formulation of the plan.     Attending Note:  Nausea from yest resolved, NG output is serous. Abd soft, ostomy has loose output. Labs reviewed. NG clamping trial again today. Hep gtt.    I saw and examined the patient. The patient's symptoms, physical findings, and studies are as documented above by the APC. I discussed the patient's treatment plan with the patient. Data reviewed by me included all lab results, radiology studies and prior encounters. Portions of the history, physical exam, and medical decision making were performed by the APC and the history, physical exam, and medical decision making were done by me. My total physician time was greater than the APC's time.    Rita Simon, DO  Complex Abdominal Wall Surgery  [facility], [place]             Negative

## 2023-12-27 NOTE — PATIENT PROFILE ADULT - NSPROMEDSBROUGHTTOHOSP_GEN_A_NUR
Patient was referred to [facility] by Malka Phelan. Several attempts have been made to contact the patient with no success. Letter was mailed to patient to contact the office to schedule.   no

## 2024-01-11 ENCOUNTER — APPOINTMENT (OUTPATIENT)
Dept: INTERNAL MEDICINE | Facility: CLINIC | Age: 59
End: 2024-01-11

## 2024-01-12 ENCOUNTER — APPOINTMENT (OUTPATIENT)
Dept: CARDIOLOGY | Facility: CLINIC | Age: 59
End: 2024-01-12

## 2024-03-28 NOTE — ED PROVIDER NOTE - CPE EDP CARDIAC NORM
Use zofran as needed for nausea.   Push fluids (electrolyte solutions like gatorade, powerade, pedialyte, etc are best) to keep up with fluid losses from diarrhea.     If you do not urinate in 8 hrs or if you are unable to keep fluids down that is very concerning and you should likely return to the emergency department  
normal...

## 2024-04-22 RX ORDER — APIXABAN 5 MG/1
5 TABLET, FILM COATED ORAL
Qty: 180 | Refills: 1 | Status: DISCONTINUED | COMMUNITY
Start: 2022-09-30 | End: 2024-04-22

## 2024-05-28 ENCOUNTER — APPOINTMENT (OUTPATIENT)
Dept: INTERNAL MEDICINE | Facility: CLINIC | Age: 59
End: 2024-05-28
Payer: MEDICAID

## 2024-05-28 VITALS
TEMPERATURE: 97.5 F | BODY MASS INDEX: 42.66 KG/M2 | HEART RATE: 92 BPM | OXYGEN SATURATION: 97 % | RESPIRATION RATE: 15 BRPM | HEIGHT: 72 IN | SYSTOLIC BLOOD PRESSURE: 135 MMHG | DIASTOLIC BLOOD PRESSURE: 80 MMHG | WEIGHT: 315 LBS

## 2024-05-28 DIAGNOSIS — E78.5 HYPERLIPIDEMIA, UNSPECIFIED: ICD-10-CM

## 2024-05-28 DIAGNOSIS — I26.99 OTHER PULMONARY EMBOLISM W/OUT ACUTE COR PULMONALE: ICD-10-CM

## 2024-05-28 DIAGNOSIS — E66.01 MORBID (SEVERE) OBESITY DUE TO EXCESS CALORIES: ICD-10-CM

## 2024-05-28 DIAGNOSIS — S30.1XXA CONTUSION OF ABDOMINAL WALL, INITIAL ENCOUNTER: ICD-10-CM

## 2024-05-28 DIAGNOSIS — G47.33 OBSTRUCTIVE SLEEP APNEA (ADULT) (PEDIATRIC): ICD-10-CM

## 2024-05-28 DIAGNOSIS — I48.0 PAROXYSMAL ATRIAL FIBRILLATION: ICD-10-CM

## 2024-05-28 DIAGNOSIS — F32.A ANXIETY DISORDER, UNSPECIFIED: ICD-10-CM

## 2024-05-28 DIAGNOSIS — R60.0 LOCALIZED EDEMA: ICD-10-CM

## 2024-05-28 DIAGNOSIS — I25.10 ATHEROSCLEROTIC HEART DISEASE OF NATIVE CORONARY ARTERY W/OUT ANGINA PECTORIS: ICD-10-CM

## 2024-05-28 DIAGNOSIS — N52.9 MALE ERECTILE DYSFUNCTION, UNSPECIFIED: ICD-10-CM

## 2024-05-28 DIAGNOSIS — F41.9 ANXIETY DISORDER, UNSPECIFIED: ICD-10-CM

## 2024-05-28 PROCEDURE — 99215 OFFICE O/P EST HI 40 MIN: CPT

## 2024-05-28 RX ORDER — TADALAFIL 10 MG/1
10 TABLET, FILM COATED ORAL
Qty: 10 | Refills: 0 | Status: ACTIVE | COMMUNITY
Start: 2024-05-28 | End: 1900-01-01

## 2024-05-28 RX ORDER — TADALAFIL 5 MG/1
5 TABLET ORAL
Qty: 10 | Refills: 0 | Status: DISCONTINUED | COMMUNITY
Start: 2023-01-23 | End: 2024-05-28

## 2024-05-28 RX ORDER — DOXYCYCLINE 100 MG/1
100 CAPSULE ORAL
Qty: 14 | Refills: 0 | Status: DISCONTINUED | COMMUNITY
Start: 2022-04-27 | End: 2024-05-28

## 2024-05-28 RX ORDER — DIGOXIN 125 UG/1
125 TABLET ORAL DAILY
Qty: 90 | Refills: 1 | Status: DISCONTINUED | COMMUNITY
End: 2024-05-28

## 2024-05-28 RX ORDER — RIVAROXABAN 20 MG/1
20 TABLET, FILM COATED ORAL
Qty: 90 | Refills: 0 | Status: ACTIVE | COMMUNITY
Start: 2023-09-28 | End: 1900-01-01

## 2024-05-28 NOTE — HISTORY OF PRESENT ILLNESS
[de-identified] : PT RUN OUT OF XARELTO ,NOT SEEN BY VASC,PULM OR CARDIO SINCE I LAST SAW HIM 9/26/23 ,2 WEEKS AFTER ACUTE PE  HAD SOME LEFT OVER ELIQUIS AND HAS BEEN TAKING IT  CC OF ED AND WANTS MEDS  RECURRENT GANGLION CYST OF R HAND  CC OF LEAKING ABD WALL SEROMA FOR ALMOST 3 Y  NOT COMPLIANT WITH OTHER MEDS OR WITH CPAP

## 2024-05-28 NOTE — REVIEW OF SYSTEMS
[Fatigue] : fatigue [Joint Pain] : joint pain [Itching] : itching [Skin Rash] : skin rash [Negative] : Heme/Lymph [FreeTextEntry7] : ABD WALL LEAKING

## 2024-05-28 NOTE — PHYSICAL EXAM
[No Acute Distress] : no acute distress [Normal Sclera/Conjunctiva] : normal sclera/conjunctiva [Normal Outer Ear/Nose] : the outer ears and nose were normal in appearance [No JVD] : no jugular venous distention [No Respiratory Distress] : no respiratory distress  [No Accessory Muscle Use] : no accessory muscle use [Normal Rate] : normal rate  [Regular Rhythm] : with a regular rhythm [___ +] : bilateral [unfilled]U+ pitting edema to the ankles [Soft] : abdomen soft [Non Tender] : non-tender [Normal Bowel Sounds] : normal bowel sounds [Coordination Grossly Intact] : coordination grossly intact [Alert and Oriented x3] : oriented to person, place, and time [de-identified] : MULTIPLE SCAR WITH SEROUS D/C MIDLINE

## 2024-05-28 NOTE — ASSESSMENT
[FreeTextEntry1] : 59 Y OLD MALE WITH PMX OF PE 9/23= RESUME XARELTO = CARIOLOGY - VASC F/U  DAMION= PULM EVAL  ABD WALL SEROMA = SURGICAL EVAL  R WRIST GANGLION CYST = HAND EVAL  DYSLIPIDEMIA = LABS  RTO 3 M

## 2024-05-29 LAB
ALBUMIN SERPL ELPH-MCNC: 4.2 G/DL
ALP BLD-CCNC: 86 U/L
ALT SERPL-CCNC: 13 U/L
ANION GAP SERPL CALC-SCNC: 16 MMOL/L
AST SERPL-CCNC: 16 U/L
BILIRUB SERPL-MCNC: 0.5 MG/DL
BUN SERPL-MCNC: 21 MG/DL
CALCIUM SERPL-MCNC: 9.4 MG/DL
CHLORIDE SERPL-SCNC: 103 MMOL/L
CO2 SERPL-SCNC: 24 MMOL/L
CREAT SERPL-MCNC: 1.02 MG/DL
EGFR: 85 ML/MIN/1.73M2
GLUCOSE SERPL-MCNC: 87 MG/DL
POTASSIUM SERPL-SCNC: 4.7 MMOL/L
PROT SERPL-MCNC: 7.2 G/DL
SODIUM SERPL-SCNC: 142 MMOL/L

## 2024-05-30 LAB
CHOLEST SERPL-MCNC: 241 MG/DL
HDLC SERPL-MCNC: 58 MG/DL
LDLC SERPL CALC-MCNC: 165 MG/DL
NONHDLC SERPL-MCNC: 183 MG/DL
TRIGL SERPL-MCNC: 105 MG/DL

## 2024-06-05 ENCOUNTER — NON-APPOINTMENT (OUTPATIENT)
Age: 59
End: 2024-06-05

## 2024-06-10 ENCOUNTER — APPOINTMENT (OUTPATIENT)
Dept: CARDIOLOGY | Facility: CLINIC | Age: 59
End: 2024-06-10

## 2024-06-10 NOTE — REASON FOR VISIT
[Follow-Up - Clinic] : a clinic follow-up of [FreeTextEntry2] : Edema [FreeTextEntry1] : 6/10/2024  Since last visit patient reports...  1/13/2023 He is using pneumatic compression pump He complains of scrotal edema Legs are better He was seen by Dr. Contreras, his cardiologist No wounds on the legs Plans for carpel tunnel sx He is smoking   5/16/2022 Here for followup visit.  Dr. Contreras is cardiology  Seen by ID for MRSA infection - seeing ID later today Went to lymphedema therapy one time, he was measured. He was given compression garments. He wears compression sometimes.  Legs are improved a bit, but still swollen. The left is a bit more swollen than the right. He remains on eliquis There is no date for surgery yet, seeing ID later today    He is being followed by Dr. Yarbrough - needs CT abdomen soon

## 2024-06-13 ENCOUNTER — APPOINTMENT (OUTPATIENT)
Dept: ORTHOPEDIC SURGERY | Facility: CLINIC | Age: 59
End: 2024-06-13

## 2024-06-13 ENCOUNTER — APPOINTMENT (OUTPATIENT)
Dept: CARDIOLOGY | Facility: CLINIC | Age: 59
End: 2024-06-13

## 2024-06-13 PROBLEM — D21.11 BENIGN NEOPLASM OF SOFT TISSUES OF UPPER LIMB, RIGHT: Status: ACTIVE | Noted: 2023-01-27

## 2024-06-20 ENCOUNTER — APPOINTMENT (OUTPATIENT)
Dept: ORTHOPEDIC SURGERY | Facility: CLINIC | Age: 59
End: 2024-06-20

## 2024-06-20 DIAGNOSIS — D21.11 BENIGN NEOPLASM OF CONNECTIVE AND OTHER SOFT TISSUE OF RIGHT UPPER LIMB, INCLUDING SHOULDER: ICD-10-CM

## 2024-06-21 PROBLEM — G56.01 CARPAL TUNNEL SYNDROME OF RIGHT WRIST: Status: ACTIVE | Noted: 2022-11-23

## 2024-06-21 PROBLEM — G56.02 CARPAL TUNNEL SYNDROME OF LEFT WRIST: Status: ACTIVE | Noted: 2022-11-23

## 2024-06-21 PROBLEM — D17.21 LIPOMA OF RIGHT UPPER EXTREMITY: Status: ACTIVE | Noted: 2023-01-17

## 2024-06-21 NOTE — HISTORY OF PRESENT ILLNESS
[FreeTextEntry1] : He comes in today for evaluation of left  He is status post right open carpal tunnel release and excision of right wrist Eccrine Spiroadenoma on 1/17/2023.  He is  He has a complex past medical history which includes coronary artery disease, status post stents.  He has had an MI in the past.  He takes Eliquis.  He also has a history of atrial fibrillation and spinal stenosis.  He is disabled.  EMGs dated 11/10/2022 demonstrated severe bilateral carpal tunnel syndrome.

## 2024-06-21 NOTE — PHYSICAL EXAM
[de-identified] : - Constitutional: This is an obese male in no obvious distress.   - Psych: Patient is alert and oriented to person, place and time.  Patient has a normal mood and affect. - Cardiovascular: Normal pulses throughout the upper extremities.  No significant varicosities are noted in the upper extremities.  - Respiratory:  Patient exhibits no evidence of shortness of breath or difficulty breathing. - Skin: No rashes, lesions, or other abnormalities are noted in the upper extremities.  ---  Examination of his hands demonstrates no obvious atrophy.  He has decreased sensation to light touch as well as dysesthesias along the median nerve distribution with sensation intact along the radial and ulnar nerve distributions.  There is no evidence of trigger finger.  Provocative signs for carpal tunnel syndrome are equivocal.  He has what appears to be a multiloculated ganglion cyst volarly and radially.  It is tender.  There is negative Finkelstein sign. [de-identified] : A diagnostic ultrasound of his right wrist demonstrate findings consistent with multiloculated ganglion cyst.\par  \par  PA, lateral oblique radiographs of his right wrist and hand demonstrate no obvious abnormal soft tissue calcifications or arthritis.

## 2024-06-28 ENCOUNTER — APPOINTMENT (OUTPATIENT)
Dept: ORTHOPEDIC SURGERY | Facility: CLINIC | Age: 59
End: 2024-06-28

## 2024-06-28 DIAGNOSIS — D17.21 BENIGN LIPOMATOUS NEOPLASM OF SKIN AND SUBCUTANEOUS TISSUE OF RIGHT ARM: ICD-10-CM

## 2024-06-28 DIAGNOSIS — G56.02 CARPAL TUNNEL SYNDROME, LEFT UPPER LIMB: ICD-10-CM

## 2024-06-28 DIAGNOSIS — G56.01 CARPAL TUNNEL SYNDROME, RIGHT UPPER LIMB: ICD-10-CM

## 2024-07-28 ENCOUNTER — EMERGENCY (EMERGENCY)
Facility: HOSPITAL | Age: 59
LOS: 1 days | Discharge: HOME | End: 2024-07-28
Attending: EMERGENCY MEDICINE
Payer: MEDICARE

## 2024-07-28 VITALS
HEART RATE: 81 BPM | DIASTOLIC BLOOD PRESSURE: 90 MMHG | SYSTOLIC BLOOD PRESSURE: 149 MMHG | OXYGEN SATURATION: 94 % | RESPIRATION RATE: 16 BRPM | TEMPERATURE: 98 F

## 2024-07-28 VITALS
RESPIRATION RATE: 16 BRPM | HEIGHT: 72 IN | OXYGEN SATURATION: 95 % | DIASTOLIC BLOOD PRESSURE: 64 MMHG | WEIGHT: 315 LBS | HEART RATE: 84 BPM | TEMPERATURE: 98 F | SYSTOLIC BLOOD PRESSURE: 135 MMHG

## 2024-07-28 DIAGNOSIS — Z98.890 OTHER SPECIFIED POSTPROCEDURAL STATES: Chronic | ICD-10-CM

## 2024-07-28 DIAGNOSIS — I25.10 ATHEROSCLEROTIC HEART DISEASE OF NATIVE CORONARY ARTERY WITHOUT ANGINA PECTORIS: Chronic | ICD-10-CM

## 2024-07-28 DIAGNOSIS — Z90.49 ACQUIRED ABSENCE OF OTHER SPECIFIED PARTS OF DIGESTIVE TRACT: Chronic | ICD-10-CM

## 2024-07-28 LAB
ALBUMIN SERPL ELPH-MCNC: 3.9 G/DL — SIGNIFICANT CHANGE UP (ref 3.3–5)
ALP SERPL-CCNC: 82 U/L — SIGNIFICANT CHANGE UP (ref 40–120)
ALT FLD-CCNC: 17 U/L — SIGNIFICANT CHANGE UP (ref 10–45)
ANION GAP SERPL CALC-SCNC: 13 MMOL/L — SIGNIFICANT CHANGE UP (ref 5–17)
AST SERPL-CCNC: 15 U/L — SIGNIFICANT CHANGE UP (ref 10–40)
BASOPHILS # BLD AUTO: 0.04 K/UL — SIGNIFICANT CHANGE UP (ref 0–0.2)
BASOPHILS NFR BLD AUTO: 0.3 % — SIGNIFICANT CHANGE UP (ref 0–2)
BILIRUB SERPL-MCNC: 0.4 MG/DL — SIGNIFICANT CHANGE UP (ref 0.2–1.2)
BUN SERPL-MCNC: 22 MG/DL — SIGNIFICANT CHANGE UP (ref 7–23)
CALCIUM SERPL-MCNC: 9.5 MG/DL — SIGNIFICANT CHANGE UP (ref 8.4–10.5)
CHLORIDE SERPL-SCNC: 101 MMOL/L — SIGNIFICANT CHANGE UP (ref 96–108)
CO2 SERPL-SCNC: 27 MMOL/L — SIGNIFICANT CHANGE UP (ref 22–31)
CREAT SERPL-MCNC: 1.03 MG/DL — SIGNIFICANT CHANGE UP (ref 0.5–1.3)
EGFR: 84 ML/MIN/1.73M2 — SIGNIFICANT CHANGE UP
EOSINOPHIL # BLD AUTO: 0.27 K/UL — SIGNIFICANT CHANGE UP (ref 0–0.5)
EOSINOPHIL NFR BLD AUTO: 2.1 % — SIGNIFICANT CHANGE UP (ref 0–6)
GLUCOSE SERPL-MCNC: 137 MG/DL — HIGH (ref 70–99)
HCT VFR BLD CALC: 47.6 % — SIGNIFICANT CHANGE UP (ref 39–50)
HGB BLD-MCNC: 13.4 G/DL — SIGNIFICANT CHANGE UP (ref 13–17)
IMM GRANULOCYTES NFR BLD AUTO: 1.2 % — HIGH (ref 0–0.9)
LIDOCAIN IGE QN: 24 U/L — SIGNIFICANT CHANGE UP (ref 7–60)
LYMPHOCYTES # BLD AUTO: 1.31 K/UL — SIGNIFICANT CHANGE UP (ref 1–3.3)
LYMPHOCYTES # BLD AUTO: 10.4 % — LOW (ref 13–44)
MCHC RBC-ENTMCNC: 22.4 PG — LOW (ref 27–34)
MCHC RBC-ENTMCNC: 28.2 GM/DL — LOW (ref 32–36)
MCV RBC AUTO: 79.7 FL — LOW (ref 80–100)
MONOCYTES # BLD AUTO: 0.79 K/UL — SIGNIFICANT CHANGE UP (ref 0–0.9)
MONOCYTES NFR BLD AUTO: 6.3 % — SIGNIFICANT CHANGE UP (ref 2–14)
NEUTROPHILS # BLD AUTO: 10.06 K/UL — HIGH (ref 1.8–7.4)
NEUTROPHILS NFR BLD AUTO: 79.7 % — HIGH (ref 43–77)
NRBC # BLD: 0 /100 WBCS — SIGNIFICANT CHANGE UP (ref 0–0)
PLATELET # BLD AUTO: 333 K/UL — SIGNIFICANT CHANGE UP (ref 150–400)
POTASSIUM SERPL-MCNC: 4.1 MMOL/L — SIGNIFICANT CHANGE UP (ref 3.5–5.3)
POTASSIUM SERPL-SCNC: 4.1 MMOL/L — SIGNIFICANT CHANGE UP (ref 3.5–5.3)
PROT SERPL-MCNC: 7.6 G/DL — SIGNIFICANT CHANGE UP (ref 6–8.3)
RBC # BLD: 5.97 M/UL — HIGH (ref 4.2–5.8)
RBC # FLD: 18.3 % — HIGH (ref 10.3–14.5)
SODIUM SERPL-SCNC: 141 MMOL/L — SIGNIFICANT CHANGE UP (ref 135–145)
TROPONIN T, HIGH SENSITIVITY RESULT: 14 NG/L — SIGNIFICANT CHANGE UP (ref 0–51)
WBC # BLD: 12.62 K/UL — HIGH (ref 3.8–10.5)
WBC # FLD AUTO: 12.62 K/UL — HIGH (ref 3.8–10.5)

## 2024-07-28 PROCEDURE — 74177 CT ABD & PELVIS W/CONTRAST: CPT | Mod: MC

## 2024-07-28 PROCEDURE — 99285 EMERGENCY DEPT VISIT HI MDM: CPT | Mod: GC

## 2024-07-28 PROCEDURE — 96375 TX/PRO/DX INJ NEW DRUG ADDON: CPT

## 2024-07-28 PROCEDURE — 74018 RADEX ABDOMEN 1 VIEW: CPT

## 2024-07-28 PROCEDURE — 83690 ASSAY OF LIPASE: CPT

## 2024-07-28 PROCEDURE — 96376 TX/PRO/DX INJ SAME DRUG ADON: CPT

## 2024-07-28 PROCEDURE — 80053 COMPREHEN METABOLIC PANEL: CPT

## 2024-07-28 PROCEDURE — 84484 ASSAY OF TROPONIN QUANT: CPT

## 2024-07-28 PROCEDURE — 74018 RADEX ABDOMEN 1 VIEW: CPT | Mod: 26

## 2024-07-28 PROCEDURE — 85025 COMPLETE CBC W/AUTO DIFF WBC: CPT

## 2024-07-28 PROCEDURE — 96374 THER/PROPH/DIAG INJ IV PUSH: CPT | Mod: XU

## 2024-07-28 PROCEDURE — 99284 EMERGENCY DEPT VISIT MOD MDM: CPT | Mod: 25

## 2024-07-28 PROCEDURE — 74177 CT ABD & PELVIS W/CONTRAST: CPT | Mod: 26,MC

## 2024-07-28 RX ORDER — ONDANSETRON HYDROCHLORIDE 2 MG/ML
4 INJECTION INTRAMUSCULAR; INTRAVENOUS ONCE
Refills: 0 | Status: COMPLETED | OUTPATIENT
Start: 2024-07-28 | End: 2024-07-28

## 2024-07-28 RX ORDER — MORPHINE SULFATE 100 MG/1
4 TABLET, EXTENDED RELEASE ORAL ONCE
Refills: 0 | Status: DISCONTINUED | OUTPATIENT
Start: 2024-07-28 | End: 2024-07-28

## 2024-07-28 RX ORDER — ACETAMINOPHEN 325 MG
1000 TABLET ORAL ONCE
Refills: 0 | Status: COMPLETED | OUTPATIENT
Start: 2024-07-28 | End: 2024-07-28

## 2024-07-28 RX ORDER — SODIUM CHLORIDE 0.9 % (FLUSH) 0.9 %
1000 SYRINGE (ML) INJECTION ONCE
Refills: 0 | Status: COMPLETED | OUTPATIENT
Start: 2024-07-28 | End: 2024-07-28

## 2024-07-28 RX ORDER — MORPHINE SULFATE 100 MG/1
8 TABLET, EXTENDED RELEASE ORAL ONCE
Refills: 0 | Status: DISCONTINUED | OUTPATIENT
Start: 2024-07-28 | End: 2024-07-28

## 2024-07-28 RX ADMIN — Medication 400 MILLIGRAM(S): at 03:42

## 2024-07-28 RX ADMIN — MORPHINE SULFATE 8 MILLIGRAM(S): 100 TABLET, EXTENDED RELEASE ORAL at 03:15

## 2024-07-28 RX ADMIN — MORPHINE SULFATE 8 MILLIGRAM(S): 100 TABLET, EXTENDED RELEASE ORAL at 11:59

## 2024-07-28 RX ADMIN — MORPHINE SULFATE 8 MILLIGRAM(S): 100 TABLET, EXTENDED RELEASE ORAL at 03:26

## 2024-07-28 RX ADMIN — Medication 1000 MILLILITER(S): at 04:18

## 2024-07-28 RX ADMIN — MORPHINE SULFATE 4 MILLIGRAM(S): 100 TABLET, EXTENDED RELEASE ORAL at 08:16

## 2024-07-28 RX ADMIN — ONDANSETRON HYDROCHLORIDE 4 MILLIGRAM(S): 2 INJECTION INTRAMUSCULAR; INTRAVENOUS at 11:59

## 2024-07-28 NOTE — ED ADULT NURSE NOTE - OBJECTIVE STATEMENT
60yo M A&Ox4 c/o Abd Pain 10/10. pt brother states he had a colon resection with hernias. pt states he had a mesh put in for hernia, thinks the hernias went under mesh. upon assessment pt abdomen tender to palpation, softball sized bump to pts abdomen. pt denies fever, chills, N/V/D, Cp, sob, GI/ symptoms. PMH: diverticulitis, Hernia. PSH: Colon resection, hernia repair. 60yo M A&Ox4 c/o Abd Pain 10/10. pt brother states he had a colon resection with hernias in 2019. pt states he had a mesh put in for hernias in 2020 thinks the hernias went under mesh. upon assessment pt abdomen tender to palpation, softball sized bump to pts abdomen. pt has minimal yellow drainage from previous surgery site.  pt denies fever, chills, N/V/D, Cp, sob, GI/ symptoms. PMH: diverticulitis, Hernia, heart attack. PSH: Colon resection, hernia repair. 60yo M A&Ox4 c/o Abd Pain. pt brother at bedside states pt had a colon resection with hernia repair in 2019. pt states he had a mesh put in for hernias in 2020. pt presents to the ed with concerns that "the hernias went under mesh." resulting in severe abd pain. upon assessment pt abdomen tender to palpation, softball sized bump noted to umbilical area. pt  slight yellow drainage from previous umbilical hernia repair noted.  pt denies fever, chills, N/V/D, Cp, SOB, GI/ symptoms. PMH: diverticulitis, Hernia, MI. PSH: Colon resection, hernia repair. VSS. 60yo M A&Ox4 c/o Abd Pain. pt brother at bedside states pt had a colon resection with hernia repair in 2019. pt states he had a mesh put in for hernias in 2020. pt presents to the ed with concerns that "the hernias went under mesh." resulting in severe abd pain. upon assessment pt abdomen tender to palpation, softball sized bump noted to umbilical area. slight yellow drainage from previous umbilical hernia repair noted.  pt denies fever, chills, N/V/D, Cp, SOB, GI/ symptoms. PMH: diverticulitis, Hernia, MI. PSH: Colon resection, hernia repair. VSS.

## 2024-07-28 NOTE — ED PROVIDER NOTE - CARE PROVIDER_API CALL
Yariel Chowdhury.  Surgery  [address], [address]  [place] [address]  Phone: [phone]  Fax: [phone]  Follow Up Time:

## 2024-07-28 NOTE — CONSULT NOTE ADULT - ASSESSMENT
59M with previous abdominal surgeries and multiple recurrent incisional hernias presenting with pain at hernia site, reduced at bedside. CT with concern for partial obstruction, patient not clinically obstructed     Plan:  - Hernia reduced at bedside  - PO challenge in ED  - Follow up in office with Dr Chowdhury, one of Dr. Yarbrough partners

## 2024-07-28 NOTE — ED PROVIDER NOTE - PATIENT PORTAL LINK FT
You can access the FollowMyHealth Patient Portal offered by [facility] by registering at the following website: [email or web address]. By joining [facility]’s FollowMyHealth portal, you will also be able to view your health information using other applications (apps) compatible with our system.

## 2024-07-28 NOTE — ED PROVIDER NOTE - NSFOLLOWUPINSTRUCTIONS_ED_ALL_ED_FT
You were seen in the ED for abdominal pain and had your hernia reduced by the surgical team.    Please follow up with Dr. Chowdhury. Contact information is attached. Please call to make an appointment in one week.    You can use 500-1000mg Tylenol every 6 hours for pain - as needed.  This is an over-the-counter medications - please respect the warnings on the label. This medication come with certain risks and side effects that you need to discuss with your doctor, especially if you are taking it for a prolonged period.    Return to the ED if you develop fevers, severe abdominal pain not controlled at home, unable to tolerate food or liquid, or new or worsening symptoms.

## 2024-07-28 NOTE — ED ADULT NURSE REASSESSMENT NOTE - NS ED NURSE REASSESS COMMENT FT1
pt given morphine and zofran. resting in bed, pending surgery team re-eval and interventions. dr. gutierrez aware of med admin. call bell in reach.
surgery at bedside.
Report taken from Magnus RN and Stoney RN. Pt introduced to oncoming RN and updated on plan of care. A&Ox4, breathing unlabored, abd obese nontender, area of redness to mid lower abdskin warm dry and intact. Pending CT scan. Call bell in reach, pt educated on use. Bed locked and in lowest position. Denies any other needs or complaints at this time.

## 2024-07-28 NOTE — ED PROVIDER NOTE - PROGRESS NOTE DETAILS
Received signout.  In summary patient with prior ventral hernia, multiple SBO's managed with NG tube, presents with 1 day of abdominal pain, passing gas and having bowel movements.  Prior surgeries with Dr. Yarbrough.  CT shows right umbilical hernia with findings concerning for early/developing small bowel obstruction.  Surgery paged.  Trev Darling PGY-3 Evaluated by surgery. Likely 2/2 to hernia which was reduced at bedside. Patient tolerated PO. No abd pain, no N/V. To be discharged.  Trev Darling PGY-3

## 2024-07-28 NOTE — CONSULT NOTE ADULT - SUBJECTIVE AND OBJECTIVE BOX
SURGERY CONSULT NOTE     HPI: 59M with previous history of diverticulitis s/p John and reversal and incisional hernias with multiple repairs now presenting with abdominal pain at site of incisional hernia. Patient reports pain for 1 day, no associated nausea or vomiting. His last BM was yesterday, does not know if he's passing flatus. Denies fevers or chills.     PMHx: Sleep apnea  HLD (hyperlipidemia)  Morbid obesity  Diverticulitis  History of neuropathy  Colostomy present  Lumbar spinal stenosis  History of neck pain  Lymphedema  H/O ventral hernia  Heart attack  AF (atrial fibrillation)      PSHx: No significant past surgical history  S/P repair of hydrocele  S/P colon resection  S/P hernia repair  Coronary heart disease      Medications (inpatient):   Medications (PRN):  Allergies: No Known Allergies  (Intolerances: )  Social Hx:   Family Hx: Family history of lung cancer  mother  age 40, hx lung cancer        Physical Exam  T(C): 36.7  HR: 77 (76 - 84)  BP: 129/74 (126/78 - 143/87)  RR: 16 (14 - 20)  SpO2: 95% (94% - 97%)  Tmax: T(C): , Max: 37.1 (24 @ 03:30)    General: well developed, well nourished, NAD  Neuro: alert and oriented, no focal deficits, moves all extremities spontaneously  Respiratory: airway patent, respirations unlabored  CVS: regular rate and rhythm  Abdomen: soft, obese, nontender, midline incisional hernia easily reducible, midline wound with serous drainage (present for 2 years per patient), right umbilical incisional hernia reducible after main meds   Extremities: no edema, sensation and movement grossly intact  Skin: warm, dry, appropriate color    Labs:                        13.4   12.62 )-----------( 333      ( 2024 03:45 )             47.6       -    141  |  101  |  22  ----------------------------<  137<H>  4.1   |  27  |  1.03    Ca    9.5      2024 03:45    TPro  7.6  /  Alb  3.9  /  TBili  0.4  /  DBili  x   /  AST  15  /  ALT  17  /  AlkPhos  82      Urinalysis Basic - ( 2024 03:45 )    Color: x / Appearance: x / SG: x / pH: x  Gluc: 137 mg/dL / Ketone: x  / Bili: x / Urobili: x   Blood: x / Protein: x / Nitrite: x   Leuk Esterase: x / RBC: x / WBC x   Sq Epi: x / Non Sq Epi: x / Bacteria: x            Imaging and other studies:    < from: CT Abdomen and Pelvis w/ IV Cont (24 @ 07:41) >  FINDINGS:  LOWER CHEST: Small bilateral pleural effusions, greater on left than   right.    LIVER: Left hepatic lobe cyst.  BILE DUCTS: Normal caliber.  GALLBLADDER: Within normal limits.  SPLEEN: Within normal limits.  PANCREAS: Within normallimits.  ADRENALS: Within normal limits.  KIDNEYS/URETERS: Symmetric renal enhancement. No hydronephrosis.    BLADDER: Within normal limits.  REPRODUCTIVE ORGANS: Prostate within normal limits.    BOWEL: No bowel obstruction. Appendix is not visualized. Right   paraumbilical hernia with loops of fluid-filled bowel entering and mildly   collapsed loop exiting (3, 1:30). There is proximal dilation of   fluid-filled bowel loops prior to hernia sac. Postsurgical changes,   similar to prior imaging. Mild uncomplicated diverticulosis.  PERITONEUM/RETROPERITONEUM: Within normal limits.  VESSELS: Atherosclerotic changes.  LYMPH NODES: No lymphadenopathy.  ABDOMINAL WALL: Postsurgical changes to the ventral wall. Surgical clips   in the left inguinal region.  BONES: Degenerative changes.    IMPRESSION:  Right periumbilical hernia with findings are concerning for   early/developing small bowel obstruction.        < end of copied text >

## 2024-07-28 NOTE — ED PROVIDER NOTE - CLINICAL SUMMARY MEDICAL DECISION MAKING FREE TEXT BOX
Dr. Miramontes (Attending Physician)  59M with previous history of diverticulitis s/p John and reversal and incisional hernias with multiple repairs now presenting with abdominal pain at site of incisional hernia. Patient reports pain for 1 day with associated nausea, constipation, and no flatus.. His last BM was yesterday. Denies fevers, chills, vomiting.    Epigastric draining wound with yellow thick discharge. Multiple abd. scars and hernias. Diffusely TTP without guarding.    Will give pain and nausea control. IVF. Check labs including LFTS and lipase. CTAP to eval for obstruction, diverticulitis, and reassess. Likely surgical consult.

## 2024-08-15 ENCOUNTER — APPOINTMENT (OUTPATIENT)
Dept: INTERNAL MEDICINE | Facility: CLINIC | Age: 59
End: 2024-08-15

## 2024-09-04 ENCOUNTER — RX RENEWAL (OUTPATIENT)
Age: 59
End: 2024-09-04

## 2024-09-04 ENCOUNTER — APPOINTMENT (OUTPATIENT)
Dept: SURGERY | Facility: CLINIC | Age: 59
End: 2024-09-04

## 2024-09-06 NOTE — HISTORY OF PRESENT ILLNESS
[de-identified] : Del is a 60 y/o male here for a follow up visit.   In ED on 7/28/24 due to incisional hernia with partial small bowel obstruction. Hernia was reduced at bedside.   CT A+P on 7/28/24 - Right periumbilical hernia with findings are concerning for early/developing small bowel obstruction.  S/p Open John's reversal, repair of incisional parastomal hernia 6/28/19 by Dr. Stein.  S/P Incisional hernia repair with mesh with myofascial flap advancement, preservation of neurovascular bundles along with a one-sided anterior component separation on 8/2/21 by Dr. Yarbrough.  Two cardiac stents placed in 2020 July.  Last seen on 1/11/23 - Patient with recurrent incisional hernia morbid obesity and chronic discharge from his midline abdominal scar. He has multiple other medical problems essentially all weight related. He has been successful in losing weight on his own in the past and he feels he could do this again if he has physical therapy. He thinks that he cannot have physical therapy because of the prior wound cultures of MRSA. He does not have an active infection at this point and he does not need to isolate just because of his history of MRSA and from my standpoint he is able to have physical therapy. I see no contraindications from my standpoint for him to have his carpal tunnel surgery. RTO for any new issues and after weight loss.  Spoke to TRACEY Chun on 7/28/24 - Pt was recently seen in [facility] ED for an incisional hernia with partial small bowel obstruction. Hernia was reduced at bedside. Pt was advised to f/u with Dr. Chowdhury outedwin. He called the office reporting intermittent abdominal pain that "comes and goes." At present, he does not have any pain. He is tolerating PO without n/v. He has a BM this morning and is passing gas. He was given an appointment with Dr. Chowdhury this Wednesday. He agreed to call the office immediately if he develops any acute issue.      [FreeTextEntry1] : Del is a 60 y/o male being seen for a follow-up visit  Colonoscopy 6/27/19 (pre-op) - The anus, splenic flexure, transverse colon, hepatic flexure, ascending colon and cecum are normal.  Petechia(e) in the proximal rectum, in the mid rectum and in the distal rectum, consistent with diversion proctitis.  One benign appearing 2 mm polyp in the rectum, removed.  Diverticulosis in the proximal sigmoid colon.    S/p Open John's resection, 3/12/19 due to sigmoid colon mass with LBO  s/p Open John's reversal and repair of incisional parastomal hernia 6/28/19  Two cardiac stents placed in 2020 July.  S/P Incisional hernia repair with mesh with myofascial flap advancement, preservation of neurovascular bundles along with a one-sided anterior component separation on 8/2/21 by Dr. Yarbrough.  CT A&P 9/27/22 - Non drainable intra abdominal or abdominal wall collection,. Interval resolution of air and fluid with in the abdominal incision and region of hernia repair. There is a right paraumbilical ventral hernia again containing un obstructed small bowel.  [facility] ED 7/28/24 with c/o abdominal pain at site of incisioinal hernia  CT A/P 7/28/24 - Right periumbilical hernia with findings are concerning for early/developing small bowel obstruction.  Last seen 1/11/23 - Patient with recurrent incisional hernia morbid obesity and chronic discharge from his midline abdominal scar. He has multiple other medical problems essentially all weight related. He has been successful in losing weight on his own in the past and he feels he could do this again if he has physical therapy. He thinks that he cannot have physical therapy because of the prior wound cultures of MRSA. He does not have an active infection at this point and he does not need to isolate just because of his history of MRSA and from my standpoint he is able to have physical therapy. I see no contraindications from my standpoint for him to have his carpal tunnel surgery. RTO for any new issues and after weight loss.

## 2024-09-06 NOTE — HISTORY OF PRESENT ILLNESS
[de-identified] : Del is a 58 y/o male here for a follow up visit.   In ED on 7/28/24 due to incisional hernia with partial small bowel obstruction. Hernia was reduced at bedside.   CT A+P on 7/28/24 - Right periumbilical hernia with findings are concerning for early/developing small bowel obstruction.  S/p Open John's reversal, repair of incisional parastomal hernia 6/28/19 by Dr. Stein.  S/P Incisional hernia repair with mesh with myofascial flap advancement, preservation of neurovascular bundles along with a one-sided anterior component separation on 8/2/21 by Dr. Yarbroguh.  Two cardiac stents placed in 2020 July.  Last seen on 1/11/23 - Patient with recurrent incisional hernia morbid obesity and chronic discharge from his midline abdominal scar. He has multiple other medical problems essentially all weight related. He has been successful in losing weight on his own in the past and he feels he could do this again if he has physical therapy. He thinks that he cannot have physical therapy because of the prior wound cultures of MRSA. He does not have an active infection at this point and he does not need to isolate just because of his history of MRSA and from my standpoint he is able to have physical therapy. I see no contraindications from my standpoint for him to have his carpal tunnel surgery. RTO for any new issues and after weight loss.  Spoke to TRACEY Chun on 7/28/24 - Pt was recently seen in [facility] ED for an incisional hernia with partial small bowel obstruction. Hernia was reduced at bedside. Pt was advised to f/u with Dr. Chowdhury outedwin. He called the office reporting intermittent abdominal pain that "comes and goes." At present, he does not have any pain. He is tolerating PO without n/v. He has a BM this morning and is passing gas. He was given an appointment with Dr. Chowdhury this Wednesday. He agreed to call the office immediately if he develops any acute issue.      [FreeTextEntry1] : Del is a 60 y/o male being seen for a follow-up visit  Colonoscopy 6/27/19 (pre-op) - The anus, splenic flexure, transverse colon, hepatic flexure, ascending colon and cecum are normal.  Petechia(e) in the proximal rectum, in the mid rectum and in the distal rectum, consistent with diversion proctitis.  One benign appearing 2 mm polyp in the rectum, removed.  Diverticulosis in the proximal sigmoid colon.    S/p Open John's resection, 3/12/19 due to sigmoid colon mass with LBO  s/p Open John's reversal and repair of incisional parastomal hernia 6/28/19  Two cardiac stents placed in 2020 July.  S/P Incisional hernia repair with mesh with myofascial flap advancement, preservation of neurovascular bundles along with a one-sided anterior component separation on 8/2/21 by Dr. Yarbrough.  CT A&P 9/27/22 - Non drainable intra abdominal or abdominal wall collection,. Interval resolution of air and fluid with in the abdominal incision and region of hernia repair. There is a right paraumbilical ventral hernia again containing un obstructed small bowel.  [facility] ED 7/28/24 with c/o abdominal pain at site of incisioinal hernia  CT A/P 7/28/24 - Right periumbilical hernia with findings are concerning for early/developing small bowel obstruction.  Last seen 1/11/23 - Patient with recurrent incisional hernia morbid obesity and chronic discharge from his midline abdominal scar. He has multiple other medical problems essentially all weight related. He has been successful in losing weight on his own in the past and he feels he could do this again if he has physical therapy. He thinks that he cannot have physical therapy because of the prior wound cultures of MRSA. He does not have an active infection at this point and he does not need to isolate just because of his history of MRSA and from my standpoint he is able to have physical therapy. I see no contraindications from my standpoint for him to have his carpal tunnel surgery. RTO for any new issues and after weight loss.

## 2024-09-06 NOTE — HISTORY OF PRESENT ILLNESS
[de-identified] : Del is a 58 y/o male here for a follow up visit.   In ED on 7/28/24 due to incisional hernia with partial small bowel obstruction. Hernia was reduced at bedside.   CT A+P on 7/28/24 - Right periumbilical hernia with findings are concerning for early/developing small bowel obstruction.  S/p Open John's reversal, repair of incisional parastomal hernia 6/28/19 by Dr. Steni.  S/P Incisional hernia repair with mesh with myofascial flap advancement, preservation of neurovascular bundles along with a one-sided anterior component separation on 8/2/21 by Dr. Yarbrough.  Two cardiac stents placed in 2020 July.  Last seen on 1/11/23 - Patient with recurrent incisional hernia morbid obesity and chronic discharge from his midline abdominal scar. He has multiple other medical problems essentially all weight related. He has been successful in losing weight on his own in the past and he feels he could do this again if he has physical therapy. He thinks that he cannot have physical therapy because of the prior wound cultures of MRSA. He does not have an active infection at this point and he does not need to isolate just because of his history of MRSA and from my standpoint he is able to have physical therapy. I see no contraindications from my standpoint for him to have his carpal tunnel surgery. RTO for any new issues and after weight loss.  Spoke to TRACEY Chun on 7/28/24 - Pt was recently seen in [facility] ED for an incisional hernia with partial small bowel obstruction. Hernia was reduced at bedside. Pt was advised to f/u with Dr. Chowdhury outedwin. He called the office reporting intermittent abdominal pain that "comes and goes." At present, he does not have any pain. He is tolerating PO without n/v. He has a BM this morning and is passing gas. He was given an appointment with Dr. Chowdhury this Wednesday. He agreed to call the office immediately if he develops any acute issue.      [FreeTextEntry1] : Del is a 60 y/o male being seen for a follow-up visit  Colonoscopy 6/27/19 (pre-op) - The anus, splenic flexure, transverse colon, hepatic flexure, ascending colon and cecum are normal.  Petechia(e) in the proximal rectum, in the mid rectum and in the distal rectum, consistent with diversion proctitis.  One benign appearing 2 mm polyp in the rectum, removed.  Diverticulosis in the proximal sigmoid colon.    S/p Open John's resection, 3/12/19 due to sigmoid colon mass with LBO  s/p Open John's reversal and repair of incisional parastomal hernia 6/28/19  Two cardiac stents placed in 2020 July.  S/P Incisional hernia repair with mesh with myofascial flap advancement, preservation of neurovascular bundles along with a one-sided anterior component separation on 8/2/21 by Dr. Yarbrough.  CT A&P 9/27/22 - Non drainable intra abdominal or abdominal wall collection,. Interval resolution of air and fluid with in the abdominal incision and region of hernia repair. There is a right paraumbilical ventral hernia again containing un obstructed small bowel.  [facility] ED 7/28/24 with c/o abdominal pain at site of incisioinal hernia  CT A/P 7/28/24 - Right periumbilical hernia with findings are concerning for early/developing small bowel obstruction.  Last seen 1/11/23 - Patient with recurrent incisional hernia morbid obesity and chronic discharge from his midline abdominal scar. He has multiple other medical problems essentially all weight related. He has been successful in losing weight on his own in the past and he feels he could do this again if he has physical therapy. He thinks that he cannot have physical therapy because of the prior wound cultures of MRSA. He does not have an active infection at this point and he does not need to isolate just because of his history of MRSA and from my standpoint he is able to have physical therapy. I see no contraindications from my standpoint for him to have his carpal tunnel surgery. RTO for any new issues and after weight loss.

## 2024-09-17 ENCOUNTER — APPOINTMENT (OUTPATIENT)
Dept: HUMAN REPRODUCTION | Facility: CLINIC | Age: 59
End: 2024-09-17

## 2025-01-24 NOTE — H&P PST ADULT - PRO ANTICIPATED DISCH DISP
home

## 2025-01-28 ENCOUNTER — APPOINTMENT (OUTPATIENT)
Dept: INTERNAL MEDICINE | Facility: CLINIC | Age: 60
End: 2025-01-28
Payer: MEDICARE

## 2025-01-28 ENCOUNTER — NON-APPOINTMENT (OUTPATIENT)
Age: 60
End: 2025-01-28

## 2025-01-28 VITALS
TEMPERATURE: 97.5 F | HEIGHT: 72 IN | SYSTOLIC BLOOD PRESSURE: 161 MMHG | OXYGEN SATURATION: 95 % | DIASTOLIC BLOOD PRESSURE: 108 MMHG | HEART RATE: 97 BPM | WEIGHT: 315 LBS | RESPIRATION RATE: 15 BRPM | BODY MASS INDEX: 42.66 KG/M2

## 2025-01-28 DIAGNOSIS — S30.1XXA CONTUSION OF ABDOMINAL WALL, INITIAL ENCOUNTER: ICD-10-CM

## 2025-01-28 DIAGNOSIS — Z00.00 ENCOUNTER FOR GENERAL ADULT MEDICAL EXAMINATION W/OUT ABNORMAL FINDINGS: ICD-10-CM

## 2025-01-28 DIAGNOSIS — E78.5 HYPERLIPIDEMIA, UNSPECIFIED: ICD-10-CM

## 2025-01-28 DIAGNOSIS — K43.2 INCISIONAL HERNIA W/OUT OBSTRUCTION OR GANGRENE: ICD-10-CM

## 2025-01-28 DIAGNOSIS — I26.99 OTHER PULMONARY EMBOLISM W/OUT ACUTE COR PULMONALE: ICD-10-CM

## 2025-01-28 DIAGNOSIS — R06.02 SHORTNESS OF BREATH: ICD-10-CM

## 2025-01-28 DIAGNOSIS — I89.0 LYMPHEDEMA, NOT ELSEWHERE CLASSIFIED: ICD-10-CM

## 2025-01-28 PROCEDURE — 93000 ELECTROCARDIOGRAM COMPLETE: CPT

## 2025-01-28 PROCEDURE — 99213 OFFICE O/P EST LOW 20 MIN: CPT | Mod: 25

## 2025-01-28 PROCEDURE — 99396 PREV VISIT EST AGE 40-64: CPT

## 2025-01-29 LAB
25(OH)D3 SERPL-MCNC: 9 NG/ML
ALBUMIN SERPL ELPH-MCNC: 3.8 G/DL
ALP BLD-CCNC: 85 U/L
ALT SERPL-CCNC: 12 U/L
ANION GAP SERPL CALC-SCNC: 18 MMOL/L
AST SERPL-CCNC: 13 U/L
BILIRUB SERPL-MCNC: 0.2 MG/DL
BUN SERPL-MCNC: 17 MG/DL
CALCIUM SERPL-MCNC: 9.4 MG/DL
CHLORIDE SERPL-SCNC: 103 MMOL/L
CHOLEST SERPL-MCNC: 225 MG/DL
CO2 SERPL-SCNC: 20 MMOL/L
CREAT SERPL-MCNC: 1.09 MG/DL
EGFR: 78 ML/MIN/1.73M2
GLUCOSE SERPL-MCNC: 72 MG/DL
HCT VFR BLD CALC: 42.3 %
HDLC SERPL-MCNC: 51 MG/DL
HGB BLD-MCNC: 12.3 G/DL
LDLC SERPL CALC-MCNC: 146 MG/DL
MCHC RBC-ENTMCNC: 24.2 PG
MCHC RBC-ENTMCNC: 29.1 G/DL
MCV RBC AUTO: 83.3 FL
NONHDLC SERPL-MCNC: 174 MG/DL
PLATELET # BLD AUTO: 321 K/UL
POTASSIUM SERPL-SCNC: 4.8 MMOL/L
PROT SERPL-MCNC: 6.5 G/DL
PSA FREE FLD-MCNC: 19 %
PSA FREE SERPL-MCNC: 0.15 NG/ML
PSA SERPL-MCNC: 0.75 NG/ML
RBC # BLD: 5.08 M/UL
RBC # FLD: 15.9 %
SODIUM SERPL-SCNC: 141 MMOL/L
TRIGL SERPL-MCNC: 156 MG/DL
TSH SERPL-ACNC: 1.28 UIU/ML
WBC # FLD AUTO: 9.85 K/UL

## 2025-01-31 LAB
ESTIMATED AVERAGE GLUCOSE: 128 MG/DL
HBA1C MFR BLD HPLC: 6.1 %

## 2025-02-18 NOTE — PACU DISCHARGE NOTE - AIRWAY PATENCY:
[facility]  [address]  [facility] [address]  Dept: [phone]  Dept Fax: [phone]  Loc: [phone]      Visit type: New patient    Encounter Start Time: 3:02 PM EST  Encounter End Time: 3:34 PM EST     100% of the time was spent with the patient today, discussing their symptoms, conducting an examination, reviewing the patient's diagnostic test results, and counseling    Reason for Visit: New Patient (No concerns)      Assessment and Plan         Assessment & Plan  Smoking cessation  Uncontrolled  She has a history of smoking, starting at age 16, and currently smokes between half to a whole pack a day. She has expressed a desire to quit smoking but prefers not to use medication for cessation.  - She was encouraged to quit smoking and was informed about the potential benefits of medication in managing cravings and withdrawal symptoms.    Hypothyroidism  Uncontrolled  Her thyroid levels were found to be low during her last visit in January 2024, suggesting that her fatigue may be due to an excessive dosage of thyroid medication.  - She was advised to take her thyroid medication on an empty stomach, either 2 hours after eating or first thing in the morning.  - If her thyroid levels remain low, a reduction in her thyroid medication dosage may be necessary.    Weight management  Uncontrolled  She has been experiencing weight gain over the past 5 years, which she attributes to her thyroid condition and aging. She has a sedentary job and has expressed a desire to lose weight.  - She was advised to abstain from consuming high fructose corn syrup.  - She was also advised to gradually reduce her caffeine intake by one-third until complete cessation.  - She was advised to consider weight loss as a potential solution to her snoring issue.  - If her snoring persists despite weight loss efforts, a sleep study may be considered.    Seasonal allergies  Controlled  She has been 
Satisfactory

## 2025-02-24 ENCOUNTER — APPOINTMENT (OUTPATIENT)
Dept: CARDIOLOGY | Facility: CLINIC | Age: 60
End: 2025-02-24

## 2025-03-02 NOTE — DISCHARGE NOTE NURSING/CASE MANAGEMENT/SOCIAL WORK - HAS THE PATIENT USED TOBACCO IN THE PAST 30 DAYS?
Refill Routing Note   Medication(s) are not appropriate for processing by [facility] for the following reason(s):        Drug-disease interaction    Required labs outdated    [facility] action(s):  Defer     Requires labs : Yes           Appointments  past 12m or future 3m with PCP    Date Provider   Last Visit   8/1/2024 Tod Marie DO   Next Visit   6/4/2025 Tod Marie,    ED visits in past 90 days: 0        Note composed:2:49 PM 03/02/2025           Yes

## 2025-03-17 NOTE — DISCHARGE NOTE PROVIDER - NSDCQMBETA_CARD_A_CORE
PROCEDURE NOTE  Date: 3/17/2025   Name: Lexie Mireles  YOB: 1971    Procedures    EEG REPORT     CLINICAL NEUROPHYSIOLOGY LABORATORY  DEPARTMENT OF NEUROLOGY  [facility]       Patient: Lexie Mireles Age: 53 y.o.  MRN: [id number]      Referring Provider: No ref. provider found    History: This routine 30 minute scalp EEG was recorded with video- monitoring for a 53 y.o.. female  who presented with encephalopathy. This EEG was performed to evaluate for focal and epileptiform abnormalities.     Lexie Mireles   Current Facility-Administered Medications   Medication Dose Route Frequency Provider Last Rate Last Admin    labetalol (NORMODYNE;TRANDATE) injection 10 mg  10 mg IntraVENous Q6H PRN Taylor Pompa MD        acetaminophen (TYLENOL) tablet 650 mg  650 mg Oral Q4H PRN Evert Ramirez MD   650 mg at 03/17/25 1613    aspirin EC tablet 81 mg  81 mg Oral Daily Carolina Mobley MD   81 mg at 03/17/25 0842    albuterol sulfate HFA (PROVENTIL;VENTOLIN;PROAIR) 108 (90 Base) MCG/ACT inhaler 2 puff  2 puff Inhalation Q6H PRN Carolina Mobley MD        albuterol (PROVENTIL) (2.5 MG/3ML) 0.083% nebulizer solution 2.5 mg  2.5 mg Nebulization 4x Daily PRN Carolina Mobley MD        amLODIPine (NORVASC) tablet 10 mg  10 mg Oral Daily Taylor Pompa MD        [Held by provider] glipiZIDE (GLUCOTROL) tablet 5 mg  5 mg Oral BID AC Carolina Mobley MD        insulin glargine (LANTUS) injection vial 10 Units  10 Units SubCUTAneous BID Carolina Mobley MD   10 Units at 03/17/25 0842    [Held by provider] Semaglutide TABS 7 mg (Patient Supplied)  7 mg Oral QAM Carolina Teague MD        vitamin D (ERGOCALCIFEROL) capsule 50,000 Units  50,000 Units Oral Weekly Carolina Mobley MD   50,000 Units at 03/16/25 0935    sodium chloride flush 0.9 % injection 5-40 mL  5-40 mL IntraVENous 2 times per day Carolina Mobley MD   10 mL at 03/17/25 0842    sodium chloride flush 0.9 % injection 5-40 mL  5-40 mL IntraVENous PRN Itz,  MD Carolina        0.9 % sodium chloride infusion   IntraVENous PRN Carolina Mobley MD        ondansetron (ZOFRAN-ODT) disintegrating tablet 4 mg  4 mg Oral Q8H PRN Carolina Mobley MD        Or    ondansetron (ZOFRAN) injection 4 mg  4 mg IntraVENous Q6H PRN Carolina Mobley MD        polyethylene glycol (GLYCOLAX) packet 17 g  17 g Oral Daily PRN Carolina Mobley MD        enoxaparin (LOVENOX) injection 40 mg  40 mg SubCUTAneous Daily Carolina Mobley MD   40 mg at 03/17/25 0842    sodium chloride 0.9 % bolus 500 mL  500 mL IntraVENous Once Carolina Mobley MD        gabapentin (NEURONTIN) capsule 300 mg  300 mg Oral TID Carolina Mobley MD   300 mg at 03/17/25 1354    insulin lispro (HUMALOG,ADMELOG) injection vial 0-8 Units  0-8 Units SubCUTAneous 4x Daily AC & HS Carolina Mobley MD   2 Units at 03/17/25 1238    glucose chewable tablet 16 g  4 tablet Oral PRN Carolina Mobley MD        dextrose bolus 10% 125 mL  125 mL IntraVENous PRN Carolina Mobley MD        Or    dextrose bolus 10% 250 mL  250 mL IntraVENous PRN Carolina Mobley MD        glucagon injection 1 mg  1 mg SubCUTAneous PRN Carolina Mobley MD        dextrose 10 % infusion   IntraVENous Continuous PRN Carolina Mobley MD            Technical Description: This is a 21 channel digital EEG recording with time-locked video. Electrodes were placed in accordance with the 10-20 International System of Electrode Placement. Single lead EKG monitoring as well as temporal electrodes were included.    The patient was not sleep deprived. This recording was obtained during wakefulness.    EEG Description: The dominant background activity during maximal recorded wakefulness consisted of bioccipitally dominant 9 Hz, 25-35 uV symmetric, regular activity that was reactive to eye opening. During drowsiness, the background rhythm waxed and waned and there were periods of slowing. During stage II sleep symmetric V waves, K complexes, and sleep spindles were seen. There was  No, not prescribed...

## 2025-07-17 NOTE — PHYSICAL THERAPY INITIAL EVALUATION ADULT - MD/RN NOTIFIED
Patient Education   Preventive Care Advice   This is general advice given by our system to help you stay healthy. However, your care team may have specific advice just for you. Please talk to your care team about your preventive care needs.  Nutrition  Eat 5 or more servings of fruits and vegetables each day.  Try wheat bread, brown rice and whole grain pasta (instead of white bread, rice, and pasta).  Get enough calcium and vitamin D. Check the label on foods and aim for 100% of the RDA (recommended daily allowance).  Lifestyle  Exercise at least 150 minutes each week  (30 minutes a day, 5 days a week).  Do muscle strengthening activities 2 days a week. These help control your weight and prevent disease.  No smoking.  Wear sunscreen to prevent skin cancer.  Have a dental exam and cleaning every 6 months.  Yearly exams  See your health care team every year to talk about:  Any changes in your health.  Any medicines your care team has prescribed.  Preventive care, family planning, and ways to prevent chronic diseases.  Shots (vaccines)   HPV shots (up to age 26), if you've never had them before.  Hepatitis B shots (up to age 59), if you've never had them before.  COVID-19 shot: Get this shot when it's due.  Flu shot: Get a flu shot every year.  Tetanus shot: Get a tetanus shot every 10 years.  Pneumococcal, hepatitis A, and RSV shots: Ask your care team if you need these based on your risk.  Shingles shot (for age 50 and up)  General health tests  Diabetes screening:  Starting at age 35, Get screened for diabetes at least every 3 years.  If you are younger than age 35, ask your care team if you should be screened for diabetes.  Cholesterol test: At age 39, start having a cholesterol test every 5 years, or more often if advised.  Bone density scan (DEXA): At age 50, ask your care team if you should have this scan for osteoporosis (brittle bones).  Hepatitis C: Get tested at least once in your life.  STIs (sexually  transmitted infections)  Before age 24: Ask your care team if you should be screened for STIs.  After age 24: Get screened for STIs if you're at risk. You are at risk for STIs (including HIV) if:  You are sexually active with more than one person.  You don't use condoms every time.  You or a partner was diagnosed with a sexually transmitted infection.  If you are at risk for HIV, ask about PrEP medicine to prevent HIV.  Get tested for HIV at least once in your life, whether you are at risk for HIV or not.  Cancer screening tests  Cervical cancer screening: If you have a cervix, begin getting regular cervical cancer screening tests starting at age 21.  Breast cancer scan (mammogram): If you've ever had breasts, begin having regular mammograms starting at age 40. This is a scan to check for breast cancer.  Colon cancer screening: It is important to start screening for colon cancer at age 45.  Have a colonoscopy test every 10 years (or more often if you're at risk) Or, ask your provider about stool tests like a FIT test every year or Cologuard test every 3 years.  To learn more about your testing options, visit:   .  For help making a decision, visit:   https://bit.ly/nw71687.  Prostate cancer screening test: If you have a prostate, ask your care team if a prostate cancer screening test (PSA) at age 55 is right for you.  Lung cancer screening: If you are a current or former smoker ages 50 to 80, ask your care team if ongoing lung cancer screenings are right for you.  For informational purposes only. Not to replace the advice of your health care provider. Copyright   2023 [facility] [employer]. All rights reserved. Clinically reviewed by the [facility]. [employer] 135160 - REV 01/24.  Hearing Loss: Care Instructions  Overview     Hearing loss is a sudden or slow decrease in how well you hear. It can range from slight to profound. Permanent hearing loss can occur with aging. It also can  happen when you are exposed long-term to loud noise. Examples include listening to loud music, riding motorcycles, or being around other loud machines.  Hearing loss can affect your work and home life. It can make you feel lonely or depressed. You may feel that you have lost your independence. But hearing aids and other devices can help you hear better and feel connected to others.  Follow-up care is a key part of your treatment and safety. Be sure to make and go to all appointments, and call your doctor if you are having problems. It's also a good idea to know your test results and keep a list of the medicines you take.  How can you care for yourself at home?  Avoid loud noises whenever possible. This helps keep your hearing from getting worse.  Always wear hearing protection around loud noises.  Wear a hearing aid as directed.  A professional can help you pick a hearing aid that will work best for you.  You can also get hearing aids over the counter for mild to moderate hearing loss.  Have hearing tests as your doctor suggests. They can show whether your hearing has changed. Your hearing aid may need to be adjusted.  Use other devices as needed. These may include:  Telephone amplifiers and hearing aids that can connect to a television, stereo, radio, or microphone.  Devices that use lights or vibrations. These alert you to the doorbell, a ringing telephone, or a baby monitor.  Television closed-captioning. This shows the words at the bottom of the screen. Most new TVs can do this.  TTY (text telephone). This lets you type messages back and forth on the telephone instead of talking or listening. These devices are also called TDD. When messages are typed on the keyboard, they are sent over the phone line to a receiving TTY. The message is shown on a monitor.  Use text messaging, social media, and email if it is hard for you to communicate by telephone.  Try to learn a listening technique called speechreading. It is  "not lipreading. You pay attention to people's gestures, expressions, posture, and tone of voice. These clues can help you understand what a person is saying. Face the person you are talking to, and have them face you. Make sure the lighting is good. You need to see the other person's face clearly.  Think about counseling if you need help to adjust to your hearing loss.  When should you call for help?  Watch closely for changes in your health, and be sure to contact your doctor if:    You think your hearing is getting worse.     You have new symptoms, such as dizziness or nausea.   Where can you learn more?  Go to https://www.[employer].net/patiented  Enter R798 in the search box to learn more about \"Hearing Loss: Care Instructions.\"  Current as of: October 27, 2024  Content Version: 14.5    4856-3596 [employer].   Care instructions adapted under license by your healthcare professional. If you have questions about a medical condition or this instruction, always ask your healthcare professional. [employer] disclaims any warranty or liability for your use of this information.    Learning About Sleeping Well  What does sleeping well mean?     Sleeping well means getting enough sleep to feel good and stay healthy. How much sleep is enough varies among people.  The number of hours you sleep and how you feel when you wake up are both important. If you do not feel refreshed, you probably need more sleep. Another sign of not getting enough sleep is feeling tired during the day.  Experts recommend that adults get at least 7 or more hours of sleep per day. Children and older adults need more sleep.  Why is getting enough sleep important?  Getting enough quality sleep is a basic part of good health. When your sleep suffers, your physical health, mood, and your thoughts can suffer too. You may find yourself feeling more grumpy or stressed. Not getting enough sleep also can lead to serious problems, " "including injury, accidents, anxiety, and depression.  What might cause poor sleeping?  Many things can cause sleep problems, including:  Changes to your sleep schedule.  Stress. Stress can be caused by fear about a single event, such as giving a speech. Or you may have ongoing stress, such as worry about work or school.  Depression, anxiety, and other mental or emotional conditions.  Changes in your sleep habits or surroundings. This includes changes that happen where you sleep, such as noise, light, or sleeping in a different bed. It also includes changes in your sleep pattern, such as having jet lag or working a late shift.  Health problems, such as pain, breathing problems, and restless legs syndrome.  Lack of regular exercise.  Using alcohol, nicotine, or caffeine before bed.  How can you help yourself?  Here are some tips that may help you sleep more soundly and wake up feeling more refreshed.  Your sleeping area   Use your bedroom only for sleeping and sex. A bit of light reading may help you fall asleep. But if it doesn't, do your reading elsewhere in the house. Try not to use your TV, computer, smartphone, or tablet while you are in bed.  Be sure your bed is big enough to stretch out comfortably, especially if you have a sleep partner.  Keep your bedroom quiet, dark, and cool. Use curtains, blinds, or a sleep mask to block out light. To block out noise, use earplugs, soothing music, or a \"white noise\" machine.  Your evening and bedtime routine   Create a relaxing bedtime routine. You might want to take a warm shower or bath, or listen to soothing music.  Go to bed at the same time every night. And get up at the same time every morning, even if you feel tired.  What to avoid   Limit caffeine (coffee, tea, caffeinated sodas) during the day, and don't have any for at least 6 hours before bedtime.  Avoid drinking alcohol before bedtime. Alcohol can cause you to wake up more often during the night.  Try not to " "smoke or use tobacco, especially in the evening. Nicotine can keep you awake.  Limit naps during the day, especially close to bedtime.  Avoid lying in bed awake for too long. If you can't fall asleep or if you wake up in the middle of the night and can't get back to sleep within about 20 minutes, get out of bed and go to another room until you feel sleepy.  Avoid taking medicine right before bed that may keep you awake or make you feel hyper or energized. Your doctor can tell you if your medicine may do this and if you can take it earlier in the day.  If you can't sleep   Imagine yourself in a peaceful, pleasant scene. Focus on the details and feelings of being in a place that is relaxing.  Get up and do a quiet or boring activity until you feel sleepy.  Avoid drinking any liquids before going to bed to help prevent waking up often to use the bathroom.  Where can you learn more?  Go to https://www.[employer].net/patiented  Enter J942 in the search box to learn more about \"Learning About Sleeping Well.\"  Current as of: July 31, 2024  Content Version: 14.5    2581-7937 [employer].   Care instructions adapted under license by your healthcare professional. If you have questions about a medical condition or this instruction, always ask your healthcare professional. [employer] disclaims any warranty or liability for your use of this information.    Bladder Training: Care Instructions  Your Care Instructions     Bladder training is used to treat urge incontinence and stress incontinence. Urge incontinence means that the need to urinate comes on so fast that you can't get to a toilet in time. Stress incontinence means that you leak urine because of pressure on your bladder. For example, it may happen when you laugh, cough, or lift something heavy.  Bladder training can increase how long you can wait before you have to urinate. It can also help your bladder hold more urine. And it can give you better " control over the urge to urinate.  It is important to remember that bladder training takes a few weeks to a few months to make a difference. You may not see results right away, but don't give up.  Follow-up care is a key part of your treatment and safety. Be sure to make and go to all appointments, and call your doctor if you are having problems. It's also a good idea to know your test results and keep a list of the medicines you take.  How can you care for yourself at home?  Work with your doctor to come up with a bladder training program that is right for you. You may use one or more of the following methods.  Delayed urination  In the beginning, try to keep from urinating for 5 minutes after you first feel the need to go.  While you wait, take deep, slow breaths to relax. Kegel exercises can also help you delay the need to go to the bathroom.  After some practice, when you can easily wait 5 minutes to urinate, try to wait 10 minutes before you urinate.  Slowly increase the waiting period until you are able to control when you have to urinate.  Scheduled urination  Empty your bladder when you first wake up in the morning.  Schedule times throughout the day when you will urinate.  Start by going to the bathroom every hour, even if you don't need to go.  Slowly increase the time between trips to the bathroom.  When you have found a schedule that works well for you, keep doing it.  If you wake up during the night and have to urinate, do it. Apply your schedule to waking hours only.  Kegel exercises  These tighten and strengthen pelvic muscles, which can help you control the flow of urine. (If doing these exercises causes pain, stop doing them and talk with your doctor.) To do Kegel exercises:  Squeeze your muscles as if you were trying not to pass gas. Or squeeze your muscles as if you were stopping the flow of urine. Your belly, legs, and buttocks shouldn't move.  Hold the squeeze for 3 seconds, then relax for 5 to  "10 seconds.  Start with 3 seconds, then add 1 second each week until you are able to squeeze for 10 seconds.  Repeat the exercise 10 times a session. Do 3 to 8 sessions a day.  When should you call for help?  Watch closely for changes in your health, and be sure to contact your doctor if:    Your incontinence is getting worse.     You do not get better as expected.   Where can you learn more?  Go to https://www.[employer].net/patiented  Enter V684 in the search box to learn more about \"Bladder Training: Care Instructions.\"  Current as of: April 30, 2024  Content Version: 14.5    1501-9251 [employer].   Care instructions adapted under license by your healthcare professional. If you have questions about a medical condition or this instruction, always ask your healthcare professional. [employer] disclaims any warranty or liability for your use of this information.           " yes

## (undated) DEVICE — GOWN TRIMAX LG

## (undated) DEVICE — PACK UPPER EXTREMITY

## (undated) DEVICE — TOURNIQUET ESMARK 4"

## (undated) DEVICE — PACK MAJOR ABDOMINAL SUPINE

## (undated) DEVICE — GLV 7.5 PROTEXIS (BLUE)

## (undated) DEVICE — BLADE SCALPEL SAFETYLOCK #15

## (undated) DEVICE — GLV 8.5 PROTEXIS (WHITE)

## (undated) DEVICE — DRSG CURITY GAUZE SPONGE 4 X 4" 12-PLY

## (undated) DEVICE — GLV 7 PROTEXIS (WHITE)

## (undated) DEVICE — SYR LUER LOK 10CC

## (undated) DEVICE — WARMING BLANKET UPPER ADULT

## (undated) DEVICE — ABDOMINAL BINDER XL 9" X 62"-74"

## (undated) DEVICE — NDL HYPO REGULAR BEVEL 25G X 1.5" (BLUE)

## (undated) DEVICE — STAPLER SKIN VISI-STAT 35 WIDE

## (undated) DEVICE — BLADE SCALPEL SAFETYLOCK #10

## (undated) DEVICE — LONE STAR ELASTIC STAY HOOK 5MM SHARP

## (undated) DEVICE — SOL IRR POUR NS 0.9% 500ML

## (undated) DEVICE — DRAPE TOWEL BLUE 17" X 24"

## (undated) DEVICE — DRSG WEBRIL 3"

## (undated) DEVICE — VENODYNE/SCD SLEEVE CALF MEDIUM

## (undated) DEVICE — VENODYNE/SCD SLEEVE CALF LARGE

## (undated) DEVICE — DRSG TEGADERM 6"X8"

## (undated) DEVICE — ABDOMINAL BINDER MED/LG 12" X 45"-62"

## (undated) DEVICE — MEDICATION LABELS W MARKER

## (undated) DEVICE — SOL IRR POUR H2O 250ML

## (undated) DEVICE — DRAPE INSTRUMENT POUCH 6.75" X 11"

## (undated) DEVICE — SOL IRR POUR H2O 1500ML

## (undated) DEVICE — GLV 6.5 PROTEXIS (WHITE)

## (undated) DEVICE — GLV 7.5 PROTEXIS (WHITE)

## (undated) DEVICE — DRSG STERISTRIPS 0.5 X 4"

## (undated) DEVICE — SPECIMEN CONTAINER PET

## (undated) DEVICE — SPECIMEN CONTAINER 100ML

## (undated) DEVICE — NDL HYPO REGULAR BEVEL 22G X 1.5" (TURQUOISE)

## (undated) DEVICE — POSITIONER FOAM HEAD CRADLE (PINK)

## (undated) DEVICE — POSITIONER FOAM EGG CRATE ULNAR 2PCS (PINK)

## (undated) DEVICE — POSITIONER STRAP ARMBOARD VELCRO TS-30

## (undated) DEVICE — WARMING BLANKET FULL ADULT

## (undated) DEVICE — STAPLER COVIDIEN ENDO GIA SHORT HANDLE

## (undated) DEVICE — LIGASURE IMPACT

## (undated) DEVICE — MARKING PEN W RULER

## (undated) DEVICE — GLV 8 PROTEXIS (WHITE)

## (undated) DEVICE — LONE STAR RETRACTOR SQUARE 14.1CMX14.1CM DISP

## (undated) DEVICE — DRSG ACE BANDAGE 4"

## (undated) DEVICE — DRAPE SURGICAL #1010

## (undated) DEVICE — DRAPE 3/4 SHEET 52X76"

## (undated) DEVICE — TOURNIQUET CUFF 18" DUAL PORT SINGLE BLADDER W PLC  (BLACK)

## (undated) DEVICE — SUT MONOSOF 5-0 18" P-12